# Patient Record
Sex: FEMALE | Race: WHITE | NOT HISPANIC OR LATINO
[De-identification: names, ages, dates, MRNs, and addresses within clinical notes are randomized per-mention and may not be internally consistent; named-entity substitution may affect disease eponyms.]

---

## 2019-02-13 ENCOUNTER — APPOINTMENT (OUTPATIENT)
Age: 34
End: 2019-02-13

## 2019-02-15 ENCOUNTER — ASOB RESULT (OUTPATIENT)
Age: 34
End: 2019-02-15

## 2019-02-15 ENCOUNTER — APPOINTMENT (OUTPATIENT)
Dept: ANTEPARTUM | Facility: CLINIC | Age: 34
End: 2019-02-15
Payer: MEDICAID

## 2019-02-15 PROCEDURE — 76817 TRANSVAGINAL US OBSTETRIC: CPT

## 2019-03-20 ENCOUNTER — APPOINTMENT (OUTPATIENT)
Age: 34
End: 2019-03-20

## 2019-03-20 ENCOUNTER — APPOINTMENT (OUTPATIENT)
Dept: MATERNAL FETAL MEDICINE | Facility: CLINIC | Age: 34
End: 2019-03-20

## 2019-03-22 ENCOUNTER — APPOINTMENT (OUTPATIENT)
Dept: ANTEPARTUM | Facility: CLINIC | Age: 34
End: 2019-03-22

## 2019-04-03 ENCOUNTER — ASOB RESULT (OUTPATIENT)
Age: 34
End: 2019-04-03

## 2019-04-03 ENCOUNTER — APPOINTMENT (OUTPATIENT)
Dept: ANTEPARTUM | Facility: CLINIC | Age: 34
End: 2019-04-03
Payer: MEDICAID

## 2019-04-03 ENCOUNTER — APPOINTMENT (OUTPATIENT)
Dept: MATERNAL FETAL MEDICINE | Facility: CLINIC | Age: 34
End: 2019-04-03
Payer: MEDICAID

## 2019-04-03 VITALS
HEIGHT: 63 IN | OXYGEN SATURATION: 98 % | BODY MASS INDEX: 19.76 KG/M2 | RESPIRATION RATE: 16 BRPM | DIASTOLIC BLOOD PRESSURE: 56 MMHG | SYSTOLIC BLOOD PRESSURE: 88 MMHG | HEART RATE: 88 BPM | WEIGHT: 111.5 LBS

## 2019-04-03 DIAGNOSIS — Z87.898 PERSONAL HISTORY OF OTHER SPECIFIED CONDITIONS: ICD-10-CM

## 2019-04-03 DIAGNOSIS — Z86.19 PERSONAL HISTORY OF OTHER INFECTIOUS AND PARASITIC DISEASES: ICD-10-CM

## 2019-04-03 DIAGNOSIS — Z87.51 PERSONAL HISTORY OF PRE-TERM LABOR: ICD-10-CM

## 2019-04-03 DIAGNOSIS — Z82.79 FAMILY HISTORY OF OTHER CONGENITAL MALFORMATIONS, DEFORMATIONS AND CHROMOSOMAL ABNORMALITIES: ICD-10-CM

## 2019-04-03 DIAGNOSIS — Z87.59 PERSONAL HISTORY OF OTHER COMPLICATIONS OF PREGNANCY, CHILDBIRTH AND THE PUERPERIUM: ICD-10-CM

## 2019-04-03 PROCEDURE — 76815 OB US LIMITED FETUS(S): CPT

## 2019-04-03 PROCEDURE — 99205 OFFICE O/P NEW HI 60 MIN: CPT

## 2019-04-03 NOTE — DISCUSSION/SUMMARY
[FreeTextEntry1] : Cori is aware of these risks. She will discuss with you at the next visit next week.\par \par She has an appointment with the hepatologist next month, and if she decides to continue the pregnancy, closer followup with them will be needed. \par \par She is scheduled to see us in 4 weeks if she decides to continue the pregnancy, however the risk of a poor maternal and/or fetal outcome is extremely high and termination of pregnancy should be considered.

## 2019-04-03 NOTE — HISTORY OF PRESENT ILLNESS
[FreeTextEntry1] : Cori has a poor OB history related to chronic liver disease. Her last pregnancy in 2010 was terminated due to the risks of pregnancy with her medical condition. \par \par She is currently 14 weeks pregnant, having conceived accidentally.\par \par Her prior pregnancies included a 28 week demise followed by a prolonged ICU stay for acute liver failure, a 16 week loss, an 8 week loss and the 6 week TOP for medical reasons. \par \par She presents to discuss the safety of this pregnancy

## 2019-04-03 NOTE — DATA REVIEWED
[FreeTextEntry1] : I reviewed her history with Cori and an . She is aware of the incredibly high risk of pregnancy with her medical condition. I undeerstand her desire to have a child, and although there is no guarantee either way, it is up to her to decide if the risks of the pregnancy are too great.\par \par I spoke with her hepatologist office in [facility] who confirms the presence of the cirrhosis and ascites. there is also a small lesion on the liver which may progress to hapatic cancer.  There are varices noted, and with the increased portal pressure in the second half of pregnancy these would significantly worsen.\par \par The very real liklihood is that there could be irreversible worsening of her liver status with any continuation of pregnancy, regardless of outcome.  Given her history of 2nd and 3rd trimester loss, there is a high liklihood of poor OB outcome in addition to maternal risks.

## 2019-04-23 ENCOUNTER — INPATIENT (INPATIENT)
Facility: HOSPITAL | Age: 34
LOS: 0 days | Discharge: HOME | DRG: 832 | End: 2019-04-24
Attending: OBSTETRICS & GYNECOLOGY | Admitting: OBSTETRICS & GYNECOLOGY
Payer: MEDICAID

## 2019-04-23 VITALS
TEMPERATURE: 97 F | OXYGEN SATURATION: 99 % | RESPIRATION RATE: 20 BRPM | HEIGHT: 61.02 IN | SYSTOLIC BLOOD PRESSURE: 100 MMHG | HEART RATE: 99 BPM | WEIGHT: 145.06 LBS | DIASTOLIC BLOOD PRESSURE: 68 MMHG

## 2019-04-23 LAB
ALBUMIN SERPL ELPH-MCNC: 3.7 G/DL — SIGNIFICANT CHANGE UP (ref 3.3–5.2)
ALP SERPL-CCNC: 100 U/L — SIGNIFICANT CHANGE UP (ref 40–120)
ALT FLD-CCNC: 11 U/L — SIGNIFICANT CHANGE UP
AMYLASE P1 CFR SERPL: 64 U/L — SIGNIFICANT CHANGE UP (ref 36–128)
ANION GAP SERPL CALC-SCNC: 12 MMOL/L — SIGNIFICANT CHANGE UP (ref 5–17)
APPEARANCE UR: CLEAR — SIGNIFICANT CHANGE UP
AST SERPL-CCNC: 20 U/L — SIGNIFICANT CHANGE UP
BACTERIA # UR AUTO: ABNORMAL
BASOPHILS # BLD AUTO: 0 K/UL — SIGNIFICANT CHANGE UP (ref 0–0.2)
BASOPHILS NFR BLD AUTO: 0.2 % — SIGNIFICANT CHANGE UP (ref 0–2)
BILIRUB SERPL-MCNC: 1.1 MG/DL — SIGNIFICANT CHANGE UP (ref 0.4–2)
BILIRUB UR-MCNC: NEGATIVE — SIGNIFICANT CHANGE UP
BUN SERPL-MCNC: 17 MG/DL — SIGNIFICANT CHANGE UP (ref 8–20)
CALCIUM SERPL-MCNC: 9.4 MG/DL — SIGNIFICANT CHANGE UP (ref 8.6–10.2)
CHLORIDE SERPL-SCNC: 103 MMOL/L — SIGNIFICANT CHANGE UP (ref 98–107)
CO2 SERPL-SCNC: 23 MMOL/L — SIGNIFICANT CHANGE UP (ref 22–29)
COLOR SPEC: YELLOW — SIGNIFICANT CHANGE UP
COMMENT - URINE: SIGNIFICANT CHANGE UP
COMMENT - URINE: SIGNIFICANT CHANGE UP
CREAT SERPL-MCNC: 0.35 MG/DL — LOW (ref 0.5–1.3)
DIFF PNL FLD: ABNORMAL
EOSINOPHIL # BLD AUTO: 0.1 K/UL — SIGNIFICANT CHANGE UP (ref 0–0.5)
EOSINOPHIL NFR BLD AUTO: 2.4 % — SIGNIFICANT CHANGE UP (ref 0–6)
EPI CELLS # UR: SIGNIFICANT CHANGE UP
GLUCOSE SERPL-MCNC: 95 MG/DL — SIGNIFICANT CHANGE UP (ref 70–115)
GLUCOSE UR QL: NEGATIVE MG/DL — SIGNIFICANT CHANGE UP
HCG UR QL: POSITIVE
HCT VFR BLD CALC: 34.4 % — LOW (ref 37–47)
HGB BLD-MCNC: 11.2 G/DL — LOW (ref 12–16)
HYALINE CASTS # UR AUTO: ABNORMAL /LPF
KETONES UR-MCNC: ABNORMAL
LEUKOCYTE ESTERASE UR-ACNC: ABNORMAL
LIDOCAIN IGE QN: 40 U/L — SIGNIFICANT CHANGE UP (ref 22–51)
LYMPHOCYTES # BLD AUTO: 1.1 K/UL — SIGNIFICANT CHANGE UP (ref 1–4.8)
LYMPHOCYTES # BLD AUTO: 21.5 % — SIGNIFICANT CHANGE UP (ref 20–55)
MCHC RBC-ENTMCNC: 29.5 PG — SIGNIFICANT CHANGE UP (ref 27–31)
MCHC RBC-ENTMCNC: 32.6 G/DL — SIGNIFICANT CHANGE UP (ref 32–36)
MCV RBC AUTO: 90.5 FL — SIGNIFICANT CHANGE UP (ref 81–99)
MONOCYTES # BLD AUTO: 0.4 K/UL — SIGNIFICANT CHANGE UP (ref 0–0.8)
MONOCYTES NFR BLD AUTO: 7 % — SIGNIFICANT CHANGE UP (ref 3–10)
NEUTROPHILS # BLD AUTO: 3.4 K/UL — SIGNIFICANT CHANGE UP (ref 1.8–8)
NEUTROPHILS NFR BLD AUTO: 67.9 % — SIGNIFICANT CHANGE UP (ref 37–73)
NITRITE UR-MCNC: NEGATIVE — SIGNIFICANT CHANGE UP
PH UR: 6 — SIGNIFICANT CHANGE UP (ref 5–8)
PLATELET # BLD AUTO: 125 K/UL — LOW (ref 150–400)
POTASSIUM SERPL-MCNC: 4 MMOL/L — SIGNIFICANT CHANGE UP (ref 3.5–5.3)
POTASSIUM SERPL-SCNC: 4 MMOL/L — SIGNIFICANT CHANGE UP (ref 3.5–5.3)
PROT SERPL-MCNC: 6.7 G/DL — SIGNIFICANT CHANGE UP (ref 6.6–8.7)
PROT UR-MCNC: 30 MG/DL
RBC # BLD: 3.8 M/UL — LOW (ref 4.4–5.2)
RBC # FLD: 14.2 % — SIGNIFICANT CHANGE UP (ref 11–15.6)
RBC CASTS # UR COMP ASSIST: ABNORMAL /HPF (ref 0–4)
SODIUM SERPL-SCNC: 138 MMOL/L — SIGNIFICANT CHANGE UP (ref 135–145)
SP GR SPEC: 1.03 — HIGH (ref 1.01–1.02)
UROBILINOGEN FLD QL: 8 MG/DL
WBC # BLD: 5 K/UL — SIGNIFICANT CHANGE UP (ref 4.8–10.8)
WBC # FLD AUTO: 5 K/UL — SIGNIFICANT CHANGE UP (ref 4.8–10.8)
WBC UR QL: SIGNIFICANT CHANGE UP

## 2019-04-23 PROCEDURE — 99285 EMERGENCY DEPT VISIT HI MDM: CPT

## 2019-04-23 RX ORDER — SODIUM CHLORIDE 9 MG/ML
500 INJECTION INTRAMUSCULAR; INTRAVENOUS; SUBCUTANEOUS ONCE
Qty: 0 | Refills: 0 | Status: COMPLETED | OUTPATIENT
Start: 2019-04-23 | End: 2019-04-23

## 2019-04-23 RX ADMIN — SODIUM CHLORIDE 500 MILLILITER(S): 9 INJECTION INTRAMUSCULAR; INTRAVENOUS; SUBCUTANEOUS at 23:13

## 2019-04-23 NOTE — ED PROVIDER NOTE - OBJECTIVE STATEMENT
34 y/o F with PMHx of cirrhosis (unsure what the cause is, denies EtOH use) presents to ED with multiple medical complaints. Is currently 18 weeks pregnant and is experiencing L sided abdominal pain and back pain. Associated brown spotting, but no significant bleeding. States she is also experiencing bilateral breast pain that she describes as "being stabbed by a knife". Was evaluated at the [facility], but was told to come to the ED for further evaluation. Denies fevers, chills, nausea, vomiting, diarrhea, dysuria, hematuria or difficulty breathing. NKDA. No further acute complaints at this time.

## 2019-04-23 NOTE — ED ADULT TRIAGE NOTE - CHIEF COMPLAINT QUOTE
c/o lower back pain and abd pain and b/l breast pain, c/o spotting brown, , approximately 18 wks pregnant, hx cirrhosis and HTN

## 2019-04-23 NOTE — ED PROVIDER NOTE - CLINICAL SUMMARY MEDICAL DECISION MAKING FREE TEXT BOX
18 weeks pregnant, spotting, no indication for pelvic exam, will check urine, does not appear jaundiced. Is also experiencing CELESTINA breast discomfort likely attributed to growing of breasts during pregnancy, no SOB or dyspnea.

## 2019-04-23 NOTE — ED PROVIDER NOTE - PROGRESS NOTE DETAILS
I performed the initial face to face bedside interview with this patient regarding history of present illness, review of symptoms and past medical, social and family history.  I completed an independent physical examination.  I was the initial provider who evaluated this patient.  The history, review of symptoms and examination was documented by the scribe in my presence and I attest to the accuracy of the documentation.  I have signed out the follow up of any pending tests (i.e. labs, radiological studies) to the PA.  I have discussed the patient’s plan of care and disposition with the PA. PA Note: PT evaluated by intake physician. HPI/PE/ROS as noted above. Will follow up plan per intake physician. PA Note: Ultrasound reading subchorionic/subamniotic hemorrhage, OBGYN resident called, will come see patient. PA Note: pt to be admitted per OBGYN team

## 2019-04-24 ENCOUNTER — TRANSCRIPTION ENCOUNTER (OUTPATIENT)
Age: 34
End: 2019-04-24

## 2019-04-24 VITALS
SYSTOLIC BLOOD PRESSURE: 93 MMHG | TEMPERATURE: 98 F | HEART RATE: 101 BPM | RESPIRATION RATE: 18 BRPM | DIASTOLIC BLOOD PRESSURE: 62 MMHG

## 2019-04-24 DIAGNOSIS — O41.8X90 OTHER SPECIFIED DISORDERS OF AMNIOTIC FLUID AND MEMBRANES, UNSPECIFIED TRIMESTER, NOT APPLICABLE OR UNSPECIFIED: ICD-10-CM

## 2019-04-24 LAB — HCG SERPL-ACNC: HIGH MIU/ML

## 2019-04-24 PROCEDURE — 76805 OB US >/= 14 WKS SNGL FETUS: CPT | Mod: 26

## 2019-04-24 NOTE — ED ADULT NURSE REASSESSMENT NOTE - NS ED NURSE REASSESS COMMENT FT1
Report given to 2 [facility] RN. Upon pt leaving ER noted to be hypotensive, see VS documentation. Pt with asymptomatic hypotension at this time, denies pain dizziness lightheadedness. OBGYN covering MD contacted. MD made aware of current blood pressure. As per MD pt to be started on LR bolus and transported up to [address] [place] [address] with most recent BP. Pt left ER safely in stretcher with transporter at this time. MD aware pt will be arriving on unit to reassess.
Assumed pt care 0240. VS as noted. Charting as noted. Pt lying in stretcher, no acute distress noted. Pt complains of LLQ, L Pelvic pain radiating to L lower back. Pt states pain is "tolerable" at this time at 5/10. Pt abdomen is rounded, nondistended, tender at LLQ, bowel sounds present throughout. Pt states she is still having brown colored vaginal discharge which she believes is "old blood." Pt awaiting bed at this time, pt educated on plan of care, able to successfully teach back plan of care to RN. RN will continue to update pt throughout ED stay.

## 2019-04-24 NOTE — DISCHARGE NOTE ANTEPARTUM - PLAN OF CARE
Outpatient monitoring Patient should transition to regular activity level. Resume regular diet. Patient should be on pelvic rest. Patient should follow up with MFM on 5/1 and her GI on 5/9. Patient should call her doctor sooner if she develops a fever or uncontrolled vaginal bleeding.

## 2019-04-24 NOTE — OB PROVIDER H&P - HISTORY OF PRESENT ILLNESS
32 yo  at 17.1 weeks LMP 2018 EMY 10/01/219 w/ PMH of liver cirrhosis presents with complaint of left lower abd pain, back pain and brown spotting. The symptoms started 3 days ago with her lower back pain. Patient was seen at [facility] clinic yesterday evening and sent to the ED for further evaluation. Patient denies any graciela vaginal bleeding, LOF or other abnormal vag discharge. 32 yo  at 17.1 weeks LMP 2018 EMY 10/01/219 w/ PMH of liver cirrhosis presents with complaint of left lower abd pain, back pain and brown spotting. The symptoms started 3 days ago with her lower back pain. Patient was seen at [facility] clinic yesterday evening and sent to the ED for further evaluation. Patient denies any graciela vaginal bleeding, LOF, other abnormal vag discharge or cramping pain. Currently her abd pain has reduced and patient is only complaining of mild low back pain.

## 2019-04-24 NOTE — OB PROVIDER H&P - ATTENDING COMMENTS
I have seen and examined this pt in the ER with Dr. Sahni. She will be admitted to [facility] service, with a GI consult.

## 2019-04-24 NOTE — OB PROVIDER H&P - NSHPLABSRESULTS_GEN_ALL_CORE
Labs  HCG Quantitative, Serum: 49236.0    CBC Full  -  ( 23 Apr 2019 23:22 )  WBC Count : 5.0 K/uL  RBC Count : 3.80 M/uL  Hemoglobin : 11.2 g/dL  Hematocrit : 34.4 %  Platelet Count - Automated : 125 K/uL  Mean Cell Volume : 90.5 fl  Mean Cell Hemoglobin : 29.5 pg  Mean Cell Hemoglobin Concentration : 32.6 g/dL  Auto Neutrophil # : 3.4 K/uL  Auto Lymphocyte # : 1.1 K/uL  Auto Monocyte # : 0.4 K/uL  Auto Eosinophil # : 0.1 K/uL  Auto Basophil # : 0.0 K/uL  Auto Neutrophil % : 67.9 %  Auto Lymphocyte % : 21.5 %  Auto Monocyte % : 7.0 %  Auto Eosinophil % : 2.4 %  Auto Basophil % : 0.2 %    04-23    138  |  103  |  17.0  ----------------------------<  95  4.0   |  23.0  |  0.35<L>    Ca    9.4      23 Apr 2019 23:22    TPro  6.7  /  Alb  3.7  /  TBili  1.1  /  DBili  x   /  AST  20  /  ALT  11  /  AlkPhos  100  04-23    Imaging  < from: US Echo OB >=14 Weeks, First Gestation (04.24.19 @ 00:07) >    Findings:     Uterus: Anterior placenta with the placental edge approximately 2.5 cm   away from the cervical os. Ringlike hypoechoic structure (9.6 x 2.5 cm,   transverse x AP) between the amnion and placenta, raising suspicion for   subchronic or possibly subamniotic hemorrhage.    Single intrauterine gestation with a variable presentation of the fetus.   Evaluation of fetal anatomy is limited on this examination. The   measurements are as follows:  BPD: 3.9 cm (17 weeks 6 days).  HC:  14.4 cm (17 weeks 5 days).  AC: 12.0 cm (17 weeks 5 days).   FL: 2.5 cm (17 weeks 3 days).  Fetal heart rate: 152 bpm.    Estimated fetal weight: 7 oz (+/-1 oz) or 201.3 g (+/-30.2 g).  Estimated gestational age by US: 17 weeks 5 days (+/-1 weeks 2 days).  Expected date of delivery by US: 9/26/2019.     Cervix: 3.2 cm in length. Closed.  Right ovary: Measures 2.7 x 2.2 x 2.1 A 1.4 x 1.2 x 1.1 cm corpus luteum   Flow present.   Left ovary: Not visualized, limiting evaluation.  Additional: No adnexal mass. No free fluid.    Impression:    Single live intrauterine gestation with estimated gestational age of 17   weeks 5 days as described. Moderate-sized suspect   subchorionic/subamniotic hemorrhage. Recommend close follow-up.     < end of copied text >

## 2019-04-24 NOTE — OB PROVIDER H&P - NS_OBGYNHISTORY_OBGYN_ALL_OB_FT
Unknown dates  1st preg:  delivery at 28 weeks, baby  30 min after delivery  2nd and 3rd preg: spontaneous abortions  4th preg: induced medical  after patient was convinced to terminate pregnancy by high risk physicians

## 2019-04-24 NOTE — CONSULT NOTE ADULT - ASSESSMENT
33y old  pregnant Female (; 17 weeks) with a past medical history significant for liver cirrhosis presents with complaint of left lower abd pain, back pain and brown spotting. She has a history of cirrhosis. She likely has a low MELD score although we need an INR to calculate. Possible esophageal varices although she was not on any nonselective beta blockers so if she has varices they are likely small.   - LFTs, Cr and INR daily  - please obtain EGD and imaging records from [facility] - to see if she has varices or ascites    Thanks

## 2019-04-24 NOTE — OB PROVIDER H&P - NSHPPHYSICALEXAM_GEN_ALL_CORE
Vitals  T(C): 36.2 (04-23-19 @ 21:50), Max: 36.2 (04-23-19 @ 21:50)  HR: 99 (04-23-19 @ 21:50) (99 - 99)  BP: 100/68 (04-23-19 @ 21:50) (100/68 - 100/68)  RR: 20 (04-23-19 @ 21:50) (20 - 20)  SpO2: 99% (04-23-19 @ 21:50) (99% - 99%)  Wt(kg): --    Physical Exam:   GENERAL: NAD, well-groomed, well-developed  GI: Soft, gravid, Nontender  SVE: cervix closed, scant brown discharge from vaginal canal, no graciela bright red blood  SSE: deferred

## 2019-04-24 NOTE — DISCHARGE NOTE ANTEPARTUM - PATIENT PORTAL LINK FT
You can access the [facility] Portal, offered by [facility], by registering with the following website: [email or web address]

## 2019-04-24 NOTE — DISCHARGE NOTE ANTEPARTUM - MEDICATION SUMMARY - MEDICATIONS TO TAKE
I will START or STAY ON the medications listed below when I get home from the hospital:    Prenatal 1 oral capsule  -- 1 tab(s) orally  -- Indication: For home med

## 2019-04-24 NOTE — DISCHARGE NOTE ANTEPARTUM - HOSPITAL COURSE
Patient admitted for subchorionic hematoma found on US and vaginal spotting. Vaginal spotting resolved. Consulted GI for history of cirrhosis they recommended following labs. Uncomplicated hospital course. Patient aware of plan to follow up with MFM on 5/1 and her GI on 5/9. Patient admitted for subchorionic hematoma found on US and vaginal spotting. Vaginal spotting resolved. Consulted GI for history of cirrhosis they recommended following labs. Uncomplicated hospital course. Patient seen by HERMELINDA and Dr. Johnson recommended MFM f/u outpatient with no need for inpatient consult. Patient aware of plan to follow up with MFM on 5/1 and her GI on 5/9.

## 2019-04-24 NOTE — DISCHARGE NOTE ANTEPARTUM - CARE PROVIDER_API CALL
Kobe Johnson)  MaternalFetal Medicine; Obstetrics and Gynecology  [address], [address]  [place] [address]  Phone: [phone]  Fax: [phone]  Follow Up Time:     Sherlyn Campos)  Obstetrics and Gynecology  [facility] [place] [address]  Phone: [phone]  Fax: [phone]  Follow Up Time:

## 2019-04-24 NOTE — ED ADULT NURSE NOTE - OBJECTIVE STATEMENT
Patient AOX4, reliable historian, complains of pelvic pain that radiates to the back. Patient reports brown blood noticed every month since she's been pregnant. Patient reports pain started a few days ago, but now has increased. She feels a stabbing pain to the left pelvis and under rib cage. Denies n/v, discharge, itching.

## 2019-04-24 NOTE — OB PROVIDER H&P - ASSESSMENT
34 yo  at 17.1 weeks w/ PMH of multiple abortions and liver cirrhosis presents with complaint of low abd/back pain and brown vaginal spotting, found to have moderate subchorionic hematoma.  Admit to Ob/Gyn service    High Risk Pregnancy in 2nd trimester  -HCk  -Ob sono: live IUP, 9.6x2.5cm subchorionic/subamniotic hematoma; .3g  -Cervix closed, 3.2cm  -Multiple previous abortions (2 spontaenous/1 medically induced)  -Patient hemodynamically stable  -Pending MFM consult in the AM    Hx of Liver Cirrhosis  -Unknown etiology  -Followed by hepatologist at [facility]  -GI consult pending    Preventative Measure  -VCD Boots for DVT prophylaxis

## 2019-04-24 NOTE — CONSULT NOTE ADULT - SUBJECTIVE AND OBJECTIVE BOX
[place]  used over the phone  HISTORY OF PRESENT ILLNESS:  This is a 33y old  pregnant Female (; 17 weeks) with a past medical history significant for liver cirrhosis presents with complaint of left lower abd pain, back pain and brown spotting. The symptoms started 3 days ago with her lower back pain. Patient was seen at high risk clinic yesterday evening and sent to the ED. She was admitted for observation.  She is currently only c/o mild back pain. She has a history of cryptogenic cirrhosis diagnosed in  around her first pregnancy. The etiology of her cirrhosis was not known. She had an EGD at that time was told she had "dilated veins" in her esophagus.  Since moving to the  she has been seeing Dr. Waleska Reinoso at [facility]. She shes her every 3-6 months for the last 2 years. She has had multiple liver MRIs at [facility] to screen for HCC. She is not on any meds for her cirrhosis and she is not on the transplant list. She had an EGD in [place] 2 years ago but she is not sure of the doctor's name or what the results were.     REVIEW OF SYSTEMS:  Constitutional:  No unintentional weight loss, fevers, chills or night sweats	  Eyes: No eye pain, redness, discharge, or proptosis  ENMT: No sore throat, ear pain, mouth sores, or swollen glands in the neck  Respiratory: No dyspnea, cough or wheezing  Cardiovascular: No chest pain, dyspnea on exertion, or orthopnea  Gastrointestinal:	Please see HPI  Genitourinary: No dysuria or hematuria  Neurological:	 No changes in sleep/wake cycle, convulsions, confusion, dizziness or lightheadedness  Psychiatric: No changes in personality or emotional problems   Hematology: No easy bruising   Endocrine: No hot or cold flashes or deepening of voice	  All other review of systems were completed and were otherwise negative save what is reported in the HPI.    PAST MEDICAL/SURGICAL HISTORY:  Cirrhosis  No significant past surgical history    SOCIAL HISTORY:  - TOBACCO: none  - ALCOHOL: none  - ILLICIT DRUG USE: Denies    FAMILY HISTORY:  No known history of gastrointestinal or liver disease;  No pertinent family history in first degree relatives      HOME MEDICATIONS:  Prenatal 1 oral capsule: 1 tab(s) orally (2019 02:56)    INPATIENT MEDICATIONS:  MEDICATIONS  (STANDING):    MEDICATIONS  (PRN):    ALLERGIES:  No Known Allergies    VITAL SIGNS LAST 24 HOURS:  T(C): 36.4 (2019 05:53), Max: 36.4 (2019 05:53)  T(F): 97.6 (2019 05:53), Max: 97.6 (2019 05:53)  HR: 95 (2019 05:53) (89 - 99)  BP: 92/60 (2019 05:53) (88/68 - 100/68)  BP(mean): --  RR: 18 (2019 05:53) (18 - 20)  SpO2: 100% (2019 05:53) (97% - 100%)      PHYSICAL EXAM:  Constitutional: Well-developed, well-nourished, in no apparent distress  Eyes: Sclerae anicteric, conjunctivae normal  ENMT: Mucus membranes moist, no oropharyngeal thrush noted  Neck: No thyroid nodules appreciated, no significant cervical or supraclavicular lymphadenopathy  Respiratory: Breathing nonlabored; clear to auscultation  Cardiovascular: Regular rate and rhythm  Gastrointestinal: Soft, nontender, nondistended, normoactive bowel sounds; no hepatosplenomegaly appreciated; no rebound tenderness or involuntary guarding  Extremities: No clubbing, cyanosis or edema  Neurological: Alert and oriented to person, place and time; no asterixis  Skin: No jaundice  Lymph Nodes: No significant lymphadenopathy  Musculoskeletal: No significant peripheral atrophy  Psychiatric: Affect and mood appropriate      LABS:                        11.2   5.0   )-----------( 125      ( 2019 23:22 )             34.4           138  |  103  |  17.0  ----------------------------<  95  4.0   |  23.0  |  0.35<L>    Ca    9.4      2019 23:22    TPro  6.7  /  Alb  3.7  /  TBili  1.1  /  DBili  x   /  AST  20  /  ALT  11  /  AlkPhos  100  04-    LIVER FUNCTIONS - ( 2019 23:22 )  Alb: 3.7 g/dL / Pro: 6.7 g/dL / ALK PHOS: 100 U/L / ALT: 11 U/L / AST: 20 U/L / GGT: x           Urinalysis Basic - ( 2019 22:40 )    Color: Yellow / Appearance: Clear / S.030 / pH: x  Gluc: x / Ketone: Trace  / Bili: Negative / Urobili: 8 mg/dL   Blood: x / Protein: 30 mg/dL / Nitrite: Negative   Leuk Esterase: Trace / RBC: 3-5 /HPF / WBC 0-2   Sq Epi: x / Non Sq Epi: Occasional / Bacteria: Few          IMAGING:  < from: US Echo OB >=14 Weeks, First Gestation (19 @ 00:07) >    Impression:    Single live intrauterine gestation with estimated gestational age of 17   weeks 5 days as described. Moderate-sized suspect   subchorionic/subamniotic hemorrhage. Recommend close follow-up.     < end of copied text >

## 2019-04-24 NOTE — ED ADULT NURSE NOTE - NSIMPLEMENTINTERV_GEN_ALL_ED
Implemented All Universal Safety Interventions:  [place] to call system. Call bell, personal items and telephone within reach. Instruct patient to call for assistance. Room bathroom lighting operational. Non-slip footwear when patient is off stretcher. Physically safe environment: no spills, clutter or unnecessary equipment. Stretcher in lowest position, wheels locked, appropriate side rails in place.

## 2019-05-01 ENCOUNTER — APPOINTMENT (OUTPATIENT)
Dept: MATERNAL FETAL MEDICINE | Facility: CLINIC | Age: 34
End: 2019-05-01
Payer: MEDICAID

## 2019-05-01 ENCOUNTER — APPOINTMENT (OUTPATIENT)
Dept: ANTEPARTUM | Facility: CLINIC | Age: 34
End: 2019-05-01
Payer: MEDICAID

## 2019-05-01 ENCOUNTER — ASOB RESULT (OUTPATIENT)
Age: 34
End: 2019-05-01

## 2019-05-01 VITALS
RESPIRATION RATE: 16 BRPM | WEIGHT: 113.06 LBS | OXYGEN SATURATION: 98 % | HEART RATE: 96 BPM | DIASTOLIC BLOOD PRESSURE: 60 MMHG | SYSTOLIC BLOOD PRESSURE: 90 MMHG | HEIGHT: 63 IN | BODY MASS INDEX: 20.03 KG/M2

## 2019-05-01 PROCEDURE — 99213 OFFICE O/P EST LOW 20 MIN: CPT

## 2019-05-01 PROCEDURE — 76816 OB US FOLLOW-UP PER FETUS: CPT

## 2019-05-01 NOTE — DISCUSSION/SUMMARY
[FreeTextEntry1] : The current plan is to wait for the May 9 visit.  If she decides to continue the pregnancy, she was given an appointment for followup in 2-3 weeks for Level 2 sonogram and reconsultation.

## 2019-05-08 ENCOUNTER — NON-APPOINTMENT (OUTPATIENT)
Age: 34
End: 2019-05-08

## 2019-05-09 ENCOUNTER — NON-APPOINTMENT (OUTPATIENT)
Age: 34
End: 2019-05-09

## 2019-05-09 ENCOUNTER — LABORATORY RESULT (OUTPATIENT)
Age: 34
End: 2019-05-09

## 2019-05-10 ENCOUNTER — NON-APPOINTMENT (OUTPATIENT)
Age: 34
End: 2019-05-10

## 2019-05-10 ENCOUNTER — ASOB RESULT (OUTPATIENT)
Age: 34
End: 2019-05-10

## 2019-05-10 ENCOUNTER — APPOINTMENT (OUTPATIENT)
Dept: MATERNAL FETAL MEDICINE | Facility: CLINIC | Age: 34
End: 2019-05-10
Payer: MEDICAID

## 2019-05-10 ENCOUNTER — APPOINTMENT (OUTPATIENT)
Dept: ANTEPARTUM | Facility: CLINIC | Age: 34
End: 2019-05-10
Payer: MEDICAID

## 2019-05-10 ENCOUNTER — OUTPATIENT (OUTPATIENT)
Dept: OUTPATIENT SERVICES | Facility: HOSPITAL | Age: 34
LOS: 1 days | End: 2019-05-10
Payer: MEDICAID

## 2019-05-10 VITALS
BODY MASS INDEX: 20.2 KG/M2 | SYSTOLIC BLOOD PRESSURE: 88 MMHG | DIASTOLIC BLOOD PRESSURE: 60 MMHG | HEIGHT: 63 IN | WEIGHT: 114 LBS

## 2019-05-10 DIAGNOSIS — O09.899 SUPERVISION OF OTHER HIGH RISK PREGNANCIES, UNSPECIFIED TRIMESTER: ICD-10-CM

## 2019-05-10 LAB
BILIRUB UR QL STRIP: NORMAL
CLARITY UR: CLEAR
COLLECTION METHOD: NORMAL
GLUCOSE UR-MCNC: NORMAL
HCG UR QL: 2 EU/DL
HGB UR QL STRIP.AUTO: NORMAL
KETONES UR-MCNC: NORMAL
LEUKOCYTE ESTERASE UR QL STRIP: NORMAL
NITRITE UR QL STRIP: NORMAL
PH UR STRIP: 5.5
PROT UR STRIP-MCNC: NORMAL
SP GR UR STRIP: 1.02

## 2019-05-10 PROCEDURE — 80053 COMPREHEN METABOLIC PANEL: CPT

## 2019-05-10 PROCEDURE — 36415 COLL VENOUS BLD VENIPUNCTURE: CPT

## 2019-05-10 PROCEDURE — 85610 PROTHROMBIN TIME: CPT

## 2019-05-10 PROCEDURE — 86765 RUBEOLA ANTIBODY: CPT

## 2019-05-10 PROCEDURE — 85384 FIBRINOGEN ACTIVITY: CPT

## 2019-05-10 PROCEDURE — 82570 ASSAY OF URINE CREATININE: CPT

## 2019-05-10 PROCEDURE — 87389 HIV-1 AG W/HIV-1&-2 AB AG IA: CPT

## 2019-05-10 PROCEDURE — 82239 BILE ACIDS TOTAL: CPT

## 2019-05-10 PROCEDURE — 84156 ASSAY OF PROTEIN URINE: CPT

## 2019-05-10 PROCEDURE — 86900 BLOOD TYPING SEROLOGIC ABO: CPT

## 2019-05-10 PROCEDURE — 80074 ACUTE HEPATITIS PANEL: CPT

## 2019-05-10 PROCEDURE — 85730 THROMBOPLASTIN TIME PARTIAL: CPT

## 2019-05-10 PROCEDURE — 86850 RBC ANTIBODY SCREEN: CPT

## 2019-05-10 PROCEDURE — 85027 COMPLETE CBC AUTOMATED: CPT

## 2019-05-10 PROCEDURE — 81003 URINALYSIS AUTO W/O SCOPE: CPT | Mod: NC,QW

## 2019-05-10 PROCEDURE — 76815 OB US LIMITED FETUS(S): CPT | Mod: 26

## 2019-05-10 PROCEDURE — 76817 TRANSVAGINAL US OBSTETRIC: CPT

## 2019-05-10 PROCEDURE — 87086 URINE CULTURE/COLONY COUNT: CPT

## 2019-05-10 PROCEDURE — G0463: CPT

## 2019-05-10 PROCEDURE — 81003 URINALYSIS AUTO W/O SCOPE: CPT

## 2019-05-10 PROCEDURE — 99213 OFFICE O/P EST LOW 20 MIN: CPT | Mod: GE

## 2019-05-10 PROCEDURE — 76815 OB US LIMITED FETUS(S): CPT

## 2019-05-10 PROCEDURE — 76817 TRANSVAGINAL US OBSTETRIC: CPT | Mod: 26

## 2019-05-11 ENCOUNTER — LABORATORY RESULT (OUTPATIENT)
Age: 34
End: 2019-05-11

## 2019-05-11 LAB
ALBUMIN SERPL ELPH-MCNC: 4 G/DL — SIGNIFICANT CHANGE UP (ref 3.3–5)
ALP SERPL-CCNC: 112 U/L — SIGNIFICANT CHANGE UP (ref 40–120)
ALT FLD-CCNC: 14 U/L — SIGNIFICANT CHANGE UP (ref 10–45)
ANION GAP SERPL CALC-SCNC: 12 MMOL/L — SIGNIFICANT CHANGE UP (ref 5–17)
APTT BLD: 40.7 SEC — HIGH (ref 27.5–36.3)
AST SERPL-CCNC: 20 U/L — SIGNIFICANT CHANGE UP (ref 10–40)
BILIRUB SERPL-MCNC: 1.4 MG/DL — HIGH (ref 0.2–1.2)
BUN SERPL-MCNC: 11 MG/DL — SIGNIFICANT CHANGE UP (ref 7–23)
CALCIUM SERPL-MCNC: 9.4 MG/DL — SIGNIFICANT CHANGE UP (ref 8.4–10.5)
CHLORIDE SERPL-SCNC: 104 MMOL/L — SIGNIFICANT CHANGE UP (ref 96–108)
CO2 SERPL-SCNC: 23 MMOL/L — SIGNIFICANT CHANGE UP (ref 22–31)
CREAT ?TM UR-MCNC: 93 MG/DL — SIGNIFICANT CHANGE UP
CREAT SERPL-MCNC: 0.45 MG/DL — LOW (ref 0.5–1.3)
FIBRINOGEN PPP-MCNC: 533 MG/DL — HIGH (ref 320–500)
GLUCOSE SERPL-MCNC: 79 MG/DL — SIGNIFICANT CHANGE UP (ref 70–99)
HAV IGM SER-ACNC: SIGNIFICANT CHANGE UP
HBV CORE IGM SER-ACNC: REACTIVE
HBV SURFACE AG SER-ACNC: SIGNIFICANT CHANGE UP
HCT VFR BLD CALC: 38.9 % — SIGNIFICANT CHANGE UP (ref 34.5–45)
HGB BLD-MCNC: 11.8 G/DL — SIGNIFICANT CHANGE UP (ref 11.5–15.5)
HIV 1+2 AB+HIV1 P24 AG SERPL QL IA: SIGNIFICANT CHANGE UP
INR BLD: 1 RATIO — SIGNIFICANT CHANGE UP (ref 0.88–1.16)
MCHC RBC-ENTMCNC: 29.6 PG — SIGNIFICANT CHANGE UP (ref 27–34)
MCHC RBC-ENTMCNC: 30.3 GM/DL — LOW (ref 32–36)
MCV RBC AUTO: 97.7 FL — SIGNIFICANT CHANGE UP (ref 80–100)
MEV IGG SER-ACNC: >300 AU/ML — SIGNIFICANT CHANGE UP
MEV IGG+IGM SER-IMP: POSITIVE — SIGNIFICANT CHANGE UP
PLATELET # BLD AUTO: 129 K/UL — LOW (ref 150–400)
POTASSIUM SERPL-MCNC: 4.4 MMOL/L — SIGNIFICANT CHANGE UP (ref 3.5–5.3)
POTASSIUM SERPL-SCNC: 4.4 MMOL/L — SIGNIFICANT CHANGE UP (ref 3.5–5.3)
PROT ?TM UR-MCNC: 18 MG/DL — HIGH (ref 0–12)
PROT SERPL-MCNC: 7 G/DL — SIGNIFICANT CHANGE UP (ref 6–8.3)
PROT/CREAT UR-RTO: 0.2 RATIO — SIGNIFICANT CHANGE UP (ref 0–0.2)
PROTHROM AB SERPL-ACNC: 11.5 SEC — SIGNIFICANT CHANGE UP (ref 10–13.1)
RBC # BLD: 3.98 M/UL — SIGNIFICANT CHANGE UP (ref 3.8–5.2)
RBC # FLD: 14.5 % — SIGNIFICANT CHANGE UP (ref 10.3–14.5)
SODIUM SERPL-SCNC: 139 MMOL/L — SIGNIFICANT CHANGE UP (ref 135–145)
WBC # BLD: 5.57 K/UL — SIGNIFICANT CHANGE UP (ref 3.8–10.5)
WBC # FLD AUTO: 5.57 K/UL — SIGNIFICANT CHANGE UP (ref 3.8–10.5)

## 2019-05-12 LAB
CULTURE RESULTS: SIGNIFICANT CHANGE UP
SPECIMEN SOURCE: SIGNIFICANT CHANGE UP

## 2019-05-14 LAB — BILE AC FLD-MCNC: 15.7 UMOL/L — SIGNIFICANT CHANGE UP (ref 4.7–24.5)

## 2019-05-15 ENCOUNTER — APPOINTMENT (OUTPATIENT)
Dept: HEPATOLOGY | Facility: CLINIC | Age: 34
End: 2019-05-15
Payer: MEDICAID

## 2019-05-15 VITALS
RESPIRATION RATE: 16 BRPM | HEART RATE: 89 BPM | SYSTOLIC BLOOD PRESSURE: 100 MMHG | TEMPERATURE: 97.3 F | HEIGHT: 63 IN | DIASTOLIC BLOOD PRESSURE: 69 MMHG

## 2019-05-15 PROCEDURE — 99203 OFFICE O/P NEW LOW 30 MIN: CPT

## 2019-05-16 LAB
CERULOPLASMIN SERPL-MCNC: 52 MG/DL
HBV CORE IGG+IGM SER QL: REACTIVE
HBV SURFACE AB SER QL: NONREACTIVE
HCV AB SER QL: NONREACTIVE
HCV S/CO RATIO: 0.21 S/CO
MITOCHONDRIA AB SER IF-ACNC: NORMAL
SMOOTH MUSCLE AB SER QL IF: NORMAL

## 2019-05-16 RX ORDER — LABETALOL HYDROCHLORIDE 100 MG/1
100 TABLET, FILM COATED ORAL
Qty: 60 | Refills: 3 | Status: DISCONTINUED | COMMUNITY
Start: 2019-05-16 | End: 2019-05-16

## 2019-05-17 LAB
HCV AB S/CO SERPL IA: 0.22 S/CO — SIGNIFICANT CHANGE UP (ref 0–0.99)
HCV AB SERPL-IMP: SIGNIFICANT CHANGE UP

## 2019-05-18 DIAGNOSIS — Z3A.14 14 WEEKS GESTATION OF PREGNANCY: ICD-10-CM

## 2019-05-18 DIAGNOSIS — I85.00 ESOPHAGEAL VARICES WITHOUT BLEEDING: ICD-10-CM

## 2019-05-18 DIAGNOSIS — O09.90 SUPERVISION OF HIGH RISK PREGNANCY, UNSPECIFIED, UNSPECIFIED TRIMESTER: ICD-10-CM

## 2019-05-20 ENCOUNTER — OUTPATIENT (OUTPATIENT)
Dept: OUTPATIENT SERVICES | Facility: HOSPITAL | Age: 34
LOS: 1 days | End: 2019-05-20
Payer: MEDICAID

## 2019-05-20 ENCOUNTER — APPOINTMENT (OUTPATIENT)
Dept: CV DIAGNOSITCS | Facility: HOSPITAL | Age: 34
End: 2019-05-20

## 2019-05-20 DIAGNOSIS — Z3A.19 19 WEEKS GESTATION OF PREGNANCY: ICD-10-CM

## 2019-05-20 DIAGNOSIS — Z3A.14 14 WEEKS GESTATION OF PREGNANCY: ICD-10-CM

## 2019-05-20 DIAGNOSIS — I85.00 ESOPHAGEAL VARICES WITHOUT BLEEDING: ICD-10-CM

## 2019-05-20 DIAGNOSIS — O09.90 SUPERVISION OF HIGH RISK PREGNANCY, UNSPECIFIED, UNSPECIFIED TRIMESTER: ICD-10-CM

## 2019-05-20 PROCEDURE — 93306 TTE W/DOPPLER COMPLETE: CPT

## 2019-05-20 PROCEDURE — 93306 TTE W/DOPPLER COMPLETE: CPT | Mod: 26

## 2019-05-22 ENCOUNTER — APPOINTMENT (OUTPATIENT)
Dept: ANTEPARTUM | Facility: CLINIC | Age: 34
End: 2019-05-22

## 2019-05-22 ENCOUNTER — APPOINTMENT (OUTPATIENT)
Dept: HEPATOLOGY | Facility: CLINIC | Age: 34
End: 2019-05-22
Payer: MEDICAID

## 2019-05-22 ENCOUNTER — APPOINTMENT (OUTPATIENT)
Dept: MATERNAL FETAL MEDICINE | Facility: CLINIC | Age: 34
End: 2019-05-22

## 2019-05-22 VITALS
DIASTOLIC BLOOD PRESSURE: 65 MMHG | HEART RATE: 93 BPM | SYSTOLIC BLOOD PRESSURE: 98 MMHG | BODY MASS INDEX: 20.2 KG/M2 | WEIGHT: 114 LBS | RESPIRATION RATE: 16 BRPM | TEMPERATURE: 98.2 F | HEIGHT: 63 IN

## 2019-05-22 PROCEDURE — 99214 OFFICE O/P EST MOD 30 MIN: CPT

## 2019-05-22 NOTE — PHYSICAL EXAM
[General Appearance - Alert] : alert [General Appearance - In No Acute Distress] : in no acute distress [Extraocular Movements] : extraocular movements were intact [Sclera] : the sclera and conjunctiva were normal [PERRL With Normal Accommodation] : pupils were equal in size, round, and reactive to light [General Appearance - Well-Appearing] : healthy appearing [Outer Ear] : the ears and nose were normal in appearance [Neck Appearance] : the appearance of the neck was normal [Thyroid Nodule] : there were no palpable thyroid nodules [Jugular Venous Distention Increased] : there was no jugular-venous distention [Neck Cervical Mass (___cm)] : no neck mass was observed [Auscultation Breath Sounds / Voice Sounds] : lungs were clear to auscultation bilaterally [Respiration, Rhythm And Depth] : normal respiratory rhythm and effort [Heart Rate And Rhythm] : heart rate was normal and rhythm regular [Heart Sounds] : normal S1 and S2 [Murmurs] : no murmurs [Bowel Sounds] : normal bowel sounds [Heart Sounds Pericardial Friction Rub] : no pericardial rub [Abdomen Soft] : soft [] : no hepato-splenomegaly [Abdomen Tenderness] : non-tender [Abdomen Hernia] : no hernia was discovered [Abdomen Mass (___ Cm)] : no abdominal mass palpated [Skin Color & Pigmentation] : normal skin color and pigmentation [Cranial Nerves] : cranial nerves 2-12 were intact [Sensation] : the sensory exam was normal to light touch and pinprick [Motor Exam] : the motor exam was normal [Oriented To Time, Place, And Person] : oriented to person, place, and time [Scleral Icterus] : No Scleral Icterus [Hepatojugular Reflux] : patient did not have a sustained hepatojugular reflux [Asterixis] : no asterixis observed [Jaundice] : No jaundice [Depression] : no depression [FreeTextEntry1] : Gravid uterus was palpable appropriate her known pregnancy

## 2019-05-22 NOTE — PHYSICAL EXAM
[General Appearance - Alert] : alert [General Appearance - In No Acute Distress] : in no acute distress [Sclera] : the sclera and conjunctiva were normal [Outer Ear] : the ears and nose were normal in appearance [Neck Appearance] : the appearance of the neck was normal [Heart Rate And Rhythm] : heart rate was normal and rhythm regular [Heart Sounds] : normal S1 and S2 [Murmurs] : no murmurs [Heart Sounds Pericardial Friction Rub] : no pericardial rub [Bowel Sounds] : normal bowel sounds [Abdomen Soft] : soft [Abdomen Tenderness] : non-tender [] : no hepato-splenomegaly [Abdomen Hernia] : no hernia was discovered [No CVA Tenderness] : no ~M costovertebral angle tenderness [Abnormal Walk] : normal gait [Cranial Nerves] : cranial nerves 2-12 were intact [Sensation] : the sensory exam was normal to light touch and pinprick [Motor Exam] : the motor exam was normal [Oriented To Time, Place, And Person] : oriented to person, place, and time [Affect] : the affect was normal [Scleral Icterus] : No Scleral Icterus [Spider Angioma] : No spider angioma(s) were observed [Abdominal  Ascites] : no ascites [FreeTextEntry1] : Enlarged gravid uterus

## 2019-05-22 NOTE — HISTORY OF PRESENT ILLNESS
[FreeTextEntry1] : Mr. Cori Tee is a 33 years old young female from [place] with hx of cryptogenic cirrhosis with 24 weeks of pregnancy being seen at the hepatology clinic in consultation with Dr. Albrecht.\par \par Patient is Peruvian speaking presented to the clinic along with her mother. We used  (phone) for conversation. Patient was also able make some conversation in English ( but not enough for a meaningful conversation).\par \par She reportedly has been diagnosed with cirrhosis of the liver since about . She presented with 4 weeks of pregnancy at [facility], and was diagnosed with cirrhosis of the liver, and was advised to terminate pregnancy. She had two prior spontaneous , and one pre-term delivery. Her pregnancy history is as follows:\par \par #1 (): Vaginal delivery girl, at 28 weeks GA . Delivery occurred at [facility]. Pregnancy #1 Comments: ( delivery, baby  after 30 minutes. Pt remained in hospital due to enlarged liver). \par #2 (): at 16 weeks GA . Delivery occurred at [facility]. Pregnancy #2 Comments: (spontaneous ab, D&C). \par #3 (): at 8 weeks GA . Delivery occurred at [facility]. Pregnancy #3 Comments: (Spontaneous ab, D&C). \par  #4 (): at 4 weeks GA . Delivery occurred at [facility]. Pregnancy #4 Comments: (Induced ab- Pt was diagnosed with Cirrhosis, pt    was advised to terminate pregnancy). \par \par She is unmarried, and has a boyfriend, and lives with her family. Her current pregnency is unplanned, but she would like to keep this pregnency. She has been seeing a hepatologist at [facility], Dr. Davida Thompson, and she is scheduled for a EGD tomorrow. Her apst EGD from 2017 showed Grade A  esophgeal varices. Based on the picture it seems like was a short colum  (not commented on the EGD report).\par \par She denies any prior history of gastrointestinal bleeding, symptoms of distal hepatic encephalopathy or ascites. She reports generalized mild pruritus, and has not been on any specific medication to control her itching. She has been using some emolients  or oil for symptoms relief. She is going to discuss this issue with her OB/GYN.\par  \par Based on available records I am unclear as to the etiology of her cirrhosis. I have discussed with Jay Sharpe's NP, Mireya at [facility]. Per my conversation, she has no clear etiology. She seems not immune for hepatitis B per the NP, and they have no record of immunization. She was initailly seen for a suspecious lesion in her liver, and has been followed up with a MRI in January (the report was not immediately avilable for my review). Per my discussuion with Mireya ( NP), this a Li-RAD 3 lesion, and has become smaller over time. I have requested to fax the actual report to me.\par \par I reviewed her available labs. Liver function test from 2019 revealed total bilirubin 1.3 mg/dL, AST 33, ALT 34 alkaline phosphatase 123. Her total protein was 7 g/dL. GGTP was 74. Hepatitis B surface antigen was negative. Her blood type is B+. Screening test for HIV antibody was negative.\par Follow labs from May 10, 2019 revealed AST 30, ALT 14, alkaline phosphatase 112, total bilirubin 1.4 mg/dL. Her total protein was 7 and albumin was 4. Serum creatinine was 0.45 mg/dL acute hepatitis panel including hepatitis B surface antigen, hepatitis A IgM antibody were again negative. Hepatitis B core IgM antibody was positive, which I suspect is false-positive.\par Serum Bile Acid level is 15.7

## 2019-05-22 NOTE — REASON FOR VISIT
[Initial Evaluation] : an initial evaluation [Family Member] : family member [FreeTextEntry1] : pregnancy in the setting of cirrhosis of the liver

## 2019-05-22 NOTE — HISTORY OF PRESENT ILLNESS
[FreeTextEntry1] : Mr. Cori Tee is a 33 years old young female from [place] with hx of cryptogenic cirrhosis with 25 weeks of pregnancy is here for follow up.\par \par She reportedly has been diagnosed with cirrhosis of the liver since about . She presented with 4 weeks of pregnancy at [facility], and was diagnosed with cirrhosis of the liver, and was advised to terminate pregnancy. She had two prior spontaneous , and one pre-term delivery. Her pregnancy history is as follows:\par \par #1 (): Vaginal delivery girl, at 28 weeks GA . Delivery occurred at [facility]. Pregnancy #1 Comments: ( delivery, baby  after 30 minutes. Pt remained in hospital due to enlarged liver). \par #2 (): at 16 weeks GA . Delivery occurred at [facility]. Pregnancy #2 Comments: (spontaneous ab, D&C). \par #3 (): at 8 weeks GA . Delivery occurred at [facility]. Pregnancy #3 Comments: (Spontaneous ab, D&C). \par  #4 (): at 4 weeks GA . Delivery occurred at [facility]. Pregnancy #4 Comments: (Induced ab- Pt was diagnosed with Cirrhosis, pt was advised to terminate pregnancy). \par \par She is unmarried, and has a boyfriend, and lives with her family. Her current pregnancy is unplanned, but she would like to keep this pregnancy. She has been seeing a hepatologist at [facility], Dr. Davida Thompson, and she is scheduled for a EGD tomorrow. Her past EGD from 2017 showed Grade A esophageal varices. Based on the picture it seems like was a short column (not commented on the EGD report).\par \par She denies any prior history of gastrointestinal bleeding, symptoms of distal hepatic encephalopathy or ascites. She reports generalized mild pruritus, and has not been on any specific medication to control her itching. She has been using some emollients or oil for symptoms relief. She is going to discuss this issue with her OB/GYN.\par  \par She was initially seen for a suspicious lesion in her liver with Dr. Thompson, and has been followed up with a MRI in January (the report was not immediately available for my review). Per my discussion with Mireya (BEENA), this a Li-RAD 3 lesion, and has become smaller over time. I have requested to fax the actual report to me.\par \par Liver function test from 2019 revealed total bilirubin 1.3 mg/dL, AST 33, ALT 34 alkaline phosphatase 123. Her total protein was 7 g/dL. GGTP was 74. Hepatitis B surface antigen was negative. Her blood type is B+. Screening test for HIV antibody was negative.\par Follow labs from May 10, 2019 revealed AST 30, ALT 14, alkaline phosphatase 112, total bilirubin 1.4 mg/dL. Her total protein was 7 and albumin was 4. Serum creatinine was 0.45 mg/dL acute hepatitis panel including hepatitis B surface antigen, hepatitis A IgM antibody were again negative. Hepatitis B core IgM antibody was positive, which I suspect is false-positive.\par Serum Bile Acid level is 15.7 \par Interval history: \par She overall feels well other than feeling mild acid reflux, and some tight ness in her abdomen.\par \par Since last seen patient had an EGD by Dr. Thompson. This showed small esophageal varices. Considering her low BP, and small varices, we decided to defer beta blocker for now. \par Follow up labs revealed: Hepatitis C antibody was negative, hepatitis B or antibody total was reactive, hepatitis B surface antibody was nonreactive. Hepatitis B virus DNA PCR was undetectable. Ddbx-8-[id number] level was 246 mg/dl. Autoimmune  markers were negative for antimitochondrial antibody, anti-smooth muscle antibody, anti-nuclear antibody,\par Ceruloplasmin level 52 mg/dL.\par \par MRI report that was performed on 2019 was reviewed. This revealed 0.6 x 0.5 cm lesion in the segmemt 4B ( previously 0.8 x 0,7 cm), this did not meet the criteria by OPTN/UNOS for HCC, LI-RADS 3 for observation.  No new liver lesson was noted.\par

## 2019-05-23 ENCOUNTER — OTHER (OUTPATIENT)
Age: 34
End: 2019-05-23

## 2019-05-23 LAB
A1AT PHENOTYP SERPL-IMP: NORMAL BANDS
A1AT SERPL-MCNC: 246 MG/DL
ANA SER IF-ACNC: NEGATIVE
HBV DNA # SERPL NAA+PROBE: NOT DETECTED IU/ML
HEPB DNA PCR LOG: NOT DETECTED LOGIU/ML
MISCELLANEOUS TEST: NORMAL
PROC NAME: NORMAL

## 2019-05-28 ENCOUNTER — OUTPATIENT (OUTPATIENT)
Dept: OUTPATIENT SERVICES | Facility: HOSPITAL | Age: 34
LOS: 1 days | End: 2019-05-28
Payer: MEDICAID

## 2019-05-28 ENCOUNTER — APPOINTMENT (OUTPATIENT)
Dept: GASTROENTEROLOGY | Facility: HOSPITAL | Age: 34
End: 2019-05-28
Payer: MEDICAID

## 2019-05-28 VITALS
HEIGHT: 63 IN | WEIGHT: 117 LBS | SYSTOLIC BLOOD PRESSURE: 100 MMHG | DIASTOLIC BLOOD PRESSURE: 70 MMHG | BODY MASS INDEX: 20.73 KG/M2 | HEART RATE: 93 BPM

## 2019-05-28 DIAGNOSIS — K31.9 DISEASE OF STOMACH AND DUODENUM, UNSPECIFIED: ICD-10-CM

## 2019-05-28 PROCEDURE — G0463: CPT

## 2019-05-28 PROCEDURE — 99213 OFFICE O/P EST LOW 20 MIN: CPT | Mod: GC

## 2019-05-28 NOTE — PHYSICAL EXAM
[General Appearance - Alert] : alert [Sclera] : the sclera and conjunctiva were normal [General Appearance - In No Acute Distress] : in no acute distress [PERRL With Normal Accommodation] : pupils were equal in size, round, and reactive to light [Oropharynx] : the oropharynx was normal [Extraocular Movements] : extraocular movements were intact [Outer Ear] : the ears and nose were normal in appearance [Jugular Venous Distention Increased] : there was no jugular-venous distention [Neck Appearance] : the appearance of the neck was normal [Neck Cervical Mass (___cm)] : no neck mass was observed [Thyroid Diffuse Enlargement] : the thyroid was not enlarged [Thyroid Nodule] : there were no palpable thyroid nodules [Bowel Sounds] : normal bowel sounds [Auscultation Breath Sounds / Voice Sounds] : lungs were clear to auscultation bilaterally [Abdomen Soft] : soft [] : no hepato-splenomegaly [Abdomen Tenderness] : non-tender [Cervical Lymph Nodes Enlarged Posterior Bilaterally] : posterior cervical [Abdomen Mass (___ Cm)] : no abdominal mass palpated [Supraclavicular Lymph Nodes Enlarged Bilaterally] : supraclavicular [Cervical Lymph Nodes Enlarged Anterior Bilaterally] : anterior cervical [Axillary Lymph Nodes Enlarged Bilaterally] : axillary [Femoral Lymph Nodes Enlarged Bilaterally] : femoral [Inguinal Lymph Nodes Enlarged Bilaterally] : inguinal

## 2019-05-29 ENCOUNTER — OUTPATIENT (OUTPATIENT)
Dept: OUTPATIENT SERVICES | Facility: HOSPITAL | Age: 34
LOS: 1 days | End: 2019-05-29
Payer: MEDICAID

## 2019-05-29 ENCOUNTER — APPOINTMENT (OUTPATIENT)
Dept: ULTRASOUND IMAGING | Facility: HOSPITAL | Age: 34
End: 2019-05-29
Payer: MEDICAID

## 2019-05-29 DIAGNOSIS — I85.00 ESOPHAGEAL VARICES WITHOUT BLEEDING: ICD-10-CM

## 2019-05-29 DIAGNOSIS — O09.90 SUPERVISION OF HIGH RISK PREGNANCY, UNSPECIFIED, UNSPECIFIED TRIMESTER: ICD-10-CM

## 2019-05-29 DIAGNOSIS — Z3A.14 14 WEEKS GESTATION OF PREGNANCY: ICD-10-CM

## 2019-05-29 DIAGNOSIS — Z3A.19 19 WEEKS GESTATION OF PREGNANCY: ICD-10-CM

## 2019-05-29 PROCEDURE — 93975 VASCULAR STUDY: CPT | Mod: 26

## 2019-05-29 PROCEDURE — 93975 VASCULAR STUDY: CPT

## 2019-05-29 PROCEDURE — 76700 US EXAM ABDOM COMPLETE: CPT | Mod: 26,59

## 2019-05-29 PROCEDURE — 76700 US EXAM ABDOM COMPLETE: CPT

## 2019-05-29 NOTE — ASSESSMENT
[FreeTextEntry1] : Impression:\par 1. Cirrhosis- cryptogenic. compensated\par 2. 26 weeks gestation\par 3. LIver lesion <1cm, LIRADS 3\par \par Plan:\par 1. No current changes to management\par 2. She will follow up with Dr. Carvajal within 3 weeks. \par 3. She does not need to continue to follow up in fellows clinic as long as she can be seen by Dr. Carvajal\par 4. Repeat u/s of the liver to evaluate lesion

## 2019-05-29 NOTE — HISTORY OF PRESENT ILLNESS
[de-identified] : 33 year old female with cryptogenic cirrhosis here for follow up. She is currently 26 weeks gestation. \par \par She has had 10 years of cirrhosis with previous workup inconclusive. She thinks she may have had a liver biopsy in  but do not know the results.  She previously was followed at [facility] with Dr. Davida Thompson but has transitioned her care to Dr. Carvajal and her OB/gyn at [facility] as well. \par \par Previous pregnancy history:\par #1 (): Vaginal delivery girl, at 28 weeks GA . Delivery occurred at [facility]. Pregnancy #1 Comments: ( delivery, baby  after 30 minutes. Pt remained in hospital due to enlarged liver). \par #2 (): at 16 weeks GA . Delivery occurred at [facility]. Pregnancy #2 Comments: (spontaneous ab, D&C). \par #3 (): at 8 weeks GA . Delivery occurred at [facility]. Pregnancy #3 Comments: (Spontaneous ab, D&C). \par  #4 (): at 4 weeks GA . Delivery occurred at [facility]. Pregnancy #4 Comments: (Induced ab- Pt was diagnosed with Cirrhosis, pt was advised to terminate pregnancy). \par \par Per Dr. Carvajal's note she had MRI report that was performed on 2019 was reviewed. This revealed 0.6 x 0.5 cm lesion in the segmemt 4B ( previously 0.8 x 0,7 cm), this did not meet the criteria by OPTN/UNOS for HCC, LI-RADS 3 for observation. No new liver lesson was noted.\par

## 2019-05-30 DIAGNOSIS — K76.9 LIVER DISEASE, UNSPECIFIED: ICD-10-CM

## 2019-05-30 DIAGNOSIS — K74.60 UNSPECIFIED CIRRHOSIS OF LIVER: ICD-10-CM

## 2019-05-31 ENCOUNTER — OUTPATIENT (OUTPATIENT)
Dept: OUTPATIENT SERVICES | Facility: HOSPITAL | Age: 34
LOS: 1 days | End: 2019-05-31
Payer: MEDICAID

## 2019-05-31 ENCOUNTER — LABORATORY RESULT (OUTPATIENT)
Age: 34
End: 2019-05-31

## 2019-05-31 ENCOUNTER — NON-APPOINTMENT (OUTPATIENT)
Age: 34
End: 2019-05-31

## 2019-05-31 ENCOUNTER — APPOINTMENT (OUTPATIENT)
Dept: ANTEPARTUM | Facility: CLINIC | Age: 34
End: 2019-05-31
Payer: MEDICAID

## 2019-05-31 ENCOUNTER — APPOINTMENT (OUTPATIENT)
Dept: ANTEPARTUM | Facility: CLINIC | Age: 34
End: 2019-05-31

## 2019-05-31 ENCOUNTER — ASOB RESULT (OUTPATIENT)
Age: 34
End: 2019-05-31

## 2019-05-31 ENCOUNTER — TRANSCRIPTION ENCOUNTER (OUTPATIENT)
Age: 34
End: 2019-05-31

## 2019-05-31 ENCOUNTER — INPATIENT (INPATIENT)
Facility: HOSPITAL | Age: 34
LOS: 4 days | Discharge: HOME | End: 2019-06-05
Attending: OBSTETRICS & GYNECOLOGY | Admitting: OBSTETRICS & GYNECOLOGY
Payer: MEDICAID

## 2019-05-31 ENCOUNTER — APPOINTMENT (OUTPATIENT)
Dept: MATERNAL FETAL MEDICINE | Facility: CLINIC | Age: 34
End: 2019-05-31
Payer: MEDICAID

## 2019-05-31 VITALS
DIASTOLIC BLOOD PRESSURE: 66 MMHG | SYSTOLIC BLOOD PRESSURE: 96 MMHG | RESPIRATION RATE: 16 BRPM | TEMPERATURE: 98 F | OXYGEN SATURATION: 99 % | HEART RATE: 86 BPM

## 2019-05-31 VITALS
BODY MASS INDEX: 20.28 KG/M2 | DIASTOLIC BLOOD PRESSURE: 68 MMHG | OXYGEN SATURATION: 98 % | WEIGHT: 114.44 LBS | SYSTOLIC BLOOD PRESSURE: 106 MMHG | HEIGHT: 63 IN | HEART RATE: 91 BPM

## 2019-05-31 DIAGNOSIS — Z01.818 ENCOUNTER FOR OTHER PREPROCEDURAL EXAMINATION: ICD-10-CM

## 2019-05-31 DIAGNOSIS — O34.30 MATERNAL CARE FOR CERVICAL INCOMPETENCE, UNSPECIFIED TRIMESTER: ICD-10-CM

## 2019-05-31 DIAGNOSIS — O09.899 SUPERVISION OF OTHER HIGH RISK PREGNANCIES, UNSPECIFIED TRIMESTER: ICD-10-CM

## 2019-05-31 LAB
ALBUMIN SERPL ELPH-MCNC: 3.5 G/DL — SIGNIFICANT CHANGE UP (ref 3.3–5)
ALP SERPL-CCNC: 113 U/L — SIGNIFICANT CHANGE UP (ref 40–120)
ALT FLD-CCNC: 12 U/L — SIGNIFICANT CHANGE UP (ref 10–45)
ANION GAP SERPL CALC-SCNC: 12 MMOL/L — SIGNIFICANT CHANGE UP (ref 5–17)
APPEARANCE UR: CLEAR — SIGNIFICANT CHANGE UP
APTT BLD: 37.1 SEC — HIGH (ref 27.5–36.3)
AST SERPL-CCNC: 18 U/L — SIGNIFICANT CHANGE UP (ref 10–40)
B PERT IGG+IGM PNL SER: ABNORMAL
BASOPHILS # BLD AUTO: 0 K/UL — SIGNIFICANT CHANGE UP (ref 0–0.2)
BASOPHILS NFR AMN MANUAL: 4 /UL — SIGNIFICANT CHANGE UP (ref 0–5)
BASOPHILS NFR BLD AUTO: 0.5 % — SIGNIFICANT CHANGE UP (ref 0–2)
BILIRUB SERPL-MCNC: 1.3 MG/DL — HIGH (ref 0.2–1.2)
BILIRUB UR-MCNC: NEGATIVE — SIGNIFICANT CHANGE UP
BLD GP AB SCN SERPL QL: NEGATIVE — SIGNIFICANT CHANGE UP
BUN SERPL-MCNC: 12 MG/DL — SIGNIFICANT CHANGE UP (ref 7–23)
CALCIUM SERPL-MCNC: 9.4 MG/DL — SIGNIFICANT CHANGE UP (ref 8.4–10.5)
CHLORIDE SERPL-SCNC: 103 MMOL/L — SIGNIFICANT CHANGE UP (ref 96–108)
CO2 SERPL-SCNC: 21 MMOL/L — LOW (ref 22–31)
COLOR FLD: YELLOW — SIGNIFICANT CHANGE UP
COLOR SPEC: YELLOW — SIGNIFICANT CHANGE UP
CREAT SERPL-MCNC: 0.43 MG/DL — LOW (ref 0.5–1.3)
DIFF PNL FLD: NEGATIVE — SIGNIFICANT CHANGE UP
EOSINOPHIL # BLD AUTO: 0.2 K/UL — SIGNIFICANT CHANGE UP (ref 0–0.5)
EOSINOPHIL NFR BLD AUTO: 2.8 % — SIGNIFICANT CHANGE UP (ref 0–6)
FIBRINOGEN PPP-MCNC: 548 MG/DL — HIGH (ref 350–510)
FLUID INTAKE SUBSTANCE CLASS: SIGNIFICANT CHANGE UP
FLUID SEGMENTED GRANULOCYTES: 0 % — SIGNIFICANT CHANGE UP
GLUCOSE FLD-MCNC: 15 MG/DL — SIGNIFICANT CHANGE UP
GLUCOSE SERPL-MCNC: 81 MG/DL — SIGNIFICANT CHANGE UP (ref 70–99)
GLUCOSE UR QL: NEGATIVE — SIGNIFICANT CHANGE UP
GRAM STN FLD: SIGNIFICANT CHANGE UP
HCT VFR BLD CALC: 35.5 % — SIGNIFICANT CHANGE UP (ref 34.5–45)
HGB BLD-MCNC: 12.1 G/DL — SIGNIFICANT CHANGE UP (ref 11.5–15.5)
KETONES UR-MCNC: ABNORMAL
LEUKOCYTE ESTERASE UR-ACNC: NEGATIVE — SIGNIFICANT CHANGE UP
LYMPHOCYTES # BLD AUTO: 1.1 K/UL — SIGNIFICANT CHANGE UP (ref 1–3.3)
LYMPHOCYTES # BLD AUTO: 18.4 % — SIGNIFICANT CHANGE UP (ref 13–44)
LYMPHOCYTES # FLD: 93 % — SIGNIFICANT CHANGE UP
MCHC RBC-ENTMCNC: 31.3 PG — SIGNIFICANT CHANGE UP (ref 27–34)
MCHC RBC-ENTMCNC: 34.1 GM/DL — SIGNIFICANT CHANGE UP (ref 32–36)
MCV RBC AUTO: 91.8 FL — SIGNIFICANT CHANGE UP (ref 80–100)
MONOCYTES # BLD AUTO: 0.4 K/UL — SIGNIFICANT CHANGE UP (ref 0–0.9)
MONOCYTES NFR BLD AUTO: 6.5 % — SIGNIFICANT CHANGE UP (ref 2–14)
MONOS+MACROS # FLD: 7 % — SIGNIFICANT CHANGE UP
NEUTROPHILS # BLD AUTO: 4.1 K/UL — SIGNIFICANT CHANGE UP (ref 1.8–7.4)
NEUTROPHILS NFR BLD AUTO: 71.8 % — SIGNIFICANT CHANGE UP (ref 43–77)
NITRITE UR-MCNC: NEGATIVE — SIGNIFICANT CHANGE UP
PH UR: 6.5 — SIGNIFICANT CHANGE UP (ref 5–8)
PLATELET # BLD AUTO: 120 K/UL — LOW (ref 150–400)
POTASSIUM SERPL-MCNC: 4.2 MMOL/L — SIGNIFICANT CHANGE UP (ref 3.5–5.3)
POTASSIUM SERPL-SCNC: 4.2 MMOL/L — SIGNIFICANT CHANGE UP (ref 3.5–5.3)
PROT SERPL-MCNC: 6.5 G/DL — SIGNIFICANT CHANGE UP (ref 6–8.3)
PROT UR-MCNC: SIGNIFICANT CHANGE UP
RBC # BLD: 3.86 M/UL — SIGNIFICANT CHANGE UP (ref 3.8–5.2)
RBC # FLD: 12.5 % — SIGNIFICANT CHANGE UP (ref 10.3–14.5)
RCV VOL RI: 2 /UL — HIGH (ref 0–0)
RH IG SCN BLD-IMP: POSITIVE — SIGNIFICANT CHANGE UP
SODIUM SERPL-SCNC: 136 MMOL/L — SIGNIFICANT CHANGE UP (ref 135–145)
SP GR SPEC: 1.02 — SIGNIFICANT CHANGE UP (ref 1.01–1.02)
TUBE TYPE: SIGNIFICANT CHANGE UP
URATE SERPL-MCNC: 2.9 MG/DL — SIGNIFICANT CHANGE UP (ref 2.5–7)
UROBILINOGEN FLD QL: ABNORMAL
WBC # BLD: 5.7 K/UL — SIGNIFICANT CHANGE UP (ref 3.8–10.5)
WBC # FLD AUTO: 5.7 K/UL — SIGNIFICANT CHANGE UP (ref 3.8–10.5)

## 2019-05-31 PROCEDURE — 59000 AMNIOCENTESIS DIAGNOSTIC: CPT

## 2019-05-31 PROCEDURE — 76811 OB US DETAILED SNGL FETUS: CPT

## 2019-05-31 PROCEDURE — 88285 CHROMOSOME COUNT ADDITIONAL: CPT

## 2019-05-31 PROCEDURE — 59001 AMNIOCENTESIS THERAPEUTIC: CPT

## 2019-05-31 PROCEDURE — 76811 OB US DETAILED SNGL FETUS: CPT | Mod: 26

## 2019-05-31 PROCEDURE — 87205 SMEAR GRAM STAIN: CPT

## 2019-05-31 PROCEDURE — 88269 CHROMOSOME ANALYS AMNIOTIC: CPT

## 2019-05-31 PROCEDURE — 88280 CHROMOSOME KARYOTYPE STUDY: CPT

## 2019-05-31 PROCEDURE — 76946 ECHO GUIDE FOR AMNIOCENTESIS: CPT

## 2019-05-31 PROCEDURE — 76946 ECHO GUIDE FOR AMNIOCENTESIS: CPT | Mod: 26

## 2019-05-31 PROCEDURE — 89051 BODY FLUID CELL COUNT: CPT

## 2019-05-31 PROCEDURE — G0463: CPT

## 2019-05-31 PROCEDURE — 82945 GLUCOSE OTHER FLUID: CPT

## 2019-05-31 PROCEDURE — 76817 TRANSVAGINAL US OBSTETRIC: CPT

## 2019-05-31 PROCEDURE — 76817 TRANSVAGINAL US OBSTETRIC: CPT | Mod: 26

## 2019-05-31 PROCEDURE — 87075 CULTR BACTERIA EXCEPT BLOOD: CPT

## 2019-05-31 PROCEDURE — 87070 CULTURE OTHR SPECIMN AEROBIC: CPT

## 2019-05-31 PROCEDURE — 88235 TISSUE CULTURE PLACENTA: CPT

## 2019-05-31 PROCEDURE — 99213 OFFICE O/P EST LOW 20 MIN: CPT | Mod: GE,25

## 2019-05-31 PROCEDURE — 99204 OFFICE O/P NEW MOD 45 MIN: CPT | Mod: 25

## 2019-05-31 RX ORDER — SODIUM CHLORIDE 9 MG/ML
1000 INJECTION, SOLUTION INTRAVENOUS
Refills: 0 | Status: DISCONTINUED | OUTPATIENT
Start: 2019-05-31 | End: 2019-06-02

## 2019-05-31 RX ORDER — ACETAMINOPHEN 500 MG
975 TABLET ORAL ONCE
Refills: 0 | Status: DISCONTINUED | OUTPATIENT
Start: 2019-05-31 | End: 2019-06-03

## 2019-05-31 RX ORDER — HYDROXYPROGESTERONE CAPROATE 250 MG/ML
250 VIAL (ML) INTRAMUSCULAR ONCE
Refills: 0 | Status: COMPLETED | OUTPATIENT
Start: 2019-05-31 | End: 2019-05-31

## 2019-05-31 RX ORDER — URSODIOL 250 MG/1
500 TABLET, FILM COATED ORAL
Refills: 0 | Status: DISCONTINUED | OUTPATIENT
Start: 2019-05-31 | End: 2019-06-02

## 2019-05-31 RX ADMIN — Medication 250 MILLIGRAM(S): at 22:35

## 2019-05-31 RX ADMIN — Medication 1 TABLET(S): at 20:18

## 2019-05-31 NOTE — PATIENT PROFILE OB - CURRENT PREGNANCY COMPLICATIONS, OB PROFILE
Incompetent Cervix/Cervical Insufficiency Gestational Diabetes/Incompetent Cervix/Cervical Insufficiency

## 2019-06-01 ENCOUNTER — LABORATORY RESULT (OUTPATIENT)
Age: 34
End: 2019-06-01

## 2019-06-01 ENCOUNTER — TRANSCRIPTION ENCOUNTER (OUTPATIENT)
Age: 34
End: 2019-06-01

## 2019-06-01 LAB
APTT BLD: 36.4 SEC — HIGH (ref 27.5–36.3)
GLUCOSE BLDC GLUCOMTR-MCNC: 163 MG/DL — HIGH (ref 70–99)
GLUCOSE BLDC GLUCOMTR-MCNC: 87 MG/DL — SIGNIFICANT CHANGE UP (ref 70–99)
GLUCOSE BLDC GLUCOMTR-MCNC: 87 MG/DL — SIGNIFICANT CHANGE UP (ref 70–99)
GRAM STN FLD: SIGNIFICANT CHANGE UP
HCT VFR BLD CALC: 32.1 % — LOW (ref 34.5–45)
HGB BLD-MCNC: 11.1 G/DL — LOW (ref 11.5–15.5)
INR BLD: 1.02 RATIO — SIGNIFICANT CHANGE UP (ref 0.88–1.16)
MCHC RBC-ENTMCNC: 31.8 PG — SIGNIFICANT CHANGE UP (ref 27–34)
MCHC RBC-ENTMCNC: 34.5 GM/DL — SIGNIFICANT CHANGE UP (ref 32–36)
MCV RBC AUTO: 92.1 FL — SIGNIFICANT CHANGE UP (ref 80–100)
PLATELET # BLD AUTO: 105 K/UL — LOW (ref 150–400)
PROTHROM AB SERPL-ACNC: 11.7 SEC — SIGNIFICANT CHANGE UP (ref 10–12.9)
RBC # BLD: 3.48 M/UL — LOW (ref 3.8–5.2)
RBC # FLD: 12.5 % — SIGNIFICANT CHANGE UP (ref 10.3–14.5)
SPECIMEN SOURCE: SIGNIFICANT CHANGE UP
T PALLIDUM AB TITR SER: NEGATIVE — SIGNIFICANT CHANGE UP
WBC # BLD: 6.1 K/UL — SIGNIFICANT CHANGE UP (ref 3.8–10.5)
WBC # FLD AUTO: 6.1 K/UL — SIGNIFICANT CHANGE UP (ref 3.8–10.5)

## 2019-06-01 PROCEDURE — 59320 REVISION OF CERVIX: CPT

## 2019-06-01 RX ORDER — INDOMETHACIN 50 MG
1 CAPSULE ORAL
Qty: 4 | Refills: 0
Start: 2019-06-01 | End: 2019-06-01

## 2019-06-01 RX ORDER — INDOMETHACIN 50 MG
25 CAPSULE ORAL ONCE
Refills: 0 | Status: COMPLETED | OUTPATIENT
Start: 2019-06-01 | End: 2019-06-01

## 2019-06-01 RX ORDER — URSODIOL 250 MG/1
1 TABLET, FILM COATED ORAL
Qty: 0 | Refills: 0 | DISCHARGE
Start: 2019-06-01

## 2019-06-01 RX ORDER — INDOMETHACIN 50 MG
25 CAPSULE ORAL EVERY 6 HOURS
Refills: 0 | Status: DISCONTINUED | OUTPATIENT
Start: 2019-06-01 | End: 2019-06-01

## 2019-06-01 RX ORDER — INDOMETHACIN 50 MG
50 CAPSULE ORAL ONCE
Refills: 0 | Status: DISCONTINUED | OUTPATIENT
Start: 2019-06-01 | End: 2019-06-01

## 2019-06-01 RX ADMIN — Medication 25 MILLIGRAM(S): at 18:33

## 2019-06-01 RX ADMIN — Medication 25 MILLIGRAM(S): at 12:00

## 2019-06-01 RX ADMIN — SODIUM CHLORIDE 125 MILLILITER(S): 9 INJECTION, SOLUTION INTRAVENOUS at 00:48

## 2019-06-01 RX ADMIN — Medication 25 MILLIGRAM(S): at 18:00

## 2019-06-01 NOTE — DISCHARGE NOTE OB - MEDICATION SUMMARY - MEDICATIONS TO TAKE
I will START or STAY ON the medications listed below when I get home from the hospital:    indomethacin 25 mg oral capsule  -- 1 cap(s) by mouth every 6 hours   -- Do not take aspirin or aspirin containing products without knowledge and consent of your physician.  It is very important that you take or use this exactly as directed.  Do not skip doses or discontinue unless directed by your doctor.  May cause drowsiness or dizziness.  Obtain medical advice before taking any non-prescription drugs as some may affect the action of this medication.  Take with food or milk.    -- Indication: For pain    ursodiol 500 mg oral tablet  -- 1 tab(s) by mouth   -- Indication: For cholestasis    Prenatal 1 oral capsule  -- 1 tab(s) orally  -- Indication: For vitamin I will START or STAY ON the medications listed below when I get home from the hospital:  None I will START or STAY ON the medications listed below when I get home from the hospital:    amoxicillin-clavulanate 875 mg-125 mg oral tablet  -- 1 tab(s) by mouth every 12 hours   -- Finish all this medication unless otherwise directed by prescriber.  Take with food or milk.    -- Indication: For Home

## 2019-06-01 NOTE — DISCHARGE NOTE OB - CARE PROVIDER_API CALL
[facility],   [address] [place] [address]  [address]  [place] [address]  Phone: [phone]  Fax: (   )    -  Follow Up Time:

## 2019-06-01 NOTE — DISCHARGE NOTE OB - PROVIDER TOKENS
FREE:[LAST:[[facility]],PHONE:[phone]],FAX:[(   )    -],ADDRESS:[[address] [place], [place] [id number]]]

## 2019-06-01 NOTE — DISCHARGE NOTE OB - SOCIAL WORKER'S NAME
Ivett MonsivaisLztwkvmh-SORQ-[phone];  Nilsa Rodgers-[facility]- [phone]

## 2019-06-01 NOTE — DISCHARGE NOTE OB - PLAN OF CARE
rest Return to labor and delivery if you have vaginal bleeding, leakage of fluid, regular contractions, or the baby is not moving well. Recovery  Continue antibiotics Follow up Appointment If you are interested in pre-conception counseling, please call [phone] to schedule an appointment. Follow up appointment June 14 at 1030am  If you are interested in pre-conception counseling, please call [phone] to schedule an appointment.

## 2019-06-01 NOTE — DISCHARGE NOTE OB - PATIENT PORTAL LINK FT
You can access the [facility], offered by [facility], by registering with the following website: [email or web address]

## 2019-06-01 NOTE — DISCHARGE NOTE OB - ADDITIONAL INSTRUCTIONS
Follow up at [address] [place] [address] on June 7th at 11am for a repeat sonogram and [facility] follow up appointment. Follow up at [address] on

## 2019-06-01 NOTE — DISCHARGE NOTE OB - CARE PLAN
Principal Discharge DX:	Cervical insufficiency in pregnancy, antepartum  Goal:	rest  Assessment and plan of treatment:	Return to labor and delivery if you have vaginal bleeding, leakage of fluid, regular contractions, or the baby is not moving well. Principal Discharge DX:	Sepsis  Goal:	rest  Assessment and plan of treatment:	Recovery  Continue antibiotics  Secondary Diagnosis:	Cervical insufficiency in pregnancy, antepartum  Goal:	Follow up Appointment Principal Discharge DX:	Sepsis  Goal:	rest  Assessment and plan of treatment:	Recovery  Continue antibiotics  Secondary Diagnosis:	Cervical insufficiency in pregnancy, antepartum  Goal:	Follow up Appointment  Assessment and plan of treatment:	If you are interested in pre-conception counseling, please call [phone] to schedule an appointment. Principal Discharge DX:	Sepsis  Goal:	rest  Assessment and plan of treatment:	Recovery  Continue antibiotics  Secondary Diagnosis:	Cervical insufficiency in pregnancy, antepartum  Goal:	Follow up Appointment  Assessment and plan of treatment:	Follow up appointment June 14 at 1030am  If you are interested in pre-conception counseling, please call [phone] to schedule an appointment.

## 2019-06-01 NOTE — DISCHARGE NOTE OB - HOSPITAL COURSE
Patient admitted for cervical insufficiency for evaluation for rescue cerclage. Patient underwent amniocentesis and gram stain was negative. On HD 2, patient underwent Jordan cerclage. She was discharged home with indomethacin and is to follow up in 1 week for a repeat sonogram and M appointment. Hospital course:  5/31/2019 Amniocentesis with amnioreduction of 300mL of fluid sent for infectious workup - preliminary negative with glucose 15 and gram stain negative  6/1/2019 Ultrasound indicated Braxton cerclage placement under spinal anesthesia, complicated by postoperative rupture of membranes  6/2/2019 Intracardiac KCl injection for interruption of pregnancy, spinal/epidural anesthesia placement - required phenylephrine for pressure support several times, cervical cerclage removal, and induction of labor with misoprostol and laminaria placement.   During this process, she became febrile to 39.5C, tachycardic to 115s, O2 saturation to 89% with concern for sepsis. Lactate 2. Treated with Clinda/Gent x 1 dose each, then transitioned to ertapenem. Tylenol/Motrin for fever, fluid hydration.   Vaginal delivery of nonviable fetus and placenta with EBL 150cc. Pitocin 10 units and methergine 0.2mg IM each given once with excellent hemostasis.   Transferred to SICU  (6/3)  Discharged in stable condition Hospital course:  5/31/2019 Amniocentesis with amnioreduction of 300mL of fluid sent for infectious workup - preliminary negative with glucose 15 and gram stain negative  6/1/2019 Ultrasound indicated Braxton cerclage placement under spinal anesthesia, complicated by postoperative rupture of membranes  6/2/2019 Intracardiac KCl injection for interruption of pregnancy, spinal/epidural anesthesia placement - required phenylephrine for pressure support several times, cervical cerclage removal, and induction of labor with misoprostol and laminaria placement.   During this process, she became febrile to 39.5C, tachycardic to 115s, O2 saturation to 89% with concern for sepsis. Lactate 2. Treated with Clinda/Gent x 1 dose each, then transitioned to ertapenem. Tylenol/Motrin for fever, fluid hydration.   Vaginal delivery of nonviable fetus and placenta with EBL 150cc. Pitocin 10 units and methergine 0.2mg IM each given once with excellent hemostasis.   Transferred to SICU  (6/3)  Discharged in stable condition on Augmentin x 9 days

## 2019-06-02 ENCOUNTER — ASOB RESULT (OUTPATIENT)
Age: 34
End: 2019-06-02

## 2019-06-02 ENCOUNTER — RESULT REVIEW (OUTPATIENT)
Age: 34
End: 2019-06-02

## 2019-06-02 ENCOUNTER — NON-APPOINTMENT (OUTPATIENT)
Age: 34
End: 2019-06-02

## 2019-06-02 ENCOUNTER — APPOINTMENT (OUTPATIENT)
Dept: ANTEPARTUM | Facility: CLINIC | Age: 34
End: 2019-06-02
Payer: MEDICAID

## 2019-06-02 LAB
ALBUMIN SERPL ELPH-MCNC: 3.3 G/DL — SIGNIFICANT CHANGE UP (ref 3.3–5)
ALP SERPL-CCNC: 112 U/L — SIGNIFICANT CHANGE UP (ref 40–120)
ALT FLD-CCNC: 11 U/L — SIGNIFICANT CHANGE UP (ref 10–45)
ANION GAP SERPL CALC-SCNC: 14 MMOL/L — SIGNIFICANT CHANGE UP (ref 5–17)
APTT BLD: 35.5 SEC — SIGNIFICANT CHANGE UP (ref 27.5–36.3)
APTT BLD: 37.3 SEC — HIGH (ref 27.5–36.3)
AST SERPL-CCNC: 18 U/L — SIGNIFICANT CHANGE UP (ref 10–40)
BASOPHILS # BLD AUTO: 0 K/UL — SIGNIFICANT CHANGE UP (ref 0–0.2)
BASOPHILS # BLD AUTO: 0 K/UL — SIGNIFICANT CHANGE UP (ref 0–0.2)
BASOPHILS NFR BLD AUTO: 0.1 % — SIGNIFICANT CHANGE UP (ref 0–2)
BASOPHILS NFR BLD AUTO: 0.2 % — SIGNIFICANT CHANGE UP (ref 0–2)
BILIRUB SERPL-MCNC: 1.5 MG/DL — HIGH (ref 0.2–1.2)
BLD GP AB SCN SERPL QL: NEGATIVE — SIGNIFICANT CHANGE UP
BUN SERPL-MCNC: 5 MG/DL — LOW (ref 7–23)
CALCIUM SERPL-MCNC: 8.4 MG/DL — SIGNIFICANT CHANGE UP (ref 8.4–10.5)
CHLORIDE SERPL-SCNC: 104 MMOL/L — SIGNIFICANT CHANGE UP (ref 96–108)
CO2 SERPL-SCNC: 20 MMOL/L — LOW (ref 22–31)
CREAT SERPL-MCNC: 0.5 MG/DL — SIGNIFICANT CHANGE UP (ref 0.5–1.3)
EOSINOPHIL # BLD AUTO: 0.1 K/UL — SIGNIFICANT CHANGE UP (ref 0–0.5)
EOSINOPHIL # BLD AUTO: 0.1 K/UL — SIGNIFICANT CHANGE UP (ref 0–0.5)
EOSINOPHIL NFR BLD AUTO: 0.7 % — SIGNIFICANT CHANGE UP (ref 0–6)
EOSINOPHIL NFR BLD AUTO: 1.8 % — SIGNIFICANT CHANGE UP (ref 0–6)
FIBRINOGEN PPP-MCNC: 520 MG/DL — HIGH (ref 350–510)
FIBRINOGEN PPP-MCNC: 582 MG/DL — HIGH (ref 350–510)
GLUCOSE BLDC GLUCOMTR-MCNC: 101 MG/DL — HIGH (ref 70–99)
GLUCOSE BLDC GLUCOMTR-MCNC: 84 MG/DL — SIGNIFICANT CHANGE UP (ref 70–99)
GLUCOSE BLDC GLUCOMTR-MCNC: 90 MG/DL — SIGNIFICANT CHANGE UP (ref 70–99)
GLUCOSE BLDC GLUCOMTR-MCNC: 94 MG/DL — SIGNIFICANT CHANGE UP (ref 70–99)
GLUCOSE SERPL-MCNC: 108 MG/DL — HIGH (ref 70–99)
HCT VFR BLD CALC: 32.1 % — LOW (ref 34.5–45)
HCT VFR BLD CALC: 33.6 % — LOW (ref 34.5–45)
HGB BLD-MCNC: 11.2 G/DL — LOW (ref 11.5–15.5)
HGB BLD-MCNC: 11.5 G/DL — SIGNIFICANT CHANGE UP (ref 11.5–15.5)
INR BLD: 0.99 RATIO — SIGNIFICANT CHANGE UP (ref 0.88–1.16)
INR BLD: 1.02 RATIO — SIGNIFICANT CHANGE UP (ref 0.88–1.16)
LACTATE SERPL-SCNC: 2 MMOL/L — SIGNIFICANT CHANGE UP (ref 0.7–2)
LYMPHOCYTES # BLD AUTO: 1 K/UL — SIGNIFICANT CHANGE UP (ref 1–3.3)
LYMPHOCYTES # BLD AUTO: 1.1 K/UL — SIGNIFICANT CHANGE UP (ref 1–3.3)
LYMPHOCYTES # BLD AUTO: 12.8 % — LOW (ref 13–44)
LYMPHOCYTES # BLD AUTO: 18 % — SIGNIFICANT CHANGE UP (ref 13–44)
MCHC RBC-ENTMCNC: 31.5 PG — SIGNIFICANT CHANGE UP (ref 27–34)
MCHC RBC-ENTMCNC: 32.1 PG — SIGNIFICANT CHANGE UP (ref 27–34)
MCHC RBC-ENTMCNC: 34.3 GM/DL — SIGNIFICANT CHANGE UP (ref 32–36)
MCHC RBC-ENTMCNC: 35.1 GM/DL — SIGNIFICANT CHANGE UP (ref 32–36)
MCV RBC AUTO: 91.6 FL — SIGNIFICANT CHANGE UP (ref 80–100)
MCV RBC AUTO: 91.9 FL — SIGNIFICANT CHANGE UP (ref 80–100)
MONOCYTES # BLD AUTO: 0.4 K/UL — SIGNIFICANT CHANGE UP (ref 0–0.9)
MONOCYTES # BLD AUTO: 0.5 K/UL — SIGNIFICANT CHANGE UP (ref 0–0.9)
MONOCYTES NFR BLD AUTO: 6.3 % — SIGNIFICANT CHANGE UP (ref 2–14)
MONOCYTES NFR BLD AUTO: 7.4 % — SIGNIFICANT CHANGE UP (ref 2–14)
NEUTROPHILS # BLD AUTO: 4.4 K/UL — SIGNIFICANT CHANGE UP (ref 1.8–7.4)
NEUTROPHILS # BLD AUTO: 6.2 K/UL — SIGNIFICANT CHANGE UP (ref 1.8–7.4)
NEUTROPHILS NFR BLD AUTO: 72.7 % — SIGNIFICANT CHANGE UP (ref 43–77)
NEUTROPHILS NFR BLD AUTO: 80.1 % — HIGH (ref 43–77)
PLATELET # BLD AUTO: 103 K/UL — LOW (ref 150–400)
PLATELET # BLD AUTO: 110 K/UL — LOW (ref 150–400)
POTASSIUM SERPL-MCNC: 3.6 MMOL/L — SIGNIFICANT CHANGE UP (ref 3.5–5.3)
POTASSIUM SERPL-SCNC: 3.6 MMOL/L — SIGNIFICANT CHANGE UP (ref 3.5–5.3)
PROT SERPL-MCNC: 6.2 G/DL — SIGNIFICANT CHANGE UP (ref 6–8.3)
PROTHROM AB SERPL-ACNC: 11.4 SEC — SIGNIFICANT CHANGE UP (ref 10–12.9)
PROTHROM AB SERPL-ACNC: 11.7 SEC — SIGNIFICANT CHANGE UP (ref 10–12.9)
RBC # BLD: 3.5 M/UL — LOW (ref 3.8–5.2)
RBC # BLD: 3.66 M/UL — LOW (ref 3.8–5.2)
RBC # FLD: 12.4 % — SIGNIFICANT CHANGE UP (ref 10.3–14.5)
RBC # FLD: 12.6 % — SIGNIFICANT CHANGE UP (ref 10.3–14.5)
RH IG SCN BLD-IMP: POSITIVE — SIGNIFICANT CHANGE UP
SODIUM SERPL-SCNC: 138 MMOL/L — SIGNIFICANT CHANGE UP (ref 135–145)
WBC # BLD: 6.1 K/UL — SIGNIFICANT CHANGE UP (ref 3.8–10.5)
WBC # BLD: 7.8 K/UL — SIGNIFICANT CHANGE UP (ref 3.8–10.5)
WBC # FLD AUTO: 6.1 K/UL — SIGNIFICANT CHANGE UP (ref 3.8–10.5)
WBC # FLD AUTO: 7.8 K/UL — SIGNIFICANT CHANGE UP (ref 3.8–10.5)

## 2019-06-02 PROCEDURE — 59514 CESAREAN DELIVERY ONLY: CPT | Mod: U7

## 2019-06-02 PROCEDURE — 88305 TISSUE EXAM BY PATHOLOGIST: CPT | Mod: 26

## 2019-06-02 PROCEDURE — 99233 SBSQ HOSP IP/OBS HIGH 50: CPT

## 2019-06-02 PROCEDURE — 76815 OB US LIMITED FETUS(S): CPT

## 2019-06-02 PROCEDURE — 99221 1ST HOSP IP/OBS SF/LOW 40: CPT

## 2019-06-02 RX ORDER — GLYCERIN ADULT
1 SUPPOSITORY, RECTAL RECTAL AT BEDTIME
Refills: 0 | Status: DISCONTINUED | OUTPATIENT
Start: 2019-06-02 | End: 2019-06-05

## 2019-06-02 RX ORDER — LANOLIN
1 OINTMENT (GRAM) TOPICAL EVERY 6 HOURS
Refills: 0 | Status: DISCONTINUED | OUTPATIENT
Start: 2019-06-02 | End: 2019-06-05

## 2019-06-02 RX ORDER — AER TRAVELER 0.5 G/1
1 SOLUTION RECTAL; TOPICAL EVERY 4 HOURS
Refills: 0 | Status: DISCONTINUED | OUTPATIENT
Start: 2019-06-02 | End: 2019-06-05

## 2019-06-02 RX ORDER — OXYTOCIN 10 UNIT/ML
333.33 VIAL (ML) INJECTION
Qty: 20 | Refills: 0 | Status: DISCONTINUED | OUTPATIENT
Start: 2019-06-02 | End: 2019-06-03

## 2019-06-02 RX ORDER — TETANUS TOXOID, REDUCED DIPHTHERIA TOXOID AND ACELLULAR PERTUSSIS VACCINE, ADSORBED 5; 2.5; 8; 8; 2.5 [IU]/.5ML; [IU]/.5ML; UG/.5ML; UG/.5ML; UG/.5ML
0.5 SUSPENSION INTRAMUSCULAR ONCE
Refills: 0 | Status: DISCONTINUED | OUTPATIENT
Start: 2019-06-02 | End: 2019-06-05

## 2019-06-02 RX ORDER — HYDROCORTISONE 1 %
1 OINTMENT (GRAM) TOPICAL EVERY 6 HOURS
Refills: 0 | Status: DISCONTINUED | OUTPATIENT
Start: 2019-06-02 | End: 2019-06-05

## 2019-06-02 RX ORDER — ERTAPENEM SODIUM 1 G/1
1000 INJECTION, POWDER, LYOPHILIZED, FOR SOLUTION INTRAMUSCULAR; INTRAVENOUS EVERY 24 HOURS
Refills: 0 | Status: DISCONTINUED | OUTPATIENT
Start: 2019-06-03 | End: 2019-06-05

## 2019-06-02 RX ORDER — MAGNESIUM HYDROXIDE 400 MG/1
30 TABLET, CHEWABLE ORAL
Refills: 0 | Status: DISCONTINUED | OUTPATIENT
Start: 2019-06-02 | End: 2019-06-05

## 2019-06-02 RX ORDER — ERTAPENEM SODIUM 1 G/1
INJECTION, POWDER, LYOPHILIZED, FOR SOLUTION INTRAMUSCULAR; INTRAVENOUS
Refills: 0 | Status: DISCONTINUED | OUTPATIENT
Start: 2019-06-02 | End: 2019-06-05

## 2019-06-02 RX ORDER — BENZOCAINE 10 %
1 GEL (GRAM) MUCOUS MEMBRANE EVERY 6 HOURS
Refills: 0 | Status: DISCONTINUED | OUTPATIENT
Start: 2019-06-02 | End: 2019-06-05

## 2019-06-02 RX ORDER — SIMETHICONE 80 MG/1
80 TABLET, CHEWABLE ORAL EVERY 4 HOURS
Refills: 0 | Status: DISCONTINUED | OUTPATIENT
Start: 2019-06-02 | End: 2019-06-05

## 2019-06-02 RX ORDER — ACETAMINOPHEN 500 MG
1000 TABLET ORAL ONCE
Refills: 0 | Status: COMPLETED | OUTPATIENT
Start: 2019-06-02 | End: 2019-06-02

## 2019-06-02 RX ORDER — PIPERACILLIN AND TAZOBACTAM 4; .5 G/20ML; G/20ML
3.38 INJECTION, POWDER, LYOPHILIZED, FOR SOLUTION INTRAVENOUS EVERY 8 HOURS
Refills: 0 | Status: DISCONTINUED | OUTPATIENT
Start: 2019-06-02 | End: 2019-06-02

## 2019-06-02 RX ORDER — GENTAMICIN SULFATE 40 MG/ML
80 VIAL (ML) INJECTION EVERY 8 HOURS
Refills: 0 | Status: DISCONTINUED | OUTPATIENT
Start: 2019-06-02 | End: 2019-06-02

## 2019-06-02 RX ORDER — DOCUSATE SODIUM 100 MG
100 CAPSULE ORAL
Refills: 0 | Status: DISCONTINUED | OUTPATIENT
Start: 2019-06-02 | End: 2019-06-05

## 2019-06-02 RX ORDER — IBUPROFEN 200 MG
600 TABLET ORAL ONCE
Refills: 0 | Status: COMPLETED | OUTPATIENT
Start: 2019-06-02 | End: 2019-06-02

## 2019-06-02 RX ORDER — DIPHENHYDRAMINE HCL 50 MG
25 CAPSULE ORAL EVERY 6 HOURS
Refills: 0 | Status: DISCONTINUED | OUTPATIENT
Start: 2019-06-02 | End: 2019-06-05

## 2019-06-02 RX ORDER — DIBUCAINE 1 %
1 OINTMENT (GRAM) RECTAL EVERY 6 HOURS
Refills: 0 | Status: DISCONTINUED | OUTPATIENT
Start: 2019-06-02 | End: 2019-06-05

## 2019-06-02 RX ORDER — ERTAPENEM SODIUM 1 G/1
1000 INJECTION, POWDER, LYOPHILIZED, FOR SOLUTION INTRAMUSCULAR; INTRAVENOUS ONCE
Refills: 0 | Status: COMPLETED | OUTPATIENT
Start: 2019-06-02 | End: 2019-06-02

## 2019-06-02 RX ORDER — GENTAMICIN SULFATE 40 MG/ML
VIAL (ML) INJECTION
Refills: 0 | Status: DISCONTINUED | OUTPATIENT
Start: 2019-06-02 | End: 2019-06-02

## 2019-06-02 RX ORDER — PIPERACILLIN AND TAZOBACTAM 4; .5 G/20ML; G/20ML
3.38 INJECTION, POWDER, LYOPHILIZED, FOR SOLUTION INTRAVENOUS ONCE
Refills: 0 | Status: DISCONTINUED | OUTPATIENT
Start: 2019-06-02 | End: 2019-06-02

## 2019-06-02 RX ORDER — GENTAMICIN SULFATE 40 MG/ML
120 VIAL (ML) INJECTION ONCE
Refills: 0 | Status: COMPLETED | OUTPATIENT
Start: 2019-06-02 | End: 2019-06-02

## 2019-06-02 RX ORDER — SODIUM CHLORIDE 9 MG/ML
1000 INJECTION INTRAMUSCULAR; INTRAVENOUS; SUBCUTANEOUS
Refills: 0 | Status: DISCONTINUED | OUTPATIENT
Start: 2019-06-02 | End: 2019-06-03

## 2019-06-02 RX ORDER — PRAMOXINE HYDROCHLORIDE 150 MG/15G
1 AEROSOL, FOAM RECTAL EVERY 4 HOURS
Refills: 0 | Status: DISCONTINUED | OUTPATIENT
Start: 2019-06-02 | End: 2019-06-05

## 2019-06-02 RX ORDER — SODIUM CHLORIDE 9 MG/ML
3 INJECTION INTRAMUSCULAR; INTRAVENOUS; SUBCUTANEOUS EVERY 8 HOURS
Refills: 0 | Status: DISCONTINUED | OUTPATIENT
Start: 2019-06-02 | End: 2019-06-03

## 2019-06-02 RX ADMIN — Medication 10 UNIT(S): at 22:13

## 2019-06-02 RX ADMIN — ERTAPENEM SODIUM 120 MILLIGRAM(S): 1 INJECTION, POWDER, LYOPHILIZED, FOR SOLUTION INTRAMUSCULAR; INTRAVENOUS at 20:09

## 2019-06-02 RX ADMIN — Medication 600 MILLIGRAM(S): at 20:09

## 2019-06-02 RX ADMIN — SODIUM CHLORIDE 125 MILLILITER(S): 9 INJECTION INTRAMUSCULAR; INTRAVENOUS; SUBCUTANEOUS at 03:00

## 2019-06-02 RX ADMIN — Medication 400 MILLIGRAM(S): at 18:09

## 2019-06-02 RX ADMIN — Medication 200 MILLIGRAM(S): at 17:04

## 2019-06-02 RX ADMIN — Medication 0.2 MILLIGRAM(S): at 22:18

## 2019-06-02 RX ADMIN — Medication 100 MILLIGRAM(S): at 16:28

## 2019-06-02 RX ADMIN — Medication 1000 MILLIUNIT(S)/MIN: at 22:15

## 2019-06-02 NOTE — PRE-ANESTHESIA EVALUATION ADULT - NSANTHOSAYNRD_GEN_A_CORE
No. NAYELI screening performed.  STOP BANG Legend: 0-2 = LOW Risk; 3-4 = INTERMEDIATE Risk; 5-8 = HIGH Risk
No. NAYELI screening performed.  STOP BANG Legend: 0-2 = LOW Risk; 3-4 = INTERMEDIATE Risk; 5-8 = HIGH Risk

## 2019-06-02 NOTE — CONSULT NOTE ADULT - ATTENDING COMMENTS
Pt evaluated in L&D with GUZMAN Yi  23 wk pregnant with cervical incompetence s/p rescue cerclage developed amniotic leakage s/p induction and delivery of fetus now has high fever tachycardia and severe sepsis  Change IVF to D5LR for borderline hypoglycemia and metabolic acidosis  Abx to cover for chorioamnionitis, on Ertapenem, will send blood cultures  Transfer to SICU    Spoke to her  and OB attending Pt evaluated in L&D with GUZMAN Yi  23 wk pregnant with cervical incompetence s/p rescue cerclage developed amniotic leakage s/p induction and delivery of fetus now has high fever tachycardia borderline hypotension and severe sepsis  Change IVF to D5LR for borderline hypoglycemia and metabolic acidosis  Abx to cover for chorioamnionitis, on Ertapenem, will send blood cultures  H/o cryptogenic cirrhosis  Transfer to SICU    Spoke to her  and OB attending

## 2019-06-02 NOTE — PRE-ANESTHESIA EVALUATION ADULT - NSANTHADDINFOFT_GEN_ALL_CORE
-pt is high-risk for surgical and anesthetic interventions  -d/w surgeon preferability of Neuraxial vs. GETA in this setting with recommendation of Neuraxial d/t risks associated with GA in a parturient > 15 wga in addition to risks associated with liver disease such as aspiration, hypotension, and bleeding  -d/w OB, MFM, and pt r/b/a of anesthesia provided for bedside cardiac injection of fetus and  vs. OR D&E/?Hysterectomy  -MELD=7 (1.9% risk of 3-mo mortality)  -Child's Class = A (10% risk of mortality for abd sx)  -plan for bedside CSE with dense spinal block and close monitoring of BP in the setting of liver disease  -pt also consented for GA and A-line in case of an emergency

## 2019-06-02 NOTE — CONSULT NOTE ADULT - ASSESSMENT
ERIC DAVILA is a 33y  Female with gestational diabetes, cryptogenic cirrhosis, complicated by Grade 1 Esophageal varices, splenomegaly initially presented on  @ 22weeks gestation, with known cervical insufficiency s/p Amniocentesis (), Braxton Cerclage (), then discharged home(). Patient represents , with Premature rupture of membranes, and likely chorioamnionitis, s/p induction and delivery, with fetal demise.    NEURO: Acute post-procedural pain  - Continue with Dilaudid PCEA for now    RESP: No active issues  - Will encourgae ISS, to prevent atelectasis  - Encourage OOB, ambulation tomorrow    CV: Mild sinus tachycardia, Borderline hypotension, now improved; likely related to sepsis, in the context of chorioamnionitis   - Will continue hemodynamic monitoring, as otherwise appears well perfused    GI: No active issues  - Advance diet as per primary team  - No indication for stress ulcer prophylaxis    RENAL: No active issues  - Will change IVF to D5LR @ 125ml/hr    HEME: No active issues  - Will start SQH, in the setting of epidural catheter, if cleared by primary team    ID: Likely chorioamnionitis, although patient appears improved following delivery, and source control  - Will send blood cultures x 2  - Would continue with Ertapenem    Endo: No active issues    DISPO: Will admit patient to SICU for hemodynamic monitoring x 24hours    Garfield Garcia PA-C  Critical Care Physician Assistant  [facility] #[id number] ERIC DAVILA is a 33y  Female with gestational diabetes, cryptogenic cirrhosis, complicated by Grade 1 Esophageal varices, splenomegaly initially presented on  @ 22weeks gestation, with known cervical insufficiency s/p Amniocentesis (), Braxton Cerclage (), then discharged home(). Patient represents , with Premature rupture of membranes, and likely chorioamnionitis, s/p induction and delivery, with fetal demise.    NEURO: Acute post-procedural pain, resolved  - Judicious use of tylenol with history of Cirrhosis, limit 2grams daily    RESP: No active issues  - Will encourgae ISS, to prevent atelectasis  - Encourage OOB, ambulation tomorrow    CV: Mild sinus tachycardia, Borderline hypotension, now improved; likely related to sepsis, in the context of chorioamnionitis   - Will continue hemodynamic monitoring, as otherwise appears well perfused    GI: No active issues  - Advance diet as per primary team  - No indication for stress ulcer prophylaxis    RENAL: No active issues  - Will change IVF to D5LR @ 125ml/hr    HEME: No active issues  - WIll start Lovenox 40mg daily, if cleared by primary team    ID: Likely chorioamnionitis, although patient appears improved following delivery, and source control  - Will send blood cultures x 2  - Would continue with Ertapenem    Endo: No active issues    DISPO: Will admit patient to SICU for hemodynamic monitoring x 24hours    Garfield Garcia PA-C  Critical Care Physician Assistant  [facility] #[id number]

## 2019-06-02 NOTE — CHART NOTE - NSCHARTNOTEFT_GEN_A_CORE
SICU acceptance and care of this patient is appreciated.   Summary of her history and hospital course from [facility]  Notes. Health information also available in [employer]:    32yo Czech speaking  EDC 10/1/2019 presented on 2019 at 22 3/7 weeks gestational age with short cervix on TVUS found to have dilated cervix. Patient w/PMH of cirrhosis, portal hypertension, esophageal varicies cholestasis, GDMA1.    POB:  P1 -28 week - pt presented w VB to hospital, found to have "dirty fluid and foul smelling" and had PTD.  demise 30 minutes after delivery w/ICU stay after  P2 - At 16 weeks pt developed vaginal bleeding and broke her water, presented to the hospital where she delivered the baby and continued to bleed, required D&C for bleeding.   P3 - 8 week SAB, pt states she "broke her water at 2 months"   P4  TOP for new diagnosis of cirrhosis    All these pregnancies were from her prior marriage, care took place in [facility].  P5 current pregnancy, new , unplanned but desires to continue pregnancy.    This history taken by the resident confirmed on our visit this morning.  PMH: cirrhosis, portal vein hypertension, esophageal varices. Follows w Dr Carvajal, a [facility] Hepatologist  PSH: TOP x 1  All: none  Meds: ursodiol 500 qd (held now), pnv      [facility] EGD(5/15/19): small esophageal varices, given small size and low BP, beta blockers not initiated  Abd Sono (19): cirrhosis, mild splenomegaly  EGK (): NSR  Echo (2019): wnl EF 65%  MRI ():This revealed 0.6 x 0.5 cm lesion in the segment 4B ( previously 0.8 x 0,7 cm), this did not meet the criteria by OPTN/UNOS for HCC, LI-RADS 3 for observation. No new liver lesson was noted.    Bile acids this pregnancy elevated to 41, transaminases normal with bilirubin (total) mild elevation (1.5)   Coagulation profile normal       Hospital course:  2019 Amniocentesis with amnioreduction of 300mL of fluid sent for infectious workup - preliminary negative with glucose 15 and gram stain negative  2019 Ultrasound indicated Braxton cerclage placement under spinal anesthesia, complicated by postoperative rupture of membranes  2019 Intracardiac KCl injection for interruption of pregnancy, spinal/epidural anesthesia placement - required phenylephrine for pressure support several times, cervical cerclage removal, and induction of labor with misoprostol and laminaria placement.   During this process, she became febrile to 39.5C, tachycardic to 115s, O2 saturation to 89% with concern for sepsis. Lactate 2. Treated with Clinda/Gent x 1 dose each, then transitioned to ertapenem. Tylenol/Motrin for fever, fluid hydration.   Vaginal delivery of nonviable fetus and placenta with EBL 150cc. Pitocin 10 units and methergine 0.2mg IM each given once with excellent hemostasis.   Bedside ultrasound showed thin endometrial stripe, no concern for retained products and no blood collection in endometrial cavity.     Thank you again for excellent care.   Please contact with any questions [phone]  Kriss Westfall MD

## 2019-06-02 NOTE — PRE-ANESTHESIA EVALUATION ADULT - NSANTHPMHFT_GEN_ALL_CORE
33  @ 22+3wga s/p cerclage with spontaneous /fetal demise for vaginal delivery vs. D&E  PMHx: crypotgenic liver cirrhosis c/b portalHTN, esophageal varices, paraumbilical varices, and splenomegaly.  No ascites.  gDM.  HSV2.  PSHx: D&C    Denies SOB or CP.  >4 METS.  Denies recent URI symptoms.

## 2019-06-02 NOTE — CONSULT NOTE ADULT - SUBJECTIVE AND OBJECTIVE BOX
HISTORY OF PRESENT ILLNESS:  ERIC DAVILA is a 33y  Female with gestational diabetes, cryptogenic cirrhosis, complicated by Grade 1 Esophageal varices, splenomegaly initially presented on  @ 22weeks gestation, with known cervical insufficiency s/p Amniocentesis, 300ml (), Braxton Cerclage (), complicated by Premature rupture of membranes, associated with fever, 38.4 Centigrade, sinus tachycardia (HR-100s), hypoxemia (SpO2- 88%), requiring supplemental O2. Patient received Gentamycin, Clindamycin, which was eventually transitioned to Ertapenem, due to worsening fever curve. Patient subsequently induced, with vaginal delivery of non-viable fetus.   Patient seen in Labor and delivery, Rm#10. Patient denies HA, blurry vision, dyspnea, CP, Abd pain, N/V, chills.  Other than above stated, remainder of ROS is negative.     PAST MEDICAL HISTORY: Cirrhosis  Gestational diabetes       PAST SURGICAL HISTORY: No significant past surgical history  No significant past surgical history      FAMILY HISTORY: No pertinent family history in first degree relatives      SOCIAL HISTORY: Denies ETOH, smoking.    CODE STATUS: Full Code    HOME MEDICATIONS:  Ursodiol  Prenatal vitamin     ALLERGIES: No Known Allergies      VITAL SIGNS:  ICU Vital Signs Last 24 Hrs  T(C): 36 (2019 10:10), Max: 36 (2019 10:10)  T(F): --  HR: 94 (2019 10:10) (94 - 94)  BP: 94/57 (2019 10:10) (94/57 - 94/57)  BP(mean): --  ABP: --  ABP(mean): --  RR: 20 (2019 10:10) (20 - 20)  SpO2: 96% (2019 10:10) (96% - 96%)      NEURO  Exam: Alert, No acute distress, Oriented x 4  acetaminophen   Tablet .. 975 milliGRAM(s) Oral once  diphenhydrAMINE 25 milliGRAM(s) Oral every 6 hours PRN Pruritus  LORazepam     Tablet 0.5 milliGRAM(s) Oral once      RESPIRATORY  Mechanical Ventilation:   ABG - ( 2019 17:50 )  pH: x     /  pCO2: x     /  pO2: x     / HCO3: x     / Base Excess: x     /  SaO2: x       Lactate: 2.0    Exam: Clear to auscultation bilaterally, without wheeze or rhonchi      CARDIOVASCULAR  Exam: No murmur, Appears well perfused with normal mentation, warm extremities  Cardiac Rhythm: Sinus Rhythm       GI/NUTRITION  Exam: Soft, Non-tender, Non distended  Diet: CLD  docusate sodium 100 milliGRAM(s) Oral two times a day PRN For stool softening  glycerin Suppository - Adult 1 Suppository(s) Rectal at bedtime PRN Constipation  magnesium hydroxide Suspension 30 milliLiter(s) Oral two times a day PRN Constipation  simethicone 80 milliGRAM(s) Chew every 4 hours PRN Gas      GENITOURINARY/RENAL  oxytocin Infusion 333.333 milliUNIT(s)/Min IV Continuous <Continuous>  sodium chloride 0.9% lock flush 3 milliLiter(s) IV Push every 8 hours  sodium chloride 0.9%. 1000 milliLiter(s) IV Continuous <Continuous>       @ 07:01  -   @ 07:00  --------------------------------------------------------  IN:    lactated ringers.: 1375 mL  Total IN: 1375 mL    OUT:    Voided: 400 mL  Total OUT: 400 mL    Total NET: 975 mL       @ 07:01  -   @ 23:34  --------------------------------------------------------  IN:    sodium chloride 0.9%.: 1625 mL  Total IN: 1625 mL    OUT:    Indwelling Catheter - Urethral: 900 mL  Total OUT: 900 mL    Total NET: 725 mL        Weight (kg): 51.7 ( @ 10:10)      138  |  104  |  5<L>  ----------------------------<  108<H>  3.6   |  20<L>  |  0.50    Ca    8.4      2019 17:50    TPro  6.2  /  Alb  3.3  /  TBili  1.5<H>  /  DBili  x   /  AST  18  /  ALT  11  /  AlkPhos  112      [x ] Lucas catheter, indication: urine output monitoring in critically ill patient    HEMATOLOGIC  [ ] VTE Prophylaxis:                          11.5   7.8   )-----------( 103      ( 2019 17:50 )             33.6     PT/INR - ( 2019 17:50 )   PT: 11.7 sec;   INR: 1.02 ratio         PTT - ( 2019 17:50 )  PTT:37.3 sec  Transfusion: [ ] PRBC	[ ] Platelets	[ ] FFP	[ ] Cryoprecipitate      INFECTIOUS DISEASES  diphtheria/tetanus/pertussis (acellular) Vaccine (ADAcel) 0.5 milliLiter(s) IntraMuscular once  ertapenem  IVPB        RECENT CULTURES:  Specimen Source: .Body Fluid amnioitic fluid  Date/Time:  @ 05:39  Culture Results:   No growth  Gram Stain:   No polymorphonuclear cells seen  No organisms seen  by cytocentrifuge  Organism: --      ENDOCRINE    CAPILLARY BLOOD GLUCOSE      POCT Blood Glucose.: 101 mg/dL (2019 21:53)  POCT Blood Glucose.: 94 mg/dL (2019 15:19)  POCT Blood Glucose.: 84 mg/dL (2019 07:58)  POCT Blood Glucose.: 90 mg/dL (2019 02:01)      PATIENT CARE ACCESS DEVICES:  [x ] Peripheral IV  [ ] Central Venous Line	[ ] R	[ ] L	[ ] IJ	[ ] Fem	[ ] SC	Placed:   [ ] Arterial Line		[ ] R	[ ] L	[ ] Fem	[ ] Rad	[ ] Ax	Placed:   [ ] PICC:					[ ] Mediport  [x ] Urinary Catheter, Date Placed:   [x] Necessity of urinary, arterial, and venous catheters discussed

## 2019-06-02 NOTE — PRE-ANESTHESIA EVALUATION ADULT - NSRADCARDRESULTSFT_GEN_ALL_CORE
She was in the office the end of June and she has appt for follow up in December EKG: pending  ECHO: EF =  65%, WNL

## 2019-06-03 DIAGNOSIS — A41.9 SEPSIS, UNSPECIFIED ORGANISM: ICD-10-CM

## 2019-06-03 LAB
ANION GAP SERPL CALC-SCNC: 14 MMOL/L — SIGNIFICANT CHANGE UP (ref 5–17)
APTT BLD: 36.9 SEC — HIGH (ref 27.5–36.3)
BASOPHILS # BLD AUTO: 0 K/UL — SIGNIFICANT CHANGE UP (ref 0–0.2)
BASOPHILS NFR BLD AUTO: 0 % — SIGNIFICANT CHANGE UP (ref 0–2)
BUN SERPL-MCNC: 7 MG/DL — SIGNIFICANT CHANGE UP (ref 7–23)
CALCIUM SERPL-MCNC: 8.1 MG/DL — LOW (ref 8.4–10.5)
CHLORIDE SERPL-SCNC: 104 MMOL/L — SIGNIFICANT CHANGE UP (ref 96–108)
CO2 SERPL-SCNC: 18 MMOL/L — LOW (ref 22–31)
CREAT SERPL-MCNC: 0.5 MG/DL — SIGNIFICANT CHANGE UP (ref 0.5–1.3)
EOSINOPHIL # BLD AUTO: 0.1 K/UL — SIGNIFICANT CHANGE UP (ref 0–0.5)
EOSINOPHIL NFR BLD AUTO: 0.8 % — SIGNIFICANT CHANGE UP (ref 0–6)
GLUCOSE SERPL-MCNC: 82 MG/DL — SIGNIFICANT CHANGE UP (ref 70–99)
HBV CORE IGM SER-ACNC: REACTIVE
HBV SURFACE AB SER-ACNC: SIGNIFICANT CHANGE UP
HBV SURFACE AG SER-ACNC: SIGNIFICANT CHANGE UP
HCT VFR BLD CALC: 32.8 % — LOW (ref 34.5–45)
HCT VFR BLD CALC: 33.3 % — LOW (ref 34.5–45)
HGB BLD-MCNC: 11.2 G/DL — LOW (ref 11.5–15.5)
HGB BLD-MCNC: 11.3 G/DL — LOW (ref 11.5–15.5)
INR BLD: 1.06 RATIO — SIGNIFICANT CHANGE UP (ref 0.88–1.16)
LACTATE SERPL-SCNC: 1.2 MMOL/L — SIGNIFICANT CHANGE UP (ref 0.7–2)
LYMPHOCYTES # BLD AUTO: 1.1 K/UL — SIGNIFICANT CHANGE UP (ref 1–3.3)
LYMPHOCYTES # BLD AUTO: 14.6 % — SIGNIFICANT CHANGE UP (ref 13–44)
MAGNESIUM SERPL-MCNC: 1.6 MG/DL — SIGNIFICANT CHANGE UP (ref 1.6–2.6)
MCHC RBC-ENTMCNC: 30.7 PG — SIGNIFICANT CHANGE UP (ref 27–34)
MCHC RBC-ENTMCNC: 31.7 PG — SIGNIFICANT CHANGE UP (ref 27–34)
MCHC RBC-ENTMCNC: 33.5 GM/DL — SIGNIFICANT CHANGE UP (ref 32–36)
MCHC RBC-ENTMCNC: 34.4 GM/DL — SIGNIFICANT CHANGE UP (ref 32–36)
MCV RBC AUTO: 91.8 FL — SIGNIFICANT CHANGE UP (ref 80–100)
MCV RBC AUTO: 92.3 FL — SIGNIFICANT CHANGE UP (ref 80–100)
MONOCYTES # BLD AUTO: 0.7 K/UL — SIGNIFICANT CHANGE UP (ref 0–0.9)
MONOCYTES NFR BLD AUTO: 9.5 % — SIGNIFICANT CHANGE UP (ref 2–14)
NEUTROPHILS # BLD AUTO: 5.7 K/UL — SIGNIFICANT CHANGE UP (ref 1.8–7.4)
NEUTROPHILS NFR BLD AUTO: 75.1 % — SIGNIFICANT CHANGE UP (ref 43–77)
PHOSPHATE SERPL-MCNC: 4.1 MG/DL — SIGNIFICANT CHANGE UP (ref 2.5–4.5)
PLATELET # BLD AUTO: 101 K/UL — LOW (ref 150–400)
PLATELET # BLD AUTO: 93 K/UL — LOW (ref 150–400)
POTASSIUM SERPL-MCNC: 3.3 MMOL/L — LOW (ref 3.5–5.3)
POTASSIUM SERPL-SCNC: 3.3 MMOL/L — LOW (ref 3.5–5.3)
PROCALCITONIN SERPL-MCNC: 0.12 NG/ML — HIGH (ref 0.02–0.1)
PROTHROM AB SERPL-ACNC: 12.2 SEC — SIGNIFICANT CHANGE UP (ref 10–12.9)
RBC # BLD: 3.55 M/UL — LOW (ref 3.8–5.2)
RBC # BLD: 3.63 M/UL — LOW (ref 3.8–5.2)
RBC # FLD: 12.5 % — SIGNIFICANT CHANGE UP (ref 10.3–14.5)
RBC # FLD: 12.6 % — SIGNIFICANT CHANGE UP (ref 10.3–14.5)
SODIUM SERPL-SCNC: 136 MMOL/L — SIGNIFICANT CHANGE UP (ref 135–145)
WBC # BLD: 7.6 K/UL — SIGNIFICANT CHANGE UP (ref 3.8–10.5)
WBC # BLD: 7.6 K/UL — SIGNIFICANT CHANGE UP (ref 3.8–10.5)
WBC # FLD AUTO: 7.6 K/UL — SIGNIFICANT CHANGE UP (ref 3.8–10.5)
WBC # FLD AUTO: 7.6 K/UL — SIGNIFICANT CHANGE UP (ref 3.8–10.5)

## 2019-06-03 PROCEDURE — 99222 1ST HOSP IP/OBS MODERATE 55: CPT | Mod: GC

## 2019-06-03 PROCEDURE — 99231 SBSQ HOSP IP/OBS SF/LOW 25: CPT | Mod: 25

## 2019-06-03 RX ORDER — CHLORHEXIDINE GLUCONATE 213 G/1000ML
1 SOLUTION TOPICAL
Refills: 0 | Status: DISCONTINUED | OUTPATIENT
Start: 2019-06-03 | End: 2019-06-03

## 2019-06-03 RX ORDER — ENOXAPARIN SODIUM 100 MG/ML
40 INJECTION SUBCUTANEOUS EVERY 24 HOURS
Refills: 0 | Status: DISCONTINUED | OUTPATIENT
Start: 2019-06-03 | End: 2019-06-05

## 2019-06-03 RX ORDER — SODIUM CHLORIDE 9 MG/ML
1000 INJECTION, SOLUTION INTRAVENOUS
Refills: 0 | Status: DISCONTINUED | OUTPATIENT
Start: 2019-06-03 | End: 2019-06-03

## 2019-06-03 RX ORDER — ACETAMINOPHEN 500 MG
650 TABLET ORAL EVERY 8 HOURS
Refills: 0 | Status: DISCONTINUED | OUTPATIENT
Start: 2019-06-03 | End: 2019-06-03

## 2019-06-03 RX ORDER — FAMOTIDINE 10 MG/ML
20 INJECTION INTRAVENOUS ONCE
Refills: 0 | Status: DISCONTINUED | OUTPATIENT
Start: 2019-06-03 | End: 2019-06-05

## 2019-06-03 RX ORDER — ACETAMINOPHEN 500 MG
650 TABLET ORAL EVERY 8 HOURS
Refills: 0 | Status: DISCONTINUED | OUTPATIENT
Start: 2019-06-03 | End: 2019-06-05

## 2019-06-03 RX ORDER — POTASSIUM CHLORIDE 20 MEQ
20 PACKET (EA) ORAL
Refills: 0 | Status: COMPLETED | OUTPATIENT
Start: 2019-06-03 | End: 2019-06-03

## 2019-06-03 RX ORDER — OXYTOCIN 10 UNIT/ML
10 VIAL (ML) INJECTION ONCE
Refills: 0 | Status: COMPLETED | OUTPATIENT
Start: 2019-06-03 | End: 2019-06-02

## 2019-06-03 RX ORDER — MAGNESIUM SULFATE 500 MG/ML
2 VIAL (ML) INJECTION ONCE
Refills: 0 | Status: COMPLETED | OUTPATIENT
Start: 2019-06-03 | End: 2019-06-03

## 2019-06-03 RX ORDER — ACETAMINOPHEN 500 MG
975 TABLET ORAL EVERY 6 HOURS
Refills: 0 | Status: DISCONTINUED | OUTPATIENT
Start: 2019-06-03 | End: 2019-06-03

## 2019-06-03 RX ADMIN — Medication 50 GRAM(S): at 04:15

## 2019-06-03 RX ADMIN — Medication 20 MILLIEQUIVALENT(S): at 07:28

## 2019-06-03 RX ADMIN — ENOXAPARIN SODIUM 40 MILLIGRAM(S): 100 INJECTION SUBCUTANEOUS at 06:08

## 2019-06-03 RX ADMIN — Medication 20 MILLIEQUIVALENT(S): at 06:09

## 2019-06-03 RX ADMIN — ERTAPENEM SODIUM 120 MILLIGRAM(S): 1 INJECTION, POWDER, LYOPHILIZED, FOR SOLUTION INTRAMUSCULAR; INTRAVENOUS at 21:59

## 2019-06-03 RX ADMIN — CHLORHEXIDINE GLUCONATE 1 APPLICATION(S): 213 SOLUTION TOPICAL at 06:09

## 2019-06-03 RX ADMIN — Medication 20 MILLIEQUIVALENT(S): at 04:42

## 2019-06-03 NOTE — CONSULT NOTE ADULT - SUBJECTIVE AND OBJECTIVE BOX
HPI:      PAST MEDICAL & SURGICAL HISTORY:  Cirrhosis  No significant past surgical history      Allergies    No Known Allergies    Intolerances        ANTIMICROBIALS:  ertapenem  IVPB    ertapenem  IVPB 1000 every 24 hours      OTHER MEDS:  acetaminophen   Tablet .. 650 milliGRAM(s) Oral every 8 hours PRN  benzocaine 20%/menthol 0.5% Spray 1 Spray(s) Topical every 6 hours PRN  chlorhexidine 4% Liquid 1 Application(s) Topical <User Schedule>  dextrose 5% + lactated ringers. 1000 milliLiter(s) IV Continuous <Continuous>  dibucaine 1% Ointment 1 Application(s) Topical every 6 hours PRN  diphenhydrAMINE 25 milliGRAM(s) Oral every 6 hours PRN  diphtheria/tetanus/pertussis (acellular) Vaccine (ADAcel) 0.5 milliLiter(s) IntraMuscular once  docusate sodium 100 milliGRAM(s) Oral two times a day PRN  enoxaparin Injectable 40 milliGRAM(s) SubCutaneous every 24 hours  glycerin Suppository - Adult 1 Suppository(s) Rectal at bedtime PRN  hydrocortisone 1% Cream 1 Application(s) Topical every 6 hours PRN  lanolin Ointment 1 Application(s) Topical every 6 hours PRN  magnesium hydroxide Suspension 30 milliLiter(s) Oral two times a day PRN  pramoxine 1%/zinc 5% Cream 1 Application(s) Topical every 4 hours PRN  simethicone 80 milliGRAM(s) Chew every 4 hours PRN  witch hazel Pads 1 Application(s) Topical every 4 hours PRN      SOCIAL HISTORY:    no smoking, alcohol or drug abuse  no recent travel    FAMILY HISTORY:  No pertinent family history in first degree relatives      ROS:  Unobtainable because:   All other systems negative     Constitutional: no fever, no chills, no weight loss, no night sweats  Eye: no eye pain, no redness, no vision changes  ENT:  no sore throat, no rhinorrhea  Cardiovascular:  no chest pain, no palpitation  Respiratory:  no SOB, no cough  GI:  no abd pain, no vomiting, no diarrhea  urinary: no dysuria, no hematuria, no flank pain  : no  discharge or bleeding  musculoskeletal:  no joint pain, no joint swelling  skin:  no rash  neurology:  no headache, no seizure, no change in mental status  psych: no anxiety, no depression     Physical Exam:    General:    NAD, non toxic  Head: atraumatic, normocephalic  Eyes: normal sclera and conjunctiva  ENT:   no oropharyngeal lesions, no LAD, neck supple  Cardio:    regular S1,S2, no murmur  Respiratory:   clear b/l, no wheezing  abd:   soft, BS +, not tender, no hepatosplenomegaly  :     no CVAT, no suprapubic tenderness, no salamanca  Musculoskeletal : no joint swelling, no edema  Skin:    no rash  vascular: no phlebitis, normal pulses  Neurologic:     no focal deficits  psych: normal affect, no suicidal ideation      Drug Dosing Weight  Height (cm): 160.02 (02 Jun 2019 10:10)  Weight (kg): 51.7 (02 Jun 2019 10:10)  BMI (kg/m2): 20.2 (02 Jun 2019 10:10)  BSA (m2): 1.52 (02 Jun 2019 10:10)    Vital Signs Last 24 Hrs  T(F): 97.8 (06-03-19 @ 07:00), Max: 101.5 (06-02-19 @ 22:40)    Vital Signs Last 24 Hrs  HR: 74 (06-03-19 @ 08:00) (68 - 101)  BP: 95/66 (06-03-19 @ 08:00) (89/58 - 108/77)  RR: 15 (06-03-19 @ 08:00)  SpO2: 97% (06-03-19 @ 08:00) (94% - 99%)  Wt(kg): --                          11.3   7.6   )-----------( 93       ( 03 Jun 2019 03:26 )             32.8       06-03    136  |  104  |  7   ----------------------------<  82  3.3<L>   |  18<L>  |  0.50    Ca    8.1<L>      03 Jun 2019 03:26  Phos  4.1     06-03  Mg     1.6     06-03    TPro  6.2  /  Alb  3.3  /  TBili  1.5<H>  /  DBili  x   /  AST  18  /  ALT  11  /  AlkPhos  112  06-02          MICROBIOLOGY:  v  .Body Fluid amnioitic fluid  06-01-19   No growth  --    No polymorphonuclear cells seen  No organisms seen  by cytocentrifuge      .Urine  05-11-19   <10,000 CFU/mL Normal Urogenital Ashley  --  --                  RADIOLOGY:    Images below reviewed personally HPI:  33F was pregnant at 22 weeks with cervical insufficiency for which she underwent amnioreduction 5/31 and cerclage 6/1, course complicated by PPROM. She underwent KCl now s/p delivery (fetal demise) and development of sepsis 101.5F on 6/2. She received one dose of Gentamicin and Clindamycin 6/2 evening and now is on Ertapenem. Blood and placental cultures in lab.   placental and blood cultures in lab (after antibiotics given)   low platelets     PAST MEDICAL & SURGICAL HISTORY:  Cirrhosis  No significant past surgical history      Allergies    No Known Allergies    Intolerances        ANTIMICROBIALS:  ertapenem  IVPB    ertapenem  IVPB 1000 every 24 hours      OTHER MEDS:  acetaminophen   Tablet .. 650 milliGRAM(s) Oral every 8 hours PRN  benzocaine 20%/menthol 0.5% Spray 1 Spray(s) Topical every 6 hours PRN  chlorhexidine 4% Liquid 1 Application(s) Topical <User Schedule>  dextrose 5% + lactated ringers. 1000 milliLiter(s) IV Continuous <Continuous>  dibucaine 1% Ointment 1 Application(s) Topical every 6 hours PRN  diphenhydrAMINE 25 milliGRAM(s) Oral every 6 hours PRN  diphtheria/tetanus/pertussis (acellular) Vaccine (ADAcel) 0.5 milliLiter(s) IntraMuscular once  docusate sodium 100 milliGRAM(s) Oral two times a day PRN  enoxaparin Injectable 40 milliGRAM(s) SubCutaneous every 24 hours  glycerin Suppository - Adult 1 Suppository(s) Rectal at bedtime PRN  hydrocortisone 1% Cream 1 Application(s) Topical every 6 hours PRN  lanolin Ointment 1 Application(s) Topical every 6 hours PRN  magnesium hydroxide Suspension 30 milliLiter(s) Oral two times a day PRN  pramoxine 1%/zinc 5% Cream 1 Application(s) Topical every 4 hours PRN  simethicone 80 milliGRAM(s) Chew every 4 hours PRN  witch hazel Pads 1 Application(s) Topical every 4 hours PRN      SOCIAL HISTORY:    no smoking, alcohol or drug abuse  no recent travel    FAMILY HISTORY:  No pertinent family history in first degree relatives      ROS:  Unobtainable because:   All other systems negative     Constitutional: no fever, no chills, no weight loss, no night sweats  Eye: no eye pain, no redness, no vision changes  ENT:  no sore throat, no rhinorrhea  Cardiovascular:  no chest pain, no palpitation  Respiratory:  no SOB, no cough  GI:  no abd pain, no vomiting, no diarrhea  urinary: no dysuria, no hematuria, no flank pain  : no  discharge or bleeding  musculoskeletal:  no joint pain, no joint swelling  skin:  no rash  neurology:  no headache, no seizure, no change in mental status  psych: no anxiety, no depression     Physical Exam:    General:    NAD, non toxic  Head: atraumatic, normocephalic  Eyes: normal sclera and conjunctiva  ENT:   no oropharyngeal lesions, no LAD, neck supple  Cardio:    regular S1,S2, no murmur  Respiratory:   clear b/l, no wheezing  abd:   soft, BS +, not tender, no hepatosplenomegaly  :     no CVAT, no suprapubic tenderness, no salamanca  Musculoskeletal : no joint swelling, no edema  Skin:    no rash  vascular: no phlebitis, normal pulses  Neurologic:     no focal deficits  psych: normal affect, no suicidal ideation      Drug Dosing Weight  Height (cm): 160.02 (02 Jun 2019 10:10)  Weight (kg): 51.7 (02 Jun 2019 10:10)  BMI (kg/m2): 20.2 (02 Jun 2019 10:10)  BSA (m2): 1.52 (02 Jun 2019 10:10)    Vital Signs Last 24 Hrs  T(F): 97.8 (06-03-19 @ 07:00), Max: 101.5 (06-02-19 @ 22:40)    Vital Signs Last 24 Hrs  HR: 74 (06-03-19 @ 08:00) (68 - 101)  BP: 95/66 (06-03-19 @ 08:00) (89/58 - 108/77)  RR: 15 (06-03-19 @ 08:00)  SpO2: 97% (06-03-19 @ 08:00) (94% - 99%)  Wt(kg): --                          11.3   7.6   )-----------( 93       ( 03 Jun 2019 03:26 )             32.8       06-03    136  |  104  |  7   ----------------------------<  82  3.3<L>   |  18<L>  |  0.50    Ca    8.1<L>      03 Jun 2019 03:26  Phos  4.1     06-03  Mg     1.6     06-03    TPro  6.2  /  Alb  3.3  /  TBili  1.5<H>  /  DBili  x   /  AST  18  /  ALT  11  /  AlkPhos  112  06-02          MICROBIOLOGY:  v  .Body Fluid amnioitic fluid  06-01-19   No growth  --    No polymorphonuclear cells seen  No organisms seen  by cytocentrifuge      .Urine  05-11-19   <10,000 CFU/mL Normal Urogenital Ashley  --  --                  RADIOLOGY:    Images below reviewed personally HPI:  33F was pregnant at 22 weeks with cervical insufficiency for which she underwent amnioreduction 5/31 and cerclage 6/1, course complicated by PPROM. Intracardiac KCl for termination of pregnancy was performed and she underwent induction of labor 6/2 with a nonviable fetus and placenta without concern for retained products. During induction she developed sepsis 101.5F and received one dose of Gentamicin and Clindamycin, now on Ertapenem. Blood and placental cultures in lab.   [place] chills last night with the fever but feels well now. No abdominal pain. Small amount of vaginal bleeding. No cough. No diarrhea.   This is her fifth lost pregnancy. Prior pregnancies complicated by bleeding or PPROM.   History of liver cirrhosis of unclear cause. Never drank alcohol. No family history of liver disease. Hep B core positive but antigen negative.     PAST MEDICAL & SURGICAL HISTORY:  Cirrhosis  No significant past surgical history    Allergies    No Known Allergies    Intolerances        ANTIMICROBIALS:  ertapenem  IVPB    ertapenem  IVPB 1000 every 24 hours      OTHER MEDS:  acetaminophen   Tablet .. 650 milliGRAM(s) Oral every 8 hours PRN  benzocaine 20%/menthol 0.5% Spray 1 Spray(s) Topical every 6 hours PRN  chlorhexidine 4% Liquid 1 Application(s) Topical <User Schedule>  dextrose 5% + lactated ringers. 1000 milliLiter(s) IV Continuous <Continuous>  dibucaine 1% Ointment 1 Application(s) Topical every 6 hours PRN  diphenhydrAMINE 25 milliGRAM(s) Oral every 6 hours PRN  diphtheria/tetanus/pertussis (acellular) Vaccine (ADAcel) 0.5 milliLiter(s) IntraMuscular once  docusate sodium 100 milliGRAM(s) Oral two times a day PRN  enoxaparin Injectable 40 milliGRAM(s) SubCutaneous every 24 hours  glycerin Suppository - Adult 1 Suppository(s) Rectal at bedtime PRN  hydrocortisone 1% Cream 1 Application(s) Topical every 6 hours PRN  lanolin Ointment 1 Application(s) Topical every 6 hours PRN  magnesium hydroxide Suspension 30 milliLiter(s) Oral two times a day PRN  pramoxine 1%/zinc 5% Cream 1 Application(s) Topical every 4 hours PRN  simethicone 80 milliGRAM(s) Chew every 4 hours PRN  witch hazel Pads 1 Application(s) Topical every 4 hours PRN      SOCIAL HISTORY: Born in [facility]. No alcohol. No smoking. Lives with her . Not working.     FAMILY HISTORY:  No history of GI or liver disease in family members.     ROS:  All other systems negative   Constitutional: fever and chills last night   Eye: no vision changes  ENT:  no sore throat, no rhinorrhea  Cardiovascular:  no chest pain, no palpitation  Respiratory:  no SOB, no cough  GI:  some tightness in her stomach when she swallows but no pain on swallowing. no abd pain, no vomiting, no diarrhea  urinary: salamanca catheter but did not have dysuria prior   GYN: mild vaginal bleeding/discharge   musculoskeletal: no joint pain, no joint swelling  skin:  no rash  neurology:  no headache   psych: mood stable     Physical Exam:  General:  NAD, non toxic  Head: atraumatic, normocephalic  Eyes: normal sclera and conjunctiva  ENT:   no oropharyngeal lesions. tonsilar adenopathy, nontender. neck supple  Cardio:    regular S1,S2, no murmur  Respiratory:   nonlabored on room air. clear b/l, no wheezing  abd: soft, slightly distended, BS +, not tender, no hepatosplenomegaly  :  salamanca with yellow urine   Musculoskeletal: no joint swelling. mild pitting edema to legs   Skin:  no rash  vascular: no phlebitis, normal pulses  Neurologic:     no focal deficits  psych: normal affect       Drug Dosing Weight  Height (cm): 160.02 (02 Jun 2019 10:10)  Weight (kg): 51.7 (02 Jun 2019 10:10)  BMI (kg/m2): 20.2 (02 Jun 2019 10:10)  BSA (m2): 1.52 (02 Jun 2019 10:10)    Vital Signs Last 24 Hrs  T(F): 97.8 (06-03-19 @ 07:00), Max: 101.5 (06-02-19 @ 22:40)    Vital Signs Last 24 Hrs  HR: 74 (06-03-19 @ 08:00) (68 - 101)  BP: 95/66 (06-03-19 @ 08:00) (89/58 - 108/77)  RR: 15 (06-03-19 @ 08:00)  SpO2: 97% (06-03-19 @ 08:00) (94% - 99%)  Wt(kg): --                          11.3   7.6   )-----------( 93       ( 03 Jun 2019 03:26 )             32.8       06-03    136  |  104  |  7   ----------------------------<  82  3.3<L>   |  18<L>  |  0.50    Ca    8.1<L>      03 Jun 2019 03:26  Phos  4.1     06-03  Mg     1.6     06-03    TPro  6.2  /  Alb  3.3  /  TBili  1.5<H>  /  DBili  x   /  AST  18  /  ALT  11  /  AlkPhos  112  06-02          MICROBIOLOGY:  Culture - Body Fluid with Gram Stain (06.01.19 @ 05:39)    Gram Stain:   No polymorphonuclear cells seen  No organisms seen  by cytocentrifuge    Specimen Source: .Body Fluid amnioitic fluid    Culture Results:   No growth    Culture - Urine (05.11.19 @ 06:59)    Specimen Source: .Urine    Culture Results:   <10,000 CFU/mL Normal Urogenital Ashley    RADIOLOGY:  US Abdomen Complete (05.29.19 @ 15:51)   Diffusely coarsened, heterogeneous echotexture of the liver and enlarged caudate lobe, consistent with cirrhosis.  Mild splenomegaly.  Multiple varices in the splenic hilum and recanalization of the   paraumbilical vein. Hepatopedal flow in the main, right, and left portal   veins. Variable direction of flow is seen within the splenic vein.

## 2019-06-03 NOTE — PROGRESS NOTE ADULT - ASSESSMENT
A/P: 33y P0 w/ PMH of cirrhosis/portal HTN/esophageal varices now PPD#1 s/p elective termination at 22w5d for previable PPROM.   Patient had significant prenatal history of cervical insufficiency s/p amnioreduction on 5/31 in anticipation of cerclage placement 6/1.  Patient received cerclage 6/1 w/ subsequent PPROM.   Patient underwent KCL injection on 6/2 AM w/ subseqent cytotec induction of labor.  Induction c/b sepsis w/ maternal fever and tachycardia.   Patient currently on IV antibiotics w/ close maternal vital sign monitoring.   Tachycardia resolving w/ IV fluids.   Patient accepted for transfer to SICU for closer monitoring.

## 2019-06-03 NOTE — PROGRESS NOTE ADULT - PROBLEM SELECTOR PLAN 1
Neuro: PO pain meds prn  CV: Hemodynamically stable.  H/H stable.    Pulm: Saturating well on room air, encourage oob/amb  GI: NPO while being closely monitored.  Will advance diet as clinically improved.   : Lucas in place for strict I&O's.    Heme: c/w SCDs for DVT ppx.  Consider adding HSQ due to PP state and infection.   FEN: LR@125.  replete electrolytes prn   ID: -Sepsis:  Likely 2/2 intra-amniotic infection.  Now resolving s/p delviery of placenta.  f/u Bcx.  f/u placental cultures.  Continue Invanz.  Continue close vital sign monitoring.  Continue fluid resuscitation.  appreciate SICU care.   Endo: No active issues   Dispo: Appreciate excellent SICU care.     Tati Vang PGY-3

## 2019-06-03 NOTE — CONSULT NOTE ADULT - ATTENDING COMMENTS
Patient seen and examined  Above verified  Agree with above unless as noted below.  33F admitted 5/31/19 for management of cervical insufficiency at 22 weeks gestation, underwent cerclage 6/1, complicated by PPROM, induced labor 6/2 with nonviable fetus and developed sepsis 101.5F. Beside sono without concern for retained products. Received one dose of Gentamicin and Clindamycin 6/2, now on Ertapenem.   Suspect endometritis. No other clear source for an infection. Appears to be doing well now.   Also has cirrhosis of unclear etiology, splenomegaly, low platelets. HBV core positive, antigen negative.     Recommend  -continue Ertapenem 1GM IV q24h for now  -f/u placental and blood cultures   -if clinically worsens, CT chest angiogram (amniotic emboli) and abdomen pelvis (retained products), repeat blood cultures and broaden to Vancomycin and Meropenem       Will tailor plan for ID issues  per course,results.Will defer to primary team on management of other issues.  case d/w SICU team and GYN  Will Follow.  Beeper [id number]/afterhours/No response-[id number]

## 2019-06-03 NOTE — PROGRESS NOTE ADULT - SUBJECTIVE AND OBJECTIVE BOX
HISTORY  ERIC DAVILA is a 33y  Female with gestational diabetes, cryptogenic cirrhosis, complicated by Grade 1 Esophageal varices, splenomegaly initially presented on  @ 22weeks gestation, with known cervical insufficiency s/p Amniocentesis, 300ml (), Braxton Cerclage (), complicated by Premature rupture of membranes, associated with fever, 38.4 Centigrade, sinus tachycardia (HR-100s), hypoxemia (SpO2- 88%), requiring supplemental O2. Patient received Gentamycin, Clindamycin, which was eventually transitioned to Ertapenem, due to worsening fever curve. Patient subsequently induced, with vaginal delivery of non-viable fetus.   Patient seen in Labor and delivery, Rm#10. Patient denies HA, blurry vision, dyspnea, CP, Abd pain, N/V, chills.  Other than above stated, remainder of ROS is negative.       24 HOUR EVENTS: Changed IVF to D5LR @ 125ml/hr  - Blood cultures x 2 sent    SUBJECTIVE/ROS:  [x ] A ten-point review of systems was otherwise negative except as noted.  [ ] Due to altered mental status/intubation, subjective information were not able to be obtained from the patient. History was obtained, to the extent possible, from review of the chart and collateral sources of information.      NEURO  RASS:     GCS:     CAM ICU:  Exam: awake, alert, oriented x 4, NAD  Meds: diphenhydrAMINE 25 milliGRAM(s) Oral every 6 hours PRN Pruritus    [x] Adequacy of sedation and pain control has been assessed and adjusted      RESPIRATORY  RR: 25 (19 @ 03:00) (16 - 25)  SpO2: 97% (19 @ 03:00) (94% - 99%)  Wt(kg): --  Exam: unlabored, clear to auscultation bilaterally  Mechanical Ventilation:   ABG - ( 2019 01:26 )  pH: x     /  pCO2: x     /  pO2: x     / HCO3: x     / Base Excess: x     /  SaO2: x       Lactate: 1.2              [N/A] Extubation Readiness Assessed  Meds:       CARDIOVASCULAR  HR: 94 (19 @ 03:00) (87 - 101)  BP: 107/66 (19 @ 03:00) (94/57 - 108/77)  BP(mean): 78 (19 @ 03:00) (78 - 78)  ABP: --  ABP(mean): --  Wt(kg): --  CVP(cm H2O): --    Lactate, Blood: 1.2 mmol/L ( @ 01:26)    Exam: regular rate and rhythm  Cardiac Rhythm: sinus  Perfusion     [x]Adequate   [ ]Inadequate  Mentation   [x]Normal       [ ]Reduced  Extremities  [x]Warm         [ ]Cool  Volume Status [ ]Hypervolemic [x]Euvolemic [ ]Hypovolemic  Meds:       GI/NUTRITION  Exam: soft, nontender, nondistended  Diet: NPO  Meds: docusate sodium 100 milliGRAM(s) Oral two times a day PRN For stool softening  glycerin Suppository - Adult 1 Suppository(s) Rectal at bedtime PRN Constipation  magnesium hydroxide Suspension 30 milliLiter(s) Oral two times a day PRN Constipation  simethicone 80 milliGRAM(s) Chew every 4 hours PRN Gas      GENITOURINARY  I&O's Detail     @ 07:01  -   @ 07:00  --------------------------------------------------------  IN:    lactated ringers.: 1375 mL  Total IN: 1375 mL    OUT:    Voided: 400 mL  Total OUT: 400 mL    Total NET: 975 mL       @ 07:  -   @ 03:22  --------------------------------------------------------  IN:    sodium chloride 0.9%: 1982 mL  Total IN: 1982 mL    OUT:    Indwelling Catheter - Urethral: 1550 mL  Total OUT: 1550 mL    Total NET: 432 mL        Weight (kg): 51.7 ( @ 10:10)      138  |  104  |  5<L>  ----------------------------<  108<H>  3.6   |  20<L>  |  0.50    Ca    8.4      2019 17:50    TPro  6.2  /  Alb  3.3  /  TBili  1.5<H>  /  DBili  x   /  AST  18  /  ALT  11  /  AlkPhos  112  -    [ x] Lucas catheter, indication: N/A  Meds: dextrose 5% + lactated ringers. 1000 milliLiter(s) IV Continuous <Continuous>  oxytocin Infusion 333.333 milliUNIT(s)/Min IV Continuous <Continuous>        HEMATOLOGIC  Meds:   [x] VTE Prophylaxis                        11.2   7.6   )-----------( 101      ( 2019 01:27 )             33.3     PT/INR - ( 2019 01:26 )   PT: 12.2 sec;   INR: 1.06 ratio         PTT - ( 2019 01:26 )  PTT:36.9 sec  Transfusion     [ ] PRBC   [ ] Platelets   [ ] FFP   [ ] Cryoprecipitate      INFECTIOUS DISEASES  WBC Count: 7.6 K/uL ( @ 01:27)  WBC Count: 7.8 K/uL ( @ 17:50)    RECENT CULTURES:  Specimen Source: .Body Fluid amnioitic fluid  Date/Time:  @ 05:39  Culture Results:   No growth  Gram Stain:   No polymorphonuclear cells seen  No organisms seen  by cytocentrifuge  Organism: --    Meds: diphtheria/tetanus/pertussis (acellular) Vaccine (ADAcel) 0.5 milliLiter(s) IntraMuscular once  ertapenem  IVPB      ertapenem  IVPB 1000 milliGRAM(s) IV Intermittent every 24 hours        ENDOCRINE  CAPILLARY BLOOD GLUCOSE      POCT Blood Glucose.: 101 mg/dL (2019 21:53)  POCT Blood Glucose.: 94 mg/dL (2019 15:19)  POCT Blood Glucose.: 84 mg/dL (2019 07:58)    Meds:       ACCESS DEVICES:  [ x] Peripheral IV  [ ] Central Venous Line	[ ] R	[ ] L	[ ] IJ	[ ] Fem	[ ] SC	Placed:   [ ] Arterial Line		[ ] R	[ ] L	[ ] Fem	[ ] Rad	[ ] Ax	Placed:   [ ] PICC:					[ ] Mediport  [x ] Urinary Catheter, Date Placed:   [x] Necessity of urinary, arterial, and venous catheters discussed    OTHER MEDICATIONS:  benzocaine 20%/menthol 0.5% Spray 1 Spray(s) Topical every 6 hours PRN  chlorhexidine 4% Liquid 1 Application(s) Topical <User Schedule>  dibucaine 1% Ointment 1 Application(s) Topical every 6 hours PRN  hydrocortisone 1% Cream 1 Application(s) Topical every 6 hours PRN  lanolin Ointment 1 Application(s) Topical every 6 hours PRN  pramoxine 1%/zinc 5% Cream 1 Application(s) Topical every 4 hours PRN  witch hazel Pads 1 Application(s) Topical every 4 hours PRN      CODE STATUS: Full Code      IMAGING: HISTORY  ERIC ADVILA is a 33y  Female with gestational diabetes, cryptogenic cirrhosis, complicated by Grade 1 Esophageal varices, splenomegaly initially presented on  @ 22weeks gestation, with known cervical insufficiency s/p Amniocentesis, 300ml (), Braxton Cerclage (), complicated by Premature rupture of membranes, associated with fever, 38.4 Centigrade, sinus tachycardia (HR-100s), hypoxemia (SpO2- 88%), requiring supplemental O2. Patient received Gentamycin, Clindamycin, which was eventually transitioned to Ertapenem, due to worsening fever curve. Patient subsequently induced, with vaginal delivery of non-viable fetus.   Patient seen in Labor and delivery, Rm#10. Patient denies HA, blurry vision, dyspnea, CP, Abd pain, N/V, chills.  Other than above stated, remainder of ROS is negative.       24 HOUR EVENTS: Changed IVF to D5LR @ 125ml/hr  - Blood cultures x 2 sent    SUBJECTIVE/ROS:  [x ] A ten-point review of systems was otherwise negative except as noted.  [ ] Due to altered mental status/intubation, subjective information were not able to be obtained from the patient. History was obtained, to the extent possible, from review of the chart and collateral sources of information.      NEURO  RASS:     GCS:     CAM ICU:  Exam: awake, alert, oriented x 4, NAD  Meds: diphenhydrAMINE 25 milliGRAM(s) Oral every 6 hours PRN Pruritus    [x] Adequacy of sedation and pain control has been assessed and adjusted      RESPIRATORY  RR: 25 (19 @ 03:00) (16 - 25)  SpO2: 97% (19 @ 03:00) (94% - 99%)  Wt(kg): --  Exam: unlabored, clear to auscultation bilaterally  Mechanical Ventilation:   ABG - ( 2019 01:26 )  pH: x     /  pCO2: x     /  pO2: x     / HCO3: x     / Base Excess: x     /  SaO2: x       Lactate: 1.2              [N/A] Extubation Readiness Assessed  Meds:       CARDIOVASCULAR  HR: 94 (19 @ 03:00) (87 - 101)  BP: 107/66 (19 @ 03:00) (94/57 - 108/77)  BP(mean): 78 (06-03-19 @ 03:00) (78 - 78)  ABP: --  ABP(mean): --  Wt(kg): --  CVP(cm H2O): --    Lactate, Blood: 1.2 mmol/L ( @ 01:26)    Exam: regular rate and rhythm  Cardiac Rhythm: sinus  Perfusion     [x]Adequate   [ ]Inadequate  Mentation   [x]Normal       [ ]Reduced  Extremities  [x]Warm         [ ]Cool  Volume Status [ ]Hypervolemic [x]Euvolemic [ ]Hypovolemic  Meds:       GI/NUTRITION  Exam: soft, nontender, nondistended  Diet: NPO  Meds: docusate sodium 100 milliGRAM(s) Oral two times a day PRN For stool softening  glycerin Suppository - Adult 1 Suppository(s) Rectal at bedtime PRN Constipation  magnesium hydroxide Suspension 30 milliLiter(s) Oral two times a day PRN Constipation  simethicone 80 milliGRAM(s) Chew every 4 hours PRN Gas      GENITOURINARY    2019 07:  -  2019 07:00  --------------------------------------------------------  IN:    lactated ringers.: 1375 mL  Total IN: 1375 mL    OUT:    Voided: 400 mL  Total OUT: 400 mL    Total NET: 975 mL      2019 07:  -  2019 06:59  --------------------------------------------------------  IN:    dextrose 5% + lactated ringers.: 375 mL    IV PiggyBack: 50 mL    oxytocin Infusion: 250 mL    sodium chloride 0.9%: 1982 mL  Total IN: 2657 mL    OUT:    Indwelling Catheter - Urethral: 1935 mL  Total OUT: 1935 mL    Total NET: 722 mL          [ x] Lucas catheter, indication: N/A  Meds: dextrose 5% + lactated ringers. 1000 milliLiter(s) IV Continuous <Continuous>  oxytocin Infusion 333.333 milliUNIT(s)/Min IV Continuous <Continuous>            136  |  104  |  7   ----------------------------<  82  3.3<L>   |  18<L>  |  0.50    Ca    8.1<L>      2019 03:26  Phos  4.1       Mg     1.6         TPro  6.2  /  Alb  3.3  /  TBili  1.5<H>  /  DBili  x   /  AST  18  /  ALT  11  /  AlkPhos  112        HEMATOLOGIC  Meds:   [x] VTE Prophylaxis                                   11.3   7.6   )-----------( 93       ( 2019 03:26 )             32.8   PT/INR - ( 2019 01:26 )   PT: 12.2 sec;   INR: 1.06 ratio         PTT - ( 2019 01:26 )  PTT:36.9 sec  Transfusion     [ ] PRBC   [ ] Platelets   [ ] FFP   [ ] Cryoprecipitate      INFECTIOUS DISEASES  WBC Count: 7.6 K/uL ( @ 03:26)  WBC Count: 7.6 K/uL ( @ 01:27)  WBC Count: 7.8 K/uL ( @ 17:50)  WBC Count: 6.1 K/uL ( @ 02:28)  WBC Count: 6.1 K/uL ( @ 13:51)      RECENT CULTURES:  Specimen Source: .Body Fluid amnioitic fluid  Date/Time:  @ 05:39  Culture Results:   No growth  Gram Stain:   No polymorphonuclear cells seen  No organisms seen  by cytocentrifuge  Organism: --    Meds: diphtheria/tetanus/pertussis (acellular) Vaccine (ADAcel) 0.5 milliLiter(s) IntraMuscular once  ertapenem  IVPB      ertapenem  IVPB 1000 milliGRAM(s) IV Intermittent every 24 hours        ENDOCRINE  CAPILLARY BLOOD GLUCOSE      POCT Blood Glucose.: 101 mg/dL (2019 21:53)  POCT Blood Glucose.: 94 mg/dL (2019 15:19)  POCT Blood Glucose.: 84 mg/dL (2019 07:58)    Meds:       ACCESS DEVICES:  [ x] Peripheral IV  [ ] Central Venous Line	[ ] R	[ ] L	[ ] IJ	[ ] Fem	[ ] SC	Placed:   [ ] Arterial Line		[ ] R	[ ] L	[ ] Fem	[ ] Rad	[ ] Ax	Placed:   [ ] PICC:					[ ] Mediport  [x ] Urinary Catheter, Date Placed:   [x] Necessity of urinary, arterial, and venous catheters discussed    OTHER MEDICATIONS:  benzocaine 20%/menthol 0.5% Spray 1 Spray(s) Topical every 6 hours PRN  chlorhexidine 4% Liquid 1 Application(s) Topical <User Schedule>  dibucaine 1% Ointment 1 Application(s) Topical every 6 hours PRN  hydrocortisone 1% Cream 1 Application(s) Topical every 6 hours PRN  lanolin Ointment 1 Application(s) Topical every 6 hours PRN  pramoxine 1%/zinc 5% Cream 1 Application(s) Topical every 4 hours PRN  witch hazel Pads 1 Application(s) Topical every 4 hours PRN      CODE STATUS: Full Code      IMAGING:

## 2019-06-03 NOTE — PROGRESS NOTE ADULT - SUBJECTIVE AND OBJECTIVE BOX
PPD#1  HD#3    Patient seen and examined at bedside, no acute overnight events. No acute complaints, pain well controlled.  Patient not OOB yet. Lucas is still in place. Tolerating clears. Denies CP, SOB, N/V, fevers, and chills.    Vital Signs Last 24 Hours  T(C): 37.4 (06-03-19 @ 03:00), Max: 38.6 (06-02-19 @ 22:40)  HR: 68 (06-03-19 @ 07:00) (68 - 101)  BP: 89/58 (06-03-19 @ 07:00) (89/58 - 108/77)  RR: 15 (06-03-19 @ 07:00) (15 - 25)  SpO2: 96% (06-03-19 @ 07:00) (94% - 99%)    I&O's Summary    02 Jun 2019 07:01  -  03 Jun 2019 07:00  --------------------------------------------------------  IN: 2657 mL / OUT: 1935 mL / NET: 722 mL        Physical Exam:  General: NAD  CV: NR, RR, S1, S2, no M/R/G  Lungs: CTA-B  Abdomen: Soft, non-tender, non-distended, +BS  Ext: No pain or swelling    Labs:                        11.3   7.6   )-----------( 93       ( 03 Jun 2019 03:26 )             32.8   baso x      eos x      imm gran x      lymph x      mono x      poly x                            11.2   7.6   )-----------( 101      ( 03 Jun 2019 01:27 )             33.3   baso 0.0    eos 0.8    imm gran x      lymph 14.6   mono 9.5    poly 75.1                         11.5   7.8   )-----------( 103      ( 02 Jun 2019 17:50 )             33.6   baso 0.2    eos 0.7    imm gran x      lymph 12.8   mono 6.3    poly 80.1                         11.2   6.1   )-----------( 110      ( 02 Jun 2019 02:28 )             32.1   baso 0.1    eos 1.8    imm gran x      lymph 18.0   mono 7.4    poly 72.7                         11.1   6.1   )-----------( 105      ( 01 Jun 2019 13:51 )             32.1   baso x      eos x      imm gran x      lymph x      mono x      poly x                            12.1   5.7   )-----------( 120      ( 31 May 2019 19:00 )             35.5   baso 0.5    eos 2.8    imm gran x      lymph 18.4   mono 6.5    poly 71.8       MEDICATIONS  (STANDING):  chlorhexidine 4% Liquid 1 Application(s) Topical <User Schedule>  dextrose 5% + lactated ringers. 1000 milliLiter(s) (125 mL/Hr) IV Continuous <Continuous>  diphtheria/tetanus/pertussis (acellular) Vaccine (ADAcel) 0.5 milliLiter(s) IntraMuscular once  enoxaparin Injectable 40 milliGRAM(s) SubCutaneous every 24 hours  ertapenem  IVPB      ertapenem  IVPB 1000 milliGRAM(s) IV Intermittent every 24 hours  potassium chloride    Tablet ER 20 milliEquivalent(s) Oral every 2 hours    MEDICATIONS  (PRN):  acetaminophen   Tablet .. 650 milliGRAM(s) Oral every 8 hours PRN Mild Pain (1 - 3)  benzocaine 20%/menthol 0.5% Spray 1 Spray(s) Topical every 6 hours PRN for Perineal discomfort  dibucaine 1% Ointment 1 Application(s) Topical every 6 hours PRN Perineal discomfort  diphenhydrAMINE 25 milliGRAM(s) Oral every 6 hours PRN Pruritus  docusate sodium 100 milliGRAM(s) Oral two times a day PRN For stool softening  glycerin Suppository - Adult 1 Suppository(s) Rectal at bedtime PRN Constipation  hydrocortisone 1% Cream 1 Application(s) Topical every 6 hours PRN Moderate Pain (4-6)  lanolin Ointment 1 Application(s) Topical every 6 hours PRN nipple soreness  magnesium hydroxide Suspension 30 milliLiter(s) Oral two times a day PRN Constipation  pramoxine 1%/zinc 5% Cream 1 Application(s) Topical every 4 hours PRN Moderate Pain (4-6)  simethicone 80 milliGRAM(s) Chew every 4 hours PRN Gas  witch hazel Pads 1 Application(s) Topical every 4 hours PRN Perineal discomfort PPD#1  HD#3    [place]  #[id number]  Patient seen and examined at bedside, no acute overnight events. No acute complaints, pain well controlled.  Patient not OOB yet. Lucas is still in place. Tolerating clears. Denies CP, SOB, N/V, fevers, and chills.    Vital Signs Last 24 Hours  T(C): 37.4 (06-03-19 @ 03:00), Max: 38.6 (06-02-19 @ 22:40)  HR: 68 (06-03-19 @ 07:00) (68 - 101)  BP: 89/58 (06-03-19 @ 07:00) (89/58 - 108/77)  RR: 15 (06-03-19 @ 07:00) (15 - 25)  SpO2: 96% (06-03-19 @ 07:00) (94% - 99%)    I&O's Summary    02 Jun 2019 07:01  -  03 Jun 2019 07:00  --------------------------------------------------------  IN: 2657 mL / OUT: 1935 mL / NET: 722 mL        Physical Exam:  General: NAD  CV: NR, RR, S1, S2, no M/R/G  Lungs: CTA-B  Abdomen: Soft, non-tender, non-distended, +BS  Vag: minimal bleeding on pad  Ext: No pain or swelling    Labs:                        11.3   7.6   )-----------( 93       ( 03 Jun 2019 03:26 )             32.8   baso x      eos x      imm gran x      lymph x      mono x      poly x                            11.2   7.6   )-----------( 101      ( 03 Jun 2019 01:27 )             33.3   baso 0.0    eos 0.8    imm gran x      lymph 14.6   mono 9.5    poly 75.1                         11.5   7.8   )-----------( 103      ( 02 Jun 2019 17:50 )             33.6   baso 0.2    eos 0.7    imm gran x      lymph 12.8   mono 6.3    poly 80.1                         11.2   6.1   )-----------( 110      ( 02 Jun 2019 02:28 )             32.1   baso 0.1    eos 1.8    imm gran x      lymph 18.0   mono 7.4    poly 72.7                         11.1   6.1   )-----------( 105      ( 01 Jun 2019 13:51 )             32.1   baso x      eos x      imm gran x      lymph x      mono x      poly x                            12.1   5.7   )-----------( 120      ( 31 May 2019 19:00 )             35.5   baso 0.5    eos 2.8    imm gran x      lymph 18.4   mono 6.5    poly 71.8       MEDICATIONS  (STANDING):  chlorhexidine 4% Liquid 1 Application(s) Topical <User Schedule>  dextrose 5% + lactated ringers. 1000 milliLiter(s) (125 mL/Hr) IV Continuous <Continuous>  diphtheria/tetanus/pertussis (acellular) Vaccine (ADAcel) 0.5 milliLiter(s) IntraMuscular once  enoxaparin Injectable 40 milliGRAM(s) SubCutaneous every 24 hours  ertapenem  IVPB      ertapenem  IVPB 1000 milliGRAM(s) IV Intermittent every 24 hours  potassium chloride    Tablet ER 20 milliEquivalent(s) Oral every 2 hours    MEDICATIONS  (PRN):  acetaminophen   Tablet .. 650 milliGRAM(s) Oral every 8 hours PRN Mild Pain (1 - 3)  benzocaine 20%/menthol 0.5% Spray 1 Spray(s) Topical every 6 hours PRN for Perineal discomfort  dibucaine 1% Ointment 1 Application(s) Topical every 6 hours PRN Perineal discomfort  diphenhydrAMINE 25 milliGRAM(s) Oral every 6 hours PRN Pruritus  docusate sodium 100 milliGRAM(s) Oral two times a day PRN For stool softening  glycerin Suppository - Adult 1 Suppository(s) Rectal at bedtime PRN Constipation  hydrocortisone 1% Cream 1 Application(s) Topical every 6 hours PRN Moderate Pain (4-6)  lanolin Ointment 1 Application(s) Topical every 6 hours PRN nipple soreness  magnesium hydroxide Suspension 30 milliLiter(s) Oral two times a day PRN Constipation  pramoxine 1%/zinc 5% Cream 1 Application(s) Topical every 4 hours PRN Moderate Pain (4-6)  simethicone 80 milliGRAM(s) Chew every 4 hours PRN Gas  witch hazel Pads 1 Application(s) Topical every 4 hours PRN Perineal discomfort

## 2019-06-03 NOTE — PROGRESS NOTE ADULT - ASSESSMENT
ERIC DAVILA is a 33y  Female with gestational diabetes, cryptogenic cirrhosis, complicated by Grade 1 Esophageal varices, splenomegaly initially presented on  @ 22weeks gestation, with known cervical insufficiency s/p Amniocentesis (), Braxton Cerclage (), then discharged home(). Patient represents , with Premature rupture of membranes, and likely chorioamnionitis, s/p induction and delivery, with fetal demise.    NEURO: Acute post-procedural pain  - Acetaminophen PRN    RESP: No active issues  - Will encourage ISS, to prevent atelectasis  - Encourage OOB, ambulation tomorrow    CV: Mild sinus tachycardia, Borderline hypotension, now improved; likely related to sepsis, in the context of chorioamnionitis   - Will continue hemodynamic monitoring, as otherwise appears well perfused    GI: No active issues  - Advance diet as per primary team  - No indication for stress ulcer prophylaxis    RENAL: No active issues  - Will change IVF to D5LR @ 125ml/hr    HEME: No active issues  - Will start Lovenox 40mg daily for VTE prophylaxis, if cleared by primary team    ID: Likely chorioamnionitis, although patient appears improved following delivery, and source control  - Follow up blood cultures x 2  - Would continue with Ertapenem    Endo: No active issues    DISPO: SICU for hemodynamic monitoring x 24hours    Garfield Garcia PA-C  Critical Care Physician Assistant  [facility] #[phone]    Garfield Garcia PA-C  Critical Care Physician Assistant  [facility] #[phone] ERIC DAVILA is a 33y  Female with gestational diabetes, cryptogenic cirrhosis, complicated by Grade 1 Esophageal varices, splenomegaly initially presented on  @ 22weeks gestation, with known cervical insufficiency s/p Amniocentesis (), Braxton Cerclage (), then discharged home(). Patient represents , with Premature rupture of membranes, and likely chorioamnionitis, s/p induction and delivery, with fetal demise.    NEURO: Acute post-procedural pain  - Judicious use of tylenol with history of Cirrhosis, limit 2grams daily    RESP: No active issues  - Will encourage ISS, to prevent atelectasis  - Encourage OOB, ambulation tomorrow    CV: Mild sinus tachycardia, Borderline hypotension, now improved; likely related to sepsis, in the context of chorioamnionitis   - Will continue hemodynamic monitoring, as otherwise appears well perfused    GI: No active issues  - Advance diet as per primary team  - No indication for stress ulcer prophylaxis    RENAL: No active issues  - Will change IVF to D5LR @ 125ml/hr    HEME: No active issues  - Will start Lovenox 40mg daily for VTE prophylaxis, if cleared by primary team    ID: Likely chorioamnionitis, although patient appears improved following delivery, and source control  - Follow up blood cultures x 2  - Would continue with Ertapenem    Endo: No active issues    DISPO: SICU for hemodynamic monitoring x 24hours    Garfield Garcia PA-C  Critical Care Physician Assistant  [facility] #[id number]    Garfield Garcia PA-C  Critical Care Physician Assistant  [facility] #[phone]

## 2019-06-03 NOTE — PROGRESS NOTE ADULT - SUBJECTIVE AND OBJECTIVE BOX
MEAGHAN ZIMMERMAN Progress Note    PPD#1   HD#3    Patient seen and examined at bedside.  Reports feeling better.  Denies fever/chills, n/v, CP/SOB.     Vital Signs Last 24 Hours  T(C): 37.7 (06-03-19 @ 00:40), Max: 38.6 (06-02-19 @ 22:40)  HR: 87 (06-03-19 @ 00:40) (87 - 101)  BP: 102/60 (06-03-19 @ 00:40) (94/57 - 108/77)  RR: 17 (06-03-19 @ 00:40) (16 - 20)  SpO2: 95% (06-03-19 @ 00:40) (94% - 99%)    I&O's Summary    01 Jun 2019 07:01  -  02 Jun 2019 07:00  --------------------------------------------------------  IN: 1375 mL / OUT: 400 mL / NET: 975 mL    02 Jun 2019 07:01  -  03 Jun 2019 02:14  --------------------------------------------------------  IN: 1982 mL / OUT: 900 mL / NET: 1082 mL        Physical Exam:  General: NAD  CV: RR, S1, S2, no M/R/G  Lungs: CTA b/l, good air flow b/l   Abdomen: Soft, fundus firm, non tender  : no bleeding on pad  Ext: No pain or swelling     Labs:                        11.2   7.6   )-----------( 101      ( 03 Jun 2019 01:27 )             33.3   baso 0.0    eos 0.8    imm gran x      lymph 14.6   mono 9.5    poly 75.1                         11.5   7.8   )-----------( 103      ( 02 Jun 2019 17:50 )             33.6   baso 0.2    eos 0.7    imm gran x      lymph 12.8   mono 6.3    poly 80.1                         11.2   6.1   )-----------( 110      ( 02 Jun 2019 02:28 )             32.1   baso 0.1    eos 1.8    imm gran x      lymph 18.0   mono 7.4    poly 72.7                         11.1   6.1   )-----------( 105      ( 01 Jun 2019 13:51 )             32.1   baso x      eos x      imm gran x      lymph x      mono x      poly x                            12.1   5.7   )-----------( 120      ( 31 May 2019 19:00 )             35.5   baso 0.5    eos 2.8    imm gran x      lymph 18.4   mono 6.5    poly 71.8       MEDICATIONS  (STANDING):  acetaminophen   Tablet .. 975 milliGRAM(s) Oral once  diphtheria/tetanus/pertussis (acellular) Vaccine (ADAcel) 0.5 milliLiter(s) IntraMuscular once  ertapenem  IVPB      ertapenem  IVPB 1000 milliGRAM(s) IV Intermittent every 24 hours  LORazepam     Tablet 0.5 milliGRAM(s) Oral once  oxytocin Infusion 333.333 milliUNIT(s)/Min (1000 mL/Hr) IV Continuous <Continuous>  sodium chloride 0.9% lock flush 3 milliLiter(s) IV Push every 8 hours  sodium chloride 0.9%. 1000 milliLiter(s) (250 mL/Hr) IV Continuous <Continuous>    MEDICATIONS  (PRN):  benzocaine 20%/menthol 0.5% Spray 1 Spray(s) Topical every 6 hours PRN for Perineal discomfort  dibucaine 1% Ointment 1 Application(s) Topical every 6 hours PRN Perineal discomfort  diphenhydrAMINE 25 milliGRAM(s) Oral every 6 hours PRN Pruritus  docusate sodium 100 milliGRAM(s) Oral two times a day PRN For stool softening  glycerin Suppository - Adult 1 Suppository(s) Rectal at bedtime PRN Constipation  hydrocortisone 1% Cream 1 Application(s) Topical every 6 hours PRN Moderate Pain (4-6)  lanolin Ointment 1 Application(s) Topical every 6 hours PRN nipple soreness  magnesium hydroxide Suspension 30 milliLiter(s) Oral two times a day PRN Constipation  pramoxine 1%/zinc 5% Cream 1 Application(s) Topical every 4 hours PRN Moderate Pain (4-6)  simethicone 80 milliGRAM(s) Chew every 4 hours PRN Gas  witch hazel Pads 1 Application(s) Topical every 4 hours PRN Perineal discomfort

## 2019-06-03 NOTE — PROGRESS NOTE ADULT - ASSESSMENT
A/P: 33y P0 w/ PMH of cirrhosis/portal HTN/esophageal varices PPD#1 s/p elective termination at 22w5d for previable PPROM.   Patient had significant prenatal history of cervical insufficiency s/p amnioreduction on 5/31 in anticipation of cerclage placement 6/1.  Patient received cerclage 6/1 w/ subsequent PPROM.   Patient underwent KCL injection on 6/2 AM w/ subsequent cytotec induction of labor.  Induction c/b sepsis w/ maternal fever and tachycardia.   Patient currently on IV antibiotics w/ close maternal vital sign monitoring.   Tachycardia resolving w/ IV fluids.  Currently in SICU, stable.      Neuro: PO pain meds prn  CV: Hemodynamically stable.  H/H stable.    Pulm: Saturating well on room air, encourage oob/amb  GI: Regular diet  : Lucas in place for strict I&O's.    Heme: Lovenox and SCDS for DVT ppx   FEN: LR@125.  replete electrolytes prn   ID: -Sepsis:  Likely 2/2 intra-amniotic infection.  Now resolving s/p delivery of placenta.  f/u Bcx.  f/u placental cultures.  Continue Invanz. s/p Gent/Clinda Continue close vital sign monitoring.  Continue fluid resuscitation.  Appreciate SICU care.   Endo: No active issues   Dispo: Appreciate excellent SICU care. For bereavement serviees.    ANITA Gomes, PGY3  Spectra [phone]

## 2019-06-03 NOTE — PROGRESS NOTE ADULT - ATTENDING COMMENTS
Dr. Franco (Attending Physician)  Cervical insufficiency s/p amniocentesis with PROM, chorioamnionitis on ertapenem fevers now improved SIRS improving  Cirrhosis limit tylenol to <2 grams daily  Reflux will give maalox and pepcid  Cryptogenic cirrhosis unclear hepatitis b labs on last visit will repeat hepatitis b testing.

## 2019-06-03 NOTE — CONSULT NOTE ADULT - ASSESSMENT
33F with decompensated cirrhosis, was pregnant with cervical insufficiency s/p amnioreduction 5/31 and cerclage 6/1, course complicated by PPROM, now s/p delivery (fetal demise) and development of sepsis 101.5F 6/2.   placental and blood cultures in lab (after antibiotics given)   low platelets   white count normal   amniotic fluid 4 WBC?   on ertapenem 33F admitted 5/31/19 for management of cervical insufficiency at 22 weeks gestation, underwent cerclage 6/1, complicated by PPROM, induced labor 6/2 with nonviable fetus and developed sepsis 101.5F. Beside sono without concern for retained products. Received one dose of Gentamicin and Clindamycin 6/2, now on Ertapenem.   Suspect endometritis. No other clear source for an infection. Appears to be doing well now.   Also has cirrhosis of unclear etiology, splenomegaly, low platelets. HBV core positive, antigen negative.     Recommend  -continue Ertapenem 1GM IV q24h for now  -f/u placental and blood cultures (appears collected after antibiotics)   -if clinically worsens, CT chest angiogram (amniotic emboli) and abdomen pelvis (retained products), repeat blood cultures and broaden to Vancomycin and Meropenem

## 2019-06-04 ENCOUNTER — CHART COPY (OUTPATIENT)
Age: 34
End: 2019-06-04

## 2019-06-04 LAB
ANION GAP SERPL CALC-SCNC: 9 MMOL/L — SIGNIFICANT CHANGE UP (ref 5–17)
BUN SERPL-MCNC: 7 MG/DL — SIGNIFICANT CHANGE UP (ref 7–23)
CALCIUM SERPL-MCNC: 9.2 MG/DL — SIGNIFICANT CHANGE UP (ref 8.4–10.5)
CHLORIDE SERPL-SCNC: 103 MMOL/L — SIGNIFICANT CHANGE UP (ref 96–108)
CO2 SERPL-SCNC: 24 MMOL/L — SIGNIFICANT CHANGE UP (ref 22–31)
CREAT SERPL-MCNC: 0.46 MG/DL — LOW (ref 0.5–1.3)
GLUCOSE SERPL-MCNC: 79 MG/DL — SIGNIFICANT CHANGE UP (ref 70–99)
HCT VFR BLD CALC: 34.4 % — LOW (ref 34.5–45)
HGB BLD-MCNC: 11.2 G/DL — LOW (ref 11.5–15.5)
MAGNESIUM SERPL-MCNC: 1.7 MG/DL — SIGNIFICANT CHANGE UP (ref 1.6–2.6)
MCHC RBC-ENTMCNC: 30.3 PG — SIGNIFICANT CHANGE UP (ref 27–34)
MCHC RBC-ENTMCNC: 32.5 GM/DL — SIGNIFICANT CHANGE UP (ref 32–36)
MCV RBC AUTO: 93.3 FL — SIGNIFICANT CHANGE UP (ref 80–100)
PHOSPHATE SERPL-MCNC: 3.9 MG/DL — SIGNIFICANT CHANGE UP (ref 2.5–4.5)
PLATELET # BLD AUTO: 112 K/UL — LOW (ref 150–400)
POTASSIUM SERPL-MCNC: 3.8 MMOL/L — SIGNIFICANT CHANGE UP (ref 3.5–5.3)
POTASSIUM SERPL-SCNC: 3.8 MMOL/L — SIGNIFICANT CHANGE UP (ref 3.5–5.3)
RBC # BLD: 3.69 M/UL — LOW (ref 3.8–5.2)
RBC # FLD: 12.7 % — SIGNIFICANT CHANGE UP (ref 10.3–14.5)
SODIUM SERPL-SCNC: 136 MMOL/L — SIGNIFICANT CHANGE UP (ref 135–145)
WBC # BLD: 6.8 K/UL — SIGNIFICANT CHANGE UP (ref 3.8–10.5)
WBC # FLD AUTO: 6.8 K/UL — SIGNIFICANT CHANGE UP (ref 3.8–10.5)

## 2019-06-04 PROCEDURE — 99231 SBSQ HOSP IP/OBS SF/LOW 25: CPT | Mod: 25

## 2019-06-04 PROCEDURE — 99232 SBSQ HOSP IP/OBS MODERATE 35: CPT

## 2019-06-04 RX ADMIN — ENOXAPARIN SODIUM 40 MILLIGRAM(S): 100 INJECTION SUBCUTANEOUS at 05:47

## 2019-06-04 RX ADMIN — ERTAPENEM SODIUM 120 MILLIGRAM(S): 1 INJECTION, POWDER, LYOPHILIZED, FOR SOLUTION INTRAMUSCULAR; INTRAVENOUS at 21:51

## 2019-06-04 NOTE — PROGRESS NOTE ADULT - ASSESSMENT
A/P: 33y P0 w/ PMH of cirrhosis/portal HTN/esophageal varices now PPD#2 s/p elective termination at 22w5d for previable PPROM.   Patient had significant prenatal history of cervical insufficiency s/p amnioreduction on 5/31 in anticipation of cerclage placement 6/1.  Patient received cerclage 6/1 w/ subsequent PPROM.   Patient underwent KCL injection on 6/2 AM w/ subseqent cytotec induction of labor.  Induction c/b IAI w/ maternal fever and tachycardia w/ brief SICU stay from PPD#0-PPD#1.   Patient currently on IV antibiotics w/ close maternal vital sign monitoring.  Afebrile since 6/2@11 PM.  Clinically improving.

## 2019-06-04 NOTE — CONSULT NOTE ADULT - ASSESSMENT
***********************CASE PENDING DISCUSSION WITH FELLOW/ATTENDING**************************************    This is a 33 year old woman with a PMH of cryptogenic cirrhosis (followed outpt by hepatologist Dr. Carvajal, last seen 5/22/19, f/u scheduled in 2 wks) admitted for PPROM. Hepatology was consulted at pt's request to evaluate for cirrhosis. Recent U/S showing diffusely coarsened, heterogeneous echotexture of the liver and enlarged caudate lobe, consistent with cirrhosis. Recent MRI showing a 0.6 x 0.5 cm lesion in the segmemt 4B ( previously 0.8 x 0,7 cm), this did not meet the criteria by OPTN/UNOS for HCC, LI-RADS 3 for observation. No new liver lesion was noted. Labs showing HBV core positive, antigen negative.     - keep outpatient follow up appointment with hepatologist Dr. Carvajal as scheduled in two weeks  - trend CBC, INR, CMP   - obtain US with doppler to evaluate biliary tree/ rule out portal vein thrombosis  - obtain HBsAb, HBsAg, HBcAb, HCV Ab, HAV IgG/IgM to r/o viral hepatitides as cause of cirrhosis  - obtain JERICHO, ASMA, LKM Ab, IgG subsets, quant IgM, IgA, AMA to r/o autoimmune disease  - obtain alpha-1-antityrypsin, iron panel and ferritin, ceruloplasmin  - avoid all hepatotoxic agents  - pt will need outpatient EGD for varices surveillance (#[id number])  - pt will need hep A and hep B vaccination series if non-immune, ultrasound q6 months for HCC screening  - encourage EtOH cessation  - please call with questions ***********************CASE PENDING DISCUSSION WITH FELLOW/ATTENDING**************************************    This is a 33 year old woman with a PMH of cryptogenic cirrhosis (followed outpt by hepatologist Dr. Carvajal, last seen 5/22/19, f/u scheduled in 2 wks) admitted for PPROM. Hepatology was consulted at pt's request to evaluate for cirrhosis. Recent U/S showing diffusely coarsened, heterogeneous echotexture of the liver and enlarged caudate lobe, consistent with cirrhosis. Recent MRI showing a 0.6 x 0.5 cm lesion in the segmemt 4B ( previously 0.8 x 0,7 cm), this did not meet the criteria by OPTN/UNOS for HCC, LI-RADS 3 for observation. No new liver lesion was noted. Labs showing HBV core positive, antigen negative.     - keep outpatient follow up appointment with hepatologist Dr. Carvajal as scheduled in two weeks  - trend CBC, INR, CMP   - avoid all hepatotoxic agents  - pt will need outpatient EGD for varices surveillance (#[id number])  - pt will need hep A and hep B vaccination series if non-immune, ultrasound q6 months for HCC screening  - encourage EtOH cessation  - please call with questions This is a 33 year old woman with a PMH of cryptogenic cirrhosis (followed outpt by hepatologist Dr. Carvajal, last seen 5/22/19, f/u scheduled in 2 wks) admitted for PPROM. Hepatology was consulted at pt's request to evaluate for cirrhosis. Recent U/S showing diffusely coarsened, heterogeneous echotexture of the liver and enlarged caudate lobe, consistent with cirrhosis. Upper endoscopy done last month with Dr. Thompson and was found to have small esophageal varices. Recent MRI showing a 0.6 x 0.5 cm lesion in the segmemt 4B ( previously 0.8 x 0,7 cm), this did not meet the criteria by OPTN/UNOS for HCC, LI-RADS 3 for observation. Labs showing HBV core positive, antigen negative.     - keep outpatient follow up appointment with hepatologist Dr. Carvajal as scheduled in two weeks  - trend CBC, INR, CMP   - consider hypercoagulability workup  - avoid all hepatotoxic agents  - pt will need outpatient EGD for varices surveillance (#[id number])  - pt will need hep A and hep B vaccination series if non-immune, ultrasound q6 months for HCC screening  - encourage EtOH cessation  - please call with questions

## 2019-06-04 NOTE — PROGRESS NOTE ADULT - SUBJECTIVE AND OBJECTIVE BOX
Patient is a 33y old  Female who presents with a chief complaint of PPROM, s/p  (2019 16:32)    Being followed by ID for ?chorioamniotis     Interval history:feels better  No abd pain  some vaginal bleeding(minimal)   No acute events      ROS:  No cough,SOB,CP  No N/V/D./abd pain  No other complaints      Antimicrobials:    ertapenem  IVPB      ertapenem  IVPB 1000 milliGRAM(s) IV Intermittent every 24 hours    Other medications reviewed    Vital Signs Last 24 Hrs  T(C): 36.5 (19 @ 09:10), Max: 37.1 (19 @ 11:00)  T(F): 97.7 (19 @ 09:10), Max: 98.8 (19 @ 11:00)  HR: 83 (19 @ 09:10) (76 - 90)  BP: 95/60 (19 @ 09:10) (90/59 - 109/75)  BP(mean): 77 (19 @ 13:00) (69 - 77)  RR: 18 (19 @ 09:10) (16 - 20)  SpO2: 97% (19 @ 09:10) (95% - 97%)    Physical Exam:        HEENT PERRLA EOMI    No oral exudate or erythema    Chest Good AE,CTA    CVS RRR S1 S2 WNl No murmur or rub or gallop    Abd soft BS normal minimal  tenderness no masses    IV site no erythema tenderness or discharge    CNS AAO X 3 no focal    Lab Data:                          11.2   6.8   )-----------( 112      ( 2019 07:02 )             34.4       06-04    136  |  103  |  7   ----------------------------<  79  3.8   |  24  |  0.46<L>    Ca    9.2      2019 07:02  Phos  3.9     06-04  Mg     1.7     -    TPro  6.2  /  Alb  3.3  /  TBili  1.5<H>  /  DBili  x   /  AST  18  /  ALT  11  /  AlkPhos  112  06-02        Culture - Blood (collected 2019 09:51)  Source: .Blood  Preliminary Report (2019 10:01):    No growth to date.    Culture - Blood (collected 2019 09:51)  Source: .Blood  Preliminary Report (2019 10:01):    No growth to date.    Culture - Other (collected 2019 08:37)  Source: Skin maternal culture  Preliminary Report (2019 08:53):    No growth    Culture - Other (collected 2019 08:36)  Source: Skin fetal culture  Preliminary Report (2019 08:50):    No growth    Culture - Body Fluid with Gram Stain (collected 2019 05:39)  Source: .Body Fluid amnioitic fluid  Gram Stain (2019 07:46):    No polymorphonuclear cells seen    No organisms seen    by cytocentrifuge  Preliminary Report (2019 08:51):    No growth

## 2019-06-04 NOTE — PROGRESS NOTE ADULT - SUBJECTIVE AND OBJECTIVE BOX
MEAGHAN ZIMMERMAN Progress Note    PPD#2  HD#5    Patient seen and examined at bedside.  No acute events overnight. No acute complaints.  Pain well controlled.   Patient is ambulating and tolerating regular diet. Patient is passing flatus.    Patient is voiding spontaneously.   Denies CP, SOB, N/V, fevers, and chills.    Vital Signs Last 24 Hours  T(C): 36.4 (06-03-19 @ 21:57), Max: 37.4 (06-03-19 @ 03:00)  HR: 89 (06-03-19 @ 21:57) (68 - 94)  BP: 109/75 (06-03-19 @ 21:57) (89/58 - 109/75)  RR: 18 (06-03-19 @ 21:57) (15 - 25)  SpO2: 96% (06-03-19 @ 21:57) (95% - 97%)    I&O's Summary    02 Jun 2019 07:01  -  03 Jun 2019 07:00  --------------------------------------------------------  IN: 2657 mL / OUT: 1935 mL / NET: 722 mL    03 Jun 2019 07:01  -  04 Jun 2019 01:10  --------------------------------------------------------  IN: 750 mL / OUT: 1000 mL / NET: -250 mL        Physical Exam:  General: NAD  CV: RR, S1, S2, no M/R/G  Lungs: CTA b/l, good air flow b/l   Abdomen: Soft, NT, fundus firm and non tender  : no bleeding on pad  Ext: No pain or swelling     Labs:                        11.3   7.6   )-----------( 93       ( 03 Jun 2019 03:26 )             32.8   baso x      eos x      imm gran x      lymph x      mono x      poly x                            11.2   7.6   )-----------( 101      ( 03 Jun 2019 01:27 )             33.3   baso 0.0    eos 0.8    imm gran x      lymph 14.6   mono 9.5    poly 75.1                         11.5   7.8   )-----------( 103      ( 02 Jun 2019 17:50 )             33.6   baso 0.2    eos 0.7    imm gran x      lymph 12.8   mono 6.3    poly 80.1                         11.2   6.1   )-----------( 110      ( 02 Jun 2019 02:28 )             32.1   baso 0.1    eos 1.8    imm gran x      lymph 18.0   mono 7.4    poly 72.7                         11.1   6.1   )-----------( 105      ( 01 Jun 2019 13:51 )             32.1   baso x      eos x      imm gran x      lymph x      mono x      poly x          MEDICATIONS  (STANDING):  diphtheria/tetanus/pertussis (acellular) Vaccine (ADAcel) 0.5 milliLiter(s) IntraMuscular once  enoxaparin Injectable 40 milliGRAM(s) SubCutaneous every 24 hours  ertapenem  IVPB      ertapenem  IVPB 1000 milliGRAM(s) IV Intermittent every 24 hours  famotidine Injectable 20 milliGRAM(s) IV Push once    MEDICATIONS  (PRN):  acetaminophen   Tablet .. 650 milliGRAM(s) Oral every 8 hours PRN Mild Pain (1 - 3)  aluminum hydroxide/magnesium hydroxide/simethicone Suspension 30 milliLiter(s) Oral every 4 hours PRN Dyspepsia  benzocaine 20%/menthol 0.5% Spray 1 Spray(s) Topical every 6 hours PRN for Perineal discomfort  dibucaine 1% Ointment 1 Application(s) Topical every 6 hours PRN Perineal discomfort  diphenhydrAMINE 25 milliGRAM(s) Oral every 6 hours PRN Pruritus  docusate sodium 100 milliGRAM(s) Oral two times a day PRN For stool softening  glycerin Suppository - Adult 1 Suppository(s) Rectal at bedtime PRN Constipation  hydrocortisone 1% Cream 1 Application(s) Topical every 6 hours PRN Moderate Pain (4-6)  lanolin Ointment 1 Application(s) Topical every 6 hours PRN nipple soreness  magnesium hydroxide Suspension 30 milliLiter(s) Oral two times a day PRN Constipation  pramoxine 1%/zinc 5% Cream 1 Application(s) Topical every 4 hours PRN Moderate Pain (4-6)  simethicone 80 milliGRAM(s) Chew every 4 hours PRN Gas  witch hazel Pads 1 Application(s) Topical every 4 hours PRN Perineal discomfort

## 2019-06-04 NOTE — PROGRESS NOTE ADULT - ASSESSMENT
33F admitted 5/31/19 for management of cervical insufficiency at 22 weeks gestation, underwent cerclage 6/1, complicated by PPROM, induced labor 6/2 with nonviable fetus and developed sepsis 101.5F. Beside sono without concern for retained products. Received one dose of Gentamicin and Clindamycin 6/2, now on Ertapenem.   Suspect endometritis. No other clear source for an infection. Appears to be doing well now.   Also has cirrhosis of unclear etiology, splenomegaly, low platelets. HBV core positive, antigen negative.   Clinically stable  Transferred out of SICU  Cultures so far no growth  Continue invanz for now  If continues to improve may be able to de-escalate coverage soon  Will tailor plan for ID issues  per course,results.Will defer to primary team on management of other issues.  Will Follow.  Beeper [id number]-rbns/afterhours/No response-[id number]

## 2019-06-04 NOTE — CONSULT NOTE ADULT - ATTENDING COMMENTS
This patient has been shown to have cirrhosis by imaging and the etiology of liver disease is cryptogenic.  The patient has had multiple pregnancies , but has not carried any of her children to term.  Now s/p delivery of nonviable fetus.  Patient has not had exacerbation of her liver disease and is stable for discharge.  the presence of cirrhosis may contribute to her inability to carry pregnancies to term.

## 2019-06-04 NOTE — CONSULT NOTE ADULT - SUBJECTIVE AND OBJECTIVE BOX
INITIAL CONSULT NOTE    HPI: History obtained via  phone (patient's primary language is Icelandic)    This is a 33 year old woman with a PMH of cryptogenic cirrhosis (followed outpt by hepatologist Dr. Carvajal, last seen 5/22/19, f/u scheduled in 2 wks) admitted for PPROM. Hepatology was consulted at pt's request to evaluate for cirrhosis.     Patient reports she was found to have cirrhosis via US and MRI during her fourth pregnancy (in 2010). At the time, pt reports having some ascites but denies otherwise having any stigmata of chronic liver disease at any time such as hematemesis, lower GI bleeding, or s/s of hepatic encephalopathy. Denies having a paracentesis at any point in the past. Pt reports she had an upper endoscopy done last month with Dr. Thompson and was found to have small esophageal varices. Pt admits to feelings of occasional RUQ "tightness", especially when walking, but otherwise patient reports feeling well. Denies RUQ pain, early satiety, SOB, BOLIVAR, hematemesis, f/c, BRBPR.    As per outpatient note from 5/22 (Dr. Carvajal): "MRI report that was performed on January 20, 2019 revealed 0.6 x 0.5 cm lesion in the segmemt 4B ( previously 0.8 x 0,7 cm), this did not meet the criteria by OPTN/UNOS for HCC, LI-RADS 3 for observation. No new liver lesion was noted."    Review of Sxs:    NO chest pain, SOB, BOLIVAR, PND, orthopnea, palpitations, diaphoresis, lightheadedness, dizziness, lower extremity edema, fever, myalgias, generalized fatigue, abdominal pain, N/V/C/D, BRBPR, melena, cough, or rashes.     PHYSICAL EXAM:    Gen: NAD, Well and comfortable appearing. Interactive.  Neck: No JVD.   Respiratory: Normal respiratory effort; symmetric breath sounds.   Abd: soft, nontender, nondistended, + bowel sounds, no hepatosplenomegaly  Skin: WWP, no rashes  Neuro: A&O x 3.    OBJECTIVE DATA:    h/o cryptogenic Cirrhosis, MELD-Na 9 (Cr 0.46, Tbili 1.5, INR 1.06, Na 136)        - varices: ?        - ascites: N	Any on US? N s/p TIPS? N        - SPE: present?, on Xifaxan and lactulose at home? N        - HCC: imaging evidenc?        - coagulopathy: plt  since admission (most recently 112), INR 1.06        - work up:     6/3/19: HBcIgM Reactive, HBsAb/Ag both nonreactive,   5/11/19: HCV S/CO Ratio 0.22, HCV Interpretation nonreactive    Abdominal US 5/29/19:  FINDINGS:    Liver: Diffusely coarsened, heterogeneous echotexture. Enlarged caudate   lobe.    Doppler evaluation demonstrated patent right, middle, and left hepatic   veins and inferior vena cava.    Patent main, right, and left portal veins, with hepatopedal flow.   However, there is recanalization of the paraumbilical vein. Multiple   varices are noted in the splenic hilum and there is variable direction of   flow within the splenic vein.     Hepatic artery is patent, with peak systolic velocity of 78 cm/s.    Bile ducts: Normal caliber. Common bile duct measures 3 mm.     Gallbladder: Within normal limits.        Pancreas: Visualized portions are within normal limits.    Spleen: Mildly enlarged, measuring 14 cm. Multiple varices are seen   within the splenic hilum.    Right kidney: 13.9 cm. No hydronephrosis.     Left kidney: 12.9 cm.  No hydronephrosis.     Ascites: None.    Aorta and IVC: Visualized portions are within normal limits.    IMPRESSION:     Diffusely coarsened, heterogeneous echotexture of the liver and enlarged   caudate lobe, consistent with cirrhosis.      CBC Full  -  ( 04 Jun 2019 07:02 )  WBC Count : 6.8 K/uL  RBC Count : 3.69 M/uL  Hemoglobin : 11.2 g/dL  Hematocrit : 34.4 %  Platelet Count - Automated : 112 K/uL  Mean Cell Volume : 93.3 fl  Mean Cell Hemoglobin : 30.3 pg  Mean Cell Hemoglobin Concentration : 32.5 gm/dL    06-04    136  |  103  |  7   ----------------------------<  79  3.8   |  24  |  0.46<L>    Ca    9.2      04 Jun 2019 07:02  Phos  3.9     06-04  Mg     1.7     06-04    TPro  6.2  /  Alb  3.3  /  TBili  1.5<H>  /  DBili  x   /  AST  18  /  ALT  11  /  AlkPhos  112  06-02    PT/INR - ( 03 Jun 2019 01:26 )   PT: 12.2 sec;   INR: 1.06 ratio    PTT - ( 03 Jun 2019 01:26 )  PTT:36.9 sec INITIAL CONSULT NOTE    HPI: History obtained via  phone (patient's primary language is Greek)    This is a 33 year old woman with a PMH of cryptogenic cirrhosis (followed outpt by hepatologist Dr. Carvajal, last seen 5/22/19, f/u scheduled in 2 wks) admitted for PPROM. Hepatology was consulted at pt's request to evaluate for cirrhosis.     Patient reports she was found to have cirrhosis via US and MRI during her fourth pregnancy (in 2010). At the time, pt reports having some ascites but denies otherwise having any stigmata of chronic liver disease at any time such as hematemesis, lower GI bleeding, or s/s of hepatic encephalopathy. Denies having a paracentesis at any point in the past. Pt reports she had an upper endoscopy done last month with Dr. Thompson and was found to have small esophageal varices. Pt admits to feelings of occasional RUQ "tightness", especially when walking, but otherwise patient reports feeling well. Denies RUQ pain, early satiety, SOB, BOLIVAR, hematemesis, f/c, BRBPR, h/o blood clots.    As per outpatient note from 5/22 (Dr. Carvajal): "MRI report that was performed on January 20, 2019 revealed 0.6 x 0.5 cm lesion in the segmemt 4B ( previously 0.8 x 0,7 cm), this did not meet the criteria by OPTN/UNOS for HCC, LI-RADS 3 for observation. No new liver lesion was noted."    Review of Sxs:    NO chest pain, SOB, BOLIVAR, PND, orthopnea, palpitations, diaphoresis, lightheadedness, dizziness, lower extremity edema, fever, myalgias, generalized fatigue, abdominal pain, N/V/C/D, BRBPR, melena, cough, or rashes.     PAST MEDICAL & SURGICAL HISTORY:  Cirrhosis  Hep A as a child  Born in [facility] in a hospital  No significant past surgical history    FAMILY HISTORY:  No pertinent family history in first degree relatives    PHYSICAL EXAM:    Gen: NAD, Well and comfortable appearing. Interactive.  Neck: No JVD.   Respiratory: Normal respiratory effort; symmetric breath sounds.   Abd: soft, nontender, nondistended, + bowel sounds, no hepatosplenomegaly  Skin: WWP, no rashes  Neuro: A&O x 3.    OBJECTIVE DATA:    h/o cryptogenic Cirrhosis, MELD-Na 9 (Cr 0.46, Tbili 1.5, INR 1.06, Na 136)        - varices: ?        - ascites: N	Any on US? N s/p TIPS? N        - SPE: present?, on Xifaxan and lactulose at home? N        - HCC: imaging evidenc?        - coagulopathy: plt  since admission (most recently 112), INR 1.06        - work up:     6/3/19: HBcIgM Reactive, HBsAb/Ag both nonreactive,   5/11/19: HCV S/CO Ratio 0.22, HCV Interpretation nonreactive    Abdominal US 5/29/19:  FINDINGS:    Liver: Diffusely coarsened, heterogeneous echotexture. Enlarged caudate   lobe.    Doppler evaluation demonstrated patent right, middle, and left hepatic   veins and inferior vena cava.    Patent main, right, and left portal veins, with hepatopedal flow.   However, there is recanalization of the paraumbilical vein. Multiple   varices are noted in the splenic hilum and there is variable direction of   flow within the splenic vein.     Hepatic artery is patent, with peak systolic velocity of 78 cm/s.    Bile ducts: Normal caliber. Common bile duct measures 3 mm.     Gallbladder: Within normal limits.        Pancreas: Visualized portions are within normal limits.    Spleen: Mildly enlarged, measuring 14 cm. Multiple varices are seen   within the splenic hilum.    Right kidney: 13.9 cm. No hydronephrosis.     Left kidney: 12.9 cm.  No hydronephrosis.     Ascites: None.    Aorta and IVC: Visualized portions are within normal limits.    IMPRESSION:     Diffusely coarsened, heterogeneous echotexture of the liver and enlarged   caudate lobe, consistent with cirrhosis.      CBC Full  -  ( 04 Jun 2019 07:02 )  WBC Count : 6.8 K/uL  RBC Count : 3.69 M/uL  Hemoglobin : 11.2 g/dL  Hematocrit : 34.4 %  Platelet Count - Automated : 112 K/uL  Mean Cell Volume : 93.3 fl  Mean Cell Hemoglobin : 30.3 pg  Mean Cell Hemoglobin Concentration : 32.5 gm/dL    06-04    136  |  103  |  7   ----------------------------<  79  3.8   |  24  |  0.46<L>    Ca    9.2      04 Jun 2019 07:02  Phos  3.9     06-04  Mg     1.7     06-04    TPro  6.2  /  Alb  3.3  /  TBili  1.5<H>  /  DBili  x   /  AST  18  /  ALT  11  /  AlkPhos  112  06-02    PT/INR - ( 03 Jun 2019 01:26 )   PT: 12.2 sec;   INR: 1.06 ratio    PTT - ( 03 Jun 2019 01:26 )  PTT:36.9 sec INITIAL CONSULT NOTE    HPI: History obtained via  phone (patient's primary language is Armenian)    This is a 33 year old  with a PMH of cryptogenic cirrhosis (followed outpt by hepatologist Dr. Carvajal, last seen 19, f/u scheduled in 2 wks) admitted for PPROM. Hepatology was consulted at pt's request to evaluate for cirrhosis.     Patient reports she was found to have cirrhosis via US and MRI during her fourth pregnancy (in ). At the time, pt reports having some ascites but denies otherwise having any stigmata of chronic liver disease at any time such as hematemesis, lower GI bleeding, or s/s of hepatic encephalopathy. Denies having a paracentesis at any point in the past. Pt reports she had an upper endoscopy done last month with Dr. Thompson and was found to have small esophageal varices. Pt admits to feelings of occasional RUQ "tightness", especially when walking, but otherwise patient reports feeling well. Denies RUQ pain, early satiety, SOB, BOLIVAR, hematemesis, f/c, BRBPR, h/o blood clots.    As per outpatient note from  (Dr. Carvajal): "MRI report that was performed on 2019 revealed 0.6 x 0.5 cm lesion in the segmemt 4B ( previously 0.8 x 0,7 cm), this did not meet the criteria by OPTN/UNOS for HCC, LI-RADS 3 for observation. No new liver lesion was noted."    Review of Sxs:    Denies chest pain, SOB, BOLIVAR, PND, orthopnea, palpitations, diaphoresis, lightheadedness, dizziness, lower extremity edema, fever, myalgias, generalized fatigue, abdominal pain, N/V/C/D, BRBPR, melena, cough, or rashes.     PAST MEDICAL & SURGICAL HISTORY:  Cirrhosis  Hep A as a child  Born in [facility] in a hospital  No significant past surgical history    FAMILY HISTORY:  No pertinent family history in first degree relatives    PHYSICAL EXAM:    Gen: NAD, Well and comfortable appearing. Interactive.  Neck: No JVD.   Respiratory: Normal respiratory effort; symmetric breath sounds.   Abd: soft, nontender, nondistended, + bowel sounds, no hepatosplenomegaly  Skin: WWP, no rashes  Neuro: A&O x 3.    OBJECTIVE DATA:    h/o cryptogenic Cirrhosis, MELD-Na 9 (Cr 0.46, Tbili 1.5, INR 1.06, Na 136)        - varices: ?        - ascites: N	Any on US? N s/p TIPS? N        - SPE: present?, on Xifaxan and lactulose at home? N        - HCC: imaging evidenc?        - coagulopathy: plt  since admission (most recently 112), INR 1.06        - work up:     6/3/19: HBcIgM Reactive, HBsAb/Ag both nonreactive,   19: HCV S/CO Ratio 0.22, HCV Interpretation nonreactive    Abdominal US 19:  FINDINGS:    Liver: Diffusely coarsened, heterogeneous echotexture. Enlarged caudate   lobe.    Doppler evaluation demonstrated patent right, middle, and left hepatic   veins and inferior vena cava.    Patent main, right, and left portal veins, with hepatopedal flow.   However, there is recanalization of the paraumbilical vein. Multiple   varices are noted in the splenic hilum and there is variable direction of   flow within the splenic vein.     Hepatic artery is patent, with peak systolic velocity of 78 cm/s.    Bile ducts: Normal caliber. Common bile duct measures 3 mm.     Gallbladder: Within normal limits.        Pancreas: Visualized portions are within normal limits.    Spleen: Mildly enlarged, measuring 14 cm. Multiple varices are seen   within the splenic hilum.    Right kidney: 13.9 cm. No hydronephrosis.     Left kidney: 12.9 cm.  No hydronephrosis.     Ascites: None.    Aorta and IVC: Visualized portions are within normal limits.    IMPRESSION:     Diffusely coarsened, heterogeneous echotexture of the liver and enlarged   caudate lobe, consistent with cirrhosis.      CBC Full  -  ( 2019 07:02 )  WBC Count : 6.8 K/uL  RBC Count : 3.69 M/uL  Hemoglobin : 11.2 g/dL  Hematocrit : 34.4 %  Platelet Count - Automated : 112 K/uL  Mean Cell Volume : 93.3 fl  Mean Cell Hemoglobin : 30.3 pg  Mean Cell Hemoglobin Concentration : 32.5 gm/dL        136  |  103  |  7   ----------------------------<  79  3.8   |  24  |  0.46<L>    Ca    9.2      2019 07:02  Phos  3.9     06-04  Mg     1.7     06-04    TPro  6.2  /  Alb  3.3  /  TBili  1.5<H>  /  DBili  x   /  AST  18  /  ALT  11  /  AlkPhos  112  06-02    PT/INR - ( 2019 01:26 )   PT: 12.2 sec;   INR: 1.06 ratio    PTT - ( 2019 01:26 )  PTT:36.9 sec

## 2019-06-04 NOTE — PROGRESS NOTE ADULT - PROBLEM SELECTOR PLAN 1
Neuro: PO pain meds prn  CV: Hemodynamically stable.  H/H stable.    Pulm: Saturating well on room air, encourage oob/amb  GI: Regular diet  : Voiding freely.   Heme: c/w SCDs and Lovenox for DVT ppx.     -Thrombocytopenia:  Notable pre-delivery.  ?ITP vs. gestational however unusal for this early in gestation.   Can be component of systemic infection.  Will f/u AM CBC for trend.   FEN: SLIV.  replete electrolytes prn   ID: -IAI:  Resolving.  Continue IV Invanz.  Appreciate ID input.  f/u Bcx.  f/u placental cultures.  Continue close vital sign monitoring.  f/u AM CBC  Endo: No active issues   Dispo: Continue inpatient care.     Tati Vang PGY-3

## 2019-06-05 VITALS
DIASTOLIC BLOOD PRESSURE: 63 MMHG | SYSTOLIC BLOOD PRESSURE: 99 MMHG | TEMPERATURE: 98 F | RESPIRATION RATE: 18 BRPM | OXYGEN SATURATION: 98 % | HEART RATE: 93 BPM

## 2019-06-05 LAB
ANION GAP SERPL CALC-SCNC: 11 MMOL/L — SIGNIFICANT CHANGE UP (ref 5–17)
BUN SERPL-MCNC: 10 MG/DL — SIGNIFICANT CHANGE UP (ref 7–23)
CALCIUM SERPL-MCNC: 8.8 MG/DL — SIGNIFICANT CHANGE UP (ref 8.4–10.5)
CHLORIDE SERPL-SCNC: 102 MMOL/L — SIGNIFICANT CHANGE UP (ref 96–108)
CO2 SERPL-SCNC: 23 MMOL/L — SIGNIFICANT CHANGE UP (ref 22–31)
CREAT SERPL-MCNC: 0.45 MG/DL — LOW (ref 0.5–1.3)
GLUCOSE SERPL-MCNC: 84 MG/DL — SIGNIFICANT CHANGE UP (ref 70–99)
HCT VFR BLD CALC: 32.8 % — LOW (ref 34.5–45)
HGB BLD-MCNC: 11.3 G/DL — LOW (ref 11.5–15.5)
MAGNESIUM SERPL-MCNC: 1.8 MG/DL — SIGNIFICANT CHANGE UP (ref 1.6–2.6)
MCHC RBC-ENTMCNC: 32 PG — SIGNIFICANT CHANGE UP (ref 27–34)
MCHC RBC-ENTMCNC: 34.3 GM/DL — SIGNIFICANT CHANGE UP (ref 32–36)
MCV RBC AUTO: 93.3 FL — SIGNIFICANT CHANGE UP (ref 80–100)
PHOSPHATE SERPL-MCNC: 4.6 MG/DL — HIGH (ref 2.5–4.5)
PLATELET # BLD AUTO: 121 K/UL — LOW (ref 150–400)
POTASSIUM SERPL-MCNC: 3.6 MMOL/L — SIGNIFICANT CHANGE UP (ref 3.5–5.3)
POTASSIUM SERPL-SCNC: 3.6 MMOL/L — SIGNIFICANT CHANGE UP (ref 3.5–5.3)
RBC # BLD: 3.52 M/UL — LOW (ref 3.8–5.2)
RBC # FLD: 12.7 % — SIGNIFICANT CHANGE UP (ref 10.3–14.5)
SODIUM SERPL-SCNC: 136 MMOL/L — SIGNIFICANT CHANGE UP (ref 135–145)
WBC # BLD: 6.2 K/UL — SIGNIFICANT CHANGE UP (ref 3.8–10.5)
WBC # FLD AUTO: 6.2 K/UL — SIGNIFICANT CHANGE UP (ref 3.8–10.5)

## 2019-06-05 PROCEDURE — 99222 1ST HOSP IP/OBS MODERATE 55: CPT | Mod: GC

## 2019-06-05 PROCEDURE — 99231 SBSQ HOSP IP/OBS SF/LOW 25: CPT | Mod: 25

## 2019-06-05 PROCEDURE — 99232 SBSQ HOSP IP/OBS MODERATE 35: CPT

## 2019-06-05 RX ADMIN — Medication 650 MILLIGRAM(S): at 14:43

## 2019-06-05 RX ADMIN — Medication 650 MILLIGRAM(S): at 15:15

## 2019-06-05 RX ADMIN — ENOXAPARIN SODIUM 40 MILLIGRAM(S): 100 INJECTION SUBCUTANEOUS at 05:28

## 2019-06-05 NOTE — PROGRESS NOTE ADULT - ASSESSMENT
33F admitted 5/31/19 for management of cervical insufficiency at 22 weeks gestation, underwent cerclage 6/1, complicated by PPROM, induced labor 6/2 with nonviable fetus and developed sepsis 101.5F.   Bedside sono(per Ob Gyn)  without concern for retained products. Received one dose of Gentamicin and Clindamycin 6/2, now on Ertapenem.   Doing well clinically  No growth on cultures  Afebrile  No leucocytosis  No s/s any residual infection clinically  Can switch to po augmentin-continue X 9 more days to finish 14 day course  Other plan per Ob-Gyn team  If Dced to follow in Gyn clinic-advised about s/s worseninga nd to come back to ER if any occur  case d/w Ob team  ID will follow as needed,please call [phone] if any questions or issues.

## 2019-06-05 NOTE — PROGRESS NOTE ADULT - ASSESSMENT
A/P: 33y P0 w/ PMH of cirrhosis/portal HTN/esophageal varices now PPD#3 s/p induction termination at 22w5d for previable PPROM.   Patient had significant prenatal history of cervical insufficiency s/p amnioreduction on 5/31 in anticipation of cerclage placement 6/1.  Patient received cerclage 6/1 w/ subsequent PPROM.   Patient underwent KCL injection on 6/2 AM w/ subseqent cytotec induction of labor.  Induction c/b IAI w/ maternal fever and tachycardia w/ brief SICU stay from PPD#0-PPD#1.   Patient currently on IV antibiotics w/ close maternal vital sign monitoring.  Afebrile since 6/2@11 PM.  Clinically improving.

## 2019-06-05 NOTE — PROGRESS NOTE ADULT - PROBLEM SELECTOR PLAN 1
Neuro: PO pain meds prn  CV: Hemodynamically stable.  H/H stable.    Pulm: Saturating well on room air, encourage oob/amb  GI: Regular diet  : Voiding freely.   Heme: c/w SCDs and Lovenox for DVT ppx.     -Thrombocytopenia:  Notable pre-delivery.  ?ITP vs. gestational however unusal for this early in gestation.   Can be component of systemic infection.  Currently platlets uptrending.  Will f/u AM CBC for trend.   FEN: SLIV.  replete electrolytes prn   ID: -IAI:  Resolving.  Continue IV Invanz.  Appreciate ID input.  f/u Bcx- currently NGTD.  f/u placental cultures- currently NGTD x 2.  Continue close vital sign monitoring.  f/u AM CBC  Endo: No active issues   Dispo: Continue inpatient care.     Tati Vang PGY-3 Neuro: PO pain meds prn  CV: Hemodynamically stable.  H/H stable.    Pulm: Saturating well on room air, encourage oob/amb  GI: Regular diet     -Hep B core antigen +, Remainder of Hep antigens negative:   s/p hepatology consult.  appreciate input.  f/u outpatient.   : Voiding freely.   Heme: c/w SCDs and Lovenox for DVT ppx.     -Thrombocytopenia:  Notable pre-delivery.  ?ITP vs. gestational however unusal for this early in gestation.   Can be component of systemic infection.  Currently platlets uptrending.  Will f/u AM CBC for trend.   FEN: SLIV.  replete electrolytes prn   ID: -IAI:  Resolving.  Continue IV Invanz.  Appreciate ID input.  f/u Bcx- currently NGTD.  f/u placental cultures- currently NGTD x 2.  Continue close vital sign monitoring.  f/u AM CBC  Endo: No active issues   Dispo: Continue inpatient care.     Tati Vang PGY-3 Neuro: PO pain meds prn  CV: Hemodynamically stable.  H/H stable.    Pulm: Saturating well on room air, encourage oob/amb  GI: Regular diet     -Hep B core antigen +, Remainder of Hep antigens negative:   s/p hepatology consult.  appreciate input.  f/u outpatient.   : Voiding freely.   Heme: c/w SCDs and Lovenox for DVT ppx.     -Thrombocytopenia:  Notable pre-delivery.  ?ITP vs. gestational however unusal for this early in gestation.   Can be component of systemic infection.  Currently platlets uptrending.  Will f/u AM CBC for trend.   FEN: SLIV.  replete electrolytes prn   ID: -IAI:  Resolving.  Continue IV Invanz.  Appreciate ID input.  f/u Bcx- currently NGTD.  f/u placental cultures- currently NGTD x 2.  Continue close vital sign monitoring.  f/u AM CBC  Endo: No active issues   Dispo: Continue inpatient care.  Discharge planning.     Tati Vang PGY-3

## 2019-06-05 NOTE — PROGRESS NOTE ADULT - SUBJECTIVE AND OBJECTIVE BOX
Patient is a 33y old  Female who presents with a chief complaint of PPROM (04 Jun 2019 13:59)    Being followed by ID for chorioamniotis    Interval history:feels well  denies abd pain  Minimal vaginal discharge   No acute events      ROS:  No cough,SOB,CP  No N/V/D./abd pain  No other complaints      Antimicrobials:    ertapenem  IVPB      ertapenem  IVPB 1000 milliGRAM(s) IV Intermittent every 24 hours    Other medications reviewed    Vital Signs Last 24 Hrs  T(C): 36.4 (06-05-19 @ 09:00), Max: 36.8 (06-05-19 @ 00:13)  T(F): 97.5 (06-05-19 @ 09:00), Max: 98.2 (06-05-19 @ 00:13)  HR: 93 (06-05-19 @ 09:00) (81 - 98)  BP: 99/63 (06-05-19 @ 09:00) (95/60 - 110/75)  BP(mean): --  RR: 18 (06-05-19 @ 09:00) (16 - 18)  SpO2: 98% (06-05-19 @ 09:00) (96% - 98%)    Physical Exam:        HEENT PERRLA EOMI    No oral exudate or erythema    Chest Good AE,CTA    CVS RRR S1 S2 WNl No murmur or rub or gallop    Abd soft BS normal No tenderness no masses    IV site no erythema tenderness or discharge    CNS AAO X 3 no focal    Lab Data:                          11.3   6.2   )-----------( 121      ( 05 Jun 2019 05:47 )             32.8       06-05    136  |  102  |  10  ----------------------------<  84  3.6   |  23  |  0.45<L>    Ca    8.8      05 Jun 2019 05:47  Phos  4.6     06-05  Mg     1.8     06-05          Culture - Blood (collected 03 Jun 2019 09:51)  Source: .Blood  Preliminary Report (04 Jun 2019 10:01):    No growth to date.    Culture - Blood (collected 03 Jun 2019 09:51)  Source: .Blood  Preliminary Report (04 Jun 2019 10:01):    No growth to date.    Culture - Other (collected 03 Jun 2019 08:37)  Source: Skin maternal culture  Preliminary Report (04 Jun 2019 08:53):    No growth    Culture - Other (collected 03 Jun 2019 08:36)  Source: Skin fetal culture  Preliminary Report (04 Jun 2019 08:50):    No growth    Culture - Body Fluid with Gram Stain (collected 01 Jun 2019 05:39)  Source: .Body Fluid amnioitic fluid  Gram Stain (01 Jun 2019 07:46):    No polymorphonuclear cells seen    No organisms seen    by cytocentrifuge  Preliminary Report (02 Jun 2019 08:51):    No growth          < from: US Abdomen Complete (05.29.19 @ 15:51) >    IMPRESSION:     Diffusely coarsened, heterogeneous echotexture of the liver and enlarged   caudate lobe, consistent with cirrhosis.    Mild splenomegaly.    Multiple varices in the splenic hilum and recanalization of the   paraumbilical vein. Hepatopedal flow in the main, right, and left portal     < end of copied text >

## 2019-06-05 NOTE — PROGRESS NOTE ADULT - SUBJECTIVE AND OBJECTIVE BOX
MEAGHAN ZIMMERMAN Progress Note    PPD#3   HD#6    Patient seen and examined at bedside.  No acute events overnight. No acute complaints.  Pain well controlled.   Patient is ambulating and tolerating regular diet. Patient is passing flatus.    Patient is voiding spontaneously.   Denies CP, SOB, N/V, fevers, and chills.    Vital Signs Last 24 Hours  T(C): 36.8 (06-05-19 @ 00:13), Max: 36.8 (06-05-19 @ 00:13)  HR: 98 (06-05-19 @ 00:13) (81 - 98)  BP: 96/59 (06-05-19 @ 00:13) (95/60 - 110/75)  RR: 18 (06-05-19 @ 00:13) (16 - 18)  SpO2: 97% (06-04-19 @ 20:00) (96% - 97%)    I&O's Summary    03 Jun 2019 07:01  -  04 Jun 2019 07:00  --------------------------------------------------------  IN: 750 mL / OUT: 1000 mL / NET: -250 mL      Physical Exam:  General: NAD  CV: RR, S1, S2, no M/R/G  Lungs: CTA b/l, good air flow b/l   Abdomen: Soft, NT, fundus firm and non tender  : no bleeding on pad  Ext: No pain or swelling     Labs:                        11.2   6.8   )-----------( 112      ( 04 Jun 2019 07:02 )             34.4   baso x      eos x      imm gran x      lymph x      mono x      poly x                            11.3   7.6   )-----------( 93       ( 03 Jun 2019 03:26 )             32.8   baso x      eos x      imm gran x      lymph x      mono x      poly x                            11.2   7.6   )-----------( 101      ( 03 Jun 2019 01:27 )             33.3   baso 0.0    eos 0.8    imm gran x      lymph 14.6   mono 9.5    poly 75.1                         11.5   7.8   )-----------( 103      ( 02 Jun 2019 17:50 )             33.6   baso 0.2    eos 0.7    imm gran x      lymph 12.8   mono 6.3    poly 80.1                         11.2   6.1   )-----------( 110      ( 02 Jun 2019 02:28 )             32.1   baso 0.1    eos 1.8    imm gran x      lymph 18.0   mono 7.4    poly 72.7       MEDICATIONS  (STANDING):  diphtheria/tetanus/pertussis (acellular) Vaccine (ADAcel) 0.5 milliLiter(s) IntraMuscular once  enoxaparin Injectable 40 milliGRAM(s) SubCutaneous every 24 hours  ertapenem  IVPB      ertapenem  IVPB 1000 milliGRAM(s) IV Intermittent every 24 hours  famotidine Injectable 20 milliGRAM(s) IV Push once    MEDICATIONS  (PRN):  acetaminophen   Tablet .. 650 milliGRAM(s) Oral every 8 hours PRN Mild Pain (1 - 3)  aluminum hydroxide/magnesium hydroxide/simethicone Suspension 30 milliLiter(s) Oral every 4 hours PRN Dyspepsia  benzocaine 20%/menthol 0.5% Spray 1 Spray(s) Topical every 6 hours PRN for Perineal discomfort  dibucaine 1% Ointment 1 Application(s) Topical every 6 hours PRN Perineal discomfort  diphenhydrAMINE 25 milliGRAM(s) Oral every 6 hours PRN Pruritus  docusate sodium 100 milliGRAM(s) Oral two times a day PRN For stool softening  glycerin Suppository - Adult 1 Suppository(s) Rectal at bedtime PRN Constipation  hydrocortisone 1% Cream 1 Application(s) Topical every 6 hours PRN Moderate Pain (4-6)  lanolin Ointment 1 Application(s) Topical every 6 hours PRN nipple soreness  magnesium hydroxide Suspension 30 milliLiter(s) Oral two times a day PRN Constipation  pramoxine 1%/zinc 5% Cream 1 Application(s) Topical every 4 hours PRN Moderate Pain (4-6)  simethicone 80 milliGRAM(s) Chew every 4 hours PRN Gas  witch hazel Pads 1 Application(s) Topical every 4 hours PRN Perineal discomfort

## 2019-06-06 ENCOUNTER — OTHER (OUTPATIENT)
Age: 34
End: 2019-06-06

## 2019-06-06 ENCOUNTER — OUTPATIENT (OUTPATIENT)
Dept: OUTPATIENT SERVICES | Facility: HOSPITAL | Age: 34
LOS: 1 days | End: 2019-06-06
Payer: MEDICAID

## 2019-06-06 ENCOUNTER — APPOINTMENT (OUTPATIENT)
Dept: MATERNAL FETAL MEDICINE | Facility: CLINIC | Age: 34
End: 2019-06-06
Payer: MEDICAID

## 2019-06-06 VITALS — DIASTOLIC BLOOD PRESSURE: 68 MMHG | SYSTOLIC BLOOD PRESSURE: 100 MMHG

## 2019-06-06 VITALS — TEMPERATURE: 98.8 F

## 2019-06-06 DIAGNOSIS — K83.1 LIVER AND BILIARY TRACT DISORDERS IN PREGNANCY, SECOND TRIMESTER: ICD-10-CM

## 2019-06-06 DIAGNOSIS — O26.612 LIVER AND BILIARY TRACT DISORDERS IN PREGNANCY, SECOND TRIMESTER: ICD-10-CM

## 2019-06-06 DIAGNOSIS — Z3A.14 14 WEEKS GESTATION OF PREGNANCY: ICD-10-CM

## 2019-06-06 DIAGNOSIS — I85.00 ESOPHAGEAL VARICES WITHOUT BLEEDING: ICD-10-CM

## 2019-06-06 DIAGNOSIS — O09.299 SUPERVISION OF PREGNANCY WITH OTHER POOR REPRODUCTIVE OR OBSTETRIC HISTORY, UNSPECIFIED TRIMESTER: ICD-10-CM

## 2019-06-06 DIAGNOSIS — O09.522 SUPERVISION OF ELDERLY MULTIGRAVIDA, SECOND TRIMESTER: ICD-10-CM

## 2019-06-06 DIAGNOSIS — O09.90 SUPERVISION OF HIGH RISK PREGNANCY, UNSPECIFIED, UNSPECIFIED TRIMESTER: ICD-10-CM

## 2019-06-06 DIAGNOSIS — O92.29 OTHER DISORDERS OF BREAST ASSOCIATED WITH PREGNANCY AND THE PUERPERIUM: ICD-10-CM

## 2019-06-06 DIAGNOSIS — Z86.32 PERSONAL HISTORY OF GESTATIONAL DIABETES: ICD-10-CM

## 2019-06-06 LAB
CULTURE RESULTS: SIGNIFICANT CHANGE UP
SPECIMEN SOURCE: SIGNIFICANT CHANGE UP

## 2019-06-06 PROCEDURE — G0463: CPT

## 2019-06-06 PROCEDURE — 99213 OFFICE O/P EST LOW 20 MIN: CPT

## 2019-06-07 ENCOUNTER — APPOINTMENT (OUTPATIENT)
Dept: ANTEPARTUM | Facility: CLINIC | Age: 34
End: 2019-06-07

## 2019-06-07 ENCOUNTER — APPOINTMENT (OUTPATIENT)
Dept: MATERNAL FETAL MEDICINE | Facility: CLINIC | Age: 34
End: 2019-06-07

## 2019-06-07 LAB — CHROM ANALY OVERALL INTERP SPEC-IMP: SIGNIFICANT CHANGE UP

## 2019-06-08 LAB
CULTURE RESULTS: SIGNIFICANT CHANGE UP
SPECIMEN SOURCE: SIGNIFICANT CHANGE UP

## 2019-06-10 DIAGNOSIS — Z3A.22 22 WEEKS GESTATION OF PREGNANCY: ICD-10-CM

## 2019-06-10 DIAGNOSIS — O43.109 MALFORMATION OF PLACENTA, UNSPECIFIED, UNSPECIFIED TRIMESTER: ICD-10-CM

## 2019-06-10 DIAGNOSIS — K83.1 OBSTRUCTION OF BILE DUCT: ICD-10-CM

## 2019-06-10 DIAGNOSIS — O09.90 SUPERVISION OF HIGH RISK PREGNANCY, UNSPECIFIED, UNSPECIFIED TRIMESTER: ICD-10-CM

## 2019-06-13 NOTE — HISTORY OF PRESENT ILLNESS
[Complications:___] : complications include: [unfilled] [Postpartum Follow Up] : postpartum follow up [Delivery Date: ___] : on [unfilled] [] : delivered by vaginal delivery [Breast Pain] : breast pain [Tender] : tenderness [Examination Of The Breasts] : a normal appearance [Normal] : normal [No Masses] : no breast masses were palpable [No Sign of Infection] : is showing no signs of infection [Rhogam] : Rhogam was not administered [Rubella Vaccine] : Rubella vaccine was not administered [Pertussis Vaccine] : Pertussis vaccine was not administered [BTL] : no tubal ligation [BreastFeeding Problems] : no breastfeeding problems [Mass] : no breast mass [Nipple Discharge] : no nipple discharge [Axillary LAD] : no axillary lymphadenopathy [Breast Palpation Diffuse Fibrous Tissue Bilateral] : no fibrocystic changes [Breast Hypertrophy Bilateral] : no hypertrophy [Breast - Peau D'Orange Bilateral] : no Peau D'Orange [Dimpling In Both Breasts] : no dimpling [Superficial Breast Veins Dilated Bilaterally] : no dilated superficial veins [Breast Implant Bilateral] : no implants [Breast Nipple Inversion] : not inverted [Breast Nipple Retraction] : not retracted [Breast Nipple Flattening] : not flattened [Breast Nipple Fissures] : not fissured [Axillary Lymph Nodes Enlarged Bilaterally] : no enlarged nodes [de-identified] : PPROM @ 22 + weeks  IOL Chorio   [de-identified] : bilateral breast pain and hardness

## 2019-06-14 ENCOUNTER — OUTPATIENT (OUTPATIENT)
Dept: OUTPATIENT SERVICES | Facility: HOSPITAL | Age: 34
LOS: 1 days | End: 2019-06-14
Payer: MEDICAID

## 2019-06-14 ENCOUNTER — APPOINTMENT (OUTPATIENT)
Dept: MATERNAL FETAL MEDICINE | Facility: CLINIC | Age: 34
End: 2019-06-14
Payer: MEDICAID

## 2019-06-14 PROCEDURE — 99213 OFFICE O/P EST LOW 20 MIN: CPT | Mod: GE

## 2019-06-14 PROCEDURE — G0463: CPT

## 2019-06-19 ENCOUNTER — APPOINTMENT (OUTPATIENT)
Dept: HEPATOLOGY | Facility: CLINIC | Age: 34
End: 2019-06-19
Payer: MEDICAID

## 2019-06-19 PROCEDURE — 99215 OFFICE O/P EST HI 40 MIN: CPT

## 2019-06-21 NOTE — PHYSICAL EXAM
[General Appearance - Well Nourished] : well nourished [General Appearance - Alert] : alert [Outer Ear] : the ears and nose were normal in appearance [Sclera] : the sclera and conjunctiva were normal [General Appearance - Well Developed] : well developed [Neck Appearance] : the appearance of the neck was normal [Jugular Venous Distention Increased] : there was no jugular-venous distention [Neck Cervical Mass (___cm)] : no neck mass was observed [Heart Rate And Rhythm] : heart rate was normal and rhythm regular [Murmurs] : no murmurs [Heart Sounds] : normal S1 and S2 [Heart Sounds Gallop] : no gallops [Heart Sounds Pericardial Friction Rub] : no pericardial rub [Arterial Pulses Carotid] : carotid pulses were normal with no bruits [Abdomen Tenderness] : non-tender [Abdomen Soft] : soft [Bowel Sounds] : normal bowel sounds [Abdomen Mass (___ Cm)] : no abdominal mass palpated [Abdomen Hernia] : no hernia was discovered [] : no hepato-splenomegaly [Cranial Nerves] : cranial nerves 2-12 were intact [Abnormal Walk] : normal gait [Sensation] : the sensory exam was normal to light touch and pinprick [Motor Exam] : the motor exam was normal [Oriented To Time, Place, And Person] : oriented to person, place, and time [Affect] : the affect was normal [Spider Angioma] : No spider angioma(s) were observed [Scleral Icterus] : No Scleral Icterus

## 2019-06-21 NOTE — ASSESSMENT
[FreeTextEntry1] : 33 years old young [place] Female with cirrhosis of the liver, cryptogenic etiology, hx of ascites, portal hypertension, esophageal varices. Recent induction and termination of pregnancy for previable rupture of membrane ( PPROM) at 25 weeks 5 days of pregnancy. Patient is here for follow up.\par \par Plan\par -follow up labs.\par -no beta blocker therapy considering small esophageal varices, and her low BP. Risk of variceal bleeding appears to be low.\par -Her  anti HBs test is negative, but has hepatitis B core IgG is positive. The following interpretation is possible: May be distantly immune, but the test may not be sensitive enough to detect a very low level of anti-HBs in serum, may be susceptible with a false positive anti-HBc. I earlier recommend vaccination with Engerix B at 0, 1 , 6 months schedule, as the patient was pregant and the safety of Heplisav B is unknown iin pregnancy. Since patient is no longer conceiving, I will recommend Heplisav B at 0,1 months schedule.\par -MRI from January 20, 2019 shows a 0.6 x 0.5 cm lesion in the segmemt 4B ( previously 0.8 x 0,7 cm), that did not meet the criteria by OPTN/UNOS for HCC, LI-RADS 3 for observation.  No new liver lesson was noted. Her US did not show any lesion. I will plan for an MRI in 6 months from the last imaging study.\par -d/c Ursodiol.\par -follow up with Dr. Ambrocio Albrecht ( ob/gyn).\par \par -RTC in 4 weeks. \par

## 2019-06-21 NOTE — HISTORY OF PRESENT ILLNESS
[FreeTextEntry1] : Mr. Cori Tee is a 33 years old young female from [place] with hx of cryptogenic cirrhosis, portal hypertension with small esophgeal varices, hx of ascites ( now resolved).\par \par She reportedly has been diagnosed with cirrhosis of the liver since about . She presented with 4 weeks of pregnancy at [facility], and was diagnosed with cirrhosis of the liver, and was advised to terminate pregnancy. She had two prior spontaneous , and one pre-term delivery. Her pregnancy history is as follows:\par \par #1 (): Vaginal delivery girl, at 28 weeks GA . Delivery occurred at [facility]. Pregnancy #1 Comments: ( delivery, baby  after 30 minutes. Pt remained in hospital due to enlarged liver). \par #2 (): at 16 weeks GA . Delivery occurred at [facility]. Pregnancy #2 Comments: (spontaneous ab, D&C). \par #3 (): at 8 weeks GA . Delivery occurred at [facility]. Pregnancy #3 Comments: (Spontaneous ab, D&C). \par  #4 (): at 4 weeks GA . Delivery occurred at [facility]. Pregnancy #4 Comments: (Induced ab- Pt was diagnosed with Cirrhosis, pt was advised to terminate pregnancy). \par \par She is unmarried, and has a boyfriend, and lives with her family. She has been seeing a hepatologist at [facility], Dr. Davida Thompson. Her past EGD from 2017 showed Grade A esophageal varices. Based on the picture it seems like was a short column (not commented on the EGD report).\par \par She denies any prior history of gastrointestinal bleeding, symptoms of distal hepatic encephalopathy or ascites. She reports generalized mild pruritus, and has not been on any specific medication to control her itching. She has been using some emollients or oil for symptoms relief. She is going to discuss this issue with her OB/GYN.\par  \par She was initially seen for a suspicious lesion in her liver with Dr. Thompson, and has been followed up with a MRI in January (the report was not immediately available for my review). Per my discussion with Mireya (BEENA), this a Li-RAD 3 lesion, and has become smaller over time. I have requested to fax the actual report to me.\par \par Liver function test from 2019 revealed total bilirubin 1.3 mg/dL, AST 33, ALT 34 alkaline phosphatase 123. Her total protein was 7 g/dL. GGTP was 74. Hepatitis B surface antigen was negative. Her blood type is B+. Screening test for HIV antibody was negative.\par Follow labs from May 10, 2019 revealed AST 30, ALT 14, alkaline phosphatase 112, total bilirubin 1.4 mg/dL. Her total protein was 7 and albumin was 4. Serum creatinine was 0.45 mg/dL acute hepatitis panel including hepatitis B surface antigen, hepatitis A IgM antibody were again negative. Hepatitis B core IgM antibody was positive, which I suspect is false-positive.\par Serum Bile Acid level is 15.7 \par \par Interval history: \par Patient today presented for follow up along with her boyfriend ( who speaks English and [place]ian).\par Recent EGD by Dr. Thompson. This showed small esophageal varices. Considering her low BP, and small varices, we decided to defer beta blocker for now. \par \par Since last seen in the hepatology clinic patient was admitted in hospital and underwent induction termination at 22 week 5 days for previable premature rupture of membrane ( PPROM) per ob/gyn ( see Ambrocio Sharpe's note for more details). She had a brief stay in the SICU for fever, and received IV antibiotics followed by oral antibiotics x 10 days. \par She is here for follow up, and overall feels well. She c/o some tidiness. Denies any nausea, vomiting and fever. Also reports, some pain in both LE since the surgery ( induction termination). \par She wanted to know if she can be pregnant again. I deferred this question to Dr. Albrecht on her next follow up. However, I have cautioned her potential risk and liver related complications with pregnancy. I suggested her to defer any planned pregnancy for now. \par \par Labs reviewed from the last clinic visit ( results appended). She has no immunity towards hepatitis B (although Hepatitis B Core IgG is +) suggesting past infection or possible false positive test. HBV DNA is negative. I will  recommend vaccination for hepatitis B.\par \par Hepatitis C antibody was negative.  [id number] level was 246 mg/dl. Autoimmune markers were negative for antimitochondrial antibody, anti-smooth muscle antibody, anti-nuclear antibody,\par Ceruloplasmin level 52 mg/dL.\par

## 2019-06-21 NOTE — REASON FOR VISIT
[Follow-Up: _____] : a [unfilled] follow-up visit [[employer] ] : provided by [employer]   [FreeTextEntry1] : #[id number]

## 2019-07-08 ENCOUNTER — APPOINTMENT (OUTPATIENT)
Dept: HEPATOLOGY | Facility: CLINIC | Age: 34
End: 2019-07-08
Payer: MEDICAID

## 2019-07-08 PROCEDURE — 90739 HEPB VACC 2/4 DOSE ADULT IM: CPT

## 2019-07-08 PROCEDURE — 90471 IMMUNIZATION ADMIN: CPT

## 2019-07-10 PROCEDURE — 85610 PROTHROMBIN TIME: CPT

## 2019-07-10 PROCEDURE — 85730 THROMBOPLASTIN TIME PARTIAL: CPT

## 2019-07-10 PROCEDURE — 84145 PROCALCITONIN (PCT): CPT

## 2019-07-10 PROCEDURE — 88305 TISSUE EXAM BY PATHOLOGIST: CPT

## 2019-07-10 PROCEDURE — 86705 HEP B CORE ANTIBODY IGM: CPT

## 2019-07-10 PROCEDURE — 80048 BASIC METABOLIC PNL TOTAL CA: CPT

## 2019-07-10 PROCEDURE — 82962 GLUCOSE BLOOD TEST: CPT

## 2019-07-10 PROCEDURE — 84550 ASSAY OF BLOOD/URIC ACID: CPT

## 2019-07-10 PROCEDURE — 85384 FIBRINOGEN ACTIVITY: CPT

## 2019-07-10 PROCEDURE — 86780 TREPONEMA PALLIDUM: CPT

## 2019-07-10 PROCEDURE — 87040 BLOOD CULTURE FOR BACTERIA: CPT

## 2019-07-10 PROCEDURE — 86900 BLOOD TYPING SEROLOGIC ABO: CPT

## 2019-07-10 PROCEDURE — 81003 URINALYSIS AUTO W/O SCOPE: CPT

## 2019-07-10 PROCEDURE — 87340 HEPATITIS B SURFACE AG IA: CPT

## 2019-07-10 PROCEDURE — 85027 COMPLETE CBC AUTOMATED: CPT

## 2019-07-10 PROCEDURE — 86901 BLOOD TYPING SEROLOGIC RH(D): CPT

## 2019-07-10 PROCEDURE — 86706 HEP B SURFACE ANTIBODY: CPT

## 2019-07-10 PROCEDURE — 83735 ASSAY OF MAGNESIUM: CPT

## 2019-07-10 PROCEDURE — 80053 COMPREHEN METABOLIC PANEL: CPT

## 2019-07-10 PROCEDURE — 87070 CULTURE OTHR SPECIMN AEROBIC: CPT

## 2019-07-10 PROCEDURE — 86850 RBC ANTIBODY SCREEN: CPT

## 2019-07-10 PROCEDURE — 84100 ASSAY OF PHOSPHORUS: CPT

## 2019-07-10 PROCEDURE — 83605 ASSAY OF LACTIC ACID: CPT

## 2019-07-12 ENCOUNTER — LABORATORY RESULT (OUTPATIENT)
Age: 34
End: 2019-07-12

## 2019-07-12 ENCOUNTER — APPOINTMENT (OUTPATIENT)
Dept: MATERNAL FETAL MEDICINE | Facility: CLINIC | Age: 34
End: 2019-07-12
Payer: MEDICAID

## 2019-07-12 ENCOUNTER — OUTPATIENT (OUTPATIENT)
Dept: OUTPATIENT SERVICES | Facility: HOSPITAL | Age: 34
LOS: 1 days | End: 2019-07-12
Payer: MEDICAID

## 2019-07-12 VITALS
SYSTOLIC BLOOD PRESSURE: 100 MMHG | WEIGHT: 109.38 LBS | BODY MASS INDEX: 19.38 KG/M2 | DIASTOLIC BLOOD PRESSURE: 70 MMHG | HEIGHT: 63 IN

## 2019-07-12 LAB
BILIRUB UR QL STRIP: NORMAL
GLUCOSE UR-MCNC: NORMAL
HCG UR QL: 1 EU/DL
HGB UR QL STRIP.AUTO: NORMAL
KETONES UR-MCNC: NORMAL
LEUKOCYTE ESTERASE UR QL STRIP: NORMAL
NITRITE UR QL STRIP: NORMAL
PH UR STRIP: 5
PROT UR STRIP-MCNC: NORMAL
SP GR UR STRIP: 1.03

## 2019-07-12 PROCEDURE — 85027 COMPLETE CBC AUTOMATED: CPT

## 2019-07-12 PROCEDURE — 80053 COMPREHEN METABOLIC PANEL: CPT

## 2019-07-12 PROCEDURE — G0463: CPT

## 2019-07-12 PROCEDURE — 87800 DETECT AGNT MULT DNA DIREC: CPT

## 2019-07-12 PROCEDURE — 85610 PROTHROMBIN TIME: CPT

## 2019-07-12 PROCEDURE — 99213 OFFICE O/P EST LOW 20 MIN: CPT | Mod: GC

## 2019-07-12 PROCEDURE — 36415 COLL VENOUS BLD VENIPUNCTURE: CPT

## 2019-07-15 DIAGNOSIS — O09.899 SUPERVISION OF OTHER HIGH RISK PREGNANCIES, UNSPECIFIED TRIMESTER: ICD-10-CM

## 2019-07-16 LAB
ALBUMIN SERPL ELPH-MCNC: 4.5 G/DL — SIGNIFICANT CHANGE UP (ref 3.3–5)
ALP SERPL-CCNC: 99 U/L — SIGNIFICANT CHANGE UP (ref 40–120)
ALT FLD-CCNC: 27 U/L — SIGNIFICANT CHANGE UP (ref 10–45)
ANION GAP SERPL CALC-SCNC: 11 MMOL/L — SIGNIFICANT CHANGE UP (ref 5–17)
AST SERPL-CCNC: 25 U/L — SIGNIFICANT CHANGE UP (ref 10–40)
BILIRUB SERPL-MCNC: 1.4 MG/DL — HIGH (ref 0.2–1.2)
BUN SERPL-MCNC: 14 MG/DL — SIGNIFICANT CHANGE UP (ref 7–23)
CALCIUM SERPL-MCNC: 9.8 MG/DL — SIGNIFICANT CHANGE UP (ref 8.4–10.5)
CANDIDA AB TITR SER: SIGNIFICANT CHANGE UP
CHLORIDE SERPL-SCNC: 106 MMOL/L — SIGNIFICANT CHANGE UP (ref 96–108)
CO2 SERPL-SCNC: 25 MMOL/L — SIGNIFICANT CHANGE UP (ref 22–31)
CREAT SERPL-MCNC: 0.6 MG/DL — SIGNIFICANT CHANGE UP (ref 0.5–1.3)
G VAGINALIS DNA SPEC QL NAA+PROBE: SIGNIFICANT CHANGE UP
GLUCOSE SERPL-MCNC: 91 MG/DL — SIGNIFICANT CHANGE UP (ref 70–99)
HCT VFR BLD CALC: 43.2 % — SIGNIFICANT CHANGE UP (ref 34.5–45)
HGB BLD-MCNC: 13.2 G/DL — SIGNIFICANT CHANGE UP (ref 11.5–15.5)
INR BLD: 1.03 RATIO — SIGNIFICANT CHANGE UP (ref 0.88–1.16)
MCHC RBC-ENTMCNC: 29.3 PG — SIGNIFICANT CHANGE UP (ref 27–34)
MCHC RBC-ENTMCNC: 30.6 GM/DL — LOW (ref 32–36)
MCV RBC AUTO: 95.8 FL — SIGNIFICANT CHANGE UP (ref 80–100)
PLATELET # BLD AUTO: 126 K/UL — LOW (ref 150–400)
POTASSIUM SERPL-MCNC: 4.8 MMOL/L — SIGNIFICANT CHANGE UP (ref 3.5–5.3)
POTASSIUM SERPL-SCNC: 4.8 MMOL/L — SIGNIFICANT CHANGE UP (ref 3.5–5.3)
PROT SERPL-MCNC: 7.3 G/DL — SIGNIFICANT CHANGE UP (ref 6–8.3)
PROTHROM AB SERPL-ACNC: 11.7 SEC — SIGNIFICANT CHANGE UP (ref 10–13.1)
RBC # BLD: 4.51 M/UL — SIGNIFICANT CHANGE UP (ref 3.8–5.2)
RBC # FLD: 12.7 % — SIGNIFICANT CHANGE UP (ref 10.3–14.5)
SODIUM SERPL-SCNC: 142 MMOL/L — SIGNIFICANT CHANGE UP (ref 135–145)
T VAGINALIS SPEC QL WET PREP: SIGNIFICANT CHANGE UP
WBC # BLD: 5.01 K/UL — SIGNIFICANT CHANGE UP (ref 3.8–10.5)
WBC # FLD AUTO: 5.01 K/UL — SIGNIFICANT CHANGE UP (ref 3.8–10.5)

## 2019-07-25 DIAGNOSIS — O34.30 MATERNAL CARE FOR CERVICAL INCOMPETENCE, UNSPECIFIED TRIMESTER: ICD-10-CM

## 2019-08-07 ENCOUNTER — LABORATORY RESULT (OUTPATIENT)
Age: 34
End: 2019-08-07

## 2019-08-07 ENCOUNTER — APPOINTMENT (OUTPATIENT)
Dept: HEPATOLOGY | Facility: CLINIC | Age: 34
End: 2019-08-07
Payer: MEDICAID

## 2019-08-07 VITALS
SYSTOLIC BLOOD PRESSURE: 114 MMHG | HEART RATE: 91 BPM | DIASTOLIC BLOOD PRESSURE: 79 MMHG | HEIGHT: 63 IN | RESPIRATION RATE: 16 BRPM | BODY MASS INDEX: 19.31 KG/M2 | WEIGHT: 109 LBS | TEMPERATURE: 98.5 F

## 2019-08-07 PROCEDURE — 90471 IMMUNIZATION ADMIN: CPT

## 2019-08-07 PROCEDURE — 90739 HEPB VACC 2/4 DOSE ADULT IM: CPT

## 2019-08-07 PROCEDURE — ZZZZZ: CPT

## 2019-08-07 PROCEDURE — 99214 OFFICE O/P EST MOD 30 MIN: CPT | Mod: 25

## 2019-08-07 RX ORDER — MULTIVIT-MIN/FOLIC/VIT K/LYCOP 400-300MCG
28-0.8 TABLET ORAL DAILY
Qty: 31 | Refills: 3 | Status: DISCONTINUED | COMMUNITY
Start: 2019-05-31 | End: 2019-08-07

## 2019-08-07 RX ORDER — URSODIOL 500 MG/1
500 TABLET ORAL TWICE DAILY
Qty: 60 | Refills: 1 | Status: DISCONTINUED | COMMUNITY
Start: 2019-05-10 | End: 2019-08-07

## 2019-08-07 RX ORDER — IBUPROFEN 600 MG/1
600 TABLET, FILM COATED ORAL 3 TIMES DAILY
Qty: 30 | Refills: 0 | Status: DISCONTINUED | COMMUNITY
Start: 2019-06-06 | End: 2019-08-07

## 2019-08-07 NOTE — ASSESSMENT
[FreeTextEntry1] : 33 years old young [place] Female with cirrhosis of the liver, cryptogenic etiology, hx of ascites, portal hypertension, esophageal varices. Recent induction and termination of pregnancy for previable rupture of membrane ( PPROM) at 25 weeks 5 days of pregnancy. Patient is here for follow up after being last seen on 19-Jun-2019\par \par Plan\par -follow up labs.\par -no beta blocker therapy considering small esophageal varices, and her low BP. Risk of variceal bleeding appears to be low.\par -MRI from January 20, 2019 shows a 0.6 x 0.5 cm lesion in the segmemt 4B ( previously 0.8 x 0,7 cm), that did not meet the criteria by OPTN/UNOS for HCC, LI-RADS 3 for observation.  No new liver lesson was noted. Her US did not show any lesion. I will plan for an MRI in 6 months from the last imaging study. She is already scheduled for MRI in September.\par -She asked me if she can take ЕЛЕНА-e ( S-adenosyl methionine) which she was being prescribed when she was in [place], and had no side effects from it. Since this is unregulated in [place], I asked her bring the medication to review with me. This is marketed in [place] as dietary supplements. I explained that these medications may not have any beneficial effects, but possibly may even harm ( if mixed with other OTCs). However, I wanted to review the bottle before giving my OK.\par \par -RTC in 3 months,.\par

## 2019-08-07 NOTE — HISTORY OF PRESENT ILLNESS
[FreeTextEntry1] : Mr. Cori Tee is a 33 years old young female from [place] with hx of cryptogenic cirrhosis, portal hypertension with small esophgeal varices, hx of ascites ( now resolved).\par \par She reportedly has been diagnosed with cirrhosis of the liver since about . She presented with 4 weeks of pregnancy at [facility], and was diagnosed with cirrhosis of the liver, and was advised to terminate pregnancy. She had two prior spontaneous , and one pre-term delivery. Her pregnancy history is as follows:\par \par #1 (): Vaginal delivery girl, at 28 weeks GA . Delivery occurred at [facility]. Pregnancy #1 Comments: ( delivery, baby  after 30 minutes. Pt remained in hospital due to enlarged liver). \par #2 (): at 16 weeks GA . Delivery occurred at [facility]. Pregnancy #2 Comments: (spontaneous ab, D&C). \par #3 (): at 8 weeks GA . Delivery occurred at [facility]. Pregnancy #3 Comments: (Spontaneous ab, D&C). \par  #4 (): at 4 weeks GA . Delivery occurred at [facility]. Pregnancy #4 Comments: (Induced ab- Pt was diagnosed with Cirrhosis, pt was advised to terminate pregnancy). \par \par She is unmarried, and has a boyfriend, and lives with her family. She has been seeing a hepatologist at [facility], Dr. Davida Thompson. Her past EGD from 2017 showed Grade A esophageal varices. Based on the picture it seems like was a short column (not commented on the EGD report).\par \par She denies any prior history of gastrointestinal bleeding, symptoms of distal hepatic encephalopathy or ascites. She reports generalized mild pruritus, and has not been on any specific medication to control her itching. She has been using some emollients or oil for symptoms relief. She is going to discuss this issue with her OB/GYN.\par  \par She was initially seen for a suspicious lesion in her liver with Dr. Thompson, and has been followed up with a MRI in January (the report was not immediately available for my review). Per my discussion with Mireya (BEENA), this a Li-RAD 3 lesion, and has become smaller over time. I have requested to fax the actual report to me.\par \par Liver function test from 2019 revealed total bilirubin 1.3 mg/dL, AST 33, ALT 34 alkaline phosphatase 123. Her total protein was 7 g/dL. GGTP was 74. Hepatitis B surface antigen was negative. Her blood type is B+. Screening test for HIV antibody was negative.\par Follow labs from May 10, 2019 revealed AST 30, ALT 14, alkaline phosphatase 112, total bilirubin 1.4 mg/dL. Her total protein was 7 and albumin was 4. Serum creatinine was 0.45 mg/dL acute hepatitis panel including hepatitis B surface antigen, hepatitis A IgM antibody were again negative. Hepatitis B core IgM antibody was positive, which I suspect is false-positive.\par Serum Bile Acid level is 15.7 \par \par Interval history: \par Patient today presented for follow up along with her boyfriend ( who speaks English and Spanish).\par Recent EGD by Dr. Thompson. This showed small esophageal varices. Considering her low BP, and small varices, we decided to defer beta blocker for now. \par \par Since last seen in the hepatology clinic patient was admitted in hospital and underwent induction termination at 22 week 5 days for previable premature rupture of membrane ( PPROM) per ob/gyn ( see Ambrocio Sharpe's note for more details). She had a brief stay in the SICU for fever, and received IV antibiotics followed by oral antibiotics x 10 days. \par She is here for follow up, and overall feels well. She c/o some tidiness. Denies any nausea, vomiting and fever. Also reports, some pain in both LE since the surgery ( induction termination). \par She wanted to know if she can be pregnant again. I deferred this question to Dr. Albrecht on her next follow up. However, I have cautioned her potential risk and liver related complications with pregnancy. I suggested her to defer any planned pregnancy for now. \par \par Labs reviewed from the last clinic visit ( results appended). She has no immunity towards hepatitis B (although Hepatitis B Core IgG is +) suggesting past infection or possible false positive test. HBV DNA is negative. I will  recommend vaccination for hepatitis B.\par \par Hepatitis C antibody was negative.  Kstt-[id number] level was 246 mg/dl. Autoimmune markers were negative for antimitochondrial antibody, anti-smooth muscle antibody, anti-nuclear antibody,\par Ceruloplasmin level 52 mg/dL.\par \par Interval hx (2019): \par We used  to communicate with her. \par \par Since last seen in the hepatology clinic she has been overall doing well. She came to the clinic by herself. Other than reporting RUQ intermittent discomfort she has no other symptoms. Recent labs shows normalized LFTs ( although bili was 1.4). She has low MELD score with normal Cr and INR.\par \par Today she reported me that her mother once told her that she was jaundiced after birth that was prolonged.\par \par Clinically there were no edema. \par She reported good appetite.\par Denies any nausea/vomiting/fever/chills.\par \par \par

## 2019-08-07 NOTE — PHYSICAL EXAM
[General Appearance - Alert] : alert [General Appearance - Well Nourished] : well nourished [Sclera] : the sclera and conjunctiva were normal [General Appearance - Well Developed] : well developed [Outer Ear] : the ears and nose were normal in appearance [Neck Appearance] : the appearance of the neck was normal [Jugular Venous Distention Increased] : there was no jugular-venous distention [Neck Cervical Mass (___cm)] : no neck mass was observed [Heart Rate And Rhythm] : heart rate was normal and rhythm regular [Heart Sounds] : normal S1 and S2 [Heart Sounds Gallop] : no gallops [Murmurs] : no murmurs [Heart Sounds Pericardial Friction Rub] : no pericardial rub [Arterial Pulses Carotid] : carotid pulses were normal with no bruits [Bowel Sounds] : normal bowel sounds [Abdomen Tenderness] : non-tender [Abdomen Soft] : soft [] : no hepato-splenomegaly [Abdomen Mass (___ Cm)] : no abdominal mass palpated [Abdomen Hernia] : no hernia was discovered [Abnormal Walk] : normal gait [Cranial Nerves] : cranial nerves 2-12 were intact [Sensation] : the sensory exam was normal to light touch and pinprick [Motor Exam] : the motor exam was normal [Oriented To Time, Place, And Person] : oriented to person, place, and time [Affect] : the affect was normal [Skin Color & Pigmentation] : normal skin color and pigmentation [Scleral Icterus] : No Scleral Icterus [Spider Angioma] : No spider angioma(s) were observed

## 2019-08-09 LAB
ALBUMIN SERPL ELPH-MCNC: 4.4 G/DL
ALP BLD-CCNC: 102 U/L
ALT SERPL-CCNC: 23 U/L
ANION GAP SERPL CALC-SCNC: 13 MMOL/L
AST SERPL-CCNC: 26 U/L
BASOPHILS # BLD AUTO: 0.06 K/UL
BASOPHILS NFR BLD AUTO: 1.2 %
BILIRUB SERPL-MCNC: 1.8 MG/DL
BUN SERPL-MCNC: 14 MG/DL
CALCIUM SERPL-MCNC: 9.8 MG/DL
CHLORIDE SERPL-SCNC: 103 MMOL/L
CO2 SERPL-SCNC: 24 MMOL/L
CREAT SERPL-MCNC: 0.64 MG/DL
EOSINOPHIL # BLD AUTO: 1.4 K/UL
EOSINOPHIL NFR BLD AUTO: 27.5 %
GLUCOSE SERPL-MCNC: 79 MG/DL
HCT VFR BLD CALC: 42.1 %
HGB BLD-MCNC: 13.2 G/DL
IMM GRANULOCYTES NFR BLD AUTO: 0.4 %
INR PPP: 1.08 RATIO
LYMPHOCYTES # BLD AUTO: 1.39 K/UL
LYMPHOCYTES NFR BLD AUTO: 27.3 %
MAN DIFF?: NORMAL
MCHC RBC-ENTMCNC: 29.3 PG
MCHC RBC-ENTMCNC: 31.4 GM/DL
MCV RBC AUTO: 93.6 FL
MONOCYTES # BLD AUTO: 0.44 K/UL
MONOCYTES NFR BLD AUTO: 8.6 %
NEUTROPHILS # BLD AUTO: 1.78 K/UL
NEUTROPHILS NFR BLD AUTO: 35 %
PLATELET # BLD AUTO: 122 K/UL
POTASSIUM SERPL-SCNC: 4.4 MMOL/L
PROT SERPL-MCNC: 7.5 G/DL
PT BLD: 12.4 SEC
RBC # BLD: 4.5 M/UL
RBC # FLD: 12.6 %
SODIUM SERPL-SCNC: 140 MMOL/L
WBC # FLD AUTO: 5.09 K/UL

## 2019-08-19 DIAGNOSIS — O09.899 SUPERVISION OF OTHER HIGH RISK PREGNANCIES, UNSPECIFIED TRIMESTER: ICD-10-CM

## 2019-09-23 NOTE — ED ADULT TRIAGE NOTE - TEMPERATURE IN CELSIUS (DEGREES C)
[facility]  Certificate of Child Health Examination  Student's Name:   Jimi Carr Birth Date  2016  Sex  male  Race/Ethnicity   School/Grade Level/ID#     Address:   [address] [place] [id number]  Parent/Guardian             Telephone#                                            Home:                               Work:   IMMUNIZATIONS: To be completed by health care provider. The mo/da/yr for every dose administered is required. If a specific vaccine is medically contraindicated, a separate written statement must be attached by the health care responsible for completing the health examination explaining the medical reason for the contraindication.      Immunization History   Administered Date(s) Administered   • DTaP (INFANRIX)(DAPTACEL,INFANRIX) 04/23/2016   • DTaP/Hep B/IPV 2014, 01/19/2015, 03/23/2015   • Hep B, Unspecified Formulation 2014   • Hepatitis A - Adult 09/22/2015, 04/23/2016   • Hib (PRP-OMP) 2014, 01/19/2015, 04/16/2016   • Influenza, Unspecified Formulation 11/02/2015, 09/27/2016, 10/02/2017, 10/01/2018   • MMR 09/22/2015   • Pneumococcal Conjugate 13 valent 2014, 01/19/2015, 03/23/2015, 09/22/2015   • Rotavirus - monovalent 2014, 01/19/2015   • Varicella 04/16/2016      Immunizations given 9/23/2019: proquad, kinrix, hgb      Health care provider (MD, DO, APN, PA, school health professional, health official) verifying above immunization history must sign below. If adding dates to the above immunization history section, put your initials by date(s) and sign here.)  Signature                                                                 Title                                                Date  _____________________________________________________________________________________  Signature                                                                 Title                                                 Date  _____________________________________________________________________________________   ALTERNATIVE PROOF OF IMMUNITY   1. Clinical diagnosis (measles, mumps, hepatitis B) is allowed when verified by physician and supported with lab confirmation.  Attach copy of lab result.    * MEASLES (Rubeola)  MO   DA  YR          **MUMPS  MO   DA  YR             HEPATITIS B  MO   DA   YR           VARICELLA  MO   DA  YR      2. History of varicella (chickenpox) disease is acceptable if verified by health care provider, school health professional or health official.  Person signing below verifies that the parent/guardian’s description of varicella disease history is indicative of past infection and is accepting such history as documentation of disease.  Date of Disease                                  Signature                                                           Title   3. Laboratory Evidence of Immunity (check one)      __ Measles*         __ Mumps**        __ Rubella        __Varicella    (Attach copy of lab result)         *All measles cases diagnosed on or after July 1, 2002, must be confirmed by laboratory evidence.      **All mumps cases diagnosed on or after July 1, 2013, must be confirmed by laboratory evidence.     Completion of Alternative 1 or 3 MUST be accompanied by Labs & Physician Signature: ___________________________  Physician Statements of Immunity MUST be submitted to [facility] for review.     Certificates of [employer] Exemption to Immunizations or Physician Medical Statements of Medical Contraindication Are Reviewed   and Maintained by the School Authority     (11/2015)                                         (COMPLETE BOTH SIDES)                                  Approved [facility] 3/2016      Student's Name:  Jimi Carr Birth Date 2016 Sex male School Grade Level/ID#     Page 2 of 2   HEALTH HISTORY      TO BE COMPLETED AND SIGNED BY PARENT/GUARDIAN AND VERIFIED BY HEALTH CARE  PROVIDER  ALLERGIES: (Food, drug, insect, other) No has No Known Allergies.   MEDICATION: (List all prescribed or taken on a regular basis.)   No   Diagnosis of asthma?  Child wakes during night coughing? Yes    No  Yes    No  Loss of function of one of paired  organs?(eye/ear/kidney/testicle) Yes    No    Birth defects? Yes    No  Hospitalizations? Yes    No    Developmental delay? Yes    No   When? What for? Yes    No    Blood disorders? Hemophilia,  Sickle Cell, Other? Explain. Yes    No  Surgery? (List all.)  When? What for? Yes    No    Diabetes? Yes    No  Serious injury or illness? Yes    No    Head injury/Concussion/Passed out? Yes    No  TB skin test positive (past/present)? * Yes    No *If yes, refer to local health dept   Seizures? What are they like?  Yes    No  TB disease (past or present)? * Yes    No *If yes, refer to local [facility] dept   Heart problem/Shortness of breath?  Yes    No  Tobacco use (type, frequency)?  Yes    No    Heart murmur/High blood pressure? Yes    No  Alcohol/Drug use?  Yes    No    Dizziness or chest pain with exercise? Yes    No  Family history of sudden death  before age 50? (Cause?) Yes    No    Eye/Vision problems? ______           __Glasses          __Contacts  Last exam by eye doctor ______  Other concerns? (crossed eye, drooping lids, squinting, difficulty reading) Dental?   __ Braces     __ Bridge     __ Plate   __Other   Ear/Hearing problems? Yes    No  Information may be shared with appropriate personnel for health and educational purposes.   Bone/Joint problem/injury/scoliosis? Yes    No  Parent/Guardian  Signature                                               Date   PHYSICAL EXAMINATION REQUIREMENTS   Entire section below to be completed by MD/DO/APN/PA Head Circumference if <2-3 years old - Pulse 116   Temp 98.4 °F (36.9 °C) (Temporal)   Ht 3' 8.21\" (1.123 m)   Wt 19.2 kg (42 lb 6.4 oz)   BMI 15.25 kg/m²   BSA 0.77 m²    44 %ile (Z= -0.15) based on CDC (Boys,  2-20 Years) BMI-for-age based on BMI available as of 9/23/2019.   DIABETES SCREENING (NOT REQUIRED FOR ) BMI>85% age/sex No     And any two of the following: Family History: No  Ethnic Minority: No    Signs of Insulin Resistance (hypertension, dyslipidemia, polycystic ovarian syndrome, acanthosis nigricans): No  At Risk: No   LEAD RISK QUESTIONNAIRE Required for children age 6 months through 6 years enrolled in licensed or public school operated day care, , nursery school and/or . (Blood test required if resides in [place] or high risk zip [facility].)  Questionnaire Administered? Yes  Blood Test Indicated? No   Blood Test Date _____   Blood Test Result ______________   TB SKIN OR BLOOD TEST Recommended only for children in high-risk groups including children immunosuppressed due to HIV infection or other conditions, frequent travel to or born in high prevalence countries or those exposed to adults in high-risk categories. See CDC guidelines. http://www.cdc.gov/tb/publications/factsheets/testing/TB_testing.htm.  Test Needed: No     Test performed: No                          Skin Test:    Date Read              /      /             Result:   Positive__      Negative__        mm________                          Blood Test: Date Reported       /      /               Result:  Positive__      Negative__      Value________  LAB TESTS (recommended) Date/Result  Date/Results   Hemoglobin or Hematocrit NA Sickle Cell (when indicated) NA   Urinalysis NA Developmental Screening Tool NA        SYSTEM REVIEW Normal  Comments/Follow-up/Needs  Normal Comments/Follow-up/Needs   Skin  Yes  Endocrine Yes    Ears Yes                  Screening Result Gastrointestinal Yes    Eyes Yes                  Screening Result: Genito-Urinary Yes                                            LMP   Nose Yes  Neurologic Yes    Throat Yes  Musculoskeletal Yes    Mouth/Dental Yes  Spinal Exam Yes    Cardiovascular/HTN Yes   Nutritional status Yes    Respiratory Yes Diagnosis of Asthma: No   Mental Health Yes    Currently Prescribed Asthma Medication:        No  Quick-relief medication (e.g. Short Acting Beta Antagonist)        No   Controller medication (e.g. inhaled corticosteroid) Other     NEEDS/MODIFICATIONS required in the school setting: No restrictions DIETARY Needs/Restrictions: No restrictions   SPECIAL INSTRUCTIONS/DEVICES e.g. safety glasses, glass eye, chest protector for arrhythmia, pacemaker, prosthetic device, dental bridge, false teeth, athletic support/cup: No restrictions   MENTAL HEALTH/OTHER Is there anything else the school should know about this student?  If you would like to discuss this student’s health with school or school health personnel:  Not needed   EMERGENCY ACTION needed while at school due to child’s health condition (e.g. ,seizures, asthma, insect sting, food, peanut allergy, bleeding problem, diabetes, heart problem)?   No   On the basis of the examination on this day, I approve this child’s participation in                                (If No or Modified please attach explanation.)  PHYSICAL EDUCATION:  Yes                               INTERSCHOLASTIC SPORTS   Yes   Print Name  Raymond Jose MD         Signature                                                                       Date: 9/23/2019   Address:  [facility] [address] [facility] [address]  [address] [facility] [address]  [phone]         (Complete Both Sides)     Approved [facility] 3/2016   36.2

## 2019-10-31 PROCEDURE — 83690 ASSAY OF LIPASE: CPT

## 2019-10-31 PROCEDURE — 82150 ASSAY OF AMYLASE: CPT

## 2019-10-31 PROCEDURE — 81025 URINE PREGNANCY TEST: CPT

## 2019-10-31 PROCEDURE — 84702 CHORIONIC GONADOTROPIN TEST: CPT

## 2019-10-31 PROCEDURE — 99285 EMERGENCY DEPT VISIT HI MDM: CPT

## 2019-10-31 PROCEDURE — G0378: CPT

## 2019-10-31 PROCEDURE — 76805 OB US >/= 14 WKS SNGL FETUS: CPT

## 2019-10-31 PROCEDURE — 36415 COLL VENOUS BLD VENIPUNCTURE: CPT

## 2019-10-31 PROCEDURE — 80053 COMPREHEN METABOLIC PANEL: CPT

## 2019-10-31 PROCEDURE — 85027 COMPLETE CBC AUTOMATED: CPT

## 2019-10-31 PROCEDURE — 81001 URINALYSIS AUTO W/SCOPE: CPT

## 2019-12-16 ENCOUNTER — APPOINTMENT (OUTPATIENT)
Dept: HEPATOLOGY | Facility: CLINIC | Age: 34
End: 2019-12-16
Payer: MEDICAID

## 2019-12-16 VITALS
DIASTOLIC BLOOD PRESSURE: 72 MMHG | SYSTOLIC BLOOD PRESSURE: 102 MMHG | HEART RATE: 80 BPM | HEIGHT: 63 IN | BODY MASS INDEX: 18.61 KG/M2 | WEIGHT: 105 LBS | TEMPERATURE: 97.6 F | RESPIRATION RATE: 16 BRPM

## 2019-12-16 PROCEDURE — 99214 OFFICE O/P EST MOD 30 MIN: CPT

## 2019-12-17 LAB
ALBUMIN SERPL ELPH-MCNC: 3.8 G/DL
ALP BLD-CCNC: 110 U/L
ALT SERPL-CCNC: 24 U/L
ANION GAP SERPL CALC-SCNC: 13 MMOL/L
AST SERPL-CCNC: 34 U/L
BASOPHILS # BLD AUTO: 0.05 K/UL
BASOPHILS NFR BLD AUTO: 1.4 %
BILIRUB SERPL-MCNC: 1.6 MG/DL
BUN SERPL-MCNC: 13 MG/DL
CALCIUM SERPL-MCNC: 8.9 MG/DL
CHLORIDE SERPL-SCNC: 106 MMOL/L
CO2 SERPL-SCNC: 21 MMOL/L
CREAT SERPL-MCNC: 0.66 MG/DL
EOSINOPHIL # BLD AUTO: 0.38 K/UL
EOSINOPHIL NFR BLD AUTO: 10.7 %
GLUCOSE SERPL-MCNC: 89 MG/DL
HCT VFR BLD CALC: 39.8 %
HGB BLD-MCNC: 11.8 G/DL
IMM GRANULOCYTES NFR BLD AUTO: 0.3 %
INR PPP: 1.14 RATIO
LYMPHOCYTES # BLD AUTO: 1.49 K/UL
LYMPHOCYTES NFR BLD AUTO: 41.9 %
MAN DIFF?: NORMAL
MCHC RBC-ENTMCNC: 26.4 PG
MCHC RBC-ENTMCNC: 29.6 GM/DL
MCV RBC AUTO: 89 FL
MONOCYTES # BLD AUTO: 0.26 K/UL
MONOCYTES NFR BLD AUTO: 7.3 %
NEUTROPHILS # BLD AUTO: 1.37 K/UL
NEUTROPHILS NFR BLD AUTO: 38.4 %
PLATELET # BLD AUTO: 131 K/UL
POTASSIUM SERPL-SCNC: 4.2 MMOL/L
PROT SERPL-MCNC: 6.7 G/DL
PT BLD: 13.1 SEC
RBC # BLD: 4.47 M/UL
RBC # FLD: 15.9 %
SODIUM SERPL-SCNC: 140 MMOL/L
WBC # FLD AUTO: 3.56 K/UL

## 2019-12-20 LAB
AFPL3 RESULTS RECEIVED: NORMAL
ALPHA-1-FETOPROTEIN-L3: NORMAL %
ALPHA-1-FETOPROTEIN: 1.5 NG/ML

## 2020-02-04 NOTE — HISTORY OF PRESENT ILLNESS
[Postpartum Follow Up] : postpartum follow up [Complications:___] : complications include: [unfilled] [Delivery Date: ___] : on [unfilled] [Breast Pain] : breast pain [Examination Of The Breasts] : breasts are normal [Mild] : mild vaginal bleeding [Awake] : awake [Alert] : alert [Soft] : soft [Oriented x3] : oriented to person, place, and time [Scant] : there was scant vaginal bleeding [Doing Well] : is doing well [Normal] : external genitalia [Breastfeeding] : not currently nursing [Resumed Menses] : has not resumed her menses [Abdominal Pain] : no abdominal pain [Resumed Mobile] : has not resumed intercourse [Back Pain] : no back pain [BreastFeeding Problems] : no breastfeeding problems [Chest Pain] : no chest pain [Heavy Bleeding] : no heavy bleeding [Chills] : no chills [Fatigue] : no fatigue [Dysuria] : no dysuria [Fever] : no fever [Nausea] : no nausea [Headache] : no headache [Acute Distress] : no acute distress [Vomiting] : no vomiting [Mass] : no breast mass [Nipple Discharge] : no nipple discharge [Axillary LAD] : no axillary lymphadenopathy [Tender] : non tender [Depressed Mood] : not depressed [Distended] : not distended [FreeTextEntry8] : follow up s/p 22w5d .    # [id number] [Flat Affect] : affect not flat

## 2020-05-13 ENCOUNTER — APPOINTMENT (OUTPATIENT)
Dept: HEPATOLOGY | Facility: CLINIC | Age: 35
End: 2020-05-13
Payer: MEDICAID

## 2020-05-13 PROCEDURE — 99443: CPT

## 2020-06-09 NOTE — HISTORY OF PRESENT ILLNESS
[FreeTextEntry1] : Mr. Cori Tee is a 33 years old young female from [place] with hx of cryptogenic cirrhosis, portal hypertension with small esophgeal varices, hx of ascites ( now resolved).\par \par She reportedly has been diagnosed with cirrhosis of the liver since about . She presented with 4 weeks of pregnancy at [facility], and was diagnosed with cirrhosis of the liver, and was advised to terminate pregnancy. She had two prior spontaneous , and one pre-term delivery. Her pregnancy history is as follows:\par \par #1 (): Vaginal delivery girl, at 28 weeks GA . Delivery occurred at [facility]. Pregnancy #1 Comments: ( delivery, baby  after 30 minutes. Pt remained in hospital due to enlarged liver). \par #2 (): at 16 weeks GA . Delivery occurred at [facility]. Pregnancy #2 Comments: (spontaneous ab, D&C). \par #3 (): at 8 weeks GA . Delivery occurred at [facility]. Pregnancy #3 Comments: (Spontaneous ab, D&C). \par  #4 (): at 4 weeks GA . Delivery occurred at [facility]. Pregnancy #4 Comments: (Induced ab- Pt was diagnosed with Cirrhosis, pt was advised to terminate pregnancy). \par \par She is unmarried, and has a boyfriend, and lives with her family. She has been seeing a hepatologist at [facility], Dr. Davida Thompson. Her past EGD from 2017 showed Grade A esophageal varices. Based on the picture it seems like was a short column (not commented on the EGD report).\par \par She denies any prior history of gastrointestinal bleeding, symptoms of distal hepatic encephalopathy or ascites. She reports generalized mild pruritus, and has not been on any specific medication to control her itching. She has been using some emollients or oil for symptoms relief. She is going to discuss this issue with her OB/GYN.\par  \par She was initially seen for a suspicious lesion in her liver with Dr. Thompson, and has been followed up with a MRI in January (the report was not immediately available for my review). Per my discussion with Mireya (BEENA), this a Li-RAD 3 lesion, and has become smaller over time. I have requested to fax the actual report to me.\par \par Liver function test from 2019 revealed total bilirubin 1.3 mg/dL, AST 33, ALT 34 alkaline phosphatase 123. Her total protein was 7 g/dL. GGTP was 74. Hepatitis B surface antigen was negative. Her blood type is B+. Screening test for HIV antibody was negative.\par Follow labs from May 10, 2019 revealed AST 30, ALT 14, alkaline phosphatase 112, total bilirubin 1.4 mg/dL. Her total protein was 7 and albumin was 4. Serum creatinine was 0.45 mg/dL acute hepatitis panel including hepatitis B surface antigen, hepatitis A IgM antibody were again negative. Hepatitis B core IgM antibody was positive, which I suspect is false-positive.\par Serum Bile Acid level is 15.7 \par \par Interval history: \par Patient today presented for follow up along with her boyfriend ( who speaks English and Citizen of [place]).\par Recent EGD by Dr. Thompson. This showed small esophageal varices. Considering her low BP, and small varices, we decided to defer beta blocker for now. \par \par Since last seen in the hepatology clinic patient was admitted in hospital and underwent induction termination at 22 week 5 days for previable premature rupture of membrane ( PPROM) per ob/gyn ( see Ambrocio Sharpe's note for more details). She had a brief stay in the SICU for fever, and received IV antibiotics followed by oral antibiotics x 10 days. \par She is here for follow up, and overall feels well. She c/o some tidiness. Denies any nausea, vomiting and fever. Also reports, some pain in both LE since the surgery ( induction termination). \par She wanted to know if she can be pregnant again. I deferred this question to Dr. Albrecht on her next follow up. However, I have cautioned her potential risk and liver related complications with pregnancy. I suggested her to defer any planned pregnancy for now. \par \par Labs reviewed from the last clinic visit ( results appended). She has no immunity towards hepatitis B (although Hepatitis B Core IgG is +) suggesting past infection or possible false positive test. HBV DNA is negative. I will  recommend vaccination for hepatitis B.\par \par Hepatitis C antibody was negative.  Mrna-3-iyknrmhmwjn level was 246 mg/dl. Autoimmune markers were negative for antimitochondrial antibody, anti-smooth muscle antibody, anti-nuclear antibody,\par Ceruloplasmin level 52 mg/dL.\par \par Interval hx (2019): \par We used  to communicate with her. \par \par Since last seen in the hepatology clinic she has been overall doing well. She came to the clinic by herself. Other than reporting RUQ intermittent discomfort she has no other symptoms. Recent labs shows normalized LFTs ( although bili was 1.4). She has low MELD score with normal Cr and INR.\par \par Today she reported me that her mother once told her that she was jaundiced after birth that was prolonged.\par \par Clinically there were no edema. \par She reported good appetite.\par Denies any nausea/vomiting/fever/chills.\par \par Iternal history (2019):\par Patient presents for followup today. She was last seen in hepatology clinic in 2019. Labs reviewed from her last clinic revealed hemoglobin of 13.2 g/dL and platelet count 122,000. Her liver function tests revealed total bilirubin 1.8 mg/dL, AST 26 U/L, ALT 23 U/L, alkaline phosphatase 102 U/L. Serum creatinine was 0.6 mg/dL. INR was 1.08.\par \par Her MRI scanfrom 19 SEP 2019 revealed 2 lesions in the liver: one in segment 4B and the second one in segment 8. The segment from the lesion measured 0.6x0.5 cm. This was thought to be a LI-RADS 3 lesion, and the lesion in question in segment 8 measured about 0.9 pulse 0.6 cm.. This was also thought to be a LI-RADS 3 lesion.\par \par Interval History May 13, 2020\par \rommel Persaud is being seen today for a followup visit to Telehealth clinic. Patient reports overall clinically doing well but reports significant itching symptoms to the point that it has been bothering her sleep. Apparently she has no scratch marks on her back as well. She denies any nausea, vomiting, abdominal pain or fever. She also denies any abdominal distention or lower extremity swelling.\par \par Followup blood test results from 2019 was reviewed. This revealed normal INR, hemoglobin 11.8, platelet count 131,000. Liver function test revealed total bilirubin 1.6, AST 34, ALT 24 alkaline phosphatase 110. Serum creatine was 0.66. Her alpha-fetoprotein level was 1.5 ng per ml.\par \par She is due for a follow up MRI.\par

## 2020-06-09 NOTE — ASSESSMENT
[FreeTextEntry1] : 34 years old young Brazilian Female with cirrhosis of the liver, cryptogenic etiology, hx of ascites, portal hypertension, esophageal varices. She is being seen for routine followup and reports having significant pruritus symptoms. No jaundice.\par \par Plan\par -follow up labs today to include CBC, CMP, PT/INR, AFP and Ca19-9.\par -no beta blocker therapy considering small esophageal varices, and her low BP. Risk of variceal bleeding appears to be low. We'll plan for repeat upper endoscopy in about 6 months after her next visit in the hepatology clinic.\par -Her MRI scan from 19 SEP 2019 revealed 2 lesions in the liver: one in segment 4B and the second one in segment 8. The segment from the lesion measured 0.6x0.5 cm. This was thought to be a LI-RADS 3 lesion, and the lesion in question in segment 8 measured about 0.9 pulse 0.6 cm.. This was also thought to be a LI-RADS 3 lesion. I have recommended a follow up MRI. Patient is hesitant to come out of the house to have an MRI done at this time due to Covid19 dynamic\par -I discussed the meaning of cirrhosis with the patient. I reviewed the natural history of the disease. I explained the risks for the development of esophageal varices with and without bleeding, hepatic encephalopathy, ascites, hepatocellular carcinoma, and liver failure. I have explained that disease can progress to the point of requiring an evaluation for liver transplantation. I have explained the need for imaging every 6 months to screen for liver cancer.\par -I have recommended her to take Ursodiol 300 mg PO tid. and Hydroxyzine 25 mg PO q 6hrs as needed for her pruritus symptoms.\par \par -RTC in 3 months,.\par

## 2020-09-15 NOTE — DISCHARGE NOTE OB - INSTRUCTIONS
RE: Plan of Care    Dear Dr. Cleve Guerra MD    Thank you for referring Marilyn Weinstein. The following information reflects my assessment and plan of care.           Plan of Care 09/15/20   Effective from: 9/15/2020  Effective to: 9/15/2020    Plan ID: [id number]           Participants     Name Type Comments Contact Info    Cleve Guerra MD Provider  [phone]    Ramona Galloway, PT PT             Marilyn Weinstein MRN:[id number] (:1956 64 year old F)            Evaluation     Author: Ramona Galloway, PT Status: Signed Last edited: 9/15/2020 10:30 AM       Physical Therapy Discharge Summary    Visit Count: 6   Insurance: Medicare     Referred by: Cleve Guerra MD  Next provider visit (if known/scheduled): unknown  Medical Diagnosis (from order): Patellofemoral syndrome of right knee [M22.2X1]  Treatment Diagnosis: R knee OA   Diagnosis Precautions: none  Chart review completed, relevant history as follows: high cholesterol, HTN - both controlled with medication; L knee arthroscopy      SUBJECTIVE   9/15/2020   Stairs are much better, which is my big concern. I still have a twinge every now and then, but nothing major. Therapy was definitely helpful.       OBJECTIVE     Objective data At eval 2020 Current 9/15/2020   Gait Slightly decr stance time R, decr step length L, decr heel strike B, decr push off B, short step length No significant gait deviations   Sit<>stand B knee valgus, weight shift off R LE x10 s UEs and min cues for knee mechanics   AROM R knee Knee ext: lacking 10deg  Knee flex: 120deg  Knee ext: lacking 5deg  Knee flex: 129deg    MMT  * indicates pain Hip flex: 4/5 L / 4-/5 R*  Hip ABD: 3+/5 R  Knee flex: 4/5 B  Knee ext: 4/5 B (R*)  DF: 4+/5 B  LE strength grossly 4+/5 or greater throughout   Stairs Ascend/descend reciprocally with min reliance on HR but slow, cautious, decr eccentric control, decr concentric leverage Ascend/descend with improved confidence compared with  eval, no c/o pain   LEFS 52 (see tab) 59pts (see tab)      Treatment     HEP Therapeutic Exercise  Reps Date Comments   yes clamshell x15  With green TB   yes SL hip ABD x15  With green TB    Mejias       NuStep 6min 9/15/2020 Lev 6   yes Stretching @ stairs 3x30\" each RLE  Knee flex & ext   yes Step ups x20 each RLE 9/15/2020 Up & lat   yes Sink squats x15 9/15/2020 With TRX    bridge x15      Leg press x20 each 9/15/2020 100# all - DL & SL RLE    Balance board  9/15/2020 Balance 2x1min ML  Mini squats x15    Stationary bike 5min  Seat at B    paloff step out x5 B 9/15/2020 Red TB    SLR in ER x15      Bridge with ABD band x15  Green TB    Bridge/hs curl combo x15  On SB          Skilled input: cues for form throughout, explanation of rationale  MedBridge    Manual Therapy Treatment details Date performed   IASTM/MFR To R distal ITB via HG8 8/25/2020   Mejias tape To R knee 8/4/2020        Neuro Re-education                    Therapeutic Activities     reassessment Goals reviewed, progress assessed 9/15/2020             Modalities      Intensity:  Duration:  Position:            Therapist verbally educated the patient and received verbal consent from the patient on hand placement, positioning of patient, and techniques to be performed today and how they are pertinent to the patient's plan of care.       ASSESSMENT   Thank you for referring Marilyn Weinstein to us, I enjoy working with her. She has been an enthusiastic participant in therapy and shows good adherence to her HEP. Patient has made good progress in therapy and shows improved ROM and mechanics about the R knee. Patient to be discharged at this time.       PLAN   Goals: To be achieved by end of this plan of care:  1. Patient will be independent with progressed and modified home exercise program      []Progressing [x]Goal Met []Partially Met []Not Met []Discontinue  2. Patient will incr knee AROM to 0-120deg in order to facilitate improved L  mechanics      []Progressing []Goal Met [x]Partially Met []Not Met []Discontinue  3. Patient will incr hip strength to 4/5 or greater in order to facilitate decr load to knee joint     []Progressing [x]Goal Met []Partially Met []Not Met []Discontinue  4. Patient will improve LE mechanics in order to facilitate decr load to knee joint     []Progressing [x]Goal Met []Partially Met []Not Met []Discontinue  5. Patient will improve LEFS score to 61pts or greater     []Progressing []Goal Met [x]Partially Met []Not Met []Discontinue    Ramona Galloway PT, DPT         Updated Participants     Name Type Comments Contact Info    Cleve Guerra MD Provider  [phone]    Signature pending    Ramona Galloway PT PT                  Please complete the attached form to indicate your approval of the plan of care upon receipt.  Insurance compliance requires your approval be on file.  Should you have any questions, feel free to contact me.     Ramona Galloway PT  [address] [address]  Phone: [phone]  Fax: [phone]                RE: Plan of Care    I certify the need for these services, furnished under this plan of treatment and while under my care.  I agree with the plan of care as stated and request that therapy proceed.        __________________________________________________________________________________  MD Signature         Date   Time Follow up appointment June 14 at 1030am

## 2020-11-22 ENCOUNTER — EMERGENCY (EMERGENCY)
Facility: HOSPITAL | Age: 35
LOS: 1 days | Discharge: HOME | End: 2020-11-22
Attending: EMERGENCY MEDICINE
Payer: COMMERCIAL

## 2020-11-22 VITALS
RESPIRATION RATE: 16 BRPM | DIASTOLIC BLOOD PRESSURE: 78 MMHG | SYSTOLIC BLOOD PRESSURE: 114 MMHG | TEMPERATURE: 98 F | WEIGHT: 114.64 LBS | HEIGHT: 63 IN | HEART RATE: 83 BPM | OXYGEN SATURATION: 97 %

## 2020-11-22 VITALS
TEMPERATURE: 98 F | DIASTOLIC BLOOD PRESSURE: 71 MMHG | RESPIRATION RATE: 18 BRPM | OXYGEN SATURATION: 98 % | SYSTOLIC BLOOD PRESSURE: 101 MMHG | HEART RATE: 78 BPM

## 2020-11-22 LAB
ALBUMIN SERPL ELPH-MCNC: 4 G/DL — SIGNIFICANT CHANGE UP (ref 3.3–5)
ALP SERPL-CCNC: 125 U/L — HIGH (ref 40–120)
ALT FLD-CCNC: 23 U/L — SIGNIFICANT CHANGE UP (ref 10–45)
ANION GAP SERPL CALC-SCNC: 14 MMOL/L — SIGNIFICANT CHANGE UP (ref 5–17)
APPEARANCE UR: ABNORMAL
APTT BLD: 45.4 SEC — HIGH (ref 27.5–35.5)
AST SERPL-CCNC: 32 U/L — SIGNIFICANT CHANGE UP (ref 10–40)
BACTERIA # UR AUTO: NEGATIVE — SIGNIFICANT CHANGE UP
BASE EXCESS BLDV CALC-SCNC: -0.5 MMOL/L — SIGNIFICANT CHANGE UP (ref -2–2)
BASOPHILS # BLD AUTO: 0.03 K/UL — SIGNIFICANT CHANGE UP (ref 0–0.2)
BASOPHILS NFR BLD AUTO: 0.6 % — SIGNIFICANT CHANGE UP (ref 0–2)
BILIRUB SERPL-MCNC: 2.4 MG/DL — HIGH (ref 0.2–1.2)
BILIRUB UR-MCNC: ABNORMAL
BUN SERPL-MCNC: 16 MG/DL — SIGNIFICANT CHANGE UP (ref 7–23)
CA-I SERPL-SCNC: 1.14 MMOL/L — SIGNIFICANT CHANGE UP (ref 1.12–1.3)
CALCIUM SERPL-MCNC: 9.4 MG/DL — SIGNIFICANT CHANGE UP (ref 8.4–10.5)
CHLORIDE BLDV-SCNC: 106 MMOL/L — SIGNIFICANT CHANGE UP (ref 96–108)
CHLORIDE SERPL-SCNC: 104 MMOL/L — SIGNIFICANT CHANGE UP (ref 96–108)
CO2 BLDV-SCNC: 26 MMOL/L — SIGNIFICANT CHANGE UP (ref 22–30)
CO2 SERPL-SCNC: 21 MMOL/L — LOW (ref 22–31)
COLOR SPEC: ABNORMAL
CREAT SERPL-MCNC: 0.59 MG/DL — SIGNIFICANT CHANGE UP (ref 0.5–1.3)
DIFF PNL FLD: ABNORMAL
EOSINOPHIL # BLD AUTO: 0.15 K/UL — SIGNIFICANT CHANGE UP (ref 0–0.5)
EOSINOPHIL NFR BLD AUTO: 3 % — SIGNIFICANT CHANGE UP (ref 0–6)
EPI CELLS # UR: 13 /HPF — HIGH
GAS PNL BLDV: 136 MMOL/L — SIGNIFICANT CHANGE UP (ref 135–145)
GAS PNL BLDV: SIGNIFICANT CHANGE UP
GAS PNL BLDV: SIGNIFICANT CHANGE UP
GLUCOSE BLDV-MCNC: 73 MG/DL — SIGNIFICANT CHANGE UP (ref 70–99)
GLUCOSE SERPL-MCNC: 74 MG/DL — SIGNIFICANT CHANGE UP (ref 70–99)
GLUCOSE UR QL: NEGATIVE — SIGNIFICANT CHANGE UP
HCG UR QL: NEGATIVE — SIGNIFICANT CHANGE UP
HCO3 BLDV-SCNC: 24 MMOL/L — SIGNIFICANT CHANGE UP (ref 21–29)
HCT VFR BLD CALC: 42.8 % — SIGNIFICANT CHANGE UP (ref 34.5–45)
HCT VFR BLDA CALC: 42 % — SIGNIFICANT CHANGE UP (ref 39–50)
HGB BLD CALC-MCNC: 13.7 G/DL — SIGNIFICANT CHANGE UP (ref 11.5–15.5)
HGB BLD-MCNC: 13.3 G/DL — SIGNIFICANT CHANGE UP (ref 11.5–15.5)
HYALINE CASTS # UR AUTO: 9 /LPF — HIGH (ref 0–2)
IMM GRANULOCYTES NFR BLD AUTO: 0.4 % — SIGNIFICANT CHANGE UP (ref 0–1.5)
INR BLD: 1.17 RATIO — HIGH (ref 0.88–1.16)
KETONES UR-MCNC: ABNORMAL
LACTATE BLDV-MCNC: 2 MMOL/L — SIGNIFICANT CHANGE UP (ref 0.7–2)
LEUKOCYTE ESTERASE UR-ACNC: NEGATIVE — SIGNIFICANT CHANGE UP
LIDOCAIN IGE QN: 32 U/L — SIGNIFICANT CHANGE UP (ref 7–60)
LYMPHOCYTES # BLD AUTO: 1 K/UL — SIGNIFICANT CHANGE UP (ref 1–3.3)
LYMPHOCYTES # BLD AUTO: 20.3 % — SIGNIFICANT CHANGE UP (ref 13–44)
MCHC RBC-ENTMCNC: 28.5 PG — SIGNIFICANT CHANGE UP (ref 27–34)
MCHC RBC-ENTMCNC: 31.1 GM/DL — LOW (ref 32–36)
MCV RBC AUTO: 91.6 FL — SIGNIFICANT CHANGE UP (ref 80–100)
MONOCYTES # BLD AUTO: 0.31 K/UL — SIGNIFICANT CHANGE UP (ref 0–0.9)
MONOCYTES NFR BLD AUTO: 6.3 % — SIGNIFICANT CHANGE UP (ref 2–14)
NEUTROPHILS # BLD AUTO: 3.42 K/UL — SIGNIFICANT CHANGE UP (ref 1.8–7.4)
NEUTROPHILS NFR BLD AUTO: 69.4 % — SIGNIFICANT CHANGE UP (ref 43–77)
NITRITE UR-MCNC: NEGATIVE — SIGNIFICANT CHANGE UP
NRBC # BLD: 0 /100 WBCS — SIGNIFICANT CHANGE UP (ref 0–0)
PCO2 BLDV: 44 MMHG — SIGNIFICANT CHANGE UP (ref 35–50)
PH BLDV: 7.36 — SIGNIFICANT CHANGE UP (ref 7.35–7.45)
PH UR: 5.5 — SIGNIFICANT CHANGE UP (ref 5–8)
PLATELET # BLD AUTO: 101 K/UL — LOW (ref 150–400)
PO2 BLDV: 27 MMHG — SIGNIFICANT CHANGE UP (ref 25–45)
POTASSIUM BLDV-SCNC: 5.2 MMOL/L — SIGNIFICANT CHANGE UP (ref 3.5–5.3)
POTASSIUM SERPL-MCNC: 4.9 MMOL/L — SIGNIFICANT CHANGE UP (ref 3.5–5.3)
POTASSIUM SERPL-SCNC: 4.9 MMOL/L — SIGNIFICANT CHANGE UP (ref 3.5–5.3)
PROT SERPL-MCNC: 6.9 G/DL — SIGNIFICANT CHANGE UP (ref 6–8.3)
PROT UR-MCNC: ABNORMAL
PROTHROM AB SERPL-ACNC: 13.9 SEC — HIGH (ref 10.6–13.6)
RBC # BLD: 4.67 M/UL — SIGNIFICANT CHANGE UP (ref 3.8–5.2)
RBC # FLD: 14.5 % — SIGNIFICANT CHANGE UP (ref 10.3–14.5)
RBC CASTS # UR COMP ASSIST: 2 /HPF — SIGNIFICANT CHANGE UP (ref 0–4)
SAO2 % BLDV: 42 % — LOW (ref 67–88)
SODIUM SERPL-SCNC: 139 MMOL/L — SIGNIFICANT CHANGE UP (ref 135–145)
SP GR SPEC: 1.03 — HIGH (ref 1.01–1.02)
UROBILINOGEN FLD QL: ABNORMAL
WBC # BLD: 4.93 K/UL — SIGNIFICANT CHANGE UP (ref 3.8–10.5)
WBC # FLD AUTO: 4.93 K/UL — SIGNIFICANT CHANGE UP (ref 3.8–10.5)
WBC UR QL: 3 /HPF — SIGNIFICANT CHANGE UP (ref 0–5)

## 2020-11-22 PROCEDURE — 85018 HEMOGLOBIN: CPT

## 2020-11-22 PROCEDURE — 82803 BLOOD GASES ANY COMBINATION: CPT

## 2020-11-22 PROCEDURE — 87086 URINE CULTURE/COLONY COUNT: CPT

## 2020-11-22 PROCEDURE — 82330 ASSAY OF CALCIUM: CPT

## 2020-11-22 PROCEDURE — 81025 URINE PREGNANCY TEST: CPT

## 2020-11-22 PROCEDURE — 84295 ASSAY OF SERUM SODIUM: CPT

## 2020-11-22 PROCEDURE — 36415 COLL VENOUS BLD VENIPUNCTURE: CPT

## 2020-11-22 PROCEDURE — 99285 EMERGENCY DEPT VISIT HI MDM: CPT

## 2020-11-22 PROCEDURE — 85610 PROTHROMBIN TIME: CPT

## 2020-11-22 PROCEDURE — 76700 US EXAM ABDOM COMPLETE: CPT

## 2020-11-22 PROCEDURE — 82435 ASSAY OF BLOOD CHLORIDE: CPT

## 2020-11-22 PROCEDURE — 99284 EMERGENCY DEPT VISIT MOD MDM: CPT | Mod: 25

## 2020-11-22 PROCEDURE — 85025 COMPLETE CBC W/AUTO DIFF WBC: CPT

## 2020-11-22 PROCEDURE — 76700 US EXAM ABDOM COMPLETE: CPT | Mod: 26

## 2020-11-22 PROCEDURE — 80053 COMPREHEN METABOLIC PANEL: CPT

## 2020-11-22 PROCEDURE — 83690 ASSAY OF LIPASE: CPT

## 2020-11-22 PROCEDURE — 82947 ASSAY GLUCOSE BLOOD QUANT: CPT

## 2020-11-22 PROCEDURE — 84132 ASSAY OF SERUM POTASSIUM: CPT

## 2020-11-22 PROCEDURE — 85730 THROMBOPLASTIN TIME PARTIAL: CPT

## 2020-11-22 PROCEDURE — 85014 HEMATOCRIT: CPT

## 2020-11-22 PROCEDURE — 81001 URINALYSIS AUTO W/SCOPE: CPT

## 2020-11-22 PROCEDURE — 83605 ASSAY OF LACTIC ACID: CPT

## 2020-11-22 RX ORDER — FAMOTIDINE 10 MG/ML
20 INJECTION INTRAVENOUS DAILY
Refills: 0 | Status: DISCONTINUED | OUTPATIENT
Start: 2020-11-22 | End: 2020-11-25

## 2020-11-22 RX ORDER — FAMOTIDINE 10 MG/ML
1 INJECTION INTRAVENOUS
Qty: 28 | Refills: 0
Start: 2020-11-22 | End: 2020-12-05

## 2020-11-22 RX ADMIN — FAMOTIDINE 20 MILLIGRAM(S): 10 INJECTION INTRAVENOUS at 13:36

## 2020-11-22 RX ADMIN — Medication 30 MILLILITER(S): at 13:36

## 2020-11-22 RX ADMIN — Medication 10 MILLIGRAM(S): at 13:36

## 2020-11-22 NOTE — ED PROVIDER NOTE - OBJECTIVE STATEMENT
Patient history obtained via [place]  as above.  Patient presenting complaining of 3-4 days of RUQ pains radiating to epigastrum and back.  Occuring intermittently.  Some associated nausea, no vomiting.   Associated loose stools which she attributes to only drinking liquids over the past 3-4 days.  No noted fevers or chills.  Does report feeling "like there is too much gas in my lower abdomen" associated with symptoms.  Denying urinary symptoms.  History of cirrhosis, currently being followed by GI, not actively drinking at this time (couldn't tell me if cirrhosis was secondary to EtOH or primary hepatic issue).  Pains currently not present.  No history of similar prior pains.  No prior abdominal surgeries.

## 2020-11-22 NOTE — ED PROVIDER NOTE - PROGRESS NOTE DETAILS
Patient with minor elevation of alkphos and bili possibly secondary to known cirrhosis however in setting of RUQ pain US obtained.  No emergent findings noted on US.  Discussed with patient.  Will treat symptomatically and re-evaluate, likely follow up with her GI as outpatient for further management.  Jay Story M.D. Pain improved with bentyl and pepcid, will discharge with same and follow up with her GI.  Jay Story M.D.

## 2020-11-22 NOTE — ED ADULT NURSE NOTE - OBJECTIVE STATEMENT
35 y/o female PMH cirrhosis presents to ED reporting four days of abdominal pain. Pt reports loose stools and only drinking water the past two days. On exam, AOx3, speaking in complete sentences. Unlabored, spontaneous respirations, NAD, O2 sat 98% RA. Abdomen soft, non-tender, non-distended. Pt denies CP, SOB, n/v/d, fever/chills at this time. Heplock placed, labs sent. MD at bedside to evaluate pt.

## 2020-11-22 NOTE — ED PROVIDER NOTE - NSFOLLOWUPINSTRUCTIONS_ED_ALL_ED_FT
Thank you for choosing [facility] for your care.    Please take medications as prescribed for pain at home.  Follow up with your gastroenterologist for further care.  Please return to the Emergency Department for severe, uncontrollable pains or any other concerning symptoms.

## 2020-11-22 NOTE — ED PROVIDER NOTE - CLINICAL SUMMARY MEDICAL DECISION MAKING FREE TEXT BOX
Patient presenting with colicky RUQ pains radiating to back - differential includes biliary colic versus renal colic - non focal exam, well appearing.  Plan for screening labs/UA, re-evaluate, may require imaging based off workup.

## 2020-11-22 NOTE — ED PROVIDER NOTE - PHYSICAL EXAMINATION
Exam:  General: Patient well appearing, vital signs within normal limits  HEENT: airway patent with moist mucous membranes  Cardiac: RRR S1/S2 with strong peripheral pulses  Respiratory: lungs clear without respiratory distress  GI: abdomen soft, non tender, non distended  Neuro: no gross neurologic deficits  Skin: warm, well perfused  Psych: normal mood and affect

## 2020-11-23 LAB
CULTURE RESULTS: SIGNIFICANT CHANGE UP
SPECIMEN SOURCE: SIGNIFICANT CHANGE UP

## 2020-11-24 ENCOUNTER — NON-APPOINTMENT (OUTPATIENT)
Age: 35
End: 2020-11-24

## 2020-11-24 ENCOUNTER — APPOINTMENT (OUTPATIENT)
Dept: GASTROENTEROLOGY | Facility: CLINIC | Age: 35
End: 2020-11-24
Payer: MEDICAID

## 2020-11-24 VITALS
HEIGHT: 62 IN | TEMPERATURE: 97.7 F | HEART RATE: 74 BPM | BODY MASS INDEX: 20.43 KG/M2 | WEIGHT: 111 LBS | SYSTOLIC BLOOD PRESSURE: 115 MMHG | DIASTOLIC BLOOD PRESSURE: 65 MMHG

## 2020-11-24 PROCEDURE — 82272 OCCULT BLD FECES 1-3 TESTS: CPT

## 2020-11-24 PROCEDURE — 99205 OFFICE O/P NEW HI 60 MIN: CPT

## 2020-11-24 NOTE — ASSESSMENT
[FreeTextEntry1] : 1.  Cryptogenic cirrhosis, evidence of portal hypertension with esophageal varices and recent onset abdominal pain, found to have ascites on ultrasound and on today's exam.\par 2.  Status post D&C.\par 3.  Allergic to fruit.\par \par Plan:\par 1.  Records reviewed.\par 2.  FOLLOW-UP WITH HEPATOLOGY ASAP!!\par 3.  MRI abdomen with and without contrast.\par 4.  2 g sodium diet--instruction given.\par 5.  Begin Aldactone 50 mg and furosemide 20 mg daily each morning.  She is aware that chemistries need to be repeated within 1-2 weeks.\par 6.  Continue twice daily Pepcid for now.\par 7.  EGD to be performed by Hepatology team at the hospital---we do no work at the hospitals. \par 8.  Further evaluation, Rx as per Hepatology.

## 2020-11-24 NOTE — PHYSICAL EXAM
[General Appearance - Alert] : alert [General Appearance - In No Acute Distress] : in no acute distress [General Appearance - Well Nourished] : well nourished [General Appearance - Well Developed] : well developed [Sclera] : the sclera and conjunctiva were normal [Neck Appearance] : the appearance of the neck was normal [Neck Cervical Mass (___cm)] : no neck mass was observed [Jugular Venous Distention Increased] : there was no jugular-venous distention [Thyroid Diffuse Enlargement] : the thyroid was not enlarged [Thyroid Nodule] : there were no palpable thyroid nodules [Auscultation Breath Sounds / Voice Sounds] : lungs were clear to auscultation bilaterally [Heart Rate And Rhythm] : heart rate was normal and rhythm regular [Heart Sounds] : normal S1 and S2 [Heart Sounds Gallop] : no gallops [Murmurs] : no murmurs [Heart Sounds Pericardial Friction Rub] : no pericardial rub [Full Pulse] : the pedal pulses are present [Edema] : there was no peripheral edema [Bowel Sounds] : normal bowel sounds [Abdomen Soft] : soft [Abdomen Mass (___ Cm)] : no abdominal mass palpated [Normal Sphincter Tone] : normal sphincter tone [No Rectal Mass] : no rectal mass [Internal Hemorrhoid] : no internal hemorrhoids [External Hemorrhoid] : no external hemorrhoids [Occult Blood Positive] : stool was negative for occult blood [Cervical Lymph Nodes Enlarged Posterior Bilaterally] : posterior cervical [Cervical Lymph Nodes Enlarged Anterior Bilaterally] : anterior cervical [Supraclavicular Lymph Nodes Enlarged Bilaterally] : supraclavicular [Inguinal Lymph Nodes Enlarged Bilaterally] : inguinal [Abnormal Walk] : normal gait [Nail Clubbing] : no clubbing  or cyanosis of the fingernails [Musculoskeletal - Swelling] : no joint swelling seen [Skin Color & Pigmentation] : normal skin color and pigmentation [Skin Turgor] : normal skin turgor [] : no rash [FreeTextEntry1] : no asterixis [Oriented To Time, Place, And Person] : oriented to person, place, and time [Impaired Insight] : insight and judgment were intact [Affect] : the affect was normal

## 2020-11-24 NOTE — REASON FOR VISIT
[Consultation] : a consultation visit [Source: ______] : History obtained from [unfilled] [FreeTextEntry1] : Bloating and pain in the chest and abdomen

## 2020-11-24 NOTE — HISTORY OF PRESENT ILLNESS
[FreeTextEntry1] : Cori was diagnosed with cryptogenic cirrhosis approximately 2010.  She is followed by the Hepatology team.  She presented to [facility] ER on 11/22/20 with intermittent epigastric and chest discomfort, bloating and nausea.  Abdominal ultrasound revealed periportal edema, trace perihepatic ascites, right pleural effusion, cirrhosis, and mild splenomegaly.  Labs revealed mild thrombocytopenia, INR 1.17, , Bili 2.4, with albumin 4.0.  She was discharged on twice daily Pepcid and dicyclomine as needed.  She reports constipation, up to 2-3 days without defecating.  She denies fever, vomiting, melena, or blood in the stool.  Last EGD June 2017 reportedly revealed grade a varices.  Last MRI was September 2019.

## 2020-11-30 ENCOUNTER — APPOINTMENT (OUTPATIENT)
Dept: HEPATOLOGY | Facility: CLINIC | Age: 35
End: 2020-11-30
Payer: MEDICAID

## 2020-11-30 VITALS
TEMPERATURE: 97.9 F | RESPIRATION RATE: 16 BRPM | BODY MASS INDEX: 20.06 KG/M2 | SYSTOLIC BLOOD PRESSURE: 91 MMHG | HEART RATE: 80 BPM | DIASTOLIC BLOOD PRESSURE: 68 MMHG | WEIGHT: 109 LBS | HEIGHT: 62 IN

## 2020-11-30 PROCEDURE — 99214 OFFICE O/P EST MOD 30 MIN: CPT

## 2020-11-30 RX ORDER — HYDROXYZINE HYDROCHLORIDE 25 MG/1
25 TABLET ORAL 4 TIMES DAILY
Qty: 60 | Refills: 1 | Status: DISCONTINUED | COMMUNITY
Start: 2020-05-13 | End: 2020-11-30

## 2020-11-30 RX ORDER — URSODIOL 300 MG/1
300 CAPSULE ORAL
Qty: 90 | Refills: 2 | Status: DISCONTINUED | COMMUNITY
Start: 2020-05-13 | End: 2020-11-30

## 2020-12-08 ENCOUNTER — APPOINTMENT (OUTPATIENT)
Dept: MRI IMAGING | Facility: CLINIC | Age: 35
End: 2020-12-08
Payer: COMMERCIAL

## 2020-12-08 ENCOUNTER — OUTPATIENT (OUTPATIENT)
Dept: OUTPATIENT SERVICES | Facility: HOSPITAL | Age: 35
LOS: 1 days | End: 2020-12-08
Payer: COMMERCIAL

## 2020-12-08 DIAGNOSIS — K74.60 UNSPECIFIED CIRRHOSIS OF LIVER: ICD-10-CM

## 2020-12-08 PROCEDURE — 74183 MRI ABD W/O CNTR FLWD CNTR: CPT | Mod: 26

## 2020-12-08 PROCEDURE — A9585: CPT

## 2020-12-08 PROCEDURE — 74183 MRI ABD W/O CNTR FLWD CNTR: CPT

## 2020-12-15 ENCOUNTER — NON-APPOINTMENT (OUTPATIENT)
Age: 35
End: 2020-12-15

## 2020-12-22 ENCOUNTER — LABORATORY RESULT (OUTPATIENT)
Age: 35
End: 2020-12-22

## 2020-12-22 ENCOUNTER — APPOINTMENT (OUTPATIENT)
Dept: HEPATOLOGY | Facility: CLINIC | Age: 35
End: 2020-12-22
Payer: COMMERCIAL

## 2020-12-22 VITALS
DIASTOLIC BLOOD PRESSURE: 64 MMHG | BODY MASS INDEX: 20.43 KG/M2 | RESPIRATION RATE: 16 BRPM | TEMPERATURE: 98 F | HEIGHT: 62 IN | SYSTOLIC BLOOD PRESSURE: 95 MMHG | WEIGHT: 111 LBS | HEART RATE: 85 BPM

## 2020-12-22 LAB
BASOPHILS # BLD AUTO: 0.02 K/UL
BASOPHILS NFR BLD AUTO: 0.5 %
EOSINOPHIL # BLD AUTO: 0.19 K/UL
EOSINOPHIL NFR BLD AUTO: 5.2 %
HCT VFR BLD CALC: 43.3 %
HGB BLD-MCNC: 13.2 G/DL
IMM GRANULOCYTES NFR BLD AUTO: 0.5 %
INR PPP: 1.17 RATIO
LYMPHOCYTES # BLD AUTO: 1.06 K/UL
LYMPHOCYTES NFR BLD AUTO: 29.1 %
MAN DIFF?: NORMAL
MCHC RBC-ENTMCNC: 27.9 PG
MCHC RBC-ENTMCNC: 30.5 GM/DL
MCV RBC AUTO: 91.5 FL
MONOCYTES # BLD AUTO: 0.25 K/UL
MONOCYTES NFR BLD AUTO: 6.9 %
NEUTROPHILS # BLD AUTO: 2.1 K/UL
NEUTROPHILS NFR BLD AUTO: 57.8 %
PLATELET # BLD AUTO: 99 K/UL
PT BLD: 13.7 SEC
RBC # BLD: 4.73 M/UL
RBC # FLD: 14.6 %
WBC # FLD AUTO: 3.64 K/UL

## 2020-12-22 PROCEDURE — 99072 ADDL SUPL MATRL&STAF TM PHE: CPT

## 2020-12-22 PROCEDURE — 99214 OFFICE O/P EST MOD 30 MIN: CPT

## 2020-12-23 ENCOUNTER — NON-APPOINTMENT (OUTPATIENT)
Age: 35
End: 2020-12-23

## 2020-12-23 LAB
ALBUMIN SERPL ELPH-MCNC: 4.4 G/DL
ALP BLD-CCNC: 144 U/L
ALT SERPL-CCNC: 22 U/L
ANION GAP SERPL CALC-SCNC: 13 MMOL/L
AST SERPL-CCNC: 29 U/L
BILIRUB SERPL-MCNC: 1.6 MG/DL
BUN SERPL-MCNC: 13 MG/DL
CALCIUM SERPL-MCNC: 9.2 MG/DL
CHLORIDE SERPL-SCNC: 108 MMOL/L
CO2 SERPL-SCNC: 20 MMOL/L
CREAT SERPL-MCNC: 0.7 MG/DL
GLUCOSE SERPL-MCNC: 97 MG/DL
POTASSIUM SERPL-SCNC: 4.5 MMOL/L
PROT SERPL-MCNC: 7.2 G/DL
SODIUM SERPL-SCNC: 141 MMOL/L

## 2020-12-24 LAB
ALPHA-1-FETOPROTEIN-L3: NORMAL %
ALPHA-1-FETOPROTEIN: 2.2 NG/ML

## 2021-01-08 ENCOUNTER — OUTPATIENT (OUTPATIENT)
Dept: OUTPATIENT SERVICES | Facility: HOSPITAL | Age: 36
LOS: 1 days | Discharge: HOME | End: 2021-01-08

## 2021-01-08 DIAGNOSIS — I82.0 BUDD-CHIARI SYNDROME: ICD-10-CM

## 2021-01-12 ENCOUNTER — APPOINTMENT (OUTPATIENT)
Dept: HEMATOLOGY ONCOLOGY | Facility: CLINIC | Age: 36
End: 2021-01-12
Payer: COMMERCIAL

## 2021-01-12 ENCOUNTER — RESULT REVIEW (OUTPATIENT)
Age: 36
End: 2021-01-12

## 2021-01-12 VITALS
BODY MASS INDEX: 19.87 KG/M2 | WEIGHT: 109.35 LBS | HEART RATE: 89 BPM | SYSTOLIC BLOOD PRESSURE: 114 MMHG | DIASTOLIC BLOOD PRESSURE: 74 MMHG | OXYGEN SATURATION: 97 % | TEMPERATURE: 97.6 F | RESPIRATION RATE: 16 BRPM | HEIGHT: 62.2 IN

## 2021-01-12 LAB
BASOPHILS # BLD AUTO: 0.04 K/UL — SIGNIFICANT CHANGE UP (ref 0–0.2)
BASOPHILS NFR BLD AUTO: 1 % — SIGNIFICANT CHANGE UP (ref 0–2)
EOSINOPHIL # BLD AUTO: 0.24 K/UL — SIGNIFICANT CHANGE UP (ref 0–0.5)
EOSINOPHIL NFR BLD AUTO: 5.9 % — SIGNIFICANT CHANGE UP (ref 0–6)
HCT VFR BLD CALC: 41.9 % — SIGNIFICANT CHANGE UP (ref 34.5–45)
HGB BLD-MCNC: 13.1 G/DL — SIGNIFICANT CHANGE UP (ref 11.5–15.5)
IMM GRANULOCYTES NFR BLD AUTO: 0.2 % — SIGNIFICANT CHANGE UP (ref 0–1.5)
LYMPHOCYTES # BLD AUTO: 1.03 K/UL — SIGNIFICANT CHANGE UP (ref 1–3.3)
LYMPHOCYTES # BLD AUTO: 25.3 % — SIGNIFICANT CHANGE UP (ref 13–44)
MCHC RBC-ENTMCNC: 28.1 PG — SIGNIFICANT CHANGE UP (ref 27–34)
MCHC RBC-ENTMCNC: 31.3 G/DL — LOW (ref 32–36)
MCV RBC AUTO: 89.9 FL — SIGNIFICANT CHANGE UP (ref 80–100)
MONOCYTES # BLD AUTO: 0.26 K/UL — SIGNIFICANT CHANGE UP (ref 0–0.9)
MONOCYTES NFR BLD AUTO: 6.4 % — SIGNIFICANT CHANGE UP (ref 2–14)
NEUTROPHILS # BLD AUTO: 2.49 K/UL — SIGNIFICANT CHANGE UP (ref 1.8–7.4)
NEUTROPHILS NFR BLD AUTO: 61.2 % — SIGNIFICANT CHANGE UP (ref 43–77)
NRBC # BLD: 0 /100 WBCS — SIGNIFICANT CHANGE UP (ref 0–0)
PLATELET # BLD AUTO: 120 K/UL — LOW (ref 150–400)
RBC # BLD: 4.66 M/UL — SIGNIFICANT CHANGE UP (ref 3.8–5.2)
RBC # FLD: 13.9 % — SIGNIFICANT CHANGE UP (ref 10.3–14.5)
WBC # BLD: 4.07 K/UL — SIGNIFICANT CHANGE UP (ref 3.8–10.5)
WBC # FLD AUTO: 4.07 K/UL — SIGNIFICANT CHANGE UP (ref 3.8–10.5)

## 2021-01-12 PROCEDURE — 99072 ADDL SUPL MATRL&STAF TM PHE: CPT

## 2021-01-12 PROCEDURE — 99499A: CUSTOM | Mod: NC

## 2021-01-12 PROCEDURE — 99204 OFFICE O/P NEW MOD 45 MIN: CPT

## 2021-01-12 RX ORDER — FAMOTIDINE 20 MG/1
20 TABLET, FILM COATED ORAL
Refills: 0 | Status: DISCONTINUED | COMMUNITY
End: 2021-01-12

## 2021-01-12 RX ORDER — VITAMIN C, CALCIUM, IRON, VITAMIN D3, VITAMIN E, VITAMIN B1, VITAMIN B2, VITAMIN B3, VITAMIN B6, FOLIC ACID, IODINE, ZINC, COPPER, DOCUSATE SODIUM, DOCOSAHEXAENOIC ACID (DHA) 27-1-50 MG
KIT ORAL
Refills: 0 | Status: DISCONTINUED | COMMUNITY
End: 2021-01-12

## 2021-01-20 LAB
ABR COMMENT: NORMAL
ALBUMIN SERPL ELPH-MCNC: 4.2 G/DL
ALP BLD-CCNC: 132 U/L
ALT SERPL-CCNC: 21 U/L
ANION GAP SERPL CALC-SCNC: 11 MMOL/L
APCR PPP: 2.82 RATIO
AST SERPL-CCNC: 28 U/L
AT III PPP CHRO-ACNC: 90 %
B2 GLYCOPROT1 IGG SER-ACNC: <5 SGU
B2 GLYCOPROT1 IGM SER-ACNC: <5 SMU
BILIRUB SERPL-MCNC: 1.7 MG/DL
BUN SERPL-MCNC: 19 MG/DL
CALCIUM SERPL-MCNC: 9.1 MG/DL
CARDIOLIPIN IGM SER-MCNC: 8.6 GPL
CARDIOLIPIN IGM SER-MCNC: 9.2 MPL
CHLORIDE SERPL-SCNC: 104 MMOL/L
CO2 SERPL-SCNC: 24 MMOL/L
CONFIRM: 32 SEC
CREAT SERPL-MCNC: 0.76 MG/DL
DNA PLOIDY SPEC FC-IMP: NORMAL
DRVVT IMM 1:2 NP PPP: NORMAL
DRVVT SCREEN TO CONFIRM RATIO: 0.85 RATIO
FVL BLANK: 41.5
FVL TEST: 116.9
GLUCOSE SERPL-MCNC: 111 MG/DL
HCYS SERPL-MCNC: 6.5 UMOL/L
JAK2 GENE MUT ANL BLD/T: NORMAL
PNH GRANULOCYTES: 0 %
PNH MONOCYTES: 0 %
PNH RBC - COMPLETE: 0 %
PNH RBC - PARTIAL: 0.01 %
POTASSIUM SERPL-SCNC: 4.4 MMOL/L
PROT C PPP CHRO-ACNC: 83 %
PROT S AG ACT/NOR PPP IA: 68 %
PROT SERPL-MCNC: 7.2 G/DL
PTR INTERP: NORMAL
SCREEN DRVVT: 30.2 SEC
SODIUM SERPL-SCNC: 139 MMOL/L

## 2021-01-20 NOTE — PHYSICAL EXAM
[Fully active, able to carry on all pre-disease performance without restriction] : Status 0 - Fully active, able to carry on all pre-disease performance without restriction [Normal] : affect appropriate [de-identified] : No cervical, axillary or inguinal LAD noted

## 2021-01-20 NOTE — ASSESSMENT
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 \par \par Will check CBC, CMP, PT/PTT/INR, homocysteine, Factor V Leiden mutation, prothrombin gene mutation, anti-phospholipid antibody (DRVVT, anti-cardioliipin antibody, Beta 2 glycoprotein). anti-thrombin III assay, protein C activity/antigen, activated protein c resistance, protein S antigen, protein S free activity, Pi Linked antigen and Elliott 2 mutation. \par RV in 2 weeks to discuss all test results. \par Case and management discussed with Dr. Kelechi Venegas.  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  No other explanations for cirrhosis known currently.  Much of this history took place in [facility], records not available to me, and unclear if full workup for hypercoagulable states were ever run. The history of 2 miscarriages, and and her first born passing away within minutes is concerning for possible underling hypercoagulation disorder, particularly APLS since the early 2000's.   Check Activated Protein C resistance/FVL, Prothrombin gene mutation, Homocysteine, DRVVT, Antiphospholipid antibodies, Protein C and S, AT III activity for hypercoagulable states.Will also evaluate for ELLIOTT 2 and Pi linked antigen for PNH given their association with Budd Chiari.  \par \par Spent > 45 minutes in direct patient care and and addressed all questions and concerns.  >50% of this time was in direct face to face contact with patient during exam and counseling. \par \par Addendum 1/20/21\par Called patient with [place]  Flore[place]  #[id number]\par Briefly reviewed patients bloodwork form last week.  APLS labs, Protein C and S, ATIII, Factor V liden, APCR, and Prothrombin gene mution all normal.  ELLIOTT 2 not detected, Pi- Linked antigen for PNH normal.  No hypercoagulable state detected.  Will have patient follow up on the 28th for further explanation, and consider extended hypercoagulable workup for minor hypercoagulable states though those are not typically associated with Budd-Chiari.

## 2021-01-20 NOTE — HISTORY OF PRESENT ILLNESS
[de-identified] : 2021 Initial Consultation:\par CC: Referred by Dr. 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  Patient had an MRI abdomen 2020 that showed enlarged heterogenous cirrhotic liver with diffuse patchy enhancement and poor delineation of hepatic veins suggesting Budd Chiari syndrome.  Patient has a history of 5 pregnancies- 3 of them were spontaneous abortions.  Patient denies h/o PE, DVT or CVA.  She denies h/o taking oral contraceptives.  She c/o fatigue.  She reports a good appetite without recent weight loss. She reports occasional abdominal pain and occasional nausea.  She also reports occasional breast pain bilateral X 2 months.  She denies nipple discharge.  She reports occasional pruritus arms/ trunks without distinct rash.  Patient denies any fever/chills, recent infections, CP, SOB, diarrhea, blood in stool, frequent headaches, dizziness, change in vision, hematuria/dysuria, skeletal pain, calf pain/edema or any unexplained bleeding/bruising. 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  Baby  within 20-30 minutes of life.  In [facility]\par 2nd pregnancy () at 16 weeks gestational age.  Heavy bleeding, spontaneous .  In [facility]\par 3rd pregnancy () premature rupture of membranes, bleeding, spontaneous  at 8 weeks gestation.  In [facility] 4th pregnancy () heavy bleeding at 4 weeks gestation.  Was advised to terminate pregnancy due to cirrhosis.  ADvised not to get pregnant again\par 5th pregnancy (2019) Patient reports that she accidentally got pregnant.  She had short cervix and  premature rupture of membranes. 22 weeks 5 days gestation, patient was advised to terminate pregnancy. \par Menstrual cycle is regular- comes every month, lasts 5 days.  Denies heavy bleeding. \par \par Medications:\par Furosemide\par Spironolactone\par \par Allergies: \par NKDA\par \par Social History:\par .  Has boyfriend. \par Emigrated from [place] in 2017.  \par Works in day care.  \par Denies smoking or alcohol use.  \par Lives with mom\par \par Healthcare Maintenance: \par Dr. Fanta Bhakta- in PMD\par Sees Hepatology Dr. Carvajal\par Sees Dr. Davida PABLO Hepatology\par Last endoscopy 1 1/2 years ago\par Received flu vaccine this season  [de-identified] : Initial Consult

## 2021-01-20 NOTE — REVIEW OF SYSTEMS
[Fatigue] : fatigue [Abdominal Pain] : abdominal pain [Negative] : Allergic/Immunologic [FreeTextEntry4] : occasional gum bleeding, denies epistaxis  [FreeTextEntry7] : occas abd pain and occasional nausea without vomiting. Denies bloating  [de-identified] : pruritus

## 2021-01-26 ENCOUNTER — APPOINTMENT (OUTPATIENT)
Dept: HEPATOLOGY | Facility: CLINIC | Age: 36
End: 2021-01-26
Payer: COMMERCIAL

## 2021-01-26 VITALS
DIASTOLIC BLOOD PRESSURE: 67 MMHG | TEMPERATURE: 97.8 F | HEART RATE: 92 BPM | OXYGEN SATURATION: 95 % | HEIGHT: 62.2 IN | SYSTOLIC BLOOD PRESSURE: 97 MMHG | BODY MASS INDEX: 20.53 KG/M2 | RESPIRATION RATE: 16 BRPM | WEIGHT: 113 LBS

## 2021-01-26 PROCEDURE — 99214 OFFICE O/P EST MOD 30 MIN: CPT

## 2021-01-26 PROCEDURE — 99072 ADDL SUPL MATRL&STAF TM PHE: CPT

## 2021-01-26 RX ORDER — FUROSEMIDE 20 MG/1
20 TABLET ORAL DAILY
Qty: 30 | Refills: 5 | Status: DISCONTINUED | COMMUNITY
Start: 2020-11-24 | End: 2021-01-26

## 2021-01-27 ENCOUNTER — APPOINTMENT (OUTPATIENT)
Dept: HEMATOLOGY ONCOLOGY | Facility: CLINIC | Age: 36
End: 2021-01-27
Payer: COMMERCIAL

## 2021-01-27 VITALS
BODY MASS INDEX: 20.75 KG/M2 | SYSTOLIC BLOOD PRESSURE: 97 MMHG | WEIGHT: 114.2 LBS | HEIGHT: 62.17 IN | DIASTOLIC BLOOD PRESSURE: 67 MMHG | TEMPERATURE: 97.8 F | OXYGEN SATURATION: 98 % | HEART RATE: 76 BPM | RESPIRATION RATE: 16 BRPM

## 2021-01-27 PROCEDURE — 99072 ADDL SUPL MATRL&STAF TM PHE: CPT

## 2021-01-27 PROCEDURE — 99213 OFFICE O/P EST LOW 20 MIN: CPT

## 2021-01-28 LAB — PROT S FREE PPP-ACNC: 40 %

## 2021-01-30 NOTE — HISTORY OF PRESENT ILLNESS
[de-identified] : \par \par THere is a 2/22/10 document in [place] that she has that staes that she had a prothrombin gene mutation.  heterozygous.  \par \par PReoitein S activity and antigen in 2017 form [facility] during [facility] group found a proten S acitviyy 68% and Protein S Ag 66. \par PRotein C 90%.  \par AT .\par \par FV L neg \par \par Factrore XI activity 27% facor IX activity 73% and Factor XII 86%.  \par \par PTT was prolonged.  40.7 sec.  \par Prothrombin gene muation negative.  [de-identified] : Patient returns for follow up.  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 Activity and Antigen both 40%.  THis may be secondary to her liver disease therefore may not have cause the initial liver cirrhosis and may simply be a result of it. As the damage had already been done and the vessels saima sclerosed with no current blood clot in the hepatic veins, I am uncertain she would have much benefit from anticoagulation ow that is has been years later.  \par \par She does have a large stack of papers from her original evaluations from [facility] and her evaluation at [facility] hematology.  Art had a Pretien S activity of 66% and Antigen 66% in 2017.\par \par \par [id number] Sergej\par \par

## 2021-01-30 NOTE — ASSESSMENT
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  No other explanations for cirrhosis known currently.  Much of this history took place in [facility],  Patient was able to bring records from the evaluation in [facility] as well as earlier evaluation in 2017 at [facility].  Records mostly in [place]ian, was not able to julien that much detail with the video . Obtained a copy for a professional translation.  On the parts that we were able to read by video phone, patient was reported to have a heterozygous prothrombin gene mutation, however this test was run at our office was negative.  REcords from [facility] in 2018 also documented no prothrombin gene mutation.  On our hypercoagulable workup, the only deficit that was found was a protein S deficiency at 40% activity and antigen, however patient's protein S levels at [facility] were 66%.  This suggests that it was a consequence of progressive liver disease rather than the underlying cause of what is suspected to be an initial hepatic vein thrombosis causing Budd-Chiari syndrome.  CUrrently there is no evidence of a current acute thrombosis. 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  and a prolonged PTT.  THis may introduce a significant risk of bleeding.  Treatment of the cirrhosis and what appears  to be chronic Budd-Chiari changes would include management of the portal hypertension, perhaps by TIPS procedure and an eventual liver transplant should she qualify for one.  \par \par Anticoagulation is is still subject to discussion however as there can be an argument made for future risk for thrombosis, or perhaps the Budd-Chiari is not as chronic as I suspect it to be. Will review the rest of the older records of after translation.

## 2021-01-30 NOTE — PHYSICAL EXAM
[Normal] : normal appearance, no rash, nodules, vesicles, ulcers, erythema [de-identified] : mild abdominal pain

## 2021-02-23 ENCOUNTER — LABORATORY RESULT (OUTPATIENT)
Age: 36
End: 2021-02-23

## 2021-02-23 ENCOUNTER — APPOINTMENT (OUTPATIENT)
Dept: TRANSPLANT | Facility: CLINIC | Age: 36
End: 2021-02-23
Payer: COMMERCIAL

## 2021-02-23 ENCOUNTER — APPOINTMENT (OUTPATIENT)
Dept: HEPATOLOGY | Facility: CLINIC | Age: 36
End: 2021-02-23
Payer: COMMERCIAL

## 2021-02-23 VITALS
WEIGHT: 110 LBS | HEART RATE: 94 BPM | BODY MASS INDEX: 19.99 KG/M2 | TEMPERATURE: 97.8 F | RESPIRATION RATE: 16 BRPM | HEIGHT: 62.17 IN | DIASTOLIC BLOOD PRESSURE: 71 MMHG | OXYGEN SATURATION: 95 % | SYSTOLIC BLOOD PRESSURE: 110 MMHG

## 2021-02-23 PROCEDURE — 99072 ADDL SUPL MATRL&STAF TM PHE: CPT

## 2021-02-23 PROCEDURE — 99214 OFFICE O/P EST MOD 30 MIN: CPT

## 2021-02-23 PROCEDURE — 99205 OFFICE O/P NEW HI 60 MIN: CPT

## 2021-02-23 NOTE — PHYSICAL EXAM
[Abdominal  Ascites] : ascites [Splenomegaly] : splenomegaly [Liver Palpable ___ Finger Breadths Below Costal Margin] : Liver edge was palpable [unfilled] finger breadths below costal margin [Non-Tender] : non-tender [General Appearance - Alert] : alert [General Appearance - In No Acute Distress] : in no acute distress [General Appearance - Well Nourished] : well nourished [Neck Appearance] : the appearance of the neck was normal [] : no respiratory distress [Respiration, Rhythm And Depth] : normal respiratory rhythm and effort [Exaggerated Use Of Accessory Muscles For Inspiration] : no accessory muscle use [Apical Impulse] : the apical impulse was normal [Heart Sounds] : normal S1 and S2 [Heart Rate And Rhythm] : heart rate was normal and rhythm regular [Arterial Pulses Carotid] : carotid pulses were normal with no bruits [Bowel Sounds] : normal bowel sounds [Abnormal Walk] : normal gait [Skin Color & Pigmentation] : normal skin color and pigmentation [Oriented To Time, Place, And Person] : oriented to person, place, and time [Affect] : the affect was normal [Irregular] : irregular [Sclera] : the sclera and conjunctiva were normal [PERRL With Normal Accommodation] : pupils were equal in size, round, and reactive to light [Outer Ear] : the ears and nose were normal in appearance [Scleral Icterus] : No Scleral Icterus [Spider Angioma] : No spider angioma(s) were observed [Abdominal Bruit] : no abdominal bruit [Asterixis] : no asterixis observed [Jaundice] : No jaundice [FreeTextEntry1] : Liver palpable 2fb below CM, Spleen palpable 0.5 fb , trace ascites

## 2021-02-23 NOTE — HISTORY OF PRESENT ILLNESS
[MELD Score: ___] : MELD Score is [unfilled] [de-identified] : Ms. Tee is a 35y.o F from  [facility] referred by Dr. Carvajal for TIPS consideration.  \par This consult was conducted using video . \par She has a PMH Cryptogenic cirrhosis dx in [facility] around  during her 4th pregnancy. Her disease is complicated by portal HTN with small esophageal varices and ascites now resolved, treated with diuretics, never had LVP. MELD 10. She's had a poor OB hx related to her chronic liver disease. she only had once viable pregnancy, she delivered prematurely and infant passed same day. Her pregnancy hx is as follows: \par \par #1 (): Vaginal delivery girl, at 28 weeks GA . Delivery occurred at [facility]. Pregnancy #1 Comments: ( delivery, baby  after 30 minutes. Pt remained in hospital due to enlarged liver). \par #2 (): at 16 weeks GA . Delivery occurred at [facility]. Pregnancy #2 Comments: (spontaneous ab, D&C). \par #3 (): at 8 weeks GA . Delivery occurred at [facility]. Pregnancy #3 Comments: (Spontaneous ab, D&C). \par  #4 (): at 4 weeks GA . Delivery occurred at [facility]. Pregnancy #4 Comments: (Induced ab- Pt was diagnosed with Cirrhosis, pt was advised to terminate pregnancy). \par # 5 () at 25 weeks and 5 days  [place] induction and termination of pregnancy for previable rupture of membrane ( PPROM) at 25 weeks 5 days of pregnancy.\par \par She denies any prior history of gastrointestinal bleeding, symptoms of hepatic encephalopathy, ascites in the past now well controlled on diuretics.  c/o pruritus \par \par She has been followed by Dr. Carvajal for disease management as well as Dr. Davida Moreno (hepatologist at [facility]) for a liver lesion last seen 2 yrs ago. Her most recent MRI 2020 demonstrated an Enlarged heterogeneous cirrhotic liver with diffuse patchy enhancement and poor delineation of the hepatic veins suggesting underlying Budd-Chiari syndrome. Extensive portal hypertension with paraesophageal varices and splenorenal varices. There are multiple small enhancing foci scattered in liver the largest appears represent a 1 cm lesion along the dome segment 8 features favoring Category: LR-4\par In addition there is a small 0.5 cm segment 4 lesion Category: LR-3. AFP 2.2 (2020)\par She denies undergoing liver bx or BM bx. \par Her Factor V Leiden (21)-Negative , Jak2 Mutation -Negative , Prothrombin mutation -Negative. she does has a protein S deficiency. She is followed by Dr. Venegas from hematology and is not on AC as there is min benefit and high risk of bleeding. She reports Hep A infection as a child. \par She denies hemoptysis, no abnormal vaginal bleeding reports regular menses. \par \par Social Hx : Born in [facility] in the  4 yrs. 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 Has a brother who is healthy \par \par Studies reviewed: \par \par Labs 21-MELD 10 TB 1.7, AST 28, ALT 21, , Cr 0.76\par WBC 4.07, H/H 13.1/41.9\par \par ABD US 2020- Cirrhosis. Periportal edema which may be seen in fluid overload or hepatitis.\par Trace perihepatic ascites. Right pleural effusion. Mild splenomegaly.\par \par EGD 2017-Grade A varices and Gastropathy

## 2021-02-23 NOTE — REASON FOR VISIT
[Consultation] : a consultation visit [ Service] : provided by  Service [FreeTextEntry1] : [id number] [FreeTextEntry3] :  # [phone] [id number] -elizabeth  [TWNoteComboBox1] : [place]

## 2021-03-01 LAB
AFP-TM SERPL-MCNC: 3.5 NG/ML
ALBUMIN SERPL ELPH-MCNC: 4.3 G/DL
ALP BLD-CCNC: 156 U/L
ALT SERPL-CCNC: 26 U/L
ANA SER IF-ACNC: NEGATIVE
ANION GAP SERPL CALC-SCNC: 13 MMOL/L
AST SERPL-CCNC: 33 U/L
BASOPHILS # BLD AUTO: 0.04 K/UL
BASOPHILS NFR BLD AUTO: 1 %
BILIRUB SERPL-MCNC: 2.5 MG/DL
BUN SERPL-MCNC: 15 MG/DL
CALCIUM SERPL-MCNC: 9.9 MG/DL
CHLORIDE SERPL-SCNC: 103 MMOL/L
CO2 SERPL-SCNC: 24 MMOL/L
COPPER SERPL-MCNC: 157 UG/DL
CREAT SERPL-MCNC: 0.71 MG/DL
DNA PLOIDY SPEC FC-IMP: NORMAL
EOSINOPHIL # BLD AUTO: 0.19 K/UL
EOSINOPHIL NFR BLD AUTO: 4.9 %
FACT VIII ACT/NOR PPP: 135 %
GLUCOSE SERPL-MCNC: 97 MG/DL
HAV IGM SER QL: ABNORMAL
HBV CORE IGG+IGM SER QL: REACTIVE
HBV SURFACE AB SER QL: REACTIVE
HBV SURFACE AB SERPL IA-ACNC: 794.3 MIU/ML
HBV SURFACE AG SER QL: NONREACTIVE
HCT VFR BLD CALC: 41.5 %
HCV AB SER QL: NONREACTIVE
HCV S/CO RATIO: 0.26 S/CO
HGB BLD-MCNC: 13.2 G/DL
IMM GRANULOCYTES NFR BLD AUTO: 0.5 %
INR PPP: 1.21 RATIO
LYMPHOCYTES # BLD AUTO: 0.87 K/UL
LYMPHOCYTES NFR BLD AUTO: 22.3 %
MAN DIFF?: NORMAL
MCHC RBC-ENTMCNC: 28.2 PG
MCHC RBC-ENTMCNC: 31.8 GM/DL
MCV RBC AUTO: 88.7 FL
MONOCYTES # BLD AUTO: 0.32 K/UL
MONOCYTES NFR BLD AUTO: 8.2 %
NEUTROPHILS # BLD AUTO: 2.47 K/UL
NEUTROPHILS NFR BLD AUTO: 63.1 %
PLATELET # BLD AUTO: 112 K/UL
POTASSIUM SERPL-SCNC: 4.7 MMOL/L
PROT SERPL-MCNC: 7.7 G/DL
PT BLD: 14.1 SEC
RBC # BLD: 4.68 M/UL
RBC # FLD: 14.3 %
SMOOTH MUSCLE AB SER QL IF: ABNORMAL
SODIUM SERPL-SCNC: 139 MMOL/L
WBC # FLD AUTO: 3.91 K/UL

## 2021-03-04 NOTE — HISTORY OF PRESENT ILLNESS
[FreeTextEntry1] : Mr. Cori Tee is a 33 years old young female from [place] with hx of cryptogenic cirrhosis, portal hypertension with small esophgeal varices, hx of ascites ( now resolved).\par \par She reportedly has been diagnosed with cirrhosis of the liver since about . She presented with 4 weeks of pregnancy at [facility], and was diagnosed with cirrhosis of the liver, and was advised to terminate pregnancy. She had two prior spontaneous , and one pre-term delivery. Her pregnancy history is as follows:\par \par #1 (): Vaginal delivery girl, at 28 weeks GA . Delivery occurred at [facility]. Pregnancy #1 Comments: ( delivery, baby  after 30 minutes. Pt remained in hospital due to enlarged liver). \par #2 (): at 16 weeks GA . Delivery occurred at [facility]. Pregnancy #2 Comments: (spontaneous ab, D&C). \par #3 (): at 8 weeks GA . Delivery occurred at [facility]. Pregnancy #3 Comments: (Spontaneous ab, D&C). \par  #4 (): at 4 weeks GA . Delivery occurred at [facility]. Pregnancy #4 Comments: (Induced ab- Pt was diagnosed with Cirrhosis, pt was advised to terminate pregnancy). \par \par She is unmarried, and has a boyfriend, and lives with her family. She has been seeing a hepatologist at [facility], Dr. Davida Thompson. Her past EGD from 2017 showed Grade A esophageal varices. Based on the picture it seems like was a short column (not commented on the EGD report).\par \par She denies any prior history of gastrointestinal bleeding, symptoms of distal hepatic encephalopathy or ascites. She reports generalized mild pruritus, and has not been on any specific medication to control her itching. She has been using some emollients or oil for symptoms relief. She is going to discuss this issue with her OB/GYN.\par  \par She was initially seen for a suspicious lesion in her liver with Dr. Thompson, and has been followed up with a MRI in January (the report was not immediately available for my review). Per my discussion with Mireya (BEENA), this a Li-RAD 3 lesion, and has become smaller over time. I have requested to fax the actual report to me.\par \par Liver function test from 2019 revealed total bilirubin 1.3 mg/dL, AST 33, ALT 34 alkaline phosphatase 123. Her total protein was 7 g/dL. GGTP was 74. Hepatitis B surface antigen was negative. Her blood type is B+. Screening test for HIV antibody was negative.\par Follow labs from May 10, 2019 revealed AST 30, ALT 14, alkaline phosphatase 112, total bilirubin 1.4 mg/dL. Her total protein was 7 and albumin was 4. Serum creatinine was 0.45 mg/dL acute hepatitis panel including hepatitis B surface antigen, hepatitis A IgM antibody were again negative. Hepatitis B core IgM antibody was positive, which I suspect is false-positive.\par Serum Bile Acid level is 15.7 \par \par Interval history: \par Patient today presented for follow up along with her boyfriend ( who speaks English and Irish).\par Recent EGD by Dr. Thompson. This showed small esophageal varices. Considering her low BP, and small varices, we decided to defer beta blocker for now. \par \par Since last seen in the hepatology clinic patient was admitted in hospital and underwent induction termination at 22 week 5 days for previable premature rupture of membrane ( PPROM) per ob/gyn ( see Ambrocio Sharpe's note for more details). She had a brief stay in the SICU for fever, and received IV antibiotics followed by oral antibiotics x 10 days. \par She is here for follow up, and overall feels well. She c/o some tidiness. Denies any nausea, vomiting and fever. Also reports, some pain in both LE since the surgery ( induction termination). \par She wanted to know if she can be pregnant again. I deferred this question to Dr. Albrecht on her next follow up. However, I have cautioned her potential risk and liver related complications with pregnancy. I suggested her to defer any planned pregnancy for now. \par \par Labs reviewed from the last clinic visit ( results appended). She has no immunity towards hepatitis B (although Hepatitis B Core IgG is +) suggesting past infection or possible false positive test. HBV DNA is negative. I will  recommend vaccination for hepatitis B.\par \par Hepatitis C antibody was negative.  Pvna-0-ptciskpaqui level was 246 mg/dl. Autoimmune markers were negative for antimitochondrial antibody, anti-smooth muscle antibody, anti-nuclear antibody,\par Ceruloplasmin level 52 mg/dL.\par \par Interval hx (2019): \par We used  to communicate with her. \par \par Since last seen in the hepatology clinic she has been overall doing well. She came to the clinic by herself. Other than reporting RUQ intermittent discomfort she has no other symptoms. Recent labs shows normalized LFTs ( although bili was 1.4). She has low MELD score with normal Cr and INR.\par \par Today she reported me that her mother once told her that she was jaundiced after birth that was prolonged.\par \par Clinically there were no edema. \par She reported good appetite.\par Denies any nausea/vomiting/fever/chills.\par \par Iternal history (2019):\par Patient presents for followup today. She was last seen in hepatology clinic in 2019. Labs reviewed from her last clinic revealed hemoglobin of 13.2 g/dL and platelet count 122,000. Her liver function tests revealed total bilirubin 1.8 mg/dL, AST 26 U/L, ALT 23 U/L, alkaline phosphatase 102 U/L. Serum creatinine was 0.6 mg/dL. INR was 1.08.\par \par Her MRI scanfrom 19 SEP 2019 revealed 2 lesions in the liver: one in segment 4B and the second one in segment 8. The segment from the lesion measured 0.6x0.5 cm. This was thought to be a LI-RADS 3 lesion, and the lesion in question in segment 8 measured about 0.9 pulse 0.6 cm.. This was also thought to be a LI-RADS 3 lesion.\par \par Interval History May 13, 2020\par \rommel Persaud is being seen today for a followup visit to Telehealth clinic. Patient reports overall clinically doing well but reports significant itching symptoms to the point that it has been bothering her sleep. Apparently she has no scratch marks on her back as well. She denies any nausea, vomiting, abdominal pain or fever. She also denies any abdominal distention or lower extremity swelling.\par \par Followup blood test results from 2019 was reviewed. This revealed normal INR, hemoglobin 11.8, platelet count 131,000. Liver function test revealed total bilirubin 1.6, AST 34, ALT 24 alkaline phosphatase 110. Serum creatine was 0.66. Her alpha-fetoprotein level was 1.5 ng per ml.\par \par She is due for a follow up MRI.\par \par Interval Hx (2020)\par \par Cori presents for a follow up. She recently presented to [facility] ER on 20 with intermittent epigastric and chest discomfort, bloating and nausea. Abdominal ultrasound revealed periportal edema, trace perihepatic ascites, right pleural effusion, cirrhosis, and mild splenomegaly.\par Labs revealed mild thrombocytopnia, INR 1.17, , Bili 2.4, with albumin 4.0. She was discharged on twice daily Pepcid and dicyclomine as needed. \par \par She denies fever, vomiting, melena, or blood in the stool. \par \par Last EGD 2017 reportedly revealed grade 1 varices. Last MRI was 2019. \par \par Since last seen by Dr. Bunch ( GI) she has been started with Lasix 20 mg and Aldactone 50 mg. Hef ascites, and pl effusion has resolved. She feels well. \par \par Interval Hx (2020)\par Patient presents for a follow up after MRI. \par MRI from Dec 08, 2020- LIVER: Hepatomegaly with cirrhotic features seen, .. Multiple scattered small subcentimeters areas of enhancement is seen in liver the largest measuring 1.0 cm in the anterior dome. Please see below There is marked patchy heterogeneous enhancement morphology throughout the liver with poor delineation of the hepatic veins but mildly compressed patent intrahepatic IVC.\par The findings suggest seen in Budd-Chiari syndrome\par \par Lesion #: 1\par Location: Anterior dome segment 8, series 20 image 20\par Size: 1.0 x 1.0.\par AP Hyperenhancement: YES\par PV/Equilibrium Washout: YES\par Capsule Appearance: YES:\par Threshold Growth (>50% size increase in <= 6 months): Not applicable\par Ancillary features: NA\par LI-RADS v 2018 Category: LR-4\par OPTN Category (if LR-5):\par \par Lesion #: 2\par Location: Segment 4, series 12 image 24\par Size: 0.5 cm.\par AP Hyperenhancement: YES\par PV/Equilibrium Washout: YES\par Capsule Appearance: Not able to be determined:\par Threshold Growth (>50% size increase in <= 6 months): Not applicable\par Ancillary features:\par LI-RADS v 2018 Category: LR-3\par OPTN Category (if LR-5):\par \par Today, she reports feeling fine, but apparently having dizziness with Aldactone and Lasix due to dizziness. She is having difficulty sleeping. She also reported breast tenderness when she was taking Aldactone. \par She also reports feeling heaviness on her upper abdomen.\par I have recommended her to take Lasix 20 mg as needed. \par \par Interval history dated 2021-\par Patient presents for a follow-up visit today at the hematology clinic.  She was last seen in the pathology clinic on 2020.  Since then she was referred to hematologist and has been seeing Dr. Kelechi Venegas, for further work-up with regards to her recent diagnosis of Budd-Chiari syndrome.  She has been extensively evaluated and no clear hematological disorder has been identified so far.\par I have noted a slightly low protein S free activity level otherwise most of the hematological work-up has been negative.  Patient also brought extensive test results from [place].  Most of the test results were in Irish language, and patient help me translate it through an .  Although, it is unclear based on review of her extensive work-up from the past as to if there was a clear diagnosis, but it appears that she was diagnosed with cryptogenic cirrhosis at that time. \par Today, she appears very anxious about her diagnosis and is eager to know what is the etiology of her liver disease that resulted in Budd-Chiari syndrome.  She denies any significant abdominal distention but reports some discomfort.  She does not have any significant pedal edema.  She was taking Lasix 20 mg daily as well as Aldactone 25 mg daily which she has ran out about a month ago.  Considering she has not developed significant recurrence of edema or abdominal distention, I have recommended her to initiate only Aldactone 25 mg daily.\par Her recent liver function test results from 2021 revealed a total bilirubin of 1.7, AST 28, ALT 21, alkaline phosphatase 132.\par \par Interval history dated 2021.\par \par Cori presents for a hepatology follow-up appointment for potential evaluation for liver transplantation.  She has been overall doing well.  Apicitis is currently resolved with small dose of diuretics.  She was initiated with Aldactone 25 mg daily during her last clinic visit.\par

## 2021-03-04 NOTE — ASSESSMENT
[FreeTextEntry1] : 35 years old young [place] Female with cirrhosis of the liver, recent diagnosis of Budd-Chiari syndrome based on MRI ( used to carry a diagnosis of cryptogenic cirrhosis), hx of ascites, portal hypertension, esophageal varices. \par MRI showing Budd.Chiari syndrome, and clinically she has hepatomegaly. She has a low MELD score.  Her ascites is currently resolved and she remains on a low-dose of diuretics Aldactone 25 mg daily.\par \par Plan\par -no beta blocker therapy considering small esophageal varices, and her low BP. Risk of variceal bleeding appears to be low. \par \par -Stable lesions on follow up MRI. But now follow up MRI shows Budd-Chiari syndrome. She used to have her MRI at [facility].\par \par -I discussed the meaning of cirrhosis with the patient. I reviewed the natural history of the disease. I explained the risks for the development of esophageal varices with and without bleeding, hepatic encephalopathy, ascites, hepatocellular carcinoma, and liver failure. I have explained that disease can progress to the point of requiring an evaluation for liver transplantation. I have explained the need for imaging every 6 months to screen for liver cancer.\par \par -Keep on Lasix 20 mg as needed.  Keep on Aldactone 25 mg daily.  Ms. DAVILA was counseled to adhere to a low sodium (<2 grams of sodium per day) diet by: avoiding adding any salt to meals (removing the salt shaker from the table); eliminating salty foods from her diet; eating more home-cooked meals; choosing fresh or frozen, not canned, vegetables and fruits; and reading ingredient and food labels to choose low sodium foods. \par \par -Considering overall stability we have decided after discussion with transplant surgery team not to pursue evaluation for liver transplantation.  She has a little more difference to liver disease score.  We will defer any IR intervention at this time.\par \par -She will follow up with Dr. Kelechi Venegas with regards to further work-up for any hypercoagulable disorder.  I will defer to Dr. Venegas with regards to starting anticoagulation.\par \par \par -RTC in 1-2 months.\par

## 2021-03-04 NOTE — PHYSICAL EXAM
[General Appearance - Alert] : alert [General Appearance - In No Acute Distress] : in no acute distress [PERRL With Normal Accommodation] : pupils were equal in size, round, and reactive to light [Extraocular Movements] : extraocular movements were intact [Outer Ear] : the ears and nose were normal in appearance [Oropharynx] : the oropharynx was normal [Neck Appearance] : the appearance of the neck was normal [Jugular Venous Distention Increased] : there was no jugular-venous distention [Neck Cervical Mass (___cm)] : no neck mass was observed [Thyroid Diffuse Enlargement] : the thyroid was not enlarged [Thyroid Nodule] : there were no palpable thyroid nodules [Auscultation Breath Sounds / Voice Sounds] : lungs were clear to auscultation bilaterally [Bowel Sounds] : normal bowel sounds [Abdomen Soft] : soft [Abdomen Tenderness] : non-tender [Abdomen Mass (___ Cm)] : no abdominal mass palpated [Skin Color & Pigmentation] : normal skin color and pigmentation [Skin Turgor] : normal skin turgor [] : no rash [Deep Tendon Reflexes (DTR)] : deep tendon reflexes were 2+ and symmetric [Sensation] : the sensory exam was normal to light touch and pinprick [No Focal Deficits] : no focal deficits [Oriented To Time, Place, And Person] : oriented to person, place, and time [Impaired Insight] : insight and judgment were intact [Affect] : the affect was normal [FreeTextEntry1] : Mild icterus

## 2021-03-20 ENCOUNTER — INPATIENT (INPATIENT)
Facility: HOSPITAL | Age: 36
LOS: 4 days | Discharge: HOME | DRG: 294 | End: 2021-03-25
Attending: STUDENT IN AN ORGANIZED HEALTH CARE EDUCATION/TRAINING PROGRAM | Admitting: INTERNAL MEDICINE
Payer: COMMERCIAL

## 2021-03-20 VITALS
DIASTOLIC BLOOD PRESSURE: 80 MMHG | SYSTOLIC BLOOD PRESSURE: 116 MMHG | OXYGEN SATURATION: 91 % | TEMPERATURE: 98 F | HEART RATE: 109 BPM | RESPIRATION RATE: 18 BRPM

## 2021-03-20 DIAGNOSIS — J96.01 ACUTE RESPIRATORY FAILURE WITH HYPOXIA: ICD-10-CM

## 2021-03-20 DIAGNOSIS — K74.60 UNSPECIFIED CIRRHOSIS OF LIVER: ICD-10-CM

## 2021-03-20 DIAGNOSIS — I81 PORTAL VEIN THROMBOSIS: ICD-10-CM

## 2021-03-20 DIAGNOSIS — I82.220 ACUTE EMBOLISM AND THROMBOSIS OF INFERIOR VENA CAVA: ICD-10-CM

## 2021-03-20 DIAGNOSIS — U07.1 COVID-19: ICD-10-CM

## 2021-03-20 DIAGNOSIS — J90 PLEURAL EFFUSION, NOT ELSEWHERE CLASSIFIED: ICD-10-CM

## 2021-03-20 DIAGNOSIS — Z29.9 ENCOUNTER FOR PROPHYLACTIC MEASURES, UNSPECIFIED: ICD-10-CM

## 2021-03-20 DIAGNOSIS — I82.0 BUDD-CHIARI SYNDROME: ICD-10-CM

## 2021-03-20 DIAGNOSIS — R18.8 OTHER ASCITES: ICD-10-CM

## 2021-03-20 LAB
ALBUMIN SERPL ELPH-MCNC: 3.1 G/DL — LOW (ref 3.3–5)
ALBUMIN SERPL ELPH-MCNC: 3.4 G/DL — SIGNIFICANT CHANGE UP (ref 3.3–5)
ALP SERPL-CCNC: 158 U/L — HIGH (ref 40–120)
ALP SERPL-CCNC: 170 U/L — HIGH (ref 40–120)
ALT FLD-CCNC: 15 U/L — SIGNIFICANT CHANGE UP (ref 10–45)
ALT FLD-CCNC: 16 U/L — SIGNIFICANT CHANGE UP (ref 10–45)
ANION GAP SERPL CALC-SCNC: 12 MMOL/L — SIGNIFICANT CHANGE UP (ref 5–17)
ANION GAP SERPL CALC-SCNC: 12 MMOL/L — SIGNIFICANT CHANGE UP (ref 5–17)
APPEARANCE UR: CLEAR — SIGNIFICANT CHANGE UP
APTT BLD: 122.3 SEC — CRITICAL HIGH (ref 27.5–35.5)
APTT BLD: 41.9 SEC — HIGH (ref 27.5–35.5)
APTT BLD: >200 SEC — CRITICAL HIGH (ref 27.5–35.5)
AST SERPL-CCNC: 28 U/L — SIGNIFICANT CHANGE UP (ref 10–40)
AST SERPL-CCNC: 32 U/L — SIGNIFICANT CHANGE UP (ref 10–40)
BACTERIA # UR AUTO: NEGATIVE — SIGNIFICANT CHANGE UP
BASOPHILS # BLD AUTO: 0 K/UL — SIGNIFICANT CHANGE UP (ref 0–0.2)
BASOPHILS NFR BLD AUTO: 0 % — SIGNIFICANT CHANGE UP (ref 0–2)
BILIRUB SERPL-MCNC: 2.1 MG/DL — HIGH (ref 0.2–1.2)
BILIRUB SERPL-MCNC: 2.1 MG/DL — HIGH (ref 0.2–1.2)
BILIRUB UR-MCNC: NEGATIVE — SIGNIFICANT CHANGE UP
BUN SERPL-MCNC: 10 MG/DL — SIGNIFICANT CHANGE UP (ref 7–23)
BUN SERPL-MCNC: 14 MG/DL — SIGNIFICANT CHANGE UP (ref 7–23)
CALCIUM SERPL-MCNC: 8.7 MG/DL — SIGNIFICANT CHANGE UP (ref 8.4–10.5)
CALCIUM SERPL-MCNC: 9.2 MG/DL — SIGNIFICANT CHANGE UP (ref 8.4–10.5)
CHLORIDE SERPL-SCNC: 101 MMOL/L — SIGNIFICANT CHANGE UP (ref 96–108)
CHLORIDE SERPL-SCNC: 103 MMOL/L — SIGNIFICANT CHANGE UP (ref 96–108)
CO2 SERPL-SCNC: 21 MMOL/L — LOW (ref 22–31)
CO2 SERPL-SCNC: 21 MMOL/L — LOW (ref 22–31)
COLOR SPEC: YELLOW — SIGNIFICANT CHANGE UP
COVID-19 SPIKE DOMAIN AB INTERP: POSITIVE
COVID-19 SPIKE DOMAIN ANTIBODY RESULT: >250 U/ML — HIGH
CREAT SERPL-MCNC: 0.43 MG/DL — LOW (ref 0.5–1.3)
CREAT SERPL-MCNC: 0.48 MG/DL — LOW (ref 0.5–1.3)
CRP SERPL-MCNC: 1.27 MG/DL — HIGH (ref 0–0.4)
D DIMER BLD IA.RAPID-MCNC: 1093 NG/ML DDU — HIGH
DIFF PNL FLD: NEGATIVE — SIGNIFICANT CHANGE UP
EOSINOPHIL # BLD AUTO: 0 K/UL — SIGNIFICANT CHANGE UP (ref 0–0.5)
EOSINOPHIL NFR BLD AUTO: 0 % — SIGNIFICANT CHANGE UP (ref 0–6)
EPI CELLS # UR: 2 /HPF — SIGNIFICANT CHANGE UP
FERRITIN SERPL-MCNC: 53 NG/ML — SIGNIFICANT CHANGE UP (ref 15–150)
GLUCOSE SERPL-MCNC: 90 MG/DL — SIGNIFICANT CHANGE UP (ref 70–99)
GLUCOSE SERPL-MCNC: 94 MG/DL — SIGNIFICANT CHANGE UP (ref 70–99)
GLUCOSE UR QL: NEGATIVE — SIGNIFICANT CHANGE UP
HCG SERPL-ACNC: <2 MIU/ML — SIGNIFICANT CHANGE UP
HCT VFR BLD CALC: 37.3 % — SIGNIFICANT CHANGE UP (ref 34.5–45)
HCT VFR BLD CALC: 40.3 % — SIGNIFICANT CHANGE UP (ref 34.5–45)
HGB BLD-MCNC: 11.5 G/DL — SIGNIFICANT CHANGE UP (ref 11.5–15.5)
HGB BLD-MCNC: 12.6 G/DL — SIGNIFICANT CHANGE UP (ref 11.5–15.5)
HYALINE CASTS # UR AUTO: 1 /LPF — SIGNIFICANT CHANGE UP (ref 0–2)
INR BLD: 1.42 RATIO — HIGH (ref 0.88–1.16)
INR BLD: 1.47 RATIO — HIGH (ref 0.88–1.16)
KETONES UR-MCNC: ABNORMAL
LEUKOCYTE ESTERASE UR-ACNC: NEGATIVE — SIGNIFICANT CHANGE UP
LIDOCAIN IGE QN: 35 U/L — SIGNIFICANT CHANGE UP (ref 7–60)
LYMPHOCYTES # BLD AUTO: 0.27 K/UL — LOW (ref 1–3.3)
LYMPHOCYTES # BLD AUTO: 4.4 % — LOW (ref 13–44)
MAGNESIUM SERPL-MCNC: 1.8 MG/DL — SIGNIFICANT CHANGE UP (ref 1.6–2.6)
MCHC RBC-ENTMCNC: 27.1 PG — SIGNIFICANT CHANGE UP (ref 27–34)
MCHC RBC-ENTMCNC: 27.2 PG — SIGNIFICANT CHANGE UP (ref 27–34)
MCHC RBC-ENTMCNC: 30.8 GM/DL — LOW (ref 32–36)
MCHC RBC-ENTMCNC: 31.3 GM/DL — LOW (ref 32–36)
MCV RBC AUTO: 87 FL — SIGNIFICANT CHANGE UP (ref 80–100)
MCV RBC AUTO: 88 FL — SIGNIFICANT CHANGE UP (ref 80–100)
MONOCYTES # BLD AUTO: 0.32 K/UL — SIGNIFICANT CHANGE UP (ref 0–0.9)
MONOCYTES NFR BLD AUTO: 5.2 % — SIGNIFICANT CHANGE UP (ref 2–14)
NEUTROPHILS # BLD AUTO: 5.31 K/UL — SIGNIFICANT CHANGE UP (ref 1.8–7.4)
NEUTROPHILS NFR BLD AUTO: 86.1 % — HIGH (ref 43–77)
NITRITE UR-MCNC: NEGATIVE — SIGNIFICANT CHANGE UP
NRBC # BLD: 0 /100 WBCS — SIGNIFICANT CHANGE UP (ref 0–0)
PH UR: 6 — SIGNIFICANT CHANGE UP (ref 5–8)
PHOSPHATE SERPL-MCNC: 3 MG/DL — SIGNIFICANT CHANGE UP (ref 2.5–4.5)
PLATELET # BLD AUTO: 145 K/UL — LOW (ref 150–400)
PLATELET # BLD AUTO: 177 K/UL — SIGNIFICANT CHANGE UP (ref 150–400)
POTASSIUM SERPL-MCNC: 4.1 MMOL/L — SIGNIFICANT CHANGE UP (ref 3.5–5.3)
POTASSIUM SERPL-MCNC: 4.3 MMOL/L — SIGNIFICANT CHANGE UP (ref 3.5–5.3)
POTASSIUM SERPL-SCNC: 4.1 MMOL/L — SIGNIFICANT CHANGE UP (ref 3.5–5.3)
POTASSIUM SERPL-SCNC: 4.3 MMOL/L — SIGNIFICANT CHANGE UP (ref 3.5–5.3)
PROCALCITONIN SERPL-MCNC: 0.08 NG/ML — SIGNIFICANT CHANGE UP (ref 0.02–0.1)
PROT SERPL-MCNC: 6.2 G/DL — SIGNIFICANT CHANGE UP (ref 6–8.3)
PROT SERPL-MCNC: 6.8 G/DL — SIGNIFICANT CHANGE UP (ref 6–8.3)
PROT UR-MCNC: SIGNIFICANT CHANGE UP
PROTHROM AB SERPL-ACNC: 16.8 SEC — HIGH (ref 10.6–13.6)
PROTHROM AB SERPL-ACNC: 17.3 SEC — HIGH (ref 10.6–13.6)
RBC # BLD: 4.24 M/UL — SIGNIFICANT CHANGE UP (ref 3.8–5.2)
RBC # BLD: 4.63 M/UL — SIGNIFICANT CHANGE UP (ref 3.8–5.2)
RBC # FLD: 14.6 % — HIGH (ref 10.3–14.5)
RBC # FLD: 14.6 % — HIGH (ref 10.3–14.5)
RBC CASTS # UR COMP ASSIST: 1 /HPF — SIGNIFICANT CHANGE UP (ref 0–4)
SARS-COV-2 IGG+IGM SERPL QL IA: >250 U/ML — HIGH
SARS-COV-2 IGG+IGM SERPL QL IA: POSITIVE
SARS-COV-2 RNA SPEC QL NAA+PROBE: DETECTED
SODIUM SERPL-SCNC: 134 MMOL/L — LOW (ref 135–145)
SODIUM SERPL-SCNC: 136 MMOL/L — SIGNIFICANT CHANGE UP (ref 135–145)
SP GR SPEC: 1.05 — HIGH (ref 1.01–1.02)
TROPONIN T, HIGH SENSITIVITY RESULT: <6 NG/L — SIGNIFICANT CHANGE UP (ref 0–51)
UROBILINOGEN FLD QL: ABNORMAL
WBC # BLD: 5.23 K/UL — SIGNIFICANT CHANGE UP (ref 3.8–10.5)
WBC # BLD: 6.17 K/UL — SIGNIFICANT CHANGE UP (ref 3.8–10.5)
WBC # FLD AUTO: 5.23 K/UL — SIGNIFICANT CHANGE UP (ref 3.8–10.5)
WBC # FLD AUTO: 6.17 K/UL — SIGNIFICANT CHANGE UP (ref 3.8–10.5)
WBC UR QL: 1 /HPF — SIGNIFICANT CHANGE UP (ref 0–5)

## 2021-03-20 PROCEDURE — 71275 CT ANGIOGRAPHY CHEST: CPT | Mod: 26,ME

## 2021-03-20 PROCEDURE — 99285 EMERGENCY DEPT VISIT HI MDM: CPT

## 2021-03-20 PROCEDURE — 99223 1ST HOSP IP/OBS HIGH 75: CPT | Mod: GC

## 2021-03-20 PROCEDURE — 71045 X-RAY EXAM CHEST 1 VIEW: CPT | Mod: 26

## 2021-03-20 PROCEDURE — 74177 CT ABD & PELVIS W/CONTRAST: CPT | Mod: 26,MG

## 2021-03-20 PROCEDURE — G1004: CPT

## 2021-03-20 RX ORDER — CEFTRIAXONE 500 MG/1
2000 INJECTION, POWDER, FOR SOLUTION INTRAMUSCULAR; INTRAVENOUS ONCE
Refills: 0 | Status: COMPLETED | OUTPATIENT
Start: 2021-03-20 | End: 2021-03-20

## 2021-03-20 RX ORDER — HEPARIN SODIUM 5000 [USP'U]/ML
4000 INJECTION INTRAVENOUS; SUBCUTANEOUS EVERY 6 HOURS
Refills: 0 | Status: DISCONTINUED | OUTPATIENT
Start: 2021-03-20 | End: 2021-03-25

## 2021-03-20 RX ORDER — SODIUM CHLORIDE 9 MG/ML
1000 INJECTION INTRAMUSCULAR; INTRAVENOUS; SUBCUTANEOUS ONCE
Refills: 0 | Status: COMPLETED | OUTPATIENT
Start: 2021-03-20 | End: 2021-03-20

## 2021-03-20 RX ORDER — HEPARIN SODIUM 5000 [USP'U]/ML
4000 INJECTION INTRAVENOUS; SUBCUTANEOUS ONCE
Refills: 0 | Status: COMPLETED | OUTPATIENT
Start: 2021-03-20 | End: 2021-03-20

## 2021-03-20 RX ORDER — PANTOPRAZOLE SODIUM 20 MG/1
40 TABLET, DELAYED RELEASE ORAL
Refills: 0 | Status: DISCONTINUED | OUTPATIENT
Start: 2021-03-20 | End: 2021-03-23

## 2021-03-20 RX ORDER — HEPARIN SODIUM 5000 [USP'U]/ML
2000 INJECTION INTRAVENOUS; SUBCUTANEOUS EVERY 6 HOURS
Refills: 0 | Status: DISCONTINUED | OUTPATIENT
Start: 2021-03-20 | End: 2021-03-25

## 2021-03-20 RX ORDER — LANOLIN ALCOHOL/MO/W.PET/CERES
3 CREAM (GRAM) TOPICAL AT BEDTIME
Refills: 0 | Status: DISCONTINUED | OUTPATIENT
Start: 2021-03-20 | End: 2021-03-21

## 2021-03-20 RX ORDER — KETOROLAC TROMETHAMINE 30 MG/ML
15 SYRINGE (ML) INJECTION ONCE
Refills: 0 | Status: DISCONTINUED | OUTPATIENT
Start: 2021-03-20 | End: 2021-03-20

## 2021-03-20 RX ORDER — HEPARIN SODIUM 5000 [USP'U]/ML
INJECTION INTRAVENOUS; SUBCUTANEOUS
Qty: 25000 | Refills: 0 | Status: DISCONTINUED | OUTPATIENT
Start: 2021-03-20 | End: 2021-03-25

## 2021-03-20 RX ORDER — SPIRONOLACTONE 25 MG/1
25 TABLET, FILM COATED ORAL DAILY
Refills: 0 | Status: DISCONTINUED | OUTPATIENT
Start: 2021-03-20 | End: 2021-03-21

## 2021-03-20 RX ADMIN — HEPARIN SODIUM 700 UNIT(S)/HR: 5000 INJECTION INTRAVENOUS; SUBCUTANEOUS at 19:32

## 2021-03-20 RX ADMIN — HEPARIN SODIUM 800 UNIT(S)/HR: 5000 INJECTION INTRAVENOUS; SUBCUTANEOUS at 12:23

## 2021-03-20 RX ADMIN — PANTOPRAZOLE SODIUM 40 MILLIGRAM(S): 20 TABLET, DELAYED RELEASE ORAL at 14:37

## 2021-03-20 RX ADMIN — CEFTRIAXONE 100 MILLIGRAM(S): 500 INJECTION, POWDER, FOR SOLUTION INTRAMUSCULAR; INTRAVENOUS at 02:00

## 2021-03-20 RX ADMIN — HEPARIN SODIUM 4000 UNIT(S): 5000 INJECTION INTRAVENOUS; SUBCUTANEOUS at 06:07

## 2021-03-20 RX ADMIN — HEPARIN SODIUM 0 UNIT(S)/HR: 5000 INJECTION INTRAVENOUS; SUBCUTANEOUS at 12:20

## 2021-03-20 RX ADMIN — HEPARIN SODIUM 1000 UNIT(S)/HR: 5000 INJECTION INTRAVENOUS; SUBCUTANEOUS at 06:07

## 2021-03-20 RX ADMIN — SODIUM CHLORIDE 1000 MILLILITER(S): 9 INJECTION INTRAMUSCULAR; INTRAVENOUS; SUBCUTANEOUS at 01:10

## 2021-03-20 RX ADMIN — Medication 15 MILLIGRAM(S): at 20:51

## 2021-03-20 NOTE — H&P ADULT - ATTENDING COMMENTS
35F with PMHx of cryptogenic cirrhosis, Budd Chiari syndrome, Protein S deficiency, and COVID-19 (dx on 3/7) infection who presents with complaints of upper abdominal abdominal bloating and diarrhea x 5 days. Endorses She was having loose stools throughout the week, took imodium x 1 yesterday. Since then has not been passing as much gas and worsening discomfort. No nausea/vomiting.     Here afebrile, sinus tachycardia to 100, -116, saturating 91% on RA. Labs with INR 1.42 PTT 41.9, ddimer 1000s. CTA chest CTAP with IVC noting: no PE, moderate L pleueral effusion, thombus in suprerenal SVC to level of splenic/portal vein, moderate volume ascites.     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 Started on heparin gtt, monitor PTT closely. Vascular recs appreciated. Follow up Hepatology recs. Consult heme  (follows Dr. Venegas as outpatient)    #Cryptogenic Cirrhosis: Currently with mild ascites and L hydrothorax. MELD 17 on admission. EGD 6/2017 Grade A esophageal varices, gastropathy. Will see if needs repeat tap vs diuretic regimen. Follow up hepatology recs    #Recent COVId infxn: Positive on 3/7, currently with high titers of Ab. Wean O2 as able. Obtain ambulatory sats    #R pleural effusion: Unclear if due to hepatic hydrothorax, plan for IR consult and tap 35F with PMHx of cryptogenic cirrhosis, Budd Chiari syndrome, Protein S deficiency, and COVID-19 (dx on 3/7) infection who presents with complaints of upper abdominal abdominal bloating and diarrhea x 5 days. Endorses She was having loose stools throughout the week, took imodium x 1 yesterday. Since then has not been passing as much gas and worsening discomfort. No nausea/vomiting.     Here afebrile, sinus tachycardia to 100, -116, saturating 91% on RA. Labs with INR 1.42 PTT 41.9, ddimer 1000s. CTA chest CTAP with IVC noting: no PE, moderate L pleueral effusion, thombus in suprerenal SVC to level of splenic/portal vein, moderate volume ascites.     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 Started on heparin gtt, monitor PTT closely. Vascular recs appreciated. Follow up Hepatology recs. Consult heme  (follows Dr. Venegas as outpatient)    #Cryptogenic Cirrhosis: Currently with mild ascites and L hydrothorax. MELD 17 on admission. EGD 6/2017 Grade A esophageal varices, gastropathy. Will see if needs repeat tap vs diuretic regimen. Follow up hepatology recs    #Recent COVId infxn: Positive on 3/4, currently with high titers of Ab. Wean O2 as able. Obtain ambulatory sats    #R pleural effusion: Unclear if due to hepatic hydrothorax, plan for IR consult and tap

## 2021-03-20 NOTE — ED PROVIDER NOTE - CLINICAL SUMMARY MEDICAL DECISION MAKING FREE TEXT BOX
35yF h/o Cirrhosis (unsure of etiology), Covid+ (3/4) presents from urgent care for further evaluation due to worsening epigastric pain and abdominal distention of days duration. Concern for but not limited to pancreatitis vs biliary pathology vs PV thrombosis vs gastritis. Will get ekg, labs, lipase, coags, blood gas, UA, hcg, CT abd/pelvis and reassess 35yF h/o Cirrhosis (unsure of etiology), Covid+ (3/4) presents from urgent care for further evaluation due to worsening epigastric pain and abdominal distention of days duration. Concern for but not limited to pancreatitis vs biliary pathology vs PV thrombosis vs gastritis. Will get ekg, labs, lipase, coags, blood gas, UA, hcg, CT abd/pelvis and reassess    Attending Statement: Agree with the above.  Abd distension and difficulty breathing in pt c known idiopathic cirrhosis.  DDx as above, would include SBP as well given new ascites.  Noted to have R pleural effusion and ascites on POCUS but no significant accumulation evident to tap safely for paracentesis.  Overall minimally tachypneic and borderline SaO2 (~94%) but well appearing, nontoxic.  Reported fever today.  Plan as above; will require admission.  --BMM 35yF h/o Cirrhosis (unsure of etiology), Covid+ (3/4) presents from urgent care for further evaluation due to worsening epigastric pain and abdominal distention of days duration. Concern for but not limited to pancreatitis vs biliary pathology vs PV thrombosis vs gastritis. Will get ekg, labs, lipase, coags, blood gas, UA, hcg, CT abd/pelvis and reassess    Attending Statement: Agree with the above.  Abd distension and difficulty breathing in pt c known idiopathic cirrhosis.  DDx as above with most concern for portal veing thrombus, though possible to represent SBP as well given new(?) ascites.  Noted to have R pleural effusion and ascites on POCUS but no significant accumulation evident to tap safely for paracentesis.  Overall minimally tachypneic and borderline SaO2 (~94%) but well appearing, nontoxic.  Reported fever today.  Plan as above; will require admission.  --BMM

## 2021-03-20 NOTE — ED PROVIDER NOTE - PHYSICAL EXAMINATION
Gen: AAOx3, non-toxic  Head: NCAT  HEENT: EOMI, oral mucosa moist, normal conjunctiva  Lung: CTAB, no respiratory distress,, speaking in full sentences  CV: RRR, no murmurs, rubs or gallops  Abd: soft, distended, epigastric ttp, no guarding, no CVA tenderness  MSK: no visible deformities  Neuro: No focal sensory or motor deficits  Skin: Warm, well perfused, no rash  Psych: normal affect.   ~Antoni Strickland M.D. Resident

## 2021-03-20 NOTE — H&P ADULT - NSICDXPASTMEDICALHX_GEN_ALL_CORE_FT
PAST MEDICAL HISTORY:  Cirrhosis      PAST MEDICAL HISTORY:  Budd-Chiari syndrome     Cirrhosis     H/O protein S deficiency

## 2021-03-20 NOTE — H&P ADULT - PROBLEM SELECTOR PLAN 5
Large Moderate pleural effusion, likely in setting of hepatic hydrothorax  -IR guided thoracentesis when more stable  -O2 supplementation, wean down as tolerated Large Moderate pleural effusion, likely in setting of hepatic hydrothorax  -IR guided thoracentesis  -O2 supplementation, wean down as tolerated

## 2021-03-20 NOTE — ED PROVIDER NOTE - PROGRESS NOTE DETAILS
SVC/SMV ?PV? thrombus on CTs noted.  Heparin gtt started.  She remains hemodynamically stable.  Patient to be admitted.  --BMM

## 2021-03-20 NOTE — H&P ADULT - NSHPSOCIALHISTORY_GEN_ALL_CORE
Lives with    Non-smoker  Non-drinker  No recreational drug use  Previously able to do all iADLs/ADLs but largely bedbound for past 2 weeks due to COVID Lives with    5 Pregnancies: 1st pregnancy (delivered at 28 weeks), 2nd-3rd pregnancies spontaneous miscarriages at 8 and 16 weeks respectively, 4th-5th pregnancy advised to have elective abortions    Non-smoker  Non-drinker  No recreational drug use  Previously able to do all iADLs/ADLs but largely bedbound for past 2 weeks due to COVID Lives with   From [facility]  No recent travel hx  5 Pregnancies: 1st pregnancy (delivered at 28 weeks), 2nd-3rd pregnancies spontaneous miscarriages at 8 and 16 weeks respectively, 4th-5th pregnancy advised to have elective abortions    Non-smoker  Non-drinker  No recreational drug use  Previously able to do all iADLs/ADLs but largely bedbound for past 2 weeks due to COVID

## 2021-03-20 NOTE — H&P ADULT - ASSESSMENT
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

## 2021-03-20 NOTE — H&P ADULT - PROBLEM SELECTOR PLAN 2
Hypercoagulable in setting of Budd-Chiari syndrome, Protein S Deficiency, COVID-19, ? ANGELICA syndrome (hx of multiple miscarriages)  -No PE seen on CTA, hemodynamically stable outside of O2  -Not previously on AC  -C/w Heparin gtt  -Vascular Sx consulted, appreciate recs Hypercoagulable in setting of Budd-Chiari syndrome, Protein S Deficiency, COVID-19, ? ANGELICA syndrome (hx of multiple miscarriages)  -No PE seen on CTA, hemodynamically stable outside of O2  -Not previously on AC  -C/w Heparin gtt  -F/u Anticardiolipin, Beta2-glycoprotein, Lupus anticoagulant  -Vascular Sx consulted, appreciate recs

## 2021-03-20 NOTE — ED PROVIDER NOTE - OBJECTIVE STATEMENT
35yF h/o Cirrhosis (unsure of etiology), Covid+ (3/4) presents from urgent care for further evaluation due to worsening epigastric pain and abdominal distention of days duration. Patient also reports intermittent b/l chest pain but denies sob, n/v, urinary or bowel irregularities.

## 2021-03-20 NOTE — H&P ADULT - PROBLEM SELECTOR PLAN 7
-COVID positive on PCR, first positive  -Elevated inflammatory markers, D-Dimer downtrending  -Inflammatory markers q3 days  -Heparin gtt  -O2 supplementation as needed -COVID positive on PCR, first positive of 3/4  -Outside window for remdesivir, dexamethasone  -F/u COVID Antibodies  -Elevated inflammatory markers, D-Dimer downtrending  -Inflammatory markers q3 days  -Heparin gtt  -O2 supplementation

## 2021-03-20 NOTE — ED PROVIDER NOTE - NS ED ROS FT
GENERAL: No fever or chills, EYES: no change in vision, HEENT: no trouble swallowing or speaking, CARDIAC: +chest pain, PULMONARY: no cough or SOB, GI: +abdominal pain, no nausea, no vomiting, no diarrhea or constipation, : No changes in urination, SKIN: no rashes, NEURO: no headache,  MSK: No joint pain ~Antoni Strickland M.D. Resident

## 2021-03-20 NOTE — H&P ADULT - PROBLEM SELECTOR PLAN 3
MRI 12/7/2020 demonstrated an Enlarged heterogeneous cirrhotic liver with diffuse patchy enhancement and poor delineation of the hepatic veins suggesting underlying Budd-Chiari syndrome.  -Extensive portal hypertension with paraesophageal varices and splenorenal varices.  -Multiple small enhancing foci scattered in liver the largest appears represent a 1 cm lesion along the dome segment 8  -Not previously on AC due to esophageal varices, high bleeding risk  -Plan for cirrhosis/Budd chiari below  -Hepatology consulted, appreciate recs MRI 12/7/2020 demonstrated an Enlarged heterogeneous cirrhotic liver with diffuse patchy enhancement and poor delineation of the hepatic veins suggesting underlying Budd-Chiari syndrome.  -Extensive portal hypertension with paraesophageal varices and splenorenal varices.  -Multiple small enhancing foci scattered in liver the largest appears represent a 1 cm lesion along the dome segment 8  -Not previously on AC due to esophageal varices, high bleeding risk  -Plan for cirrhosis/Budd Chiari below  -Hepatology consulted, appreciate recs

## 2021-03-20 NOTE — H&P ADULT - PROBLEM SELECTOR PLAN 6
-S/p IV Ceftriaxone x 1  -Low volume ascites on CT A/P  -Concern for SBP given febrile (102-103 F) outpatient (could be 2/2 to COVID infection) with intermittent abdominal pain outpatient. However, afebrile, no WBC or abdominal pain on admission   -No previous taps, per patient  -Warrants diagnostic paracentesis, concerning for bleeding risk given elevated INR   -May empirically treat with Ceftriaxone 2 mg  -Hepatology following, appreciate recs -S/p IV Ceftriaxone x 1  -Low volume ascites on CT A/P  -Concern for SBP given febrile (102-103 F) outpatient (could be 2/2 to COVID infection) with intermittent abdominal pain outpatient. However, afebrile, no WBC or abdominal pain on admission   -No previous taps, per patient  -Warrants diagnostic paracentesis, concerning for bleeding risk given elevated INR   -May empirically treat with Zosyn for coverage for PNA if febrile  -IR consult for diagnostic paracentesis  -Hepatology following, appreciate recs

## 2021-03-20 NOTE — CONSULT NOTE ADULT - SUBJECTIVE AND OBJECTIVE BOX
HPI:    Patient is a 35 year old female with PMHx of cryptogenic cirrhosis, Budd Chiari syndrome, Protein S deficiency, COVID-19 infection who presents with complaints of chest painx 3 days and abdominal bloating and tightness for 5 days. Patient follows with Dr. Carvajal for outpatient Hepatology. Previous work up from outpatient hepatology: No immunity towards hepatitis B (although Hepatitis B Core IgG is +) suggesting past infection or possible false positive test. HBV DNA is negative. Hepatitis C antibody was negative. Mica-0-aimepqlhbkk level was 246 mg/dl. Autoimmune markers were negative for antimitochondrial antibody, anti-smooth muscle antibody, anti-nuclear antibody, Ceruloplasmin level 52 mg/dL. There is marked patchy heterogeneous enhancement morphology throughout the liver with poor delineation of the hepatic veins but mildly compressed patent intrahepatic IVC. Patient received CT angio showing. Thrombus in the suprarenal inferior vena cava. Vacular surgery consulted for thrombus in the superior mesenteric vein extending to the confluence with the splenic vein and possibly into the portal vein.      PAST MEDICAL & SURGICAL HISTORY:  H/O protein S deficiency    Budd-Chiari syndrome    Cirrhosis        MEDICATIONS  (STANDING):  heparin  Infusion.  Unit(s)/Hr (10 mL/Hr) IV Continuous <Continuous>    MEDICATIONS  (PRN):  heparin   Injectable 4000 Unit(s) IV Push every 6 hours PRN For aPTT less than 40  heparin   Injectable 2000 Unit(s) IV Push every 6 hours PRN For aPTT between 40 - 57      Allergies    No Known Allergies    Intolerances        SOCIAL HISTORY:    FAMILY HISTORY:  FHx: liver disease  Paternal uncle            Physical Exam:  General: NAD, resting comfortably  HEENT: NC/AT, EOMI, normal hearing, no oral lesions, no LAD, neck supple  Pulmonary: normal resp effort, CTA-B  Cardiovascular: NSR, no murmurs  Abdominal: soft, ND/NT, no organomegaly  Extremities: WWP, normal strength, no clubbing/cyanosis/edema  Neuro: A/O x 3, CNs II-XII grossly intact, normal sensation, no focal deficits  Pulses: palpable distal pulses    Vital Signs Last 24 Hrs  T(C): 36.5 (20 Mar 2021 06:32), Max: 36.8 (20 Mar 2021 00:58)  T(F): 97.7 (20 Mar 2021 06:32), Max: 98.3 (20 Mar 2021 00:58)  HR: 102 (20 Mar 2021 06:32) (88 - 109)  BP: 110/67 (20 Mar 2021 06:32) (110/67 - 122/67)  BP(mean): --  RR: 23 (20 Mar 2021 06:32) (16 - 23)  SpO2: 97% (20 Mar 2021 06:32) (91% - 98%)    I&O's Summary          LABS:                        11.5   5.23  )-----------( 145      ( 20 Mar 2021 09:27 )             37.3     03-20    136  |  103  |  10  ----------------------------<  90  4.1   |  21<L>  |  0.43<L>    Ca    8.7      20 Mar 2021 09:27  Phos  3.0     03-20  Mg     1.8     03-20    TPro  6.2  /  Alb  3.1<L>  /  TBili  2.1<H>  /  DBili  x   /  AST  28  /  ALT  16  /  AlkPhos  158<H>  03-20    PT/INR - ( 20 Mar 2021 09:28 )   PT: 17.3 sec;   INR: 1.47 ratio         PTT - ( 20 Mar 2021 09:28 )  PTT:>200.0 sec  Urinalysis Basic - ( 20 Mar 2021 03:21 )    Color: Yellow / Appearance: Clear / S.050 / pH: x  Gluc: x / Ketone: Small  / Bili: Negative / Urobili: 2 mg/dL   Blood: x / Protein: Trace / Nitrite: Negative   Leuk Esterase: Negative / RBC: 1 /hpf / WBC 1 /HPF   Sq Epi: x / Non Sq Epi: 2 /hpf / Bacteria: Negative      CAPILLARY BLOOD GLUCOSE        LIVER FUNCTIONS - ( 20 Mar 2021 09:27 )  Alb: 3.1 g/dL / Pro: 6.2 g/dL / ALK PHOS: 158 U/L / ALT: 16 U/L / AST: 28 U/L / GGT: x             Cultures:      RADIOLOGY & ADDITIONAL STUDIES:              < from: CT Abdomen and Pelvis w/ IV Cont (21 @ 02:41) >  INTERPRETATION:  CLINICAL INFORMATION: Cough. Epigastric pain and abdominal distention for several days. History of cirrhosis. Concern for portal vein thrombosis.    COMPARISON: None.    CONTRAST/COMPLICATIONS:  IV Contrast: Omnipaque 350  90 cc administered   10 cc discarded  Oral Contrast: NONE  Complications: None reported at time of study completion    PROCEDURE:  CT Angiography of the Chest was performed followed by portal venous phase imaging of the Abdomen and Pelvis.  Sagittal and coronal reformats were performed as well as 3D (MIP) reconstructions.    FINDINGS:  CHEST:  LUNGS AND LARGE AIRWAYS: Patent central airways. Near complete atelectasis of the right lower lobe and partial atelectasis of the right middle lobe. Left lower lobe subsegmental atelectasis. Small patchy airspace opacities in bilateral upper and left lower lobes likely infectious in etiology.  PLEURA: Moderate to large right pleural effusion.  VESSELS: No pulmonary embolism. Enlargement of the azygos vein.  HEART: Heart size is normal. No pericardial effusion.  MEDIASTINUM AND CHRISTA: No lymphadenopathy.  CHEST WALL AND LOWER NECK: Within normal limits.    ABDOMEN AND PELVIS:  LIVER: Cirrhotic. Scattered areas of decreased enhancement throughout the liver possibly due to transient hepatic attenuation difference.  BILE DUCTS: Normal caliber.  GALLBLADDER: Diffuse wall thickening likely secondary to cirrhosis.  SPLEEN: Splenomegaly.  PANCREAS: Within normal limits.  ADRENALS: Within normal limits.  KIDNEYS/URETERS: Within normal limits.    BLADDER: Within normal limits.  REPRODUCTIVE ORGANS: Uterus and ovaries within normal limits.    BOWEL: No bowel obstruction or inflammation. Appendix is normal.  PERITONEUM: Small to moderate volume abdominal and pelvic ascites.  VESSELS: Thrombus in the suprarenal inferior vena cava. Thrombus in the superior mesenteric vein extending to the confluence with the splenic vein and possibly into the portal vein. Recanalization of the umbilical vein. Umbilical and splenic varices.  RETROPERITONEUM/LYMPH NODES: No lymphadenopathy.  ABDOMINAL WALL: Within normal limits.  BONES: Within normal limits.    IMPRESSION:  1. No pulmonary embolism.  2. Moderate to large right pleural effusion with adjacent compressive atelectasis including near complete atelectasis of the right lower lobe and partial atelectasis of the right middle lobe.  3. Small patchy airspace opacities in bilateral upper and left lower lobes likely infectious in etiology.  4. Thrombus in the suprarenal inferior vena cava. Thrombus in the superior mesenteric vein extending to the confluence with the splenic vein and possibly into the portal vein.  5. Cirrhosis with portal hypertension.  6. Small to moderate volume abdominal and pelvic ascites.    < end of copied text >

## 2021-03-20 NOTE — ED ADULT NURSE NOTE - OBJECTIVE STATEMENT
35yF h/o Cirrhosis (unsure of etiology), Covid+ (3/4) presents from urgent care for further evaluation due to worsening epigastric pain and abdominal distention of days duration. Patient also reports intermittent b/l chest pain but denies sob, n/v, urinary or bowel irregularities. Pt is AOx4, patent airway, hypoxic on RA to 91% placed on 2L NC saturating at 97%, soft tender epigastric region, skin is warm dry and intact, ambulates independently.

## 2021-03-20 NOTE — H&P ADULT - NSHPPHYSICALEXAM_GEN_ALL_CORE
Vital Signs Last 24 Hrs  T(C): 36.5 (20 Mar 2021 06:32), Max: 36.8 (20 Mar 2021 00:58)  T(F): 97.7 (20 Mar 2021 06:32), Max: 98.3 (20 Mar 2021 00:58)  HR: 102 (20 Mar 2021 06:32) (88 - 109)  BP: 110/67 (20 Mar 2021 06:32) (110/67 - 122/67)  BP(mean): --  RR: 23 (20 Mar 2021 06:32) (16 - 23)  SpO2: 97% (20 Mar 2021 06:32) (91% - 98%)    PHYSICAL EXAM:  GENERAL: NAD, well-groomed, well-developed young woman, on NC, able to speak in full sentences  HEAD: Atraumatic, Normocephalic  EYES: EOMI, PERRL, pink conjunctiva and sclera clear, no evidence of scleral icterus  ENMT: No tonsillar erythema, exudates, or enlargement; Moist mucous membranes  CHEST/LUNG: Decreased breath sounds on RML and RLL with dullness to percussion; No wheezes or rubs  HEART: Tachycardic S1 and S2; No murmurs, rubs, or gallops  ABDOMEN: Soft, Mildly distended, Non distended, Nondistended: Bowel sounds present  EXTREMITIES: 2+ Peripheral Pulses, No clubbing, cyanosis, or edema  LYMPH: No lymphadenopathy noted  SKIN: No rashes or lesions  MSK: no joint swelling or erythema  NEURO: CN 2-12 intact, 5/5 strength in UE and LE, gross sensatation intact, RACHELLE intact, Normal gait Vital Signs Last 24 Hrs  T(C): 36.5 (20 Mar 2021 06:32), Max: 36.8 (20 Mar 2021 00:58)  T(F): 97.7 (20 Mar 2021 06:32), Max: 98.3 (20 Mar 2021 00:58)  HR: 102 (20 Mar 2021 06:32) (88 - 109)  BP: 110/67 (20 Mar 2021 06:32) (110/67 - 122/67)  BP(mean): --  RR: 23 (20 Mar 2021 06:32) (16 - 23)  SpO2: 97% (20 Mar 2021 06:32) (91% - 98%)    PHYSICAL EXAM:  GENERAL: NAD, well-groomed, well-developed young woman, on NC, able to speak in full sentences  HEAD: Atraumatic, Normocephalic  EYES: EOMI, PERRL, pink conjunctiva and sclera clear, no evidence of scleral icterus  ENMT: No tonsillar erythema, exudates, or enlargement; Moist mucous membranes  CHEST/LUNG: CP Non-reproducible on palpation. Decreased breath sounds on RML and RLL with dullness to percussion; No wheezes or rubs  HEART: Tachycardic S1 and S2; No murmurs, rubs, or gallops  ABDOMEN: Soft, Distended, Non tender, +Hepatosplenomegaly Nondistended: Bowel sounds present  EXTREMITIES: 2+ Peripheral Pulses, No clubbing, cyanosis, or edema  LYMPH: No lymphadenopathy noted  SKIN: No rashes or lesions  MSK: no joint swelling or erythema  NEURO: CN 2-12 intact, 5/5 strength in UE and LE, gross sensatation intact, RACHELLE intact, Normal gait

## 2021-03-20 NOTE — CONSULT NOTE ADULT - ASSESSMENT
Patient is a 35 year old female with PMHx of cryptogenic cirrhosis, Budd Chiari syndrome, Protein S deficiency, COVID-19 infection who presents with complaints of chest painx 3 days and abdominal bloating and tightness for 5 day. Vacular surgery consulted for thrombus in the superior mesenteric vein extending to the confluence with the splenic vein and possibly into the portal vein.    - No acute vascular surgery intervention at this time  - Recommend hepatology consult (outpatient hepatologist following)  - Recommend transplant surgery consult  - Please start Heparin gtt  - Discussed with fellow    [id number]

## 2021-03-20 NOTE — H&P ADULT - PROBLEM SELECTOR PLAN 8
Diet: Regular diet  DVT: Heparin gtt  Dispo: Pending clinical course, GOC conversation ongoing, FULL CODE

## 2021-03-20 NOTE — H&P ADULT - NSHPREVIEWOFSYSTEMS_GEN_ALL_CORE
REVIEW OF SYSTEMS:    CONSTITUTIONAL: No weakness, fevers, night sweats, fatigue/malaise, chills, weight loss, loss of appetite  HEENT: No headache, visual changes, hearing changes, dysphagia or odynophagia    NECK: No neck pain or stiffness  RESPIRATORY: No SOB, cough, wheezing, hemoptysis   CARDIOVASCULAR: No chest pain, palpitations, orthopnea  GASTROINTESTINAL: No abdominal pain, nausea, vomiting, hematemesis, diarrhea or constipation. No melena or hematochezia.  GENITOURINARY: No dysuria, frequency or hematuria  NEUROLOGICAL: No numbness, weakness, paresthesias  MSK: No joint swelling or pain  HEME: No easy bruising, bleeding or LAD  SKIN: No itching, rashes  PSYCH: No mood changes, no SI/HI REVIEW OF SYSTEMS:    CONSTITUTIONAL: +Weakness, fatigue, fevers, loss of appetite. No night sweats, weight loss  HEENT: No headache, visual changes, hearing changes, dysphagia or odynophagia    NECK: No neck pain or stiffness  RESPIRATORY: +Dyspnea on exertion on minimal exertion (walking a few steps), non-productive cough. No SOB at rest, wheezing, hemoptysis   CARDIOVASCULAR: +Non-reproducible chest pain, palpitations, orthopnea. No PND.   GASTROINTESTINAL: +Abdominal discomfort, nausea, NBNB vomiting, diarrhea. No abdominal pain, hematemesis, constipation, melena or hematochezia.  GENITOURINARY: No dysuria, frequency or hematuria  NEUROLOGICAL: No numbness, weakness, paresthesias  MSK: No joint swelling or pain  HEME: No easy bruising, bleeding or LAD  SKIN: No itching, rashes  PSYCH: No mood changes, no SI/HI

## 2021-03-20 NOTE — H&P ADULT - PROBLEM SELECTOR PLAN 1
2/2 to Moderate pleural effusion, COVID-19 infection  -Plan for mod pleural effusion, COVID-19 infection below  -on 2 L NC, wean down as tolerated  -ECG Sinus tachy without evidence of ischemia  -Trops downtrending on repeat  -TTE when stable given hx of orthopnea, chest pain 2/2 to Moderate pleural effusion, COVID-19 infection, possible underlying PNA  -Plan for mod pleural effusion, COVID-19 infection below  -on 2 L NC, wean down as tolerated  -ECG Sinus tachy without evidence of ischemia  -Can start Zosyn for coverage for PNA and SBP  -Trops <6  -TTE when stable given hx of orthopnea, chest pain

## 2021-03-20 NOTE — H&P ADULT - PROBLEM SELECTOR PLAN 4
-Hx of crytogenic cirrhosis and Budd Chiari, followed by Dr. Carvajal outpatient  -MELD-Na 16, up from 10 outpatient   -MRI showing Budd Chiari, paraesophageal varices and splenorenal varices with multiple foci in liver  -Outpatient Hepatitis workup inconclusive: Hx of Hep A as a child, Hep B Core Antibody Positive but Hep B DNA neg. Otherwise neg  -Alpha1-Antitrypsin, Ceroplasmin neg  -CT A/P showing cirrhosis  -Being worked up for liver transplantation outpatient  -Hepatology/Transplant Hepatology consulted, appreciate recs -No Hx of HE, no hx of variceal bleed, no hx of SBP  -Hx of crytogenic cirrhosis and Budd Chiari, followed by Dr. Carvajal outpatient  -MELD-Na 16, up from 10 outpatient   -MRI showing Budd Chiari, paraesophageal varices and splenorenal varices with multiple foci in liver  -Outpatient Hepatitis workup inconclusive: Hx of Hep A as a child, Hep B Core Antibody Positive but Hep B DNA neg. Otherwise neg  -Alpha1-Antitrypsin, Ceroplasmin neg  -CT A/P showing cirrhosis  -Being worked up for liver transplantation outpatient  -Hepatology/Transplant Hepatology consulted, appreciate recs -No Hx of HE, no hx of variceal bleed, no hx of SBP  -Hx of crytogenic cirrhosis and Budd Chiari, followed by Dr. Carvajal outpatient  -MELD-Na 16, up from 10 outpatient   -MRI showing Budd Chiari, paraesophageal varices and splenorenal varices with multiple foci in liver  -Outpatient Hepatitis workup inconclusive: Hx of Hep A as a child, Hep B Core Antibody Positive but Hep B DNA neg. Otherwise neg  -Alpha1-Antitrypsin, Ceroplasmin neg  -CT A/P showing cirrhosis with small-moderate ascites  -Being worked up for liver transplantation outpatient  -Hepatology/Transplant Hepatology consulted, appreciate recs

## 2021-03-20 NOTE — H&P ADULT - HISTORY OF PRESENT ILLNESS
Previous work up from outpatient hepatology: Labs reviewed from the last clinic visit ( results appended). She has no immunity towards hepatitis B (although Hepatitis B Core IgG is +) suggesting past infection or possible false positive test. HBV DNA is negative. I will recommend vaccination for hepatitis B.    Hepatitis C antibody was negative. Lsrm-7-bhyuepidzek level was 246 mg/dl. Autoimmune markers were negative for antimitochondrial antibody, anti-smooth muscle antibody, anti-nuclear antibody,  Ceruloplasmin level 52 mg/dL.    ere is marked patchy heterogeneous enhancement morphology throughout the liver with poor delineation of the hepatic veins but mildly compressed patent intrahepatic IVC.  The findings suggest seen in Budd-Chiari syndrome   Patient is a 35 year old female with PMHx of cryptogenic cirrhosis, Budd Chiari syndrome, Protein S deficiency, COVID-19 infection who presents with complaints of chest painx 3 days and abdominal bloating and tightness for 5 days.     Patient follows with Dr. Carvajal for outpatient Hepatology. Previous work up from outpatient hepatology: No immunity towards hepatitis B (although Hepatitis B Core IgG is +) suggesting past infection or possible false positive test. HBV DNA is negative. Hepatitis C antibody was negative. Qrws-3-sscwevedcmd level was 246 mg/dl. Autoimmune markers were negative for antimitochondrial antibody, anti-smooth muscle antibody, anti-nuclear antibody, Ceruloplasmin level 52 mg/dL.    ere is marked patchy heterogeneous enhancement morphology throughout the liver with poor delineation of the hepatic veins but mildly compressed patent intrahepatic IVC.  The findings suggest seen in Budd-Chiari syndrome   Patient is a 35 year old female with PMHx of cryptogenic cirrhosis, Budd Chiari syndrome, Protein S deficiency, COVID-19 infection who presents with complaints of chest painx 3 days and abdominal bloating and tightness for 5 days. Chest pain described as chest tightness, across the chest with radiation to left side, worsens with changes in positions and non-productive cough. Denies abdominal pain but has abdominal discomfort with worsening distension. Per patient, she was diagnosed with COVID on 3/4 and since then has had high fevers (102-103F), dyspnea on minimal exertion (previously able to ambulate normally-several blocks and stairs without SOB).      Patient follows with Dr. Carvajal for outpatient Hepatology and Dr. Venegas for Hematology. Previous work up from outpatient hepatology: No immunity towards hepatitis B (although Hepatitis B Core IgG is +) suggesting past infection or possible false positive test. HBV DNA is negative. Hepatitis C antibody was negative. Xjfb-4-vurinvoqsue level was 246 mg/dl. Autoimmune markers were negative for antimitochondrial antibody, anti-smooth muscle antibody, anti-nuclear antibody, Ceruloplasmin level 52 mg/dL. Factor V Leiden neg, Ivan 2 Neg, Prothrombin gene neg. MRI abdomen showing heterogenous liver with branches of hepatic vein. EGD 6/2017 Grade A esophageal varices, gastropathy. Abdomen U/S for cirrhosis, mild splenomegaly, trace ascites.   In ED, vitals 97.7, tachycardic to 102, 110/67, tachpneic to 23, Satting 91% on RA--> 97% on 2L NC. S/p 2 g Ceftriaxone, NS 1 L bolus.    Patient is a 35 year old female with PMHx of cryptogenic cirrhosis, Budd Chiari syndrome, Protein S deficiency, COVID-19 infection who presents with complaints of chest painx 3 days and abdominal bloating and tightness for 5 days. Chest pain described as chest tightness, across the chest with radiation to left side, worsens with changes in positions and non-productive cough. Denies abdominal pain but has abdominal discomfort with worsening distension. Per patient, she was diagnosed with COVID on 3/4 and since then has had high fevers (102-103F), dyspnea on minimal exertion (previously able to ambulate normally-several blocks and stairs without SOB). Chest pain worsened to 9/10, more constant, prompting her to come into the ED.     Patient follows with Dr. Carvajal for outpatient Hepatology and Dr. Venegas for Hematology. Previous work up from outpatient hepatology: No immunity towards hepatitis B (although Hepatitis B Core IgG is +) suggesting past infection or possible false positive test. HBV DNA is negative. Hepatitis C antibody was negative. Rlye-5-wtaelvtoqcz level was 246 mg/dl. Autoimmune markers were negative for antimitochondrial antibody, anti-smooth muscle antibody, anti-nuclear antibody, Ceruloplasmin level 52 mg/dL. Factor V Leiden neg, Ivan 2 Neg, Prothrombin gene neg. MRI abdomen showing heterogenous liver with branches of hepatic vein. EGD 6/2017 Grade A esophageal varices, gastropathy. Abdomen U/S for cirrhosis, mild splenomegaly, trace ascites.   In ED, vitals 97.7, tachycardic to 102, 110/67, tachpneic to 23, Satting 91% on RA--> 97% on 2L NC. S/p 2 g Ceftriaxone, NS 1 L bolus.

## 2021-03-20 NOTE — CONSULT NOTE ADULT - ATTENDING COMMENTS
Full consult as above; discussed with surgery resident and vascular  fellow.   She has Budd-Chiari syndrome with an IVC thrombosis. Initially she had abdominal pain with distention. The pain is resolved. She is on anticoagulation. She has signs and symptoms of liver failure. She does not require any vascular intervention. She should be evaluated by the transplant team for potentially a liver transplant.

## 2021-03-21 ENCOUNTER — TRANSCRIPTION ENCOUNTER (OUTPATIENT)
Age: 36
End: 2021-03-21

## 2021-03-21 LAB
ALBUMIN SERPL ELPH-MCNC: 3 G/DL — LOW (ref 3.3–5)
ALP SERPL-CCNC: 149 U/L — HIGH (ref 40–120)
ALT FLD-CCNC: 15 U/L — SIGNIFICANT CHANGE UP (ref 10–45)
ANION GAP SERPL CALC-SCNC: 13 MMOL/L — SIGNIFICANT CHANGE UP (ref 5–17)
APTT BLD: 89.4 SEC — HIGH (ref 27.5–35.5)
APTT BLD: 97.9 SEC — HIGH (ref 27.5–35.5)
AST SERPL-CCNC: 24 U/L — SIGNIFICANT CHANGE UP (ref 10–40)
BILIRUB SERPL-MCNC: 1.9 MG/DL — HIGH (ref 0.2–1.2)
BUN SERPL-MCNC: 15 MG/DL — SIGNIFICANT CHANGE UP (ref 7–23)
CALCIUM SERPL-MCNC: 8.4 MG/DL — SIGNIFICANT CHANGE UP (ref 8.4–10.5)
CHLORIDE SERPL-SCNC: 104 MMOL/L — SIGNIFICANT CHANGE UP (ref 96–108)
CO2 SERPL-SCNC: 22 MMOL/L — SIGNIFICANT CHANGE UP (ref 22–31)
CREAT SERPL-MCNC: 0.46 MG/DL — LOW (ref 0.5–1.3)
GLUCOSE SERPL-MCNC: 68 MG/DL — LOW (ref 70–99)
HAV IGM SER-ACNC: SIGNIFICANT CHANGE UP
HBV CORE IGM SER-ACNC: REACTIVE
HBV SURFACE AG SER-ACNC: SIGNIFICANT CHANGE UP
HCT VFR BLD CALC: 36.5 % — SIGNIFICANT CHANGE UP (ref 34.5–45)
HCV AB S/CO SERPL IA: 0.17 S/CO — SIGNIFICANT CHANGE UP (ref 0–0.99)
HCV AB SERPL-IMP: SIGNIFICANT CHANGE UP
HEPARINASE TEG R TIME: 11.8 MIN (ref 4.3–8.3)
HGB BLD-MCNC: 11.5 G/DL — SIGNIFICANT CHANGE UP (ref 11.5–15.5)
INR BLD: 1.31 RATIO — HIGH (ref 0.88–1.16)
MAGNESIUM SERPL-MCNC: 2.1 MG/DL — SIGNIFICANT CHANGE UP (ref 1.6–2.6)
MCHC RBC-ENTMCNC: 27.8 PG — SIGNIFICANT CHANGE UP (ref 27–34)
MCHC RBC-ENTMCNC: 31.5 GM/DL — LOW (ref 32–36)
MCV RBC AUTO: 88.2 FL — SIGNIFICANT CHANGE UP (ref 80–100)
NRBC # BLD: 0 /100 WBCS — SIGNIFICANT CHANGE UP (ref 0–0)
PHOSPHATE SERPL-MCNC: 3.1 MG/DL — SIGNIFICANT CHANGE UP (ref 2.5–4.5)
PLATELET # BLD AUTO: 144 K/UL — LOW (ref 150–400)
POTASSIUM SERPL-MCNC: 4.1 MMOL/L — SIGNIFICANT CHANGE UP (ref 3.5–5.3)
POTASSIUM SERPL-SCNC: 4.1 MMOL/L — SIGNIFICANT CHANGE UP (ref 3.5–5.3)
PROCALCITONIN SERPL-MCNC: 0.11 NG/ML — HIGH (ref 0.02–0.1)
PROT SERPL-MCNC: 6.2 G/DL — SIGNIFICANT CHANGE UP (ref 6–8.3)
PROTHROM AB SERPL-ACNC: 15.5 SEC — HIGH (ref 10.6–13.6)
RAPIDTEG MAXIMUM AMPLITUDE: 63.5 MM — SIGNIFICANT CHANGE UP (ref 52–70)
RBC # BLD: 4.14 M/UL — SIGNIFICANT CHANGE UP (ref 3.8–5.2)
RBC # FLD: 14.7 % — HIGH (ref 10.3–14.5)
SODIUM SERPL-SCNC: 139 MMOL/L — SIGNIFICANT CHANGE UP (ref 135–145)
TEG FUNCTIONAL FIBRINOGEN: 20.1 MM — SIGNIFICANT CHANGE UP (ref 15–32)
TEG MAXIMUM AMPLITUDE: 58.5 MM — SIGNIFICANT CHANGE UP (ref 52–69)
TEG REACTION TIME: >17 MIN (ref 4.6–9.1)
WBC # BLD: 3.72 K/UL — LOW (ref 3.8–10.5)
WBC # FLD AUTO: 3.72 K/UL — LOW (ref 3.8–10.5)

## 2021-03-21 PROCEDURE — 99223 1ST HOSP IP/OBS HIGH 75: CPT | Mod: GC

## 2021-03-21 PROCEDURE — 99233 SBSQ HOSP IP/OBS HIGH 50: CPT | Mod: GC

## 2021-03-21 PROCEDURE — ZZZZZ: CPT

## 2021-03-21 RX ORDER — LANOLIN ALCOHOL/MO/W.PET/CERES
3 CREAM (GRAM) TOPICAL AT BEDTIME
Refills: 0 | Status: DISCONTINUED | OUTPATIENT
Start: 2021-03-21 | End: 2021-03-25

## 2021-03-21 RX ORDER — SPIRONOLACTONE 25 MG/1
50 TABLET, FILM COATED ORAL DAILY
Refills: 0 | Status: DISCONTINUED | OUTPATIENT
Start: 2021-03-21 | End: 2021-03-22

## 2021-03-21 RX ORDER — SPIRONOLACTONE 25 MG/1
100 TABLET, FILM COATED ORAL DAILY
Refills: 0 | Status: DISCONTINUED | OUTPATIENT
Start: 2021-03-21 | End: 2021-03-21

## 2021-03-21 RX ORDER — FUROSEMIDE 40 MG
40 TABLET ORAL DAILY
Refills: 0 | Status: DISCONTINUED | OUTPATIENT
Start: 2021-03-21 | End: 2021-03-21

## 2021-03-21 RX ORDER — FUROSEMIDE 40 MG
20 TABLET ORAL DAILY
Refills: 0 | Status: DISCONTINUED | OUTPATIENT
Start: 2021-03-21 | End: 2021-03-21

## 2021-03-21 RX ORDER — SPIRONOLACTONE 25 MG/1
25 TABLET, FILM COATED ORAL DAILY
Refills: 0 | Status: DISCONTINUED | OUTPATIENT
Start: 2021-03-21 | End: 2021-03-21

## 2021-03-21 RX ORDER — OXYCODONE AND ACETAMINOPHEN 5; 325 MG/1; MG/1
1 TABLET ORAL EVERY 8 HOURS
Refills: 0 | Status: DISCONTINUED | OUTPATIENT
Start: 2021-03-21 | End: 2021-03-25

## 2021-03-21 RX ORDER — LIDOCAINE 4 G/100G
1 CREAM TOPICAL DAILY
Refills: 0 | Status: DISCONTINUED | OUTPATIENT
Start: 2021-03-21 | End: 2021-03-21

## 2021-03-21 RX ORDER — ACETAMINOPHEN 500 MG
650 TABLET ORAL EVERY 8 HOURS
Refills: 0 | Status: DISCONTINUED | OUTPATIENT
Start: 2021-03-21 | End: 2021-03-25

## 2021-03-21 RX ORDER — FUROSEMIDE 40 MG
20 TABLET ORAL DAILY
Refills: 0 | Status: DISCONTINUED | OUTPATIENT
Start: 2021-03-21 | End: 2021-03-22

## 2021-03-21 RX ORDER — LIDOCAINE 4 G/100G
1 CREAM TOPICAL DAILY
Refills: 0 | Status: DISCONTINUED | OUTPATIENT
Start: 2021-03-21 | End: 2021-03-25

## 2021-03-21 RX ORDER — SPIRONOLACTONE 25 MG/1
50 TABLET, FILM COATED ORAL DAILY
Refills: 0 | Status: DISCONTINUED | OUTPATIENT
Start: 2021-03-21 | End: 2021-03-21

## 2021-03-21 RX ADMIN — HEPARIN SODIUM 700 UNIT(S)/HR: 5000 INJECTION INTRAVENOUS; SUBCUTANEOUS at 01:45

## 2021-03-21 RX ADMIN — HEPARIN SODIUM 700 UNIT(S)/HR: 5000 INJECTION INTRAVENOUS; SUBCUTANEOUS at 12:37

## 2021-03-21 RX ADMIN — SPIRONOLACTONE 50 MILLIGRAM(S): 25 TABLET, FILM COATED ORAL at 12:48

## 2021-03-21 RX ADMIN — PANTOPRAZOLE SODIUM 40 MILLIGRAM(S): 20 TABLET, DELAYED RELEASE ORAL at 05:41

## 2021-03-21 RX ADMIN — PANTOPRAZOLE SODIUM 40 MILLIGRAM(S): 20 TABLET, DELAYED RELEASE ORAL at 17:39

## 2021-03-21 RX ADMIN — Medication 20 MILLIGRAM(S): at 12:48

## 2021-03-21 NOTE — DISCHARGE NOTE PROVIDER - DETAILS OF MALNUTRITION DIAGNOSIS/DIAGNOSES
This patient has been assessed with a concern for Malnutrition and was treated during this hospitalization for the following Nutrition diagnosis/diagnoses:     -  03/23/2021: Mild protein-calorie malnutrition   -  03/23/2021: Underweight (BMI < 19)   This patient has been assessed with a concern for Malnutrition and was treated during this hospitalization for the following Nutrition diagnosis/diagnoses:     -  03/23/2021: Mild protein-calorie malnutrition   -  03/23/2021: Underweight (BMI < 19)    This patient has been assessed with a concern for Malnutrition and was treated during this hospitalization for the following Nutrition diagnosis/diagnoses:     -  03/23/2021: Mild protein-calorie malnutrition   -  03/23/2021: Underweight (BMI < 19)   This patient has been assessed with a concern for Malnutrition and was treated during this hospitalization for the following Nutrition diagnosis/diagnoses:     -  03/23/2021: Mild protein-calorie malnutrition   -  03/23/2021: Underweight (BMI < 19)    This patient has been assessed with a concern for Malnutrition and was treated during this hospitalization for the following Nutrition diagnosis/diagnoses:     -  03/23/2021: Mild protein-calorie malnutrition   -  03/23/2021: Underweight (BMI < 19)    This patient has been assessed with a concern for Malnutrition and was treated during this hospitalization for the following Nutrition diagnosis/diagnoses:     -  03/23/2021: Mild protein-calorie malnutrition   -  03/23/2021: Underweight (BMI < 19)   This patient has been assessed with a concern for Malnutrition and was treated during this hospitalization for the following Nutrition diagnosis/diagnoses:     -  03/23/2021: Mild protein-calorie malnutrition   -  03/23/2021: Underweight (BMI < 19)    This patient has been assessed with a concern for Malnutrition and was treated during this hospitalization for the following Nutrition diagnosis/diagnoses:     -  03/23/2021: Mild protein-calorie malnutrition   -  03/23/2021: Underweight (BMI < 19)    This patient has been assessed with a concern for Malnutrition and was treated during this hospitalization for the following Nutrition diagnosis/diagnoses:     -  03/23/2021: Mild protein-calorie malnutrition   -  03/23/2021: Underweight (BMI < 19)    This patient has been assessed with a concern for Malnutrition and was treated during this hospitalization for the following Nutrition diagnosis/diagnoses:     -  03/23/2021: Mild protein-calorie malnutrition   -  03/23/2021: Underweight (BMI < 19)

## 2021-03-21 NOTE — DISCHARGE NOTE PROVIDER - NSDCCPCAREPLAN_GEN_ALL_CORE_FT
PRINCIPAL DISCHARGE DIAGNOSIS  Diagnosis: Venous thrombosis  Assessment and Plan of Treatment: You came to the hosptal with stomach and chest pain and were found to have clots in the large vein that leads to your heart as well as a second vein in your stomach. This was likely the source of your pain. You were started on a blood thinner and were seen by the hematology (blood) team who recommended_____________. Please follow up with the hematology team within one to two weeks of discharge. If you start to feel short of breath, very light headed, or dizzy for a prolonged period of time, please  return to the emergency room.      SECONDARY DISCHARGE DIAGNOSES  Diagnosis: Pleural effusion  Assessment and Plan of Treatment: When you came to the hospital, you were found to have a large pleural effusion, fluid around the lung. The interventional radiology team drained the fluid which showed______?    Diagnosis: Cirrhosis  Assessment and Plan of Treatment: You came to the hospital with a history of cirrhosis and were evaluated by the hepatology team who stated that__________?    Diagnosis: COVID-19  Assessment and Plan of Treatment:      PRINCIPAL DISCHARGE DIAGNOSIS  Diagnosis: Venous thrombosis  Assessment and Plan of Treatment: You came to the hospital with stomach and chest pain and were found to have clots in the large vein that leads to your heart as well as a second vein in your stomach. This was likely the source of your pain. You were started on a blood thinner and were seen by the hematology (blood) team who recommended_____________.   Please follow up with the hematology team within one to two weeks of discharge. If you start to feel short of breath, very light headed, or dizzy for a prolonged period of time, please return to the emergency room immediately.      SECONDARY DISCHARGE DIAGNOSES  Diagnosis: Cirrhosis  Assessment and Plan of Treatment: You came to the hospital with a history of cirrhosis and were evaluated by the hepatology team who stated that__________?    Diagnosis: Pleural effusion  Assessment and Plan of Treatment: When you came to the hospital, you were found to have a large pleural effusion, fluid around the lung. The interventional radiology team drained the fluid which showed______?    Diagnosis: COVID-19  Assessment and Plan of Treatment:      PRINCIPAL DISCHARGE DIAGNOSIS  Diagnosis: Venous thrombosis  Assessment and Plan of Treatment: You came to the hospital with stomach and chest pain and were found to have clots in the large vein that leads to your heart as well as a second vein in your stomach. This was likely the source of your pain. You were started on a blood thinner and were seen by the hematology (blood) team who recommended discharge on medication, Xarelto, a blood thinner to treat this large clot. You were initially on an IV blood thinner called Heparin in the hospital. After your lab studies resulted negative for Antiphospholipid Syndrome, this medication was stopped and your oral blood thinner was started.  Please follow up with the hematology team, Dr. Venegas, within one to two weeks of discharge. If you start to feel short of breath, very light headed, or dizzy for a prolonged period of time, please return to the emergency room immediately. It was recommended to repeat an ultrasound study on 3/28 or 3/29 outpatient to access the clot again after initiation of medicine.      SECONDARY DISCHARGE DIAGNOSES  Diagnosis: Cirrhosis  Assessment and Plan of Treatment: You came to the hospital with a history of cirrhosis and were evaluated by the hepatology team who recommended lasix and spirinolactone medications to increase your urine output and slow reoccurance of fluid build up in your belly (ascites) and lungs (pleural effusions). You had fluid drained from both sites (lung = thoracentesis, abdomen = paracentesis) and studies were sent on this fluid that was negative for infection or malignancy. It is important that you follow closely with the hepatologist outpatient to ensure proper management of your condition. Cirrhosis is end-stage liver disease, and you should continue to work with your outpatient doctors for evaluation for liver transplant.    Diagnosis: Pleural effusion  Assessment and Plan of Treatment: When you came to the hospital, you were found to have a large pleural effusion, fluid around the lung. The interventional radiology team drained the fluid which helped improve your shortness of breath. This fluid likely accumulated secondary to your liver disease, and may require recurrent drainage. It is important to follow up outpatient closely with your doctors to access for reoccurance. If you become extremely short of breath with deep, slow breaths outpatient and become dizziness, lightheaded, "air hungry" like you can not catch your breath, please call your doctor or come to the hospital for evaluation.    Diagnosis: Ascites  Assessment and Plan of Treatment: You were found to have fluid in your belly that collected secondary to your liver disease. You have this fluid collection drained inpatient and your abdomenal distention largely resolved. This fluid was sent for evaluation and was negative for infection or cancer. Given your cirrhosis, it is likely this fluid will collect again in your abdomen and you will require routine drains outpatient. This will be decided upon evaluation outpatient / follow up with your liver doctor (hepatologist)

## 2021-03-21 NOTE — CONSULT NOTE ADULT - ASSESSMENT
35F w/ cryptogenic cirrhosis decompensated by ascites and small non-bleeding varices, Budd-Chiari syndrome, hypercoagulable disorder with multiple clots and multiple spontaneous abortions, recent dx of COVID-19 PNA, presenting w/ chest tightness, dyspnea, and abd distension, found to have clot in IVC and portal confluence.    Impression:  #Decompensated cirrhosis  - varices: small, short column on last EGD 2017, not on beta blockers due to small size and low BP  - ascites: history of ascites, previously resolved, recurrent likely due to PV thrombus  - HE: none  - HCC: LIRADS 4 and LIRADS 3 lesion seen on MRI 12/2020, stable from prior, AFP wnl 2/2021  - MELD-Na = 16 on 3/20/2020  #IVC thrombus  #Portal confluence thrombus  #Hx of hypercoagulable disorder  #COVID-19 PNA - on nasal cannula     Recommendations:  - Agree w/ hep gtt for thrombi  - Hematology consult for long-term A/C recommendations in this patient w/ hypercoagulable disorder  - Recommend diagnostic paracentesis, please send cell count, total protein, albumin  - Can start lasix 40mg PO daily and spironolactone 100mg daily, trend Cr/lytes closely  - Trend CBC, CMP, INR daily    Kane Orellana  Gastroenterology Fellow  NON-URGENT CONSULTS:  Please email [email or web address]  AT NIGHT AND ON WEEKENDS:  Contact on-call GI fellow via answering service ([phone]) from 5pm-8am and on weekends/holidays  MONDAY-FRIDAY 8AM-5PM:  Available via [employer] Teams  Call [phone] ([facility]) or Page [phone] ([facility]) from 8am-5pm M-F     35F w/ cryptogenic cirrhosis decompensated by ascites and small non-bleeding varices, Budd-Chiari syndrome, hypercoagulable disorder with multiple clots and multiple spontaneous abortions, recent dx of COVID-19 PNA, presenting w/ chest tightness, dyspnea, and abd distension, found to have clot in IVC and portal confluence.    Impression:  #Decompensated cirrhosis  - varices: small, short column on last EGD 2017, not on beta blockers due to small size and low BP  - ascites: history of ascites, previously resolved, recurrent likely due to PV thrombus  - HE: none  - HCC: LIRADS 4 and LIRADS 3 lesion seen on MRI 12/2020, stable from prior, AFP wnl 2/2021  - MELD-Na = 16 on 3/20/2020  #IVC thrombus  #Portal confluence thrombus  #Hx of hypercoagulable disorder  #COVID-19 PNA - on nasal cannula     Recommendations:  - Agree w/ hep gtt for thrombi  - Hematology consult for long-term A/C recommendations in this patient w/ hypercoagulable disorder  - Recommend diagnostic paracentesis, please send cell count, total protein, albumin  - Agree w/ thoracentesis at the same time, please send same fluid studies as above to evaluate for hepatic hydrothorax  - Hold hep gtt prior to procedure as per IR recommendations  - Can start lasix 20mg PO daily and spironolactone 50mg daily, trend Cr/lytes closely  - Trend CBC, CMP, INR daily    Kane Orellana  Gastroenterology Fellow  NON-URGENT CONSULTS:  Please email [email or web address]  [email or web address]  AT NIGHT AND ON WEEKENDS:  Contact on-call GI fellow via answering service ([phone]) from 5pm-8am and on weekends/holidays  MONDAY-FRIDAY 8AM-5PM:  Available via [employer] Teams  Call [phone] ([facility]) or Page [phone] ([facility]) from 8am-5pm M-F

## 2021-03-21 NOTE — CONSULT NOTE ADULT - SUBJECTIVE AND OBJECTIVE BOX
Chief Complaint:  Patient is a 35y old  Female who presents with a chief complaint of abdominal pain (21 Mar 2021 06:46)    HPI:ERIC DAVILA is a 35y Female w/ hx of cryptogenic cirrhosis decompensated by ascites and small non-bleeding esophageal varices, Budd-Chiari syndrome, protein S deficiency w/ multiple spontaneous abortions, recent dx of COVID-19 PNA (on 3/4 w/ high fevers), presenting w/ dyspnea, chest pain/tightness, and abd distension. Pt is COVID + and is on 2L NC. 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     Her prior liver work-up has been extensive and largely unrevealing. Her MELD-Na has been consistently low and she is not currently planned for transplant evaluation. She follows w/ hematology (Dr. Venegas) as an outpatient for hypercoagulable disorder. She has had several pregnancies with spontaneous  in the past. She has 2 liver lesions that have been stable on serial MRIs. Last MRI 2020 showed Budd Chiari syndrome.     She has been started on heparin gtt for multiple clots    PMHX/PSHX:  H/O protein S deficiency  Budd-Chiari syndrome  Cirrhosis    Allergies:  No Known Allergies    Home Medications: reviewed    Hospital Medications:  heparin   Injectable 4000 Unit(s) IV Push every 6 hours PRN  heparin   Injectable 2000 Unit(s) IV Push every 6 hours PRN  heparin  Infusion.  Unit(s)/Hr IV Continuous <Continuous>  melatonin 3 milliGRAM(s) Oral at bedtime PRN  pantoprazole  Injectable 40 milliGRAM(s) IV Push two times a day  spironolactone 25 milliGRAM(s) Oral daily    Social History:   Tob: Denies  EtOH: Denies  Illicit Drugs: Denies    Family history:  FHx: liver disease      Denies family history of colon cancer/polyps, stomach cancer/polyps, pancreatic cancer/masses, liver cancer/disease, ovarian cancer and endometrial cancer.    ROS:   General:  No  fevers, chills, night sweats, fatigue  Eyes:  Good vision, no reported pain  ENT:  No sore throat, pain, runny nose  CV:  No pain, palpitations  Pulm:  No cough  GI:  See HPI, otherwise negative  :  No  incontinence, nocturia  Muscle:  No pain, weakness  Neuro:  No memory problems  Psych:  No insomnia, mood problems, depression  Endocrine:  No polyuria, polydipsia, cold/heat intolerance  Heme:  No petechiae, ecchymosis, easy bruisability  Skin:  No rash    PHYSICAL EXAM:   Vital Signs:  Vital Signs Last 24 Hrs  T(C): 36.7 (21 Mar 2021 05:38), Max: 36.7 (21 Mar 2021 05:38)  T(F): 98 (21 Mar 2021 05:38), Max: 98 (21 Mar 2021 05:38)  HR: 88 (21 Mar 2021 05:38) (88 - 100)  BP: 109/73 (21 Mar 2021 05:38) (92/66 - 129/70)  BP(mean): --  RR: 19 (21 Mar 2021 05:38) (18 - 21)  SpO2: 95% (21 Mar 2021 05:38) (95% - 98%)  Daily Height in cm: 170.18 (20 Mar 2021 17:30)    Daily     GENERAL: no acute distress  NEURO: alert  HEENT: anicteric sclera, no conjunctival pallor appreciated  CHEST: no respiratory distress, no accessory muscle use  CARDIAC: regular rate, rhythm  ABDOMEN: soft, non-tender, distended, no rebound or guarding  EXTREMITIES: warm, well perfused, no edema  SKIN: no lesions noted    LABS: reviewed                        11.5   5.23  )-----------( 145      ( 20 Mar 2021 09:27 )             37.3     03-20    136  |  103  |  10  ----------------------------<  90  4.1   |  21<L>  |  0.43<L>    Ca    8.7      20 Mar 2021 09:27  Phos  3.0     03-20  Mg     1.8     03-20    TPro  6.2  /  Alb  3.1<L>  /  TBili  2.1<H>  /  DBili  x   /  AST  28  /  ALT  16  /  AlkPhos  158<H>  03-20    LIVER FUNCTIONS - ( 20 Mar 2021 09:27 )  Alb: 3.1 g/dL / Pro: 6.2 g/dL / ALK PHOS: 158 U/L / ALT: 16 U/L / AST: 28 U/L / GGT: x               Diagnostic Studies: see sunrise for full report         Chief Complaint:  Patient is a 35y old  Female who presents with a chief complaint of abdominal pain (21 Mar 2021 06:46)    HPI:ERIC DAVILA is a 35y Female w/ hx of cryptogenic cirrhosis decompensated by ascites and small non-bleeding esophageal varices, Budd-Chiari syndrome, protein S deficiency w/ multiple spontaneous abortions, recent dx of COVID-19 PNA (on 3/4 w/ high fevers), presenting w/ dyspnea, chest pain/tightness, and abd distension. Pt is COVID + and is on 2L NC. 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     Her prior liver work-up has been extensive and largely unrevealing. Her MELD-Na has been consistently low and she is not currently planned for transplant evaluation. She follows w/ hematology (Dr. Venegas) as an outpatient for hypercoagulable disorder. She has had several pregnancies with spontaneous  in the past. She has 2 liver lesions that have been stable on serial MRIs. Last MRI 2020 suspected Budd Chiari syndrome.     She has been started on heparin gtt for multiple clots    PMHX/PSHX:  H/O protein S deficiency  Budd-Chiari syndrome  Cirrhosis    Allergies:  No Known Allergies    Home Medications: reviewed    Hospital Medications:  heparin   Injectable 4000 Unit(s) IV Push every 6 hours PRN  heparin   Injectable 2000 Unit(s) IV Push every 6 hours PRN  heparin  Infusion.  Unit(s)/Hr IV Continuous <Continuous>  melatonin 3 milliGRAM(s) Oral at bedtime PRN  pantoprazole  Injectable 40 milliGRAM(s) IV Push two times a day  spironolactone 25 milliGRAM(s) Oral daily    Social History:   Tob: Denies  EtOH: Denies  Illicit Drugs: Denies    Family history:  FHx: liver disease      Denies family history of colon cancer/polyps, stomach cancer/polyps, pancreatic cancer/masses, liver cancer/disease, ovarian cancer and endometrial cancer.    ROS:   General:  No  fevers, chills, night sweats, fatigue  Eyes:  Good vision, no reported pain  ENT:  No sore throat, pain, runny nose  CV:  No pain, palpitations  Pulm:  No cough  GI:  See HPI, otherwise negative  :  No  incontinence, nocturia  Muscle:  No pain, weakness  Neuro:  No memory problems  Psych:  No insomnia, mood problems, depression  Endocrine:  No polyuria, polydipsia, cold/heat intolerance  Heme:  No petechiae, ecchymosis, easy bruisability  Skin:  No rash    PHYSICAL EXAM:   Vital Signs:  Vital Signs Last 24 Hrs  T(C): 36.7 (21 Mar 2021 05:38), Max: 36.7 (21 Mar 2021 05:38)  T(F): 98 (21 Mar 2021 05:38), Max: 98 (21 Mar 2021 05:38)  HR: 88 (21 Mar 2021 05:38) (88 - 100)  BP: 109/73 (21 Mar 2021 05:38) (92/66 - 129/70)  BP(mean): --  RR: 19 (21 Mar 2021 05:38) (18 - 21)  SpO2: 95% (21 Mar 2021 05:38) (95% - 98%)  Daily Height in cm: 170.18 (20 Mar 2021 17:30)    Daily     GENERAL: no acute distress  NEURO: alert  HEENT: anicteric sclera, no conjunctival pallor appreciated  CHEST: no respiratory distress, no accessory muscle use  CARDIAC: regular rate, rhythm  ABDOMEN: soft, non-tender, distended, no rebound or guarding  EXTREMITIES: warm, well perfused, no edema  SKIN: no lesions noted    LABS: reviewed                        11.5   5.23  )-----------( 145      ( 20 Mar 2021 09:27 )             37.3     03-20    136  |  103  |  10  ----------------------------<  90  4.1   |  21<L>  |  0.43<L>    Ca    8.7      20 Mar 2021 09:27  Phos  3.0     03-20  Mg     1.8     03-20    TPro  6.2  /  Alb  3.1<L>  /  TBili  2.1<H>  /  DBili  x   /  AST  28  /  ALT  16  /  AlkPhos  158<H>  03-20    LIVER FUNCTIONS - ( 20 Mar 2021 09:27 )  Alb: 3.1 g/dL / Pro: 6.2 g/dL / ALK PHOS: 158 U/L / ALT: 16 U/L / AST: 28 U/L / GGT: x               Diagnostic Studies: see sunrise for full report

## 2021-03-21 NOTE — PROGRESS NOTE ADULT - PROBLEM SELECTOR PLAN 3
MRI 12/7/2020 demonstrated an Enlarged heterogeneous cirrhotic liver with diffuse patchy enhancement and poor delineation of the hepatic veins suggesting underlying Budd-Chiari syndrome.  -Extensive portal hypertension with paraesophageal varices and splenorenal varices.  -Multiple small enhancing foci scattered in liver the largest appears represent a 1 cm lesion along the dome segment 8  -Not previously on AC due to esophageal varices, high bleeding risk  -Plan for cirrhosis/Budd Chiari below  -Hepatology consulted, appreciate recs MRI 12/7/2020 demonstrated an Enlarged heterogeneous cirrhotic liver with diffuse patchy enhancement and poor delineation of the hepatic veins suggesting underlying Budd-Chiari syndrome.  -Not on AC due to bleeding risk in setting of extensive portal hypertension with paraesophageal varices and splenorenal varices.  -Plan for cirrhosis below  -Plan for IVC thrombosis above  -Hepatology consulted, appreciate recs  -Heme following, appreciate recs

## 2021-03-21 NOTE — DISCHARGE NOTE PROVIDER - NSDCCPTREATMENT_GEN_ALL_CORE_FT
PRINCIPAL PROCEDURE  Procedure: CTA chest w/w/o contrast  Findings and Treatment: IMPRESSION:  1. No pulmonary embolism.  2. Moderate to large right pleural effusion with adjacent compressive atelectasis including near complete atelectasis of the right lower lobe and partial atelectasis of the right middle lobe.  3. Small patchy airspace opacities in bilateral upper and left lower lobes likely infectious in etiology.  4. Thrombus in the suprarenal inferior vena cava. Thrombus in the superior mesenteric vein extending to the confluence with the splenic vein and possibly into the portal vein.  5. Cirrhosis with portal hypertension.  6. Small to moderate volume abdominal and pelvic ascites.

## 2021-03-21 NOTE — CONSULT NOTE ADULT - SUBJECTIVE AND OBJECTIVE BOX
HPI:  Patient is a 35 year old female with PMHx of cryptogenic cirrhosis, Budd Chiari syndrome, Protein S deficiency, COVID-19 infection who presents with complaints of chest painx 3 days and abdominal bloating and tightness for 5 days. Chest pain described as chest tightness, across the chest with radiation to left side, worsens with changes in positions and non-productive cough. Denies abdominal pain but has abdominal discomfort with worsening distension. Per patient, she was diagnosed with COVID on 3/4 and since then has had high fevers (102-103F), dyspnea on minimal exertion (previously able to ambulate normally-several blocks and stairs without SOB). Chest pain worsened to 9/10, more constant, prompting her to come into the ED.     Patient follows with Dr. Carvajal for outpatient Hepatology and Dr. Venegas for Hematology. Previous work up from outpatient hepatology: No immunity towards hepatitis B (although Hepatitis B Core IgG is +) suggesting past infection or possible false positive test. HBV DNA is negative. Hepatitis C antibody was negative. [id number] level was 246 mg/dl. Autoimmune markers were negative for antimitochondrial antibody, anti-smooth muscle antibody, anti-nuclear antibody, Ceruloplasmin level 52 mg/dL. Factor V Leiden neg, Ivan 2 Neg, Prothrombin gene neg. MRI abdomen showing heterogenous liver with branches of hepatic vein. EGD 6/2017 Grade A esophageal varices, gastropathy. Abdomen U/S for cirrhosis, mild splenomegaly, trace ascites.     In ED, vitals 97.7, tachycardic to 102, 110/67, tachpneic to 23, Satting 91% on RA--> 97% on 2L NC. S/p 2 g Ceftriaxone, NS 1 L bolus.     Allergies  No Known Allergies    MEDICATIONS  (STANDING):  furosemide    Tablet 20 milliGRAM(s) Oral daily  heparin  Infusion.  Unit(s)/Hr (10 mL/Hr) IV Continuous <Continuous>  pantoprazole  Injectable 40 milliGRAM(s) IV Push two times a day  spironolactone 50 milliGRAM(s) Oral daily    MEDICATIONS  (PRN):  heparin   Injectable 4000 Unit(s) IV Push every 6 hours PRN For aPTT less than 40  heparin   Injectable 2000 Unit(s) IV Push every 6 hours PRN For aPTT between 40 - 57  melatonin 3 milliGRAM(s) Oral at bedtime PRN Insomnia      PAST MEDICAL & SURGICAL HISTORY:  H/O protein S deficiency  Budd-Chiari syndrome  Cirrhosis    FAMILY HISTORY:  FHx: liver disease  Paternal uncle    SOCIAL HISTORY: No EtOH, no tobacco    REVIEW OF SYSTEMS:  CONSTITUTIONAL: no fever  EYES/ENT: No visual changes;  no throat pain   NECK: No pain or stiffness  RESPIRATORY: see above hpi  CARDIOVASCULAR: see above hpi  GASTROINTESTINAL: see above hpi  GENITOURINARY: No dysuria, change in frequency or hematuria  NEUROLOGICAL: No numbness or weakness  SKIN: No itching, burning, rashes, or lesions   Psych: No depression   MSK: no joint pain  Allergy: no urticaria     Height (cm): 170.2 (03-20 @ 17:30)    T(F): 98 (03-21-21 @ 05:38), Max: 98 (03-21-21 @ 05:38)  HR: 88 (03-21-21 @ 05:38)  BP: 109/73 (03-21-21 @ 05:38)  RR: 19 (03-21-21 @ 05:38)  SpO2: 95% (03-21-21 @ 05:38)  Wt(kg): --    GENERAL: NAD  HEENT: EOMI, MMM, no oropharyngeal lesions or erythema appreciated  Pulm: no inc wob  CV: RRR  ABDOMEN: soft, nt, distended  MSK: nl ROM  EXTREMITIES:  no appreciable edema in b/l LE  Neuro: A&Ox3, no focal deficits  SKIN: warm and dry, no visible rash                          11.5   3.72  )-----------( 144      ( 21 Mar 2021 07:49 )             36.5       03-21    139  |  104  |  15  ----------------------------<  68<L>  4.1   |  22  |  0.46<L>    Ca    8.4      21 Mar 2021 07:49  Phos  3.1     03-21  Mg     2.1     03-21    TPro  6.2  /  Alb  3.0<L>  /  TBili  1.9<H>  /  DBili  x   /  AST  24  /  ALT  15  /  AlkPhos  149<H>  03-21      Magnesium, Serum: 2.1 mg/dL (03-21 @ 07:49)  Phosphorus Level, Serum: 3.1 mg/dL (03-21 @ 07:49)    < from: CT Angio Chest w/ IV Cont (03.20.21 @ 02:33) >  FINDINGS:  CHEST:  LUNGS AND LARGE AIRWAYS: Patent central airways. Near complete atelectasis of the right lower lobe and partial atelectasis of the right middle lobe. Left lower lobe subsegmental atelectasis. Small patchy airspace opacities in bilateral upper and left lower lobes likely infectious in etiology.  PLEURA: Moderate to large right pleural effusion.  VESSELS: No pulmonary embolism. Enlargement of the azygos vein.  HEART: Heart size is normal. No pericardial effusion.  MEDIASTINUM AND CHRISTA: No lymphadenopathy.  CHEST WALL AND LOWER NECK: Within normal limits.    ABDOMEN AND PELVIS:  LIVER: Cirrhotic. Scattered areas of decreased enhancement throughout the liver possibly due to transient hepatic attenuation difference.  BILE DUCTS: Normal caliber.  GALLBLADDER: Diffuse wall thickening likely secondary to cirrhosis.  SPLEEN: Splenomegaly.  PANCREAS: Within normal limits.  ADRENALS: Within normal limits.  KIDNEYS/URETERS: Within normal limits.    BLADDER: Within normal limits.  REPRODUCTIVE ORGANS: Uterus and ovaries within normal limits.    BOWEL: No bowel obstruction or inflammation. Appendix is normal.  PERITONEUM: Small to moderate volume abdominal and pelvic ascites.  VESSELS: Thrombus in the suprarenal inferior vena cava. Thrombus in the superior mesenteric vein extending to the confluence with the splenic vein and possibly into the portal vein. Recanalization of the umbilical vein. Umbilical and splenic varices.  RETROPERITONEUM/LYMPH NODES: No lymphadenopathy.  ABDOMINAL WALL: Within normal limits.  BONES: Within normal limits.    IMPRESSION:  1. No pulmonary embolism.  2. Moderate to large right pleural effusion with adjacent compressive atelectasis including near complete atelectasis of the right lower lobe and partial atelectasis of the right middle lobe.  3. Small patchy airspace opacities in bilateral upper and left lower lobes likely infectious in etiology.  4. Thrombus in the suprarenal inferior vena cava. Thrombus in the superior mesenteric vein extending to the confluence with the splenic vein and possibly into the portal vein.  5. Cirrhosis with portal hypertension.  6. Small to moderate volume abdominal and pelvic ascites.    < end of copied text >

## 2021-03-21 NOTE — DISCHARGE NOTE PROVIDER - PROVIDER TOKENS
PROVIDER:[TOKEN:[[id number]],FOLLOWUP:[1 week],ESTABLISHEDPATIENT:[T]],PROVIDER:[TOKEN:[[id number]],FOLLOWUP:[1 week],ESTABLISHEDPATIENT:[T]] PROVIDER:[TOKEN:[[id number]],SCHEDULEDAPPT:[03/29/2021],SCHEDULEDAPPTTIME:[01:30 PM],ESTABLISHEDPATIENT:[T]],PROVIDER:[TOKEN:[[id number]],FOLLOWUP:[1 week],ESTABLISHEDPATIENT:[T]]

## 2021-03-21 NOTE — PROGRESS NOTE ADULT - PROBLEM SELECTOR PLAN 5
Large Moderate pleural effusion, likely in setting of hepatic hydrothorax  -IR guided thoracentesis  -O2 supplementation, wean down as tolerated Large Moderate pleural effusion, likely in setting of hepatic hydrothorax  -PRN pain regimen for pleuritic chest pain  -IR guided thoracentesis tomorrow   -O2 supplementation, wean down as tolerated

## 2021-03-21 NOTE — PROGRESS NOTE ADULT - SUBJECTIVE AND OBJECTIVE BOX
Cindy Olivo, PGY-1  Internal Medicine  Pager: [phone]/MYRON [phone]    PROGRESS NOTE:     Patient is a 35y old  Female who presents with a chief complaint of abdominal pain (21 Mar 2021 09:50)      SUBJECTIVE / OVERNIGHT EVENTS: Pleuritic chest pain O/N, improved with IV Toradol. Pt seen and examined at bedside. Denies fever, chills, nausea, vomiting, cp, or sob.     ADDITIONAL REVIEW OF SYSTEMS: Endorses pleuritic chest pain, worsens with sitting forward. Denies abdominal pain. Improvement in abdominal bloating and tightness.     MEDICATIONS  (STANDING):  furosemide    Tablet 20 milliGRAM(s) Oral daily  heparin  Infusion.  Unit(s)/Hr (10 mL/Hr) IV Continuous <Continuous>  pantoprazole  Injectable 40 milliGRAM(s) IV Push two times a day  spironolactone 50 milliGRAM(s) Oral daily    MEDICATIONS  (PRN):  heparin   Injectable 4000 Unit(s) IV Push every 6 hours PRN For aPTT less than 40  heparin   Injectable 2000 Unit(s) IV Push every 6 hours PRN For aPTT between 40 - 57  melatonin 3 milliGRAM(s) Oral at bedtime PRN Insomnia      CAPILLARY BLOOD GLUCOSE        I&O's Summary    20 Mar 2021 07:01  -  21 Mar 2021 07:00  --------------------------------------------------------  IN: 86 mL / OUT: 0 mL / NET: 86 mL        PHYSICAL EXAM:  Vital Signs Last 24 Hrs  T(C): 36.7 (21 Mar 2021 05:38), Max: 36.7 (21 Mar 2021 05:38)  T(F): 98 (21 Mar 2021 05:38), Max: 98 (21 Mar 2021 05:38)  HR: 88 (21 Mar 2021 05:38) (88 - 97)  BP: 109/73 (21 Mar 2021 05:38) (92/66 - 109/76)  BP(mean): --  RR: 19 (21 Mar 2021 05:38) (18 - 20)  SpO2: 95% (21 Mar 2021 05:38) (95% - 97%)    CONSTITUTIONAL: NAD, well-developed female, on NC, speaking in full sentences  RESPIRATORY: Increased respiratory effort; Decreased lung sounds on RML and RLL  CARDIOVASCULAR: Mild tachycardic, normal S1 and S2, no murmur/rub/gallop; No lower extremity edema; Peripheral pulses are 2+ bilaterally  ABDOMEN: Soft, Nontender to palpation, Mildly distended, normoactive bowel sounds, no rebound/guarding; +Palpable Hepatosplenomegaly  MUSCULOSKELETAL: no clubbing or cyanosis of digits; no joint swelling or tenderness to palpation  PSYCH: A+O to person, place, time and situation; affect appropriate    LABS:                        11.5   3.72  )-----------( 144      ( 21 Mar 2021 07:49 )             36.5     -    139  |  104  |  15  ----------------------------<  68<L>  4.1   |  22  |  0.46<L>    Ca    8.4      21 Mar 2021 07:49  Phos  3.1       Mg     2.1         TPro  6.2  /  Alb  3.0<L>  /  TBili  1.9<H>  /  DBili  x   /  AST  24  /  ALT  15  /  AlkPhos  149<H>      PT/INR - ( 21 Mar 2021 08:52 )   PT: 15.5 sec;   INR: 1.31 ratio         PTT - ( 21 Mar 2021 01:21 )  PTT:97.9 sec      Urinalysis Basic - ( 20 Mar 2021 03:21 )    Color: Yellow / Appearance: Clear / S.050 / pH: x  Gluc: x / Ketone: Small  / Bili: Negative / Urobili: 2 mg/dL   Blood: x / Protein: Trace / Nitrite: Negative   Leuk Esterase: Negative / RBC: 1 /hpf / WBC 1 /HPF   Sq Epi: x / Non Sq Epi: 2 /hpf / Bacteria: Negative          RADIOLOGY & ADDITIONAL TESTS:  Results Reviewed:   Imaging Personally Reviewed:  Electrocardiogram Personally Reviewed:    COORDINATION OF CARE:  Care Discussed with Consultants/Other Providers [Y/N]:  Prior or Outpatient Records Reviewed [Y/N]:   Cindy Olivo, PGY-1  Internal Medicine  Pager: [phone]/MYRON [phone]    PROGRESS NOTE:     Patient is a 35y old  Female who presents with a chief complaint of abdominal pain (21 Mar 2021 09:50)      SUBJECTIVE / OVERNIGHT EVENTS: Pleuritic chest pain O/N, improved with IV Toradol. Pt seen and examined at bedside. Denies fever, chills, nausea, vomiting, cp, or sob.     ADDITIONAL REVIEW OF SYSTEMS: Endorses pleuritic chest pain, worsens with sitting forward. Denies abdominal pain. Improvement in abdominal bloating and tightness.     MEDICATIONS  (STANDING):  furosemide    Tablet 20 milliGRAM(s) Oral daily  heparin  Infusion.  Unit(s)/Hr (10 mL/Hr) IV Continuous <Continuous>  pantoprazole  Injectable 40 milliGRAM(s) IV Push two times a day  spironolactone 50 milliGRAM(s) Oral daily    MEDICATIONS  (PRN):  heparin   Injectable 4000 Unit(s) IV Push every 6 hours PRN For aPTT less than 40  heparin   Injectable 2000 Unit(s) IV Push every 6 hours PRN For aPTT between 40 - 57  melatonin 3 milliGRAM(s) Oral at bedtime PRN Insomnia      CAPILLARY BLOOD GLUCOSE        I&O's Summary    20 Mar 2021 07:01  -  21 Mar 2021 07:00  --------------------------------------------------------  IN: 86 mL / OUT: 0 mL / NET: 86 mL        PHYSICAL EXAM:  Vital Signs Last 24 Hrs  T(C): 36.7 (21 Mar 2021 05:38), Max: 36.7 (21 Mar 2021 05:38)  T(F): 98 (21 Mar 2021 05:38), Max: 98 (21 Mar 2021 05:38)  HR: 88 (21 Mar 2021 05:38) (88 - 97)  BP: 109/73 (21 Mar 2021 05:38) (92/66 - 109/76)  BP(mean): --  RR: 19 (21 Mar 2021 05:38) (18 - 20)  SpO2: 95% (21 Mar 2021 05:38) (95% - 97%)    CONSTITUTIONAL: NAD, well-developed female, on NC, speaking in full sentences  RESPIRATORY: Increased respiratory effort; Decreased lung sounds on RML and RLL  CHEST: Non-TTP  CARDIOVASCULAR: Mild tachycardic, normal S1 and S2, no murmur/rub/gallop; No lower extremity edema; Peripheral pulses are 2+ bilaterally  ABDOMEN: Soft, Nontender to palpation, Mildly distended, normoactive bowel sounds, no rebound/guarding; +Palpable Hepatosplenomegaly  MUSCULOSKELETAL: no clubbing or cyanosis of digits; no joint swelling or tenderness to palpation  PSYCH: A+O to person, place, time and situation; affect appropriate    LABS:                        11.5   3.72  )-----------( 144      ( 21 Mar 2021 07:49 )             36.5     -    139  |  104  |  15  ----------------------------<  68<L>  4.1   |  22  |  0.46<L>    Ca    8.4      21 Mar 2021 07:49  Phos  3.1     -  Mg     2.1         TPro  6.2  /  Alb  3.0<L>  /  TBili  1.9<H>  /  DBili  x   /  AST  24  /  ALT  15  /  AlkPhos  149<H>      PT/INR - ( 21 Mar 2021 08:52 )   PT: 15.5 sec;   INR: 1.31 ratio         PTT - ( 21 Mar 2021 01:21 )  PTT:97.9 sec      Urinalysis Basic - ( 20 Mar 2021 03:21 )    Color: Yellow / Appearance: Clear / S.050 / pH: x  Gluc: x / Ketone: Small  / Bili: Negative / Urobili: 2 mg/dL   Blood: x / Protein: Trace / Nitrite: Negative   Leuk Esterase: Negative / RBC: 1 /hpf / WBC 1 /HPF   Sq Epi: x / Non Sq Epi: 2 /hpf / Bacteria: Negative          RADIOLOGY & ADDITIONAL TESTS:  Results Reviewed:   Imaging Personally Reviewed:  Electrocardiogram Personally Reviewed:    COORDINATION OF CARE:  Care Discussed with Consultants/Other Providers [Y/N]:  Prior or Outpatient Records Reviewed [Y/N]:

## 2021-03-21 NOTE — PROGRESS NOTE ADULT - PROBLEM SELECTOR PLAN 1
2/2 to Moderate pleural effusion, COVID-19 infection, possible underlying PNA  -Plan for mod pleural effusion, COVID-19 infection below  -on 2 L NC, wean down as tolerated  -ECG Sinus tachy without evidence of ischemia  -Can start Zosyn for coverage for PNA and SBP  -Trops <6  -TTE when stable given hx of orthopnea, chest pain 2/2 to Moderate pleural effusion, COVID-19 infection, possible underlying PNA  -Plan for mod pleural effusion, COVID-19 infection below  -On 2 L NC  -ECG Sinus tachy without evidence of ischemia  -Procal 0.11. Currently AF and elevated WBC. Can start Zosyn and BCx, SCx for coverage for PNA and SBP if febrile.   -Trops <6  -Can consider TTE if orthopnea, chest pain remains after thoracentesis 2/2 to Moderate pleural effusion, COVID-19 infection, possible underlying PNA  -Plan for mod pleural effusion, COVID-19 infection below  -On 2 L NC, wean down following thora,  -ECG Sinus tachy without evidence of ischemia,  trops <6  -Procal 0.11. Currently AF and elevated WBC. Can start Zosyn and BCx, SCx for coverage for PNA and SBP if febrile.   -Can consider TTE if orthopnea, pleuritic chest pain remains after thoracentesis

## 2021-03-21 NOTE — DISCHARGE NOTE PROVIDER - CARE PROVIDER_API CALL
Haydee Carvajal)  Gastroenterology; Internal Medicine; Transplant Hepatology  [address] [place] [address]  Phone: [phone]  Fax: [phone]  Established Patient  Follow Up Time: 1 week    Kelechi Venegas)  Hematology; Internal Medicine; Oncology  [address], [place] [id number]  Phone: [phone]  Fax: [phone]  Established Patient  Follow Up Time: 1 week   Haydee Carvajal (MD)  Gastroenterology; Internal Medicine; Transplant Hepatology  [address] [place] [address]  Phone: [phone]  Fax: [phone]  Established Patient  Scheduled Appointment: 03/29/2021 01:30 PM    Kelechi Venegas)  Hematology; Internal Medicine; Oncology  [address] [place] [id number]  Phone: [phone]  Fax: [phone]  Established Patient  Follow Up Time: 1 week

## 2021-03-21 NOTE — PROGRESS NOTE ADULT - PROBLEM SELECTOR PLAN 7
-COVID positive on PCR, first positive of 3/4  -Outside window for remdesivir, dexamethasone  -F/u COVID Antibodies  -Elevated inflammatory markers, D-Dimer downtrending  -Inflammatory markers q3 days  -Heparin gtt  -O2 supplementation -COVID positive on PCR, first positive of 3/4  -Outside window for remdesivir, dexamethasone  -Elevated COVID Antibodies  -Elevated inflammatory markers, D-Dimer downtrending  -Inflammatory markers q3 days  -Heparin gtt  -O2 supplementation

## 2021-03-21 NOTE — DISCHARGE NOTE PROVIDER - HOSPITAL COURSE
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 Pt was started on a heparin drip, and vascular surgery was consulted who stated to continue full AC, no other intervention. Patient underwent an IR guided thoracentesis and diagnostic paracentesis which showed_________? Pt was evaluated by hematology who stated_____________? Patient was evaluated by hepatology who stated_____________?   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 Pt was started on a heparin drip, and vascular surgery was consulted who stated to continue full AC, no other intervention. Patient underwent an IR guided thoracentesis and diagnostic paracentesis which showed_________? Pt was evaluated by hematology who stated_____________? Patient was evaluated by hepatology who stated_____________?. Started on Lasix 20 and Aldactone 50, per Hepatology.    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 Pt was started on a heparin drip, and vascular surgery was consulted who stated to continue full AC, no other intervention. Patient underwent an IR guided thoracentesis and diagnostic paracentesis which were both negative for infection, malignancy. Pt was evaluated by hematology who stated patient has been worked up outpatient for hypercoagualtion, only deficient Protein S with 40% activity and antigen [negative for Factor V Leiden, Jak2, Prothrombin mutation. protein c and antithrombin III] and recommended to send APLA labs in s/o multiple miscarriages and current presentation. Recommended coumadin bridge with outpatient follow up. Patient was started on bridge inpatient but APLA resulted as negative while inpatient so heme recommended discharging on Xarelto starter pack. Patient was evaluated by hepatology who recommended lasix 40mg PO daily and spironolactone 100mg PO daily, with outpatient repeat US doppler 1 week from last evaluation [3/21/22]. Pt with extensive varices not on BB due to blood pressure / heart rate, started on ppi. Repeat U/S doppler to evaluate thrombus burden outpatient on  3/29/21 ; one week from prior evaluation. Course c/b nonreproducible chest pain on ambulation, multiple EKGs and trops with ischemic changes, CTA ruled out PE, TTE grossly normal.

## 2021-03-21 NOTE — CONSULT NOTE ADULT - ATTENDING COMMENTS
Patient was seen and examined with hepatology team on rounds on 3/21/2021.   A 35 year-old woman with history of 3 miscarriages, Protein S deficiency, suspected BCS, cryptogenic cirrhosis decompensated by ascites and small non-bleeding esophageal varices,  recent dx of COVID-19 with PNA presented with worsening chest pain and abdominal distention. A CT angio ruled out PE but mod to large right pleural effusion, cirrhosis, small to mod ascites, suprarenal IVC thrombus and thrombus in SMV extending into confluence (splenic vein, possibly PV).   Her MELD-Na has been low  Last MRI 12/2020 suspected Budd Chiari syndrome and small liver lesions.   INR 1.47, AST and ALT normal, , TB 2.1, Na 136 MELD_Na 16  Patient has protein S deficiency, cryptogenic cirrhosis with decompensated by ascites and history of small non-bleeding EV, found to have IVC thrombosis, SMV thrombosis, ascites and right pleural effusion.   Would recommend-diagnotic paracentesis, diagnostic and therapeutic thoracentesis, heme evaluation regarding hypercoagulable stable and anticoagulation, daily MELD-Na, discuss with IR for possible thrombosis intervention if needed, continue care per primary team including management of COVID related issues as well

## 2021-03-21 NOTE — DISCHARGE NOTE PROVIDER - CARE PROVIDERS DIRECT ADDRESSES
[email or web address]

## 2021-03-21 NOTE — DISCHARGE NOTE PROVIDER - NSDCMRMEDTOKEN_GEN_ALL_CORE_FT
Aldactone 25 mg oral tablet: 25 milligram(s) orally once a day  omeprazole 20 mg oral delayed release capsule: 1 cap(s) orally once a day   Aldactone 25 mg oral tablet: 25 milligram(s) orally once a day  omeprazole 20 mg oral delayed release capsule: 1 cap(s) orally once a day  Xarelto Starter Pack 15 mg-20 mg oral kit: 15 milligram(s) orally 2 times a day   please follow instructions on box   acetaminophen 325 mg oral tablet: 2 tab(s) orally every 8 hours, As needed, Mild Pain (1 - 3)  aluminum hydroxide-magnesium hydroxide 200 mg-200 mg/5 mL oral suspension: 30 milliliter(s) orally every 4 hours, As needed, Dyspepsia  ascorbic acid 500 mg oral tablet: 1 tab(s) orally once a day  furosemide 40 mg oral tablet: 1 tab(s) orally once a day  lidocaine 5% topical film: Apply topically to affected area once a day, As Needed  melatonin 3 mg oral tablet: 1 tab(s) orally once a day (at bedtime), As needed, Insomnia  Multiple Vitamins with Minerals oral tablet: 1 tab(s) orally once a day  omeprazole 40 mg oral delayed release capsule: 1 cap(s) orally once a day  polyethylene glycol 3350 oral powder for reconstitution: 17 gram(s) orally once a day, As Needed  senna oral tablet: 2 tab(s) orally once a day (at bedtime)  spironolactone 25 mg oral tablet: 4 tab(s) orally once a day  Xarelto Starter Pack 15 mg-20 mg oral kit: 15 milligram(s) orally 2 times a day   please follow instructions on box   acetaminophen 325 mg oral tablet: 2 tab(s) orally every 8 hours, As needed, Mild Pain (1 - 3)  aluminum hydroxide-magnesium hydroxide 200 mg-200 mg/5 mL oral suspension: 30 milliliter(s) orally every 4 hours, As needed, Dyspepsia  ascorbic acid 500 mg oral tablet: 1 tab(s) orally once a day  furosemide 40 mg oral tablet: 1 tab(s) orally once a day  lidocaine 5% topical film: Apply topically to affected area once a day, As Needed  melatonin 3 mg oral tablet: 1 tab(s) orally once a day (at bedtime), As needed, Insomnia  Multiple Vitamins with Minerals oral tablet: 1 tab(s) orally once a day  omeprazole 40 mg oral delayed release capsule: 1 cap(s) orally once a day  polyethylene glycol 3350 oral powder for reconstitution: 17 gram(s) orally once a day, As Needed  senna oral tablet: 2 tab(s) orally once a day (at bedtime)  spironolactone 25 mg oral tablet: 4 tab(s) orally once a day  Ultrasound Abdomen/Pelvis with Doppler: need to reaccess suprarenal IVC thrombus   please complete 1 week from initial assessment [complete on 3/29/2021 +/- 2 days]    Xarelto Starter Pack 15 mg-20 mg oral kit: 15 milligram(s) orally 2 times a day   please follow instructions on box

## 2021-03-21 NOTE — PROGRESS NOTE ADULT - PROBLEM SELECTOR PLAN 2
Hypercoagulable in setting of Budd-Chiari syndrome, Protein S Deficiency, COVID-19, ? ANGELICA syndrome (hx of multiple miscarriages)  -No PE seen on CTA, hemodynamically stable outside of O2  -Not previously on AC  -C/w Heparin gtt  -F/u Anticardiolipin, Beta2-glycoprotein, Lupus anticoagulant  -Vascular Sx consulted, appreciate recs Hypercoagulable in setting of Budd-Chiari syndrome (no previously on AC), Protein S Deficiency, COVID-19, ? ANGELICA syndrome (hx of multiple miscarriages)  -No PE seen on CTA, hemodynamically stable outside of O2  -C/w Heparin gtt, transition to Coumadin after procedures  -F/u Lupus profile for likely APLA: Anticardiolipin, Beta2-glycoprotein, Lupus anticoagulant  -F/u TEG   -Vascular Sx consulted, no surgical intervention  -Heme following, appreciate recs Hypercoagulable in setting of Budd-Chiari syndrome (no previously on AC), Protein S Deficiency, COVID-19, ? 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

## 2021-03-21 NOTE — DISCHARGE NOTE PROVIDER - NSDCFUADDAPPT_GEN_ALL_CORE_FT
- You were provided a prescription for Ultrasound to re-evaluate your blood clot. Please schedule to have this test performed either 3/28 or 3/29/2021.   - Please follow up with Dr. Venegas in 1 week with results from your repeated Ultrasound. Dr. Venegas will discuss the results and your blood thinner, Xarelto, management outpatient.  - You were evaluated by Dr. Carvajal, hepatologist, in the hospital. Please schedule an appointment with the liver doctor within 1 week of discharge. The liver doctor will help manage your water pills (spironolactone, lasix) and monitor your varices and liver function.  - You were provided a prescription for Ultrasound to re-evaluate your blood clot. Please schedule to have this test performed either 3/28 or 3/29/2021. You can do this at [address] [place] [address]. Call [phone] to schedule your Ultrasound.  - Please follow up with Dr. Venegas in 1 week with results from your repeated Ultrasound. Dr. Venegas will discuss the results and your blood thinner, Xarelto, management outpatient.  - You were evaluated by Dr. Carvajal, hepatologist, in the hospital. Please schedule an appointment with the liver doctor within 1 week of discharge. The liver doctor will help manage your water pills (spironolactone, lasix) and monitor your varices and liver function.

## 2021-03-21 NOTE — CONSULT NOTE ADULT - ASSESSMENT
35F h/o crytogenic cirrhosis, Budd Chiari, Protein S Deficiency, prior COVID infection who presents with chest pain and abdominal distension. Found to have thrombus in suprarenal IVC and SMV with moderate sized R pleural effusion, mild to moderate ascites and hematology consulted:     #DVT   -MR liver Dec 2020: cirrhosis with patchy enhancement and poor delineation of hepatic veins. suspect budd-chiari. no evidence of thrombus at that time.  -patient of Dr. Venegas who on prior hypercoagulable workup, the only deficit that was found was a protein S deficiency at 40% activity and antigen. has had neg Factor V Leiden, Jak2, Prothrombin mutation. protein c and antithrombin III normal   -patient's protein S levels at [facility] were 66% in 2018, suggesting a deficiency that's related to progressive liver disease   although its possible that a protein S deficiency vs another hypercoaguable disorder may have triggered a previous episode of suspected hepatic vein thrombosis causing Budd-Chiari syndrome>>cirrhosis.  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 pending thoracentesis, paracentesis  -send APLA labs   -can continue to f/u with Dr. Venegas     #pleural effusion   -thoracentesis with IR pending    #cirrhosis  -known to hepatology service, recommend c/s   -on ceftriaxone, diagnostic para pending    Pepito Churchill Heme/onc PGY4  35F h/o crytogenic cirrhosis, Budd Chiari, Protein S Deficiency, prior COVID infection who presents with chest pain and abdominal distension. Found to have thrombus in suprarenal IVC and SMV with moderate sized R pleural effusion, mild to moderate ascites and hematology consulted:     #DVT   -MR liver Dec 2020: cirrhosis with patchy enhancement and poor delineation of hepatic veins. suspect budd-chiari. no evidence of thrombus at that time.  -patient of Dr. Venegas who on prior hypercoagulable workup, the only deficit that was found was a protein S deficiency at 40% activity and antigen. has had neg Factor V Leiden, Jak2, Prothrombin mutation. protein c and antithrombin III normal   -patient's protein S levels at [facility] were 66% in 2018, suggesting a deficiency that's related to progressive liver disease   although its possible that a protein S deficiency vs another hypercoaguable disorder may have triggered a previous episode of suspected hepatic vein thrombosis causing Budd-Chiari syndrome>>cirrhosis.  -risks vs benefits discussion regarding a/c and decision was made to observe off a/c given factor XI deficiency and a prolonged PTT  -now with IVC and SMV thrombus. suspect 2/2 cirrhosis with portal hypertension vs COVID vs antiphospholipid syndrome?  -recommend c/w hep gtt with bridge to coumadin as there is concern for APLS syndrome given her h/o 3 late term miscarriages, inc PTT (although inc PTT could be from liver dysfunction)  -can start coumadin once no further procedure/intervention planned. pending thoracentesis, paracentesis  -send APLA labs   -can continue to f/u with Dr. Venegas     #pleural effusion   -thoracentesis with IR pending    #cirrhosis  -known to hepatology service, recommend c/s   -on ceftriaxone, diagnostic para pending    Pepito Churchill Heme/onc PGY4

## 2021-03-21 NOTE — PROGRESS NOTE ADULT - PROBLEM SELECTOR PLAN 6
-S/p IV Ceftriaxone x 1  -Low volume ascites on CT A/P  -Concern for SBP given febrile (102-103 F) outpatient (could be 2/2 to COVID infection) with intermittent abdominal pain outpatient. However, afebrile, no WBC or abdominal pain on admission   -No previous taps, per patient  -Warrants diagnostic paracentesis, concerning for bleeding risk given elevated INR   -May empirically treat with Zosyn for coverage for PNA if febrile  -IR consult for diagnostic paracentesis  -Hepatology following, appreciate recs

## 2021-03-21 NOTE — PROGRESS NOTE ADULT - PROBLEM SELECTOR PLAN 4
-No Hx of HE, no hx of variceal bleed, no hx of SBP  -Hx of crytogenic cirrhosis and Budd Chiari, followed by Dr. Carvajal outpatient  -MELD-Na 16, up from 10 outpatient   -MRI showing Budd Chiari, paraesophageal varices and splenorenal varices with multiple foci in liver  -Outpatient Hepatitis workup inconclusive: Hx of Hep A as a child, Hep B Core Antibody Positive but Hep B DNA neg. Otherwise neg  -Alpha1-Antitrypsin, Ceroplasmin neg  -CT A/P showing cirrhosis with small-moderate ascites  -Being worked up for liver transplantation outpatient  -Hepatology/Transplant Hepatology consulted, appreciate recs No Hx of HE, no hx of variceal bleed, no hx of SBP. Hx of cryptogenic cirrhosis and Budd Chiari, followed by Dr. Carvajal outpatient  -Diagnostic para tomorrow   -MELD-Na 16, up from 10 outpatient   -Hep B Antibody positive, f/u Hep DNA  -Outpatient Hepatitis workup inconclusive: Hx of Hep A as a child, Hep B Core Antibody Positive but Hep B DNA neg. Otherwise neg  -Alpha1-Antitrypsin, Ceroplasmin neg  -Being worked up for liver transplantation outpatient  -Hepatology consulted, appreciate recs No Hx of HE, no hx of variceal bleed, no hx of SBP. Hx of cryptogenic cirrhosis and Budd Chiari, followed by Dr. Carvajal outpatient  -Diagnostic para tomorrow, pending PTT  -MELD-Na 13, 16 on admission, up from 10 outpatient   -Hep B Antibody positive, f/u Hep DNA  -Outpatient Hepatitis workup inconclusive: Hx of Hep A as a child, Hep B Core Antibody Positive but Hep B DNA neg. Otherwise neg  -Alpha1-Antitrypsin, Ceroplasmin neg  -Being worked up for liver transplantation outpatient  -Hepatology consulted, appreciate recs

## 2021-03-21 NOTE — CONSULT NOTE ADULT - SUBJECTIVE AND OBJECTIVE BOX
Interventional Radiology Inpatient Consult Note       HPI:  Patient is a 35 year old female with PMHx of cryptogenic cirrhosis, Budd Chiari syndrome, Protein S deficiency, COVID-19 infection who presents with complaints of chest painx 3 days and abdominal bloating and tightness for 5 days. Chest pain described as chest tightness, across the chest with radiation to left side, worsens with changes in positions and non-productive cough. Denies abdominal pain but has abdominal discomfort with worsening distension. Per patient, she was diagnosed with COVID on 3/4 and since then has had high fevers (102-103F), dyspnea on minimal exertion (previously able to ambulate normally-several blocks and stairs without SOB). Chest pain worsened to 9/10, more constant, prompting her to come into the ED.     Patient follows with Dr. Carvajal for outpatient Hepatology and Dr. Venegas for Hematology. Previous work up from outpatient hepatology: No immunity towards hepatitis B (although Hepatitis B Core IgG is +) suggesting past infection or possible false positive test. HBV DNA is negative. Hepatitis C antibody was negative. Psmy-9-vrpbpxoeegl level was 246 mg/dl. Autoimmune markers were negative for antimitochondrial antibody, anti-smooth muscle antibody, anti-nuclear antibody, Ceruloplasmin level 52 mg/dL. Factor V Leiden neg, Ivan 2 Neg, Prothrombin gene neg. MRI abdomen showing heterogenous liver with branches of hepatic vein. EGD 2017 Grade A esophageal varices, gastropathy. Abdomen U/S for cirrhosis, mild splenomegaly, trace ascites.   In ED, vitals 97.7, tachycardic to 102, 110/67, tachpneic to 23, Satting 91% on RA--> 97% on 2L NC. S/p 2 g Ceftriaxone, NS 1 L bolus.    (20 Mar 2021 07:31)      Vital Signs: Vital Signs Last 24 Hrs  T(C): 36.7 (21 Mar 2021 05:38), Max: 36.7 (21 Mar 2021 05:38)  T(F): 98 (21 Mar 2021 05:38), Max: 98 (21 Mar 2021 05:38)  HR: 88 (21 Mar 2021 05:38) (88 - 97)  BP: 109/73 (21 Mar 2021 05:38) (92/66 - 109/76)  BP(mean): --  RR: 19 (21 Mar 2021 05:38) (18 - 20)  SpO2: 95% (21 Mar 2021 05:38) (95% - 97%)    Past Medical/ Surgical History: PAST MEDICAL & SURGICAL HISTORY:  H/O protein S deficiency    Budd-Chiari syndrome    Cirrhosis        Allergies: Allergies    No Known Allergies    Intolerances        Medications: MEDICATIONS  (STANDING):  furosemide    Tablet 20 milliGRAM(s) Oral daily  heparin  Infusion.  Unit(s)/Hr (10 mL/Hr) IV Continuous <Continuous>  pantoprazole  Injectable 40 milliGRAM(s) IV Push two times a day  spironolactone 50 milliGRAM(s) Oral daily    MEDICATIONS  (PRN):  heparin   Injectable 4000 Unit(s) IV Push every 6 hours PRN For aPTT less than 40  heparin   Injectable 2000 Unit(s) IV Push every 6 hours PRN For aPTT between 40 - 57  melatonin 3 milliGRAM(s) Oral at bedtime PRN Insomnia      SOCIAL HISTORY:    FAMILY HISTORY:  FHx: liver disease  Paternal uncle          PHYSICAL EXAM:    General:   Well-groomed, well-nourished, in no distress  Neuro/Psych:  A &O x 3    LABS:                        11.5   3.72  )-----------( 144      ( 21 Mar 2021 07:49 )             36.5     03-21    139  |  104  |  15  ----------------------------<  68<L>  4.1   |  22  |  0.46<L>    Ca    8.4      21 Mar 2021 07:49  Phos  3.1     -  Mg     2.1     -    TPro  6.2  /  Alb  3.0<L>  /  TBili  1.9<H>  /  DBili  x   /  AST  24  /  ALT  15  /  AlkPhos  149<H>  03-21    PT/INR - ( 21 Mar 2021 08:52 )   PT: 15.5 sec;   INR: 1.31 ratio         PTT - ( 21 Mar 2021 01:21 )  PTT:97.9 sec  Urinalysis Basic - ( 20 Mar 2021 03:21 )    Color: Yellow / Appearance: Clear / S.050 / pH: x  Gluc: x / Ketone: Small  / Bili: Negative / Urobili: 2 mg/dL   Blood: x / Protein: Trace / Nitrite: Negative   Leuk Esterase: Negative / RBC: 1 /hpf / WBC 1 /HPF   Sq Epi: x / Non Sq Epi: 2 /hpf / Bacteria: Negative        RADIOLOGY & ADDITIONAL STUDIES:  < from: CT Abdomen and Pelvis w/ IV Cont (21 @ 02:41) >    EXAM:  CT ANGIO CHEST (W)AW IC                            PROCEDURE DATE:  2021            INTERPRETATION:  CLINICAL INFORMATION: Cough. Epigastric pain and abdominal distention for several days. History of cirrhosis. Concern for portal vein thrombosis.    COMPARISON: None.    CONTRAST/COMPLICATIONS:  IV Contrast: Omnipaque 350  90 cc administered   10 cc discarded  Oral Contrast: NONE  Complications: None reported at time of study completion    PROCEDURE:  CT Angiography of the Chest was performed followed by portal venous phase imaging of the Abdomen and Pelvis.  Sagittal and coronal reformats were performed as well as 3D (MIP) reconstructions.    FINDINGS:  CHEST:  LUNGS AND LARGE AIRWAYS: Patent central airways. Near complete atelectasis of the right lower lobe and partial atelectasis of the right middle lobe. Left lower lobe subsegmental atelectasis. Small patchy airspace opacities in bilateral upper and left lower lobes likely infectious in etiology.  PLEURA: Moderate to large right pleural effusion.  VESSELS: No pulmonary embolism. Enlargement of the azygos vein.  HEART: Heart size is normal. No pericardial effusion.  MEDIASTINUM AND CHRISTA: No lymphadenopathy.  CHEST WALL AND LOWER NECK: Within normal limits.    ABDOMEN AND PELVIS:  LIVER: Cirrhotic. Scattered areas of decreased enhancement throughout the liver possibly due to transient hepatic attenuation difference.  BILE DUCTS: Normal caliber.  GALLBLADDER: Diffuse wall thickening likely secondary to cirrhosis.  SPLEEN: Splenomegaly.  PANCREAS: Within normal limits.  ADRENALS: Within normal limits.  KIDNEYS/URETERS: Within normal limits.    BLADDER: Within normal limits.  REPRODUCTIVE ORGANS: Uterus and ovaries within normal limits.    BOWEL: No bowel obstruction or inflammation. Appendix is normal.  PERITONEUM: Small to moderate volume abdominal and pelvic ascites.  VESSELS: Thrombus in the suprarenal inferior vena cava. Thrombus in the superior mesenteric vein extending to the confluence with the splenic vein and possibly into the portal vein. Recanalization of the umbilical vein. Umbilical and splenic varices.  RETROPERITONEUM/LYMPH NODES: No lymphadenopathy.  ABDOMINAL WALL: Within normal limits.  BONES: Within normal limits.    IMPRESSION:  1. No pulmonary embolism.  2. Moderate to large right pleural effusion with adjacent compressive atelectasis including near complete atelectasis of the right lower lobe and partial atelectasis of the right middle lobe.  3. Small patchy airspace opacities in bilateral upper and left lower lobes likely infectious in etiology.  4. Thrombus in the suprarenal inferior vena cava. Thrombus in the superior mesenteric vein extending to the confluence with the splenic vein and possibly into the portal vein.  5. Cirrhosis with portal hypertension.  6. Small to moderate volume abdominal and pelvic ascites.                          MARYLIN HORTA MD; Resident Radiology  This document has been electronically signed.  AMA HIGHTOWER MD; Attending Radiologist  This document has been electronically signed. Mar 20 2021  4    < end of copied text >      A/P:  - Paracentesis and thoracentesis approved for 3/22/21  - Please place order for IR procedures. Dr. Contreras approving attending.  - Pt does not need to be NPO for procedures.  - Please obtain stat PT/PTT/INR in am. Heparin will need to be held prior to and post procedure. Contact IR at [phone] in am for timing.

## 2021-03-21 NOTE — PROGRESS NOTE ADULT - ATTENDING COMMENTS
Agree with assessment and plan as above by Dr. Olivo. Reviewed all pertinent labs, glucose values, and imaging studies. Modifications made as indicated above.     Orlin Chapa D.O  [phone] Agree with assessment and plan as above by Dr. Olivo. Reviewed all pertinent labs, glucose values, and imaging studies. Modifications made as indicated above. Pt. with IVC and SMV thrombus. suspect 2/2 cirrhosis with portal hypertension vs COVID vs antiphospholipid syndrome? c/w a/c awaiting paracentesis and thoracentesis 3/22. Heme and GI follow-up.    Orlin Chapa D.O  [phone]

## 2021-03-22 ENCOUNTER — RESULT REVIEW (OUTPATIENT)
Age: 36
End: 2021-03-22

## 2021-03-22 LAB
ALBUMIN FLD-MCNC: 2.2 G/DL — SIGNIFICANT CHANGE UP
ALBUMIN SERPL ELPH-MCNC: 3.2 G/DL — LOW (ref 3.3–5)
ALP SERPL-CCNC: 147 U/L — HIGH (ref 40–120)
ALT FLD-CCNC: 14 U/L — SIGNIFICANT CHANGE UP (ref 10–45)
ANION GAP SERPL CALC-SCNC: 12 MMOL/L — SIGNIFICANT CHANGE UP (ref 5–17)
APTT BLD: 41 SEC — HIGH (ref 27.5–35.5)
APTT BLD: 52.4 SEC — HIGH (ref 27.5–35.5)
AST SERPL-CCNC: 24 U/L — SIGNIFICANT CHANGE UP (ref 10–40)
B PERT IGG+IGM PNL SER: CLEAR — SIGNIFICANT CHANGE UP
B PERT IGG+IGM PNL SER: CLEAR — SIGNIFICANT CHANGE UP
BILIRUB DIRECT SERPL-MCNC: 0.6 MG/DL — HIGH (ref 0–0.2)
BILIRUB SERPL-MCNC: 1.8 MG/DL — HIGH (ref 0.2–1.2)
BUN SERPL-MCNC: 13 MG/DL — SIGNIFICANT CHANGE UP (ref 7–23)
CALCIUM SERPL-MCNC: 8.6 MG/DL — SIGNIFICANT CHANGE UP (ref 8.4–10.5)
CHLORIDE SERPL-SCNC: 99 MMOL/L — SIGNIFICANT CHANGE UP (ref 96–108)
CK MB CFR SERPL CALC: 1 NG/ML — SIGNIFICANT CHANGE UP (ref 0–3.8)
CK SERPL-CCNC: 75 U/L — SIGNIFICANT CHANGE UP (ref 25–170)
CO2 SERPL-SCNC: 24 MMOL/L — SIGNIFICANT CHANGE UP (ref 22–31)
COLOR FLD: YELLOW — SIGNIFICANT CHANGE UP
COLOR FLD: YELLOW — SIGNIFICANT CHANGE UP
CREAT SERPL-MCNC: 0.53 MG/DL — SIGNIFICANT CHANGE UP (ref 0.5–1.3)
DRVVT SCREEN TO CONFIRM RATIO: SIGNIFICANT CHANGE UP
FLUID INTAKE SUBSTANCE CLASS: SIGNIFICANT CHANGE UP
FLUID INTAKE SUBSTANCE CLASS: SIGNIFICANT CHANGE UP
FLUID SEGMENTED GRANULOCYTES: 13 % — SIGNIFICANT CHANGE UP
FLUID SEGMENTED GRANULOCYTES: 8 % — SIGNIFICANT CHANGE UP
GLUCOSE FLD-MCNC: 92 MG/DL — SIGNIFICANT CHANGE UP
GLUCOSE SERPL-MCNC: 80 MG/DL — SIGNIFICANT CHANGE UP (ref 70–99)
HAPTOGLOB SERPL-MCNC: 90 MG/DL — SIGNIFICANT CHANGE UP (ref 34–200)
INR BLD: 1.27 RATIO — HIGH (ref 0.88–1.16)
LA NT DPL PPP QL: 34.1 SEC — SIGNIFICANT CHANGE UP
LDH SERPL L TO P-CCNC: 159 U/L — SIGNIFICANT CHANGE UP
LDH SERPL L TO P-CCNC: 171 U/L — SIGNIFICANT CHANGE UP (ref 50–242)
LYMPHOCYTES # FLD: 37 % — SIGNIFICANT CHANGE UP
LYMPHOCYTES # FLD: 58 % — SIGNIFICANT CHANGE UP
MAGNESIUM SERPL-MCNC: 2 MG/DL — SIGNIFICANT CHANGE UP (ref 1.6–2.6)
MESOTHL CELL # FLD: 12 % — SIGNIFICANT CHANGE UP
MESOTHL CELL # FLD: 42 % — SIGNIFICANT CHANGE UP
MONOS+MACROS # FLD: 22 % — SIGNIFICANT CHANGE UP
MONOS+MACROS # FLD: 8 % — SIGNIFICANT CHANGE UP
NORMALIZED SCT PPP-RTO: 0.73 RATIO — SIGNIFICANT CHANGE UP (ref 0–1.16)
NORMALIZED SCT PPP-RTO: SIGNIFICANT CHANGE UP
PH FLD: >8 — SIGNIFICANT CHANGE UP
PHOSPHATE SERPL-MCNC: 3.2 MG/DL — SIGNIFICANT CHANGE UP (ref 2.5–4.5)
POTASSIUM SERPL-MCNC: 4 MMOL/L — SIGNIFICANT CHANGE UP (ref 3.5–5.3)
POTASSIUM SERPL-SCNC: 4 MMOL/L — SIGNIFICANT CHANGE UP (ref 3.5–5.3)
PROT FLD-MCNC: 3.8 G/DL — SIGNIFICANT CHANGE UP
PROT SERPL-MCNC: 6.4 G/DL — SIGNIFICANT CHANGE UP (ref 6–8.3)
PROTHROM AB SERPL-ACNC: 14.9 SEC — HIGH (ref 10.6–13.6)
RCV VOL RI: 187 /UL — HIGH (ref 0–0)
RCV VOL RI: 207 /UL — HIGH (ref 0–0)
SODIUM SERPL-SCNC: 135 MMOL/L — SIGNIFICANT CHANGE UP (ref 135–145)
TOTAL NUCLEATED CELL COUNT, BODY FLUID: 213 /UL — SIGNIFICANT CHANGE UP
TOTAL NUCLEATED CELL COUNT, BODY FLUID: 227 /UL — SIGNIFICANT CHANGE UP
TROPONIN T, HIGH SENSITIVITY RESULT: <6 NG/L — SIGNIFICANT CHANGE UP (ref 0–51)
TSH SERPL-MCNC: 2.96 UIU/ML — SIGNIFICANT CHANGE UP (ref 0.27–4.2)
TUBE TYPE: SIGNIFICANT CHANGE UP
TUBE TYPE: SIGNIFICANT CHANGE UP

## 2021-03-22 PROCEDURE — 49083 ABD PARACENTESIS W/IMAGING: CPT

## 2021-03-22 PROCEDURE — 88112 CYTOPATH CELL ENHANCE TECH: CPT | Mod: 26

## 2021-03-22 PROCEDURE — 99233 SBSQ HOSP IP/OBS HIGH 50: CPT | Mod: GC

## 2021-03-22 PROCEDURE — 99233 SBSQ HOSP IP/OBS HIGH 50: CPT

## 2021-03-22 PROCEDURE — 32555 ASPIRATE PLEURA W/ IMAGING: CPT | Mod: RT

## 2021-03-22 PROCEDURE — 71045 X-RAY EXAM CHEST 1 VIEW: CPT | Mod: 26

## 2021-03-22 PROCEDURE — 88305 TISSUE EXAM BY PATHOLOGIST: CPT | Mod: 26

## 2021-03-22 RX ORDER — SPIRONOLACTONE 25 MG/1
100 TABLET, FILM COATED ORAL DAILY
Refills: 0 | Status: DISCONTINUED | OUTPATIENT
Start: 2021-03-22 | End: 2021-03-25

## 2021-03-22 RX ORDER — POLYETHYLENE GLYCOL 3350 17 G/17G
17 POWDER, FOR SOLUTION ORAL DAILY
Refills: 0 | Status: DISCONTINUED | OUTPATIENT
Start: 2021-03-22 | End: 2021-03-25

## 2021-03-22 RX ORDER — MULTIVIT-MIN/FERROUS GLUCONATE 9 MG/15 ML
1 LIQUID (ML) ORAL DAILY
Refills: 0 | Status: DISCONTINUED | OUTPATIENT
Start: 2021-03-22 | End: 2021-03-25

## 2021-03-22 RX ORDER — SENNA PLUS 8.6 MG/1
2 TABLET ORAL AT BEDTIME
Refills: 0 | Status: DISCONTINUED | OUTPATIENT
Start: 2021-03-22 | End: 2021-03-25

## 2021-03-22 RX ORDER — WARFARIN SODIUM 2.5 MG/1
5 TABLET ORAL ONCE
Refills: 0 | Status: COMPLETED | OUTPATIENT
Start: 2021-03-22 | End: 2021-03-22

## 2021-03-22 RX ORDER — ALBUMIN HUMAN 25 %
100 VIAL (ML) INTRAVENOUS EVERY 8 HOURS
Refills: 0 | Status: COMPLETED | OUTPATIENT
Start: 2021-03-22 | End: 2021-03-23

## 2021-03-22 RX ORDER — FUROSEMIDE 40 MG
40 TABLET ORAL DAILY
Refills: 0 | Status: DISCONTINUED | OUTPATIENT
Start: 2021-03-22 | End: 2021-03-25

## 2021-03-22 RX ORDER — ASCORBIC ACID 60 MG
500 TABLET,CHEWABLE ORAL DAILY
Refills: 0 | Status: DISCONTINUED | OUTPATIENT
Start: 2021-03-22 | End: 2021-03-25

## 2021-03-22 RX ORDER — FUROSEMIDE 40 MG
40 TABLET ORAL DAILY
Refills: 0 | Status: DISCONTINUED | OUTPATIENT
Start: 2021-03-22 | End: 2021-03-22

## 2021-03-22 RX ADMIN — SPIRONOLACTONE 50 MILLIGRAM(S): 25 TABLET, FILM COATED ORAL at 06:21

## 2021-03-22 RX ADMIN — WARFARIN SODIUM 5 MILLIGRAM(S): 2.5 TABLET ORAL at 21:47

## 2021-03-22 RX ADMIN — HEPARIN SODIUM 800 UNIT(S)/HR: 5000 INJECTION INTRAVENOUS; SUBCUTANEOUS at 18:19

## 2021-03-22 RX ADMIN — PANTOPRAZOLE SODIUM 40 MILLIGRAM(S): 20 TABLET, DELAYED RELEASE ORAL at 18:21

## 2021-03-22 RX ADMIN — Medication 20 MILLIGRAM(S): at 06:19

## 2021-03-22 RX ADMIN — PANTOPRAZOLE SODIUM 40 MILLIGRAM(S): 20 TABLET, DELAYED RELEASE ORAL at 06:20

## 2021-03-22 RX ADMIN — Medication 50 MILLILITER(S): at 22:25

## 2021-03-22 RX ADMIN — SENNA PLUS 2 TABLET(S): 8.6 TABLET ORAL at 21:47

## 2021-03-22 RX ADMIN — POLYETHYLENE GLYCOL 3350 17 GRAM(S): 17 POWDER, FOR SOLUTION ORAL at 18:21

## 2021-03-22 NOTE — PROGRESS NOTE ADULT - ATTENDING COMMENTS
Hepatology Staff: Haydee Carvajal MD    I saw and examined the patient along with  Dr. Orellana 03-22-21 @ 15:57  Patient Medical Record, hosptial course was reviewed and summarized as below:    Vitals: Vital Signs Last 24 Hrs  T(C): 36.4 (22 Mar 2021 14:54), Max: 37 (22 Mar 2021 11:35)  T(F): 97.5 (22 Mar 2021 14:54), Max: 98.6 (22 Mar 2021 11:35)  HR: 81 (22 Mar 2021 14:54) (76 - 93)  BP: 102/67 (22 Mar 2021 14:54) (99/65 - 102/70)  BP(mean): --  RR: 18 (22 Mar 2021 14:54) (18 - 18)  SpO2: 96% (22 Mar 2021 14:54) (95% - 96%)    Labs:INR: 1.27 ratio (03-22-21 @ 07:42)  Creatinine, Serum: 0.53 mg/dL (03-22-21 @ 07:01)  Bilirubin Total, Serum: 1.8 mg/dL (03-22-21 @ 07:01)      I/O: I&O's Summary    21 Mar 2021 07:01  -  22 Mar 2021 07:00  --------------------------------------------------------  IN: 804 mL / OUT: 300 mL / NET: 504 mL    22 Mar 2021 07:01  -  22 Mar 2021 15:57  --------------------------------------------------------  IN: 21 mL / OUT: 0 mL / NET: 21 mL      Nutritional Status:   Albumin, Serum: 3.2 g/dL (03-22-21 @ 07:01)    Recommendations: Patient known to me from the clinic.  35-year-old female with cirrhosis of the liver suspected to be related to Budd-Chiari syndrome (recently diagnosed previously carried a diagnosis of cryptogenic cirrhosis), protein S deficiency, prior COVID-19 infection presented chest pain and abdominal distention.  Patient also has history of decompensated liver disease with ascites, portal hypertension with esophageal varices.  She was found on further evaluation to have a thrombus in the suprarenal IVC and SMV with moderate sized right pleural effusion and mild to moderate ascites.    Patient has been seen by Dr. Kelechi Venegas as an outpatient and who deferred starting anticoagulation.  Considering extensive thrombosis, anticoagulation is strongly indicated at this time.  Agree with initiation of IV heparin at this time.    Thoracentesis and paracentesis today.  Initiate Lasix 40 mg daily and Aldactone 100 mg daily.      Plan discussed with Primary team.

## 2021-03-22 NOTE — PROGRESS NOTE ADULT - PROBLEM SELECTOR PLAN 1
2/2 to Moderate pleural effusion, COVID-19 infection, possible underlying PNA  -Plan for mod pleural effusion, COVID-19 infection below  -On 2 L NC, wean down following thora,  -ECG Sinus tachy without evidence of ischemia,  trops <6  -Procal 0.11. Currently AF and elevated WBC. Can start Zosyn and BCx, SCx for coverage for PNA and SBP if febrile.   -Can consider TTE if orthopnea, pleuritic chest pain remains after thoracentesis

## 2021-03-22 NOTE — PROGRESS NOTE ADULT - PROBLEM SELECTOR PLAN 4
No Hx of HE, no hx of variceal bleed, no hx of SBP. Hx of cryptogenic cirrhosis and Budd Chiari, followed by Dr. Carvajal outpatient  -Diagnostic para tomorrow, pending PTT  -MELD-Na 13, 16 on admission, up from 10 outpatient   -Hep B Antibody positive, f/u Hep DNA  -Outpatient Hepatitis workup inconclusive: Hx of Hep A as a child, Hep B Core Antibody Positive but Hep B DNA neg. Otherwise neg  -Alpha1-Antitrypsin, Ceroplasmin neg  -Being worked up for liver transplantation outpatient  -Hepatology consulted, appreciate recs No Hx of HE, no hx of variceal bleed, no hx of SBP. Hx of cryptogenic cirrhosis and Budd Chiari, followed by Dr. Carvajal outpatient  -Diagnostic para today, hep ggt held 930AM  -MELD-Na 13, 16 on admission, up from 10 outpatient   -Hep B Antibody positive, f/u Hep DNA  -Outpatient Hepatitis workup inconclusive: Hx of Hep A as a child, Hep B Core Antibody Positive but Hep B DNA neg. Otherwise neg  -Alpha1-Antitrypsin, Ceroplasmin neg  -Being worked up for liver transplantation outpatient  -Hepatology consulted, appreciate recs

## 2021-03-22 NOTE — PROCEDURE NOTE - PLAN
Status post right thoracentesis (removed 650 cc clear yellow fluid) and right lower quadrant paracentesis (removed 600 cc clear yellow fluid).  Specimens from both sent for culture and cytology.  Followup chest x-ray result s/p right thoracentesis

## 2021-03-22 NOTE — PROGRESS NOTE ADULT - PROBLEM SELECTOR PLAN 2
Hypercoagulable in setting of Budd-Chiari syndrome (no previously on AC), Protein S Deficiency, COVID-19, ? ANGELICA syndrome (hx of multiple miscarriages)  -No PE seen on CTA, hemodynamically stable outside of O2  -C/w Heparin gtt, transition to Coumadin after procedures  -F/u Lupus profile for likely APLA: Anticardiolipin, Beta2-glycoprotein, Lupus anticoagulant  -F/u TEG   -Vascular Sx consulted, no surgical intervention at this time, hemodynamically stable  -Heme following, appreciate recs Hypercoagulable in setting of Budd-Chiari syndrome (no previously on AC), Protein S Deficiency, COVID-19, ? ANGELICA syndrome (hx of multiple miscarriages)  -No PE seen on CTA, hemodynamically stable outside of O2  -C/w Heparin gtt, transition to Coumadin after procedures  -F/u Lupus profile for likely APLA: Anticardiolipin, Beta2-glycoprotein, Lupus AC  -TEG resulted  -Vascular Sx consulted, no surgical intervention at this time, hemodynamically stable  -Hep to coumadin bridge post IR interventions today  -Heme following, appreciate recs

## 2021-03-22 NOTE — PROGRESS NOTE ADULT - ASSESSMENT
35F w/ cryptogenic cirrhosis decompensated by ascites and small non-bleeding varices, Budd-Chiari syndrome, hypercoagulable disorder with multiple clots and multiple spontaneous abortions, recent dx of COVID-19 PNA, presenting w/ chest tightness, dyspnea, and abd distension, found to have clot in IVC and portal confluence.    Impression:  #Decompensated cirrhosis  - varices: small, short column on last EGD 2017, not on beta blockers due to small size and low BP  - ascites: history of ascites, previously resolved, recurrent likely due to PV thrombus  - HE: none  - HCC: LIRADS 4 and LIRADS 3 lesion seen on MRI 12/2020, stable from prior, AFP wnl 2/2021  - MELD-Na = 16 on 3/20/2020  #IVC thrombus  #Portal confluence thrombus  #Hx of hypercoagulable disorder  #COVID-19 PNA - on nasal cannula     Recommendations:  - Agree w/ hep gtt for thrombi  - Plan to eventually start Coumadin per hematology  - Recommend diagnostic paracentesis, please send cell count, total protein, albumin  - Agree w/ thoracentesis at the same time, please send same fluid studies as above to evaluate for hepatic hydrothorax  - C/w lasix 20mg PO daily and spironolactone 50mg daily, trend Cr/lytes closely  - Trend CBC, CMP, INR daily    Kane Orellana  Gastroenterology Fellow  NON-URGENT CONSULTS:  Please email [email or web address]  [email or web address]  AT NIGHT AND ON WEEKENDS:  Contact on-call GI fellow via answering service ([phone]) from 5pm-8am and on weekends/holidays  MONDAY-FRIDAY 8AM-5PM:  Available via [employer] Teams  Pager# [phone] ([facility]) or [phone] ([facility])  GI Phone# [phone] ([facility])

## 2021-03-22 NOTE — PRE PROCEDURE NOTE - PRE PROCEDURE EVALUATION
HPI:  Patient is a 35 year old female with PMHx of cryptogenic cirrhosis, Budd Chiari syndrome, Protein S deficiency, COVID-19 infection who presents with complaints of chest painx 3 days and abdominal bloating and tightness for 5 days. Chest pain described as chest tightness, across the chest with radiation to left side, worsens with changes in positions and non-productive cough. Denies abdominal pain but has abdominal discomfort with worsening distension. Per patient, she was diagnosed with COVID on 3/4 and since then has had high fevers (102-103F), dyspnea on minimal exertion (previously able to ambulate normally-several blocks and stairs without SOB). Chest pain worsened to 9/10, more constant, prompting her to come into the ED.     Patient follows with Dr. Carvajal for outpatient Hepatology and Dr. Venegas for Hematology. Previous work up from outpatient hepatology: No immunity towards hepatitis B (although Hepatitis B Core IgG is +) suggesting past infection or possible false positive test. HBV DNA is negative. Hepatitis C antibody was negative. Srvh-0-vqvrzmvxhkf level was 246 mg/dl. Autoimmune markers were negative for antimitochondrial antibody, anti-smooth muscle antibody, anti-nuclear antibody, Ceruloplasmin level 52 mg/dL. Factor V Leiden neg, Ivan 2 Neg, Prothrombin gene neg. MRI abdomen showing heterogenous liver with branches of hepatic vein. EGD 6/2017 Grade A esophageal varices, gastropathy. Abdomen U/S for cirrhosis, mild splenomegaly, trace ascites.   In ED, vitals 97.7, tachycardic to 102, 110/67, tachpneic to 23, Satting 91% on RA--> 97% on 2L NC. S/p 2 g Ceftriaxone, NS 1 L bolus.    (20 Mar 2021 07:31)      NPO status:   Anticoagulation:  Antibiotics:     Allergies:No Known Allergies      PAST MEDICAL & SURGICAL HISTORY:  H/O protein S deficiency    Budd-Chiari syndrome    Cirrhosis          Pertinent labs:                      11.5   3.72  )-----------( 144      ( 21 Mar 2021 07:49 )             36.5   03-22    135  |  99  |  13  ----------------------------<  80  4.0   |  24  |  0.53    Ca    8.6      22 Mar 2021 07:01  Phos  3.2     03-22  Mg     2.0     03-22    TPro  6.4  /  Alb  3.2<L>  /  TBili  1.8<H>  /  DBili  0.6<H>  /  AST  24  /  ALT  14  /  AlkPhos  147<H>  03-22  PT/INR - ( 22 Mar 2021 07:42 )   PT: 14.9 sec;   INR: 1.27 ratio         PTT - ( 22 Mar 2021 07:42 )  PTT:52.4 sec    Consent: Procedure/risks/ Benefits explained. Informed consent obtained using Tristanian . Pt verbalizes understanding.            HPI:  Patient is a 35 year old female with PMHx of cryptogenic cirrhosis, Budd Chiari syndrome, Protein S deficiency, COVID-19 infection who presents with complaints of chest painx 3 days and abdominal bloating and tightness for 5 days. Chest pain described as chest tightness, across the chest with radiation to left side, worsens with changes in positions and non-productive cough. Denies abdominal pain but has abdominal discomfort with worsening distension. Per patient, she was diagnosed with COVID on 3/4 and since then has had high fevers (102-103F), dyspnea on minimal exertion (previously able to ambulate normally-several blocks and stairs without SOB). Chest pain worsened to 9/10, more constant, prompting her to come into the ED.     Patient follows with Dr. Carvajal for outpatient Hepatology and Dr. Venegas for Hematology. Previous work up from outpatient hepatology: No immunity towards hepatitis B (although Hepatitis B Core IgG is +) suggesting past infection or possible false positive test. HBV DNA is negative. Hepatitis C antibody was negative. Kfff-1-nqiykdscaaw level was 246 mg/dl. Autoimmune markers were negative for antimitochondrial antibody, anti-smooth muscle antibody, anti-nuclear antibody, Ceruloplasmin level 52 mg/dL. Factor V Leiden neg, Ivan 2 Neg, Prothrombin gene neg. MRI abdomen showing heterogenous liver with branches of hepatic vein. EGD 6/2017 Grade A esophageal varices, gastropathy. Abdomen U/S for cirrhosis, mild splenomegaly, trace ascites.   In ED, vitals 97.7, tachycardic to 102, 110/67, tachpneic to 23, Satting 91% on RA--> 97% on 2L NC. S/p 2 g Ceftriaxone, NS 1 L bolus.     IR consulted for right thoracentesis and paracentesis.     NPO status: N/A  Anticoagulation: Coumadin (has IVC thrombus)  Antibiotics: Completed dose of Rocephin IV    Allergies: No Known Allergies      PAST MEDICAL & SURGICAL HISTORY:  H/O protein S deficiency    Budd-Chiari syndrome    Cirrhosis          Pertinent labs:                      11.5   3.72  )-----------( 144      ( 21 Mar 2021 07:49 )             36.5   03-22    135  |  99  |  13  ----------------------------<  80  4.0   |  24  |  0.53    Ca    8.6      22 Mar 2021 07:01  Phos  3.2     03-22  Mg     2.0     03-22    TPro  6.4  /  Alb  3.2<L>  /  TBili  1.8<H>  /  DBili  0.6<H>  /  AST  24  /  ALT  14  /  AlkPhos  147<H>  03-22  PT/INR - ( 22 Mar 2021 07:42 )   PT: 14.9 sec;   INR: 1.27 ratio      PTT - ( 22 Mar 2021 07:42 )  PTT:52.4 sec    Consent: Procedure/risks/ Benefits explained. Informed consent obtained using Bahraini . Pt verbalizes understanding.

## 2021-03-22 NOTE — PROGRESS NOTE ADULT - PROBLEM SELECTOR PLAN 5
Large Moderate pleural effusion, likely in setting of hepatic hydrothorax  -PRN pain regimen for pleuritic chest pain  -IR guided thoracentesis tomorrow   -O2 supplementation, wean down as tolerated Large Moderate pleural effusion, likely in setting of hepatic hydrothorax  -PRN pain regimen for pleuritic chest pain  -IR guided thoracentesis 3/22  -O2 supplementation, wean down as tolerated

## 2021-03-22 NOTE — PROGRESS NOTE ADULT - ATTENDING COMMENTS
Pt seen and examined. Agree with above with additional findings:    # decompensated cirrhosis with ascites  # pleural effusion/hepatic hydrothorax  # IVC thrombosis  # COVID19    - plan for IR guided diagnostic/therapeutic thora and paracentesis  - continue on heparin gtt; highly concern for APLS syndrome; heme recommends transition to coumadin  - appreciate hepatology: continue lasix/spironolactone  - pt out of window for monoclonal Ab or remdesivir    Shasha Palencia MD  Division of Hospital Medicine  Cell: [phone]  Office: [phone]

## 2021-03-22 NOTE — PROGRESS NOTE ADULT - SUBJECTIVE AND OBJECTIVE BOX
PROGRESS NOTE:     Contact Information:  Katherine Parmar DO PGY1   Pager [phone] [facility]   Please call cover for TEAM 3 if after hours     Patient is a 35y old  Female who presents with a chief complaint of abdominal pain (22 Mar 2021 08:06)    OVERNIGHT EVENTS / SUBJECTIVE:  ADDITIONAL REVIEW OF SYSTEMS: see above     MEDICATIONS  (STANDING):  ascorbic acid 500 milliGRAM(s) Oral daily  furosemide    Tablet 20 milliGRAM(s) Oral daily  heparin  Infusion.  Unit(s)/Hr (10 mL/Hr) IV Continuous <Continuous>  multivitamin/minerals 1 Tablet(s) Oral daily  pantoprazole  Injectable 40 milliGRAM(s) IV Push two times a day  spironolactone 50 milliGRAM(s) Oral daily    MEDICATIONS  (PRN):  acetaminophen   Tablet .. 650 milliGRAM(s) Oral every 8 hours PRN Mild Pain (1 - 3)  heparin   Injectable 4000 Unit(s) IV Push every 6 hours PRN For aPTT less than 40  heparin   Injectable 2000 Unit(s) IV Push every 6 hours PRN For aPTT between 40 - 57  lidocaine   Patch 1 Patch Transdermal daily PRN Mild Pain (1-3)  melatonin 3 milliGRAM(s) Oral at bedtime PRN Insomnia  oxycodone    5 mG/acetaminophen 325 mG 1 Tablet(s) Oral every 8 hours PRN Moderate Pain (4 - 6)    Allergies  No Known Allergies    PHYSICAL EXAM:  Vital Signs Last 24 Hrs  T(C): 36.9 (22 Mar 2021 05:46), Max: 36.9 (22 Mar 2021 05:46)  T(F): 98.4 (22 Mar 2021 05:46), Max: 98.4 (22 Mar 2021 05:46)  HR: 93 (22 Mar 2021 05:46) (87 - 93)  BP: 99/65 (22 Mar 2021 05:46) (99/65 - 102/70)  BP(mean): --  RR: 18 (22 Mar 2021 05:46) (18 - 19)  SpO2: 95% (22 Mar 2021 05:46) (95% - 95%)    GENERAL: No acute distress, well-developed  HEAD:  Atraumatic, Normocephalic  EYES: EOMI, PERRLA, conjunctiva and sclera clear  EARS: symmetrical, no tenderness, no discharge  NECK: Supple, no lymphadenopathy, no JVD  CHEST/LUNG: CTAB; No wheezes, rales, or rhonchi  HEART: Regular rate and rhythm; No murmurs, rubs, or gallops  ABDOMEN: Soft, non-tender, non-distended; normal bowel sounds, no organomegaly  EXTREMITIES:  2+ peripheral pulses b/l, No clubbing, cyanosis, or edema  NEUROLOGY: A&O x 3, no focal deficits  SKIN: No rashes or lesions    PATIENT DATA:    I&O's Summary    21 Mar 2021 07:01  -  22 Mar 2021 07:00  --------------------------------------------------------  IN: 804 mL / OUT: 300 mL / NET: 504 mL      LABS:                        11.5   3.72  )-----------( 144      ( 21 Mar 2021 07:49 )             36.5     03-22    135  |  99  |  13  ----------------------------<  80  4.0   |  24  |  0.53    Ca    8.6      22 Mar 2021 07:01  Phos  3.2     03-22  Mg     2.0     03-22    TPro  6.4  /  Alb  3.2<L>  /  TBili  1.8<H>  /  DBili  0.6<H>  /  AST  24  /  ALT  14  /  AlkPhos  147<H>  03-22    PT/INR - ( 22 Mar 2021 07:42 )   PT: 14.9 sec;   INR: 1.27 ratio    PTT - ( 22 Mar 2021 07:42 )  PTT:52.4 sec      RADIOLOGY & ADDITIONAL TESTS:  Pertinent & New Results:     COORDINATION OF CARE:   Consultants Status: [ON BOARD/ SIGNED OFF]  Consultants Spoke With:   Consults Pending:   Telemetry: YES/NO PROGRESS NOTE:     Contact Information:  Katherine Parmar DO PGY1   Pager [phone] [facility]   Please call cover for TEAM 3 if after hours     Patient is a 35y old  Female who presents with a chief complaint of abdominal pain (22 Mar 2021 08:06)    OVERNIGHT EVENTS / SUBJECTIVE: Pt seen at bedside. Had first inpt BM hs, hard but improvement in abdominal distention (still present in s/o pending para). Plans for IR thora/para this AM, Hep ggt to be held 930AM.  ADDITIONAL REVIEW OF SYSTEMS: see above     MEDICATIONS  (STANDING):  ascorbic acid 500 milliGRAM(s) Oral daily  furosemide    Tablet 20 milliGRAM(s) Oral daily  heparin  Infusion.  Unit(s)/Hr (10 mL/Hr) IV Continuous <Continuous>  multivitamin/minerals 1 Tablet(s) Oral daily  pantoprazole  Injectable 40 milliGRAM(s) IV Push two times a day  spironolactone 50 milliGRAM(s) Oral daily    MEDICATIONS  (PRN):  acetaminophen   Tablet .. 650 milliGRAM(s) Oral every 8 hours PRN Mild Pain (1 - 3)  heparin   Injectable 4000 Unit(s) IV Push every 6 hours PRN For aPTT less than 40  heparin   Injectable 2000 Unit(s) IV Push every 6 hours PRN For aPTT between 40 - 57  lidocaine   Patch 1 Patch Transdermal daily PRN Mild Pain (1-3)  melatonin 3 milliGRAM(s) Oral at bedtime PRN Insomnia  oxycodone    5 mG/acetaminophen 325 mG 1 Tablet(s) Oral every 8 hours PRN Moderate Pain (4 - 6)    Allergies  No Known Allergies    PHYSICAL EXAM:  Vital Signs Last 24 Hrs  T(C): 36.9 (22 Mar 2021 05:46), Max: 36.9 (22 Mar 2021 05:46)  T(F): 98.4 (22 Mar 2021 05:46), Max: 98.4 (22 Mar 2021 05:46)  HR: 93 (22 Mar 2021 05:46) (87 - 93)  BP: 99/65 (22 Mar 2021 05:46) (99/65 - 102/70)  BP(mean): --  RR: 18 (22 Mar 2021 05:46) (18 - 19)  SpO2: 95% (22 Mar 2021 05:46) (95% - 95%)    GENERAL: No acute distress, well-developed  HEAD:  Atraumatic, Normocephalic  EYES: EOMI, PERRLA, conjunctiva and sclera clear  EARS: symmetrical, no tenderness, no discharge  NECK: Supple, no lymphadenopathy, no JVD  CHEST/LUNG: NC O2, decreased breath sounds bL   HEART: Regular rate and rhythm; No murmurs, rubs, or gallops  ABDOMEN: Soft, non-tender, distended; bowel sounds  EXTREMITIES:  2+ peripheral pulses b/l, No clubbing, cyanosis, or edema  NEUROLOGY: A&O x 3, no focal deficits  SKIN: No rashes or lesions    I&O's Summary  21 Mar 2021 07:01  -  22 Mar 2021 07:00  --------------------------------------------------------  IN: 804 mL / OUT: 300 mL / NET: 504 mL                          11.5   3.72  )-----------( 144      ( 21 Mar 2021 07:49 )             36.5     03-22    135  |  99  |  13  ----------------------------<  80  4.0   |  24  |  0.53    Ca    8.6      22 Mar 2021 07:01  Phos  3.2     03-22  Mg     2.0     03-22    TPro  6.4  /  Alb  3.2<L>  /  TBili  1.8<H>  /  DBili  0.6<H>  /  AST  24  /  ALT  14  /  AlkPhos  147<H>  03-22    PT/INR - ( 22 Mar 2021 07:42 )   PT: 14.9 sec;   INR: 1.27 ratio    PTT - ( 22 Mar 2021 07:42 )  PTT:52.4 sec

## 2021-03-22 NOTE — PROGRESS NOTE ADULT - SUBJECTIVE AND OBJECTIVE BOX
Chief Complaint:  Patient is a 35y old  Female who presents with a chief complaint of abdominal pain (21 Mar 2021 20:05)    Reason for consult: cirrhosis, ascites    Interval Events: No new events, pt remains on 2L NC, waiting IR for para/thora today.     Hospital Medications:  acetaminophen   Tablet .. 650 milliGRAM(s) Oral every 8 hours PRN  ascorbic acid 500 milliGRAM(s) Oral daily  furosemide    Tablet 20 milliGRAM(s) Oral daily  heparin   Injectable 4000 Unit(s) IV Push every 6 hours PRN  heparin   Injectable 2000 Unit(s) IV Push every 6 hours PRN  heparin  Infusion.  Unit(s)/Hr IV Continuous <Continuous>  lidocaine   Patch 1 Patch Transdermal daily PRN  melatonin 3 milliGRAM(s) Oral at bedtime PRN  multivitamin/minerals 1 Tablet(s) Oral daily  oxycodone    5 mG/acetaminophen 325 mG 1 Tablet(s) Oral every 8 hours PRN  pantoprazole  Injectable 40 milliGRAM(s) IV Push two times a day  spironolactone 50 milliGRAM(s) Oral daily      ROS:   General:  No  fevers, chills, night sweats, fatigue  Eyes:  Good vision, no reported pain  ENT:  No sore throat, pain, runny nose  CV:  No pain, palpitations  Pulm:  No dyspnea, cough  GI:  See HPI, otherwise negative  :  No  incontinence, nocturia  Muscle:  No pain, weakness  Neuro:  No memory problems  Psych:  No insomnia, mood problems, depression  Endocrine:  No polyuria, polydipsia, cold/heat intolerance  Heme:  No petechiae, ecchymosis, easy bruisability  Skin:  No rash    PHYSICAL EXAM:   Vital Signs:  Vital Signs Last 24 Hrs  T(C): 36.9 (22 Mar 2021 05:46), Max: 36.9 (22 Mar 2021 05:46)  T(F): 98.4 (22 Mar 2021 05:46), Max: 98.4 (22 Mar 2021 05:46)  HR: 93 (22 Mar 2021 05:46) (87 - 93)  BP: 99/65 (22 Mar 2021 05:46) (99/65 - 102/70)  BP(mean): --  RR: 18 (22 Mar 2021 05:46) (18 - 19)  SpO2: 95% (22 Mar 2021 05:46) (95% - 95%)  Daily     Daily     GENERAL: no acute distress  NEURO: alert  HEENT: anicteric sclera, no conjunctival pallor appreciated  CHEST: no respiratory distress, no accessory muscle use  CARDIAC: regular rate, rhythm  ABDOMEN: soft, non-tender, non-distended, no rebound or guarding  EXTREMITIES: warm, well perfused, no edema  SKIN: no lesions noted    LABS: reviewed                        11.5   3.72  )-----------( 144      ( 21 Mar 2021 07:49 )             36.5     03-22    135  |  99  |  13  ----------------------------<  80  4.0   |  24  |  0.53    Ca    8.6      22 Mar 2021 07:01  Phos  3.2     03-22  Mg     2.0     03-22    TPro  6.4  /  Alb  3.2<L>  /  TBili  1.8<H>  /  DBili  0.6<H>  /  AST  24  /  ALT  14  /  AlkPhos  147<H>  03-22    LIVER FUNCTIONS - ( 22 Mar 2021 07:01 )  Alb: 3.2 g/dL / Pro: 6.4 g/dL / ALK PHOS: 147 U/L / ALT: 14 U/L / AST: 24 U/L / GGT: x             Interval Diagnostic Studies: see sunrise for full report

## 2021-03-22 NOTE — CHART NOTE - NSCHARTNOTEFT_GEN_A_CORE
Magaly Williamson MD-PGY3  Department of Internal Medicine  Pager [phone]                                          PRE-INTERVENTIONAL RADIOLOGY PROCEDURE NOTE      Patient Age: 35    Patient Gender: F    Procedure: diagnostic thoracentesis and paracentesis     Diagnosis/Indication: R sided ascites and hydrothorax     Interventional Radiology Attending Physician:    Ordering Attending Physician: Dr. Palencia    Pertinent Medical History: cryptogenic cirrhosis, suprarenal IVC and SMV Thrombus    Pertinent labs:                      11.5   3.72  )-----------( 144      ( 21 Mar 2021 07:49 )             36.5       03-22    135  |  99  |  13  ----------------------------<  80  4.0   |  24  |  0.53    Ca    8.6      22 Mar 2021 07:01  Phos  3.2     03-22  Mg     2.0     03-22    TPro  6.4  /  Alb  3.2<L>  /  TBili  1.8<H>  /  DBili  0.6<H>  /  AST  24  /  ALT  14  /  AlkPhos  147<H>  03-22      PT/INR - ( 22 Mar 2021 07:42 )   PT: 14.9 sec;   INR: 1.27 ratio         PTT - ( 22 Mar 2021 07:42 )  PTT:52.4 sec        Patient and Family Aware ? Yes Magaly Williamson MD-PGY3  Department of Internal Medicine  Pager [phone]                                          PRE-INTERVENTIONAL RADIOLOGY PROCEDURE NOTE      Patient Age: 35    Patient Gender: F    Procedure: diagnostic thoracentesis and paracentesis     Diagnosis/Indication: R sided ascites and hydrothorax     Interventional Radiology Attending Physician: Dr. Contreras    Ordering Attending Physician: Dr. Palencia    Pertinent Medical History: cryptogenic cirrhosis, suprarenal IVC and SMV Thrombus    Pertinent labs:                      11.5   3.72  )-----------( 144      ( 21 Mar 2021 07:49 )             36.5       03-22    135  |  99  |  13  ----------------------------<  80  4.0   |  24  |  0.53    Ca    8.6      22 Mar 2021 07:01  Phos  3.2     03-22  Mg     2.0     03-22    TPro  6.4  /  Alb  3.2<L>  /  TBili  1.8<H>  /  DBili  0.6<H>  /  AST  24  /  ALT  14  /  AlkPhos  147<H>  03-22      PT/INR - ( 22 Mar 2021 07:42 )   PT: 14.9 sec;   INR: 1.27 ratio         PTT - ( 22 Mar 2021 07:42 )  PTT:52.4 sec        Patient and Family Aware ? Yes

## 2021-03-22 NOTE — PROGRESS NOTE ADULT - ASSESSMENT
Patient is a 35 year old [place]i-speaking female with PMHx of cryptogenic cirrhosis, Budd Chiari syndrome, Protein S deficiency, COVID-19 infection who presents with complaints of chest painx 3 days and abdominal bloating and tightness for 5 days, found to have thrombi in suprarenal IVC and SMV extending to confluence of splenic vein and portal vein, on Heparin gtt and moderate R pleural effusion, likely 2/2 hepatic hydrothorax. Pending IR guided thoracentesis and diagnostic paracentesis.       Heparin ggt stopped 930AM, 2 hour prior to procedure per IR. Nurse made aware.

## 2021-03-22 NOTE — PROGRESS NOTE ADULT - PROBLEM SELECTOR PLAN 3
MRI 12/7/2020 demonstrated an Enlarged heterogeneous cirrhotic liver with diffuse patchy enhancement and poor delineation of the hepatic veins suggesting underlying Budd-Chiari syndrome.  -Not on AC due to bleeding risk in setting of extensive portal hypertension with paraesophageal varices and splenorenal varices.  -Plan for cirrhosis below  -Plan for IVC thrombosis above  -Hepatology consulted, appreciate recs  -Heme following, appreciate recs MRI 12/7/2020 demonstrated an Enlarged heterogeneous cirrhotic liver with diffuse patchy enhancement and poor delineation of the hepatic veins suggesting underlying Budd-Chiari syndrome.  -prior was not on AC due to bleeding risk in setting of extensive portal hypertension with paraesophageal varices and splenorenal varices.  -Plan for cirrhosis below  -Plan for IVC thrombosis above  -Hepatology consulted, appreciate recs  -Heme following, appreciate recs

## 2021-03-23 ENCOUNTER — APPOINTMENT (OUTPATIENT)
Dept: MRI IMAGING | Facility: CLINIC | Age: 36
End: 2021-03-23

## 2021-03-23 DIAGNOSIS — R07.9 CHEST PAIN, UNSPECIFIED: ICD-10-CM

## 2021-03-23 DIAGNOSIS — K59.00 CONSTIPATION, UNSPECIFIED: ICD-10-CM

## 2021-03-23 LAB
ALBUMIN SERPL ELPH-MCNC: 4.4 G/DL — SIGNIFICANT CHANGE UP (ref 3.3–5)
ALP SERPL-CCNC: 160 U/L — HIGH (ref 40–120)
ALT FLD-CCNC: 13 U/L — SIGNIFICANT CHANGE UP (ref 10–45)
ANION GAP SERPL CALC-SCNC: 18 MMOL/L — HIGH (ref 5–17)
APPEARANCE UR: CLEAR — SIGNIFICANT CHANGE UP
APTT BLD: 76.1 SEC — HIGH (ref 27.5–35.5)
APTT BLD: 84.4 SEC — HIGH (ref 27.5–35.5)
AST SERPL-CCNC: 30 U/L — SIGNIFICANT CHANGE UP (ref 10–40)
BACTERIA # UR AUTO: NEGATIVE — SIGNIFICANT CHANGE UP
BILIRUB SERPL-MCNC: 2.4 MG/DL — HIGH (ref 0.2–1.2)
BILIRUB UR-MCNC: NEGATIVE — SIGNIFICANT CHANGE UP
BUN SERPL-MCNC: 10 MG/DL — SIGNIFICANT CHANGE UP (ref 7–23)
CALCIUM SERPL-MCNC: 9.8 MG/DL — SIGNIFICANT CHANGE UP (ref 8.4–10.5)
CHLORIDE SERPL-SCNC: 96 MMOL/L — SIGNIFICANT CHANGE UP (ref 96–108)
CO2 SERPL-SCNC: 23 MMOL/L — SIGNIFICANT CHANGE UP (ref 22–31)
COLOR SPEC: YELLOW — SIGNIFICANT CHANGE UP
CREAT SERPL-MCNC: 0.58 MG/DL — SIGNIFICANT CHANGE UP (ref 0.5–1.3)
CRP SERPL-MCNC: 2.78 MG/DL — HIGH (ref 0–0.4)
D DIMER BLD IA.RAPID-MCNC: 844 NG/ML DDU — HIGH
DIFF PNL FLD: ABNORMAL
EPI CELLS # UR: 3 /HPF — SIGNIFICANT CHANGE UP
FERRITIN SERPL-MCNC: 104 NG/ML — SIGNIFICANT CHANGE UP (ref 15–150)
GLUCOSE SERPL-MCNC: 85 MG/DL — SIGNIFICANT CHANGE UP (ref 70–99)
GLUCOSE UR QL: NEGATIVE — SIGNIFICANT CHANGE UP
HBV DNA # SERPL NAA+PROBE: SIGNIFICANT CHANGE UP IU/ML
HBV DNA SERPL NAA+PROBE-LOG#: SIGNIFICANT CHANGE UP LOG10IU/ML
HCT VFR BLD CALC: 41.8 % — SIGNIFICANT CHANGE UP (ref 34.5–45)
HGB BLD-MCNC: 13 G/DL — SIGNIFICANT CHANGE UP (ref 11.5–15.5)
HYALINE CASTS # UR AUTO: 1 /LPF — SIGNIFICANT CHANGE UP (ref 0–2)
INR BLD: 1.34 RATIO — HIGH (ref 0.88–1.16)
KETONES UR-MCNC: NEGATIVE — SIGNIFICANT CHANGE UP
LEUKOCYTE ESTERASE UR-ACNC: NEGATIVE — SIGNIFICANT CHANGE UP
MAGNESIUM SERPL-MCNC: 1.7 MG/DL — SIGNIFICANT CHANGE UP (ref 1.6–2.6)
MCHC RBC-ENTMCNC: 27.4 PG — SIGNIFICANT CHANGE UP (ref 27–34)
MCHC RBC-ENTMCNC: 31.1 GM/DL — LOW (ref 32–36)
MCV RBC AUTO: 88.2 FL — SIGNIFICANT CHANGE UP (ref 80–100)
NITRITE UR-MCNC: NEGATIVE — SIGNIFICANT CHANGE UP
NRBC # BLD: 0 /100 WBCS — SIGNIFICANT CHANGE UP (ref 0–0)
PH UR: 7.5 — SIGNIFICANT CHANGE UP (ref 5–8)
PHOSPHATE SERPL-MCNC: 3.4 MG/DL — SIGNIFICANT CHANGE UP (ref 2.5–4.5)
PLATELET # BLD AUTO: 169 K/UL — SIGNIFICANT CHANGE UP (ref 150–400)
POTASSIUM SERPL-MCNC: 4.1 MMOL/L — SIGNIFICANT CHANGE UP (ref 3.5–5.3)
POTASSIUM SERPL-SCNC: 4.1 MMOL/L — SIGNIFICANT CHANGE UP (ref 3.5–5.3)
PROCALCITONIN SERPL-MCNC: 0.09 NG/ML — SIGNIFICANT CHANGE UP (ref 0.02–0.1)
PROT SERPL-MCNC: 7.9 G/DL — SIGNIFICANT CHANGE UP (ref 6–8.3)
PROT UR-MCNC: NEGATIVE — SIGNIFICANT CHANGE UP
PROTHROM AB SERPL-ACNC: 15.6 SEC — HIGH (ref 10.6–13.6)
RBC # BLD: 4.74 M/UL — SIGNIFICANT CHANGE UP (ref 3.8–5.2)
RBC # FLD: 14.6 % — HIGH (ref 10.3–14.5)
RBC CASTS # UR COMP ASSIST: 3 /HPF — SIGNIFICANT CHANGE UP (ref 0–4)
SODIUM SERPL-SCNC: 137 MMOL/L — SIGNIFICANT CHANGE UP (ref 135–145)
SP GR SPEC: 1.01 — SIGNIFICANT CHANGE UP (ref 1.01–1.02)
UROBILINOGEN FLD QL: ABNORMAL
WBC # BLD: 3.48 K/UL — LOW (ref 3.8–10.5)
WBC # FLD AUTO: 3.48 K/UL — LOW (ref 3.8–10.5)
WBC UR QL: 1 /HPF — SIGNIFICANT CHANGE UP (ref 0–5)

## 2021-03-23 PROCEDURE — 71045 X-RAY EXAM CHEST 1 VIEW: CPT | Mod: 26

## 2021-03-23 PROCEDURE — 99223 1ST HOSP IP/OBS HIGH 75: CPT

## 2021-03-23 PROCEDURE — 99233 SBSQ HOSP IP/OBS HIGH 50: CPT | Mod: GC

## 2021-03-23 PROCEDURE — 99232 SBSQ HOSP IP/OBS MODERATE 35: CPT

## 2021-03-23 PROCEDURE — 93306 TTE W/DOPPLER COMPLETE: CPT | Mod: 26

## 2021-03-23 RX ORDER — WARFARIN SODIUM 2.5 MG/1
5 TABLET ORAL ONCE
Refills: 0 | Status: COMPLETED | OUTPATIENT
Start: 2021-03-23 | End: 2021-03-23

## 2021-03-23 RX ORDER — PANTOPRAZOLE SODIUM 20 MG/1
40 TABLET, DELAYED RELEASE ORAL
Refills: 0 | Status: DISCONTINUED | OUTPATIENT
Start: 2021-03-23 | End: 2021-03-25

## 2021-03-23 RX ORDER — IPRATROPIUM/ALBUTEROL SULFATE 18-103MCG
3 AEROSOL WITH ADAPTER (GRAM) INHALATION ONCE
Refills: 0 | Status: DISCONTINUED | OUTPATIENT
Start: 2021-03-23 | End: 2021-03-25

## 2021-03-23 RX ADMIN — Medication 50 MILLILITER(S): at 05:53

## 2021-03-23 RX ADMIN — PANTOPRAZOLE SODIUM 40 MILLIGRAM(S): 20 TABLET, DELAYED RELEASE ORAL at 17:26

## 2021-03-23 RX ADMIN — POLYETHYLENE GLYCOL 3350 17 GRAM(S): 17 POWDER, FOR SOLUTION ORAL at 11:52

## 2021-03-23 RX ADMIN — PANTOPRAZOLE SODIUM 40 MILLIGRAM(S): 20 TABLET, DELAYED RELEASE ORAL at 05:52

## 2021-03-23 RX ADMIN — SPIRONOLACTONE 100 MILLIGRAM(S): 25 TABLET, FILM COATED ORAL at 05:52

## 2021-03-23 RX ADMIN — HEPARIN SODIUM 800 UNIT(S)/HR: 5000 INJECTION INTRAVENOUS; SUBCUTANEOUS at 10:54

## 2021-03-23 RX ADMIN — Medication 500 MILLIGRAM(S): at 11:52

## 2021-03-23 RX ADMIN — WARFARIN SODIUM 5 MILLIGRAM(S): 2.5 TABLET ORAL at 21:58

## 2021-03-23 RX ADMIN — HEPARIN SODIUM 800 UNIT(S)/HR: 5000 INJECTION INTRAVENOUS; SUBCUTANEOUS at 01:37

## 2021-03-23 RX ADMIN — Medication 1 TABLET(S): at 11:52

## 2021-03-23 RX ADMIN — SPIRONOLACTONE 100 MILLIGRAM(S): 25 TABLET, FILM COATED ORAL at 01:38

## 2021-03-23 RX ADMIN — Medication 40 MILLIGRAM(S): at 05:52

## 2021-03-23 RX ADMIN — SENNA PLUS 2 TABLET(S): 8.6 TABLET ORAL at 21:58

## 2021-03-23 RX ADMIN — Medication 40 MILLIGRAM(S): at 01:38

## 2021-03-23 NOTE — PROGRESS NOTE ADULT - PROBLEM SELECTOR PLAN 2
Hypercoagulable in setting of Budd-Chiari syndrome (no previously on AC), Protein S Deficiency, COVID-19, ? 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

## 2021-03-23 NOTE — DIETITIAN INITIAL EVALUATION ADULT. - CHIEF COMPLAINT
"Found + suprarenal IVC thrombi, ascites, R pleural effusion likely hepatic hydrothorax. S/p IR thoracentesis and diagnostic paracentesis 3/23"

## 2021-03-23 NOTE — DIETITIAN INITIAL EVALUATION ADULT. - PROBLEM SELECTOR PLAN 2
Hypercoagulable in setting of Budd-Chiari syndrome, Protein S Deficiency, COVID-19, ? ANGELICA syndrome (hx of multiple miscarriages)  -No PE seen on CTA, hemodynamically stable outside of O2  -Not previously on AC  -C/w Heparin gtt  -F/u Anticardiolipin, Beta2-glycoprotein, Lupus anticoagulant  -Vascular Sx consulted, appreciate recs

## 2021-03-23 NOTE — PROGRESS NOTE ADULT - PROBLEM SELECTOR PLAN 8
Diet: Regular diet  DVT: Heparin gtt  Dispo: Pending clinical course, GOC conversation ongoing, FULL CODE - started on bowel regimen  - monitor stool count

## 2021-03-23 NOTE — PROVIDER CONTACT NOTE (OTHER) - ACTION/TREATMENT ORDERED:
Team notified and will put inh orders for EKG and labs. Per team, restart heparin gtt at rate of 7 and follow up per protocol with new lab results.
Collect UA. Continue monitor patient

## 2021-03-23 NOTE — DIETITIAN INITIAL EVALUATION ADULT. - ETIOLOGY
related to prolonged inadequate protein-energy intake related to prolonged inadequate protein-energy intake in setting of acute illness (IVC thrombi, pleural effusion), with increased needs in setting of cirrhosis complicated by Budd Chiari syndrome

## 2021-03-23 NOTE — PROGRESS NOTE ADULT - PROBLEM SELECTOR PLAN 5
Large Moderate pleural effusion, likely in setting of hepatic hydrothorax  -PRN pain regimen for pleuritic chest pain  -IR guided thoracentesis 3/22  -O2 supplementation, wean down as tolerated - low volume ascites on CT A/P  - s/p diagnostic paracentesis, fu cytologies   - Hepatology following, appreciate recs

## 2021-03-23 NOTE — PROVIDER CONTACT NOTE (OTHER) - RECOMMENDATIONS
Pt has a hx of COVID 3/4/21.  Presents with no symptoms of COVID-19, isolation discontinued as per guidelines.   Logistics Informed.
STAT EKG, Troponins & restart Heparin gtt with new aPTT
Notify team

## 2021-03-23 NOTE — PROGRESS NOTE ADULT - PROBLEM SELECTOR PLAN 3
MRI 12/7/2020 demonstrated an Enlarged heterogeneous cirrhotic liver with diffuse patchy enhancement and poor delineation of the hepatic veins suggesting underlying Budd-Chiari syndrome.  -prior was not on AC due to bleeding risk in setting of extensive portal hypertension with paraesophageal varices and splenorenal varices.  -Plan for cirrhosis below  -Plan for IVC thrombosis above  -Hepatology consulted, appreciate recs  -Heme following, appreciate recs - h/o Budd-Chiari syndrome (no previously on AC), Protein S Deficiency, COVID-19  - concern for ANGELICA in s/o multiple miscarriages   - hypercoagulability workup sent, pending final results likely outpatient   - Vascular Sx consulted: no surgical intervention at this time  - c/w Heparin gtt / coumadin bridge

## 2021-03-23 NOTE — DIETITIAN INITIAL EVALUATION ADULT. - PROBLEM SELECTOR PLAN 7
-COVID positive on PCR, first positive of 3/4  -Outside window for remdesivir, dexamethasone  -F/u COVID Antibodies  -Elevated inflammatory markers, D-Dimer downtrending  -Inflammatory markers q3 days  -Heparin gtt  -O2 supplementation

## 2021-03-23 NOTE — PROGRESS NOTE ADULT - PROBLEM SELECTOR PLAN 1
2/2 to Moderate pleural effusion, COVID-19 infection, possible underlying PNA  -Plan for mod pleural effusion, COVID-19 infection below  -On 2 L NC, wean down following thora,  -ECG Sinus tachy without evidence of ischemia,  trops <6  -Procal 0.11. Currently AF and elevated WBC. Can start Zosyn and BCx, SCx for coverage for PNA and SBP if febrile.   -Can consider TTE if orthopnea, pleuritic chest pain remains after thoracentesis - in s/o pleural effusion, COVID-19 +, possible underlying PNA  - remains hypoxic on 2L NC s/p para   - TTE for further evaluation, CTA r/o PE but missed upper lobes bL   - Infectious Disease consult to r/o underlying PNA  - afebrile, if fever --> blood cultures , sent for sputum

## 2021-03-23 NOTE — DIETITIAN INITIAL EVALUATION ADULT. - PROBLEM SELECTOR PLAN 5
Large Moderate pleural effusion, likely in setting of hepatic hydrothorax  -IR guided thoracentesis  -O2 supplementation, wean down as tolerated

## 2021-03-23 NOTE — DIETITIAN INITIAL EVALUATION ADULT. - ORAL INTAKE PTA/DIET HISTORY
Pt reports poor appetite and minimal PO intake in setting of abdominal bloating x1 day PTA. Reports typically consuming soft foods and avoid bitter foods and foods high in sodium. No vitamin/mineral supplementation PTA reported.

## 2021-03-23 NOTE — DIETITIAN NUTRITION RISK NOTIFICATION - TREATMENT: THE FOLLOWING DIET HAS BEEN RECOMMENDED
Diet, Soft:   Low Sodium  Supplement Feeding Modality:  Oral  Ensure Enlive Cans or Servings Per Day:  1       Frequency:  Daily (03-23-21 @ 17:42) [Pending Verification By Attending]

## 2021-03-23 NOTE — DIETITIAN INITIAL EVALUATION ADULT. - PROBLEM SELECTOR PLAN 4
-No Hx of HE, no hx of variceal bleed, no hx of SBP  -Hx of crytogenic cirrhosis and Budd Chiari, followed by Dr. Carvajal outpatient  -MELD-Na 16, up from 10 outpatient   -MRI showing Budd Chiari, paraesophageal varices and splenorenal varices with multiple foci in liver  -Outpatient Hepatitis workup inconclusive: Hx of Hep A as a child, Hep B Core Antibody Positive but Hep B DNA neg. Otherwise neg  -Alpha1-Antitrypsin, Ceroplasmin neg  -CT A/P showing cirrhosis with small-moderate ascites  -Being worked up for liver transplantation outpatient  -Hepatology/Transplant Hepatology consulted, appreciate recs

## 2021-03-23 NOTE — DIETITIAN INITIAL EVALUATION ADULT. - PROBLEM SELECTOR PLAN 3
MRI 12/7/2020 demonstrated an Enlarged heterogeneous cirrhotic liver with diffuse patchy enhancement and poor delineation of the hepatic veins suggesting underlying Budd-Chiari syndrome.  -Extensive portal hypertension with paraesophageal varices and splenorenal varices.  -Multiple small enhancing foci scattered in liver the largest appears represent a 1 cm lesion along the dome segment 8  -Not previously on AC due to esophageal varices, high bleeding risk  -Plan for cirrhosis/Budd Chiari below  -Hepatology consulted, appreciate recs

## 2021-03-23 NOTE — PROGRESS NOTE ADULT - ASSESSMENT
35 [place]ian-speaking Female PMHx recurrent miscarriages (x4), cryptogenic cirrhosis c/b Budd Chiari syndrome and Protein S deficiency, COVID PCR + 3/4 presented with chest pain and abdominal bloating. Found +  suprarenal IVC thrombi, ascites, R pleural effusion likely hepatic hydrothorax. S/p IR thoracentesis and diagnostic paracentesis 3/23 with cytologies, on Hep ggt with coumadin bridge for outpatient AC.

## 2021-03-23 NOTE — PROVIDER CONTACT NOTE (OTHER) - SITUATION
Patient c/o chest tightness 6/10 pain worse than previous. /72 HR 93 02 93 RA.
Notice blood in pt.s urine

## 2021-03-23 NOTE — PROGRESS NOTE ADULT - PROBLEM SELECTOR PLAN 4
No Hx of HE, no hx of variceal bleed, no hx of SBP. Hx of cryptogenic cirrhosis and Budd Chiari, followed by Dr. Carvajal outpatient  -Diagnostic para today, hep ggt held 930AM  -MELD-Na 13, 16 on admission, up from 10 outpatient   -Hep B Antibody positive, f/u Hep DNA  -Outpatient Hepatitis workup inconclusive: Hx of Hep A as a child, Hep B Core Antibody Positive but Hep B DNA neg. Otherwise neg  -Alpha1-Antitrypsin, Ceroplasmin neg  -Being worked up for liver transplantation outpatient  -Hepatology consulted, appreciate recs - R Moderate pleural effusion, likely in setting of hepatic hydrothorax  - s/p IR guided thoracentesis, still with hypoxia on NC O2  - CXR negative for pneumothorax   - f/u cytologies

## 2021-03-23 NOTE — PROGRESS NOTE ADULT - SUBJECTIVE AND OBJECTIVE BOX
Chief Complaint:  Patient is a 35y old  Female who presents with a chief complaint of abdominal pain (23 Mar 2021 09:57)    Reason for consult: cirrhosis, ascites    Interval Events: S/p para and thoracentesis yesterday, no SBP or infection. Still dyspneic off O2.     Hospital Medications:  acetaminophen   Tablet .. 650 milliGRAM(s) Oral every 8 hours PRN  ascorbic acid 500 milliGRAM(s) Oral daily  furosemide    Tablet 40 milliGRAM(s) Oral daily  heparin   Injectable 4000 Unit(s) IV Push every 6 hours PRN  heparin   Injectable 2000 Unit(s) IV Push every 6 hours PRN  heparin  Infusion.  Unit(s)/Hr IV Continuous <Continuous>  lidocaine   Patch 1 Patch Transdermal daily PRN  melatonin 3 milliGRAM(s) Oral at bedtime PRN  multivitamin/minerals 1 Tablet(s) Oral daily  oxycodone    5 mG/acetaminophen 325 mG 1 Tablet(s) Oral every 8 hours PRN  pantoprazole  Injectable 40 milliGRAM(s) IV Push two times a day  polyethylene glycol 3350 17 Gram(s) Oral daily  senna 2 Tablet(s) Oral at bedtime  spironolactone 100 milliGRAM(s) Oral daily      ROS:   General:  No  fevers, chills, night sweats, fatigue  Eyes:  Good vision, no reported pain  ENT:  No sore throat, pain, runny nose  CV:  No pain, palpitations  Pulm:  No dyspnea, cough  GI:  See HPI, otherwise negative  :  No  incontinence, nocturia  Muscle:  No pain, weakness  Neuro:  No memory problems  Psych:  No insomnia, mood problems, depression  Endocrine:  No polyuria, polydipsia, cold/heat intolerance  Heme:  No petechiae, ecchymosis, easy bruisability  Skin:  No rash    PHYSICAL EXAM:   Vital Signs:  Vital Signs Last 24 Hrs  T(C): 36.4 (23 Mar 2021 08:50), Max: 37 (22 Mar 2021 11:35)  T(F): 97.6 (23 Mar 2021 08:50), Max: 98.6 (22 Mar 2021 11:35)  HR: 88 (23 Mar 2021 08:50) (76 - 102)  BP: 92/65 (23 Mar 2021 08:50) (92/65 - 102/67)  BP(mean): --  RR: 19 (23 Mar 2021 08:50) (16 - 19)  SpO2: 94% (23 Mar 2021 08:50) (93% - 97%)  Daily     Daily     GENERAL: no acute distress, on nasal cannula  NEURO: alert  HEENT: anicteric sclera, no conjunctival pallor appreciated  CHEST: no respiratory distress, no accessory muscle use  CARDIAC: regular rate, rhythm  ABDOMEN: soft, non-tender, non-distended, no rebound or guarding  EXTREMITIES: warm, well perfused, no edema  SKIN: no lesions noted    LABS: reviewed                        13.0   3.48  )-----------( 169      ( 23 Mar 2021 07:09 )             41.8     03-23    137  |  96  |  10  ----------------------------<  85  4.1   |  23  |  0.58    Ca    9.8      23 Mar 2021 07:09  Phos  3.4     03-23  Mg     1.7     03-23    TPro  7.9  /  Alb  4.4  /  TBili  2.4<H>  /  DBili  x   /  AST  30  /  ALT  13  /  AlkPhos  160<H>  03-23    LIVER FUNCTIONS - ( 23 Mar 2021 07:09 )  Alb: 4.4 g/dL / Pro: 7.9 g/dL / ALK PHOS: 160 U/L / ALT: 13 U/L / AST: 30 U/L / GGT: x             Interval Diagnostic Studies: see sunrise for full report

## 2021-03-23 NOTE — DIETITIAN INITIAL EVALUATION ADULT. - REASON
Pt declined, wishing to rest. Observed with possible mild-moderate muscle wasting to temple and clavicle regions.

## 2021-03-23 NOTE — DIETITIAN INITIAL EVALUATION ADULT. - ADD RECOMMEND
Continue micronutrient supplementation if no medical contraindications. Encouraged PO intake as tolerated. Diet education provided. Patient made aware RD remains available. Monitor PO intake, weight, labs, skin, GI status, diet. Malnutrition sticker placed, RD to follow-up as per protocol.

## 2021-03-23 NOTE — PROGRESS NOTE ADULT - ASSESSMENT
35F w/ cryptogenic cirrhosis decompensated by ascites and small non-bleeding varices, Budd-Chiari syndrome, hypercoagulable disorder with multiple clots and multiple spontaneous abortions, recent dx of COVID-19 PNA, presenting w/ chest tightness, dyspnea, and abd distension, found to have clot in IVC and portal confluence.    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  - HE: none  - HCC: LIRADS 4 and LIRADS 3 lesion seen on MRI 12/2020, stable from prior, AFP wnl 2/2021  - MELD-Na = 16 on 3/20/2020  #IVC thrombus  #Portal confluence thrombus  #Hx of hypercoagulable disorder  #COVID-19 PNA - on nasal cannula     Recommendations:  - Agree w/ hep gtt for thrombi  - Plan to eventually start Coumadin per hematology  - Continue w/ lasix 40mg PO daily and spironolactone 100mg PO daily  - Agree w/ thoracentesis at the same time, please send same fluid studies as above to evaluate for hepatic hydrothorax  - Would consider TTE to evaluate for submassive PE in the setting of persistent hypoxia despite removal of fluid in pt w/ known thrombi  - ID to see patient re: role of COVID-19 in patient's continued hypoxia (already consulted)  - Trend CBC, CMP, INR daily    Kane Orellana  Gastroenterology Fellow  NON-URGENT CONSULTS:  Please email [email or web address]  AT NIGHT AND ON WEEKENDS:  Contact on-call GI fellow via answering service ([phone]) from 5pm-8am and on weekends/holidays  MONDAY-FRIDAY 8AM-5PM:  Available via [employer] Teams  Pager# [phone] ([facility]) or [phone] ([facility])  GI Phone# [phone] ([facility])

## 2021-03-23 NOTE — CONSULT NOTE ADULT - ASSESSMENT
35 year old female with PMHx of cryptogenic cirrhosis, Budd Chiari syndrome, Protein S deficiency, COVID-19 infection who presents with complaints of chest painx 3 days and abdominal bloating and tightness for 5 days.     WBC on presentation of 6.17 with 86.1% PMN.     COVID19 PCR (3/20) Positive  COVID19 [place] Antibody (3/20) > 250    Procalcitonin (3/20) of 0.08   CRP (3/23) 2.78  Ferritin (3/23) 104    CT CTA (3/20) with no PE but with Moderate to large right pleural effusion with adjacent compressive atelectasis including near complete atelectasis of the right lower lobe and partial atelectasis of the right middle lobe and Small patchy airspace opacities in bilateral upper and left lower lobes likely infectious in etiology.    CT A/P (3/20) with Moderate volume ascutes and Thrombus in the suprarenal inferior vena cava. Thrombus in the superior mesenteric vein extending to the confluence with the splenic vein and possibly into the portal vein.  s/p Paracentesis (3/22) with 227 nucleated cells and 13% PMN.   s/p Thoraceitesis (3/22) with negative gram stain on pleural fluid but no cell count sent off    35 year old female with PMHx of cryptogenic cirrhosis, Budd Chiari syndrome, Protein S deficiency, COVID-19 infection who presents with complaints of chest painx 3 days and abdominal bloating and tightness for 5 days.     WBC on presentation of 6.17 with 86.1% PMN.     COVID19 PCR (3/20) Positive  COVID19 [place] Antibody (3/20) > 250    Procalcitonin (3/20) of 0.08   CRP (3/23) 2.78  Ferritin (3/23) 104    CT CTA (3/20) with no PE but with Moderate to large right pleural effusion with adjacent compressive atelectasis including near complete atelectasis of the right lower lobe and partial atelectasis of the right middle lobe and Small patchy airspace opacities in bilateral upper and left lower lobes likely infectious in etiology.    CT A/P (3/20) with Moderate volume ascutes and Thrombus in the suprarenal inferior vena cava. Thrombus in the superior mesenteric vein extending to the confluence with the splenic vein and possibly into the portal vein.  s/p Paracentesis (3/22) with 227 nucleated cells and 13% PMN.   s/p Thoracentesis (3/22) with negative gram stain on pleural fluid but no cell count sent off    35 year old female with PMHx of cryptogenic cirrhosis, Budd Chiari syndrome, Protein S deficiency, COVID-19 infection who presents with complaints of chest pain x3 days and abdominal bloating and tightness for 5 days.  Diagnosed with COVID on 3/4 and with symptoms of fevers, cough and weakness. Did not require hospital admission.     COVID19 PCR (3/20) Positive  COVID19 [place] Antibody (3/20) > 250    Procalcitonin (3/20) of 0.08   CRP (3/23) 2.78  Ferritin (3/23) 104    CT CTA (3/20) with no PE but with Moderate to large right pleural effusion with adjacent compressive atelectasis including near complete atelectasis of the right lower lobe and partial atelectasis of the right middle lobe and Small patchy airspace opacities in bilateral upper and left lower lobes likely infectious in etiology.    CT A/P (3/20) with Moderate volume ascites and Thrombus in the suprarenal inferior vena cava. Thrombus in the superior mesenteric vein extending to the confluence with the splenic vein and possibly into the portal vein.    s/p Paracentesis (3/22) with 227 nucleated cells and 13% PMN.   s/p Thoracentesis (3/22) with negative gram stain on pleural fluid but no cell count sent off    I doubt active COVID19 infection at this time, positive PCR likely low level viral shedding  I suspect infiltrates on CT Chest are fluid versus residual infiltrates from COVID19  At this point I doubt bacterial superinfection as etiology of presentation  I am reassured that procalcitonin is not elevated and patient is without cough.   I do believe that the thromboses may have been precipitated by recent COVID19 infection     I am somewhat concerned with the chest pain on presentation (one of the presenting symptoms of MIS-A however, patient is currently afebrile and her inflammatory markers are not significantly elevated). I would at minimum check TTE.    I suspect her shortness of breath is secondary to her pleural effusion and not cytokine storm secondary to her COVID19 or due to COVID19 pneumonia.     RECOMMENDATIONS:    #COVID19 Infection, Chest Pain, Hypoxic Respiratory Failure, Cirrhosis  --No need for isolation at this time   --No need for antibiotics at this time  --Continue to monitor O2 saturations.  --Check TTE  --Follow up on pleural effusion cultures and ascites cultures    #IVC Thrombus, SMV Thrombus  --Anticoagulation per transplant hepatology and medicine    I will continue to follow. Please feel free to contact me with any further questions.    Avila Hunt M.D.  [facility] Division of Infectious Disease  8AM-5PM: Pager Number [phone]  After Hours (or if no response): Please contact the Infectious Diseases Office at [phone]     The above assessment and plan were discussed with Dr Shasha Palencia

## 2021-03-23 NOTE — DIETITIAN INITIAL EVALUATION ADULT. - REASON INDICATOR FOR ASSESSMENT
Consult received for assessment, education. Pt also with BMI<19. Source: EMR, ( ). Pt is a 34 yo female with PMH of cryptogenic cirrhosis, Budd Chiari syndrome, Protein S deficiency, COVID-19 who presented with chest pain, abdominal bloating and tightness, admitted 3/20. Consult received for assessment, education. Pt also with BMI<19. Source: EMR, patient. Pt is a 34 yo female with PMH of cryptogenic cirrhosis, Budd Chiari syndrome, Protein S deficiency, COVID-19 who presented with chest pain, abdominal bloating and tightness, admitted 3/20.

## 2021-03-23 NOTE — DIETITIAN INITIAL EVALUATION ADULT. - SIGNS/SYMPTOMS
as evidenced by BMI 17.6 kg/m2 as evidenced by decreased PO intake, reported weight loss, ascites, muscle losses

## 2021-03-23 NOTE — PROGRESS NOTE ADULT - ATTENDING COMMENTS
Pt seen and examined. Care discussed with Dr. Parmar. Agree with above with additional findings:    # decompensated cirrhosis with ascites  # pleural effusion/hepatic hydrothorax  # IVC thrombosis  # COVID19    - s/p IR guided diagnostic/therapeutic thora and paracentesis  - appreciate ID recs; care discussed with Dr. Hunt (ID) - low suspicion for infectious etiology at this time. Obtain TTE  - continue on heparin gtt; highly concern for APLS syndrome; heme recommends transition to coumadin - started last night; goal INR 2-3  - appreciate hepatology: continue lasix/spironolactone  - pt out of window for monoclonal Ab or Remdesivir    Shasha Palencia MD  Division of Hospital Medicine  Cell: [phone]  Office: [phone] .

## 2021-03-23 NOTE — DIETITIAN INITIAL EVALUATION ADULT. - PROBLEM SELECTOR PLAN 1
2/2 to Moderate pleural effusion, COVID-19 infection, possible underlying PNA  -Plan for mod pleural effusion, COVID-19 infection below  -on 2 L NC, wean down as tolerated  -ECG Sinus tachy without evidence of ischemia  -Can start Zosyn for coverage for PNA and SBP  -Trops <6  -TTE when stable given hx of orthopnea, chest pain

## 2021-03-23 NOTE — PROGRESS NOTE ADULT - ATTENDING COMMENTS
Hepatology Staff: Haydee Carvajal MD    I saw and examined the patient along with  Dr. Orellana 03-23-21 @ 14:02  Patient Medical Record, hosptial course was reviewed and summarized as below:    Vitals: Vital Signs Last 24 Hrs  T(C): 36.9 (23 Mar 2021 13:32), Max: 36.9 (23 Mar 2021 04:27)  T(F): 98.4 (23 Mar 2021 13:32), Max: 98.4 (23 Mar 2021 04:27)  HR: 100 (23 Mar 2021 13:32) (81 - 102)  BP: 104/69 (23 Mar 2021 13:32) (92/65 - 104/69)    RR: 19 (23 Mar 2021 13:32) (16 - 19)  SpO2: 92% (23 Mar 2021 13:32) (92% - 97%)    Diuretics:furosemide    Tablet 40 milliGRAM(s) Oral daily  spironolactone 100 milliGRAM(s) Oral daily    Labs:INR: 1.34 ratio (03-23-21 @ 08:33)  Creatinine, Serum: 0.58 mg/dL (03-23-21 @ 07:09)  Bilirubin Total, Serum: 2.4 mg/dL (03-23-21 @ 07:09)      I/O: I&O's Summary    22 Mar 2021 07:01  -  23 Mar 2021 07:00  --------------------------------------------------------  IN: 399 mL / OUT: 1150 mL / NET: -751 mL    23 Mar 2021 07:01  -  23 Mar 2021 14:02  --------------------------------------------------------  IN: 0 mL / OUT: 500 mL / NET: -500 mL      Nutritional Status:   Albumin, Serum: 4.4 g/dL (03-23-21 @ 07:09)    Recommendations: Patient remains mildly short of breath and reports feeling better since morning.  Denies any significant cough or fever.  LFTs are essentially stable with INR 1.34, total bilirubin 2.4, normal liver enzymes and mildly elevated alkaline phosphatase to 160.    Continue with anticoagulation with IV heparin.  I have also requested infectious disease specialist Dr. Bhatt for consultation in view of recent COVID-19 infection.  Noted significantly elevated D-dimer assay.    I also discussed with Dr. Otoole, interventional radiologist with regards to future management of her Budd-Chiari syndrome.  At this time we will continue with anticoagulation and we will reassess need for IR guided intervention on follow-up if needed.  Have also spoken to Kelechi Venegas her hematologist and I have updated him about her current condition.      Patient has a low model for end-stage liver disease.  She has already been seen by transplant surgery team for potential evaluation for liver transplant but in view of her low MELD score evaluation was deferred.  We will continue to assess need for transplant evaluation.      Plan discussed with Primary team.

## 2021-03-23 NOTE — CONSULT NOTE ADULT - SUBJECTIVE AND OBJECTIVE BOX
Patient is a 35y old  Female who presents with a chief complaint of abdominal pain (23 Mar 2021 09:57)      HPI:  Patient is a 35 year old female with PMHx of cryptogenic cirrhosis, Budd Chiari syndrome, Protein S deficiency, COVID-19 infection who presents with complaints of chest painx 3 days and abdominal bloating and tightness for 5 days. Chest pain described as chest tightness, across the chest with radiation to left side, worsens with changes in positions and non-productive cough. Denies abdominal pain but has abdominal discomfort with worsening distension. Per patient, she was diagnosed with COVID on 3/4 and since then has had high fevers (102-103F), dyspnea on minimal exertion (previously able to ambulate normally-several blocks and stairs without SOB). Chest pain worsened to 9/10, more constant, prompting her to come into the ED.     Patient follows with Dr. Carvajal for outpatient Hepatology and Dr. Venegas for Hematology. Previous work up from outpatient hepatology: No immunity towards hepatitis B (although Hepatitis B Core IgG is +) suggesting past infection or possible false positive test. HBV DNA is negative. Hepatitis C antibody was negative. Ebnu-3-mgwurqamglb level was 246 mg/dl. Autoimmune markers were negative for antimitochondrial antibody, anti-smooth muscle antibody, anti-nuclear antibody, Ceruloplasmin level 52 mg/dL. Factor V Leiden neg, Ivan 2 Neg, Prothrombin gene neg. MRI abdomen showing heterogenous liver with branches of hepatic vein. EGD 6/2017 Grade A esophageal varices, gastropathy. Abdomen U/S for cirrhosis, mild splenomegaly, trace ascites.   In ED, vitals 97.7, tachycardic to 102, 110/67, tachpneic to 23, Satting 91% on RA--> 97% on 2L NC. S/p 2 g Ceftriaxone, NS 1 L bolus.    (20 Mar 2021 07:31)     prior hospital charts reviewed [  ]  primary team notes reviewed [  ]  other consultant notes reviewed [  ]    PAST MEDICAL & SURGICAL HISTORY:  H/O protein S deficiency    Budd-Chiari syndrome    Cirrhosis        Allergies  No Known Allergies    ANTIMICROBIALS (past 90 days)  MEDICATIONS  (STANDING):  cefTRIAXone   IVPB   100 mL/Hr IV Intermittent (03-20-21 @ 02:00)      ANTIMICROBIALS:      OTHER MEDS: MEDICATIONS  (STANDING):  acetaminophen   Tablet .. 650 every 8 hours PRN  furosemide    Tablet 40 daily  heparin   Injectable 4000 every 6 hours PRN  heparin   Injectable 2000 every 6 hours PRN  heparin  Infusion.  <Continuous>  melatonin 3 at bedtime PRN  oxycodone    5 mG/acetaminophen 325 mG 1 every 8 hours PRN  pantoprazole  Injectable 40 two times a day  polyethylene glycol 3350 17 daily  senna 2 at bedtime  spironolactone 100 daily  warfarin 5 once    SOCIAL HISTORY:   hx smoking  non-smoker    FAMILY HISTORY:  FHx: liver disease  Paternal uncle      REVIEW OF SYSTEMS  [  ] ROS unobtainable because:    [  ] All other systems negative except as noted below:	    Constitutional:  [ ] fever [ ] chills  [ ] weight loss  [ ] weakness  Skin:  [ ] rash [ ] phlebitis	  Eyes: [ ] icterus [ ] pain  [ ] discharge	  ENMT: [ ] sore throat  [ ] thrush [ ] ulcers [ ] exudates  Respiratory: [ ] dyspnea [ ] hemoptysis [ ] cough [ ] sputum	  Cardiovascular:  [ ] chest pain [ ] palpitations [ ] edema	  Gastrointestinal:  [ ] nausea [ ] vomiting [ ] diarrhea [ ] constipation [ ] pain	  Genitourinary:  [ ] dysuria [ ] frequency [ ] hematuria [ ] discharge [ ] flank pain  [ ] incontinence  Musculoskeletal:  [ ] myalgias [ ] arthralgias [ ] arthritis  [ ] back pain  Neurological:  [ ] headache [ ] seizures  [ ] confusion/altered mental status  Psychiatric:  [ ] anxiety [ ] depression	  Hematology/Lymphatics:  [ ] lymphadenopathy  Endocrine:  [ ] adrenal [ ] thyroid  Allergic/Immunologic:	 [ ] transplant [ ] seasonal    Vital Signs Last 24 Hrs  T(F): 97.6 (03-23-21 @ 08:50), Max: 98.6 (03-22-21 @ 11:35)  Vital Signs Last 24 Hrs  HR: 88 (03-23-21 @ 08:50) (81 - 102)  BP: 92/65 (03-23-21 @ 08:50) (92/65 - 102/67)  RR: 19 (03-23-21 @ 08:50)  SpO2: 94% (03-23-21 @ 08:50) (93% - 97%)  Wt(kg): --    PHYSICAL EXAMINATION:  General: Alert and Awake, NAD  HEENT: PERRL, EOMI, No subconjunctival hemorrhages, Oropharynx Clear, MMM  Neck: Supple, No JOSE J  Cardiac: RRR, No M/R/G  Resp: CTAB, No Wh/Rh/Ra  Abdomen: NBS, NT/ND, No HSM, No rigidity or guarding  MSK: No LE edema. No stigmata of IE. No evidence of phlebitis. No evidence of synovitis.  : No salamanca  Skin: No rashes or lesions. Skin is warm and dry to the touch.   Neuro: Alert and Awake. CN 2-12 Grossly intact. Moves all four extremities spontaneously.  Psych: Calm, Pleasant, Cooperative                          13.0   3.48  )-----------( 169      ( 23 Mar 2021 07:09 )             41.8     03-23    137  |  96  |  10  ----------------------------<  85  4.1   |  23  |  0.58    Ca    9.8      23 Mar 2021 07:09  Phos  3.4     03-23  Mg     1.7     03-23    TPro  7.9  /  Alb  4.4  /  TBili  2.4<H>  /  DBili  x   /  AST  30  /  ALT  13  /  AlkPhos  160<H>  03-23    MICROBIOLOGY:  Culture - Fungal, Body Fluid (collected 22 Mar 2021 23:03)  Source: .Body Fluid Peritoneal Fluid  Preliminary Report (23 Mar 2021 09:40):    Testing in progress    Culture - Body Fluid with Gram Stain (collected 22 Mar 2021 17:33)  Source: .Body Fluid Pleural Fluid  Preliminary Report (23 Mar 2021 10:36):    No growth to date.    Culture - Fungal, Body Fluid (collected 22 Mar 2021 17:32)  Source: .Body Fluid Pleural Fluid  Preliminary Report (23 Mar 2021 07:36):    Testing in progress                    RADIOLOGY:    <The imaging below has been reviewed and visualized by me independently. Findings as detailed in report below>     Patient is a 35y old  Female who presents with a chief complaint of abdominal pain (23 Mar 2021 09:57)      HPI:  Patient is a 35 year old female with PMHx of cryptogenic cirrhosis, Budd Chiari syndrome, Protein S deficiency, COVID-19 infection who presents with complaints of chest painx 3 days and abdominal bloating and tightness for 5 days. Chest pain described as chest tightness, across the chest with radiation to left side, worsens with changes in positions and non-productive cough. Denies abdominal pain but has abdominal discomfort with worsening distension. Per patient, she was diagnosed with COVID on 3/4 and since then has had high fevers (102-103F), dyspnea on minimal exertion (previously able to ambulate normally-several blocks and stairs without SOB). Chest pain worsened to 9/10, more constant, prompting her to come into the ED.     Patient follows with Dr. Carvajal for outpatient Hepatology and Dr. Venegas for Hematology. Previous work up from outpatient hepatology: No immunity towards hepatitis B (although Hepatitis B Core IgG is +) suggesting past infection or possible false positive test. HBV DNA is negative. Hepatitis C antibody was negative. Xvae-0-phlyirdsvas level was 246 mg/dl. Autoimmune markers were negative for antimitochondrial antibody, anti-smooth muscle antibody, anti-nuclear antibody, Ceruloplasmin level 52 mg/dL. Factor V Leiden neg, Ivan 2 Neg, Prothrombin gene neg. MRI abdomen showing heterogenous liver with branches of hepatic vein. EGD 6/2017 Grade A esophageal varices, gastropathy. Abdomen U/S for cirrhosis, mild splenomegaly, trace ascites.   In ED, vitals 97.7, tachycardic to 102, 110/67, tachpneic to 23, Satting 91% on RA--> 97% on 2L NC. S/p 2 g Ceftriaxone, NS 1 L bolus.    (20 Mar 2021 07:31)     prior hospital charts reviewed [  ]  primary team notes reviewed [ x ]  other consultant notes reviewed [ x ]    PAST MEDICAL & SURGICAL HISTORY:  H/O protein S deficiency    Budd-Chiari syndrome    Cirrhosis        Allergies  No Known Allergies    ANTIMICROBIALS (past 90 days)  MEDICATIONS  (STANDING):  cefTRIAXone   IVPB   100 mL/Hr IV Intermittent (03-20-21 @ 02:00)      ANTIMICROBIALS:      OTHER MEDS: MEDICATIONS  (STANDING):  acetaminophen   Tablet .. 650 every 8 hours PRN  furosemide    Tablet 40 daily  heparin   Injectable 4000 every 6 hours PRN  heparin   Injectable 2000 every 6 hours PRN  heparin  Infusion.  <Continuous>  melatonin 3 at bedtime PRN  oxycodone    5 mG/acetaminophen 325 mG 1 every 8 hours PRN  pantoprazole  Injectable 40 two times a day  polyethylene glycol 3350 17 daily  senna 2 at bedtime  spironolactone 100 daily  warfarin 5 once    SOCIAL HISTORY:  no smoking or etoh use. born in [facility] and moved to  in 2017. No recent travel     FAMILY HISTORY:  FHx: liver disease  Paternal uncle      REVIEW OF SYSTEMS  [  ] ROS unobtainable because:    [ x ] All other systems negative except as noted below:	    Constitutional:  [ ] fever [ ] chills  [ ] weight loss  [x ] weakness  Skin:  [ ] rash [ ] phlebitis	  Eyes: [ ] icterus [ ] pain  [ ] discharge	  ENMT: [ ] sore throat  [ ] thrush [ ] ulcers [ ] exudates  Respiratory: [x] dyspnea [ ] hemoptysis [ ] cough [ ] sputum	  Cardiovascular:  [ ] chest pain [ ] palpitations [ ] edema	  Gastrointestinal:  [ ] nausea [ ] vomiting [ ] diarrhea [ ] constipation [ ] pain	  Genitourinary:  [ ] dysuria [ ] frequency [ ] hematuria [ ] discharge [ ] flank pain  [ ] incontinence  Musculoskeletal:  [ ] myalgias [ ] arthralgias [ ] arthritis  [ ] back pain  Neurological:  [ ] headache [ ] seizures  [ ] confusion/altered mental status  Psychiatric:  [ ] anxiety [ ] depression	  Hematology/Lymphatics:  [ ] lymphadenopathy  Endocrine:  [ ] adrenal [ ] thyroid  Allergic/Immunologic:	 [ ] transplant [ ] seasonal    Vital Signs Last 24 Hrs  T(F): 97.6 (03-23-21 @ 08:50), Max: 98.6 (03-22-21 @ 11:35)  Vital Signs Last 24 Hrs  HR: 88 (03-23-21 @ 08:50) (81 - 102)  BP: 92/65 (03-23-21 @ 08:50) (92/65 - 102/67)  RR: 19 (03-23-21 @ 08:50)  SpO2: 94% (03-23-21 @ 08:50) (93% - 97%)  Wt(kg): --    PHYSICAL EXAMINATION:  General: Alert and Awake, NAD  HEENT: PERRL, EOMI, Scleral icterus  Neck: Supple, No JOSE J  Cardiac: RRR, No M/R/G  Resp: Decreased BS at right lung base. no rales, wheezes or rhonchi.   Abdomen: NBS, NT/ND, No HSM, No rigidity or guarding  MSK: No LE edema. No stigmata of IE. No evidence of phlebitis. No evidence of synovitis.  : No salamanca  Skin: No rashes or lesions. Skin is warm and dry to the touch.   Neuro: Alert and Awake. CN 2-12 Grossly intact. Moves all four extremities spontaneously.  Psych: Calm, Pleasant, Cooperative                          13.0   3.48  )-----------( 169      ( 23 Mar 2021 07:09 )             41.8     03-23    137  |  96  |  10  ----------------------------<  85  4.1   |  23  |  0.58    Ca    9.8      23 Mar 2021 07:09  Phos  3.4     03-23  Mg     1.7     03-23    TPro  7.9  /  Alb  4.4  /  TBili  2.4<H>  /  DBili  x   /  AST  30  /  ALT  13  /  AlkPhos  160<H>  03-23    MICROBIOLOGY:    Culture - Fungal, Body Fluid (collected 22 Mar 2021 23:03)  Source: .Body Fluid Peritoneal Fluid  Preliminary Report (23 Mar 2021 09:40):    Testing in progress    Culture - Body Fluid with Gram Stain (collected 22 Mar 2021 17:33)  Source: .Body Fluid Pleural Fluid  Preliminary Report (23 Mar 2021 10:36):    No growth to date.    Culture - Fungal, Body Fluid (collected 22 Mar 2021 17:32)  Source: .Body Fluid Pleural Fluid  Preliminary Report (23 Mar 2021 07:36):    Testing in progress      RADIOLOGY:    <The imaging below has been reviewed and visualized by me independently. Findings as detailed in report below>    EXAM:  CT ABDOMEN AND PELVIS IC                        EXAM:  CT ANGIO CHEST (W)AW IC                        PROCEDURE DATE:  03/20/2021    1. No pulmonary embolism.  2. Moderate to large right pleural effusion with adjacent compressive atelectasis including near complete atelectasis of the right lower lobe and partial atelectasis of the right middle lobe.  3. Small patchy airspace opacities in bilateral upper and left lower lobes likely infectious in etiology.  4. Thrombus in the suprarenal inferior vena cava. Thrombus in the superior mesenteric vein extending to the confluence with the splenic vein and possibly into the portal vein.  5. Cirrhosis with portal hypertension.  6. Small to moderate volume abdominal and pelvic ascites.

## 2021-03-23 NOTE — PROGRESS NOTE ADULT - PROBLEM SELECTOR PLAN 7
-COVID positive on PCR, first positive of 3/4  -Outside window for remdesivir, dexamethasone  -Elevated COVID Antibodies  -Elevated inflammatory markers, D-Dimer downtrending  -Inflammatory markers q3 days  -Heparin gtt   -O2 supplementation

## 2021-03-23 NOTE — PROGRESS NOTE ADULT - SUBJECTIVE AND OBJECTIVE BOX
PROGRESS NOTE:     Katherine Parmar, DO PGY1   Contact: Pager [phone] [facility] | [phone] [facility] | emids Teams  Please check Resident Assignment tab on [employer] for Team / Coverage     Patient is a 35y old  Female who presents with a chief complaint of abdominal pain (23 Mar 2021 08:57)    EVENTS / SUBJECTIVE: Pt with chest pain over L side of chest, exacerbated with ambulation, resolves with rest. Trop <6. EKG largely unchanged. CXR w/o pneumo  + weakness, poor oral intake, anxiety, continued BOLIVAR / L chest pain with ambulation, decreased urine output    MEDICATIONS  (STANDING):  ascorbic acid 500 milliGRAM(s) Oral daily  furosemide    Tablet 40 milliGRAM(s) Oral daily  heparin  Infusion.  Unit(s)/Hr (10 mL/Hr) IV Continuous <Continuous>  multivitamin/minerals 1 Tablet(s) Oral daily  pantoprazole  Injectable 40 milliGRAM(s) IV Push two times a day  polyethylene glycol 3350 17 Gram(s) Oral daily  senna 2 Tablet(s) Oral at bedtime  spironolactone 100 milliGRAM(s) Oral daily    MEDICATIONS  (PRN):  acetaminophen   Tablet .. 650 milliGRAM(s) Oral every 8 hours PRN Mild Pain (1 - 3)  heparin   Injectable 4000 Unit(s) IV Push every 6 hours PRN For aPTT less than 40  heparin   Injectable 2000 Unit(s) IV Push every 6 hours PRN For aPTT between 40 - 57  lidocaine   Patch 1 Patch Transdermal daily PRN Mild Pain (1-3)  melatonin 3 milliGRAM(s) Oral at bedtime PRN Insomnia  oxycodone    5 mG/acetaminophen 325 mG 1 Tablet(s) Oral every 8 hours PRN Moderate Pain (4 - 6)    Allergies  No Known Allergies    PHYSICAL EXAM:  Vital Signs Last 24 Hrs  T(C): 36.4 (23 Mar 2021 08:50), Max: 37 (22 Mar 2021 11:35)  T(F): 97.6 (23 Mar 2021 08:50), Max: 98.6 (22 Mar 2021 11:35)  HR: 88 (23 Mar 2021 08:50) (76 - 102)  BP: 92/65 (23 Mar 2021 08:50) (92/65 - 102/67)  RR: 19 (23 Mar 2021 08:50) (16 - 19)  SpO2: 94% (23 Mar 2021 08:50) (93% - 97%)  I&O's Summary    22 Mar 2021 07:01  -  23 Mar 2021 07:00  --------------------------------------------------------  IN: 399 mL / OUT: 1150 mL / NET: -751 mL    23 Mar 2021 07:01  -  23 Mar 2021 09:57  --------------------------------------------------------  IN: 0 mL / OUT: 500 mL / NET: -500 mL    GENERAL: No acute distress, petite  HEENT::  Atraumatic, EOMI, sclera clear, supple neck  CHEST/LUNG: CTA L lung, decreased breath sounds R-side in comparison   HEART: Regular rate and rhythm; No murmurs, rubs, or gallops  ABDOMEN: Soft, mild tenderness RLQ, improved distension; + bowel sounds, hepatomegaly  EXTREMITIES:  2+ peripheral pulses b/l, No clubbing, cyanosis, or edema  NEUROLOGY: A&O x 3, no focal deficits  PSYCH: anxious, soft spoken   SKIN: No rashes or lesions    PATIENT DATA:  CAPILLARY BLOOD GLUCOSE                        13.0   3.48  )-----------( 169      ( 23 Mar 2021 07:09 )             41.8     03-23    137  |  96  |  10  ----------------------------<  85  4.1   |  23  |  0.58    Ca    9.8      23 Mar 2021 07:09  Phos  3.4     03-23  Mg     1.7     03-23    TPro  7.9  /  Alb  4.4  /  TBili  2.4<H>  /  DBili  x   /  AST  30  /  ALT  13  /  AlkPhos  160<H>  03-23    PT/INR - ( 23 Mar 2021 08:33 )   PT: 15.6 sec;   INR: 1.34 ratio         PTT - ( 23 Mar 2021 08:33 )  PTT:84.4 sec  CARDIAC MARKERS ( 22 Mar 2021 16:47 )  x     / x     / 75 U/L / x     / 1.0 ng/mL      Culture - Fungal, Body Fluid (collected 22 Mar 2021 23:03)  Source: .Body Fluid Peritoneal Fluid  Preliminary Report (23 Mar 2021 09:40):    Testing in progress    Culture - Fungal, Body Fluid (collected 22 Mar 2021 17:32)  Source: .Body Fluid Pleural Fluid  Preliminary Report (23 Mar 2021 07:36):    Testing in progress

## 2021-03-24 LAB
ALBUMIN SERPL ELPH-MCNC: 4.1 G/DL — SIGNIFICANT CHANGE UP (ref 3.3–5)
ALP SERPL-CCNC: 158 U/L — HIGH (ref 40–120)
ALT FLD-CCNC: 17 U/L — SIGNIFICANT CHANGE UP (ref 10–45)
ANION GAP SERPL CALC-SCNC: 15 MMOL/L — SIGNIFICANT CHANGE UP (ref 5–17)
APTT BLD: 92.6 SEC — HIGH (ref 27.5–35.5)
AST SERPL-CCNC: 34 U/L — SIGNIFICANT CHANGE UP (ref 10–40)
BILIRUB SERPL-MCNC: 1.9 MG/DL — HIGH (ref 0.2–1.2)
BUN SERPL-MCNC: 9 MG/DL — SIGNIFICANT CHANGE UP (ref 7–23)
CALCIUM SERPL-MCNC: 9.3 MG/DL — SIGNIFICANT CHANGE UP (ref 8.4–10.5)
CARDIOLIPIN AB SER-ACNC: NEGATIVE — SIGNIFICANT CHANGE UP
CHLORIDE SERPL-SCNC: 98 MMOL/L — SIGNIFICANT CHANGE UP (ref 96–108)
CO2 SERPL-SCNC: 25 MMOL/L — SIGNIFICANT CHANGE UP (ref 22–31)
CREAT SERPL-MCNC: 0.55 MG/DL — SIGNIFICANT CHANGE UP (ref 0.5–1.3)
GLUCOSE SERPL-MCNC: 101 MG/DL — HIGH (ref 70–99)
HCT VFR BLD CALC: 38.8 % — SIGNIFICANT CHANGE UP (ref 34.5–45)
HGB BLD-MCNC: 12.1 G/DL — SIGNIFICANT CHANGE UP (ref 11.5–15.5)
INR BLD: 1.41 RATIO — HIGH (ref 0.88–1.16)
MAGNESIUM SERPL-MCNC: 2.1 MG/DL — SIGNIFICANT CHANGE UP (ref 1.6–2.6)
MCHC RBC-ENTMCNC: 26.9 PG — LOW (ref 27–34)
MCHC RBC-ENTMCNC: 31.2 GM/DL — LOW (ref 32–36)
MCV RBC AUTO: 86.4 FL — SIGNIFICANT CHANGE UP (ref 80–100)
NON-GYNECOLOGICAL CYTOLOGY STUDY: SIGNIFICANT CHANGE UP
NON-GYNECOLOGICAL CYTOLOGY STUDY: SIGNIFICANT CHANGE UP
NRBC # BLD: 0 /100 WBCS — SIGNIFICANT CHANGE UP (ref 0–0)
PHOSPHATE SERPL-MCNC: 3.5 MG/DL — SIGNIFICANT CHANGE UP (ref 2.5–4.5)
PLATELET # BLD AUTO: 157 K/UL — SIGNIFICANT CHANGE UP (ref 150–400)
POTASSIUM SERPL-MCNC: 3.5 MMOL/L — SIGNIFICANT CHANGE UP (ref 3.5–5.3)
POTASSIUM SERPL-SCNC: 3.5 MMOL/L — SIGNIFICANT CHANGE UP (ref 3.5–5.3)
PROT SERPL-MCNC: 7.5 G/DL — SIGNIFICANT CHANGE UP (ref 6–8.3)
PROTHROM AB SERPL-ACNC: 16.6 SEC — HIGH (ref 10.6–13.6)
RBC # BLD: 4.49 M/UL — SIGNIFICANT CHANGE UP (ref 3.8–5.2)
RBC # FLD: 14.4 % — SIGNIFICANT CHANGE UP (ref 10.3–14.5)
SODIUM SERPL-SCNC: 138 MMOL/L — SIGNIFICANT CHANGE UP (ref 135–145)
WBC # BLD: 3.03 K/UL — LOW (ref 3.8–10.5)
WBC # FLD AUTO: 3.03 K/UL — LOW (ref 3.8–10.5)

## 2021-03-24 PROCEDURE — 99233 SBSQ HOSP IP/OBS HIGH 50: CPT | Mod: GC

## 2021-03-24 PROCEDURE — 99232 SBSQ HOSP IP/OBS MODERATE 35: CPT

## 2021-03-24 RX ORDER — BENZOCAINE AND MENTHOL 5; 1 G/100ML; G/100ML
1 LIQUID ORAL
Refills: 0 | Status: DISCONTINUED | OUTPATIENT
Start: 2021-03-24 | End: 2021-03-25

## 2021-03-24 RX ORDER — WARFARIN SODIUM 2.5 MG/1
5 TABLET ORAL ONCE
Refills: 0 | Status: COMPLETED | OUTPATIENT
Start: 2021-03-24 | End: 2021-03-24

## 2021-03-24 RX ADMIN — SPIRONOLACTONE 100 MILLIGRAM(S): 25 TABLET, FILM COATED ORAL at 05:50

## 2021-03-24 RX ADMIN — BENZOCAINE AND MENTHOL 1 LOZENGE: 5; 1 LIQUID ORAL at 17:25

## 2021-03-24 RX ADMIN — Medication 1 TABLET(S): at 12:10

## 2021-03-24 RX ADMIN — SENNA PLUS 2 TABLET(S): 8.6 TABLET ORAL at 22:20

## 2021-03-24 RX ADMIN — Medication 500 MILLIGRAM(S): at 12:09

## 2021-03-24 RX ADMIN — Medication 40 MILLIGRAM(S): at 05:50

## 2021-03-24 RX ADMIN — HEPARIN SODIUM 800 UNIT(S)/HR: 5000 INJECTION INTRAVENOUS; SUBCUTANEOUS at 09:04

## 2021-03-24 RX ADMIN — PANTOPRAZOLE SODIUM 40 MILLIGRAM(S): 20 TABLET, DELAYED RELEASE ORAL at 05:50

## 2021-03-24 RX ADMIN — POLYETHYLENE GLYCOL 3350 17 GRAM(S): 17 POWDER, FOR SOLUTION ORAL at 12:10

## 2021-03-24 RX ADMIN — PANTOPRAZOLE SODIUM 40 MILLIGRAM(S): 20 TABLET, DELAYED RELEASE ORAL at 17:25

## 2021-03-24 RX ADMIN — WARFARIN SODIUM 5 MILLIGRAM(S): 2.5 TABLET ORAL at 22:21

## 2021-03-24 NOTE — PROGRESS NOTE ADULT - PROBLEM SELECTOR PLAN 4
- R Moderate pleural effusion, likely in setting of hepatic hydrothorax  - s/p IR guided thoracentesis, still with hypoxia on NC O2 though improving  - CXR negative for R pneumothorax, small R pleural effusion remains L lung clear  - see above cytologies, no growth on gram stain  - net negative output 1L over 24L with improving SpO2

## 2021-03-24 NOTE — PROGRESS NOTE ADULT - ASSESSMENT
35 year old female with PMHx of cryptogenic cirrhosis, Budd Chiari syndrome, Protein S deficiency, COVID-19 infection who presents with complaints of chest pain x3 days and abdominal bloating and tightness for 5 days.  Diagnosed with COVID on 3/4 and with symptoms of fevers, cough and weakness. Did not require hospital admission.     COVID19 PCR (3/20) Positive  COVID19 [place] Antibody (3/20) > 250    Procalcitonin (3/20) of 0.08   CRP (3/23) 2.78  Ferritin (3/23) 104    CT CTA (3/20) with no PE but with Moderate to large right pleural effusion with adjacent compressive atelectasis including near complete atelectasis of the right lower lobe and partial atelectasis of the right middle lobe and Small patchy airspace opacities in bilateral upper and left lower lobes likely infectious in etiology.    CT A/P (3/20) with Moderate volume ascites and Thrombus in the suprarenal inferior vena cava. Thrombus in the superior mesenteric vein extending to the confluence with the splenic vein and possibly into the portal vein.    s/p Paracentesis (3/22) with 227 nucleated cells and 13% PMN.   s/p Thoracentesis (3/22) with negative gram stain on pleural fluid but no cell count sent off    I doubt active COVID19 infection at this time, positive PCR likely low level viral shedding  I suspect infiltrates on CT Chest are fluid versus residual infiltrates from COVID19  At this point I doubt bacterial superinfection as etiology of presentation  I am reassured that procalcitonin is not elevated and patient is without cough.   I do believe that the thromboses may have been precipitated by recent COVID19 infection     TTE with normal EF. The normal EF, absence of fever and minimally elevated inflammatory markers make MIS-A unlikely.     I suspect her shortness of breath is secondary to her pleural effusion and not cytokine storm secondary to her COVID19 or due to COVID19 pneumonia.     RECOMMENDATIONS:    #COVID19 Infection, Chest Pain, Hypoxic Respiratory Failure, Cirrhosis  --No need for isolation at this time   --No need for antibiotics at this time  --Continue to monitor O2 saturations.  --Follow up on pleural effusion cultures and ascites cultures    #IVC Thrombus, SMV Thrombus  --Anticoagulation per transplant hepatology and medicine      I will follow the patient as needed. Please feel free to contact me with any further questions or concerns.    Avila Hunt M.D.  [facility] Division of Infectious Disease  8AM-5PM: Pager Number [phone]  After Hours (or if no response): Please contact the Infectious Diseases Office at [phone]

## 2021-03-24 NOTE — PROGRESS NOTE ADULT - ATTENDING COMMENTS
# decompensated cirrhosis with ascites  # pleural effusion/hepatic hydrothorax  # IVC thrombosis  # COVID19    - s/p IR guided diagnostic/therapeutic thora and paracentesis; culture so far negative  - TTE with normal LV function, normal pulmonary pressure  - appreciate ID recs; care discussed with Dr. Hunt (ID) - low suspicion for infectious etiology at this time; monitor off of abx  - continue on heparin gtt; highly concern for APLS syndrome; heme recommends transition to coumadin - INR 1.4 this morning; goal INR 2-3  - appreciate hepatology: continue lasix/spironolactone  - pt out of window for monoclonal Ab or Remdesivir    care discussed with Dr. Agata Palencia MD  Division of Hospital Medicine  Cell: [phone]  Office: [phone] .

## 2021-03-24 NOTE — PROGRESS NOTE ADULT - SUBJECTIVE AND OBJECTIVE BOX
Chief Complaint:  Patient is a 35y old  Female who presents with a chief complaint of abdominal pain (24 Mar 2021 08:05)    Reason for consult: cirrhosis, ascites    Interval Events: Pt feels much better, off O2.     Hospital Medications:  acetaminophen   Tablet .. 650 milliGRAM(s) Oral every 8 hours PRN  albuterol/ipratropium for Nebulization. 3 milliLiter(s) Nebulizer once PRN  ascorbic acid 500 milliGRAM(s) Oral daily  benzocaine 15 mG/menthol 3.6 mG (Sugar-Free) Lozenge 1 Lozenge Oral four times a day PRN  furosemide    Tablet 40 milliGRAM(s) Oral daily  heparin   Injectable 4000 Unit(s) IV Push every 6 hours PRN  heparin   Injectable 2000 Unit(s) IV Push every 6 hours PRN  heparin  Infusion.  Unit(s)/Hr IV Continuous <Continuous>  lidocaine   Patch 1 Patch Transdermal daily PRN  melatonin 3 milliGRAM(s) Oral at bedtime PRN  multivitamin/minerals 1 Tablet(s) Oral daily  oxycodone    5 mG/acetaminophen 325 mG 1 Tablet(s) Oral every 8 hours PRN  pantoprazole  Injectable 40 milliGRAM(s) IV Push two times a day  polyethylene glycol 3350 17 Gram(s) Oral daily  senna 2 Tablet(s) Oral at bedtime  spironolactone 100 milliGRAM(s) Oral daily  warfarin 5 milliGRAM(s) Oral once      ROS:   General:  No  fevers, chills, night sweats, fatigue  Eyes:  Good vision, no reported pain  ENT:  No sore throat, pain, runny nose  CV:  No pain, palpitations  Pulm:  No dyspnea, cough  GI:  See HPI, otherwise negative  :  No  incontinence, nocturia  Muscle:  No pain, weakness  Neuro:  No memory problems  Psych:  No insomnia, mood problems, depression  Endocrine:  No polyuria, polydipsia, cold/heat intolerance  Heme:  No petechiae, ecchymosis, easy bruisability  Skin:  No rash    PHYSICAL EXAM:   Vital Signs:  Vital Signs Last 24 Hrs  T(C): 36.4 (24 Mar 2021 12:08), Max: 36.9 (23 Mar 2021 16:56)  T(F): 97.5 (24 Mar 2021 12:08), Max: 98.4 (23 Mar 2021 16:56)  HR: 99 (24 Mar 2021 12:08) (76 - 99)  BP: 100/68 (24 Mar 2021 12:08) (97/65 - 106/73)  BP(mean): --  RR: 18 (24 Mar 2021 12:08) (18 - 18)  SpO2: 95% (24 Mar 2021 12:08) (95% - 97%)  Daily     Daily     GENERAL: no acute distress  NEURO: alert  HEENT: anicteric sclera, no conjunctival pallor appreciated  CHEST: no respiratory distress, no accessory muscle use  CARDIAC: regular rate, rhythm  ABDOMEN: soft, non-tender, non-distended, no rebound or guarding  EXTREMITIES: warm, well perfused, no edema  SKIN: no lesions noted    LABS: reviewed                        12.1   3.03  )-----------( 157      ( 24 Mar 2021 06:06 )             38.8     03-24    138  |  98  |  9   ----------------------------<  101<H>  3.5   |  25  |  0.55    Ca    9.3      24 Mar 2021 06:06  Phos  3.5     03-24  Mg     2.1     03-24    TPro  7.5  /  Alb  4.1  /  TBili  1.9<H>  /  DBili  x   /  AST  34  /  ALT  17  /  AlkPhos  158<H>  03-24    LIVER FUNCTIONS - ( 24 Mar 2021 06:06 )  Alb: 4.1 g/dL / Pro: 7.5 g/dL / ALK PHOS: 158 U/L / ALT: 17 U/L / AST: 34 U/L / GGT: x             Interval Diagnostic Studies: see sunrise for full report

## 2021-03-24 NOTE — PROGRESS NOTE ADULT - PROBLEM SELECTOR PLAN 1
- in s/o pleural effusion, COVID-19 +, possible underlying PNA  - remains on 2L NC s/p para, PNA vs. PE vs. atelectasis in s/o poor inspiration  - CTA r/o PE but missed upper lobes bL, TTE grossly normal   - CXR R small pl effusion +/- RLL PNA vs atelectasis   - Infectious Disease on board, recs appreciated   - afebrile, if fever --> blood cultures , sent for sputum

## 2021-03-24 NOTE — PROGRESS NOTE ADULT - ASSESSMENT
35F w/ cryptogenic cirrhosis decompensated by ascites and small non-bleeding varices, Budd-Chiari syndrome, hypercoagulable disorder with multiple clots and multiple spontaneous abortions, recent dx of COVID-19 PNA, presenting w/ chest tightness, dyspnea, and abd distension, found to have clot in IVC and portal confluence.    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  - HE: none  - HCC: LIRADS 4 and LIRADS 3 lesion seen on MRI 12/2020, stable from prior, AFP wnl 2/2021  - MELD-Na = 16 on 3/20/2020  #IVC thrombus  #Portal confluence thrombus  #Hx of hypercoagulable disorder  #COVID-19 PNA - on nasal cannula     Recommendations:  - Agree w/ hep gtt for thrombi with bridge to Coumadin  - Continue w/ lasix 40mg PO daily and spironolactone 100mg PO daily  - Trend CBC, CMP, INR daily  - Repeat U/S doppler to evaluate thrombus burden 1 week from last one (Sunday/Monday)    Kane Orellana  Gastroenterology Fellow  NON-URGENT CONSULTS:  Please email [email or web address]  AT NIGHT AND ON WEEKENDS:  Contact on-call GI fellow via answering service ([phone]) from 5pm-8am and on weekends/holidays  MONDAY-FRIDAY 8AM-5PM:  Available via [employer] Teams  Pager# [phone] ([facility]) or [phone] ([facility])  GI Phone# [phone] ([facility])

## 2021-03-24 NOTE — PROGRESS NOTE ADULT - PROBLEM SELECTOR PLAN 2
- continues to endorse chest pain on exertion, varies in location  - not reproducible with palpation  - trops < 6, CTA r/o PE however poor view of upper lobes, TTE w/o RH strain

## 2021-03-24 NOTE — PROGRESS NOTE ADULT - SUBJECTIVE AND OBJECTIVE BOX
PROGRESS NOTE:     Katherine Parmar,  PGY1   Contact: Pager [phone] [facility] | [phone] [facility] | [employer] Teams  Please check Resident Assignment tab on [place] for Team / Coverage     Patient is a 35y old  Female who presents with a chief complaint of chest pain and abdominal bloating (23 Mar 2021 16:22)    EVENTS / SUBJECTIVE: Pt seen at bedside.     MEDICATIONS  (STANDING):  ascorbic acid 500 milliGRAM(s) Oral daily  furosemide    Tablet 40 milliGRAM(s) Oral daily  heparin  Infusion.  Unit(s)/Hr (10 mL/Hr) IV Continuous <Continuous>  multivitamin/minerals 1 Tablet(s) Oral daily  pantoprazole  Injectable 40 milliGRAM(s) IV Push two times a day  polyethylene glycol 3350 17 Gram(s) Oral daily  senna 2 Tablet(s) Oral at bedtime  spironolactone 100 milliGRAM(s) Oral daily    MEDICATIONS  (PRN):  acetaminophen   Tablet .. 650 milliGRAM(s) Oral every 8 hours PRN Mild Pain (1 - 3)  albuterol/ipratropium for Nebulization. 3 milliLiter(s) Nebulizer once PRN Shortness of Breath and/or Wheezing  heparin   Injectable 4000 Unit(s) IV Push every 6 hours PRN For aPTT less than 40  heparin   Injectable 2000 Unit(s) IV Push every 6 hours PRN For aPTT between 40 - 57  lidocaine   Patch 1 Patch Transdermal daily PRN Mild Pain (1-3)  melatonin 3 milliGRAM(s) Oral at bedtime PRN Insomnia  oxycodone    5 mG/acetaminophen 325 mG 1 Tablet(s) Oral every 8 hours PRN Moderate Pain (4 - 6)    PHYSICAL EXAM:  Vital Signs Last 24 Hrs  T(C): 36.7 (24 Mar 2021 05:44), Max: 36.9 (23 Mar 2021 13:32)  T(F): 98 (24 Mar 2021 05:44), Max: 98.4 (23 Mar 2021 13:32)  HR: 76 (24 Mar 2021 05:44) (76 - 100)  BP: 101/67 (24 Mar 2021 05:44) (92/65 - 105/73)  RR: 18 (24 Mar 2021 05:44) (18 - 19)  SpO2: 96% (24 Mar 2021 05:44) (92% - 97%)    I&O's Summary    23 Mar 2021 07:01  -  24 Mar 2021 07:00  --------------------------------------------------------  IN: 190 mL / OUT: 1450 mL / NET: -1260 mL    GENERAL: No acute distress, well-developed  HEENT::  Atraumatic, EOMI, sclera clear, supple neck  CHEST/LUNG: CTAB; No wheezes, rales, or rhonchi  HEART: Regular rate and rhythm; No murmurs, rubs, or gallops  ABDOMEN: Soft, non-tender, non-distended; normal bowel sounds, no organomegaly  EXTREMITIES:  2+ peripheral pulses b/l, No clubbing, cyanosis, or edema  NEUROLOGY: A&O x 3, no focal deficits  PSYCH: anxious  SKIN: No rashes or lesions    PATIENT DATA:                        12.1   3.03  )-----------( 157      ( 24 Mar 2021 06:06 )             38.8     138  |  98  |  9   ----------------------------<  101<H>  3.5   |  25  |  0.55    Ca    9.3      24 Mar 2021 06:06  Phos  3.5     03-24  Mg     2.1     03-24    TPro  7.5  /  Alb  4.1  /  TBili  1.9<H>  /  DBili  x   /  AST  34  /  ALT  17  /  AlkPhos  158<H>  03-24    PT/INR - ( 24 Mar 2021 06:43 )   PT: 16.6 sec;   INR: 1.41 ratio    PTT - ( 24 Mar 2021 06:43 )  PTT:92.6 sec    CARDIAC MARKERS ( 22 Mar 2021 16:47 )  x     / x     / 75 U/L / x     / 1.0 ng/mL    Urinalysis Basic - ( 23 Mar 2021 17:50 )  Color: Yellow / Appearance: Clear / S.011 / pH: x  Gluc: x / Ketone: Negative  / Bili: Negative / Urobili: 6 mg/dL   Blood: x / Protein: Negative / Nitrite: Negative   Leuk Esterase: Negative / RBC: 3 /hpf / WBC 1 /HPF   Sq Epi: x / Non Sq Epi: 3 /hpf / Bacteria: Negative    Culture - Fungal, Body Fluid (collected 22 Mar 2021 23:03)  Source: .Body Fluid Peritoneal Fluid  Preliminary Report (23 Mar 2021 09:40):    Testing in progress    Culture - Body Fluid with Gram Stain (collected 22 Mar 2021 17:33)  Source: .Body Fluid Pleural Fluid  Preliminary Report (23 Mar 2021 10:36):    No growth to date.    Culture - Fungal, Body Fluid (collected 22 Mar 2021 17:32)  Source: .Body Fluid Pleural Fluid  Preliminary Report (23 Mar 2021 07:36):    Testing in progress  Cell Count, Body Fluid (21 @ 15:18) Fluid Type: Ascites fluid   Body Fluid Appearance: Clear   Monocyte/Macrophage Count - Body Fluid: 22 %   BF Lymphocytes: 58 %   Fluid Segmented Granulocytes: 8 %  Mesothelial Cells - Body Fluid: 12 %   Color - Body Fluid: Yellow   Total Nucleated Cell Count, Body Fluid: 213: Peritoneal/Pleural/ Pericardial Body Fluid Types   Total Nucleated cells: <500/uL   Neutrophils: <25%   Synovial Body Fluid Type   Total Nucleated cells: <150/uL   Neutrophils: <25%   Lymphocytes: <75%   Moncytes/Macrophages:   Please note reference range updated effective 2019 /uL   Total RBC Count: 207 /uL   Cell Count, Body Fluid (21 @ 15:06) Fluid Type: Peritoneal fl   Fluid Segmented Granulocytes: 13 %   Monocyte/Macrophage Count - Body Fluid: 8 %   BF Lymphocytes: 37 %   Body Fluid Appearance: Clear   Mesothelial Cells - Body Fluid: 42 %   Color - Body Fluid: Yellow   Total Nucleated Cell Count, Body Fluid: 227: Peritoneal/Pleural/ Pericardial Body Fluid Types   Total Nucleated cells: <500/uL   Neutrophils: <25%   Synovial Body Fluid Type   Total Nucleated cells: <150/uL   Neutrophils: <25%   Lymphocytes: <75%   Moncytes/Macrophages:   Please note reference range updated effective 2019 /uL   Total RBC Count: 187 /uL   < from: Xray Chest 1 View- PORTABLE-Urgent (Xray Chest 1 View- PORTABLE-Urgent .) (21 @ 10:38) >  Impression:  The heart is normal in size. Small right pleural effusion. Right lower lobe pneumonia and/or atelectasis cannot be ruled out entirely. Left lung is clear. No pneumothorax. No acute bony pathology could be identified.    < from: TTE with Doppler (w/Cont) (21 @ 15:19) >  Conclusions:  1. Grossly normal left ventricular internal dimensions and  wall thicknesses.  2. Endocardial visualization enhanced with intravenous  injection of Ultrasonic Enhancing Agent (Definity). Grossly  normal left ventricular systolic function. No obvious  segmental wall motion abnormalities.  3. Normal diastolic function.  4. Normal right ventricular size and function.  5. Estimated pulmonary artery systolic pressure equals 19  mm Hg, assuming right atrial pressure equals 8  mm Hg,  consistent with normal pulmonary pressures.

## 2021-03-24 NOTE — PROGRESS NOTE ADULT - PROBLEM SELECTOR PLAN 6
- cryptogenic cirrhosis, followed by Dr. Carvajal   - MELD-Na 13, 16 on admission, up from 10 outpatient   - Hep B Ab + , Hep DNA neg ; grossly normal workup to date outpt  - c/w lasix 40, spironolactone 100 qd   - c/w ppi 40 bid for varices in s/o low bps, hr  - Hepatology on board, appreciate recs

## 2021-03-24 NOTE — PROGRESS NOTE ADULT - SUBJECTIVE AND OBJECTIVE BOX
Follow Up:  abdominal pain    Interval History:    REVIEW OF SYSTEMS  [  ] ROS unobtainable because:    [  ] All other systems negative except as noted below    Constitutional:  [ ] fever [ ] chills  [ ] weight loss  [ ] weakness  Skin:  [ ] rash [ ] phlebitis	  Eyes: [ ] icterus [ ] pain  [ ] discharge	  ENMT: [ ] sore throat  [ ] thrush [ ] ulcers [ ] exudates  Respiratory: [ ] dyspnea [ ] hemoptysis [ ] cough [ ] sputum	  Cardiovascular:  [ ] chest pain [ ] palpitations [ ] edema	  Gastrointestinal:  [ ] nausea [ ] vomiting [ ] diarrhea [ ] constipation [ ] pain	  Genitourinary:  [ ] dysuria [ ] frequency [ ] hematuria [ ] discharge [ ] flank pain  [ ] incontinence  Musculoskeletal:  [ ] myalgias [ ] arthralgias [ ] arthritis  [ ] back pain  Neurological:  [ ] headache [ ] seizures  [ ] confusion/altered mental status    Allergies  No Known Allergies        ANTIMICROBIALS:      OTHER MEDS:  MEDICATIONS  (STANDING):  acetaminophen   Tablet .. 650 every 8 hours PRN  albuterol/ipratropium for Nebulization. 3 once PRN  furosemide    Tablet 40 daily  heparin   Injectable 4000 every 6 hours PRN  heparin   Injectable 2000 every 6 hours PRN  heparin  Infusion.  <Continuous>  melatonin 3 at bedtime PRN  oxycodone    5 mG/acetaminophen 325 mG 1 every 8 hours PRN  pantoprazole  Injectable 40 two times a day  polyethylene glycol 3350 17 daily  senna 2 at bedtime  spironolactone 100 daily  warfarin 5 once      Vital Signs Last 24 Hrs  T(C): 36.9 (24 Mar 2021 16:33), Max: 36.9 (24 Mar 2021 16:33)  T(F): 98.5 (24 Mar 2021 16:33), Max: 98.5 (24 Mar 2021 16:33)  HR: 108 (24 Mar 2021 16:33) (76 - 108)  BP: 114/83 (24 Mar 2021 16:33) (97/65 - 114/83)  BP(mean): --  RR: 18 (24 Mar 2021 16:33) (18 - 18)  SpO2: 95% (24 Mar 2021 16:33) (95% - 97%)    PHYSICAL EXAMINATION:  General: Alert and Awake, NAD  HEENT: PERRL, EOMI  Neck: Supple  Cardiac: RRR, No M/R/G  Resp: CTAB, No Wh/Rh/Ra  Abdomen: NBS, NT/ND, No HSM, No rigidity or guarding  MSK: No LE edema. No Calf tenderness  : No salamanca  Skin: No rashes or lesions. Skin is warm and dry to the touch.   Neuro: Alert and Awake. CN 2-12 Grossly intact. Moves all four extremities spontaneously.  Psych: Calm, Pleasant, Cooperative                          12.1   3.03  )-----------( 157      ( 24 Mar 2021 06:06 )             38.8           138  |  98  |  9   ----------------------------<  101<H>  3.5   |  25  |  0.55    Ca    9.3      24 Mar 2021 06:06  Phos  3.5       Mg     2.1         TPro  7.5  /  Alb  4.1  /  TBili  1.9<H>  /  DBili  x   /  AST  34  /  ALT  17  /  AlkPhos  158<H>        Urinalysis Basic - ( 23 Mar 2021 17:50 )    Color: Yellow / Appearance: Clear / S.011 / pH: x  Gluc: x / Ketone: Negative  / Bili: Negative / Urobili: 6 mg/dL   Blood: x / Protein: Negative / Nitrite: Negative   Leuk Esterase: Negative / RBC: 3 /hpf / WBC 1 /HPF   Sq Epi: x / Non Sq Epi: 3 /hpf / Bacteria: Negative        MICROBIOLOGY:  v  .Body Fluid Peritoneal Fluid  21   Testing in progress  --  --      .Body Fluid Pleural Fluid  21   No growth to date.  --  --      .Body Fluid Pleural Fluid  21   Testing in progress  --  --                RADIOLOGY:    <The imaging below has been reviewed and visualized by me independently. Findings as detailed in report below> Follow Up:  abdominal pain    Interval History: afebrile. no acute overnight events. feels less SOB today.     REVIEW OF SYSTEMS  [  ] ROS unobtainable because:    [ x ] All other systems negative except as noted below:	    Constitutional:  [ ] fever [ ] chills  [ ] weight loss  [x ] weakness  Skin:  [ ] rash [ ] phlebitis	  Eyes: [ ] icterus [ ] pain  [ ] discharge	  ENMT: [ ] sore throat  [ ] thrush [ ] ulcers [ ] exudates  Respiratory: [x] dyspnea [ ] hemoptysis [ ] cough [ ] sputum	  Cardiovascular:  [ ] chest pain [ ] palpitations [ ] edema	  Gastrointestinal:  [ ] nausea [ ] vomiting [ ] diarrhea [ ] constipation [ ] pain	  Genitourinary:  [ ] dysuria [ ] frequency [ ] hematuria [ ] discharge [ ] flank pain  [ ] incontinence  Musculoskeletal:  [ ] myalgias [ ] arthralgias [ ] arthritis  [ ] back pain  Neurological:  [ ] headache [ ] seizures  [ ] confusion/altered mental status    Allergies  No Known Allergies        ANTIMICROBIALS:      OTHER MEDS:  MEDICATIONS  (STANDING):  acetaminophen   Tablet .. 650 every 8 hours PRN  albuterol/ipratropium for Nebulization. 3 once PRN  furosemide    Tablet 40 daily  heparin   Injectable 4000 every 6 hours PRN  heparin   Injectable 2000 every 6 hours PRN  heparin  Infusion.  <Continuous>  melatonin 3 at bedtime PRN  oxycodone    5 mG/acetaminophen 325 mG 1 every 8 hours PRN  pantoprazole  Injectable 40 two times a day  polyethylene glycol 3350 17 daily  senna 2 at bedtime  spironolactone 100 daily  warfarin 5 once      Vital Signs Last 24 Hrs  T(C): 36.9 (24 Mar 2021 16:33), Max: 36.9 (24 Mar 2021 16:33)  T(F): 98.5 (24 Mar 2021 16:33), Max: 98.5 (24 Mar 2021 16:33)  HR: 108 (24 Mar 2021 16:33) (76 - 108)  BP: 114/83 (24 Mar 2021 16:33) (97/65 - 114/83)  BP(mean): --  RR: 18 (24 Mar 2021 16:33) (18 - 18)  SpO2: 95% (24 Mar 2021 16:33) (95% - 97%)    PHYSICAL EXAMINATION:  General: Alert and Awake, NAD  HEENT: PERRL, EOMI, Scleral icterus  Cardiac: RRR, No M/R/G  Resp: Decreased BS at right lung base. no rales, wheezes or rhonchi.   Abdomen: NBS, NT/ND, No HSM, No rigidity or guarding  MSK: No LE edema. No stigmata of IE. No evidence of phlebitis. No evidence of synovitis.  : No salamanca  Skin: No rashes or lesions. Skin is warm and dry to the touch.   Neuro: Alert and Awake. CN 2-12 Grossly intact. Moves all four extremities spontaneously.  Psych: Calm, Pleasant, Cooperative                          12.1   3.03  )-----------( 157      ( 24 Mar 2021 06:06 )             38.8           138  |  98  |  9   ----------------------------<  101<H>  3.5   |  25  |  0.55    Ca    9.3      24 Mar 2021 06:06  Phos  3.5       Mg     2.1         TPro  7.5  /  Alb  4.1  /  TBili  1.9<H>  /  DBili  x   /  AST  34  /  ALT  17  /  AlkPhos  158<H>        Urinalysis Basic - ( 23 Mar 2021 17:50 )    Color: Yellow / Appearance: Clear / S.011 / pH: x  Gluc: x / Ketone: Negative  / Bili: Negative / Urobili: 6 mg/dL   Blood: x / Protein: Negative / Nitrite: Negative   Leuk Esterase: Negative / RBC: 3 /hpf / WBC 1 /HPF   Sq Epi: x / Non Sq Epi: 3 /hpf / Bacteria: Negative        MICROBIOLOGY:  v  .Body Fluid Peritoneal Fluid  21   Testing in progress  --  --      .Body Fluid Pleural Fluid  21   No growth to date.  --  --      .Body Fluid Pleural Fluid  21   Testing in progress  --  --    RADIOLOGY:    <The imaging below has been reviewed and visualized by me independently. Findings as detailed in report below>    EXAM:  CT ABDOMEN AND PELVIS IC                        EXAM:  CT ANGIO CHEST (W)AW IC                        PROCEDURE DATE:  2021    1. No pulmonary embolism.  2. Moderate to large right pleural effusion with adjacent compressive atelectasis including near complete atelectasis of the right lower lobe and partial atelectasis of the right middle lobe.  3. Small patchy airspace opacities in bilateral upper and left lower lobes likely infectious in etiology.  4. Thrombus in the suprarenal inferior vena cava. Thrombus in the superior mesenteric vein extending to the confluence with the splenic vein and possibly into the portal vein.  5. Cirrhosis with portal hypertension.  6. Small to moderate volume abdominal and pelvic ascites.

## 2021-03-24 NOTE — PROGRESS NOTE ADULT - ASSESSMENT
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 S/p IR thoracentesis and diagnostic paracentesis 3/22 with cytologies, on Hep ggt with coumadin bridge. Hypercoagulability work up sent inpatient, r/o APLS. Remains on 2L O2, r/o infectious process

## 2021-03-24 NOTE — PROGRESS NOTE ADULT - PROBLEM SELECTOR PLAN 7
- COVID PCR + 3/4 ; was quarantined outpatient   - DOA outside window for remdesivir, dexamethasone  - Elevated COVID Antibodies  - Elevated inflammatory markers, D-Dimer downtrending, continue to trend  - CXR + R PNA vs atelectasis, remained afebrile inpt (fevers prior to admission)  - AC on Heparin gtt - coumadin bridge for IVC thrombi  - O2 supplementation, ween as tolerated

## 2021-03-24 NOTE — PROGRESS NOTE ADULT - PROBLEM SELECTOR PLAN 3
- h/o Budd-Chiari syndrome (no previously on AC), Protein S Deficiency, COVID-19  - concern for ANGELICA in s/o multiple miscarriages   - hypercoagulability workup sent, pending final results likely outpatient   - Vascular Sx consulted: no surgical intervention at this time  - c/w Heparin gtt / coumadin bridge

## 2021-03-24 NOTE — PROGRESS NOTE ADULT - ATTENDING COMMENTS
Hepatology Staff: Haydee Carvajal MD    I saw and examined the patient along with  Dr. Orellana 03-24-21 @ 17:34.    Patient Medical Record, hosptial course was reviewed and summarized as below:    Vitals: Vital Signs Last 24 Hrs  T(C): 36.9 (24 Mar 2021 16:33), Max: 36.9 (24 Mar 2021 16:33)  T(F): 98.5 (24 Mar 2021 16:33), Max: 98.5 (24 Mar 2021 16:33)  HR: 108 (24 Mar 2021 16:33) (76 - 108)  BP: 114/83 (24 Mar 2021 16:33) (97/65 - 114/83)  BP(mean): --  RR: 18 (24 Mar 2021 16:33) (18 - 18)  SpO2: 95% (24 Mar 2021 16:33) (95% - 97%)    Labs:INR: 1.41 ratio (03-24-21 @ 06:43)  Creatinine, Serum: 0.55 mg/dL (03-24-21 @ 06:06)  Bilirubin Total, Serum: 1.9 mg/dL (03-24-21 @ 06:06)      I/O: I&O's Summary    23 Mar 2021 07:01  -  24 Mar 2021 07:00  --------------------------------------------------------  IN: 190 mL / OUT: 1450 mL / NET: -1260 mL      Nutritional Status:   Albumin, Serum: 4.1 g/dL (03-24-21 @ 06:06)    Recommendations: Patient clinically feels much better and is off oxygen.  Denies any shortness of breath although appears to have intermittent mild cough and also reports nausea symptoms.  Abdomen is much less distended.  There is no peripheral edema on my examination.  Continue with current management.  We will plan for a repeat ultrasound with Doppler study early next week.  Continue with heparin and bridge with Coumadin.      Plan discussed with Primary team.

## 2021-03-24 NOTE — PROGRESS NOTE ADULT - PROBLEM SELECTOR PLAN 5
- low volume ascites on CT A/P   - s/p diagnostic paracentesis ; see cytologies , negative gram stain  - Hepatology following, appreciate recs

## 2021-03-25 ENCOUNTER — TRANSCRIPTION ENCOUNTER (OUTPATIENT)
Age: 36
End: 2021-03-25

## 2021-03-25 VITALS
RESPIRATION RATE: 18 BRPM | SYSTOLIC BLOOD PRESSURE: 107 MMHG | OXYGEN SATURATION: 96 % | TEMPERATURE: 98 F | HEART RATE: 84 BPM | DIASTOLIC BLOOD PRESSURE: 78 MMHG

## 2021-03-25 DIAGNOSIS — K82.9 DISEASE OF GALLBLADDER, UNSPECIFIED: ICD-10-CM

## 2021-03-25 LAB
ALBUMIN SERPL ELPH-MCNC: 4.1 G/DL — SIGNIFICANT CHANGE UP (ref 3.3–5)
ALP SERPL-CCNC: 161 U/L — HIGH (ref 40–120)
ALT FLD-CCNC: 20 U/L — SIGNIFICANT CHANGE UP (ref 10–45)
ANION GAP SERPL CALC-SCNC: 15 MMOL/L — SIGNIFICANT CHANGE UP (ref 5–17)
APTT BLD: 94.3 SEC — HIGH (ref 27.5–35.5)
AST SERPL-CCNC: 41 U/L — HIGH (ref 10–40)
B2 GLYCOPROT1 AB SER QL: NEGATIVE — SIGNIFICANT CHANGE UP
BILIRUB SERPL-MCNC: 1.7 MG/DL — HIGH (ref 0.2–1.2)
BUN SERPL-MCNC: 15 MG/DL — SIGNIFICANT CHANGE UP (ref 7–23)
CALCIUM SERPL-MCNC: 9.6 MG/DL — SIGNIFICANT CHANGE UP (ref 8.4–10.5)
CHLORIDE SERPL-SCNC: 95 MMOL/L — LOW (ref 96–108)
CO2 SERPL-SCNC: 23 MMOL/L — SIGNIFICANT CHANGE UP (ref 22–31)
CREAT SERPL-MCNC: 0.56 MG/DL — SIGNIFICANT CHANGE UP (ref 0.5–1.3)
GLUCOSE SERPL-MCNC: 83 MG/DL — SIGNIFICANT CHANGE UP (ref 70–99)
HCT VFR BLD CALC: 40 % — SIGNIFICANT CHANGE UP (ref 34.5–45)
HGB BLD-MCNC: 12.7 G/DL — SIGNIFICANT CHANGE UP (ref 11.5–15.5)
MAGNESIUM SERPL-MCNC: 2.1 MG/DL — SIGNIFICANT CHANGE UP (ref 1.6–2.6)
MCHC RBC-ENTMCNC: 27.3 PG — SIGNIFICANT CHANGE UP (ref 27–34)
MCHC RBC-ENTMCNC: 31.8 GM/DL — LOW (ref 32–36)
MCV RBC AUTO: 85.8 FL — SIGNIFICANT CHANGE UP (ref 80–100)
NRBC # BLD: 0 /100 WBCS — SIGNIFICANT CHANGE UP (ref 0–0)
PHOSPHATE SERPL-MCNC: 3.4 MG/DL — SIGNIFICANT CHANGE UP (ref 2.5–4.5)
PLATELET # BLD AUTO: 176 K/UL — SIGNIFICANT CHANGE UP (ref 150–400)
POTASSIUM SERPL-MCNC: 3.8 MMOL/L — SIGNIFICANT CHANGE UP (ref 3.5–5.3)
POTASSIUM SERPL-SCNC: 3.8 MMOL/L — SIGNIFICANT CHANGE UP (ref 3.5–5.3)
PROT SERPL-MCNC: 7.7 G/DL — SIGNIFICANT CHANGE UP (ref 6–8.3)
RBC # BLD: 4.66 M/UL — SIGNIFICANT CHANGE UP (ref 3.8–5.2)
RBC # FLD: 14.6 % — HIGH (ref 10.3–14.5)
SODIUM SERPL-SCNC: 133 MMOL/L — LOW (ref 135–145)
WBC # BLD: 3.56 K/UL — LOW (ref 3.8–10.5)
WBC # FLD AUTO: 3.56 K/UL — LOW (ref 3.8–10.5)

## 2021-03-25 PROCEDURE — 99233 SBSQ HOSP IP/OBS HIGH 50: CPT | Mod: GC

## 2021-03-25 PROCEDURE — 99232 SBSQ HOSP IP/OBS MODERATE 35: CPT

## 2021-03-25 RX ORDER — ONDANSETRON 8 MG/1
4 TABLET, FILM COATED ORAL ONCE
Refills: 0 | Status: COMPLETED | OUTPATIENT
Start: 2021-03-25 | End: 2021-03-25

## 2021-03-25 RX ORDER — WARFARIN SODIUM 2.5 MG/1
5 TABLET ORAL ONCE
Refills: 0 | Status: DISCONTINUED | OUTPATIENT
Start: 2021-03-25 | End: 2021-03-25

## 2021-03-25 RX ORDER — RIVAROXABAN 15 MG-20MG
15 KIT ORAL
Refills: 0 | Status: DISCONTINUED | OUTPATIENT
Start: 2021-03-25 | End: 2021-03-25

## 2021-03-25 RX ORDER — ASCORBIC ACID 60 MG
1 TABLET,CHEWABLE ORAL
Qty: 0 | Refills: 0 | DISCHARGE
Start: 2021-03-25

## 2021-03-25 RX ORDER — FUROSEMIDE 40 MG
1 TABLET ORAL
Qty: 30 | Refills: 0
Start: 2021-03-25 | End: 2021-04-23

## 2021-03-25 RX ORDER — OMEPRAZOLE 10 MG/1
1 CAPSULE, DELAYED RELEASE ORAL
Qty: 30 | Refills: 2
Start: 2021-03-25 | End: 2021-06-22

## 2021-03-25 RX ORDER — ACETAMINOPHEN 500 MG
2 TABLET ORAL
Qty: 180 | Refills: 2
Start: 2021-03-25 | End: 2021-06-22

## 2021-03-25 RX ORDER — LANOLIN ALCOHOL/MO/W.PET/CERES
1 CREAM (GRAM) TOPICAL
Qty: 0 | Refills: 0 | DISCHARGE
Start: 2021-03-25

## 2021-03-25 RX ORDER — RIVAROXABAN 15 MG-20MG
15 KIT ORAL
Qty: 1 | Refills: 0
Start: 2021-03-25

## 2021-03-25 RX ORDER — SENNA PLUS 8.6 MG/1
2 TABLET ORAL
Qty: 60 | Refills: 2
Start: 2021-03-25 | End: 2021-06-22

## 2021-03-25 RX ORDER — MULTIVIT-MIN/FERROUS GLUCONATE 9 MG/15 ML
1 LIQUID (ML) ORAL
Qty: 0 | Refills: 0 | DISCHARGE
Start: 2021-03-25

## 2021-03-25 RX ORDER — SPIRONOLACTONE 25 MG/1
4 TABLET, FILM COATED ORAL
Qty: 120 | Refills: 2
Start: 2021-03-25 | End: 2021-06-22

## 2021-03-25 RX ORDER — LIDOCAINE 4 G/100G
1 CREAM TOPICAL
Qty: 0 | Refills: 0 | DISCHARGE
Start: 2021-03-25

## 2021-03-25 RX ORDER — POLYETHYLENE GLYCOL 3350 17 G/17G
17 POWDER, FOR SOLUTION ORAL
Qty: 510 | Refills: 0
Start: 2021-03-25 | End: 2021-04-23

## 2021-03-25 RX ADMIN — Medication 500 MILLIGRAM(S): at 12:54

## 2021-03-25 RX ADMIN — HEPARIN SODIUM 800 UNIT(S)/HR: 5000 INJECTION INTRAVENOUS; SUBCUTANEOUS at 09:17

## 2021-03-25 RX ADMIN — SPIRONOLACTONE 100 MILLIGRAM(S): 25 TABLET, FILM COATED ORAL at 05:36

## 2021-03-25 RX ADMIN — PANTOPRAZOLE SODIUM 40 MILLIGRAM(S): 20 TABLET, DELAYED RELEASE ORAL at 05:36

## 2021-03-25 RX ADMIN — RIVAROXABAN 15 MILLIGRAM(S): KIT at 18:17

## 2021-03-25 RX ADMIN — Medication 40 MILLIGRAM(S): at 05:36

## 2021-03-25 RX ADMIN — POLYETHYLENE GLYCOL 3350 17 GRAM(S): 17 POWDER, FOR SOLUTION ORAL at 12:54

## 2021-03-25 RX ADMIN — ONDANSETRON 4 MILLIGRAM(S): 8 TABLET, FILM COATED ORAL at 12:54

## 2021-03-25 RX ADMIN — Medication 1 TABLET(S): at 12:54

## 2021-03-25 RX ADMIN — RIVAROXABAN 15 MILLIGRAM(S): KIT at 13:19

## 2021-03-25 NOTE — PROVIDER CONTACT NOTE (CHANGE IN STATUS NOTIFICATION) - BACKGROUND
Pt admitted for portal vein thrombosis. Pt on heparin gtt at 8ml/hr per orders w/ bridge to coumadin.

## 2021-03-25 NOTE — DISCHARGE NOTE NURSING/CASE MANAGEMENT/SOCIAL WORK - PATIENT PORTAL LINK FT
You can access the FollowMyHealth Patient Portal offered by [facility] by registering at the following website: [email or web address]. By joining [facility]’s FollowMyHealth portal, you will also be able to view your health information using other applications (apps) compatible with our system.

## 2021-03-25 NOTE — PHYSICAL THERAPY INITIAL EVALUATION ADULT - ADDITIONAL COMMENTS
Pt lives in an elevator apartment w/ spouse, no steps to enter. PTA pt reports being independent w/ all functional mobility & did not utilize an AD for ambulation.

## 2021-03-25 NOTE — PROGRESS NOTE ADULT - PROBLEM SELECTOR PLAN 4
- continues to endorse chest pain on exertion, varies in location  - not reproducible with palpation  - trops < 6, CTA r/o PE however poor view of upper lobes, TTE w/o RH strain  - continue to monitor

## 2021-03-25 NOTE — PROGRESS NOTE ADULT - PROBLEM SELECTOR PLAN 9
- MRI 12/7/20 suggesting underlying Budd-Chiari syndrome  - was not on AC 2/2 bleed risk (portal HTN, paraesophageal/splenorenal varices)  - Hepatology consulted, appreciate recs  - Heme following, appreciate recs
- started on bowel regimen  - monitor stool count
- MRI 12/7/20 suggesting underlying Budd-Chiari syndrome  - was not on AC 2/2 bleed risk (portal HTN, paraesophageal/splenorenal varices)  - Hepatology consulted, appreciate recs  - Heme following, appreciate recs

## 2021-03-25 NOTE — PROGRESS NOTE ADULT - REASON FOR ADMISSION
abdominal pain

## 2021-03-25 NOTE — PROGRESS NOTE ADULT - SUBJECTIVE AND OBJECTIVE BOX
PROGRESS NOTE:     Katherine Parmar,  PGY1   Contact: Pager [phone] [facility] | [id number] [facility] | [facility]phoma Teams  Please check Resident Assignment tab on [place] for Team / Coverage     Patient is a 35y old  Female who presents with a chief complaint of abdominal pain (24 Mar 2021 17:21)    EVENTS / SUBJECTIVE: Pt seen at bedside. OVN chest pain, recurrent     MEDICATIONS  (STANDING):  ascorbic acid 500 milliGRAM(s) Oral daily  furosemide    Tablet 40 milliGRAM(s) Oral daily  heparin  Infusion.  Unit(s)/Hr (10 mL/Hr) IV Continuous <Continuous>  multivitamin/minerals 1 Tablet(s) Oral daily  pantoprazole  Injectable 40 milliGRAM(s) IV Push two times a day  polyethylene glycol 3350 17 Gram(s) Oral daily  senna 2 Tablet(s) Oral at bedtime  spironolactone 100 milliGRAM(s) Oral daily    MEDICATIONS  (PRN):  acetaminophen   Tablet .. 650 milliGRAM(s) Oral every 8 hours PRN Mild Pain (1 - 3)  albuterol/ipratropium for Nebulization. 3 milliLiter(s) Nebulizer once PRN Shortness of Breath and/or Wheezing  benzocaine 15 mG/menthol 3.6 mG (Sugar-Free) Lozenge 1 Lozenge Oral four times a day PRN dry cough, sore throat  heparin   Injectable 4000 Unit(s) IV Push every 6 hours PRN For aPTT less than 40  heparin   Injectable 2000 Unit(s) IV Push every 6 hours PRN For aPTT between 40 - 57  lidocaine   Patch 1 Patch Transdermal daily PRN Mild Pain (1-3)  melatonin 3 milliGRAM(s) Oral at bedtime PRN Insomnia  oxycodone    5 mG/acetaminophen 325 mG 1 Tablet(s) Oral every 8 hours PRN Moderate Pain (4 - 6)    Allergies  No Known Allergies    PHYSICAL EXAM:  Vital Signs Last 24 Hrs  T(C): 36.6 (25 Mar 2021 04:39), Max: 36.9 (24 Mar 2021 16:33)  T(F): 97.8 (25 Mar 2021 04:39), Max: 98.5 (24 Mar 2021 16:33)  HR: 80 (25 Mar 2021 04:39) (79 - 108)  BP: 101/67 (25 Mar 2021 04:39) (100/68 - 114/83)  RR: 18 (25 Mar 2021 04:39) (18 - 19)  SpO2: 95% (25 Mar 2021 04:39) (95% - 96%)    GENERAL: No acute distress, well-developed  HEENT::  Atraumatic, EOMI, sclera clear, supple neck  CHEST/LUNG: NC 2L, decreased inspiratory effort, poor air movement appreciated w bibasilar crackles   HEART: Regular rate and rhythm; No murmurs, rubs, or gallops  ABDOMEN: Soft, mild tenderness to palpation, improved distention; + bowel sounds, hepatomegaly   EXTREMITIES:  2+ peripheral pulses b/l, No clubbing, cyanosis, or edema  NEUROLOGY: A&O x 3, no focal deficits  PSYCH: anxious, soft spoken  SKIN: No rashes or lesions    PATIENT DATA:                       12.7   3.56  )-----------( 176      ( 25 Mar 2021 07:08 )             40.0       138  |  98  |  9   ----------------------------<  101<H>  3.5   |  25  |  0.55    Ca    9.3      24 Mar 2021 06:06  Phos  3.5     03-24  Mg     2.1     03-24    TPro  7.5  /  Alb  4.1  /  TBili  1.9<H>  /  DBili  x   /  AST  34  /  ALT  17  /  AlkPhos  158<H>  03-24    PT/INR - ( 24 Mar 2021 06:43 )   PT: 16.6 sec;   INR: 1.41 ratio    PTT - ( 24 Mar 2021 06:43 )  PTT:92.6 sec    Urinalysis Basic - ( 23 Mar 2021 17:50 )  Color: Yellow / Appearance: Clear / S.011 / pH: x  Gluc: x / Ketone: Negative  / Bili: Negative / Urobili: 6 mg/dL   Blood: x / Protein: Negative / Nitrite: Negative   Leuk Esterase: Negative / RBC: 3 /hpf / WBC 1 /HPF   Sq Epi: x / Non Sq Epi: 3 /hpf / Bacteria: Negative    Culture - Fungal, Body Fluid (collected 22 Mar 2021 23:03)  Source: .Body Fluid Peritoneal Fluid  Preliminary Report (23 Mar 2021 09:40):    Testing in progress    Culture - Body Fluid with Gram Stain (collected 22 Mar 2021 23:03)  Source: .Body Fluid Peritoneal Fluid  Preliminary Report (24 Mar 2021 22:16):    No growth    Culture - Body Fluid with Gram Stain (collected 22 Mar 2021 17:33)  Source: .Body Fluid Pleural Fluid  Preliminary Report (23 Mar 2021 10:36):    No growth to date.    Culture - Fungal, Body Fluid (collected 22 Mar 2021 17:32)  Source: .Body Fluid Pleural Fluid  Preliminary Report (23 Mar 2021 07:36):    Testing in progress

## 2021-03-25 NOTE — PROGRESS NOTE ADULT - ASSESSMENT
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 S/p IR thoracentesis and diagnostic paracentesis 3/22 with cytologies, on Hep ggt with coumadin bridge. Hypercoagulability work up sent inpatient, r/o APLS. Remains on 2L O2, r/o infectious process

## 2021-03-25 NOTE — PROVIDER CONTACT NOTE (CHANGE IN STATUS NOTIFICATION) - ASSESSMENT
Pt c/o pain under left breast radiating to left side 5/10. Pt refusing Tylenol for pain. Pt AO x 4. Pt denies dizziness, lightheadedness, or SOB. /67, HR 80. 02 95% on 2 L NC, other vss. No s/s of distress.

## 2021-03-25 NOTE — PROGRESS NOTE ADULT - ATTENDING COMMENTS
# decompensated cirrhosis with ascites  # pleural effusion/hepatic hydrothorax  # IVC thrombosis  # COVID19    - s/p IR guided diagnostic/therapeutic thora and paracentesis; culture so far negative  - TTE with normal LV function, normal pulmonary pressure  - appreciate ID recs; care discussed with Dr. Hunt (ID) - low suspicion for infectious etiology at this time; monitor off of abx  - APLS negative; appreciate heme recs- start on xarelto  - appreciate hepatology: continue lasix/spironolactone  - pt out of window for monoclonal Ab or Remdesivir    dc planning  care discussed with Dr. Agata Palencia MD  Division of Hospital Medicine  Cell: [phone]  Office: [phone] .

## 2021-03-25 NOTE — PHYSICAL THERAPY INITIAL EVALUATION ADULT - PERTINENT HX OF CURRENT PROBLEM, REHAB EVAL
35 y.o. F PMH cryptogenic cirrhosis, Budd Chiari syndrome, Protein S deficiency, COVID-19 infection who c/o chest painx 3 days & abdominal bloating/tightness for 5 days. Chest pain described as tightness, across the chest w/ radiation to left side, worsens w/ changes in positions a/w non-productive cough. Pt was diagnosed w/ COVID on 3/4, since has had high fevers (102-103F), & dyspnea on minimal exertion. Chest pain worsened to 9/10, more constant, prompting her to come into the ED.

## 2021-03-25 NOTE — PROGRESS NOTE ADULT - NUTRITIONAL ASSESSMENT
This patient has been assessed with a concern for Malnutrition and has been determined to have a diagnosis/diagnoses of Mild protein-calorie malnutrition and Underweight (BMI < 19).    This patient is being managed with:   Diet Soft-  Low Sodium  Supplement Feeding Modality:  Oral  Ensure Enlive Cans or Servings Per Day:  1       Frequency:  Daily  Entered: Mar 23 2021  6:27PM    Diet Soft-  Low Sodium  Supplement Feeding Modality:  Oral  Ensure Enlive Cans or Servings Per Day:  1       Frequency:  Daily  Entered: Mar 23 2021  5:42PM    The following pending diet order is being considered for treatment of Mild protein-calorie malnutrition and Underweight (BMI < 19):null
This patient has been assessed with a concern for Malnutrition and has been determined to have a diagnosis/diagnoses of Mild protein-calorie malnutrition and Underweight (BMI < 19).    This patient is being managed with:   Diet Soft-  Low Sodium  Supplement Feeding Modality:  Oral  Ensure Enlive Cans or Servings Per Day:  1       Frequency:  Daily  Entered: Mar 23 2021  6:27PM    Diet Soft-  Low Sodium  Supplement Feeding Modality:  Oral  Ensure Enlive Cans or Servings Per Day:  1       Frequency:  Daily  Entered: Mar 23 2021  5:42PM    The following pending diet order is being considered for treatment of Mild protein-calorie malnutrition and Underweight (BMI < 19):null

## 2021-03-25 NOTE — PROGRESS NOTE ADULT - PROBLEM SELECTOR PLAN 10
Diet: Regular diet  DVT: Heparin gtt - coumadin bridge   Dispo: Pending clinical course, GOC conversation ongoing, FULL CODE
Diet: Regular diet  DVT: Heparin gtt - coumadin bridge   Dispo: Pending clinical course, GOC conversation ongoing, FULL CODE
Diet: Regular diet  DVT: Heparin gtt  Dispo: Pending clinical course, GOC conversation ongoing, FULL CODE

## 2021-03-25 NOTE — PROGRESS NOTE ADULT - SUBJECTIVE AND OBJECTIVE BOX
Chief Complaint:  Patient is a 35y old  Female who presents with a chief complaint of abdominal pain (25 Mar 2021 07:43)    Reason for consult: cirrhosis, ascites    Interval Events: No events, pt off O2. Discussed at multidisciplinary conference.     Hospital Medications:  acetaminophen   Tablet .. 650 milliGRAM(s) Oral every 8 hours PRN  albuterol/ipratropium for Nebulization. 3 milliLiter(s) Nebulizer once PRN  ascorbic acid 500 milliGRAM(s) Oral daily  benzocaine 15 mG/menthol 3.6 mG (Sugar-Free) Lozenge 1 Lozenge Oral four times a day PRN  furosemide    Tablet 40 milliGRAM(s) Oral daily  heparin   Injectable 4000 Unit(s) IV Push every 6 hours PRN  heparin   Injectable 2000 Unit(s) IV Push every 6 hours PRN  heparin  Infusion.  Unit(s)/Hr IV Continuous <Continuous>  lidocaine   Patch 1 Patch Transdermal daily PRN  melatonin 3 milliGRAM(s) Oral at bedtime PRN  multivitamin/minerals 1 Tablet(s) Oral daily  oxycodone    5 mG/acetaminophen 325 mG 1 Tablet(s) Oral every 8 hours PRN  pantoprazole  Injectable 40 milliGRAM(s) IV Push two times a day  polyethylene glycol 3350 17 Gram(s) Oral daily  senna 2 Tablet(s) Oral at bedtime  spironolactone 100 milliGRAM(s) Oral daily  warfarin 5 milliGRAM(s) Oral once    ROS:   General:  No  fevers, chills, night sweats, fatigue  Eyes:  Good vision, no reported pain  ENT:  No sore throat, pain, runny nose  CV:  No pain, palpitations  Pulm:  No dyspnea, cough  GI:  See HPI, otherwise negative  :  No  incontinence, nocturia  Muscle:  No pain, weakness  Neuro:  No memory problems  Psych:  No insomnia, mood problems, depression  Endocrine:  No polyuria, polydipsia, cold/heat intolerance  Heme:  No petechiae, ecchymosis, easy bruisability  Skin:  No rash    PHYSICAL EXAM:   Vital Signs:  Vital Signs Last 24 Hrs  T(C): 36.6 (25 Mar 2021 04:39), Max: 36.9 (24 Mar 2021 16:33)  T(F): 97.8 (25 Mar 2021 04:39), Max: 98.5 (24 Mar 2021 16:33)  HR: 80 (25 Mar 2021 04:39) (80 - 108)  BP: 101/67 (25 Mar 2021 04:39) (100/68 - 114/83)  BP(mean): --  RR: 18 (25 Mar 2021 04:39) (18 - 19)  SpO2: 95% (25 Mar 2021 04:39) (95% - 96%)  Daily     Daily     GENERAL: no acute distress  NEURO: alert  HEENT: anicteric sclera, no conjunctival pallor appreciated  CHEST: no respiratory distress, no accessory muscle use  CARDIAC: regular rate, rhythm  ABDOMEN: soft, non-tender, non-distended, no rebound or guarding  EXTREMITIES: warm, well perfused, no edema  SKIN: no lesions noted    LABS: reviewed                        12.7   3.56  )-----------( 176      ( 25 Mar 2021 07:08 )             40.0     03-25    133<L>  |  95<L>  |  15  ----------------------------<  83  3.8   |  23  |  0.56    Ca    9.6      25 Mar 2021 07:08  Phos  3.4     03-25  Mg     2.1     03-25    TPro  7.7  /  Alb  4.1  /  TBili  1.7<H>  /  DBili  x   /  AST  41<H>  /  ALT  20  /  AlkPhos  161<H>  03-25    LIVER FUNCTIONS - ( 25 Mar 2021 07:08 )  Alb: 4.1 g/dL / Pro: 7.7 g/dL / ALK PHOS: 161 U/L / ALT: 20 U/L / AST: 41 U/L / GGT: x             Interval Diagnostic Studies: see sunrise for full report

## 2021-03-25 NOTE — DISCHARGE NOTE NURSING/CASE MANAGEMENT/SOCIAL WORK - NSDCFUADDAPPT_GEN_ALL_CORE_FT
- You were provided a prescription for Ultrasound to re-evaluate your blood clot. Please schedule to have this test performed either 3/28 or 3/29/2021. You can do this at [address] [place] [address]. Call [phone] to schedule your Ultrasound.  - Please follow up with Dr. Venegas in 1 week with results from your repeated Ultrasound. Dr. Venegas will discuss the results and your blood thinner, Xarelto, management outpatient.  - You were evaluated by Dr. Carvajal, hepatologist, in the hospital. Please schedule an appointment with the liver doctor within 1 week of discharge. The liver doctor will help manage your water pills (spironolactone, lasix) and monitor your varices and liver function.

## 2021-03-25 NOTE — PROGRESS NOTE ADULT - ATTENDING COMMENTS
Hepatology Staff: Haydee Carvajal MD    I saw and examined the patient along with  Dr. Orellana  03-25-21 @ 16:36  Patient Medical Record, hosptial course was reviewed and summarized as below:    Vitals: Vital Signs Last 24 Hrs  T(C): 36.4 (25 Mar 2021 13:08), Max: 36.8 (25 Mar 2021 00:19)  T(F): 97.5 (25 Mar 2021 13:08), Max: 98.3 (25 Mar 2021 00:19)  HR: 86 (25 Mar 2021 13:08) (77 - 89)  BP: 107/75 (25 Mar 2021 13:08) (101/67 - 107/75)    RR: 18 (25 Mar 2021 13:08) (18 - 19)  SpO2: 96% (25 Mar 2021 13:08) (95% - 96%)    Labs:INR: 1.55 ratio (03-25-21 @ 07:13)  Creatinine, Serum: 0.56 mg/dL (03-25-21 @ 07:08)  Bilirubin Total, Serum: 1.7 mg/dL (03-25-21 @ 07:08)      I/O: I&O's Summary    25 Mar 2021 07:01  -  25 Mar 2021 16:36  --------------------------------------------------------  IN: 360 mL / OUT: 0 mL / NET: 360 mL      Nutritional Status:   Albumin, Serum: 4.1 g/dL (03-25-21 @ 07:08)    Recommendations: Patient continues to remain hemodynamically and clinically stable.  Continues to remain afebrile and is off oxygen.  She feels comfortable except for mild pressure in the right side of her chest.  Continue with current anticoagulation regimen and bridged with Coumadin.  I discussed her imaging studies and the tumor board meeting today.  We discussed potential plan for catheter directed thrombolysis of her IVC clot if persistent thrombosis on follow-up Doppler ultrasound.    Continue with Lasix 40 mg and Aldactone 100 mg daily.    Patient has a low model for end-stage liver disease: Remains orally for evaluation for liver transplantation.  We will continue to monitor her transplant candidacy as an outpatient.  She has already been referred and has been seen by transplant surgery team as an outpatient for potential consideration for liver transplantation.          Plan discussed with Primary team.

## 2021-03-25 NOTE — PROGRESS NOTE ADULT - PROBLEM SELECTOR PLAN 1
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

## 2021-03-25 NOTE — PROGRESS NOTE ADULT - PROBLEM SELECTOR PROBLEM 1
Acute hypoxemic respiratory failure
IVC thrombosis

## 2021-03-25 NOTE — PROGRESS NOTE ADULT - PROBLEM SELECTOR PLAN 2
- in s/o pleural effusion, COVID-19 +, possible underlying PNA, ascites  - CTA r/o PE but missed upper lobes bL, TTE grossly normal   - CXR R small pl effusion +/- RLL PNA vs atelectasis ; low suspicion infxn  - remains on 2L NC s/p para, pleural effusion vs. atelectasis +/- anxiety  - incentive spirometry, lozenges for dry cough, diuresis per hepatology

## 2021-03-25 NOTE — PROGRESS NOTE ADULT - PROBLEM SELECTOR PLAN 5
- CT + diffuse wall thickening likely 2/2 cirrhosis   - Tbili and Alk Phos elevated, stable   - continue to monitor

## 2021-03-25 NOTE — PROGRESS NOTE ADULT - ASSESSMENT
35F w/ cryptogenic cirrhosis decompensated by ascites and small non-bleeding varices, Budd-Chiari syndrome, hypercoagulable disorder with multiple clots and multiple spontaneous abortions, recent dx of COVID-19 PNA, presenting w/ chest tightness, dyspnea, and abd distension, found to have clot in IVC and portal confluence.    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  - HE: none  - HCC: LIRADS 4 and LIRADS 3 lesion seen on MRI 12/2020, stable from prior, AFP wnl 2/2021  - MELD-Na = 16 on 3/20/2020  #IVC thrombus  #Portal confluence thrombus  #Hx of hypercoagulable disorder  #COVID-19 PNA - on nasal cannula     Recommendations:  - Agree w/ hep gtt for thrombi with bridge to Coumadin  - Continue w/ lasix 40mg PO daily and spironolactone 100mg PO daily  - Trend CBC, CMP, INR daily  - Repeat U/S doppler to evaluate thrombus burden 1 week from last one (Sunday/Monday)    Kane Orellana  Gastroenterology Fellow  NON-URGENT CONSULTS:  Please email [email or web address] OR  [email or web address]  AT NIGHT AND ON WEEKENDS:  Contact on-call GI fellow via answering service ([phone]) from 5pm-8am and on weekends/holidays  MONDAY-FRIDAY 8AM-5PM:  Available via [employer] Teams  Pager# [phone] ([facility]) or [phone] ([facility])  GI Phone# [phone] ([facility])

## 2021-03-26 ENCOUNTER — TRANSCRIPTION ENCOUNTER (OUTPATIENT)
Age: 36
End: 2021-03-26

## 2021-03-26 NOTE — CHART NOTE - NSCHARTNOTEFT_GEN_A_CORE
Pt discharged to home on 3/25. Case being reviewed by tumor board. Will attempt to obtain prior auth for outpt CT A/P with IV contrast at 11 am at [address] on 3/28. Case # [id number].   Fax # [phone]

## 2021-03-27 LAB
CULTURE RESULTS: SIGNIFICANT CHANGE UP
SPECIMEN SOURCE: SIGNIFICANT CHANGE UP

## 2021-03-28 ENCOUNTER — APPOINTMENT (OUTPATIENT)
Dept: CT IMAGING | Facility: IMAGING CENTER | Age: 36
End: 2021-03-28

## 2021-03-28 ENCOUNTER — INPATIENT (INPATIENT)
Facility: HOSPITAL | Age: 36
LOS: 4 days | Discharge: HOME | DRG: 294 | End: 2021-04-02
Attending: HOSPITALIST | Admitting: HOSPITALIST
Payer: COMMERCIAL

## 2021-03-28 VITALS
DIASTOLIC BLOOD PRESSURE: 66 MMHG | SYSTOLIC BLOOD PRESSURE: 99 MMHG | WEIGHT: 101.41 LBS | TEMPERATURE: 98 F | HEIGHT: 63 IN | RESPIRATION RATE: 18 BRPM | OXYGEN SATURATION: 93 % | HEART RATE: 100 BPM

## 2021-03-28 LAB
ALBUMIN SERPL ELPH-MCNC: 3.7 G/DL — SIGNIFICANT CHANGE UP (ref 3.3–5)
ALP SERPL-CCNC: 147 U/L — HIGH (ref 40–120)
ALT FLD-CCNC: 20 U/L — SIGNIFICANT CHANGE UP (ref 10–45)
ANION GAP SERPL CALC-SCNC: 8 MMOL/L — SIGNIFICANT CHANGE UP (ref 5–17)
APTT BLD: 52.7 SEC — HIGH (ref 27.5–35.5)
AST SERPL-CCNC: 37 U/L — SIGNIFICANT CHANGE UP (ref 10–40)
BASOPHILS # BLD AUTO: 0.02 K/UL — SIGNIFICANT CHANGE UP (ref 0–0.2)
BASOPHILS NFR BLD AUTO: 0.5 % — SIGNIFICANT CHANGE UP (ref 0–2)
BILIRUB SERPL-MCNC: 1 MG/DL — SIGNIFICANT CHANGE UP (ref 0.2–1.2)
BUN SERPL-MCNC: 19 MG/DL — SIGNIFICANT CHANGE UP (ref 7–23)
CALCIUM SERPL-MCNC: 8.9 MG/DL — SIGNIFICANT CHANGE UP (ref 8.4–10.5)
CHLORIDE SERPL-SCNC: 104 MMOL/L — SIGNIFICANT CHANGE UP (ref 96–108)
CO2 SERPL-SCNC: 23 MMOL/L — SIGNIFICANT CHANGE UP (ref 22–31)
CREAT SERPL-MCNC: 0.55 MG/DL — SIGNIFICANT CHANGE UP (ref 0.5–1.3)
CULTURE RESULTS: SIGNIFICANT CHANGE UP
EOSINOPHIL # BLD AUTO: 0.11 K/UL — SIGNIFICANT CHANGE UP (ref 0–0.5)
EOSINOPHIL NFR BLD AUTO: 2.8 % — SIGNIFICANT CHANGE UP (ref 0–6)
GLUCOSE SERPL-MCNC: 106 MG/DL — HIGH (ref 70–99)
HCG SERPL-ACNC: <2 MIU/ML — SIGNIFICANT CHANGE UP
HCT VFR BLD CALC: 39.5 % — SIGNIFICANT CHANGE UP (ref 34.5–45)
HGB BLD-MCNC: 12 G/DL — SIGNIFICANT CHANGE UP (ref 11.5–15.5)
HIV 1 & 2 AB SERPL IA.RAPID: SIGNIFICANT CHANGE UP
IMM GRANULOCYTES NFR BLD AUTO: 0.3 % — SIGNIFICANT CHANGE UP (ref 0–1.5)
INR BLD: 2.58 RATIO — HIGH (ref 0.88–1.16)
LYMPHOCYTES # BLD AUTO: 0.93 K/UL — LOW (ref 1–3.3)
LYMPHOCYTES # BLD AUTO: 24 % — SIGNIFICANT CHANGE UP (ref 13–44)
MCHC RBC-ENTMCNC: 26.8 PG — LOW (ref 27–34)
MCHC RBC-ENTMCNC: 30.4 GM/DL — LOW (ref 32–36)
MCV RBC AUTO: 88.4 FL — SIGNIFICANT CHANGE UP (ref 80–100)
MONOCYTES # BLD AUTO: 0.34 K/UL — SIGNIFICANT CHANGE UP (ref 0–0.9)
MONOCYTES NFR BLD AUTO: 8.8 % — SIGNIFICANT CHANGE UP (ref 2–14)
NEUTROPHILS # BLD AUTO: 2.47 K/UL — SIGNIFICANT CHANGE UP (ref 1.8–7.4)
NEUTROPHILS NFR BLD AUTO: 63.6 % — SIGNIFICANT CHANGE UP (ref 43–77)
NRBC # BLD: 0 /100 WBCS — SIGNIFICANT CHANGE UP (ref 0–0)
PLATELET # BLD AUTO: 151 K/UL — SIGNIFICANT CHANGE UP (ref 150–400)
POTASSIUM SERPL-MCNC: 4.4 MMOL/L — SIGNIFICANT CHANGE UP (ref 3.5–5.3)
POTASSIUM SERPL-SCNC: 4.4 MMOL/L — SIGNIFICANT CHANGE UP (ref 3.5–5.3)
PROT SERPL-MCNC: 7 G/DL — SIGNIFICANT CHANGE UP (ref 6–8.3)
PROTHROM AB SERPL-ACNC: 29.6 SEC — HIGH (ref 10.6–13.6)
RBC # BLD: 4.47 M/UL — SIGNIFICANT CHANGE UP (ref 3.8–5.2)
RBC # FLD: 14.7 % — HIGH (ref 10.3–14.5)
SODIUM SERPL-SCNC: 135 MMOL/L — SIGNIFICANT CHANGE UP (ref 135–145)
SPECIMEN SOURCE: SIGNIFICANT CHANGE UP
WBC # BLD: 3.88 K/UL — SIGNIFICANT CHANGE UP (ref 3.8–10.5)
WBC # FLD AUTO: 3.88 K/UL — SIGNIFICANT CHANGE UP (ref 3.8–10.5)

## 2021-03-28 PROCEDURE — G1004: CPT

## 2021-03-28 PROCEDURE — 74177 CT ABD & PELVIS W/CONTRAST: CPT | Mod: 26,MG

## 2021-03-28 PROCEDURE — 99285 EMERGENCY DEPT VISIT HI MDM: CPT

## 2021-03-28 NOTE — ED ADULT NURSE REASSESSMENT NOTE - NS ED NURSE REASSESS COMMENT FT1
Received report @ 1900, pt lying in bed comfortable and denies any pain or discomfort. VSS. Pt given call bell and educated on how to use.

## 2021-03-28 NOTE — ED PROVIDER NOTE - OBJECTIVE STATEMENT
34 yo F with hx of recurrent miscarriages (x4), cryptogenic cirrhosis c/b Budd Chiari syndrome and Protein S deficiency, COVID+ on 3/4 and 3/20, recently admitted for abdominal pain found to have IVC thrombus on xarelto presenting following request by inpatient team to return to repeat CT scan. Patient reports mild ruq pain unchanged from previous admission. She has been taking the xarelto as directed. Denies leg swelling, chest pain, sob, fevers/chills, n/v/d/c.

## 2021-03-28 NOTE — ED PROVIDER NOTE - PHYSICAL EXAMINATION
Gen: Patient is well-appearing, NAD, AAOx3.  HEENT: NCAT, EOMI, CESAR, normal conjunctiva, tongue midline, oral mucosa moist.  Lung: CTAB, no respiratory distress, no wheezes/rhonchi/rales B/L, speaking in full sentences.  CV: RRR, no murmurs, rubs or gallops, distal pulses 2+ b/l.  Abd: soft, mild ruq ttp, ND, no guarding, no rigidity, no rebound tenderness, no CVA tenderness.  MSK: no visible deformities, ROM normal in UE/LE, no back TTP.  Neuro: No focal sensory or motor deficits.  Skin: Warm, well perfused, no rash, no leg swelling.  Psych: normal affect, calm.

## 2021-03-28 NOTE — ED PROVIDER NOTE - ATTENDING CONTRIBUTION TO CARE
Chief Complaint   Patient presents with   • Headache     had eye exam about 6 months ago       Kiersten Sweeney presents for headaches.  Patient is accompanied today by her mother.  She has been experiencing headaches on a fairly consistent basis for the last year.  Despite daily treatment with nasal spray and allergy medication and recent eye exam which was normal patient continues to struggle regular headaches.  Frequency of the headaches is to -3 times per week.  She is treated with Tylenol or ibuprofen with fairly good symptoms.  She sleeps well but not always seem rested.  She was recently found to be grinding her teeth while sleeping.  She has not had any congestion fevers chills cough or other symptoms.  Denies vision changes numbness tingling or other neurological symptoms associated with headache.  No phonophobia or photophobia.  Efforts have been made to make sure that she is drinking plenty of water and that she is eating regularly.  She takes vitamin-D supplement and a probiotic.  Moods have been good.  They have not found any specific triggers such as mood, weather, or other exposures.  Patient is a twin and her sister does not experience headaches.    Family history is significant for her brother having frequent headaches that have resulted in taking a daily preventative medication.  He is seen by the [facility] pain and headache clinic.  MRI significant for Chiari 1 malformation.  Family history is also significant for thyroid abnormalities and MS in mother and father.    Other concerns today include:  Despite monitoring and symptomatic treatment at home have been unable to (severity or frequency of headaches are fine specific triggers.    ALLERGIES:  No Known Allergies    Current Outpatient Medications   Medication Sig Dispense Refill   • ALL DAY ALLERGY 10 MG tablet TAKE 1 TABLET BY MOUTH DAILY. 30 tablet 5   • sodium fluoride (LURIDE) 2.2 (1 F) MG per chewable tablet Chew 1 tablet  by mouth daily. 30 tablet 5   • Fluticasone Propionate (FLONASE NA) Spray 1 spray in each nostril 2 times daily.     • VITAMIN D, CHOLECALCIFEROL, PO      • Probiotic Product (PROBIOTIC DAILY PO)        No current facility-administered medications for this visit.        Physical Exam:  Visit Vitals  /66   Pulse 96   Ht 4' 2\" (1.27 m)   Wt 26.8 kg   BMI 16.59 kg/m²     Gen: 6 year old female pleasant, alert and orientated, no acute distress.  HEENT: Conjunctivae are noninjected, Allergic shiners are present.  TMs are dull and retracted with fluid present.  Canals are clear bilaterally. Nasal mucosa is purple and boggy with swelling, and discharge. Posterior oropharynx has small amount of postnasal drip with erythema.  Buccal mucosa is without lesions, good dentition. Neck no anterior cervical or supraclavicular lymphadenopathy.    Lungs: Clear to auscultation  Cardiac: Regular rate and rhythm, no murmurs appreciated  Abdomen: Soft, nontender, nondistended, normoactive bowel sounds, no hepatosplenomegaly  Skin:  No rashes or lesions  Extremities:  No joint swelling redness for other noted abnormalities.  Good range of motion.  Neuro:  Cranial nerves 2-12 are grossly intact.  No pronator drift alternating movements and finger-to-nose dysmetria.  Gait and station are normal.  Reflexes + 2 and symmetric bilaterally.  5/5 strength.    In office testing:  None    Assessment:   1. Need for vaccination          Plan:  At this time we recommended refill of regular allergy medication including Flonase nasal spray 1 spray each nostril twice daily and Claritin 10 mg daily.  Make sure that she is eating regularly and drinking plenty of water.  They are going to try to obtain an over-the-counter mouth guard for teeth grinding to see if that helps provide relief.  Lab evaluation to include B12 and folate, vitamin-D, hemoglobin A1 c, iron panel, ferritin, CMP, TSH, CBC, sedimentation rate for headache.  XANDER with reflex due to  strong family history of autoimmune disease.  Lipid panel testing to screen for lipid abnormalities.  Headache healthy lifestyle changes discussed.  They have been doing a good job of covering most of the things that contribute to headaches.  She still receiving relief from Tylenol and ibuprofen so will continue this treatment.  Will adjust treatment plan pending results of the labs.  If headaches are not improving consider MRI imaging or referral.   Follow up As needed pending results. If anything worsens or changes she should call or follow up sooner than planned.   All questions and concerns addressed.        Preventive care: Influenza vaccine today.      Supervising physician: Dr. HEATH Velásquez     I saw and evaluated patient with resident. I discussed H+P and MDM with resident. I agree with the statements made by the resident unless otherwise noted.    The care of this patient was in support of the [facility] countermeasures to Covid-19.

## 2021-03-28 NOTE — CHART NOTE - NSCHARTNOTEFT_GEN_A_CORE
Prior auth denied. [employer]  ID # [id number]    language Azerbaijani,  name Dot    Spoke to pt to come back to hospital to repeat imaging to assess clot burden per GI/hepatology recs and consider pt's eligibility for catheter directed thrombolysis. Pt verbalized agreement.

## 2021-03-28 NOTE — ED ADULT TRIAGE NOTE - WEIGHT IN LBS
Per pt just moved into a new work place that is causing her a lot of allergies. Pt advised she can take zertec, tylenol cold and sinus  Sudafed for congestion. Should double check for interactions with pharmacist. Pt verbalized understanding. 101.4

## 2021-03-28 NOTE — ED PROVIDER NOTE - PROGRESS NOTE DETAILS
Liliana Mosley MD: Received sign out on patient. Patient reassessed. Vital signs stable. NAD. abd soft, nontender. Pending CT. Liliana Mosley MD: CT with IVC thrombus. Pt is under different MRN ([id number]) with more recent CT scan 1 weeks ago. Radiology resident Dr. Palencia reviewed images, states that the clot burden is similar if not less. [facility] notified. Attending MD Garrison: CT shows stable IVC suprarenal thrombus, therapeutic on coumadin. Will discuss case with hepatology to see if patient still should be considered for catheter directed thrombolysis. Liliana Mosley MD: Discussed case with hepatology fellow who is requesting pt to be admitted for hepatology team in AM to evaluate. Pt remains stable. Tolerating PO.

## 2021-03-28 NOTE — ED PROVIDER NOTE - CLINICAL SUMMARY MEDICAL DECISION MAKING FREE TEXT BOX
34 yo F with hx of recurrent miscarriages (x4), cryptogenic cirrhosis c/b Budd Chiari syndrome and Protein S deficiency, COVID+ on 3/4 and 3/20, recently admitted for abdominal pain found to have IVC thrombus on xarelto presenting following request by inpatient team to return to repeat CT scan. Medicine consulted, requesting repeat CT to eval for clot burden progression. If neg patient, possible d/c. If clot burden has progressed may be admitted for thrombolysis. 34 yo F with hx of recurrent miscarriages (x4), cryptogenic cirrhosis c/b Budd Chiari syndrome and Protein S deficiency, COVID+ on 3/4 and 3/20, recently admitted for abdominal pain found to have IVC thrombus on xarelto presenting following request by inpatient team to return to repeat CT scan. Medicine consulted, requesting repeat CT to eval for clot burden progression. If neg patient, possible d/c. If clot burden has progressed may be admitted for thrombolysis.    AQUILES Gasca MD: Pt is a 36 y/o female with PMH of recurrent miscarriages (x4), cryptogenic cirrhosis c/b Budd Chiari syndrome and Protein S deficiency, COVID+ on 3/4 and 3/20, recently admitted for abdominal pain found to have IVC thrombus, now on coumadin, who presents per request of her hepatologist for imaging studies of abdomen for assessment of clot burden to determine whether pt is a candidate for catheter directed thrombolysis. Pt was unable to obtain pre-approval for CTAP outpt. Pt c/o chronic RUQ pain, unchanged. Denies CP/SOB. Plan: basic labs, coags, hcg, CTAP, f/u recommendations per hepatology

## 2021-03-28 NOTE — ED ADULT NURSE NOTE - OBJECTIVE STATEMENT
35y female coming in from home c/o blood clot. A&Ox3 and VSS. Bulgarian speaking,  Walter ID#[id number]. Hx cirrhosis. Pt is a poor historian. Presents to ED today c/o blood clots. Pt reports she was recently dx with blood clots in stomach and liver; placed on heparin in hospital, discharged home 2 days ago on xarelto. Pt reports intermittent "solid" pain underneath b/l breasts. Pt also states covid + 3/4/21 but found to be covid + 3/20/21. Denies chest pain, sob, fever/chills, n/v/d. Upon exam, neuro intact. Respirations even and unlabored. Abdomen soft, nontender.

## 2021-03-28 NOTE — ED ADULT NURSE NOTE - NSIMPLEMENTINTERV_GEN_ALL_ED
Implemented All Fall with Harm Risk Interventions:  [place] to call system. Call bell, personal items and telephone within reach. Instruct patient to call for assistance. Room bathroom lighting operational. Non-slip footwear when patient is off stretcher. Physically safe environment: no spills, clutter or unnecessary equipment. Stretcher in lowest position, wheels locked, appropriate side rails in place. Provide visual cue, wrist band, yellow gown, etc. Monitor gait and stability. Monitor for mental status changes and reorient to person, place, and time. Review medications for side effects contributing to fall risk. Reinforce activity limits and safety measures with patient and family. Provide visual clues: red socks.

## 2021-03-29 ENCOUNTER — APPOINTMENT (OUTPATIENT)
Dept: HEPATOLOGY | Facility: CLINIC | Age: 36
End: 2021-03-29

## 2021-03-29 DIAGNOSIS — Z02.9 ENCOUNTER FOR ADMINISTRATIVE EXAMINATIONS, UNSPECIFIED: ICD-10-CM

## 2021-03-29 DIAGNOSIS — I82.90 ACUTE EMBOLISM AND THROMBOSIS OF UNSPECIFIED VEIN: ICD-10-CM

## 2021-03-29 DIAGNOSIS — K74.60 UNSPECIFIED CIRRHOSIS OF LIVER: ICD-10-CM

## 2021-03-29 DIAGNOSIS — Z29.9 ENCOUNTER FOR PROPHYLACTIC MEASURES, UNSPECIFIED: ICD-10-CM

## 2021-03-29 LAB
ALBUMIN SERPL ELPH-MCNC: 3.4 G/DL — SIGNIFICANT CHANGE UP (ref 3.3–5)
ALP SERPL-CCNC: 123 U/L — HIGH (ref 40–120)
ALT FLD-CCNC: 18 U/L — SIGNIFICANT CHANGE UP (ref 10–45)
ANION GAP SERPL CALC-SCNC: 12 MMOL/L — SIGNIFICANT CHANGE UP (ref 5–17)
APTT BLD: 46.9 SEC — HIGH (ref 27.5–35.5)
AST SERPL-CCNC: 39 U/L — SIGNIFICANT CHANGE UP (ref 10–40)
BASOPHILS # BLD AUTO: 0 K/UL — SIGNIFICANT CHANGE UP (ref 0–0.2)
BASOPHILS NFR BLD AUTO: 0 % — SIGNIFICANT CHANGE UP (ref 0–2)
BILIRUB SERPL-MCNC: 1.5 MG/DL — HIGH (ref 0.2–1.2)
BUN SERPL-MCNC: 17 MG/DL — SIGNIFICANT CHANGE UP (ref 7–23)
CALCIUM SERPL-MCNC: 9 MG/DL — SIGNIFICANT CHANGE UP (ref 8.4–10.5)
CHLORIDE SERPL-SCNC: 103 MMOL/L — SIGNIFICANT CHANGE UP (ref 96–108)
CO2 SERPL-SCNC: 21 MMOL/L — LOW (ref 22–31)
CREAT SERPL-MCNC: 0.48 MG/DL — LOW (ref 0.5–1.3)
EOSINOPHIL # BLD AUTO: 0.18 K/UL — SIGNIFICANT CHANGE UP (ref 0–0.5)
EOSINOPHIL NFR BLD AUTO: 5.3 % — SIGNIFICANT CHANGE UP (ref 0–6)
GLUCOSE SERPL-MCNC: 79 MG/DL — SIGNIFICANT CHANGE UP (ref 70–99)
HCT VFR BLD CALC: 37.5 % — SIGNIFICANT CHANGE UP (ref 34.5–45)
HGB BLD-MCNC: 11.5 G/DL — SIGNIFICANT CHANGE UP (ref 11.5–15.5)
INR BLD: 1.81 RATIO — HIGH (ref 0.88–1.16)
LYMPHOCYTES # BLD AUTO: 0.94 K/UL — LOW (ref 1–3.3)
LYMPHOCYTES # BLD AUTO: 28.1 % — SIGNIFICANT CHANGE UP (ref 13–44)
MAGNESIUM SERPL-MCNC: 2 MG/DL — SIGNIFICANT CHANGE UP (ref 1.6–2.6)
MCHC RBC-ENTMCNC: 26.8 PG — LOW (ref 27–34)
MCHC RBC-ENTMCNC: 30.7 GM/DL — LOW (ref 32–36)
MCV RBC AUTO: 87.4 FL — SIGNIFICANT CHANGE UP (ref 80–100)
MONOCYTES # BLD AUTO: 0.2 K/UL — SIGNIFICANT CHANGE UP (ref 0–0.9)
MONOCYTES NFR BLD AUTO: 6.1 % — SIGNIFICANT CHANGE UP (ref 2–14)
NEUTROPHILS # BLD AUTO: 2.02 K/UL — SIGNIFICANT CHANGE UP (ref 1.8–7.4)
NEUTROPHILS NFR BLD AUTO: 60.5 % — SIGNIFICANT CHANGE UP (ref 43–77)
PHOSPHATE SERPL-MCNC: 3.4 MG/DL — SIGNIFICANT CHANGE UP (ref 2.5–4.5)
PLATELET # BLD AUTO: 130 K/UL — LOW (ref 150–400)
POTASSIUM SERPL-MCNC: 4.7 MMOL/L — SIGNIFICANT CHANGE UP (ref 3.5–5.3)
POTASSIUM SERPL-SCNC: 4.7 MMOL/L — SIGNIFICANT CHANGE UP (ref 3.5–5.3)
PROT SERPL-MCNC: 6.3 G/DL — SIGNIFICANT CHANGE UP (ref 6–8.3)
PROTHROM AB SERPL-ACNC: 21.1 SEC — HIGH (ref 10.6–13.6)
RBC # BLD: 4.29 M/UL — SIGNIFICANT CHANGE UP (ref 3.8–5.2)
RBC # FLD: 14.9 % — HIGH (ref 10.3–14.5)
SARS-COV-2 RNA SPEC QL NAA+PROBE: SIGNIFICANT CHANGE UP
SODIUM SERPL-SCNC: 136 MMOL/L — SIGNIFICANT CHANGE UP (ref 135–145)
WBC # BLD: 3.34 K/UL — LOW (ref 3.8–10.5)
WBC # FLD AUTO: 3.34 K/UL — LOW (ref 3.8–10.5)

## 2021-03-29 PROCEDURE — 99233 SBSQ HOSP IP/OBS HIGH 50: CPT

## 2021-03-29 PROCEDURE — 99223 1ST HOSP IP/OBS HIGH 75: CPT | Mod: GC

## 2021-03-29 RX ORDER — ENOXAPARIN SODIUM 100 MG/ML
50 INJECTION SUBCUTANEOUS EVERY 12 HOURS
Refills: 0 | Status: DISCONTINUED | OUTPATIENT
Start: 2021-03-29 | End: 2021-04-02

## 2021-03-29 RX ORDER — SPIRONOLACTONE 25 MG/1
100 TABLET, FILM COATED ORAL DAILY
Refills: 0 | Status: DISCONTINUED | OUTPATIENT
Start: 2021-03-29 | End: 2021-03-31

## 2021-03-29 RX ORDER — FUROSEMIDE 40 MG
40 TABLET ORAL EVERY 24 HOURS
Refills: 0 | Status: DISCONTINUED | OUTPATIENT
Start: 2021-03-29 | End: 2021-03-31

## 2021-03-29 RX ORDER — PANTOPRAZOLE SODIUM 20 MG/1
40 TABLET, DELAYED RELEASE ORAL EVERY 12 HOURS
Refills: 0 | Status: DISCONTINUED | OUTPATIENT
Start: 2021-03-29 | End: 2021-04-02

## 2021-03-29 RX ORDER — POLYETHYLENE GLYCOL 3350 17 G/17G
17 POWDER, FOR SOLUTION ORAL DAILY
Refills: 0 | Status: DISCONTINUED | OUTPATIENT
Start: 2021-03-29 | End: 2021-04-02

## 2021-03-29 RX ORDER — SENNA PLUS 8.6 MG/1
2 TABLET ORAL AT BEDTIME
Refills: 0 | Status: DISCONTINUED | OUTPATIENT
Start: 2021-03-29 | End: 2021-04-02

## 2021-03-29 RX ORDER — ASCORBIC ACID 60 MG
500 TABLET,CHEWABLE ORAL DAILY
Refills: 0 | Status: DISCONTINUED | OUTPATIENT
Start: 2021-03-29 | End: 2021-04-02

## 2021-03-29 RX ADMIN — SENNA PLUS 2 TABLET(S): 8.6 TABLET ORAL at 21:26

## 2021-03-29 RX ADMIN — PANTOPRAZOLE SODIUM 40 MILLIGRAM(S): 20 TABLET, DELAYED RELEASE ORAL at 17:45

## 2021-03-29 RX ADMIN — Medication 40 MILLIGRAM(S): at 12:28

## 2021-03-29 RX ADMIN — Medication 1 TABLET(S): at 12:28

## 2021-03-29 RX ADMIN — POLYETHYLENE GLYCOL 3350 17 GRAM(S): 17 POWDER, FOR SOLUTION ORAL at 12:28

## 2021-03-29 RX ADMIN — Medication 500 MILLIGRAM(S): at 12:28

## 2021-03-29 RX ADMIN — ENOXAPARIN SODIUM 50 MILLIGRAM(S): 100 INJECTION SUBCUTANEOUS at 17:45

## 2021-03-29 NOTE — H&P ADULT - NSHPOUTPATIENTPROVIDERS_GEN_ALL_CORE
FullName: ROSINA DOE; Specialty:   [facility]  HEPATOLOGY  FullName: DAGMAR CANDELARIO; Specialty:  [facility]  TRANSPLANT SURGERY  FullName: NARCISA OSMAN; Specialty:  [address] HEMATOLOGY ONCOLOGY  FullName: LISE BENITO; Specialty:   [facility] HEMATOLOGY ONCOLOGY  FullName: JUSTINA GARCIA; Specialty: DO [address] GASTRO[facility]OLOGY

## 2021-03-29 NOTE — H&P ADULT - HISTORY OF PRESENT ILLNESS
INTERNAL MEDICINE - ADMISSION HPI  Katherine Parmar PGY1 | NS Pager: [phone] | TEAM 3 INTERN    ADMISSION DATA:   patient name: ERIC DAVILA | age: 35y ([birth year]) | gender: Female  arrival date: 03-29-21 Inpatient    CHIEF COMPLAINT: called back to hospital for imaging     HISTORY OF PRESENT ILLNESS:                      INTERNAL MEDICINE - ADMISSION HPI  Katherine Parmar PGY1 | NS Pager: [phone] | TEAM 3 INTERN    ADMISSION DATA:   patient name: ERIC DAVILA | age: 35y ([birth year]) | gender: Female  arrival date: 03-29-21 Inpatient    CHIEF COMPLAINT: called back to hospital for imaging     HISTORY OF PRESENT ILLNESS:                      INTERNAL MEDICINE - ADMISSION [facility]  Katherine Garaybarry PGY1 | NS Pager: [phone] | TEAM 3 INTERN    ADMISSION DATA:   patient name: ERIC DAVILA | age: 35y ([birth year]) | gender: Female  arrival date: 03-29-21 Inpatient    CHIEF COMPLAINT: called back to hospital for imaging     HISTORY OF PRESENT ILLNESS:     Patient is a 35 year old Slovak-speaking female with PMHx of cryptogenic cirrhosis, Budd Chiari syndrome, Protein S deficiency (?APLS), multiple miscarriages, COVID-19 PCR + 3/4/21, recently discharged 3/25 on Xarelto for suprarenal IVC thrombus with plans for outpatient repeat imaging and hepatology follow-up c/b failed prior authorization and scheduling issues. Pt was asked to return to the hospital to reaccess her IVC clot burden with potential plans for intervention after tumor board discussion of case. Hepatology aware of patient arrival.    Of note, patient has separate chart where last admission work up is found. Pt had presented 3/20/21 w/ abdominal bloating and tightness for 5 days, found to have moderate R pleural effusion, likely 2/2 hepatic hydrothorax, ascites, and thrombi in suprarenal IVC and SMV extending to confluence of splenic vein and portal vein. Pt was started on Heparin gtt and vascular surgery was consulted who stated to continue full AC, no other intervention. Patient underwent an IR guided thoracentesis and diagnostic paracentesis which were both negative for infection, malignancy. Pt was evaluated by hematology who stated patient has been worked up outpatient for hypercoagulation - only deficient Protein S with 40% activity and antigen. Pt prior negative for Factor V Leiden, Jak2, Prothrombin mutation. protein c and antithrombin III. APLA labs were sent in s/o multiple miscarriages and current presentation. Patient was started on bridge inpatient but APLA resulted as negative while inpatient so heme recommended discharging on Xarelto starter pack. Patient was evaluated by hepatology who recommended lasix 40mg PO daily and spironolactone 100mg PO daily. Pt with extensive varices not on BB due to blood pressure / heart rate, started on ppi. Repeat U/S doppler to evaluate thrombus burden outpatient on  3/29/21 [however now readmitted for imaging] ; one week from prior evaluation. Course c/b nonreproducible chest pain on ambulation, multiple EKGs and trops with ischemic changes, CTA ruled out PE, TTE grossly normal.                  INTERNAL MEDICINE - ADMISSION [facility]  Katherine Parmar PGY1 | NS Pager: [phone] | TEAM 3 INTERN    ADMISSION DATA:   patient name: ERIC DAVILA | age: 35y ([birth year]) | gender: Female  arrival date: 03-29-21 Inpatient    CHIEF COMPLAINT: called back to hospital for imaging     HISTORY OF PRESENT ILLNESS:     Patient is a 35 year old Monegasque-speaking female with PMHx of cryptogenic cirrhosis, Budd Chiari syndrome, Protein S deficiency (?APLS), multiple miscarriages, COVID-19 PCR + 3/4/21, recently discharged 3/25 on Xarelto for suprarenal IVC thrombus with plans for outpatient repeat imaging and hepatology follow-up c/b failed prior authorization and scheduling issues. Pt was asked to return to the hospital to reaccess her IVC clot burden with potential plans for intervention after tumor board discussion of case. Hepatology aware of patient arrival.    Of note, patient has separate chart [MRN: [id number]] where last admission work up is found. Pt had presented 3/20/21 w/ abdominal bloating and tightness for 5 days, found to have moderate R pleural effusion, likely 2/2 hepatic hydrothorax, ascites, and thrombi in suprarenal IVC and SMV extending to confluence of splenic vein and portal vein. Pt was started on Heparin gtt and vascular surgery was consulted who stated to continue full AC, no other intervention. Patient underwent an IR guided thoracentesis and diagnostic paracentesis which were both negative for infection, malignancy. Pt was evaluated by hematology who stated patient has been worked up outpatient for hypercoagulation - only deficient Protein S with 40% activity and antigen. Pt prior negative for Factor V Leiden, Jak2, Prothrombin mutation. protein c and antithrombin III. APLA labs were sent in s/o multiple miscarriages and current presentation. Patient was started on bridge inpatient but APLA resulted as negative while inpatient so heme recommended discharging on Xarelto starter pack. Patient was evaluated by hepatology who recommended lasix 40mg PO daily and spironolactone 100mg PO daily. Pt with extensive varices not on BB due to blood pressure / heart rate, started on ppi. Repeat U/S doppler to evaluate thrombus burden outpatient on  3/29/21 [however now readmitted for imaging] ; one week from prior evaluation. Course c/b nonreproducible chest pain on ambulation, multiple EKGs and trops with ischemic changes, CTA ruled out PE, TTE grossly normal.

## 2021-03-29 NOTE — H&P ADULT - PROBLEM SELECTOR PLAN 1
- repeat CT a/p largely unchanged from prior CT imaging  - pending hepatology recommendations  - holding Xarelto pending recs

## 2021-03-29 NOTE — CONSULT NOTE ADULT - SUBJECTIVE AND OBJECTIVE BOX
Chief Complaint:  Patient is a 35y old  Female who presents with a chief complaint of IVC thrombus (29 Mar 2021 08:58)      HPI:  Patient is a 35 year old Afghan-speaking female with PMHx of cryptogenic cirrhosis, Budd Chiari syndrome, Protein S deficiency (?APLS), multiple miscarriages, COVID-19 PCR + 3/4/21, recently discharged 3/25 on Xarelto for suprarenal IVC thrombus with  initial plans for outpatient repeat imaging and hepatology follow-up but for imaging failed prior authorization and so was told to present to the ED. Pt was asked to return to the hospital to reaccess her IVC clot burden with potential plans for intervention after tumor board discussion of case. Hepatology aware of patient arrival.    Of note, patient has separate chart where last admission work up is found. Pt had presented 3/20/21 w/ abdominal bloating for 5 days, found to have moderate R pleural effusion, likely 2/2 hepatic hydrothorax, ascites, and thrombi in suprarenal IVC and SMV extending to confluence of splenic vein and portal vein. Pt was started on Heparin gtt and vascular surgery was consulted who stated to continue full AC, no other intervention. Patient underwent an IR guided thoracentesis and diagnostic paracentesis which were both negative for infection and malignancy. Pt was evaluated by hematology who stated patient has been worked up outpatient for hypercoagulation - only deficient Protein S with 40% activity and antigen. Pt prior negative for Factor V Leiden, Jak2, Prothrombin mutation. protein c and antithrombin III. APLS labs were sent in s/o multiple miscarriages and current presentation. Patient was started on bridge inpatient but APLS resulted as negative while inpatient so heme recommended discharging on Xarelto starter pack. Patient was evaluated by hepatology who recommended lasix 40mg PO daily and spironolactone 100mg PO daily. Pt with extensive varices not on BB due to blood pressure / heart rate. Repeat U/S doppler to evaluate thrombus burden outpatient on  3/29/21 ; one week from prior evaluation but this was not approved by insurance. During that admission CTA ruled out PE, TTE grossly normal.     Allergies:  No Known Allergies      Home Medications:    Hospital Medications:  ascorbic acid 500 milliGRAM(s) Oral daily  furosemide    Tablet 40 milliGRAM(s) Oral every 24 hours  multivitamin 1 Tablet(s) Oral daily  pantoprazole    Tablet 40 milliGRAM(s) Oral every 12 hours  polyethylene glycol 3350 17 Gram(s) Oral daily  senna 2 Tablet(s) Oral at bedtime  spironolactone 100 milliGRAM(s) Oral daily      PMHX/PSHX:  Cirrhosis    No significant past surgical history    No significant past surgical history        Family history:  No pertinent family history in first degree relatives        Social History:     ROS:     General:  No wt loss, fevers, chills, night sweats, fatigue,   Eyes:  Good vision, no reported pain  ENT:  No sore throat, pain, runny nose, dysphagia  CV:  No pain, palpitations, hypo/hypertension  Resp:  No dyspnea, cough, tachypnea, wheezing  GI:  See HPI  :  No pain, bleeding, incontinence, nocturia  Muscle:  No pain, weakness  Neuro:  No weakness, tingling, memory problems  Psych:  No fatigue, insomnia, mood problems, depression  Endocrine:  No polyuria, polydipsia, cold/heat intolerance  Heme:  No petechiae, ecchymosis, easy bruisability  Skin:  No rash, edema      PHYSICAL EXAM:     GENERAL:  NAD  CHEST:  Full & symmetric excursion  HEART:  Regular rhythm, no abdominal bruit, no edema  ABDOMEN:  Soft, non-tender, non-distended, normoactive bowel sounds,  no masses , no hepatosplenomegaly  EXTREMITIES:  no cyanosis,clubbing or edema  SKIN:  No rash/erythema/ecchymoses/petechiae/wounds/abscess/warm/dry  NEURO:  Alert, oriented    Vital Signs:  Vital Signs Last 24 Hrs  T(C): 36.4 (29 Mar 2021 08:57), Max: 37.1 (28 Mar 2021 20:00)  T(F): 97.5 (29 Mar 2021 08:57), Max: 98.8 (29 Mar 2021 05:04)  HR: 82 (29 Mar 2021 12:23) (64 - 100)  BP: 104/70 (29 Mar 2021 12:23) (93/74 - 118/70)  BP(mean): 76 (29 Mar 2021 06:00) (76 - 76)  RR: 18 (29 Mar 2021 08:57) (16 - 20)  SpO2: 95% (29 Mar 2021 08:57) (93% - 98%)  Daily Height in cm: 160.02 (28 Mar 2021 15:41)    Daily     LABS:                        11.5   3.34  )-----------( 130      ( 29 Mar 2021 08:45 )             37.5     03-29    136  |  103  |  17  ----------------------------<  79  4.7   |  21<L>  |  0.48<L>    Ca    9.0      29 Mar 2021 08:45  Phos  3.4     03-29  Mg     2.0     03-29    TPro  6.3  /  Alb  3.4  /  TBili  1.5<H>  /  DBili  x   /  AST  39  /  ALT  18  /  AlkPhos  123<H>  03-29    LIVER FUNCTIONS - ( 29 Mar 2021 08:45 )  Alb: 3.4 g/dL / Pro: 6.3 g/dL / ALK PHOS: 123 U/L / ALT: 18 U/L / AST: 39 U/L / GGT: x           PT/INR - ( 29 Mar 2021 08:45 )   PT: 21.1 sec;   INR: 1.81 ratio         PTT - ( 29 Mar 2021 08:45 )  PTT:46.9 sec        Imaging:           Chief Complaint:  Patient is a 35y old  Female who presents with a chief complaint of IVC thrombus (29 Mar 2021 08:58)      HPI:  Patient is a 35 year old [place]inamese-speaking female with PMHx of cryptogenic cirrhosis, Budd Chiari syndrome, Protein S deficiency (?APLS), multiple miscarriages, COVID-19 PCR + 3/4/21, recently discharged 3/25 on Xarelto for suprarenal IVC thrombus with  initial plans for outpatient repeat imaging and hepatology follow-up but for imaging failed prior authorization and so was told to present to the ED. Pt was asked to return to the hospital to reaccess her IVC clot burden with potential plans for intervention after tumor board discussion of case. Hepatology aware of patient arrival.    Of note, patient has separate chart where last admission work up is found. Pt had presented 3/20/21 w/ abdominal bloating for 5 days, found to have moderate R pleural effusion, likely 2/2 hepatic hydrothorax, ascites, and thrombi in suprarenal IVC and SMV extending to confluence of splenic vein and portal vein. Pt was started on Heparin gtt and vascular surgery was consulted who stated to continue full AC, no other intervention. Patient underwent an IR guided thoracentesis and diagnostic paracentesis which were both negative for infection and malignancy. Pt was evaluated by hematology who stated patient has been worked up outpatient for hypercoagulation - only deficient Protein S with 40% activity and antigen. Pt prior negative for Factor V Leiden, Jak2, Prothrombin mutation. protein c and antithrombin III. APLS labs were sent in s/o multiple miscarriages and current presentation. Patient was started on bridge inpatient but APLS resulted as negative while inpatient so heme recommended discharging on Xarelto starter pack. Patient was evaluated by hepatology who recommended lasix 40mg PO daily and spironolactone 100mg PO daily. Pt with varices not on BB due to blood pressure / heart rate. Repeat U/S doppler to evaluate thrombus burden outpatient on  3/29/21 ; one week from prior evaluation but this was not approved by insurance. During that admission CTA ruled out PE, TTE grossly normal.     Since admission CT has been done showing Suprarenal IVC thrombus. Vascular collateralization with enlargement of the azygos and paraspinal vasculature.Hepatic cirrhosis with heterogeneous/mottled appearance throughout the liver, which again can be seen in the setting of Budd-Chiari. New region of hypoenhancement in the caudate lobe which could be perfusion related to the IVC thrombus, however, hepatic infarct not excluded. Interval mild enlargement of hyperenhancing focus in segment 8. Follow-up with dedicated liver MRI is recommended. Hepatosplenomegaly and small volume abdominopelvic free fluid, suggestive of portal venous hypertension.  Bilirubin initally normal now 1.5.  AST/ALT normal and mild elevation in ALP.  INR 2.58 >1.8.    Allergies:  No Known Allergies      Home Medications:    Hospital Medications:  ascorbic acid 500 milliGRAM(s) Oral daily  furosemide    Tablet 40 milliGRAM(s) Oral every 24 hours  multivitamin 1 Tablet(s) Oral daily  pantoprazole    Tablet 40 milliGRAM(s) Oral every 12 hours  polyethylene glycol 3350 17 Gram(s) Oral daily  senna 2 Tablet(s) Oral at bedtime  spironolactone 100 milliGRAM(s) Oral daily      PMHX/PSHX:  Cirrhosis    No significant past surgical history    No significant past surgical history        Family history:  No pertinent family history in first degree relatives        Social History:     ROS:     General:  No wt loss, fevers, chills, night sweats, fatigue,   Eyes:  Good vision, no reported pain  ENT:  No sore throat, pain, runny nose, dysphagia  CV:  No pain, palpitations, hypo/hypertension  Resp:  No dyspnea, cough, tachypnea, wheezing  GI:  See HPI  :  No pain, bleeding, incontinence, nocturia  Muscle:  No pain, weakness  Neuro:  No weakness, tingling, memory problems  Psych:  No fatigue, insomnia, mood problems, depression  Endocrine:  No polyuria, polydipsia, cold/heat intolerance  Heme:  No petechiae, ecchymosis, easy bruisability  Skin:  No rash, edema      PHYSICAL EXAM:     GENERAL:  NAD  CHEST:  Full & symmetric excursion  HEART:  Regular rhythm, no abdominal bruit, no edema  ABDOMEN:  Soft, non-tender, non-distended, normoactive bowel sounds,  no masses , no hepatosplenomegaly  EXTREMITIES:  no cyanosis,clubbing or edema  SKIN:  No rash/erythema/ecchymoses/petechiae/wounds/abscess/warm/dry  NEURO:  Alert, oriented    Vital Signs:  Vital Signs Last 24 Hrs  T(C): 36.4 (29 Mar 2021 08:57), Max: 37.1 (28 Mar 2021 20:00)  T(F): 97.5 (29 Mar 2021 08:57), Max: 98.8 (29 Mar 2021 05:04)  HR: 82 (29 Mar 2021 12:23) (64 - 100)  BP: 104/70 (29 Mar 2021 12:23) (93/74 - 118/70)  BP(mean): 76 (29 Mar 2021 06:00) (76 - 76)  RR: 18 (29 Mar 2021 08:57) (16 - 20)  SpO2: 95% (29 Mar 2021 08:57) (93% - 98%)  Daily Height in cm: 160.02 (28 Mar 2021 15:41)    Daily     LABS:                        11.5   3.34  )-----------( 130      ( 29 Mar 2021 08:45 )             37.5     03-29    136  |  103  |  17  ----------------------------<  79  4.7   |  21<L>  |  0.48<L>    Ca    9.0      29 Mar 2021 08:45  Phos  3.4     03-29  Mg     2.0     03-29    TPro  6.3  /  Alb  3.4  /  TBili  1.5<H>  /  DBili  x   /  AST  39  /  ALT  18  /  AlkPhos  123<H>  03-29    LIVER FUNCTIONS - ( 29 Mar 2021 08:45 )  Alb: 3.4 g/dL / Pro: 6.3 g/dL / ALK PHOS: 123 U/L / ALT: 18 U/L / AST: 39 U/L / GGT: x           PT/INR - ( 29 Mar 2021 08:45 )   PT: 21.1 sec;   INR: 1.81 ratio         PTT - ( 29 Mar 2021 08:45 )  PTT:46.9 sec        Imaging:           Chief Complaint:  Patient is a 35y old  Female who presents with a chief complaint of IVC thrombus (29 Mar 2021 08:58)      HPI:  Patient is a 35 year old [place]-speaking female with PMHx of cryptogenic cirrhosis, Budd Chiari syndrome, Protein S deficiency (?APLS), multiple miscarriages, COVID-19 PCR + 3/4/21, recently discharged 3/25 on Xarelto for suprarenal IVC thrombus with  initial plans for outpatient repeat imaging and hepatology follow-up but for imaging failed prior authorization and so was told to present to the ED. Pt was asked to return to the hospital to reaccess her IVC clot burden with potential plans for intervention after tumor board discussion of case. Hepatology aware of patient arrival.    Of note, patient has separate chart where last admission work up is found. Pt had presented 3/20/21 w/ abdominal bloating for 5 days, found to have moderate R pleural effusion, likely 2/2 hepatic hydrothorax, ascites, and thrombi in suprarenal IVC and SMV extending to confluence of splenic vein and portal vein. Pt was started on Heparin gtt and vascular surgery was consulted who stated to continue full AC, no other intervention. Patient underwent an IR guided thoracentesis and diagnostic paracentesis which were both negative for infection and malignancy. Pt was evaluated by hematology who stated patient has been worked up outpatient for hypercoagulation - only deficient Protein S with 40% activity and antigen. Pt prior negative for Factor V Leiden, Jak2, Prothrombin mutation. protein c and antithrombin III. APLS labs were sent in s/o multiple miscarriages and current presentation. Patient was started on bridge inpatient but APLS resulted as negative while inpatient so heme recommended discharging on Xarelto starter pack. Patient was evaluated by hepatology who recommended lasix 40mg PO daily and spironolactone 100mg PO daily. Pt with varices not on BB due to blood pressure / heart rate. Repeat U/S doppler to evaluate thrombus burden outpatient on  3/29/21 ; one week from prior evaluation but this was not approved by insurance. During that admission CTA ruled out PE, TTE grossly normal.     Since admission CT has been done showing Suprarenal IVC thrombus. Vascular collateralization with enlargement of the azygos and paraspinal vasculature.Hepatic cirrhosis with heterogeneous/mottled appearance throughout the liver, which again can be seen in the setting of Budd-Chiari. New region of hypoenhancement in the caudate lobe which could be perfusion related to the IVC thrombus, however, hepatic infarct not excluded. Interval mild enlargement of hyperenhancing focus in segment 8. Follow-up with dedicated liver MRI is recommended. Hepatosplenomegaly and small volume abdominopelvic free fluid, suggestive of portal venous hypertension.  Bilirubin initally normal now 1.5.  AST/ALT normal and mild elevation in ALP.  INR 2.58 >1.8.    Allergies:  No Known Allergies      Home Medications:    Hospital Medications:  ascorbic acid 500 milliGRAM(s) Oral daily  furosemide    Tablet 40 milliGRAM(s) Oral every 24 hours  multivitamin 1 Tablet(s) Oral daily  pantoprazole    Tablet 40 milliGRAM(s) Oral every 12 hours  polyethylene glycol 3350 17 Gram(s) Oral daily  senna 2 Tablet(s) Oral at bedtime  spironolactone 100 milliGRAM(s) Oral daily      PMHX/PSHX:  Cirrhosis    No significant past surgical history    No significant past surgical history        Family history:  No pertinent family history in first degree relatives        Social History:     ROS:     General:  No wt loss, fevers, chills, night sweats, fatigue,   Eyes:  Good vision, no reported pain  ENT:  No sore throat, pain, runny nose, dysphagia  CV:  No pain, palpitations, hypo/hypertension  Resp:  No dyspnea, cough, tachypnea, wheezing  GI:  See HPI  :  No pain, bleeding, incontinence, nocturia  Muscle:  No pain, weakness  Neuro:  No weakness, tingling, memory problems  Psych:  No fatigue, insomnia, mood problems, depression  Endocrine:  No polyuria, polydipsia, cold/heat intolerance  Heme:  No petechiae, ecchymosis, easy bruisability  Skin:  No rash, edema      PHYSICAL EXAM:     GENERAL:  NAD  CHEST:  Full & symmetric excursion  HEART:  Regular rhythm, no abdominal bruit, no edema  ABDOMEN:  Soft, non-tender, slightly distended, normoactive bowel sounds,  no masses , no hepatosplenomegaly  EXTREMITIES:  no cyanosis,clubbing or edema  SKIN:  No rash/erythema/ecchymoses/petechiae/wounds/abscess/warm/dry  NEURO:  Alert, oriented    Vital Signs:  Vital Signs Last 24 Hrs  T(C): 36.4 (29 Mar 2021 08:57), Max: 37.1 (28 Mar 2021 20:00)  T(F): 97.5 (29 Mar 2021 08:57), Max: 98.8 (29 Mar 2021 05:04)  HR: 82 (29 Mar 2021 12:23) (64 - 100)  BP: 104/70 (29 Mar 2021 12:23) (93/74 - 118/70)  BP(mean): 76 (29 Mar 2021 06:00) (76 - 76)  RR: 18 (29 Mar 2021 08:57) (16 - 20)  SpO2: 95% (29 Mar 2021 08:57) (93% - 98%)  Daily Height in cm: 160.02 (28 Mar 2021 15:41)    Daily     LABS:                        11.5   3.34  )-----------( 130      ( 29 Mar 2021 08:45 )             37.5     03-29    136  |  103  |  17  ----------------------------<  79  4.7   |  21<L>  |  0.48<L>    Ca    9.0      29 Mar 2021 08:45  Phos  3.4     03-29  Mg     2.0     03-29    TPro  6.3  /  Alb  3.4  /  TBili  1.5<H>  /  DBili  x   /  AST  39  /  ALT  18  /  AlkPhos  123<H>  03-29    LIVER FUNCTIONS - ( 29 Mar 2021 08:45 )  Alb: 3.4 g/dL / Pro: 6.3 g/dL / ALK PHOS: 123 U/L / ALT: 18 U/L / AST: 39 U/L / GGT: x           PT/INR - ( 29 Mar 2021 08:45 )   PT: 21.1 sec;   INR: 1.81 ratio         PTT - ( 29 Mar 2021 08:45 )  PTT:46.9 sec        Imaging:

## 2021-03-29 NOTE — H&P ADULT - PROBLEM SELECTOR PLAN 3
- Diet: Regular   - DVT ppx: scds, pending intervention for reinstating AC  - GI: bowel regimen, PPI in s/o varices

## 2021-03-29 NOTE — H&P ADULT - ATTENDING COMMENTS
Patient seen and examined. Plan as discussed w/ Dr. Parmar: patient was asked to return to the hospital for further evaluation and monitoring of IVC clot burden and further management options per hepatology; appreciate hepatology's recommendations following CT A/P performed; will obtain recommended MRI imaging, continue AC w/ Lovenox SC, and diuretics; IR to be consulted for ?thrombectomy.

## 2021-03-29 NOTE — PROGRESS NOTE ADULT - SUBJECTIVE AND OBJECTIVE BOX
PROGRESS NOTE:     Katherine Parmar DO PGY1   Contact: Pager [phone] [facility] | [id number] [facility] | [employer]  Please check Resident Assignment tab on [place] for Team / Coverage     Patient is a 35y old  Female who presents with a chief complaint of     EVENTS / SUBJECTIVE: Pt seen at bedside.    MEDICATIONS  (STANDING):  ascorbic acid 500 milliGRAM(s) Oral daily  furosemide    Tablet 40 milliGRAM(s) Oral every 24 hours  multivitamin 1 Tablet(s) Oral daily  pantoprazole    Tablet 40 milliGRAM(s) Oral every 12 hours  polyethylene glycol 3350 17 Gram(s) Oral daily  senna 2 Tablet(s) Oral at bedtime  spironolactone 100 milliGRAM(s) Oral daily    Allergies  No Known Allergies    PHYSICAL EXAM:  Vital Signs Last 24 Hrs  T(C): 36.7 (29 Mar 2021 08:29), Max: 37.1 (28 Mar 2021 20:00)  T(F): 98 (29 Mar 2021 08:29), Max: 98.8 (29 Mar 2021 05:04)  HR: 81 (29 Mar 2021 08:29) (64 - 100)  BP: 93/74 (29 Mar 2021 08:29) (93/74 - 118/70)  BP(mean): 76 (29 Mar 2021 06:00) (76 - 76)  RR: 20 (29 Mar 2021 08:29) (16 - 20)  SpO2: 98% (29 Mar 2021 08:29) (93% - 98%)    GENERAL: No acute distress, well-developed  HEENT::  Atraumatic, EOMI, sclera clear, supple neck  CHEST/LUNG: CTAB; No wheezes, rales, or rhonchi  HEART: Regular rate and rhythm; No murmurs, rubs, or gallops  ABDOMEN: Soft, non-tender, distended; normal bowel sounds, no organomegaly  EXTREMITIES:  2+ peripheral pulses b/l, No clubbing, cyanosis, or edema  NEUROLOGY: A&O x 3, no focal deficits  PSYCH: appropriate   SKIN: No rashes or lesions    PATIENT DATA:             f/u AM lab studies       < from: CT Abdomen and Pelvis w/ IV Cont (03.28.21 @ 23:32) >  *** ADDENDUM 03/29/2021  ***  Further information provided that patient had additional interval CT scan between what was reported as the most recent prior study (MRI) on the original dictation. Patient was scanned on 3/20/2021. This addendum is in comparison to that study, the most recent.  When compared to prior study from 3/20/2021, the suprarenal IVC thrombus is not significantly changed/minimally decreased in size. The hypoenhancement within the caudate lobe and size of the largest hypervascular lesion is unchanged. Overall, study is not significantly changed from the most recent prior 3/20/2021.  *** END OF ADDENDUM 03/29/2021  ***  LOWER CHEST: Moderate right pleural effusion with associated passive atelectasis involving the rightlower lobe. Trace left basilar linear atelectasis.  LIVER: Cirrhotic morphology. Heterogeneous/mottled appearance throughout the liver, which again can be seen in the setting of Budd-Chiari. As compared to the liver MRI there is new hypoenhancementwithin the caudate lobe. Additionally, the hyperenhancing focus in segment 8 appears mildly larger now measuring 1.6 cm (prior 1 cm). Additional hyperenhancing foci in the right hepatic lobe appear grossly unchanged.  BILE DUCTS: Normal caliber.  GALLBLADDER: Within normal limits.  SPLEEN: Splenomegaly.  PANCREAS: Within normal limits.  ADRENALS: Within normal limits.  KIDNEYS/URETERS: Within normal limits.  BLADDER: Within normal limits.  REPRODUCTIVE ORGANS: Uterus within normal limits. A 2.9cm left adnexal cystic focus containing a thin curvilinear calcification, which could represent a left ovarian cyst.  BOWEL: No bowel obstruction. Appendix is normal.  PERITONEUM: Small volume abdominopelvic free fluid.  VESSELS: Thrombus in the suprarenal IVC extending into the liver. Marked splenic varices. Vascular collateralization with enlargement of the azygos and paraspinal vasculature.  RETROPERITONEUM/LYMPH NODES: No lymphadenopathy.  ABDOMINAL WALL: Within normal limits.  BONES: Within normal limits.    IMPRESSION:  Suprarenal IVC thrombus. Vascular collateralization with enlargement of the azygos and paraspinal vasculature.  Hepatic cirrhosis with heterogeneous/mottled appearance throughout the liver, which again can be seen inthe setting of Budd-Chiari. New region of hypoenhancement in the caudate lobe which could be perfusion related to the IVC thrombus, however, hepatic infarct not excluded. Interval mild enlargement of hyperenhancing focus in segment 8. Follow-up with dedicated liver MRI is recommended.  Hepatosplenomegaly and small volume abdominopelvic free fluid, suggestive of portal venous hypertension.

## 2021-03-29 NOTE — CONSULT NOTE ADULT - ASSESSMENT
Impression:  35F w/ cryptogenic cirrhosis decompensated by ascites and small non-bleeding varices, Budd-Chiari syndrome, hypercoagulable disorder with multiple clots and multiple spontaneous abortions, recent dx of COVID-19 PNA, presenting w/ chest tightness, dyspnea, and abd distension, found to have clot in IVC and portal confluence.    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  - HE: none  - HCC: LIRADS 4 and LIRADS 3 lesion seen on MRI 12/2020, stable from prior, AFP wnl 2/2021  #IVC thrombus  #Portal confluence thrombus  #Hx of hypercoagulable disorder  #COVID-19 PNA - on nasal cannula     Recommendations:  - MRI recommended by radiology  - consult interventional radiology for possible thrombectomy  - Continue w/ lasix 40mg PO daily and spironolactone 100mg PO daily  - Trend CBC, CMP, INR daily  - supportive per primary    Susan Almaraz  Gastroenterology Fellow  Pager x [phone] or [phone]  Please page on-call Fellow on weekends/holidays or after 5 pm and before 8 am on weekdays   On-call GI fellow can be contacted via the [employer] [facility] service ([phone]) Impression:  35F w/ cryptogenic cirrhosis decompensated by ascites and small non-bleeding varices, Budd-Chiari syndrome, hypercoagulable disorder with multiple clots and multiple spontaneous abortions, recent dx of COVID-19 PNA, presenting w/ chest tightness, dyspnea, and abd distension, found to have clot in IVC and portal confluence.    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  - HE: none  - HCC: LIRADS 4 and LIRADS 3 lesion seen on MRI 12/2020, stable from prior, AFP wnl 2/2021  #IVC thrombus  #Portal confluence thrombus  #Hx of hypercoagulable disorder  #COVID-19 PNA - on nasal cannula     Recommendations:  - please start LMWH now  - MRI recommended by radiology  - consult interventional radiology for possible thrombectomy  - Continue w/ lasix 40mg PO daily and spironolactone 100mg PO daily  - Trend CBC, CMP, INR daily  - supportive per primary    Susan Almaraz  Gastroenterology Fellow  Pager x [phone] or [phone]  Please page on-call Fellow on weekends/holidays or after 5 pm and before 8 am on weekdays   On-call GI fellow can be contacted via the answering service ([phone]) Impression:  35F w/ cryptogenic cirrhosis decompensated by ascites and small non-bleeding varices, Budd-Chiari syndrome, hypercoagulable disorder with multiple clots and multiple spontaneous abortions, recent dx of COVID-19 PNA, presenting w/ chest tightness, dyspnea, and abd distension, found to have clot in IVC and portal confluence.    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  - HE: none  - HCC: LIRADS 4 and LIRADS 3 lesion seen on MRI 12/2020, stable from prior, AFP wnl 2/2021  - MELD-Na is 16  #IVC thrombus  #Portal confluence thrombus  #Hx of hypercoagulable disorder  #COVID-19 PNA - on nasal cannula     Recommendations:  - please start LMWH now  - MRI-V recommended by radiology  - consult interventional radiology for possible thrombectomy  - Continue w/ lasix 40mg PO daily and spironolactone 100mg PO daily  - Trend CBC, CMP, INR daily  - supportive per primary    Susan Almaraz  Gastroenterology Fellow  Pager x [phone] or [phone]  Please page on-call Fellow on weekends/holidays or after 5 pm and before 8 am on weekdays   On-call GI fellow can be contacted via the answering service ([phone]) Impression:  35F w/ chronic Budd Chiari syndrome with cirrhosis decompensated by ascites, hepatic hydrothorax, and small non-bleeding varices, with hypercoagulable disorder with multiple clots and multiple spontaneous abortions, recent dx of COVID-19 PNA, presenting w/ chest tightness, dyspnea, and abd distension, found to have clot in IVC and portal confluence.    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  - HE: none  - HCC: LIRADS 4 and LIRADS 3 lesion seen on MRI 12/2020, stable from prior, AFP wnl 2/2021  - MELD-Na is 16  #IVC thrombus  #Portal confluence thrombus  #Hx of hypercoagulable disorder  #COVID-19 PNA - on nasal cannula     Recommendations:  - please start LMWH now  - MRI-V recommended by radiology  - consult interventional radiology for possible thrombectomy  - Continue w/ lasix 40mg PO daily and spironolactone 100mg PO daily  - Trend CBC, CMP, INR daily  - supportive per primary    Susan Almaraz  Gastroenterology Fellow  Pager x [phone] or [phone]  Please page on-call Fellow on weekends/holidays or after 5 pm and before 8 am on weekdays   On-call GI fellow can be contacted via the [employer] service ([phone])

## 2021-03-29 NOTE — H&P ADULT - NSHPPHYSICALEXAM_GEN_ALL_CORE
GENERAL: NAD, well-groomed, well-developed  EYES: EOMI, PERRLA, conjunctiva and sclera clear  MOUTH, THROAT: good hygiene, no erythema  NECK: supple, no JVD,  CHEST/LUNG: symmetrical chest rise with respiration, clear to auscultation bilaterally  HEART: Regular rate and rhythm; No murmurs, rubs, or gallops appreciated   ABDOMEN: Soft,  mild tender, mild distended; Bowel sounds present  MUSCULOSKELETAL: no muscle weakness, normal ROM, no joint swelling, redness, tenderness  VASCULAR:  2+ Peripheral Pulses, No clubbing, cyanosis, or edema of extremities   NERVOUS SYSTEM: alert & oriented X3 [name, location, month], motor and sensation intact

## 2021-03-29 NOTE — H&P ADULT - PROBLEM SELECTOR PLAN 2
- c/w spironolactone, lasix   - s/p dx para, thora last admission  - monitor stool count, intake and output, weights daily  - monitor vitals per routine, sbp float 110-90s

## 2021-03-29 NOTE — H&P ADULT - NSHPLABSRESULTS_GEN_ALL_CORE
PRIOR WORKUP FROM OTHER CHART:   see current labs in [facility] Results    Thromboelastography Hemostasis - STAT (03.21.21 @ 16:24)   TEG Reaction Time: >17 min   TEG Maximum Amplitude: 58.5 mm   Heparinase TEG R Time: 11.8 min   RapidTEG Maximum Amplitude: 63.5 mm   TEG Functional Fibrinogen: 20.1 mm     Anticardiolipin Antibody Level, Total (03.23.21 @ 12:28) NEGATIVE  Beta 2 Glycoprotein 1 Antibody Screen (03.23.21 @ 12:28) NEGATIVE    Ferritin, Serum (03.23.21 @ 10:31) 104 NG/Ml  D-Dimer Assay, Quantitative (03.23.21 @ 07:12) 844  C-Reactive Protein, Serum (03.23.21 @ 07:09) 2.78  Procalcitonin, Serum (03.23.21 @ 07:09)  0.09    Culture - Body Fluid with Gram Stain (03.22.21 @ 23:03)   Specimen Source: .Body Fluid Peritoneal Fluid   Culture Results:   No growth at 5 days     CTA chest, abdomen, pelvis PROCEDURE DATE:  03/20/2021    INTERPRETATION:  CLINICAL INFORMATION: Cough. Epigastric pain and abdominal distention for several days. History of cirrhosis. Concern for portal vein thrombosis.  CT Angiography of the Chest was performed followed by portal venous phase imaging of the Abdomen and Pelvis.  LUNGS AND LARGE AIRWAYS: Patent central airways. Near complete atelectasis of right lower lobe and partial atelectasis of the right middle lobe. Left lower lobe subsegmental atelectasis. Small patchy airspace opacities in bilateral upper, left lower lobes likely infectious   PLEURA: Moderate to large right pleural effusion.  LIVER: Cirrhotic. Scattered areas of decreased enhancement throughout the liver possibly due to transient hepatic attenuation difference.  BILE DUCTS: Normal caliber.  GALLBLADDER: Diffuse wall thickening likely secondary to cirrhosis.  SPLEEN: Splenomegaly.  PERITONEUM: Small to moderate volume abdominal and pelvic ascites.  VESSELS: Thrombus in the suprarenal inferior vena cava. Thrombus in the superior mesenteric vein extending to the confluence with the splenic vein and possibly into the portal vein. Recanalization of the umbilical vein. Umbilical and splenic varices.  IMPRESSION:  1. No pulmonary embolism.  2. Moderate to large right pleural effusion with adjacent compressive atelectasis including near complete atelectasis of the right lower lobe and partial atelectasis of the right middle lobe.  3. Small patchy airspace opacities in bilateral upper and left lower lobes likely infectious in etiology.  4. Thrombus in the suprarenal inferior vena cava. Thrombus in the superior mesenteric vein extending to the confluence with the splenic vein and possibly into the portal vein.  5. Cirrhosis with portal hypertension.  6. Small to moderate volume abdominal and pelvic ascites.    TTE 3/23/21   Conclusions:  1. Grossly normal left ventricular internal dimensions and  wall thicknesses.  2. Endocardial visualization enhanced with intravenous  injection of Ultrasonic Enhancing Agent (Definity). Grossly  normal left ventricular systolic function. No obvious  segmental wall motion abnormalities.  3. Normal diastolic function.  4. Normal right ventricular size and function.  5. Estimated pulmonary artery systolic pressure equals 19  mm Hg, assuming right atrial pressure equals 8  mm Hg,  consistent with normal pulmonary pressures.  *** No previous Echo exam.

## 2021-03-29 NOTE — H&P ADULT - ASSESSMENT
35 year old Indian-speaking female with PMHx of cryptogenic cirrhosis, Budd Chiari syndrome, Protein S deficiency, multiple miscarriages, COVID-19 PCR + 3/4/21, recently discharged 3/25 on Xarelto for suprarenal IVC thrombus with plans for outpatient repeat imaging and hepatology follow-up c/b failed prior authorization and scheduling issues. Pt was asked to return to the hospital to reaccess her IVC clot burden with potential plans for intervention after tumor board discussion of case. Hepatology aware of patient arrival.

## 2021-03-29 NOTE — H&P ADULT - NSHPREVIEWOFSYSTEMS_GEN_ALL_CORE
GENERAL: no weight change, no fatigue, no weakness, no fevers, no chills, no night sweats  SKIN: no itching, burning, rashes, or leisons   HEAD: no trauma, no headache  EYES: no visual changes; no blurriness, no tearing, no itching   EARS: no hearing loss, no tinnitus, no vertigo, no earache  NOSE, SINUSES: no rhinnorhea, no stuffiness, no sneezing  MOUTH, THROAT, NECK: no hoarseness, no throat pain, no neck swelling or stiffness    BREASTS: no skin changes, no tenderness, no masses/lumps  RESPIRATORY: no SOB, no cough, no wheezing, no sputum or hemoptysis  CARDIAC: no chest pain or palpitations, no dyspnea on exertion  VASCULAR: no leg edema, no varicose veins, no claudication   GASTROINTESTINAL: no changes in appetite, no N/V, no changes in bowel movement frequency/color, no diarrhea or constipation, no abdominal or epigastric pain, no jaundice   URINARY: no dysuria, polyuria, or hematuria  NEUROLOGICAL: No numbness, no tingling, no tremors, no weakness or paralysis, no fainting or  black outs   HEMATOLOGIC: no anemia, no easy bruising/bleeding  ENDOCRINE: no heat/cold intolerance, no excessive swelling, no polyphagia  PSYCHIATRIC: no changes in mood and memory    All other review of systems is negative unless indicated above. GENERAL: no weight change, +fatigue, no weakness, no fevers, no chills, no night sweats  SKIN: no itching, burning, rashes, or leisons   HEAD: no trauma, no headache  EYES: no visual changes; no blurriness, no tearing, no itching   EARS: no hearing loss, no tinnitus, no vertigo, no earache  NOSE, SINUSES: no rhinnorhea, no stuffiness, no sneezing  MOUTH, THROAT, NECK: no hoarseness, no throat pain, no neck swelling or stiffness    BREASTS: no skin changes, no tenderness, no masses/lumps  RESPIRATORY: no SOB, no cough, no wheezing, no sputum or hemoptysis  CARDIAC: no chest pain or palpitations, + dyspnea on exertion  VASCULAR: no leg edema, no varicose veins, no claudication   GASTROINTESTINAL: no changes in appetite, no N/V, no diarrhea or constipation, + abdominal discomfort  URINARY: no dysuria, polyuria, or hematuria  NEUROLOGICAL: No numbness, no tingling, no tremors, no weakness or paralysis, no fainting or  black outs   HEMATOLOGIC: no anemia, no easy bruising/bleeding  PSYCHIATRIC: no changes in mood and memory    All other review of systems is negative unless indicated above.

## 2021-03-29 NOTE — CONSULT NOTE ADULT - ATTENDING COMMENTS
34 yo F with protein S deficiency, recent COVID-19 pneumonia (3/4/21), and chronic Budd-Chiari syndrome complicated by small volume ascites, right hepatic hydrothorax, small non-bleeding esophageal varices, extensive intra-abdominal ysabel-systemic collaterals, splenomegaly, and caudate lobe hypertrophy with associated mass effect on intrahepatic IVC and IVC thrombosis. Current MELD-Na 18, and discussing with Transplant Surgery and IR re: evaluation for liver transplantation, as well as likely DIPS + IVC thrombectomy this admission. Meanwhile, recommend continuing therapeutic anticoagulation.    Please don't hesitate to call with any questions or concerns.    Meme Bryatn M.D., Ph.D.  Transplant Hepatology  Cell: [phone]

## 2021-03-30 DIAGNOSIS — R17 UNSPECIFIED JAUNDICE: ICD-10-CM

## 2021-03-30 LAB
ALBUMIN SERPL ELPH-MCNC: 3.7 G/DL — SIGNIFICANT CHANGE UP (ref 3.3–5)
ALP SERPL-CCNC: 145 U/L — HIGH (ref 40–120)
ALT FLD-CCNC: 21 U/L — SIGNIFICANT CHANGE UP (ref 10–45)
ANION GAP SERPL CALC-SCNC: 12 MMOL/L — SIGNIFICANT CHANGE UP (ref 5–17)
APTT BLD: 48.7 SEC — HIGH (ref 27.5–35.5)
AST SERPL-CCNC: 38 U/L — SIGNIFICANT CHANGE UP (ref 10–40)
BASOPHILS # BLD AUTO: 0.03 K/UL — SIGNIFICANT CHANGE UP (ref 0–0.2)
BASOPHILS NFR BLD AUTO: 0.8 % — SIGNIFICANT CHANGE UP (ref 0–2)
BILIRUB SERPL-MCNC: 1.6 MG/DL — HIGH (ref 0.2–1.2)
BUN SERPL-MCNC: 17 MG/DL — SIGNIFICANT CHANGE UP (ref 7–23)
CALCIUM SERPL-MCNC: 9.3 MG/DL — SIGNIFICANT CHANGE UP (ref 8.4–10.5)
CHLORIDE SERPL-SCNC: 96 MMOL/L — SIGNIFICANT CHANGE UP (ref 96–108)
CO2 SERPL-SCNC: 24 MMOL/L — SIGNIFICANT CHANGE UP (ref 22–31)
COVID-19 SPIKE DOMAIN AB INTERP: POSITIVE
COVID-19 SPIKE DOMAIN ANTIBODY RESULT: >250 U/ML — HIGH
CREAT SERPL-MCNC: 0.6 MG/DL — SIGNIFICANT CHANGE UP (ref 0.5–1.3)
D DIMER BLD IA.RAPID-MCNC: 1245 NG/ML DDU — HIGH
EOSINOPHIL # BLD AUTO: 0.18 K/UL — SIGNIFICANT CHANGE UP (ref 0–0.5)
EOSINOPHIL NFR BLD AUTO: 4.6 % — SIGNIFICANT CHANGE UP (ref 0–6)
GLUCOSE SERPL-MCNC: 86 MG/DL — SIGNIFICANT CHANGE UP (ref 70–99)
HCT VFR BLD CALC: 41.8 % — SIGNIFICANT CHANGE UP (ref 34.5–45)
HGB BLD-MCNC: 14.1 G/DL — SIGNIFICANT CHANGE UP (ref 11.5–15.5)
IMM GRANULOCYTES NFR BLD AUTO: 0.5 % — SIGNIFICANT CHANGE UP (ref 0–1.5)
INR BLD: 1.48 RATIO — HIGH (ref 0.88–1.16)
LYMPHOCYTES # BLD AUTO: 1.36 K/UL — SIGNIFICANT CHANGE UP (ref 1–3.3)
LYMPHOCYTES # BLD AUTO: 34.9 % — SIGNIFICANT CHANGE UP (ref 13–44)
MAGNESIUM SERPL-MCNC: 2.1 MG/DL — SIGNIFICANT CHANGE UP (ref 1.6–2.6)
MCHC RBC-ENTMCNC: 29.4 PG — SIGNIFICANT CHANGE UP (ref 27–34)
MCHC RBC-ENTMCNC: 33.7 GM/DL — SIGNIFICANT CHANGE UP (ref 32–36)
MCV RBC AUTO: 87.3 FL — SIGNIFICANT CHANGE UP (ref 80–100)
MONOCYTES # BLD AUTO: 0.36 K/UL — SIGNIFICANT CHANGE UP (ref 0–0.9)
MONOCYTES NFR BLD AUTO: 9.2 % — SIGNIFICANT CHANGE UP (ref 2–14)
NEUTROPHILS # BLD AUTO: 1.95 K/UL — SIGNIFICANT CHANGE UP (ref 1.8–7.4)
NEUTROPHILS NFR BLD AUTO: 50 % — SIGNIFICANT CHANGE UP (ref 43–77)
NRBC # BLD: 0 /100 WBCS — SIGNIFICANT CHANGE UP (ref 0–0)
PHOSPHATE SERPL-MCNC: 3.9 MG/DL — SIGNIFICANT CHANGE UP (ref 2.5–4.5)
PLATELET # BLD AUTO: 194 K/UL — SIGNIFICANT CHANGE UP (ref 150–400)
POTASSIUM SERPL-MCNC: 4.3 MMOL/L — SIGNIFICANT CHANGE UP (ref 3.5–5.3)
POTASSIUM SERPL-SCNC: 4.3 MMOL/L — SIGNIFICANT CHANGE UP (ref 3.5–5.3)
PROT SERPL-MCNC: 7.2 G/DL — SIGNIFICANT CHANGE UP (ref 6–8.3)
PROTHROM AB SERPL-ACNC: 17.4 SEC — HIGH (ref 10.6–13.6)
RBC # BLD: 4.79 M/UL — SIGNIFICANT CHANGE UP (ref 3.8–5.2)
RBC # FLD: 14.7 % — HIGH (ref 10.3–14.5)
SARS-COV-2 IGG+IGM SERPL QL IA: >250 U/ML — HIGH
SARS-COV-2 IGG+IGM SERPL QL IA: POSITIVE
SODIUM SERPL-SCNC: 132 MMOL/L — LOW (ref 135–145)
WBC # BLD: 3.9 K/UL — SIGNIFICANT CHANGE UP (ref 3.8–10.5)
WBC # FLD AUTO: 3.9 K/UL — SIGNIFICANT CHANGE UP (ref 3.8–10.5)

## 2021-03-30 PROCEDURE — 99233 SBSQ HOSP IP/OBS HIGH 50: CPT | Mod: GC

## 2021-03-30 PROCEDURE — 71045 X-RAY EXAM CHEST 1 VIEW: CPT | Mod: 26

## 2021-03-30 PROCEDURE — 74182 MRI ABDOMEN W/CONTRAST: CPT | Mod: 26

## 2021-03-30 PROCEDURE — 99232 SBSQ HOSP IP/OBS MODERATE 35: CPT | Mod: GC

## 2021-03-30 RX ADMIN — Medication 500 MILLIGRAM(S): at 12:33

## 2021-03-30 RX ADMIN — Medication 40 MILLIGRAM(S): at 08:53

## 2021-03-30 RX ADMIN — PANTOPRAZOLE SODIUM 40 MILLIGRAM(S): 20 TABLET, DELAYED RELEASE ORAL at 18:43

## 2021-03-30 RX ADMIN — Medication 1 TABLET(S): at 12:33

## 2021-03-30 RX ADMIN — Medication 0.25 MILLIGRAM(S): at 17:13

## 2021-03-30 RX ADMIN — ENOXAPARIN SODIUM 50 MILLIGRAM(S): 100 INJECTION SUBCUTANEOUS at 20:46

## 2021-03-30 RX ADMIN — ENOXAPARIN SODIUM 50 MILLIGRAM(S): 100 INJECTION SUBCUTANEOUS at 08:56

## 2021-03-30 RX ADMIN — PANTOPRAZOLE SODIUM 40 MILLIGRAM(S): 20 TABLET, DELAYED RELEASE ORAL at 05:56

## 2021-03-30 NOTE — PROGRESS NOTE ADULT - SUBJECTIVE AND OBJECTIVE BOX
Chief Complaint:  Patient is a 35y old  Female who presents with a chief complaint of IVC thrombus (30 Mar 2021 08:43)      Interval Events:     Indian-speaking [place]  ID #[id number]    Mild right lower abdominal pain - denies other symptoms. All questions answered in full in Indian using  services.     Allergies:  No Known Allergies      Hospital Medications:  ascorbic acid 500 milliGRAM(s) Oral daily  enoxaparin Injectable 50 milliGRAM(s) SubCutaneous every 12 hours  furosemide    Tablet 40 milliGRAM(s) Oral every 24 hours  multivitamin 1 Tablet(s) Oral daily  pantoprazole    Tablet 40 milliGRAM(s) Oral every 12 hours  polyethylene glycol 3350 17 Gram(s) Oral daily  senna 2 Tablet(s) Oral at bedtime  spironolactone 100 milliGRAM(s) Oral daily      PMHX/PSHX:  Cirrhosis    No significant past surgical history    No significant past surgical history        Family history:  No pertinent family history in first degree relatives        ROS:     General:  No weight loss, fevers, chills, night sweats, fatigue   Eyes:  No vision changes  ENT:  No sore throat, pain, runny nose  CV:  No chest pain, palpitations, dizziness   Resp:  No SOB, cough, wheezing  GI:  See HPI  :  No burning with urination, hematuria  Muscle:  No pain, weakness  Neuro:  No weakness/tingling, memory problems  Psych:  No fatigue, insomnia, mood problems, depression  Heme:  No easy bruisability  Skin:  No rash, edema      PHYSICAL EXAM:     GENERAL:  Well developed, no distress  HEENT:  NC/AT,  conjunctivae clear, sclera anicteric  CHEST:  Full & symmetric excursion, no increased effort w/ respirations  HEART:  Regular rhythm & rate  ABDOMEN:  Soft, non-tender, non-distended  EXTREMITIES:  no LE  edema  SKIN:  No rash/erythema/ecchymoses/petechiae/wounds/jaundice  NEURO:  Alert, oriented    Vital Signs:  Vital Signs Last 24 Hrs  T(C): 36.7 (30 Mar 2021 05:04), Max: 36.7 (30 Mar 2021 05:04)  T(F): 98.1 (30 Mar 2021 05:04), Max: 98.1 (30 Mar 2021 05:04)  HR: 86 (30 Mar 2021 09:14) (83 - 88)  BP: 104/61 (30 Mar 2021 09:14) (98/66 - 104/61)  BP(mean): --  RR: 18 (30 Mar 2021 05:04) (16 - 18)  SpO2: 94% (30 Mar 2021 05:04) (94% - 95%)  Daily     Daily     LABS:                        14.1   3.90  )-----------( 194      ( 30 Mar 2021 06:48 )             41.8     03-30    132<L>  |  96  |  17  ----------------------------<  86  4.3   |  24  |  0.60    Ca    9.3      30 Mar 2021 06:48  Phos  3.9     03-30  Mg     2.1     03-30    TPro  7.2  /  Alb  3.7  /  TBili  1.6<H>  /  DBili  x   /  AST  38  /  ALT  21  /  AlkPhos  145<H>  03-30    LIVER FUNCTIONS - ( 30 Mar 2021 06:48 )  Alb: 3.7 g/dL / Pro: 7.2 g/dL / ALK PHOS: 145 U/L / ALT: 21 U/L / AST: 38 U/L / GGT: x           PT/INR - ( 30 Mar 2021 06:52 )   PT: 17.4 sec;   INR: 1.48 ratio         PTT - ( 30 Mar 2021 06:52 )  PTT:48.7 sec        Imaging:             Chief Complaint:  Patient is a 35y old  Female who presents with a chief complaint of IVC thrombus (30 Mar 2021 08:43)      Interval Events:     [place]speaking [place]  ID #[id number]    Mild right lower abdominal pain - denies other symptoms. All questions answered in full in [place]an using  services.     Allergies:  No Known Allergies      Hospital Medications:  ascorbic acid 500 milliGRAM(s) Oral daily  enoxaparin Injectable 50 milliGRAM(s) SubCutaneous every 12 hours  furosemide    Tablet 40 milliGRAM(s) Oral every 24 hours  multivitamin 1 Tablet(s) Oral daily  pantoprazole    Tablet 40 milliGRAM(s) Oral every 12 hours  polyethylene glycol 3350 17 Gram(s) Oral daily  senna 2 Tablet(s) Oral at bedtime  spironolactone 100 milliGRAM(s) Oral daily      PMHX/PSHX:  Cirrhosis    No significant past surgical history    No significant past surgical history        Family history:  No pertinent family history in first degree relatives        ROS:     General:  ++Fatigued. +Weight loss. No fevers, chills, night sweats  Eyes:  No vision changes  ENT:  No sore throat, pain, runny nose  CV:  No chest pain, palpitations, dizziness   Resp:  No SOB, cough, wheezing  GI:  See HPI  :  No burning with urination, hematuria  Muscle:  No pain, weakness  Neuro:  No weakness/tingling, memory problems  Psych:  +Depression. No insomnia.  Heme:  No easy bruisability  Skin:  No rash, edema      PHYSICAL EXAM:     GENERAL:  Well developed, no distress, thin  HEENT:  NC/AT,  conjunctivae clear, sclera anicteric  CHEST:  Full & symmetric excursion, no increased effort w/ respirations, decreased BS R base  HEART:  Regular rhythm & rate  ABDOMEN:  Soft, non-tender, non-distended  EXTREMITIES:  no LE  edema  SKIN:  No rash/erythema/ecchymoses/petechiae/wounds/jaundice  NEURO:  Alert, oriented    Vital Signs:  Vital Signs Last 24 Hrs  T(C): 36.7 (30 Mar 2021 05:04), Max: 36.7 (30 Mar 2021 05:04)  T(F): 98.1 (30 Mar 2021 05:04), Max: 98.1 (30 Mar 2021 05:04)  HR: 86 (30 Mar 2021 09:14) (83 - 88)  BP: 104/61 (30 Mar 2021 09:14) (98/66 - 104/61)  BP(mean): --  RR: 18 (30 Mar 2021 05:04) (16 - 18)  SpO2: 94% (30 Mar 2021 05:04) (94% - 95%)  Daily     Daily     LABS:                        14.1   3.90  )-----------( 194      ( 30 Mar 2021 06:48 )             41.8     03-30    132<L>  |  96  |  17  ----------------------------<  86  4.3   |  24  |  0.60    Ca    9.3      30 Mar 2021 06:48  Phos  3.9     03-30  Mg     2.1     03-30    TPro  7.2  /  Alb  3.7  /  TBili  1.6<H>  /  DBili  x   /  AST  38  /  ALT  21  /  AlkPhos  145<H>  03-30    LIVER FUNCTIONS - ( 30 Mar 2021 06:48 )  Alb: 3.7 g/dL / Pro: 7.2 g/dL / ALK PHOS: 145 U/L / ALT: 21 U/L / AST: 38 U/L / GGT: x           PT/INR - ( 30 Mar 2021 06:52 )   PT: 17.4 sec;   INR: 1.48 ratio         PTT - ( 30 Mar 2021 06:52 )  PTT:48.7 sec        Imaging:

## 2021-03-30 NOTE — PROGRESS NOTE ADULT - ATTENDING COMMENTS
Patient seen and examined. Plan as discussed w/ Dr. Parmar: MRI abdomen pending and scheduled for this afternoon; appreciate hepatology's recommendations -- discussions ongoing with patient in communication, with hepatology/IR/transplant surgery re: "possible direct intrahepatic portosystemic shunt (DIPS) placement as bridge to liver transplantation/liver transplant evaluation;" continue treatment dose AC w/ Lovenox. Patient seen and examined. Plan as discussed w/ Dr. Parmar: MRI abdomen pending and scheduled for this afternoon; appreciate hepatology's recommendations -- discussions ongoing with patient in communication, with hepatology/IR/transplant surgery re: "possible direct intrahepatic portosystemic shunt (DIPS) placement as bridge to liver transplantation/liver transplant evaluation;" continue treatment dose AC w/ Lovenox, Lasix, Spironolactone, w/ close monitoring of hemodynamics, electrolytes, coags, H&H, and mental status. Patient seen and examined. Plan as discussed w/ Dr. Parmar and MS3 Devika: MRI abdomen pending and scheduled for this afternoon; appreciate hepatology's recommendations -- discussions ongoing with patient in communication, with hepatology/IR/transplant surgery re: "possible direct intrahepatic portosystemic shunt (DIPS) placement as bridge to liver transplantation/liver transplant evaluation;" continue treatment dose AC w/ Lovenox, Lasix, Spironolactone, w/ close monitoring of hemodynamics, electrolytes, coags, H&H, and mental status.

## 2021-03-30 NOTE — PROGRESS NOTE ADULT - PROBLEM SELECTOR PLAN 4
- patient prior auth / scheduling complications outpatient - Diet: Regular   - DVT ppx: scds, on AC given clot  - GI: bowel regimen, PPI in s/o varices - Montenegrin  for communication  - Diet: Regular   - DVT ppx: scds, on AC given clot  - GI: bowel regimen, PPI in s/o varices

## 2021-03-30 NOTE — PROGRESS NOTE ADULT - ASSESSMENT
35 year old Gabonese-speaking female with PMHx of cryptogenic cirrhosis, Budd Chiari syndrome, Protein S deficiency, multiple miscarriages, COVID-19 PCR + 3/4/21, recently discharged 3/25 on Xarelto for suprarenal IVC thrombus with plans for outpatient repeat imaging and hepatology follow-up c/b failed prior authorization and scheduling issues. Pt was asked to return to the hospital to reaccess her IVC clot burden with potential plans for intervention after tumor board discussion of case. Hepatology, IR, Transplant Surgery involved,

## 2021-03-30 NOTE — PROGRESS NOTE ADULT - PROBLEM SELECTOR PLAN 2
MELDNa 18 ; c/b Budd Chiari, extensive varices and portal HTN  - HCC: LIRADS 4 / LIRADS 3 lesion on MRI 12/2020, stable w AFP wnl 2/2021  - s/p dx para, thora last admission ; monitor for distention, repeat CXR  - c/w spironolactone 100 qd, lasix 40 qd  - monitor stool count, intake and output, weights daily  - monitor vitals per routine, sbp float 110-90s MELDNa 18 ; c/b Budd Chiari, extensive varices and portal HTN  - HCC: LIRADS 4 / LIRADS 3 lesion on MRI 12/2020, stable w AFP wnl 2/2021  - s/p dx para, thora last admission ; monitor for distention, repeat CXR  - c/w spironolactone 100 qd, lasix 40 qd  - monitor stool count, intake and output, weights daily  - monitor vitals per routine, sbp float 110-90s  - daily CMP, INR for MELD-Na ; 18 today

## 2021-03-30 NOTE — PROGRESS NOTE ADULT - PROBLEM SELECTOR PLAN 1
- repeat CT a/p largely unchanged from prior CT imaging  - holding Xarelto, started lovenox 50 BID  - pending MRI-V this AM, NPO for 4 hours prior  - Transplant Surgery and IR re: evaluation for liver transplantation, as well as likely DIPS + IVC thrombectomy this admission - meeting to discuss today will f/u - repeat CT a/p largely unchanged from prior CT imaging  - holding Xarelto, started lovenox 50 BID [1mg/kg BID] w daily weights  - pending MRI-V, NPO for 4 hours prior  - Transplant Surgery and IR re: evaluation for liver transplantation, as well as likely DIPS + IVC thrombectomy this admission - meeting to discuss today will f/u

## 2021-03-30 NOTE — PROGRESS NOTE ADULT - ASSESSMENT
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, +hepatic hydrothorax, +small EV), recent COVID-19 PNA/infection, presenting MRI abdominal imaging for reassessment of intraabdominal clot burden     Impression:    #Budd Chiari syndrome with decompensated cirrhosis: - MELD-Na is 16 3/29/21  - Varices: small non-bleeding EV 2017, not on beta blockers due to small size and low BP  - Ascites: recurrent related to portal vein thrombus; likely pre-sinusoidal portal hypertension related to Budd-Chiari  - HE: None on exam  - HCC: LIRADS 4 and LIRADS 3 lesion seen on MRI 12/2020, stable from prior, AFP WNL 02/2021    #Protein S deficiency/hypercoagulable disorder on full anticoagulation  #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  #History of COVID-19 PNA     Recommendations:  - please resume full AC with Lovenox 1 mg/kg BID   - f/u MRI-venogram to reassess IVC and portal confluence thrombi  - will continue plan of care discussion with Interventional Radiology and Transplant Surgery teams regarding possible direct intrahepatic portosystemic shunt (DIPS) placement as bridge to liver transplantation/liver transplant evaluation  - continue Lasix 40mg PO daily and spironolactone 100mg PO daily  - Trend CBC, CMP, INR daily for MELD-Na  - supportive care per primary team  - plan of care discussed extensively with patient at bedside using [place]-speaking [place]  ID #[id number]      Brenda Chase PGY-5  Gastroenterology/Hepatology Fellow  Pager #[phone]  E-mail [email or web address]  Call [phone] to speak to answering service for on-call GI fellow 5pm-7am and weekends.

## 2021-03-30 NOTE — PROGRESS NOTE ADULT - PROBLEM SELECTOR PLAN 3
- Diet: Regular   - DVT ppx: scds, on AC given clot  - GI: bowel regimen, PPI in s/o varices - in s/o cirrhosis, gb wall thickening, mass effect / portal HTN, clots  - uptrend in Tbili, f/u direct and indirect levels  - continue to monitor

## 2021-03-30 NOTE — PROGRESS NOTE ADULT - ATTENDING COMMENTS
34 yo F with protein S deficiency, recent COVID-19 pneumonia (3/4/21), and chronic Budd-Chiari syndrome complicated by small volume ascites, moderate-large right hepatic hydrothorax, small non-bleeding esophageal varices, extensive intra-abdominal ysabel-systemic collaterals, splenomegaly, and caudate lobe hypertrophy with associated mass effect on intrahepatic IVC and IVC thrombosis. Current MELD-Na 18. Awaiting MRI/MRV for further evaluation, but anticipate likely will need DIPS + IVC thrombectomy, discussed with patient at length today, also discussed with IR Dr. Darrick Ragsdale. Will plan to initiate liver transplant evaluation this admission, pre-DIPS. Continue therapeutic anticoagulation with LMWH.    Please don't hesitate to call with any questions or concerns.    Meme Bryant M.D., Ph.D.  Transplant Hepatology  Cell: [phone]

## 2021-03-30 NOTE — PROGRESS NOTE ADULT - SUBJECTIVE AND OBJECTIVE BOX
PROGRESS NOTE:     Katherine Parmar DO PGY1   Contact: Pager [phone] [facility] | [id number] [facility] | [id number] Teams  Please check Resident Assignment tab on [place] for Team / Coverage     Patient is a 35y old  Female who presents with a chief complaint of IVC thrombus (29 Mar 2021 14:23)    EVENTS / SUBJECTIVE: Pt seen at bedside.    MEDICATIONS  (STANDING):  ascorbic acid 500 milliGRAM(s) Oral daily  enoxaparin Injectable 50 milliGRAM(s) SubCutaneous every 12 hours  furosemide    Tablet 40 milliGRAM(s) Oral every 24 hours  multivitamin 1 Tablet(s) Oral daily  pantoprazole    Tablet 40 milliGRAM(s) Oral every 12 hours  polyethylene glycol 3350 17 Gram(s) Oral daily  senna 2 Tablet(s) Oral at bedtime  spironolactone 100 milliGRAM(s) Oral daily    Allergies  No Known Allergies    Vital Signs Last 24 Hrs  T(F): 98.1 (30 Mar 2021 05:04), Max: 98.1 (30 Mar 2021 05:04)  HR: 83 (30 Mar 2021 05:04) (72 - 88)  BP: 100/64 (30 Mar 2021 05:04) (95/61 - 104/70)  RR: 18 (30 Mar 2021 05:04) (16 - 18)  SpO2: 94% (30 Mar 2021 05:04) (94% - 95%)    I&O's Summary  29 Mar 2021 07:01  -  30 Mar 2021 07:00  --------------------------------------------------------  IN: 80 mL / OUT: 0 mL / NET: 80 mL    GENERAL: No acute distress, well-developed  HEENT::  Atraumatic, EOMI, sclera clear, supple neck  CHEST/LUNG: CTAB; No wheezes, rales, or rhonchi  HEART: Regular rate and rhythm; No murmurs, rubs, or gallops  ABDOMEN: Soft, non-tender, non-distended; normal bowel sounds, no organomegaly  EXTREMITIES:  2+ peripheral pulses b/l, No clubbing, cyanosis, or edema  NEUROLOGY: A&O x 3, no focal deficits  PSYCH: appropriate  SKIN: No rashes or lesions    PATIENT DATA:                        14.1   3.90  )-----------( 194      ( 30 Mar 2021 06:48 )             41.8     132<L>  |  96  |  17  ----------------------------<  86  4.3   |  24  |  0.60    Ca    9.3      30 Mar 2021 06:48  Phos  3.9     03-30  Mg     2.1     03-30    TPro  7.2  /  Alb  3.7  /  TBili  1.6<H>  /  DBili  x   /  AST  38  /  ALT  21  /  AlkPhos  145<H>  03-30    PT/INR - ( 30 Mar 2021 06:52 )   PT: 17.4 sec;   INR: 1.48 ratio    PTT - ( 30 Mar 2021 06:52 )  PTT:48.7 sec  D-Dimer Assay, Quantitative: 1245 ng/mL DDU (03.30.21 @ 06:52)   < from: CT Abdomen and Pelvis w/ IV Cont (03.28.21 @ 23:32) >  EXAM:  CT ABDOMEN AND PELVIS IC                        *** ADDENDUM 03/29/2021  ***  Further information provided that patient had additional interval CT scan between what was reported as the most recent prior study (MRI) on the original dictation. Patient was scanned on 3/20/2021. This addendum is in comparison to that study, the most recent.  When compared to prior study from 3/20/2021, the suprarenal IVC thrombus is not significantly changed/minimally decreased in size. The hypoenhancement within the caudate lobe and size of the largest hypervascular lesion is unchanged. Overall, study is not significantly changed from the most recent prior 3/20/2021.  *** END OF ADDENDUM 03/29/2021  ***    Labs, Radiology, Cardiology, and Other Results: PRIOR WORKUP FROM OTHER CHART:   see current labs in [place] Results    Thromboelastography Hemostasis - STAT (03.21.21 @ 16:24)   TEG Reaction Time: >17 min   TEG Maximum Amplitude: 58.5 mm   Heparinase TEG R Time: 11.8 min   RapidTEG Maximum Amplitude: 63.5 mm   TEG Functional Fibrinogen: 20.1 mm     Anticardiolipin Antibody Level, Total (03.23.21 @ 12:28) NEGATIVE  Beta 2 Glycoprotein 1 Antibody Screen (03.23.21 @ 12:28) NEGATIVE    Ferritin, Serum (03.23.21 @ 10:31) 104 NG/Ml  D-Dimer Assay, Quantitative (03.23.21 @ 07:12) 844  C-Reactive Protein, Serum (03.23.21 @ 07:09) 2.78  Procalcitonin, Serum (03.23.21 @ 07:09)  0.09    Culture - Body Fluid with Gram Stain (03.22.21 @ 23:03)   Specimen Source: .Body Fluid Peritoneal Fluid   Culture Results:   No growth at 5 days     CTA chest, abdomen, pelvis PROCEDURE DATE:  03/20/2021    INTERPRETATION:  CLINICAL INFORMATION: Cough. Epigastric pain and abdominal distention for several days. History of cirrhosis. Concern for portal vein thrombosis.  CT Angiography of the Chest was performed followed by portal venous phase imaging of the Abdomen and Pelvis.  LUNGS AND LARGE AIRWAYS: Patent central airways. Near complete atelectasis of right lower lobe and partial atelectasis of the right middle lobe. Left lower lobe subsegmental atelectasis. Small patchy airspace opacities in bilateral upper, left lower lobes likely infectious   PLEURA: Moderate to large right pleural effusion.  LIVER: Cirrhotic. Scattered areas of decreased enhancement throughout the liver possibly due to transient hepatic attenuation difference.  BILE DUCTS: Normal caliber.  GALLBLADDER: Diffuse wall thickening likely secondary to cirrhosis.  SPLEEN: Splenomegaly.  PERITONEUM: Small to moderate volume abdominal and pelvic ascites.  VESSELS: Thrombus in the suprarenal inferior vena cava. Thrombus in the superior mesenteric vein extending to the confluence with the splenic vein and possibly into the portal vein. Recanalization of the umbilical vein. Umbilical and splenic varices.  IMPRESSION:  1. No pulmonary embolism.  2. Moderate to large right pleural effusion with adjacent compressive atelectasis including near complete atelectasis of the right lower lobe and partial atelectasis of the right middle lobe.  3. Small patchy airspace opacities in bilateral upper and left lower lobes likely infectious in etiology.  4. Thrombus in the suprarenal inferior vena cava. Thrombus in the superior mesenteric vein extending to the confluence with the splenic vein and possibly into the portal vein.  5. Cirrhosis with portal hypertension.  6. Small to moderate volume abdominal and pelvic ascites.    TTE 3/23/21   Conclusions:  1. Grossly normal left ventricular internal dimensions and  wall thicknesses.  2. Endocardial visualization enhanced with intravenous  injection of Ultrasonic Enhancing Agent (Definity). Grossly  normal left ventricular systolic function. No obvious  segmental wall motion abnormalities.  3. Normal diastolic function.  4. Normal right ventricular size and function.  5. Estimated pulmonary artery systolic pressure equals 19  mm Hg, assuming right atrial pressure equals 8  mm Hg,  consistent with normal pulmonary pressures.  *** No previous Echo exam.       PROGRESS NOTE:     Katherine Parmar DO PGY1   Contact: Pager [phone] [facility] | [id number] [facility] | [employer] Teams  Please check Resident Assignment tab on [place] for Team / Coverage     Patient is a 35y old  Female who presents with a chief complaint of IVC thrombus (29 Mar 2021 14:23)    EVENTS / SUBJECTIVE: Pt seen at bedside. Discussed at length current work up with .     MEDICATIONS  (STANDING):  ascorbic acid 500 milliGRAM(s) Oral daily  enoxaparin Injectable 50 milliGRAM(s) SubCutaneous every 12 hours  furosemide    Tablet 40 milliGRAM(s) Oral every 24 hours  multivitamin 1 Tablet(s) Oral daily  pantoprazole    Tablet 40 milliGRAM(s) Oral every 12 hours  polyethylene glycol 3350 17 Gram(s) Oral daily  senna 2 Tablet(s) Oral at bedtime  spironolactone 100 milliGRAM(s) Oral daily    Allergies  No Known Allergies    Vital Signs Last 24 Hrs  T(F): 98.1 (30 Mar 2021 05:04), Max: 98.1 (30 Mar 2021 05:04)  HR: 83 (30 Mar 2021 05:04) (72 - 88)  BP: 100/64 (30 Mar 2021 05:04) (95/61 - 104/70)  RR: 18 (30 Mar 2021 05:04) (16 - 18)  SpO2: 94% (30 Mar 2021 05:04) (94% - 95%)    I&O's Summary  29 Mar 2021 07:01  -  30 Mar 2021 07:00  --------------------------------------------------------  IN: 80 mL / OUT: 0 mL / NET: 80 mL    GENERAL: No acute distress, well-developed  HEENT::  Atraumatic, EOMI, sclera clear, supple neck  CHEST/LUNG: CTAB; No wheezes, rales, or rhonchi  HEART: Regular rate and rhythm; No murmurs, rubs, or gallops  ABDOMEN: Soft, non-tender, non-distended; normal bowel sounds, no organomegaly  EXTREMITIES:  2+ peripheral pulses b/l, No clubbing, cyanosis, or edema  NEUROLOGY: A&O x 3, no focal deficits  PSYCH: appropriate  SKIN: slight yellowing of skin from prior admission    PATIENT DATA:                        14.1   3.90  )-----------( 194      ( 30 Mar 2021 06:48 )             41.8     132<L>  |  96  |  17  ----------------------------<  86  4.3   |  24  |  0.60    Ca    9.3      30 Mar 2021 06:48  Phos  3.9     03-30  Mg     2.1     03-30    TPro  7.2  /  Alb  3.7  /  TBili  1.6<H>  /  DBili  x   /  AST  38  /  ALT  21  /  AlkPhos  145<H>  03-30    PT/INR - ( 30 Mar 2021 06:52 )   PT: 17.4 sec;   INR: 1.48 ratio    PTT - ( 30 Mar 2021 06:52 )  PTT:48.7 sec  D-Dimer Assay, Quantitative: 1245 ng/mL DDU (03.30.21 @ 06:52)   < from: CT Abdomen and Pelvis w/ IV Cont (03.28.21 @ 23:32) >  EXAM:  CT ABDOMEN AND PELVIS IC                        *** ADDENDUM 03/29/2021  ***  Further information provided that patient had additional interval CT scan between what was reported as the most recent prior study (MRI) on the original dictation. Patient was scanned on 3/20/2021. This addendum is in comparison to that study, the most recent.  When compared to prior study from 3/20/2021, the suprarenal IVC thrombus is not significantly changed/minimally decreased in size. The hypoenhancement within the caudate lobe and size of the largest hypervascular lesion is unchanged. Overall, study is not significantly changed from the most recent prior 3/20/2021.  *** END OF ADDENDUM 03/29/2021  ***    Labs, Radiology, Cardiology, and Other Results: PRIOR WORKUP FROM OTHER CHART:   see current labs in [place] Results    Thromboelastography Hemostasis - STAT (03.21.21 @ 16:24)   TEG Reaction Time: >17 min   TEG Maximum Amplitude: 58.5 mm   Heparinase TEG R Time: 11.8 min   RapidTEG Maximum Amplitude: 63.5 mm   TEG Functional Fibrinogen: 20.1 mm     Anticardiolipin Antibody Level, Total (03.23.21 @ 12:28) NEGATIVE  Beta 2 Glycoprotein 1 Antibody Screen (03.23.21 @ 12:28) NEGATIVE    Ferritin, Serum (03.23.21 @ 10:31) 104 NG/Ml  D-Dimer Assay, Quantitative (03.23.21 @ 07:12) 844  C-Reactive Protein, Serum (03.23.21 @ 07:09) 2.78  Procalcitonin, Serum (03.23.21 @ 07:09)  0.09    Culture - Body Fluid with Gram Stain (03.22.21 @ 23:03)   Specimen Source: .Body Fluid Peritoneal Fluid   Culture Results:   No growth at 5 days     CTA chest, abdomen, pelvis PROCEDURE DATE:  03/20/2021    INTERPRETATION:  CLINICAL INFORMATION: Cough. Epigastric pain and abdominal distention for several days. History of cirrhosis. Concern for portal vein thrombosis.  CT Angiography of the Chest was performed followed by portal venous phase imaging of the Abdomen and Pelvis.  LUNGS AND LARGE AIRWAYS: Patent central airways. Near complete atelectasis of right lower lobe and partial atelectasis of the right middle lobe. Left lower lobe subsegmental atelectasis. Small patchy airspace opacities in bilateral upper, left lower lobes likely infectious   PLEURA: Moderate to large right pleural effusion.  LIVER: Cirrhotic. Scattered areas of decreased enhancement throughout the liver possibly due to transient hepatic attenuation difference.  BILE DUCTS: Normal caliber.  GALLBLADDER: Diffuse wall thickening likely secondary to cirrhosis.  SPLEEN: Splenomegaly.  PERITONEUM: Small to moderate volume abdominal and pelvic ascites.  VESSELS: Thrombus in the suprarenal inferior vena cava. Thrombus in the superior mesenteric vein extending to the confluence with the splenic vein and possibly into the portal vein. Recanalization of the umbilical vein. Umbilical and splenic varices.  IMPRESSION:  1. No pulmonary embolism.  2. Moderate to large right pleural effusion with adjacent compressive atelectasis including near complete atelectasis of the right lower lobe and partial atelectasis of the right middle lobe.  3. Small patchy airspace opacities in bilateral upper and left lower lobes likely infectious in etiology.  4. Thrombus in the suprarenal inferior vena cava. Thrombus in the superior mesenteric vein extending to the confluence with the splenic vein and possibly into the portal vein.  5. Cirrhosis with portal hypertension.  6. Small to moderate volume abdominal and pelvic ascites.    TTE 3/23/21   Conclusions:  1. Grossly normal left ventricular internal dimensions and  wall thicknesses.  2. Endocardial visualization enhanced with intravenous  injection of Ultrasonic Enhancing Agent (Definity). Grossly  normal left ventricular systolic function. No obvious  segmental wall motion abnormalities.  3. Normal diastolic function.  4. Normal right ventricular size and function.  5. Estimated pulmonary artery systolic pressure equals 19  mm Hg, assuming right atrial pressure equals 8  mm Hg,  consistent with normal pulmonary pressures.  *** No previous Echo exam.

## 2021-03-31 DIAGNOSIS — J90 PLEURAL EFFUSION, NOT ELSEWHERE CLASSIFIED: ICD-10-CM

## 2021-03-31 LAB
ALBUMIN SERPL ELPH-MCNC: 3.7 G/DL — SIGNIFICANT CHANGE UP (ref 3.3–5)
ALP SERPL-CCNC: 137 U/L — HIGH (ref 40–120)
ALT FLD-CCNC: 20 U/L — SIGNIFICANT CHANGE UP (ref 10–45)
ANION GAP SERPL CALC-SCNC: 14 MMOL/L — SIGNIFICANT CHANGE UP (ref 5–17)
APTT BLD: 54.8 SEC — HIGH (ref 27.5–35.5)
AST SERPL-CCNC: 35 U/L — SIGNIFICANT CHANGE UP (ref 10–40)
BASOPHILS # BLD AUTO: 0.03 K/UL — SIGNIFICANT CHANGE UP (ref 0–0.2)
BASOPHILS NFR BLD AUTO: 0.8 % — SIGNIFICANT CHANGE UP (ref 0–2)
BILIRUB DIRECT SERPL-MCNC: 0.5 MG/DL — HIGH (ref 0–0.2)
BILIRUB INDIRECT FLD-MCNC: 0.9 MG/DL — SIGNIFICANT CHANGE UP (ref 0.2–1)
BILIRUB SERPL-MCNC: 1.4 MG/DL — HIGH (ref 0.2–1.2)
BILIRUB SERPL-MCNC: 1.4 MG/DL — HIGH (ref 0.2–1.2)
BUN SERPL-MCNC: 18 MG/DL — SIGNIFICANT CHANGE UP (ref 7–23)
CALCIUM SERPL-MCNC: 9.1 MG/DL — SIGNIFICANT CHANGE UP (ref 8.4–10.5)
CHLORIDE SERPL-SCNC: 98 MMOL/L — SIGNIFICANT CHANGE UP (ref 96–108)
CO2 SERPL-SCNC: 23 MMOL/L — SIGNIFICANT CHANGE UP (ref 22–31)
CREAT SERPL-MCNC: 0.61 MG/DL — SIGNIFICANT CHANGE UP (ref 0.5–1.3)
D DIMER BLD IA.RAPID-MCNC: 712 NG/ML DDU — HIGH
EOSINOPHIL # BLD AUTO: 0.17 K/UL — SIGNIFICANT CHANGE UP (ref 0–0.5)
EOSINOPHIL NFR BLD AUTO: 4.3 % — SIGNIFICANT CHANGE UP (ref 0–6)
GLUCOSE SERPL-MCNC: 83 MG/DL — SIGNIFICANT CHANGE UP (ref 70–99)
HCT VFR BLD CALC: 39.7 % — SIGNIFICANT CHANGE UP (ref 34.5–45)
HGB BLD-MCNC: 12.5 G/DL — SIGNIFICANT CHANGE UP (ref 11.5–15.5)
IMM GRANULOCYTES NFR BLD AUTO: 0.5 % — SIGNIFICANT CHANGE UP (ref 0–1.5)
INR BLD: 1.21 RATIO — HIGH (ref 0.88–1.16)
LDH SERPL L TO P-CCNC: 160 U/L — SIGNIFICANT CHANGE UP (ref 50–242)
LYMPHOCYTES # BLD AUTO: 1.4 K/UL — SIGNIFICANT CHANGE UP (ref 1–3.3)
LYMPHOCYTES # BLD AUTO: 35.7 % — SIGNIFICANT CHANGE UP (ref 13–44)
MAGNESIUM SERPL-MCNC: 2 MG/DL — SIGNIFICANT CHANGE UP (ref 1.6–2.6)
MCHC RBC-ENTMCNC: 27.2 PG — SIGNIFICANT CHANGE UP (ref 27–34)
MCHC RBC-ENTMCNC: 31.5 GM/DL — LOW (ref 32–36)
MCV RBC AUTO: 86.5 FL — SIGNIFICANT CHANGE UP (ref 80–100)
MONOCYTES # BLD AUTO: 0.41 K/UL — SIGNIFICANT CHANGE UP (ref 0–0.9)
MONOCYTES NFR BLD AUTO: 10.5 % — SIGNIFICANT CHANGE UP (ref 2–14)
NEUTROPHILS # BLD AUTO: 1.89 K/UL — SIGNIFICANT CHANGE UP (ref 1.8–7.4)
NEUTROPHILS NFR BLD AUTO: 48.2 % — SIGNIFICANT CHANGE UP (ref 43–77)
NRBC # BLD: 0 /100 WBCS — SIGNIFICANT CHANGE UP (ref 0–0)
PHOSPHATE SERPL-MCNC: 3.4 MG/DL — SIGNIFICANT CHANGE UP (ref 2.5–4.5)
PLATELET # BLD AUTO: 151 K/UL — SIGNIFICANT CHANGE UP (ref 150–400)
POTASSIUM SERPL-MCNC: 3.6 MMOL/L — SIGNIFICANT CHANGE UP (ref 3.5–5.3)
POTASSIUM SERPL-SCNC: 3.6 MMOL/L — SIGNIFICANT CHANGE UP (ref 3.5–5.3)
PROT SERPL-MCNC: 7 G/DL — SIGNIFICANT CHANGE UP (ref 6–8.3)
PROTHROM AB SERPL-ACNC: 14.4 SEC — HIGH (ref 10.6–13.6)
RBC # BLD: 4.59 M/UL — SIGNIFICANT CHANGE UP (ref 3.8–5.2)
RBC # FLD: 14.6 % — HIGH (ref 10.3–14.5)
SODIUM SERPL-SCNC: 135 MMOL/L — SIGNIFICANT CHANGE UP (ref 135–145)
WBC # BLD: 3.92 K/UL — SIGNIFICANT CHANGE UP (ref 3.8–10.5)
WBC # FLD AUTO: 3.92 K/UL — SIGNIFICANT CHANGE UP (ref 3.8–10.5)

## 2021-03-31 PROCEDURE — 99232 SBSQ HOSP IP/OBS MODERATE 35: CPT | Mod: GC

## 2021-03-31 PROCEDURE — 99233 SBSQ HOSP IP/OBS HIGH 50: CPT | Mod: GC

## 2021-03-31 RX ORDER — LIDOCAINE 4 G/100G
1 CREAM TOPICAL EVERY 24 HOURS
Refills: 0 | Status: DISCONTINUED | OUTPATIENT
Start: 2021-03-31 | End: 2021-04-02

## 2021-03-31 RX ORDER — FUROSEMIDE 40 MG
20 TABLET ORAL ONCE
Refills: 0 | Status: COMPLETED | OUTPATIENT
Start: 2021-03-31 | End: 2021-03-31

## 2021-03-31 RX ORDER — SPIRONOLACTONE 25 MG/1
150 TABLET, FILM COATED ORAL EVERY 24 HOURS
Refills: 0 | Status: DISCONTINUED | OUTPATIENT
Start: 2021-03-31 | End: 2021-04-02

## 2021-03-31 RX ORDER — FUROSEMIDE 40 MG
60 TABLET ORAL EVERY 24 HOURS
Refills: 0 | Status: DISCONTINUED | OUTPATIENT
Start: 2021-04-01 | End: 2021-04-02

## 2021-03-31 RX ORDER — FUROSEMIDE 40 MG
20 TABLET ORAL DAILY
Refills: 0 | Status: DISCONTINUED | OUTPATIENT
Start: 2021-03-31 | End: 2021-03-31

## 2021-03-31 RX ADMIN — PANTOPRAZOLE SODIUM 40 MILLIGRAM(S): 20 TABLET, DELAYED RELEASE ORAL at 18:00

## 2021-03-31 RX ADMIN — Medication 20 MILLIGRAM(S): at 18:04

## 2021-03-31 RX ADMIN — PANTOPRAZOLE SODIUM 40 MILLIGRAM(S): 20 TABLET, DELAYED RELEASE ORAL at 05:58

## 2021-03-31 RX ADMIN — Medication 1 TABLET(S): at 13:41

## 2021-03-31 RX ADMIN — Medication 500 MILLIGRAM(S): at 13:41

## 2021-03-31 RX ADMIN — ENOXAPARIN SODIUM 50 MILLIGRAM(S): 100 INJECTION SUBCUTANEOUS at 18:00

## 2021-03-31 RX ADMIN — Medication 40 MILLIGRAM(S): at 13:41

## 2021-03-31 RX ADMIN — POLYETHYLENE GLYCOL 3350 17 GRAM(S): 17 POWDER, FOR SOLUTION ORAL at 13:41

## 2021-03-31 RX ADMIN — SPIRONOLACTONE 150 MILLIGRAM(S): 25 TABLET, FILM COATED ORAL at 18:04

## 2021-03-31 RX ADMIN — SENNA PLUS 2 TABLET(S): 8.6 TABLET ORAL at 22:02

## 2021-03-31 RX ADMIN — ENOXAPARIN SODIUM 50 MILLIGRAM(S): 100 INJECTION SUBCUTANEOUS at 05:58

## 2021-03-31 NOTE — PROGRESS NOTE ADULT - ATTENDING COMMENTS
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 Current MELD-Na 14 (decreased from 18 yesterday). We will review her MRI/MRV at our multidisciplinary hepatobiliary conference tomorrow (Thursday) morning to discuss: (1) starting liver transplant evaluation, and (2) consideration of DIPS + IVC thrombectomy. Meanwhile, continue therapeutic anticoagulation with LMWH. Increase diuretics today to furosemide 60 mg po daily and spironolactone 150 mg po daily. Recommend limiting dietary sodium to <2g/day.    Please don't hesitate to call with any questions or concerns.    Meme Bryant M.D., Ph.D.  Transplant Hepatology  Cell: [phone]

## 2021-03-31 NOTE — PROGRESS NOTE ADULT - ASSESSMENT
35 year old [place]-speaking female [id number] PMHx of decompensated cirrhosis of unknown etiology, Budd Chiari syndrome, Protein S deficiency, COVID-19 PCR + 3/4/21, recently discharged 3/25 on Xarelto for suprarenal IVC thrombus with plans for outpatient repeat imaging and hepatology follow-up c/b failed prior authorization and scheduling issues. Pt was asked to return to the hospital to reaccess her IVC clot burden with potential plans for intervention after tumor board discussion of case. Hepatology, IR, Transplant Surgery involved.

## 2021-03-31 NOTE — PROGRESS NOTE ADULT - SUBJECTIVE AND OBJECTIVE BOX
Chief Complaint:  Patient is a 35y old  Female who presents with a chief complaint of IVC thrombus (31 Mar 2021 08:02)      Interval Events:     [place]  ID # [id number]    Only reports mild abdominal fullness, denies abdominal pain, SOB, chest pain, nausea/vomiting.      MRI abdomen performed - awaiting official read.     Allergies:  No Known Allergies      Hospital Medications:  ascorbic acid 500 milliGRAM(s) Oral daily  enoxaparin Injectable 50 milliGRAM(s) SubCutaneous every 12 hours  furosemide    Tablet 40 milliGRAM(s) Oral every 24 hours  multivitamin 1 Tablet(s) Oral daily  pantoprazole    Tablet 40 milliGRAM(s) Oral every 12 hours  polyethylene glycol 3350 17 Gram(s) Oral daily  senna 2 Tablet(s) Oral at bedtime  spironolactone 100 milliGRAM(s) Oral daily      PMHX/PSHX:  Cirrhosis    No significant past surgical history    No significant past surgical history        Family history:  No pertinent family history in first degree relatives        ROS:     General:  ++Fatigued. +Weight loss. No fevers, chills, night sweats  Eyes:  No vision changes  ENT:  No sore throat, pain, runny nose  CV:  No chest pain, palpitations, dizziness   Resp:  No SOB, cough, wheezing  GI:  See HPI  :  No burning with urination, hematuria  Muscle:  No pain, weakness  Neuro:  No weakness/tingling, memory problems  Psych:  +Depression. No insomnia.  Heme:  No easy bruisability      PHYSICAL EXAM:       GENERAL:  Well developed, no distress, thin  HEENT:  Conjunctivae clear, sclera anicteric  CHEST:  Full & symmetric excursion, no increased effort w/ respirations, decreased BS R base  HEART:  Regular rhythm & rate  ABDOMEN:  Soft, non-distended +mild RLQ tenderness   EXTREMITIES:  no LE  edema  SKIN:  No rash/erythema/ecchymoses/petechiae/wounds/jaundice  NEURO:  Alert, orientedx3      Vital Signs:  Vital Signs Last 24 Hrs  T(C): 36.6 (31 Mar 2021 04:47), Max: 36.8 (30 Mar 2021 21:59)  T(F): 97.9 (31 Mar 2021 04:47), Max: 98.3 (30 Mar 2021 21:59)  HR: 76 (31 Mar 2021 04:47) (70 - 90)  BP: 96/64 (31 Mar 2021 04:47) (96/64 - 108/77)  BP(mean): --  RR: 18 (31 Mar 2021 04:47) (18 - 18)  SpO2: 95% (31 Mar 2021 04:47) (94% - 96%)  Daily     Daily     LABS:                        12.5   3.92  )-----------( 151      ( 31 Mar 2021 07:17 )             39.7     03-31    135  |  98  |  18  ----------------------------<  83  3.6   |  23  |  0.61    Ca    9.1      31 Mar 2021 07:23  Phos  3.4     03-31  Mg     2.0     03-31    TPro  7.0  /  Alb  3.7  /  TBili  1.4<H>  /  DBili  0.5<H>  /  AST  35  /  ALT  20  /  AlkPhos  137<H>  03-31    LIVER FUNCTIONS - ( 31 Mar 2021 07:23 )  Alb: 3.7 g/dL / Pro: 7.0 g/dL / ALK PHOS: 137 U/L / ALT: 20 U/L / AST: 35 U/L / GGT: x           PT/INR - ( 31 Mar 2021 07:23 )   PT: 14.4 sec;   INR: 1.21 ratio    PTT - ( 31 Mar 2021 07:23 )  PTT:54.8 sec        Imaging:

## 2021-03-31 NOTE — PROGRESS NOTE ADULT - PROBLEM SELECTOR PLAN 4
- hx of pleural effusion, s/p thoracentesis last admission in s/o cirrhosis  - cytologies negative for malignant cells, infxn  - f/u CXR, monitor SpO2

## 2021-03-31 NOTE — PROGRESS NOTE ADULT - ASSESSMENT
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, +hepatic hydrothorax, +small EV), recent COVID-19 PNA/infection, presenting MRI abdominal imaging for reassessment of intraabdominal clot burden     Impression:    #Budd Chiari syndrome with decompensated cirrhosis: - MELD-Na is 16 3/29/21  - Varices: small non-bleeding EV 2017, not on beta blockers due to small size and low BP  - Ascites: recurrent related to portal vein thrombus; 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-sinusoidal portal hypertension related to Budd-Chiari  - HE: None on exam  - HCC: LIRADS 4 and LIRADS 3 lesion seen on MRI 12/2020, stable from prior, AFP WNL 02/2021    #Protein S deficiency/hypercoagulable disorder on full anticoagulation  #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  #History of COVID-19 PNA     Recommendations:  - increase to Lasix to 60mg PO daily and spironolactone to 150mg PO daily  - await results of MRI-venogram   - continue full AC with Lovenox 1 mg/kg BID for now  - will discuss plan for direct intrahepatic portosystemic shunt (DIPS) placement with IR as bridge to liver transplantation tomorrow  - will discuss liver transplant evaluation with Transplant Surgery teams tomorrow  - Trend CBC, CMP, INR daily for MELD-Na  - supportive care per primary team  - plan of care discussed extensively with patient at bedside using Somali-speaking Pacific  ID #[id number]        Brenda Chase PGY-5  Gastroenterology/Hepatology Fellow  Pager #[phone]  E-mail [email or web address]  Call [phone] to speak to answering service for on-call GI fellow 5pm-7am and weekends.     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, +hepatic hydrothorax, +small EV), recent COVID-19 PNA/infection, presenting MRI abdominal imaging for reassessment of intraabdominal clot burden     Impression:    #Budd Chiari syndrome with decompensated cirrhosis: - MELD-Na is 16 3/29/21  - Varices: small non-bleeding EV 2017, not on beta blockers due to small size and low BP  - Ascites: recurrent related to portal vein thrombus; likely pre-sinusoidal portal hypertension related to Budd-Chiari  - HE: None on exam  - HCC: LIRADS 4 and LIRADS 3 lesion seen on MRI 12/2020, stable from prior, AFP WNL 02/2021    #Protein S deficiency/hypercoagulable disorder on full anticoagulation  #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  #History of COVID-19 PNA     Recommendations:  - increase to Lasix to 60mg PO daily and spironolactone to 150mg PO daily  - limit sodium in diet to <2g/day  - await results of MRI-venogram   - continue full AC with Lovenox 1 mg/kg BID for now  - will discuss plan for direct intrahepatic portosystemic shunt (DIPS) placement with IR as bridge to liver transplantation tomorrow  - will discuss liver transplant evaluation with Transplant Surgery teams tomorrow  - Trend CBC, CMP, INR daily for MELD-Na  - supportive care per primary team  - plan of care discussed extensively with patient at bedside using Chilean-speaking Pacific  ID #[id number]        Brenda Chase PGY-5  Gastroenterology/Hepatology Fellow  Pager #[phone]  E-mail [email or web address]  Call [phone] to speak to answering service for on-call GI fellow 5pm-7am and weekends.

## 2021-03-31 NOTE — PROGRESS NOTE ADULT - PROBLEM SELECTOR PLAN 3
- in s/o cirrhosis, gb wall thickening, mass effect / portal HTN, clots  - uptrend in Tbili, f/u direct and indirect levels  - continue to monitor

## 2021-03-31 NOTE — PROGRESS NOTE ADULT - ATTENDING COMMENTS
Patient seen and examined. Plan as discussed w/ Dr. Parmar and MS3 Daisyleonidas: appreciate hepatology's continued follow up and ongoing interdisciplinary discussions regarding plan for consideration of DIPS + IVC thrombectomy and/or starting liver transplant evaluation; continue therapeutic AC w/ Lovenox; increase diuresis w/ Lasix / Spironolactone and restrict sodium intake to <2g day per hepatology's recommendations; continue close monitoring of hemodynamics, electrolytes, coags, H&H, and mental status; patient would benefit from use of Mauritian translation especially when communicating details of her care plan.

## 2021-03-31 NOTE — PROGRESS NOTE ADULT - PROBLEM SELECTOR PLAN 2
MELDNa 18 ; c/b Budd Chiari, extensive varices and portal HTN  - HCC: LIRADS 4 / LIRADS 3 lesion on MRI 12/2020, stable w AFP wnl 2/2021  - s/p dx para, thora last admission ; monitor for distention, repeat CXR  - c/w spironolactone 100 qd, lasix 40 qd  - monitor stool count, intake and output, weights daily  - monitor vitals per routine, sbp float 110-90s  - daily CMP, INR for MELD-Na

## 2021-03-31 NOTE — PROGRESS NOTE ADULT - PROBLEM SELECTOR PLAN 1
- repeat CT a/p largely unchanged from prior CT imaging  - holding Xarelto, started lovenox 50 BID [1mg/kg BID] w daily weights  - pending MRI-V, NPO for 4 hours prior  - Transplant Surgery and IR re: evaluation for liver transplantation, as well as likely DIPS + IVC thrombectomy this admission - meeting to discuss today will f/u

## 2021-03-31 NOTE — CHART NOTE - NSCHARTNOTEFT_GEN_A_CORE
35F h/o crytogenic cirrhosis, Budd Chiari, Protein S Deficiency, prior COVID infection who presents with chest pain and abdominal distension. Found to have thrombus in suprarenal IVC and SMV with moderate sized R pleural effusion, mild to moderate ascites. Patient was recently admitted for DVT for which hematology was consulted. At the time APLS labs were sent and were unremarkable. Due to negative hypercoagulable workup, hematology recommended to treat with DOAC. Patient is now readmitted after being called and asked to return to the hospital to reassess her IVC clot burden with potential plans for intervention after tumor board discussion of case.     - Hematology defers and intervention to primary team & surgery.    Kathy Benitez  Hematology-Oncology PGY-6  [phone]

## 2021-04-01 DIAGNOSIS — R10.9 UNSPECIFIED ABDOMINAL PAIN: ICD-10-CM

## 2021-04-01 DIAGNOSIS — D68.59 OTHER PRIMARY THROMBOPHILIA: ICD-10-CM

## 2021-04-01 DIAGNOSIS — K74.69 OTHER CIRRHOSIS OF LIVER: ICD-10-CM

## 2021-04-01 PROCEDURE — 99232 SBSQ HOSP IP/OBS MODERATE 35: CPT | Mod: GC

## 2021-04-01 PROCEDURE — 99221 1ST HOSP IP/OBS SF/LOW 40: CPT

## 2021-04-01 RX ORDER — FUROSEMIDE 40 MG
20 TABLET ORAL ONCE
Refills: 0 | Status: COMPLETED | OUTPATIENT
Start: 2021-04-01 | End: 2021-04-01

## 2021-04-01 RX ORDER — SPIRONOLACTONE 25 MG/1
50 TABLET, FILM COATED ORAL DAILY
Refills: 0 | Status: DISCONTINUED | OUTPATIENT
Start: 2021-04-01 | End: 2021-04-02

## 2021-04-01 RX ORDER — SPIRONOLACTONE 25 MG/1
200 TABLET, FILM COATED ORAL DAILY
Refills: 0 | Status: DISCONTINUED | OUTPATIENT
Start: 2021-04-02 | End: 2021-04-02

## 2021-04-01 RX ORDER — FUROSEMIDE 40 MG
80 TABLET ORAL DAILY
Refills: 0 | Status: DISCONTINUED | OUTPATIENT
Start: 2021-04-02 | End: 2021-04-02

## 2021-04-01 RX ADMIN — PANTOPRAZOLE SODIUM 40 MILLIGRAM(S): 20 TABLET, DELAYED RELEASE ORAL at 17:42

## 2021-04-01 RX ADMIN — Medication 500 MILLIGRAM(S): at 12:49

## 2021-04-01 RX ADMIN — SENNA PLUS 2 TABLET(S): 8.6 TABLET ORAL at 22:01

## 2021-04-01 RX ADMIN — SPIRONOLACTONE 150 MILLIGRAM(S): 25 TABLET, FILM COATED ORAL at 12:50

## 2021-04-01 RX ADMIN — POLYETHYLENE GLYCOL 3350 17 GRAM(S): 17 POWDER, FOR SOLUTION ORAL at 12:50

## 2021-04-01 RX ADMIN — Medication 1 TABLET(S): at 12:50

## 2021-04-01 RX ADMIN — SPIRONOLACTONE 50 MILLIGRAM(S): 25 TABLET, FILM COATED ORAL at 17:42

## 2021-04-01 RX ADMIN — ENOXAPARIN SODIUM 50 MILLIGRAM(S): 100 INJECTION SUBCUTANEOUS at 17:42

## 2021-04-01 RX ADMIN — Medication 20 MILLIGRAM(S): at 17:42

## 2021-04-01 NOTE — CONSULT NOTE ADULT - ASSESSMENT
35F w/ PMHx of Protein S deficiency, recent COVID-19 pna (3/4/21) and chronic Budd Chiari syndrome c/b small ascites, R hepatic hydrothorax, small non bleeding esophageal varices (last EGD 2017), multiple spontaneous abortions. Presented with chest tightness, dyspnea, and abd distension, imaging c/f extensive intra-abdominal ysabel-systemic collaterals, splenomegaly, and caudate lobe hypertrophy with associated mass effect on intrahepatic IVC and IVC thrombosis.     Continue care per primary team  Hepatology following: agree with AC  No acute indication for liver transplant evaluation at this time.   Ongoing discussion regarding further intervention, may need stenting at cavoatrial junction (can be arranged outpatient).      Will continue to follow.    Transplant Surgery pager [phone].

## 2021-04-01 NOTE — PROGRESS NOTE ADULT - ASSESSMENT
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      Impression:    #Budd Chiari syndrome with decompensated cirrhosis: - MELD-Na is 16 3/29/21  - Varices: small non-bleeding EV 2017, not on beta blockers due to small size and low BP  - Ascites: Mild recurrent ascites on MRV 03/2020; likely related to portal vein thrombus vs. 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  #History of COVID-19 PNA     Recommendations:  - increase to Lasix to 80mg PO daily and spironolactone to 200mg PO daily  - IR consulted by Hepatology to evaluate patient this admission for thrombectomy and DIPS placement  - limit sodium in diet to <2g/day  - continue full AC with Lovenox 1 mg/kg BID for now  - Trend CBC, CMP and INR daily for MELD-Na  - reasonable to discharge patient to home on increased dose of diuretics after IR evaluation today with planned hospital re-admission next week for thrombecotmy and DIPS placement as inpatient   - plan of care discussed extensively with patient at bedside using Bhutanese-speaking Pacific  ID #[id number]      Brenda Chase PGY-5  Gastroenterology/Hepatology Fellow  Pager #[phone]  E-mail [email or web address]  Call [phone] to speak to answering service for on-call GI fellow 5pm-7am and weekends.   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      Impression:    #Budd Chiari syndrome with decompensated cirrhosis: - MELD-Na is 16 3/29/21  - Varices: small non-bleeding EV 2017, not on beta blockers due to small size and low BP  - Ascites: Mild recurrent ascites on MRV 03/2020; likely related to portal vein thrombus vs. 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  #History of COVID-19 PNA     Recommendations:  - increase to Lasix to 80mg PO daily and spironolactone to 200mg PO daily  - IR consulted by Hepatology to evaluate patient this admission for thrombectomy and DIPS placement, though procedure likely to be scheduled as outpatient  - limit sodium in diet to <2g/day  - continue full AC with Lovenox 1 mg/kg BID for now  - Trend CBC, CMP and INR daily for MELD-Na  - reasonable to discharge patient to home on increased dose of diuretics after IR evaluation today with planned hospital re-admission next week for thrombecotmy and DIPS placement as inpatient   - plan of care discussed extensively with patient at bedside using Bur[place]ian-speaking Pacific  ID #[id number]      Brenda Chase PGY-5  Gastroenterology/Hepatology Fellow  Pager #[phone]  E-mail [email or web address]  Call [phone] to speak to answering service for on-call GI fellow 5pm-7am and weekends.

## 2021-04-01 NOTE — PROGRESS NOTE ADULT - PROBLEM SELECTOR PLAN 7
- Omani  for communication  - Diet: Regular   - DVT ppx: scds, on AC given clot  - GI: bowel regimen, PPI in s/o varices

## 2021-04-01 NOTE — DIETITIAN INITIAL EVALUATION ADULT. - REASON INDICATOR FOR ASSESSMENT
Consults received for assessment, education. Source: EMR, pt. Pt is a 34 yo female with PMH of cryptogenic cirrhosis, Budd Chiari syndrome, Protein S deficiency, multiple miscarriages, COVID-19 PCR + 3/4/21, recently discharged 3/25 for suprarenal IVC thrombus with plans for outpatient repeat imaging and hepatology follow-up complicated by failed prior authorization and scheduling issues. Returned to hospital for reaccess of IVC clot burden with potential plans for intervention, admitted 3/29.

## 2021-04-01 NOTE — PROGRESS NOTE ADULT - PROBLEM SELECTOR PLAN 2
MELDNa 18 ; c/b Budd Chiari, Protein S def, extensive varices and portal HTN  - HCC: LIRADS 4 / LIRADS 3 lesion on MRI 12/2020, stable w AFP wnl 2/2021  - s/p dx para, thora last admission ; monitor for distention, repeat CXR  - c/w spironolactone 100 qd, lasix 40 qd  - monitor stool count, intake and output, weights daily  - monitor vitals per routine, sbp float 110-90s  - daily CMP, INR for MELD-Na   - MRV rad notes appreciated ; f/u hepatology / transplant recs

## 2021-04-01 NOTE — CONSULT NOTE ADULT - SUBJECTIVE AND OBJECTIVE BOX
Transplant Surgery Consult Note    Present:   Patient seen and examined with Dr. Dejesus and Dr. Zmaora.     HPI: 35F w/ PMHx of Protein S deficiency, recent COVID-19 pna (3/4/21) and chronic Budd Chiari syndrome c/b small ascites, R hepatic hydrothorax, small non bleeding esophageal varices (last EGD 2017), multiple spontaneous abortions. Presented with chest tightness, dyspnea, and abd distension, imaging c/f  extensive intra-abdominal ysabel-systemic collaterals, splenomegaly, and caudate lobe hypertrophy with associated mass effect on intrahepatic IVC and IVC thrombosis. Started on Lovenox.  Bilirubin initially normal now 1.5.  AST/ALT normal and mild elevation in ALP.  INR 2.58 >1.8.    3/30 MRI - Severely compressed superior inferior vena cava with associated suprarenal thrombus. No superior mesenteric vein thrombus. Budd-Chiari with cirrhosis. Evidence for portal hypertension.  Unchanged moderate right pleural effusion and mild ascites.Unchanged 1.3 cm hepatic segment 8 indeterminate lesion. This could represent malignancy. Multiple other subcentimeter similar foci are seen within the liver.    MEDICATIONS  (STANDING):  ascorbic acid 500 milliGRAM(s) Oral daily  enoxaparin Injectable 50 milliGRAM(s) SubCutaneous every 12 hours  furosemide    Tablet 60 milliGRAM(s) Oral every 24 hours  multivitamin 1 Tablet(s) Oral daily  pantoprazole    Tablet 40 milliGRAM(s) Oral every 12 hours  polyethylene glycol 3350 17 Gram(s) Oral daily  senna 2 Tablet(s) Oral at bedtime  spironolactone 150 milliGRAM(s) Oral every 24 hours    Vital Signs Last 24 Hrs  T(C): 36.7 (01 Apr 2021 04:59), Max: 36.7 (31 Mar 2021 20:58)  T(F): 98 (01 Apr 2021 04:59), Max: 98.1 (31 Mar 2021 20:58)  HR: 109 (01 Apr 2021 12:51) (80 - 109)  BP: 103/73 (01 Apr 2021 12:51) (93/56 - 103/73)  BP(mean): --  RR: 18 (01 Apr 2021 04:59) (18 - 18)  SpO2: 94% (01 Apr 2021 04:59) (94% - 94%)    I&O's Summary                         13.9   4.18  )-----------( 178      ( 01 Apr 2021 09:27 )             45.1     04-01    137  |  97  |  16  ----------------------------<  105<H>  3.6   |  29  |  0.59    Ca    9.4      01 Apr 2021 09:27  Phos  2.8     04-01  Mg     2.1     04-01    TPro  7.9  /  Alb  4.2  /  TBili  1.6<H>  /  DBili  0.6<H>  /  AST  43<H>  /  ALT  25  /  AlkPhos  154<H>  04-01      Review of systems  Gen: No weight changes, fatigue, fevers/chills, weakness  Skin: No rashes  Head/Eyes/Ears/Mouth: No headache; Normal hearing; Normal vision w/o blurriness; No sinus pain/discomfort, sore throat  Respiratory: dyspnea on exertion  CV: No chest pain, PND, orthopnea  GI: no abd pain  : No increased frequency, dysuria, hematuria, nocturia  MSK: No joint pain/swelling; no back pain; no edema  Neuro: No dizziness/lightheadedness, weakness, seizures, numbness, tingling  Heme: No easy bruising or bleeding  Endo: No heat/cold intolerance  Psych: No significant nervousness, anxiety, stress, depression  All other systems were reviewed and are negative, except as noted.      PHYSICAL EXAM:  Constitutional: Well developed / well nourished  Eyes: Anicteric, PERRLA  ENMT: nc/at  Neck: supple, no JVD  Respiratory: CTA B/L  Cardiovascular: RRR  Gastrointestinal: Voiding spontaneously  Extremities: no edema  Neurological: A&O x3  Skin: no rashes, ulcerations or lesions;  Musculoskeletal: Moving all extremities  Psychiatric: Responsive

## 2021-04-01 NOTE — PROGRESS NOTE ADULT - PROBLEM SELECTOR PLAN 3
- extensive workup outpatient for underlying cause of cirrhosis  - was told + APLS in [place], cardiolipin / [id number] negative last admission  - repeat testing with Dr Venegas outpatient in ~6 weeks, made heme aware inpt  - per other chart had outpatient genetic testing/appt  - MRV cannot r/o malignancy, pending hepatology commentary

## 2021-04-01 NOTE — PROGRESS NOTE ADULT - PROBLEM SELECTOR PLAN 1
- repeat CT a/p largely unchanged from prior CT imaging  - holding Xarelto, started lovenox 50 BID [1mg/kg BID] w daily weights  - MRI-V + large extent of thrombus, likely not amendable to DIPS, r/o malignancy  - f/u transplant and hepatology recommendations

## 2021-04-01 NOTE — PROGRESS NOTE ADULT - ATTENDING COMMENTS
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 Current MELD-Na down to 12. Her recent imaging including yesterday's MRV reviewed at our multidisciplinary hepatobiliary conference today and discussed with IR and Transplant Surgery, with ongoing discussions to plan for likely IVC thrombectomy and same-day DIPS. This would likely be planned as a scheduled re-admission, not this admission. Increase furosemide to 80 mg po daily and spironolactone to 200 mg po daily. Continue LMWH for anticoagulation; will plan to resume DOAC for long-term anticoagulation once she does not have any upcoming procedures.    Please don't hesitate to call with any questions or concerns.    Meme Bryant M.D., Ph.D.  Transplant Hepatology  Cell: [phone]

## 2021-04-01 NOTE — DIETITIAN INITIAL EVALUATION ADULT. - ORAL INTAKE PTA/DIET HISTORY
Of note, patient has separate chart (MRN: [id number]). Pt known to this RD from prior admission (discharged 3/25). At time of RD assessment during previous admission, pt with recent diet advancement, eating poorly, had eaten poorly prior to that admission as well.

## 2021-04-01 NOTE — PROGRESS NOTE ADULT - PROBLEM SELECTOR PLAN 6
- patient prior auth / scheduling complications outpatient
- hx of pleural effusion, s/p thoracentesis last admission in s/o cirrhosis  - cytologies in chart under MRN noted in HPI  - CXR comparable to prior last week s/p thoracentesis ; MRV + mod R pl effusion  - monitor SpO2 > 90%

## 2021-04-01 NOTE — DIETITIAN NUTRITION RISK NOTIFICATION - TREATMENT: THE FOLLOWING DIET HAS BEEN RECOMMENDED
Diet, Regular:   Low Sodium  Supplement Feeding Modality:  Oral  Ensure Enlive Cans or Servings Per Day:  1       Frequency:  Daily (04-01-21 @ 15:50) [Pending Verification By Attending]

## 2021-04-01 NOTE — PROGRESS NOTE ADULT - PROBLEM SELECTOR PLAN 4
- in s/o decompensated cirrhosis with ascites s/p dx para last admission   - minimal fluid / distention, explained to patient recurrence expected   - continue daily abdominal exams / daily stool count

## 2021-04-01 NOTE — DIETITIAN INITIAL EVALUATION ADULT. - OTHER INFO
Pt noted to be English and East Timorese speaking, declined  services. Reports good appetite and PO intake, completing 100% of meals. Per flowsheets, pt consumed 90% of breakfast this morning (4/1). Pt denies nausea/vomiting/diarrhea/constipation. Pt unable to recall date of last BM, per nursing documentation, last BM 3/30, loose. Pt denies difficulties chewing or swallowing. Confirmed NKFA.     Pt fatigued, deferred remainder of interview at this time. Weight history per [facility]: 109 pounds (11/30/2020), 111 pounds (12/22/2020), 109.4 pounds (1/12/2021), 110 pounds (2/23/2021). Current dosing weight 101.2 pounds suggests 8.8 pound (~8%) wt loss x <2 months - clinically significant. Obtained new bed scale weight at time of visit: 105 pounds - suggests 5 pound (~4.5%) wt loss. Noted pt with recurrent mild ascites per team, and on Lasix, likely affecting weight fluctuations. However, suspect some weight loss likely in setting of variable PO intake prior to this admission with increased nutrient needs. Will continue to monitor and trend weights.     Encouraged PO intake as tolerated, educated pt on importance of adequate calorie and protein intake. Pt amenable to trial of Ensure Enlive to optimize intake. Encouraged consumption of protein foods as able and discussed good sources of protein. Further diet education deferred at this time, pt wishing to rest. Pt made aware RD remains available.

## 2021-04-01 NOTE — PROGRESS NOTE ADULT - ATTENDING COMMENTS
Patient seen and examined. Plan as discussed w/ Dr. Parmar and MS3 Devika: hepatology and transplant surgery in discussion re: plan for further intervention, though patient likely would not require initiation for liver transplant evaluation at this time; continue therapeutic AC w/ Lovenox; increase diuresis w/ Lasix to 80mg daily and Spironolactone to 200mg daily and restrict sodium intake to <2g day per hepatology's recommendations; continue close monitoring of hemodynamics, electrolytes, coags, H&H, and mental status. Appreciate SW's evaluation.

## 2021-04-01 NOTE — PROGRESS NOTE ADULT - ASSESSMENT
35 year old Paraguayan-speaking female [id number] PMHx of decompensated cirrhosis of unknown etiology, Budd Chiari syndrome, Protein S deficiency, COVID-19 PCR + 3/4/21, recently discharged 3/25 on Xarelto for suprarenal IVC thrombus with plans for outpatient repeat imaging and hepatology follow-up c/b failed prior authorization and scheduling issues. Pt was asked to return to the hospital to reaccess her IVC clot burden with potential plans for intervention after tumor board discussion of case. Hepatology, IR, Transplant Surgery involved.

## 2021-04-01 NOTE — PROGRESS NOTE ADULT - PROBLEM SELECTOR PLAN 5
- Guatemalan  for communication  - Diet: Regular   - DVT ppx: scds, on AC given clot  - GI: bowel regimen, PPI in s/o varices
- in s/o cirrhosis, gb wall thickening, mass effect / portal HTN, clots  - uptrend in Tbili, f/u direct and indirect levels  - continue to monitor
- patient prior auth / scheduling complications outpatient

## 2021-04-01 NOTE — PROGRESS NOTE ADULT - SUBJECTIVE AND OBJECTIVE BOX
PROGRESS NOTE:     Katherine Parmar DO PGY1   Contact: Pager [phone] [facility] | [phone] [facility] | [employer] Teams  Please check Resident Assignment tab on [place] for Team / Coverage     Patient is a 35y old  Female who presents with a chief complaint of IVC thrombus (31 Mar 2021 12:20)    EVENTS / SUBJECTIVE:    MEDICATIONS  (STANDING):  ascorbic acid 500 milliGRAM(s) Oral daily  enoxaparin Injectable 50 milliGRAM(s) SubCutaneous every 12 hours  furosemide    Tablet 60 milliGRAM(s) Oral every 24 hours  multivitamin 1 Tablet(s) Oral daily  pantoprazole    Tablet 40 milliGRAM(s) Oral every 12 hours  polyethylene glycol 3350 17 Gram(s) Oral daily  senna 2 Tablet(s) Oral at bedtime  spironolactone 150 milliGRAM(s) Oral every 24 hours    MEDICATIONS  (PRN):  lidocaine   Patch 1 Patch Transdermal every 24 hours PRN MSK pain    Allergies  No Known Allergies      Intolerances      PHYSICAL EXAM:  Vital Signs Last 24 Hrs  T(C): 36.7 (01 Apr 2021 04:59), Max: 36.7 (31 Mar 2021 20:58)  T(F): 98 (01 Apr 2021 04:59), Max: 98.1 (31 Mar 2021 20:58)  HR: 80 (01 Apr 2021 04:59) (80 - 89)  BP: 95/62 (01 Apr 2021 04:59) (93/56 - 95/62)  BP(mean): --  RR: 18 (01 Apr 2021 04:59) (18 - 18)  SpO2: 94% (01 Apr 2021 04:59) (94% - 95%)  I&O's Summary      GENERAL: No acute distress, well-developed  HEENT::  Atraumatic, EOMI, sclera clear, supple neck  CHEST/LUNG: CTAB; No wheezes, rales, or rhonchi  HEART: Regular rate and rhythm; No murmurs, rubs, or gallops  ABDOMEN: Soft, non-tender, non-distended; normal bowel sounds, no organomegaly  EXTREMITIES:  2+ peripheral pulses b/l, No clubbing, cyanosis, or edema  NEUROLOGY: A&O x 3, no focal deficits  PSYCH:   SKIN: No rashes or lesions    PATIENT DATA:  CAPILLARY BLOOD GLUCOSE                              13.9   4.18  )-----------( 178      ( 01 Apr 2021 09:27 )             45.1     04-01    137  |  97  |  16  ----------------------------<  105<H>  3.6   |  29  |  0.59    Ca    9.4      01 Apr 2021 09:27  Phos  2.8     04-01  Mg     2.1     04-01    TPro  7.9  /  Alb  4.2  /  TBili  1.6<H>  /  DBili  0.6<H>  /  AST  43<H>  /  ALT  25  /  AlkPhos  154<H>  04-01    PT/INR - ( 01 Apr 2021 09:27 )   PT: 13.8 sec;   INR: 1.16 ratio         PTT - ( 01 Apr 2021 09:27 )  PTT:53.1 sec             PROGRESS NOTE:     Katherine Parmar,  PGY1   Contact: Pager [phone] [facility] | [phone] [facility] | [employer] Teams  Please check Resident Assignment tab on [place] for Team / Coverage     Patient is a 35y old  Female who presents with a chief complaint of IVC thrombus (31 Mar 2021 12:20)    EVENTS / SUBJECTIVE: Pt seen at bedside. Wants to go home. Endorses some chest and abdomen discomfort similar to prior in s/o pl effusion, ascites.    MEDICATIONS  (STANDING):  ascorbic acid 500 milliGRAM(s) Oral daily  enoxaparin Injectable 50 milliGRAM(s) SubCutaneous every 12 hours  furosemide    Tablet 60 milliGRAM(s) Oral every 24 hours  multivitamin 1 Tablet(s) Oral daily  pantoprazole    Tablet 40 milliGRAM(s) Oral every 12 hours  polyethylene glycol 3350 17 Gram(s) Oral daily  senna 2 Tablet(s) Oral at bedtime  spironolactone 150 milliGRAM(s) Oral every 24 hours    MEDICATIONS  (PRN):  lidocaine   Patch 1 Patch Transdermal every 24 hours PRN MSK pain    Allergies  No Known Allergies    PHYSICAL EXAM:  Vital Signs Last 24 Hrs  T(C): 36.7 (01 Apr 2021 04:59), Max: 36.7 (31 Mar 2021 20:58)  T(F): 98 (01 Apr 2021 04:59), Max: 98.1 (31 Mar 2021 20:58)  HR: 80 (01 Apr 2021 04:59) (80 - 89)  BP: 95/62 (01 Apr 2021 04:59) (93/56 - 95/62)  RR: 18 (01 Apr 2021 04:59) (18 - 18)  SpO2: 94% (01 Apr 2021 04:59) (94% - 95%)    GENERAL: No acute distress, well-developed  HEENT::  Atraumatic, EOMI, sclera clear, supple neck  CHEST/LUNG: decreased bs bL LL, decreased inspiratory effort  HEART: Regular rate and rhythm; No murmurs, rubs, or gallops  ABDOMEN: Soft, non-tender, min distended; normal bowel sounds, hepatosplenomegaly  EXTREMITIES:  2+ peripheral pulses b/l, No clubbing, cyanosis, or edema  NEUROLOGY: A&O x 3, no focal deficits  PSYCH: anxious, soft-spoken   SKIN: No rashes or lesions    PATIENT DATA:                        13.9   4.18  )-----------( 178      ( 01 Apr 2021 09:27 )             45.1     04-01    137  |  97  |  16  ----------------------------<  105<H>  3.6   |  29  |  0.59    Ca    9.4      01 Apr 2021 09:27  Phos  2.8     04-01  Mg     2.1     04-01    TPro  7.9  /  Alb  4.2  /  TBili  1.6<H>  /  DBili  0.6<H>  /  AST  43<H>  /  ALT  25  /  AlkPhos  154<H>  04-01    PT/INR - ( 01 Apr 2021 09:27 )   PT: 13.8 sec;   INR: 1.16 ratio    PTT - ( 01 Apr 2021 09:27 )  PTT:53.1 sec          < from: MR Abdomen w/ IV Cont (03.30.21 @ 18:31) >  FINDINGS:  LOWER CHEST: Unchanged moderate right pleural effusion.    LIVER: Cirrhotic morphology. Heterogeneous parenchymal enhancement.  A previously described lesion (lesion 1) in the in the right lobe dome segment 8 is unchangedin size measuring 1.2 x 0.9 cm. This shows high signal on precontrast T1 weighted imaging consistent with hemorrhage or proteinaceous material (8:8). This is isosignal on T2-weighted imaging. This shows subtle enhancement on the early subtraction imaging with washout with a capsular appearance on delayed imaging. Numerous other subcentimeter similar appearing nodules are seen within the liver mostly in the right lobe. Malignancy cannot be excluded  Lesion #: 1  Location: Anterior dome segment 8, series 10 image 8  Size: 1.2 x 0.9.  AP Hyperenhancement: YES  PV/Equilibrium Washout: YES  Capsule Appearance: YES:  Threshold Growth (>50% size increase in <= 6 months): Not applicable  Ancillary features: NA  LI-RADS v 2018 Category: LR-4  OPTN Category (if LR-5):  BILE DUCTS: Normal caliber.  GALLBLADDER: Within normal limits.  SPLEEN: Borderline splenomegaly. Unchanged  PANCREAS: Within normal limits.  ADRENALS: Within normal limits.  KIDNEYS/URETERS: Within normal limits.  BLADDER: Within normal limits.  REPRODUCTIVE ORGANS: Normal uterus and adnexa.  BOWEL: No bowel obstruction.  PERITONEUM: Mild ascites.  VESSELS: Thrombosis of the suprarenal inferior vena cava again noted. The IVC becomes severely compressed as it traverses the liver with reconstitution just prior to entering the right atrium. No hepatic veins are appreciated. The superior mesenteric vein and its branches are widely patent. The splenic vein and main portal vein are patent. Dilated azygos vein and paraspinal veins. Splenic hilar varices. Recanalized umbilical vein.  RETROPERITONEUM/LYMPH NODES: No lymphadenopathy.  ABDOMINAL WALL: Within normal limits.  BONES: Within normal limits.  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  No superior mesenteric vein thrombus.  Budd-Chiari with cirrhosis. Evidence for portal hypertension.  Unchanged moderate right pleural effusion and mild ascites.  Unchanged 1.3 cm hepatic segment 8 indeterminatelesion. This could represent malignancy. Multiple other subcentimeter similar foci are seen within the liver.

## 2021-04-01 NOTE — DIETITIAN INITIAL EVALUATION ADULT. - ADD RECOMMEND
Continue micronutrient supplementation if no medical contraindications. Encouraged continued good PO intake as tolerated. Diet education provided. Patient made aware RD remains available. Monitor PO intake, weight, labs, skin, GI status, diet. Malnutrition sticker placed, RD to follow-up as per protocol.

## 2021-04-01 NOTE — DIETITIAN INITIAL EVALUATION ADULT. - ETIOLOGY
related to prolonged inadequate protein-energy intake related to increased nutrient needs in setting of cryptogenic cirrhosis

## 2021-04-01 NOTE — PROGRESS NOTE ADULT - SUBJECTIVE AND OBJECTIVE BOX
Chief Complaint:  Patient is a 35y old  Female who presents with a chief complaint of IVC thrombus (01 Apr 2021 12:35)      Interval Events:     MRI-venogram performed - results reviewed.  Candidacy for thrombectomy with DIPS, candidacy for liver transplantation, and MRI venogram discussed at Tumor Board with IR, Transplant Surgery and Hepatology this morning.   Patient seen by Transplant Surgery.  [facility]  ID#[id number]    Patient is feeling well, has stable mild RLQ abdominal discomfort, increased urination and slightly dyspnea after ambulation    Allergies:  No Known Allergies      Hospital Medications:  ascorbic acid 500 milliGRAM(s) Oral daily  enoxaparin Injectable 50 milliGRAM(s) SubCutaneous every 12 hours  furosemide    Tablet 60 milliGRAM(s) Oral every 24 hours  lidocaine   Patch 1 Patch Transdermal every 24 hours PRN  multivitamin 1 Tablet(s) Oral daily  pantoprazole    Tablet 40 milliGRAM(s) Oral every 12 hours  polyethylene glycol 3350 17 Gram(s) Oral daily  senna 2 Tablet(s) Oral at bedtime  spironolactone 150 milliGRAM(s) Oral every 24 hours      PMHX/PSHX:  Cirrhosis    No significant past surgical history    No significant past surgical history      Family history:  No pertinent family history in first degree relatives      ROS:     General:  ++Fatigued. +Weight loss. No fevers, chills, night sweats  Eyes:  No vision changes  ENT:  No sore throat, pain, runny nose  CV:  No chest pain, palpitations, dizziness   Resp:  No SOB, cough, wheezing  GI:  See HPI  :  No burning with urination, hematuria  Muscle:  No pain, weakness  Neuro:  No weakness/tingling, memory problems  Psych:  +Depression. No insomnia.  Heme:  No easy bruisability      PHYSICAL EXAM:     GENERAL:  Well developed, no distress, thin  HEENT:  Conjunctivae clear, sclera anicteric  CHEST:  Full & symmetric excursion, no increased effort w/ respirations, decreased BS R base  HEART:  Regular rhythm & rate  ABDOMEN:  Soft, non-distended +mild RLQ tenderness   EXTREMITIES:  no LE  edema  SKIN:  No rash/erythema/ecchymoses/petechiae/wounds/jaundice  NEURO:  Alert, orientedx3      Vital Signs:  Vital Signs Last 24 Hrs  T(C): 36.7 (01 Apr 2021 04:59), Max: 36.7 (31 Mar 2021 20:58)  T(F): 98 (01 Apr 2021 04:59), Max: 98.1 (31 Mar 2021 20:58)  HR: 109 (01 Apr 2021 12:51) (80 - 109)  BP: 103/73 (01 Apr 2021 12:51) (93/56 - 103/73)  BP(mean): --  RR: 18 (01 Apr 2021 04:59) (18 - 18)  SpO2: 94% (01 Apr 2021 04:59) (94% - 94%)  Daily     Daily     LABS:                        13.9   4.18  )-----------( 178      ( 01 Apr 2021 09:27 )             45.1     04-01    137  |  97  |  16  ----------------------------<  105<H>  3.6   |  29  |  0.59    Ca    9.4      01 Apr 2021 09:27  Phos  2.8     04-01  Mg     2.1     04-01    TPro  7.9  /  Alb  4.2  /  TBili  1.6<H>  /  DBili  0.6<H>  /  AST  43<H>  /  ALT  25  /  AlkPhos  154<H>  04-01    LIVER FUNCTIONS - ( 01 Apr 2021 09:27 )  Alb: 4.2 g/dL / Pro: 7.9 g/dL / ALK PHOS: 154 U/L / ALT: 25 U/L / AST: 43 U/L / GGT: x           PT/INR - ( 01 Apr 2021 09:27 )   PT: 13.8 sec;   INR: 1.16 ratio    PTT - ( 01 Apr 2021 09:27 )  PTT:53.1 sec    Imaging:      EXAM:  MR ABDOMEN IC                            PROCEDURE DATE:  03/30/2021            INTERPRETATION:  CLINICAL INFORMATION: Budd-Chiari, protein S deficiency, cirrhosis, IVC and SMV thrombi. Evaluate IVC thrombus.    COMPARISON: CT abdomen/pelvis3/20/2021. MRI abdomen 12/8/2020    CONTRAST/COMPLICATIONS:  IV Contrast: Gadavist  6 cc administered   1.5 cc discarded  Oral Contrast: NONE  Complications: None reported at time of study completion    PROCEDURE:  MRI/MRV of the abdomen and pelvis was performed.      FINDINGS:  LOWER CHEST: Unchanged moderate right pleural effusion.    LIVER: Cirrhotic morphology. Heterogeneous parenchymal enhancement.  A previously described lesion (lesion 1) in the in the right lobe dome segment 8 is unchangedin size measuring 1.2 x 0.9 cm. This shows high signal on precontrast T1 weighted imaging consistent with hemorrhage or proteinaceous material (8:8). This is isosignal on T2-weighted imaging. This shows subtle enhancement on the early subtraction imaging with washout with a capsular appearance on delayed imaging. Numerous other subcentimeter similar appearing nodules are seen within the liver mostly in the right lobe. Malignancy cannot be excluded    Lesion #: 1  Location: Anterior dome segment 8, series 10 image 8  Size: 1.2 x 0.9.  AP Hyperenhancement: YES  PV/Equilibrium Washout: YES  Capsule Appearance: YES:  Threshold Growth (>50% size increase in <= 6 months): Not applicable  Ancillary features: NA  LI-RADS v 2018 Category: LR-4  OPTN Category (if LR-5):      BILE DUCTS: Normal caliber.  GALLBLADDER: Within normal limits.  SPLEEN: Borderline splenomegaly. Unchanged  PANCREAS: Within normal limits.  ADRENALS: Within normal limits.  KIDNEYS/URETERS: Within normal limits.    BLADDER: Within normal limits.  REPRODUCTIVE ORGANS: Normal uterus and adnexa.    BOWEL: No bowel obstruction.  PERITONEUM: Mild ascites.  VESSELS: Thrombosis of the suprarenal inferior vena cava again noted. The IVC becomes severely compressed as it traverses the liver with reconstitution just prior to entering the right atrium. No hepatic veins are appreciated. The superior mesenteric vein and its branches are widely patent. The splenic vein and main portal vein are patent. Dilated azygos vein and paraspinal veins. Splenic hilar varices. Recanalized umbilical vein.  RETROPERITONEUM/LYMPH NODES: No lymphadenopathy.  ABDOMINAL WALL: Within normal limits.  BONES: Within normal limits.    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    No superior mesenteric vein thrombus.    Budd-Chiari with cirrhosis. Evidence for portal hypertension.    Unchanged moderate right pleural effusion and mild ascites.    Unchanged 1.3 cm hepatic segment 8 indeterminatelesion. This could represent malignancy. Multiple other subcentimeter similar foci are seen within the liver.          Yonatan Chun MD; Fellow Radiology  This document has been electronically signed.  CORRINA MAXWELL MD; Attending Radiologist  This document has been electronically signed. Mar 31 2021 12:49PM

## 2021-04-02 ENCOUNTER — TRANSCRIPTION ENCOUNTER (OUTPATIENT)
Age: 36
End: 2021-04-02

## 2021-04-02 VITALS
TEMPERATURE: 98 F | RESPIRATION RATE: 18 BRPM | DIASTOLIC BLOOD PRESSURE: 74 MMHG | OXYGEN SATURATION: 94 % | HEART RATE: 106 BPM | SYSTOLIC BLOOD PRESSURE: 104 MMHG

## 2021-04-02 LAB
ALBUMIN SERPL ELPH-MCNC: 3.6 G/DL — SIGNIFICANT CHANGE UP (ref 3.3–5)
ALP SERPL-CCNC: 130 U/L — HIGH (ref 40–120)
ALT FLD-CCNC: 21 U/L — SIGNIFICANT CHANGE UP (ref 10–45)
ANION GAP SERPL CALC-SCNC: 12 MMOL/L — SIGNIFICANT CHANGE UP (ref 5–17)
APTT BLD: 52.6 SEC — HIGH (ref 27.5–35.5)
AST SERPL-CCNC: 34 U/L — SIGNIFICANT CHANGE UP (ref 10–40)
BASOPHILS # BLD AUTO: 0.02 K/UL — SIGNIFICANT CHANGE UP (ref 0–0.2)
BASOPHILS NFR BLD AUTO: 0.6 % — SIGNIFICANT CHANGE UP (ref 0–2)
BILIRUB DIRECT SERPL-MCNC: 0.6 MG/DL — HIGH (ref 0–0.2)
BILIRUB INDIRECT FLD-MCNC: 1.4 MG/DL — HIGH (ref 0.2–1)
BILIRUB SERPL-MCNC: 2 MG/DL — HIGH (ref 0.2–1.2)
BILIRUB SERPL-MCNC: 2 MG/DL — HIGH (ref 0.2–1.2)
BUN SERPL-MCNC: 15 MG/DL — SIGNIFICANT CHANGE UP (ref 7–23)
CALCIUM SERPL-MCNC: 9.1 MG/DL — SIGNIFICANT CHANGE UP (ref 8.4–10.5)
CHLORIDE SERPL-SCNC: 97 MMOL/L — SIGNIFICANT CHANGE UP (ref 96–108)
CO2 SERPL-SCNC: 25 MMOL/L — SIGNIFICANT CHANGE UP (ref 22–31)
CREAT SERPL-MCNC: 0.6 MG/DL — SIGNIFICANT CHANGE UP (ref 0.5–1.3)
D DIMER BLD IA.RAPID-MCNC: 409 NG/ML DDU — HIGH
EOSINOPHIL # BLD AUTO: 0.1 K/UL — SIGNIFICANT CHANGE UP (ref 0–0.5)
EOSINOPHIL NFR BLD AUTO: 3 % — SIGNIFICANT CHANGE UP (ref 0–6)
GLUCOSE SERPL-MCNC: 96 MG/DL — SIGNIFICANT CHANGE UP (ref 70–99)
HCT VFR BLD CALC: 38.6 % — SIGNIFICANT CHANGE UP (ref 34.5–45)
HGB BLD-MCNC: 12.1 G/DL — SIGNIFICANT CHANGE UP (ref 11.5–15.5)
IMM GRANULOCYTES NFR BLD AUTO: 0.3 % — SIGNIFICANT CHANGE UP (ref 0–1.5)
INR BLD: 1.24 RATIO — HIGH (ref 0.88–1.16)
LDH SERPL L TO P-CCNC: 148 U/L — SIGNIFICANT CHANGE UP (ref 50–242)
LYMPHOCYTES # BLD AUTO: 0.51 K/UL — LOW (ref 1–3.3)
LYMPHOCYTES # BLD AUTO: 15.1 % — SIGNIFICANT CHANGE UP (ref 13–44)
MAGNESIUM SERPL-MCNC: 1.9 MG/DL — SIGNIFICANT CHANGE UP (ref 1.6–2.6)
MCHC RBC-ENTMCNC: 27.3 PG — SIGNIFICANT CHANGE UP (ref 27–34)
MCHC RBC-ENTMCNC: 31.3 GM/DL — LOW (ref 32–36)
MCV RBC AUTO: 86.9 FL — SIGNIFICANT CHANGE UP (ref 80–100)
MONOCYTES # BLD AUTO: 0.45 K/UL — SIGNIFICANT CHANGE UP (ref 0–0.9)
MONOCYTES NFR BLD AUTO: 13.3 % — SIGNIFICANT CHANGE UP (ref 2–14)
NEUTROPHILS # BLD AUTO: 2.29 K/UL — SIGNIFICANT CHANGE UP (ref 1.8–7.4)
NEUTROPHILS NFR BLD AUTO: 67.7 % — SIGNIFICANT CHANGE UP (ref 43–77)
NRBC # BLD: 0 /100 WBCS — SIGNIFICANT CHANGE UP (ref 0–0)
PHOSPHATE SERPL-MCNC: 2.7 MG/DL — SIGNIFICANT CHANGE UP (ref 2.5–4.5)
PLATELET # BLD AUTO: 119 K/UL — LOW (ref 150–400)
POTASSIUM SERPL-MCNC: 3.8 MMOL/L — SIGNIFICANT CHANGE UP (ref 3.5–5.3)
POTASSIUM SERPL-SCNC: 3.8 MMOL/L — SIGNIFICANT CHANGE UP (ref 3.5–5.3)
PROT SERPL-MCNC: 6.9 G/DL — SIGNIFICANT CHANGE UP (ref 6–8.3)
PROTHROM AB SERPL-ACNC: 14.7 SEC — HIGH (ref 10.6–13.6)
RBC # BLD: 4.44 M/UL — SIGNIFICANT CHANGE UP (ref 3.8–5.2)
RBC # FLD: 14.6 % — HIGH (ref 10.3–14.5)
SODIUM SERPL-SCNC: 134 MMOL/L — LOW (ref 135–145)
WBC # BLD: 3.38 K/UL — LOW (ref 3.8–10.5)
WBC # FLD AUTO: 3.38 K/UL — LOW (ref 3.8–10.5)

## 2021-04-02 PROCEDURE — 80053 COMPREHEN METABOLIC PANEL: CPT

## 2021-04-02 PROCEDURE — 85379 FIBRIN DEGRADATION QUANT: CPT

## 2021-04-02 PROCEDURE — 99239 HOSP IP/OBS DSCHRG MGMT >30: CPT | Mod: GC

## 2021-04-02 PROCEDURE — 86769 SARS-COV-2 COVID-19 ANTIBODY: CPT

## 2021-04-02 PROCEDURE — 84100 ASSAY OF PHOSPHORUS: CPT

## 2021-04-02 PROCEDURE — U0003: CPT

## 2021-04-02 PROCEDURE — U0005: CPT

## 2021-04-02 PROCEDURE — 83615 LACTATE (LD) (LDH) ENZYME: CPT

## 2021-04-02 PROCEDURE — 85730 THROMBOPLASTIN TIME PARTIAL: CPT

## 2021-04-02 PROCEDURE — 99232 SBSQ HOSP IP/OBS MODERATE 35: CPT | Mod: GC

## 2021-04-02 PROCEDURE — 85610 PROTHROMBIN TIME: CPT

## 2021-04-02 PROCEDURE — 74182 MRI ABDOMEN W/CONTRAST: CPT

## 2021-04-02 PROCEDURE — 85025 COMPLETE CBC W/AUTO DIFF WBC: CPT

## 2021-04-02 PROCEDURE — 74177 CT ABD & PELVIS W/CONTRAST: CPT

## 2021-04-02 PROCEDURE — 86703 HIV-1/HIV-2 1 RESULT ANTBDY: CPT

## 2021-04-02 PROCEDURE — 83735 ASSAY OF MAGNESIUM: CPT

## 2021-04-02 PROCEDURE — 71045 X-RAY EXAM CHEST 1 VIEW: CPT

## 2021-04-02 PROCEDURE — 99285 EMERGENCY DEPT VISIT HI MDM: CPT | Mod: 25

## 2021-04-02 PROCEDURE — 84702 CHORIONIC GONADOTROPIN TEST: CPT

## 2021-04-02 PROCEDURE — 82247 BILIRUBIN TOTAL: CPT

## 2021-04-02 PROCEDURE — 82248 BILIRUBIN DIRECT: CPT

## 2021-04-02 RX ORDER — ASCORBIC ACID 60 MG
0 TABLET,CHEWABLE ORAL
Qty: 0 | Refills: 0 | DISCHARGE

## 2021-04-02 RX ORDER — ZINC SULFATE TAB 220 MG (50 MG ZINC EQUIVALENT) 220 (50 ZN) MG
0 TAB ORAL
Qty: 0 | Refills: 0 | DISCHARGE

## 2021-04-02 RX ORDER — SODIUM CHLORIDE 0.65 %
1 AEROSOL, SPRAY (ML) NASAL DAILY
Refills: 0 | Status: DISCONTINUED | OUTPATIENT
Start: 2021-04-02 | End: 2021-04-02

## 2021-04-02 RX ORDER — SPIRONOLACTONE 25 MG/1
8 TABLET, FILM COATED ORAL
Qty: 240 | Refills: 1
Start: 2021-04-02 | End: 2021-05-31

## 2021-04-02 RX ORDER — FUROSEMIDE 40 MG
1 TABLET ORAL
Qty: 30 | Refills: 1
Start: 2021-04-02 | End: 2021-05-31

## 2021-04-02 RX ORDER — SPIRONOLACTONE 25 MG/1
4 TABLET, FILM COATED ORAL
Qty: 0 | Refills: 0 | DISCHARGE

## 2021-04-02 RX ORDER — FUROSEMIDE 40 MG
1 TABLET ORAL
Qty: 0 | Refills: 0 | DISCHARGE

## 2021-04-02 RX ORDER — SODIUM CHLORIDE 0.65 %
1 AEROSOL, SPRAY (ML) NASAL
Qty: 1 | Refills: 0
Start: 2021-04-02 | End: 2021-05-01

## 2021-04-02 RX ADMIN — SPIRONOLACTONE 200 MILLIGRAM(S): 25 TABLET, FILM COATED ORAL at 05:39

## 2021-04-02 RX ADMIN — PANTOPRAZOLE SODIUM 40 MILLIGRAM(S): 20 TABLET, DELAYED RELEASE ORAL at 05:43

## 2021-04-02 RX ADMIN — Medication 80 MILLIGRAM(S): at 05:39

## 2021-04-02 RX ADMIN — ENOXAPARIN SODIUM 50 MILLIGRAM(S): 100 INJECTION SUBCUTANEOUS at 05:43

## 2021-04-02 NOTE — PROGRESS NOTE ADULT - ATTENDING COMMENTS
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 Current MELD-Na 15 (has ranged 12-18 this admission). Receiving furosemide 80 mg po daily and spironolactone 200 mg po daily (increased this admission) to prevent reaccumulation of ascites and hepatic hydrothorax, with stable renal function on labs today. Okay to resume DOAC, as no further procedures planned for this admission.    There have been ongoing, daily discussions with IR and Transplant Surgery re: alternatives for her management, including (from least to most aggressive): (1) anticoagulation alone, (2) IVC thrombectomy and IVC stent, and (3) IVC thrombectomy, DIPS, and IVC stent. With option #1 (anticoagulation alone), there is risk of IVC thrombus becoming chronic (especially given ongoing stagnant flow within compressed intrahepatic portion of IVC), which could preclude our ability for future interventions and also preclude future liver transplantation. Option #2 would address the acute IVC thrombus and may decrease risk of recurrent IVC thrombus, but would not improve her hepatic congestion/function or portal hypertension and could make future DIPS technically challenging. Option #3 would address both the acute IVC thrombus and could improve her hepatic function and portal hypertension, potentially delaying need for liver transplantation. However, for both options #2 and 3, careful coordination between IR and Transplant Surgery is critical, to ensure there is sufficient vena cava and portal vein above and below the stents (respectively) to enable future liver transplantation.    We anticipate scheduling IR intervention next week or the following week, and will arrange for bridging off DOAC with LMWH as an outpatient, once her procedure date is confirmed. She would be admitted for observation post-procedure, typically for 48-72h.    Please don't hesitate to call with any questions or concerns.    Meme Bryant M.D., Ph.D.  Transplant Hepatology  Cell: [phone]

## 2021-04-02 NOTE — PROGRESS NOTE ADULT - SUBJECTIVE AND OBJECTIVE BOX
Chief Complaint:  Patient is a 35y old  Female who presents with a chief complaint of IVC thrombus (02 Apr 2021 07:40)      Interval Events:     Patient reports nosebleed that resolved spontaneously, no recurrent episodes, no other symptoms related to nose bleed. Otherwise no other symptoms - she denies SOB or abdominal pain.     Allergies:  No Known Allergies      Hospital Medications:  ascorbic acid 500 milliGRAM(s) Oral daily  enoxaparin Injectable 50 milliGRAM(s) SubCutaneous every 12 hours  furosemide    Tablet 80 milliGRAM(s) Oral daily  lidocaine   Patch 1 Patch Transdermal every 24 hours PRN  multivitamin 1 Tablet(s) Oral daily  pantoprazole    Tablet 40 milliGRAM(s) Oral every 12 hours  polyethylene glycol 3350 17 Gram(s) Oral daily  senna 2 Tablet(s) Oral at bedtime  sodium chloride 0.65% Nasal 1 Spray(s) Both Nostrils daily  spironolactone 200 milliGRAM(s) Oral daily      PMHX/PSHX:  Cirrhosis    No significant past surgical history    No significant past surgical history        Family history:  No pertinent family history in first degree relatives        ROS:     General:  No weight loss, fevers, chills, night sweats +fatigue   Eyes:  No vision changes  ENT:  No sore throat, pain, runny nose +nose bleeding   CV:  No chest pain, palpitations, dizziness   Resp:  No SOB, cough, wheezing  GI:  See HPI  :  No burning with urination, hematuria  Muscle:  No pain, weakness  Neuro:  No weakness/tingling, memory problems  Psych:  No fatigue, insomnia, mood problems, depression  Heme:  No easy bruisability  Skin:  No rash, edema      PHYSICAL EXAM:     GENERAL:  Well developed, no distress, thin  HEENT:  Conjunctivae clear, sclera anicteric  CHEST:  Full & symmetric excursion, no increased effort w/ respirations, decreased BS R base  HEART:  Regular rhythm & rate  ABDOMEN:  Soft, non-distended, non-tender   EXTREMITIES:  no LE  edema  SKIN:  No rash/erythema/ecchymoses/petechiae/wounds/jaundice  NEURO:  Alert, orientedx3      Vital Signs:  Vital Signs Last 24 Hrs  T(C): 36.7 (02 Apr 2021 04:26), Max: 36.7 (01 Apr 2021 20:59)  T(F): 98.1 (02 Apr 2021 04:26), Max: 98.1 (01 Apr 2021 20:59)  HR: 101 (02 Apr 2021 11:39) (78 - 103)  BP: 110/76 (02 Apr 2021 11:39) (95/60 - 110/76)  BP(mean): --  RR: 18 (02 Apr 2021 04:26) (18 - 20)  SpO2: 93% (02 Apr 2021 04:26) (93% - 97%)  Daily     Daily     LABS:                        12.1   3.38  )-----------( 119      ( 02 Apr 2021 06:55 )             38.6     04-02    134<L>  |  97  |  15  ----------------------------<  96  3.8   |  25  |  0.60    Ca    9.1      02 Apr 2021 06:57  Phos  2.7     04-02  Mg     1.9     04-02    TPro  6.9  /  Alb  3.6  /  TBili  2.0<H>  /  DBili  0.6<H>  /  AST  34  /  ALT  21  /  AlkPhos  130<H>  04-02    LIVER FUNCTIONS - ( 02 Apr 2021 06:57 )  Alb: 3.6 g/dL / Pro: 6.9 g/dL / ALK PHOS: 130 U/L / ALT: 21 U/L / AST: 34 U/L / GGT: x           PT/INR - ( 02 Apr 2021 06:58 )   PT: 14.7 sec;   INR: 1.24 ratio    PTT - ( 02 Apr 2021 06:58 )  PTT:52.6 sec      Imaging:

## 2021-04-02 NOTE — DISCHARGE NOTE PROVIDER - NSDCFUADDAPPT_GEN_ALL_CORE_FT
You will get a call from the hepatology team affiliated with Dr. Carvajal on Monday to further discuss what procedure you will undergo and when that will take place. It will likely take place the week of April 11, 2021 or later.

## 2021-04-02 NOTE — DISCHARGE NOTE PROVIDER - NSDCMRMEDTOKEN_GEN_ALL_CORE_FT
furosemide 80 mg oral tablet: 1 tab(s) orally once a day  MiraLax oral powder for reconstitution: use 17g daily  Multiple Vitamins oral tablet: 1 tab(s) orally once a day  omeprazole 40 mg oral delayed release capsule: 1 cap(s) orally once a day  Senna 8.6 mg oral tablet: 2 tab(s) orally once a day (at bedtime)  sodium chloride 0.65% nasal spray: 1 spray(s) in each nostril 2 times a day to prevent dry nose  spironolactone 25 mg oral tablet: 8 tab(s) orally once a day  Vitamin C:   Xarelto Starter Pack 15 mg-20 mg oral kit: 15mg by mouth twice a day for 3 weeks  Zinc:

## 2021-04-02 NOTE — PROGRESS NOTE ADULT - PROBLEM SELECTOR PLAN 3
- extensive workup outpatient for underlying cause of cirrhosis  - was told + APLS in [place], cardiolipin / [id number] negative last admission  - repeat testing with Dr Venegas outpatient in ~6 weeks, made heme aware inpt  - per other chart had outpatient genetic testing/appt  - see THROMBUS plan

## 2021-04-02 NOTE — PROGRESS NOTE ADULT - PROBLEM SELECTOR PLAN 1
- repeat CT a/p largely unchanged from prior CT imaging  - holding Xarelto, started lovenox 50 BID [1mg/kg BID] w daily weights  - MRI-V + large extent of thrombus, likely not amendable to DIPS, r/o malignancy  - given complexity of case, patient is cleared for discharge on her current Xarelto medication at home (will take next dose 6PM as on BID started pack), new scripts for lasix, aldactone as on currently inpatient and will be called by hepatology monday after discussion with surgerical teams to discuss when she will have her procedure, when to transition back to lovenox, and when she will be readmitted. discussed over phone with Dr. Bryant, and later with GI/Hept fellow Dr. Chase. Pt and  are aware as discussed at length with [place]  at bedside.

## 2021-04-02 NOTE — PROGRESS NOTE ADULT - ATTENDING COMMENTS
Patient seen and examined. Plan as discussed w/ Dr. Parmar: hepatology and transplant surgery in discussion re: plan for further intervention, though patient likely would not require initiation for liver transplant evaluation at this time; this will be ongoing as outpatient per Dr. Bryant and patient can be discharged home today, continuing AC at home w/ Xarelto and increased doses of Lasix and Spironolactone, to be called back to coordinate planned intervention in the coming week(s); patient and her  are in agreement with this plan and understand the importance of close f/u with hepatologist and transplant surgery/IR; total discharge planning time spent = 35minutes.

## 2021-04-02 NOTE — PROGRESS NOTE ADULT - ASSESSMENT
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      Impression:    #Budd Chiari syndrome with decompensated cirrhosis: MELD-Na is 14 4/2/21  - Varices: small non-bleeding EV 2017, not on beta blockers due to small size and low BP  - Ascites: Mild recurrent ascites on MRV 03/2020; likely related to portal vein thrombus vs. 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  #History of COVID-19 PNA     Recommendations:  - awaiting definitive plan for intervention of IVC thrombus +/- DIPS as per Transplant Surgery and Interventional Radiology prior to discharge planning   - cont Lasix 80mg PO daily and spironolactone 200mg PO daily  - limit sodium in diet to <2g/day  - continue full AC with Lovenox 1 mg/kg BID for now  - trend CBC, CMP and INR daily for MELD-Na      Brenda Chase PGY-5  Gastroenterology/Hepatology Fellow  Pager #[phone]  E-mail [email or web address]  Call [phone] to speak to answering service for on-call GI fellow 5pm-7am and weekends.

## 2021-04-02 NOTE — PROGRESS NOTE ADULT - SUBJECTIVE AND OBJECTIVE BOX
PROGRESS NOTE:     Katherine Parmar DO PGY1   Contact: Pager [phone] [facility] | [phone] [facility] | [employer] Teams  Please check Resident Assignment tab on [place] for Team / Coverage     Patient is a 35y old  Female who presents with a chief complaint of IVC thrombus (01 Apr 2021 14:05)      EVENTS / SUBJECTIVE:    MEDICATIONS  (STANDING):  ascorbic acid 500 milliGRAM(s) Oral daily  enoxaparin Injectable 50 milliGRAM(s) SubCutaneous every 12 hours  furosemide    Tablet 80 milliGRAM(s) Oral daily  multivitamin 1 Tablet(s) Oral daily  pantoprazole    Tablet 40 milliGRAM(s) Oral every 12 hours  polyethylene glycol 3350 17 Gram(s) Oral daily  senna 2 Tablet(s) Oral at bedtime  spironolactone 200 milliGRAM(s) Oral daily    MEDICATIONS  (PRN):  lidocaine   Patch 1 Patch Transdermal every 24 hours PRN MSK pain    Allergies  No Known Allergies      Intolerances      PHYSICAL EXAM:  Vital Signs Last 24 Hrs  T(C): 36.7 (02 Apr 2021 04:26), Max: 36.7 (01 Apr 2021 20:59)  T(F): 98.1 (02 Apr 2021 04:26), Max: 98.1 (01 Apr 2021 20:59)  HR: 78 (02 Apr 2021 04:26) (78 - 109)  BP: 95/60 (02 Apr 2021 04:26) (95/60 - 103/73)  BP(mean): --  RR: 18 (02 Apr 2021 04:26) (18 - 20)  SpO2: 93% (02 Apr 2021 04:26) (93% - 97%)  I&O's Summary      GENERAL: No acute distress, well-developed  HEENT::  Atraumatic, EOMI, sclera clear, supple neck  CHEST/LUNG: CTAB; No wheezes, rales, or rhonchi  HEART: Regular rate and rhythm; No murmurs, rubs, or gallops  ABDOMEN: Soft, non-tender, non-distended; normal bowel sounds, no organomegaly  EXTREMITIES:  2+ peripheral pulses b/l, No clubbing, cyanosis, or edema  NEUROLOGY: A&O x 3, no focal deficits  PSYCH:   SKIN: No rashes or lesions    PATIENT DATA:                       12.1   3.38  )-----------( 119      ( 02 Apr 2021 06:55 )             38.6     see AM labs when resulted     < from: MR Abdomen w/ IV Cont (03.30.21 @ 18:31) >  PROCEDURE:  MRI/MRV of the abdomen and pelvis was performed.  FINDINGS:  LOWER CHEST: Unchanged moderate right pleural effusion.  LIVER: Cirrhotic morphology. Heterogeneous parenchymal enhancement.  A previously described lesion (lesion 1) in the in the right lobe dome segment 8 is unchangedin size measuring 1.2 x 0.9 cm. This shows high signal on precontrast T1 weighted imaging consistent with hemorrhage or proteinaceous material (8:8). This is isosignal on T2-weighted imaging. This shows subtle enhancement on the early subtraction imaging with washout with a capsular appearance on delayed imaging. Numerous other subcentimeter similar appearing nodules are seen within the liver mostly in the right lobe. Malignancy cannot be excluded  Lesion #: 1  Location: Anterior dome segment 8, series 10 image 8  Size: 1.2 x 0.9.  AP Hyperenhancement: YES  PV/Equilibrium Washout: YES  Capsule Appearance: YES:  Threshold Growth (>50% size increase in <= 6 months): Not applicable  Ancillary features: NA  LI-RADS v 2018 Category: LR-4  OPTN Category (if LR-5):  BILE DUCTS: Normal caliber.  GALLBLADDER: Within normal limits.  SPLEEN: Borderline splenomegaly. Unchanged  PANCREAS: Within normal limits.  ADRENALS: Within normal limits.  KIDNEYS/URETERS: Within normal limits.  BLADDER: Within normal limits.  REPRODUCTIVE ORGANS: Normal uterus and adnexa.  BOWEL: No bowel obstruction.  PERITONEUM: Mild ascites.  VESSELS: Thrombosis of the suprarenal inferior vena cava again noted. The IVC becomes severely compressed as it traverses the liver with reconstitution just prior to entering the right atrium. No hepatic veins are appreciated. The superior mesenteric vein and its branches are widely patent. The splenic vein and main portal vein are patent. Dilated azygos vein and paraspinal veins. Splenic hilar varices. Recanalized umbilical vein.  RETROPERITONEUM/LYMPH NODES: No lymphadenopathy.  ABDOMINAL WALL: Within normal limits.  BONES: Within normal limits.  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  No superior mesenteric vein thrombus.  Budd-Chiari with cirrhosis. Evidence for portal hypertension.  Unchanged moderate right pleural effusion and mild ascites.  Unchanged 1.3 cm hepatic segment 8 indeterminatelesion. This could represent malignancy. Multiple other subcentimeter similar foci are seen within the liver.                       PROGRESS NOTE:     Katherine Parmar DO PGY1   Contact: Pager [phone] [facility] | [phone] [facility] | [employer] Teams  Please check Resident Assignment tab on [place] for Team / Coverage     Patient is a 35y old  Female who presents with a chief complaint of IVC thrombus (01 Apr 2021 14:05)      EVENTS / SUBJECTIVE: Pt seen at bedside.     MEDICATIONS  (STANDING):  ascorbic acid 500 milliGRAM(s) Oral daily  enoxaparin Injectable 50 milliGRAM(s) SubCutaneous every 12 hours  furosemide    Tablet 80 milliGRAM(s) Oral daily  multivitamin 1 Tablet(s) Oral daily  pantoprazole    Tablet 40 milliGRAM(s) Oral every 12 hours  polyethylene glycol 3350 17 Gram(s) Oral daily  senna 2 Tablet(s) Oral at bedtime  spironolactone 200 milliGRAM(s) Oral daily    MEDICATIONS  (PRN):  lidocaine   Patch 1 Patch Transdermal every 24 hours PRN MSK pain    Allergies  No Known Allergies      Intolerances      PHYSICAL EXAM:  Vital Signs Last 24 Hrs  T(C): 36.7 (02 Apr 2021 04:26), Max: 36.7 (01 Apr 2021 20:59)  T(F): 98.1 (02 Apr 2021 04:26), Max: 98.1 (01 Apr 2021 20:59)  HR: 78 (02 Apr 2021 04:26) (78 - 109)  BP: 95/60 (02 Apr 2021 04:26) (95/60 - 103/73)  BP(mean): --  RR: 18 (02 Apr 2021 04:26) (18 - 20)  SpO2: 93% (02 Apr 2021 04:26) (93% - 97%)  I&O's Summary      GENERAL: No acute distress, well-developed  HEENT::  Atraumatic, EOMI, sclera clear, supple neck  CHEST/LUNG: CTAB; No wheezes, rales, or rhonchi  HEART: Regular rate and rhythm; No murmurs, rubs, or gallops  ABDOMEN: Soft, non-tender, non-distended; normal bowel sounds, no organomegaly  EXTREMITIES:  2+ peripheral pulses b/l, No clubbing, cyanosis, or edema  NEUROLOGY: A&O x 3, no focal deficits  PSYCH: appropriate   SKIN: No rashes or lesions    PATIENT DATA:                       12.1   3.38  )-----------( 119      ( 02 Apr 2021 06:55 )             38.6     see AM labs when resulted     < from: MR Abdomen w/ IV Cont (03.30.21 @ 18:31) >  PROCEDURE:  MRI/MRV of the abdomen and pelvis was performed.  FINDINGS:  LOWER CHEST: Unchanged moderate right pleural effusion.  LIVER: Cirrhotic morphology. Heterogeneous parenchymal enhancement.  A previously described lesion (lesion 1) in the in the right lobe dome segment 8 is unchangedin size measuring 1.2 x 0.9 cm. This shows high signal on precontrast T1 weighted imaging consistent with hemorrhage or proteinaceous material (8:8). This is isosignal on T2-weighted imaging. This shows subtle enhancement on the early subtraction imaging with washout with a capsular appearance on delayed imaging. Numerous other subcentimeter similar appearing nodules are seen within the liver mostly in the right lobe. Malignancy cannot be excluded  Lesion #: 1  Location: Anterior dome segment 8, series 10 image 8  Size: 1.2 x 0.9.  AP Hyperenhancement: YES  PV/Equilibrium Washout: YES  Capsule Appearance: YES:  Threshold Growth (>50% size increase in <= 6 months): Not applicable  Ancillary features: NA  LI-RADS v 2018 Category: LR-4  OPTN Category (if LR-5):  BILE DUCTS: Normal caliber.  GALLBLADDER: Within normal limits.  SPLEEN: Borderline splenomegaly. Unchanged  PANCREAS: Within normal limits.  ADRENALS: Within normal limits.  KIDNEYS/URETERS: Within normal limits.  BLADDER: Within normal limits.  REPRODUCTIVE ORGANS: Normal uterus and adnexa.  BOWEL: No bowel obstruction.  PERITONEUM: Mild ascites.  VESSELS: Thrombosis of the suprarenal inferior vena cava again noted. The IVC becomes severely compressed as it traverses the liver with reconstitution just prior to entering the right atrium. No hepatic veins are appreciated. The superior mesenteric vein and its branches are widely patent. The splenic vein and main portal vein are patent. Dilated azygos vein and paraspinal veins. Splenic hilar varices. Recanalized umbilical vein.  RETROPERITONEUM/LYMPH NODES: No lymphadenopathy.  ABDOMINAL WALL: Within normal limits.  BONES: Within normal limits.  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  No superior mesenteric vein thrombus.  Budd-Chiari with cirrhosis. Evidence for portal hypertension.  Unchanged moderate right pleural effusion and mild ascites.  Unchanged 1.3 cm hepatic segment 8 indeterminatelesion. This could represent malignancy. Multiple other subcentimeter similar foci are seen within the liver.

## 2021-04-02 NOTE — DISCHARGE NOTE PROVIDER - NSDCCAREPROVSEEN_GEN_ALL_CORE_FT
[facility] Team 3 Medicine   Dr. Katherine Parmar (Internal Medicine)  Dr. Kalen Kraus (Internal Medicine)  Dr. Meme Bryant (Liver)  Dr. Brenda Chase (Liver)

## 2021-04-02 NOTE — DISCHARGE NOTE PROVIDER - DETAILS OF MALNUTRITION DIAGNOSIS/DIAGNOSES
This patient has been assessed with a concern for Malnutrition and was treated during this hospitalization for the following Nutrition diagnosis/diagnoses:     -  04/01/2021: Mild protein-calorie malnutrition   -  04/01/2021: Underweight (BMI < 19)

## 2021-04-02 NOTE — DISCHARGE NOTE PROVIDER - NSDCCPCAREPLAN_GEN_ALL_CORE_FT
PRINCIPAL DISCHARGE DIAGNOSIS  Diagnosis: Thrombus  Assessment and Plan of Treatment: You have a large blood clot in your "IVC" (large vein) that was reimaged this admission with a liver MRI. Given the extent of your clot and the concern blood thinning medication alone will not clear this vessel for good blood flow, there is plans for surgery. The surgeons and liver doctors are working together to decide the best intervention for you. You do not have to stay hospitalized for this, and they will call you on Monday April 5, 2021 to tell you what surgery they have planned and when. This surgery will likely be the week of April 11,2021 or later. The liver doctors will arrange with you transitioning your blood thinning medication to a shorter acting agent in preparation of the surgery and will arrange for your readmission for the procedure. As of now you only are to  your medications, xarelto (blood thinner), lasix and aldactone (this are water pills to help decrease blood pressure around your liver and to increase the amount of urine you pee out so less fluid will accumulate in your belly, lung). This was discussed at length at bedside with your primary provider Dr. Parmar, and with the hepatology team inpatient, Dr. Bryant.  Please take your next dose of Xarelto, which you have at home, at 600PM, 4/2/21. Then continue taking it as prior the following day, 4/3/21 - 1 time in AM and 1 time in PM. Hepatology will call you and tell you when to stop.

## 2021-04-02 NOTE — CHART NOTE - NSCHARTNOTEFT_GEN_A_CORE
Brief Nutrition Follow-up  Patient seen for: initial malnutrition follow-up    Source: EMR, ( )     Hospital course as per chart: Pt is a 36 yo female with PMH of cryptogenic cirrhosis, Budd Chiari syndrome, Protein S deficiency, multiple miscarriages, COVID-19 PCR + 3/4/21, recently discharged 3/25 for suprarenal IVC thrombus with plans for outpatient repeat imaging and hepatology follow-up complicated by failed prior authorization and scheduling issues. Returned to hospital for reaccess of IVC clot burden with potential plans for intervention, admitted 3/29. Chart reviewed, events noted.    Diet, Regular:   Low Sodium (03-31-21 @ 14:18) [Active]    Patient reports ( )    Recommendations:    Monitoring and Evaluation: Monitor PO intake, weight, labs, skin, GI status, diet     RD remains available upon request and will continue to follow-up per protocol.   Lynette Arita MS RD CDN pager #[phone] Brief Nutrition Follow-up  Patient seen for: initial malnutrition follow-up    Source: EMR, patient     Hospital course as per chart: Pt is a 34 yo female with PMH of cryptogenic cirrhosis, Budd Chiari syndrome, Protein S deficiency, multiple miscarriages, COVID-19 PCR + 3/4/21, recently discharged 3/25 for suprarenal IVC thrombus with plans for outpatient repeat imaging and hepatology follow-up complicated by failed prior authorization and scheduling issues. Returned to hospital for reaccess of IVC clot burden with potential plans for intervention, admitted 3/29. Chart reviewed, events noted.    Diet, Regular:   Low Sodium (03-31-21 @ 14:18) [Active]    Patient declined [place] .     Patient reports decreased appetite and PO intake this morning in setting of nausea, became nauseous after drinking some hot tea. Denies vomiting. Denies diarrhea/constipation. Last documented BM 3/30. Encouraged PO intake as tolerated. Educated pt on low sodium diet. Pt amenable to recommendations. Pt remains amenable to trial of Ensure Enlive to optimize PO intake. Made aware RD remains available.    Recommendations:  1) Continue Regular, Low Sodium diet. Monitor/adjust as needed.   2) Recommend Ensure Enlive 1 daily (350 kcal, 20 g protein in each) to optimize intake   3) Continue micronutrient supplementation if no medical contraindications  4) Encouraged PO intake as tolerated. Diet education provided. Patient made aware RD remains available.     Monitoring and Evaluation: Monitor PO intake, weight, labs, skin, GI status, diet     RD remains available upon request and will continue to follow-up per protocol.   Lynette Arita MS RD CDN pager #[phone]

## 2021-04-02 NOTE — PROGRESS NOTE ADULT - REASON FOR ADMISSION
IVC thrombus

## 2021-04-02 NOTE — DISCHARGE NOTE PROVIDER - CARE PROVIDER_API CALL
Haydee Carvajal (MD)  Gastroenterology; Internal Medicine; Transplant Hepatology  [address] [place] [address]  Phone: [phone]  Fax: [phone]  Established Patient  Follow Up Time: 1-3 days

## 2021-04-02 NOTE — PROGRESS NOTE ADULT - PROBLEM SELECTOR PLAN 2
MELDNa 18 ; c/b Budd Chiari, Protein S def, extensive varices and portal HTN  - HCC: LIRADS 4 / LIRADS 3 lesion on MRI 12/2020, stable w AFP wnl 2/2021  - s/p dx para, thora last admission ; monitor for distention, repeat CXR  - c/w spironolactone 100 qd, lasix 40 qd  - monitor stool count, intake and output, weights daily  - monitor vitals per routine, sbp float 110-90s  - daily CMP, INR for MELD-Na ; not a transplant candidate at this time due to current liver function / MELD Na score. Surgical intervention as discussed above  - as in THROMBUS plan

## 2021-04-02 NOTE — PROGRESS NOTE ADULT - NUTRITIONAL ASSESSMENT
This patient has been assessed with a concern for Malnutrition and has been determined to have a diagnosis/diagnoses of Mild protein-calorie malnutrition and Underweight (BMI < 19).    This patient is being managed with:   Diet Regular-  Low Sodium  Supplement Feeding Modality:  Oral  Ensure Enlive Cans or Servings Per Day:  1       Frequency:  Daily  Entered: Apr 1 2021  3:50PM    Diet Regular-  Low Sodium  Entered: Mar 31 2021  2:19PM    The following pending diet order is being considered for treatment of Mild protein-calorie malnutrition and Underweight (BMI < 19):null

## 2021-04-02 NOTE — DISCHARGE NOTE PROVIDER - HOSPITAL COURSE
Patient is a 35 year old British-speaking female with PMHx of cryptogenic cirrhosis, Budd Chiari syndrome, Protein S deficiency (?APLS), multiple miscarriages, COVID-19 PCR + 3/4/21, recently discharged 3/25 on Xarelto for suprarenal IVC thrombus with plans for outpatient repeat imaging and hepatology follow-up c/b failed prior authorization and scheduling issues. Pt was asked to return to the hospital to reaccess her IVC clot burden with potential plans for intervention after tumor board discussion of case. Hepatology aware of patient arrival.    Of note, patient has separate chart [MRN: [id number]] where last admission work up is found. Pt had presented 3/20/21 w/ abdominal bloating and tightness for 5 days, found to have moderate R pleural effusion, likely 2/2 hepatic hydrothorax, ascites, and thrombi in suprarenal IVC and SMV extending to confluence of splenic vein and portal vein. Pt was started on Heparin gtt and vascular surgery was consulted who stated to continue full AC, no other intervention. Patient underwent an IR guided thoracentesis and diagnostic paracentesis which were both negative for infection, malignancy. Pt was evaluated by hematology who stated patient has been worked up outpatient for hypercoagulation - only deficient Protein S with 40% activity and antigen. Pt prior negative for Factor V Leiden, Jak2, Prothrombin mutation. protein c and antithrombin III. APLA labs were sent in s/o multiple miscarriages and current presentation. Patient was started on bridge inpatient but APLA resulted as negative while inpatient so heme recommended discharging on Xarelto starter pack. Patient was evaluated by hepatology who recommended lasix 40mg PO daily and spironolactone 100mg PO daily. Pt with extensive varices not on BB due to blood pressure / heart rate, started on ppi. Repeat U/S doppler to evaluate thrombus burden outpatient on  3/29/21 [however now readmitted for imaging] ; one week from prior evaluation. Course c/b nonreproducible chest pain on ambulation, multiple EKGs and trops with ischemic changes, CTA ruled out PE, TTE grossly normal.     After review of imaging, patient was evaluated by liver transplant team and was deemed not a candidate as her liver function is still adequate and the clot would not expedite her transplant. Conversation between hepatology, heptalology / transplant surgery, Interventional Radiology came down to three potential options.       1. Stay on AC and no intervention       2. IVC Thrombectomy + Stent     3. IVC Thrombectomy + DIPS + Stent  Given the complexity both from a medical and surgical perspective, further discussion was warranted and surgical review required as a team, as well as a whole day of OR time blocked off for the procedure. It was deemed that the patient was medically stable and did not have to remain hospitalized for this discussion. Patient will receive a phone call Monday 4/5/21 from the speciality teams with a definite in terms of procedure and time of operation. Pt will be transitioned off xarelto to lovenox per this discussion in preparation for surgery, will be consented by the operating teams, and will return to the hospital for the procedure with a period of time inpatient afterwards to monitor for stability. This was explained at bedside with the patient's  present, and the primary providers information was given so they have a call back number if any issues arise. Pt was given new scripts for spirinolactone, lasix, and will continue on her current dose of xarelto.     Imaging performed are listed below:    EXAM:  CT ABDOMEN AND PELVIS IC  03/29/2021     Further information provided that patient had additional interval CT scan between what was reported as the most recent prior study (MRI) on the original dictation. Patient was scanned on 3/20/2021. This addendum is in comparison to that study, the most recent. When compared to prior study from 3/20/2021, the suprarenal IVC thrombus is not significantly changed/minimally decreased in size. The hypoenhancement within the caudate lobe and size of the largest hypervascular lesion is unchanged. Overall, study is not significantly changed from the most recent prior 3/20/2021.     MR Abdomen w/ IV Cont (03.30.21 @ 18:31)   PROCEDURE:  MRI/MRV of the abdomen and pelvis was performed.  FINDINGS:  LOWER CHEST: Unchanged moderate right pleural effusion.  LIVER: Cirrhotic morphology. Heterogeneous parenchymal enhancement.  A previously described lesion (lesion 1) in the in the right lobe dome segment 8 is unchangedin size measuring 1.2 x 0.9 cm. This shows high signal on precontrast T1 weighted imaging consistent with hemorrhage or proteinaceous material (8:8). This is isosignal on T2-weighted imaging. This shows subtle enhancement on the early subtraction imaging with washout with a capsular appearance on delayed imaging. Numerous other subcentimeter similar appearing nodules are seen within the liver mostly in the right lobe. Malignancy cannot be excluded  Lesion #: 1  Location: Anterior dome segment 8, series 10 image 8  Size: 1.2 x 0.9.  AP Hyperenhancement: YES  PV/Equilibrium Washout: YES  Capsule Appearance: YES:  Threshold Growth (>50% size increase in <= 6 months): Not applicable  Ancillary features: NA  LI-RADS v 2018 Category: LR-4  OPTN Category (if LR-5):  BILE DUCTS: Normal caliber.  GALLBLADDER: Within normal limits.  SPLEEN: Borderline splenomegaly. Unchanged  PANCREAS: Within normal limits.  ADRENALS: Within normal limits.  KIDNEYS/URETERS: Within normal limits.  BLADDER: Within normal limits.  REPRODUCTIVE ORGANS: Normal uterus and adnexa.  BOWEL: No bowel obstruction.  PERITONEUM: Mild ascites.  VESSELS: Thrombosis of the suprarenal inferior vena cava again noted. The IVC becomes severely compressed as it traverses the liver with reconstitution just prior to entering the right atrium. No hepatic veins are appreciated. The superior mesenteric vein and its branches are widely patent. The splenic vein and main portal vein are patent. Dilated azygos vein and paraspinal veins. Splenic hilar varices. Recanalized umbilical vein.  RETROPERITONEUM/LYMPH NODES: No lymphadenopathy.  ABDOMINAL WALL: Within normal limits.  BONES: Within normal limits.  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  No superior mesenteric vein thrombus.  Budd-Chiari with cirrhosis. Evidence for portal hypertension.  Unchanged moderate right pleural effusion and mild ascites.  Unchanged 1.3 cm hepatic segment 8 indeterminatelesion. This could represent malignancy. Multiple other subcentimeter similar foci are seen within the liver.

## 2021-04-02 NOTE — PROGRESS NOTE ADULT - ASSESSMENT
35 year old Brazilian-speaking female [id number] PMHx of decompensated cirrhosis of unknown etiology, Budd Chiari syndrome, Protein S deficiency, COVID-19 PCR + 3/4/21, recently discharged 3/25 on Xarelto for suprarenal IVC thrombus with plans for outpatient repeat imaging and hepatology follow-up c/b failed prior authorization and scheduling issues. Pt was asked to return to the hospital to reaccess her IVC clot burden with potential plans for intervention after tumor board discussion of case. Hepatology, IR, Transplant Surgery involved.    Given complexity of case, patient is cleared for discharge on her current Xarelto medication at home (will take next dose 6PM as on BID started pack), new scripts for lasix, aldactone as on currently inpatient and will be called by hepatology monday after discussion with surgerical teams to discuss when she will have her procedure, when to transition back to lovenox, and when she will be readmitted. discussed over phone with Dr. Bryant, and later with GI/Hept fellow Dr. Chase. Pt and  are aware as discussed at length with Brazilian  at bedside.

## 2021-04-02 NOTE — DISCHARGE NOTE NURSING/CASE MANAGEMENT/SOCIAL WORK - PATIENT PORTAL LINK FT
You can access the FollowMyHealth Patient Portal offered by [facility] by registering at the following website: [email or web address]. By joining [employer] [facility]’s FollowMyHealth portal, you will also be able to view your health information using other applications (apps) compatible with our system.

## 2021-04-02 NOTE — PROGRESS NOTE ADULT - PROVIDER SPECIALTY LIST ADULT
Internal Medicine
Internal Medicine
Transplant Hepatology
Internal Medicine
Transplant Hepatology
Internal Medicine
Internal Medicine

## 2021-04-05 ENCOUNTER — APPOINTMENT (OUTPATIENT)
Dept: ULTRASOUND IMAGING | Facility: IMAGING CENTER | Age: 36
End: 2021-04-05

## 2021-04-07 ENCOUNTER — OUTPATIENT (OUTPATIENT)
Dept: OUTPATIENT SERVICES | Facility: HOSPITAL | Age: 36
LOS: 1 days | Discharge: HOME | End: 2021-04-07

## 2021-04-07 DIAGNOSIS — I82.0 BUDD-CHIARI SYNDROME: ICD-10-CM

## 2021-04-08 ENCOUNTER — APPOINTMENT (OUTPATIENT)
Dept: HEMATOLOGY ONCOLOGY | Facility: CLINIC | Age: 36
End: 2021-04-08
Payer: COMMERCIAL

## 2021-04-08 ENCOUNTER — APPOINTMENT (OUTPATIENT)
Dept: HEPATOLOGY | Facility: CLINIC | Age: 36
End: 2021-04-08
Payer: MEDICAID

## 2021-04-08 VITALS
DIASTOLIC BLOOD PRESSURE: 70 MMHG | BODY MASS INDEX: 17.81 KG/M2 | SYSTOLIC BLOOD PRESSURE: 112 MMHG | HEIGHT: 62.17 IN | TEMPERATURE: 97.8 F | RESPIRATION RATE: 16 BRPM | HEART RATE: 108 BPM | WEIGHT: 98 LBS

## 2021-04-08 VITALS
BODY MASS INDEX: 17.87 KG/M2 | TEMPERATURE: 97.8 F | HEART RATE: 94 BPM | WEIGHT: 98.33 LBS | DIASTOLIC BLOOD PRESSURE: 79 MMHG | HEIGHT: 62.17 IN | RESPIRATION RATE: 18 BRPM | OXYGEN SATURATION: 99 % | SYSTOLIC BLOOD PRESSURE: 107 MMHG

## 2021-04-08 LAB
ALBUMIN SERPL ELPH-MCNC: 4.8 G/DL
ALP BLD-CCNC: 196 U/L
ALT SERPL-CCNC: 35 U/L
ANION GAP SERPL CALC-SCNC: 14 MMOL/L
AST SERPL-CCNC: 53 U/L
BASOPHILS # BLD AUTO: 0.06 K/UL
BASOPHILS NFR BLD AUTO: 1 %
BILIRUB SERPL-MCNC: 1.5 MG/DL
BUN SERPL-MCNC: 20 MG/DL
CALCIUM SERPL-MCNC: 10 MG/DL
CHLORIDE SERPL-SCNC: 97 MMOL/L
CO2 SERPL-SCNC: 25 MMOL/L
CREAT SERPL-MCNC: 0.72 MG/DL
EOSINOPHIL # BLD AUTO: 0.15 K/UL
EOSINOPHIL NFR BLD AUTO: 2.4 %
GLUCOSE SERPL-MCNC: 97 MG/DL
HCT VFR BLD CALC: 44.5 %
HGB BLD-MCNC: 14.2 G/DL
IMM GRANULOCYTES NFR BLD AUTO: 0.3 %
INR PPP: 2.99 RATIO
LYMPHOCYTES # BLD AUTO: 1.24 K/UL
LYMPHOCYTES NFR BLD AUTO: 20 %
MAN DIFF?: NORMAL
MCHC RBC-ENTMCNC: 27.2 PG
MCHC RBC-ENTMCNC: 31.9 GM/DL
MCV RBC AUTO: 85.2 FL
MONOCYTES # BLD AUTO: 0.57 K/UL
MONOCYTES NFR BLD AUTO: 9.2 %
NEUTROPHILS # BLD AUTO: 4.15 K/UL
NEUTROPHILS NFR BLD AUTO: 67.1 %
PLATELET # BLD AUTO: 169 K/UL
POTASSIUM SERPL-SCNC: 4.1 MMOL/L
PROT SERPL-MCNC: 8.3 G/DL
PT BLD: 33.8 SEC
RBC # BLD: 5.22 M/UL
RBC # FLD: 15.2 %
SODIUM SERPL-SCNC: 136 MMOL/L
WBC # FLD AUTO: 6.19 K/UL

## 2021-04-08 PROCEDURE — 99214 OFFICE O/P EST MOD 30 MIN: CPT

## 2021-04-08 PROCEDURE — 99215 OFFICE O/P EST HI 40 MIN: CPT

## 2021-04-08 PROCEDURE — 99072 ADDL SUPL MATRL&STAF TM PHE: CPT

## 2021-04-11 NOTE — PHYSICAL EXAM
[Normal] : full range of motion and no deformities appreciated [de-identified] : abdominal pain, no current ascites.

## 2021-04-11 NOTE — HISTORY OF PRESENT ILLNESS
[de-identified] : \par \par THere is a 2/22/10 document in Japanese that she has that staes that she had a prothrombin gene mutation.  heterozygous.  \par \par PReoitein S activity and antigen in 2017 form [facility] during [facility] found a proten S acitviyy 68% and Protein S Ag 66. \par PRotein C 90%.  \par AT .\par \par FV L neg \par \par Factrore XI activity 27% facor IX activity 73% and Factor XII 86%.  \par \par PTT was prolonged.  40.7 sec.  \par Prothrombin gene muation negative.  [de-identified] : [employer] [id number] Janice \par \par Was gjust at Dr. Madsen's office\par \par Now feels better ,apeptite improved.  Some epistaxis.  Nausea resolved.  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  \par \par Patient was taking 15mg Q AM and 20mg Q PM.  I clarified the dose with her as 15mg BID and 20mg \par \par Patient had a covid infection 3/4/21.  Ha d dry cough abdominal discomfort, and a fever for 5 days up an down.  Ended up at [facility] on 3/28 for abdominal pain, IVC thrombosis.  Abdominal distant.  \par \par On hyeprcoaguworkup.  Free activity PRotein S 40% low, but the Free antigen level was 68 % was normal .

## 2021-04-11 NOTE — ASSESSMENT
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  No other explanations for cirrhosis known currently.  Much of this history took place in [facility],  Patient was able to bring records from the evaluation in [facility] as well as earlier evaluation in 2017 at [facility].  Records mostly in [place], was not able to julien that much detail with the video . Obtained a copy for a professional translation.  On the parts that we were able to read by video phone, patient was reported to have a heterozygous prothrombin gene mutation, however this test was run at our office was negative.  REcords from [facility] in 2018 also documented no prothrombin gene mutation.  On our hypercoagulable workup, the only deficit that was found was a protein S deficiency at 40% activity and antigen, however patient's protein S levels at [facility] were 66%. This decrease likely due to liver disease, and does not appear to be congenital as prior records demonstrate that it was once normal.  THis is not an indiction for long term anticoagulation.  However more recently, patient  was in the hospital with abdominal pain and ascites, found to have an IVC thrombosis following the initial radiologic findings consistent with a history of Bud- Chiari.  This is an indication for long term anticoagulation.  Patient undergoing evaluation to undergo DIPS procedure.

## 2021-04-15 ENCOUNTER — APPOINTMENT (OUTPATIENT)
Dept: INTERVENTIONAL RADIOLOGY/VASCULAR | Facility: CLINIC | Age: 36
End: 2021-04-15

## 2021-04-15 PROBLEM — Z00.00 ENCOUNTER FOR PREVENTIVE HEALTH EXAMINATION: Noted: 2021-04-15

## 2021-04-15 PROCEDURE — XXXXX: CPT | Mod: 1L

## 2021-04-15 RX ORDER — HYDROXYZINE HYDROCHLORIDE 25 MG/1
25 TABLET ORAL
Qty: 30 | Refills: 1 | Status: DISCONTINUED | COMMUNITY
Start: 2021-02-23 | End: 2021-04-15

## 2021-04-15 RX ORDER — OMEPRAZOLE 20 MG/1
20 TABLET, DELAYED RELEASE ORAL
Qty: 30 | Refills: 3 | Status: DISCONTINUED | COMMUNITY
Start: 2021-01-26 | End: 2021-04-15

## 2021-04-19 ENCOUNTER — OUTPATIENT (OUTPATIENT)
Dept: OUTPATIENT SERVICES | Facility: HOSPITAL | Age: 36
LOS: 1 days | End: 2021-04-19
Payer: COMMERCIAL

## 2021-04-19 VITALS
TEMPERATURE: 98 F | DIASTOLIC BLOOD PRESSURE: 72 MMHG | HEIGHT: 61 IN | HEART RATE: 78 BPM | OXYGEN SATURATION: 98 % | RESPIRATION RATE: 16 BRPM | WEIGHT: 99.43 LBS | SYSTOLIC BLOOD PRESSURE: 99 MMHG

## 2021-04-19 DIAGNOSIS — I82.0 BUDD-CHIARI SYNDROME: ICD-10-CM

## 2021-04-19 DIAGNOSIS — I82.220 ACUTE EMBOLISM AND THROMBOSIS OF INFERIOR VENA CAVA: ICD-10-CM

## 2021-04-19 DIAGNOSIS — Z01.818 ENCOUNTER FOR OTHER PREPROCEDURAL EXAMINATION: ICD-10-CM

## 2021-04-19 DIAGNOSIS — Z11.52 ENCOUNTER FOR SCREENING FOR COVID-19: ICD-10-CM

## 2021-04-19 LAB
A1C WITH ESTIMATED AVERAGE GLUCOSE RESULT: 5.7 % — HIGH (ref 4–5.6)
ANION GAP SERPL CALC-SCNC: 12 MMOL/L — SIGNIFICANT CHANGE UP (ref 5–17)
APTT BLD: 51 SEC — HIGH (ref 27.5–35.5)
BUN SERPL-MCNC: 26 MG/DL — HIGH (ref 7–23)
CALCIUM SERPL-MCNC: 10.2 MG/DL — SIGNIFICANT CHANGE UP (ref 8.4–10.5)
CHLORIDE SERPL-SCNC: 97 MMOL/L — SIGNIFICANT CHANGE UP (ref 96–108)
CO2 SERPL-SCNC: 26 MMOL/L — SIGNIFICANT CHANGE UP (ref 22–31)
CREAT SERPL-MCNC: 0.71 MG/DL — SIGNIFICANT CHANGE UP (ref 0.5–1.3)
ESTIMATED AVERAGE GLUCOSE: 117 MG/DL — HIGH (ref 68–114)
GLUCOSE SERPL-MCNC: 127 MG/DL — HIGH (ref 70–99)
HCT VFR BLD CALC: 47.9 % — HIGH (ref 34.5–45)
HGB BLD-MCNC: 15 G/DL — SIGNIFICANT CHANGE UP (ref 11.5–15.5)
INR BLD: 1.93 RATIO — HIGH (ref 0.88–1.16)
MCHC RBC-ENTMCNC: 26.9 PG — LOW (ref 27–34)
MCHC RBC-ENTMCNC: 31.3 GM/DL — LOW (ref 32–36)
MCV RBC AUTO: 86 FL — SIGNIFICANT CHANGE UP (ref 80–100)
NRBC # BLD: 0 /100 WBCS — SIGNIFICANT CHANGE UP (ref 0–0)
PLATELET # BLD AUTO: 184 K/UL — SIGNIFICANT CHANGE UP (ref 150–400)
POTASSIUM SERPL-MCNC: 4.2 MMOL/L — SIGNIFICANT CHANGE UP (ref 3.5–5.3)
POTASSIUM SERPL-SCNC: 4.2 MMOL/L — SIGNIFICANT CHANGE UP (ref 3.5–5.3)
PROTHROM AB SERPL-ACNC: 22.5 SEC — HIGH (ref 10.6–13.6)
RBC # BLD: 5.57 M/UL — HIGH (ref 3.8–5.2)
RBC # FLD: 15.1 % — HIGH (ref 10.3–14.5)
SODIUM SERPL-SCNC: 135 MMOL/L — SIGNIFICANT CHANGE UP (ref 135–145)
WBC # BLD: 5.36 K/UL — SIGNIFICANT CHANGE UP (ref 3.8–10.5)
WBC # FLD AUTO: 5.36 K/UL — SIGNIFICANT CHANGE UP (ref 3.8–10.5)

## 2021-04-19 PROCEDURE — U0005: CPT

## 2021-04-19 PROCEDURE — U0003: CPT

## 2021-04-19 PROCEDURE — G0463: CPT

## 2021-04-19 PROCEDURE — C9803: CPT

## 2021-04-19 RX ORDER — RIVAROXABAN 15 MG-20MG
0 KIT ORAL
Qty: 0 | Refills: 0 | DISCHARGE

## 2021-04-19 NOTE — H&P PST ADULT - NSICDXPROBLEM_GEN_ALL_CORE_FT
PROBLEM DIAGNOSES  Problem: Budd-Chiari syndrome  Assessment and Plan: Scheduled for TIPS on 04/20/2021  Labs--cbc,bmp, coags,T/S, hemA1c  Pre op instructions given AND pt was informed to come tomorrow at 10am to the [facility] as instructed  by Dr Ragsdale.  Covid PCR 04/19/2021    Problem: IVC thrombosis  Assessment and Plan: Xarelto 15 mg last dose taken on 04/18/2021.Spoke to Dr Darrick Ragsdale and he is ok to proceed with the surgery on 04/20/2021      R/O PROBLEM DIAGNOSES  Problem: R/O Protein S deficiency  Assessment and Plan:

## 2021-04-19 NOTE — H&P PST ADULT - OTHER CARE PROVIDERS
DR Carvajal (Hepatologist)  Last seen April 2021 DR Carvajal (Hepatologist)  Last seen April 8th 2021

## 2021-04-19 NOTE — H&P PST ADULT - NSANTHOSAYNRD_GEN_A_CORE
No. NAYELI screening performed.  STOP BANG Legend: 0-2 = LOW Risk; 3-4 = INTERMEDIATE Risk; 5-8 = HIGH Risk

## 2021-04-19 NOTE — H&P PST ADULT - HISTORY OF PRESENT ILLNESS
Patient is a 35 year old Peruvian-speaking female with PMHx of cryptogenic cirrhosis, Budd Chiari syndrome, Protein S deficiency (?APLS), multiple miscarriages, COVID-19 PCR + 3/4/21, recently discharged 3/25 on Xarelto for suprarenal IVC thrombus with plans for outpatient repeat imaging and hepatology follow-up c/b failed prior authorization and scheduling issues. Pt was asked to return to the hospital to reaccess her IVC clot burden with potential plans for intervention after tumor board discussion of case. Hepatology aware of patient arrival.    Of note, patient has separate chart [MRN: [id number]] where last admission work up is found. Pt had presented 3/20/21 w/ abdominal bloating and tightness for 5 days, found to have moderate R pleural effusion, likely 2/2 hepatic hydrothorax, ascites, and thrombi in suprarenal IVC and SMV extending to confluence of splenic vein and portal vein. Pt was started on Heparin gtt and vascular surgery was consulted who stated to continue full AC, no other intervention. Patient underwent an IR guided thoracentesis and diagnostic paracentesis which were both negative for infection, malignancy. Pt was evaluated by hematology who stated patient has been worked up outpatient for hypercoagulation - only deficient Protein S with 40% activity and antigen. Pt prior negative for Factor V Leiden, Jak2, Prothrombin mutation. protein c and antithrombin III. APLA labs were sent in s/o multiple miscarriages and current presentation. Patient was started on bridge inpatient but APLA resulted as negative while inpatient so heme recommended discharging on Xarelto starter pack. Patient was evaluated by hepatology who recommended lasix 40mg PO daily and spironolactone 100mg PO daily. Pt with extensive varices not on BB due to blood pressure / heart rate, started on ppi. Repeat U/S doppler to evaluate thrombus burden outpatient on  3/29/21 [however now readmitted for imaging] ; one week from prior evaluation. Course c/b nonreproducible chest pain on ambulation, multiple EKGs and trops with ischemic changes, CTA ruled out PE, TTE grossly normal.                  35 year old Andorran-speaking female with PMHx of cryptogenic cirrhosis, Budd Chiari syndrome, Protein S deficiency (?APLS), multiple miscarriages, COVID-19 PCR + 3/4/21, recently discharged 3/25 on Xarelto for suprarenal IVC thrombus   Pt was evaluated  Repeat U/S doppler to evaluate thrombus burden outpatient on  3/29/21 [however now readmitted for imaging] ; one week from prior evaluation. Course c/b nonreproducible chest pain on ambulation, multiple EKGs and trops with ischemic changes, CTA ruled out PE, TTE grossly normal.   Scheduled for TIPS on 04/20/2021  Denies Recent travel, Exposure or Covid symptoms  cOVID pcr tEST 04/19/2021                 35 year old Kosovan-speaking female with PMHx of cryptogenic cirrhosis, Budd Chiari syndrome, Protein S deficiency (?APLS), multiple miscarriages, COVID-19 PCR + 3/4/21, recently discharged 3/25 on Xarelto for suprarenal IVC thrombus   Pt was evaluated  Repeat U/S doppler to evaluate thrombus burden outpatient on  3/29/21. Course c/b nonreproducible chest pain on ambulation, multiple EKGs and trops with ischemic changes, CTA ruled out PE, TTE grossly normal.   Scheduled for TIPS on 04/20/2021  Denies Recent travel, Exposure or Covid symptoms  cOVID pcr tEST 04/19/2021                 35 year old Tunisian-speaking female with PMHx of cryptogenic cirrhosis, portal hypertension with small esophageal varices, Budd Chiari syndrome, Protein S deficiency ,  multiple miscarriages, COVID-19 PCR + 3/4/21, recently discharged 3/25 on Xarelto for suprarenal IVC thrombus.  Was in [facility] for  moderate right pleural effusion (likely 2/2 hepatic hydrothorax), ascites, and thrombi in suprarenal IVC and SMV extending to confluence of splenic vein and portal vein. Was  started on heparin gtt and transitioned to Xarelto. Last dose of Xarelto 15 mg taken 04/18/2021( DR Ragsdale made aware)   Scheduled for TIPS on 04/20/2021  Denies Recent travel, Exposure or Covid symptoms  cOVID pcr tEST 04/19/2021

## 2021-04-19 NOTE — H&P PST ADULT - NSICDXPASTMEDICALHX_GEN_ALL_CORE_FT
PAST MEDICAL HISTORY:  Cirrhosis 2010    Miscarriage x 5     PAST MEDICAL HISTORY:  Cirrhosis 2010    H/O protein S deficiency     Miscarriage x 5

## 2021-04-20 ENCOUNTER — TRANSCRIPTION ENCOUNTER (OUTPATIENT)
Age: 36
End: 2021-04-20

## 2021-04-20 ENCOUNTER — INPATIENT (INPATIENT)
Facility: HOSPITAL | Age: 36
LOS: 5 days | Discharge: HOME | DRG: 270 | End: 2021-04-26
Attending: TRANSPLANT SURGERY | Admitting: TRANSPLANT SURGERY
Payer: COMMERCIAL

## 2021-04-20 ENCOUNTER — APPOINTMENT (OUTPATIENT)
Dept: HEPATOLOGY | Facility: CLINIC | Age: 36
End: 2021-04-20

## 2021-04-20 VITALS
DIASTOLIC BLOOD PRESSURE: 78 MMHG | RESPIRATION RATE: 18 BRPM | WEIGHT: 99.43 LBS | HEART RATE: 85 BPM | TEMPERATURE: 98 F | HEIGHT: 61 IN | OXYGEN SATURATION: 99 % | SYSTOLIC BLOOD PRESSURE: 106 MMHG

## 2021-04-20 DIAGNOSIS — I82.0 BUDD-CHIARI SYNDROME: ICD-10-CM

## 2021-04-20 LAB
ALBUMIN SERPL ELPH-MCNC: 3.3 G/DL — SIGNIFICANT CHANGE UP (ref 3.3–5)
ALP SERPL-CCNC: 140 U/L — HIGH (ref 40–120)
ALT FLD-CCNC: 25 U/L — SIGNIFICANT CHANGE UP (ref 10–45)
ANION GAP SERPL CALC-SCNC: 11 MMOL/L — SIGNIFICANT CHANGE UP (ref 5–17)
ANION GAP SERPL CALC-SCNC: 9 MMOL/L — SIGNIFICANT CHANGE UP (ref 5–17)
APTT BLD: 121.2 SEC — CRITICAL HIGH (ref 27.5–35.5)
APTT BLD: 37.8 SEC — HIGH (ref 27.5–35.5)
APTT BLD: 41.6 SEC — HIGH (ref 27.5–35.5)
AST SERPL-CCNC: 35 U/L — SIGNIFICANT CHANGE UP (ref 10–40)
BILIRUB SERPL-MCNC: 2.2 MG/DL — HIGH (ref 0.2–1.2)
BUN SERPL-MCNC: 24 MG/DL — HIGH (ref 7–23)
BUN SERPL-MCNC: 25 MG/DL — HIGH (ref 7–23)
CALCIUM SERPL-MCNC: 8.2 MG/DL — LOW (ref 8.4–10.5)
CALCIUM SERPL-MCNC: 9 MG/DL — SIGNIFICANT CHANGE UP (ref 8.4–10.5)
CHLORIDE SERPL-SCNC: 104 MMOL/L — SIGNIFICANT CHANGE UP (ref 96–108)
CHLORIDE SERPL-SCNC: 104 MMOL/L — SIGNIFICANT CHANGE UP (ref 96–108)
CO2 SERPL-SCNC: 19 MMOL/L — LOW (ref 22–31)
CO2 SERPL-SCNC: 21 MMOL/L — LOW (ref 22–31)
CREAT SERPL-MCNC: 0.61 MG/DL — SIGNIFICANT CHANGE UP (ref 0.5–1.3)
CREAT SERPL-MCNC: 0.63 MG/DL — SIGNIFICANT CHANGE UP (ref 0.5–1.3)
GAS PNL BLDA: SIGNIFICANT CHANGE UP
GLUCOSE SERPL-MCNC: 127 MG/DL — HIGH (ref 70–99)
GLUCOSE SERPL-MCNC: 96 MG/DL — SIGNIFICANT CHANGE UP (ref 70–99)
HCT VFR BLD CALC: 35.6 % — SIGNIFICANT CHANGE UP (ref 34.5–45)
HCT VFR BLD CALC: 36.1 % — SIGNIFICANT CHANGE UP (ref 34.5–45)
HGB BLD-MCNC: 11.1 G/DL — LOW (ref 11.5–15.5)
HGB BLD-MCNC: 11.3 G/DL — LOW (ref 11.5–15.5)
INR BLD: 1.31 RATIO — HIGH (ref 0.88–1.16)
INR BLD: 1.37 RATIO — HIGH (ref 0.88–1.16)
INR BLD: 1.4 RATIO — HIGH (ref 0.88–1.16)
MAGNESIUM SERPL-MCNC: 1.8 MG/DL — SIGNIFICANT CHANGE UP (ref 1.6–2.6)
MAGNESIUM SERPL-MCNC: 2.3 MG/DL — SIGNIFICANT CHANGE UP (ref 1.6–2.6)
MCHC RBC-ENTMCNC: 26.8 PG — LOW (ref 27–34)
MCHC RBC-ENTMCNC: 26.8 PG — LOW (ref 27–34)
MCHC RBC-ENTMCNC: 31.2 GM/DL — LOW (ref 32–36)
MCHC RBC-ENTMCNC: 31.3 GM/DL — LOW (ref 32–36)
MCV RBC AUTO: 85.5 FL — SIGNIFICANT CHANGE UP (ref 80–100)
MCV RBC AUTO: 86 FL — SIGNIFICANT CHANGE UP (ref 80–100)
NRBC # BLD: 0 /100 WBCS — SIGNIFICANT CHANGE UP (ref 0–0)
NRBC # BLD: 0 /100 WBCS — SIGNIFICANT CHANGE UP (ref 0–0)
PHOSPHATE SERPL-MCNC: 3.4 MG/DL — SIGNIFICANT CHANGE UP (ref 2.5–4.5)
PHOSPHATE SERPL-MCNC: 3.8 MG/DL — SIGNIFICANT CHANGE UP (ref 2.5–4.5)
PLATELET # BLD AUTO: 104 K/UL — LOW (ref 150–400)
PLATELET # BLD AUTO: 130 K/UL — LOW (ref 150–400)
POTASSIUM SERPL-MCNC: 4.4 MMOL/L — SIGNIFICANT CHANGE UP (ref 3.5–5.3)
POTASSIUM SERPL-MCNC: 4.6 MMOL/L — SIGNIFICANT CHANGE UP (ref 3.5–5.3)
POTASSIUM SERPL-SCNC: 4.4 MMOL/L — SIGNIFICANT CHANGE UP (ref 3.5–5.3)
POTASSIUM SERPL-SCNC: 4.6 MMOL/L — SIGNIFICANT CHANGE UP (ref 3.5–5.3)
PROT SERPL-MCNC: 5.9 G/DL — LOW (ref 6–8.3)
PROTHROM AB SERPL-ACNC: 15.5 SEC — HIGH (ref 10.6–13.6)
PROTHROM AB SERPL-ACNC: 16.2 SEC — HIGH (ref 10.6–13.6)
PROTHROM AB SERPL-ACNC: 16.5 SEC — HIGH (ref 10.6–13.6)
RBC # BLD: 4.14 M/UL — SIGNIFICANT CHANGE UP (ref 3.8–5.2)
RBC # BLD: 4.22 M/UL — SIGNIFICANT CHANGE UP (ref 3.8–5.2)
RBC # FLD: 15.1 % — HIGH (ref 10.3–14.5)
RBC # FLD: 15.3 % — HIGH (ref 10.3–14.5)
SARS-COV-2 RNA SPEC QL NAA+PROBE: SIGNIFICANT CHANGE UP
SODIUM SERPL-SCNC: 132 MMOL/L — LOW (ref 135–145)
SODIUM SERPL-SCNC: 136 MMOL/L — SIGNIFICANT CHANGE UP (ref 135–145)
WBC # BLD: 5.33 K/UL — SIGNIFICANT CHANGE UP (ref 3.8–10.5)
WBC # BLD: 7.76 K/UL — SIGNIFICANT CHANGE UP (ref 3.8–10.5)
WBC # FLD AUTO: 5.33 K/UL — SIGNIFICANT CHANGE UP (ref 3.8–10.5)
WBC # FLD AUTO: 7.76 K/UL — SIGNIFICANT CHANGE UP (ref 3.8–10.5)

## 2021-04-20 PROCEDURE — 99223 1ST HOSP IP/OBS HIGH 75: CPT

## 2021-04-20 PROCEDURE — 76937 US GUIDE VASCULAR ACCESS: CPT | Mod: 26

## 2021-04-20 PROCEDURE — 37187 VENOUS MECH THROMBECTOMY: CPT

## 2021-04-20 PROCEDURE — 36010 PLACE CATHETER IN VEIN: CPT

## 2021-04-20 RX ORDER — CALCIUM GLUCONATE 100 MG/ML
2 VIAL (ML) INTRAVENOUS ONCE
Refills: 0 | Status: COMPLETED | OUTPATIENT
Start: 2021-04-20 | End: 2021-04-20

## 2021-04-20 RX ORDER — CHLORHEXIDINE GLUCONATE 213 G/1000ML
1 SOLUTION TOPICAL DAILY
Refills: 0 | Status: DISCONTINUED | OUTPATIENT
Start: 2021-04-20 | End: 2021-04-26

## 2021-04-20 RX ORDER — SENNA PLUS 8.6 MG/1
2 TABLET ORAL AT BEDTIME
Refills: 0 | Status: DISCONTINUED | OUTPATIENT
Start: 2021-04-20 | End: 2021-04-26

## 2021-04-20 RX ORDER — SODIUM CHLORIDE 9 MG/ML
1000 INJECTION, SOLUTION INTRAVENOUS
Refills: 0 | Status: DISCONTINUED | OUTPATIENT
Start: 2021-04-20 | End: 2021-04-21

## 2021-04-20 RX ORDER — HYDROMORPHONE HYDROCHLORIDE 2 MG/ML
0.25 INJECTION INTRAMUSCULAR; INTRAVENOUS; SUBCUTANEOUS
Refills: 0 | Status: DISCONTINUED | OUTPATIENT
Start: 2021-04-20 | End: 2021-04-26

## 2021-04-20 RX ORDER — ONDANSETRON 8 MG/1
4 TABLET, FILM COATED ORAL ONCE
Refills: 0 | Status: COMPLETED | OUTPATIENT
Start: 2021-04-20 | End: 2021-04-20

## 2021-04-20 RX ORDER — POLYETHYLENE GLYCOL 3350 17 G/17G
17 POWDER, FOR SOLUTION ORAL DAILY
Refills: 0 | Status: DISCONTINUED | OUTPATIENT
Start: 2021-04-20 | End: 2021-04-26

## 2021-04-20 RX ORDER — PANTOPRAZOLE SODIUM 20 MG/1
40 TABLET, DELAYED RELEASE ORAL DAILY
Refills: 0 | Status: DISCONTINUED | OUTPATIENT
Start: 2021-04-20 | End: 2021-04-26

## 2021-04-20 RX ORDER — MAGNESIUM SULFATE 500 MG/ML
2 VIAL (ML) INJECTION ONCE
Refills: 0 | Status: COMPLETED | OUTPATIENT
Start: 2021-04-20 | End: 2021-04-20

## 2021-04-20 RX ORDER — HEPARIN SODIUM 5000 [USP'U]/ML
800 INJECTION INTRAVENOUS; SUBCUTANEOUS
Qty: 25000 | Refills: 0 | Status: DISCONTINUED | OUTPATIENT
Start: 2021-04-20 | End: 2021-04-21

## 2021-04-20 RX ADMIN — Medication 50 GRAM(S): at 18:02

## 2021-04-20 RX ADMIN — PANTOPRAZOLE SODIUM 40 MILLIGRAM(S): 20 TABLET, DELAYED RELEASE ORAL at 21:49

## 2021-04-20 RX ADMIN — ONDANSETRON 4 MILLIGRAM(S): 8 TABLET, FILM COATED ORAL at 17:27

## 2021-04-20 RX ADMIN — SODIUM CHLORIDE 75 MILLILITER(S): 9 INJECTION, SOLUTION INTRAVENOUS at 17:27

## 2021-04-20 RX ADMIN — HEPARIN SODIUM 8 UNIT(S)/HR: 5000 INJECTION INTRAVENOUS; SUBCUTANEOUS at 17:28

## 2021-04-20 RX ADMIN — Medication 200 GRAM(S): at 17:27

## 2021-04-20 NOTE — PROGRESS NOTE ADULT - SUBJECTIVE AND OBJECTIVE BOX
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     Underwent IVC thrombectomy in IR today; occlusion could not be crossed. TIPs deferred. Admitted to SICU for overnight observation and heparin gtt initiation. Plan to transition to Xarelto tomorrow.   MEDICATIONS  (STANDING):  calcium gluconate IVPB 2 Gram(s) IV Intermittent once  chlorhexidine 2% Cloths 1 Application(s) Topical daily  heparin  Infusion 800 Unit(s)/Hr (8 mL/Hr) IV Continuous <Continuous>  lactated ringers. 1000 milliLiter(s) (75 mL/Hr) IV Continuous <Continuous>  ondansetron Injectable 4 milliGRAM(s) IV Push once  pantoprazole  Injectable 40 milliGRAM(s) IV Push daily    MEDICATIONS  (PRN):  HYDROmorphone  Injectable 0.25 milliGRAM(s) IV Push every 3 hours PRN pain      PAST MEDICAL & SURGICAL HISTORY:  Cirrhosis 2010  Miscarriage x 5  H/O protein S deficiency  No significant past surgical history    Vital Signs Last 24 Hrs  T(C): 36.1 (20 Apr 2021 16:44), Max: 36.7 (20 Apr 2021 10:37)  T(F): 97 (20 Apr 2021 16:44), Max: 98 (20 Apr 2021 10:37)  HR: 101 (20 Apr 2021 17:00) (85 - 110)  BP: 104/66 (20 Apr 2021 16:45) (104/66 - 106/78)  BP(mean): 79 (20 Apr 2021 16:45) (79 - 79)  RR: 21 (20 Apr 2021 17:00) (10 - 21)  SpO2: 92% (20 Apr 2021 17:00) (92% - 99%)                          15.0   5.36  )-----------( 184      ( 19 Apr 2021 12:26 )             47.9     04-19    135  |  97  |  26<H>  ----------------------------<  127<H>  4.2   |  26  |  0.71    Ca    10.2      19 Apr 2021 12:26    TPro  8.3  /  Alb  4.5  /  TBili  1.8<H>  /  DBili  0.5<H>  /  AST  55<H>  /  ALT  39  /  AlkPhos  211<H>  04-19    Review of systems  Gen: No weight changes, fatigue, fevers/chills, weakness  Skin: No rashes  Head/Eyes/Ears/Mouth: No headache; Normal hearing; Normal vision w/o blurriness; No sinus pain/discomfort, sore throat  Respiratory: No dyspnea, cough, wheezing, hemoptysis  CV: No chest pain, PND, orthopnea  GI: C/O mild abdominal pain at surgical site. no diarrhea, constipation, nausea, vomiting, melena, hematochezia  : No increased frequency, dysuria, hematuria, nocturia  MSK: No joint pain/swelling; no back pain; no edema  Neuro: No dizziness/lightheadedness, weakness, seizures, numbness, tingling  Heme: No easy bruising or bleeding  Endo: No heat/cold intolerance  Psych: No significant nervousness, anxiety, stress, depression  All other systems were reviewed and are negative, except as noted.    PHYSICAL EXAM:  Constitutional: Well developed / well nourished  Eyes: anicteric  ENMT: nc/at, no thrush  Neck: supple  Respiratory: CTA B/L  Cardiovascular: RRR  Gastrointestinal: Soft abdomen, ND, non distended  Genitourinary: Voiding spontaneously  Extremities: SCD's in place and working bilaterally  Vascular: Palpable dp pulses bilaterally.   Neurological: A&O x3  Skin: no rashes, ulcerations, lesions  Musculoskeletal: Moving all extremities  Psychiatric: Responsive

## 2021-04-20 NOTE — PROCEDURE NOTE - PROCEDURE FINDINGS AND DETAILS
-occluded intrahepatic IVC  -suction thrombectomy performed using penumbra Cat12  -occlusion could not be crossed, TIPS/DIPS deferred -occluded intrahepatic IVC  -suction thrombectomy performed using penumbra Cat12  -occlusion could not be crossed, TIPS/DIPS deferred at this time. will plan

## 2021-04-20 NOTE — PROCEDURE NOTE - PLAN
-start heparin gtt full anticoagulation nomogram with no bolus on return to SICU  -transition to Xarelto tomorrow morning if patient remains stable  -findings discussed with Dr. Elias and Dr. Bryant during procedure, plan for TIPS/DIPS via percutaneous approach  in 2 weeks   -Please call interventional radiology at [phone] during normal business hours, or [phone] and page [phone] during call hours or weekends with any questions, concerns or issues.

## 2021-04-20 NOTE — CONSULT NOTE ADULT - ASSESSMENT
ASSESSMENT: 35yFemale with cryptogenic cirrhosis, portal hypertension with small esophageal varices, ascites, Budd Chiari syndrome, Protein S deficiency with suprarenal IVC thrombus s/p IR unsuccessful attempt at IVC thrombectomy (occlusion could not be crossed), TIPs deferred.      PLAN:   Neurologic: acute pain  - Dilaudid PRN    Respiratory: no acute issues  - Incentive spirometry, OOB to prevent atelectasis    Cardiovascular: no acute issues  - Cardiac monitoring    Gastrointestinal/Nutrition:  cryptogenic cirrhosis, portal hypertension with small esophageal varices, ascites, Budd Chiari syndrome  - Regular diet  - Plan for TIPS/DIPS via percutaneous approach in 2 weeks  - Protonix  - Bowel regimen     Genitourinary/Renal: no acute issues  - Lucas, I&Os  - LR @ 75ml/hr    Hematologic: Protein S deficiency, suprarenal IVC thrombus  - Heparin drip, no bolus  - Serial CBCs  - Transition to Xarelto tomorrow morning if patient remains stable    Infectious Disease: no acute issues  - Monitor for signs of infection    Endocrine: no acute issues  - Monitor glucose on BMP    [facility]      Lynnette Chan PA-C  #[id number]

## 2021-04-20 NOTE — PROGRESS NOTE ADULT - ASSESSMENT
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     Underwent IVC thrombectomy in IR today; occlusion could not be crossed. TIPs deferred.    -  Admitted to SICU for overnight observation and heparin gtt initiation (no bolus)  -  Plan to transition to Xarelto tomorrow.   - IR will re-evaluate in am;   - Plan for TIPS/DIPS in ~2wks 35F w/ PMHx of Protein S deficiency, COVID-19 pna (3/4/21) and chronic Budd Chiari syndrome c/b small ascites, R hepatic hydrothorax, small non bleeding esophageal varices (last EGD 2017), extensive intra-abdominal ysabel-systemic collaterals, splenomegaly, and caudate lobe hypertrophy with associated mass effect on intrahepatic IVC and IVC thrombosis, multiple spontaneous abortions.     Underwent IVC thrombectomy in IR today; occlusion could not be crossed. TIPs deferred.    - Admitted to SICU for overnight observation and heparin gtt initiation (no bolus)  - Plan to transition to Xarelto tomorrow.   - Serial CBCs  - IR will re-evaluate in am;   - Plan for TIPS/DIPS in ~2wks  - Hold diuretics today; can resume tomorrow if stable H/H  - Advance diet

## 2021-04-21 ENCOUNTER — TRANSCRIPTION ENCOUNTER (OUTPATIENT)
Age: 36
End: 2021-04-21

## 2021-04-21 LAB
ANION GAP SERPL CALC-SCNC: 9 MMOL/L — SIGNIFICANT CHANGE UP (ref 5–17)
APTT BLD: 195.1 SEC — CRITICAL HIGH (ref 27.5–35.5)
APTT BLD: 63.6 SEC — HIGH (ref 27.5–35.5)
BUN SERPL-MCNC: 17 MG/DL — SIGNIFICANT CHANGE UP (ref 7–23)
CALCIUM SERPL-MCNC: 8.1 MG/DL — LOW (ref 8.4–10.5)
CHLORIDE SERPL-SCNC: 103 MMOL/L — SIGNIFICANT CHANGE UP (ref 96–108)
CO2 SERPL-SCNC: 22 MMOL/L — SIGNIFICANT CHANGE UP (ref 22–31)
CREAT SERPL-MCNC: 0.74 MG/DL — SIGNIFICANT CHANGE UP (ref 0.5–1.3)
CULTURE RESULTS: SIGNIFICANT CHANGE UP
CULTURE RESULTS: SIGNIFICANT CHANGE UP
GLUCOSE SERPL-MCNC: 118 MG/DL — HIGH (ref 70–99)
HCT VFR BLD CALC: 33.5 % — LOW (ref 34.5–45)
HCT VFR BLD CALC: 34.2 % — LOW (ref 34.5–45)
HCT VFR BLD CALC: 34.6 % — SIGNIFICANT CHANGE UP (ref 34.5–45)
HGB BLD-MCNC: 10.4 G/DL — LOW (ref 11.5–15.5)
HGB BLD-MCNC: 10.8 G/DL — LOW (ref 11.5–15.5)
HGB BLD-MCNC: 11 G/DL — LOW (ref 11.5–15.5)
INR BLD: 1.22 RATIO — HIGH (ref 0.88–1.16)
INR BLD: 1.32 RATIO — HIGH (ref 0.88–1.16)
MAGNESIUM SERPL-MCNC: 1.9 MG/DL — SIGNIFICANT CHANGE UP (ref 1.6–2.6)
MCHC RBC-ENTMCNC: 26.7 PG — LOW (ref 27–34)
MCHC RBC-ENTMCNC: 27.2 PG — SIGNIFICANT CHANGE UP (ref 27–34)
MCHC RBC-ENTMCNC: 27.4 PG — SIGNIFICANT CHANGE UP (ref 27–34)
MCHC RBC-ENTMCNC: 31 GM/DL — LOW (ref 32–36)
MCHC RBC-ENTMCNC: 31.6 GM/DL — LOW (ref 32–36)
MCHC RBC-ENTMCNC: 31.8 GM/DL — LOW (ref 32–36)
MCV RBC AUTO: 86.1 FL — SIGNIFICANT CHANGE UP (ref 80–100)
MCV RBC AUTO: 86.1 FL — SIGNIFICANT CHANGE UP (ref 80–100)
MCV RBC AUTO: 86.3 FL — SIGNIFICANT CHANGE UP (ref 80–100)
NRBC # BLD: 0 /100 WBCS — SIGNIFICANT CHANGE UP (ref 0–0)
PHOSPHATE SERPL-MCNC: 2.4 MG/DL — LOW (ref 2.5–4.5)
PLATELET # BLD AUTO: 102 K/UL — LOW (ref 150–400)
PLATELET # BLD AUTO: 110 K/UL — LOW (ref 150–400)
PLATELET # BLD AUTO: 118 K/UL — LOW (ref 150–400)
POTASSIUM SERPL-MCNC: 4.2 MMOL/L — SIGNIFICANT CHANGE UP (ref 3.5–5.3)
POTASSIUM SERPL-SCNC: 4.2 MMOL/L — SIGNIFICANT CHANGE UP (ref 3.5–5.3)
PROTHROM AB SERPL-ACNC: 14.5 SEC — HIGH (ref 10.6–13.6)
PROTHROM AB SERPL-ACNC: 15.6 SEC — HIGH (ref 10.6–13.6)
RBC # BLD: 3.89 M/UL — SIGNIFICANT CHANGE UP (ref 3.8–5.2)
RBC # BLD: 3.97 M/UL — SIGNIFICANT CHANGE UP (ref 3.8–5.2)
RBC # BLD: 4.01 M/UL — SIGNIFICANT CHANGE UP (ref 3.8–5.2)
RBC # FLD: 15.2 % — HIGH (ref 10.3–14.5)
RBC # FLD: 15.2 % — HIGH (ref 10.3–14.5)
RBC # FLD: 15.4 % — HIGH (ref 10.3–14.5)
SODIUM SERPL-SCNC: 134 MMOL/L — LOW (ref 135–145)
SPECIMEN SOURCE: SIGNIFICANT CHANGE UP
SPECIMEN SOURCE: SIGNIFICANT CHANGE UP
WBC # BLD: 4.97 K/UL — SIGNIFICANT CHANGE UP (ref 3.8–10.5)
WBC # BLD: 6.38 K/UL — SIGNIFICANT CHANGE UP (ref 3.8–10.5)
WBC # BLD: 6.83 K/UL — SIGNIFICANT CHANGE UP (ref 3.8–10.5)
WBC # FLD AUTO: 4.97 K/UL — SIGNIFICANT CHANGE UP (ref 3.8–10.5)
WBC # FLD AUTO: 6.38 K/UL — SIGNIFICANT CHANGE UP (ref 3.8–10.5)
WBC # FLD AUTO: 6.83 K/UL — SIGNIFICANT CHANGE UP (ref 3.8–10.5)

## 2021-04-21 PROCEDURE — 99231 SBSQ HOSP IP/OBS SF/LOW 25: CPT

## 2021-04-21 PROCEDURE — 93010 ELECTROCARDIOGRAM REPORT: CPT

## 2021-04-21 PROCEDURE — 99222 1ST HOSP IP/OBS MODERATE 55: CPT | Mod: GC

## 2021-04-21 PROCEDURE — 99232 SBSQ HOSP IP/OBS MODERATE 35: CPT

## 2021-04-21 RX ORDER — MAGNESIUM SULFATE 500 MG/ML
2 VIAL (ML) INJECTION ONCE
Refills: 0 | Status: COMPLETED | OUTPATIENT
Start: 2021-04-21 | End: 2021-04-22

## 2021-04-21 RX ORDER — BENZOCAINE AND MENTHOL 5; 1 G/100ML; G/100ML
1 LIQUID ORAL ONCE
Refills: 0 | Status: COMPLETED | OUTPATIENT
Start: 2021-04-21 | End: 2021-04-21

## 2021-04-21 RX ORDER — RIVAROXABAN 15 MG-20MG
20 KIT ORAL DAILY
Refills: 0 | Status: DISCONTINUED | OUTPATIENT
Start: 2021-04-21 | End: 2021-04-23

## 2021-04-21 RX ADMIN — SENNA PLUS 2 TABLET(S): 8.6 TABLET ORAL at 21:26

## 2021-04-21 RX ADMIN — PANTOPRAZOLE SODIUM 40 MILLIGRAM(S): 20 TABLET, DELAYED RELEASE ORAL at 11:46

## 2021-04-21 RX ADMIN — POLYETHYLENE GLYCOL 3350 17 GRAM(S): 17 POWDER, FOR SOLUTION ORAL at 11:46

## 2021-04-21 RX ADMIN — CHLORHEXIDINE GLUCONATE 1 APPLICATION(S): 213 SOLUTION TOPICAL at 11:46

## 2021-04-21 RX ADMIN — RIVAROXABAN 20 MILLIGRAM(S): KIT at 16:20

## 2021-04-21 RX ADMIN — SODIUM CHLORIDE 75 MILLILITER(S): 9 INJECTION, SOLUTION INTRAVENOUS at 05:56

## 2021-04-21 RX ADMIN — BENZOCAINE AND MENTHOL 1 LOZENGE: 5; 1 LIQUID ORAL at 16:21

## 2021-04-21 NOTE — DISCHARGE NOTE PROVIDER - NSDCCPCAREPLAN_GEN_ALL_CORE_FT
PRINCIPAL DISCHARGE DIAGNOSIS  Diagnosis: IVC thrombosis  Assessment and Plan of Treatment: you underwent a procedure to remove a clot in a major blood vessel called the inferior vena cava.  you were started on a blood thinner to protect against future clots.  contact the [facility] or return to ER if you develop any signs of bleeding, severe abdominal pain or any other changes to your health.

## 2021-04-21 NOTE — DISCHARGE NOTE PROVIDER - HOSPITAL COURSE
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     Underwent IVC suction thrombectomy in IR 4/20/21, occlusion could not be crossed. TIPs deferred. Admitted to SICU for overnight observation and heparin gtt initiation. Transitioned to----------    Discharged with the following plan:   35F w/ PMHx of Protein S deficiency, COVID-19 pna (3/4/21) and chronic Budd Chiari syndrome c/b small ascites, R hepatic hydrothorax, small non bleeding esophageal varices (last EGD 2017), extensive intra-abdominal ysabel-systemic collaterals, splenomegaly, and caudate lobe hypertrophy with associated mass effect on intrahepatic IVC and IVC thrombosis, multiple spontaneous abortions.     S/p IVC thrombectomy and venography by IR on 4/20, with tight narrowed segment of the IVC just inferior to the RA that may be due to vascular stricture vs extrinsic compression and that was unable to be transversed with a wire despite attempts both from above and below that segment. She has retrograde IVC flow into a very large azygous collateral with return to SVC. TIPS deferred. Transferred to SICU for overnight observation and heparin gtt initiation which was transitioned to Xarelto 20mg daily. Post procedure patient complained of chest pressure and dyspnea, CT chest showed large pleural effusion. IR consulted ----------    She ws followed closely our multidisciplinary transplant team:  Surgeons, Hepatologists, Pharmacist, [place], ACPs, RNs, SW, Coordinators, Dieticians, and was deemed safe for discharge home with following plan:     1) Anticoagulation on discharge:   2) Diuretics on discharge:   3) Follow up:   - tentatively planned for re-admission for DIPS in 2 weeks        35F w/ PMHx of Protein S deficiency, COVID-19 pna (3/4/21) and chronic Budd Chiari syndrome c/b small ascites, R hepatic hydrothorax, small non bleeding esophageal varices (last EGD 2017), extensive intra-abdominal ysabel-systemic collaterals, splenomegaly, and caudate lobe hypertrophy with associated mass effect on intrahepatic IVC and IVC thrombosis, multiple spontaneous abortions.     S/p IVC thrombectomy and venography by IR on 4/20, with tight narrowed segment of the IVC just inferior to the RA that may be due to vascular stricture vs extrinsic compression and that was unable to be transversed with a wire despite attempts both from above and below that segment. She has retrograde IVC flow into a very large azygous collateral with return to SVC. TIPS deferred. Transferred to SICU for overnight observation and heparin gtt initiation which was transitioned to Xarelto 20mg daily. Post procedure patient complained of chest pressure and dyspnea, CT chest showed large pleural effusion. IR consulted for thoracentesis.  850ml serous fluid drained and sent for chemistry.  She remained hemodynamically stable. She was scheduled for outpatient follow-up and DIPS scheduled for 5/11/2021.    She was followed closely our multidisciplinary transplant team:  Surgeons, Hepatologists, Pharmacist, [place], ACPs, RNs, SW, Coordinators, Dieticians, and was deemed safe for discharge home with following plan:     1) Anticoagulation on discharge: Xarelto 20mg daily to start tomorrow 4/27  2) Diuretics on discharge: Lasix 80mg PO BID, Spironolactone 200mg daily  3) Follow up:   - Fu   - DIPS scheduled for 5/11/2021. Instructed to HOLD Xarelto on 5/8-11.

## 2021-04-21 NOTE — PROGRESS NOTE ADULT - SUBJECTIVE AND OBJECTIVE BOX
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     Underwent IVC thrombectomy in IR yesterday; occlusion could not be crossed. TIPs was deferred. Admitted to SICU for overnight for observation and heparin gtt initiation. Plan to transition to Xarelto today.   Interval events:   -remains on heparin drip   -h/h slightly down, will repeat this afternoon    Potential discharge date: likely today pending afternoon CBC      MEDICATIONS  (STANDING):  benzocaine 15 mG/menthol 3.6 mG (Sugar-Free) Lozenge 1 Lozenge Oral once  chlorhexidine 2% Cloths 1 Application(s) Topical daily  heparin  Infusion 800 Unit(s)/Hr (5 mL/Hr) IV Continuous <Continuous>  pantoprazole  Injectable 40 milliGRAM(s) IV Push daily  polyethylene glycol 3350 17 Gram(s) Oral daily  senna 2 Tablet(s) Oral at bedtime    MEDICATIONS  (PRN):  HYDROmorphone  Injectable 0.25 milliGRAM(s) IV Push every 3 hours PRN pain      PAST MEDICAL & SURGICAL HISTORY:  Cirrhosis  2010    Miscarriage  x 5    H/O protein S deficiency    No significant past surgical history        Vital Signs Last 24 Hrs  T(C): 36.4 (21 Apr 2021 11:00), Max: 36.6 (21 Apr 2021 03:00)  T(F): 97.5 (21 Apr 2021 11:00), Max: 97.9 (21 Apr 2021 03:00)  HR: 87 (21 Apr 2021 13:00) (63 - 110)  BP: 108/67 (21 Apr 2021 13:00) (82/53 - 108/70)  BP(mean): 83 (21 Apr 2021 13:00) (63 - 84)  RR: 31 (21 Apr 2021 13:00) (10 - 35)  SpO2: 97% (21 Apr 2021 13:00) (92% - 100%)    I&O's Summary    20 Apr 2021 07:01  -  21 Apr 2021 07:00  --------------------------------------------------------  IN: 1378 mL / OUT: 355 mL / NET: 1023 mL    21 Apr 2021 07:01  -  21 Apr 2021 13:11  --------------------------------------------------------  IN: 100 mL / OUT: 55 mL / NET: 45 mL                              10.4   6.38  )-----------( 102      ( 21 Apr 2021 05:14 )             33.5     04-20    132<L>  |  104  |  25<H>  ----------------------------<  127<H>  4.6   |  19<L>  |  0.61    Ca    9.0      20 Apr 2021 23:03  Phos  3.8     04-20  Mg     2.3     04-20    TPro  5.9<L>  /  Alb  3.3  /  TBili  2.2<H>  /  DBili  x   /  AST  35  /  ALT  25  /  AlkPhos  140<H>  04-20        Review of systems  Gen: No weight changes, fatigue, fevers/chills, weakness  Skin: No rashes  Head/Eyes/Ears/Mouth: No headache; Normal hearing; Normal vision w/o blurriness; No sinus pain/discomfort, sore throat  Respiratory: No dyspnea, cough, wheezing, hemoptysis  CV: No chest pain, PND, orthopnea  GI: C/O mild abdominal pain at surgical site. no diarrhea, constipation, nausea, vomiting, melena, hematochezia  : No increased frequency, dysuria, hematuria, nocturia  MSK: No joint pain/swelling; no back pain; no edema  Neuro: No dizziness/lightheadedness, weakness, seizures, numbness, tingling  Heme: No easy bruising or bleeding  Endo: No heat/cold intolerance  Psych: No significant nervousness, anxiety, stress, depression  All other systems were reviewed and are negative, except as noted.    PHYSICAL EXAM:  Constitutional: Well developed / well nourished  Eyes: anicteric  ENMT: nc/at, no thrush  Neck: supple  Respiratory: CTA B/L  Cardiovascular: RRR  Gastrointestinal: Soft abdomen, ND, non distended  Genitourinary: Voiding spontaneously  Extremities: SCD's in place and working bilaterally  Vascular: Palpable dp pulses bilaterally.   Neurological: A&O x3  Skin: no rashes, ulcerations, lesions  Musculoskeletal: Moving all extremities  Psychiatric: Responsive

## 2021-04-21 NOTE — PROGRESS NOTE ADULT - SUBJECTIVE AND OBJECTIVE BOX
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 Was started on heparin gtt and transitioned to Xarelto. Last dose of Xarelto 15 mg taken 04/18/21.    Pt went to IR on 4/20 for attempted IVC thrombectomy and TIPS. Cavogram performed which demonstrated occluded intrahepatic IVC. Suction thrombectomy performed using penumbra Cat12; occlusion could not be crossed. TIPS/DIPS deferred. Pt transferred to SICU for monitoring and to start heparin drip.        MEDICATIONS:   --------------------------------------------------------------------------------------  Neurologic Medications  HYDROmorphone  Injectable 0.25 milliGRAM(s) IV Push every 3 hours PRN pain    Respiratory Medications    Cardiovascular Medications    Gastrointestinal Medications  lactated ringers. 1000 milliLiter(s) IV Continuous <Continuous>  pantoprazole  Injectable 40 milliGRAM(s) IV Push daily  polyethylene glycol 3350 17 Gram(s) Oral daily  senna 2 Tablet(s) Oral at bedtime    Genitourinary Medications    Hematologic/Oncologic Medications  heparin  Infusion 800 Unit(s)/Hr IV Continuous <Continuous>    Antimicrobial/Immunologic Medications    Endocrine/Metabolic Medications    Topical/Other Medications  chlorhexidine 2% Cloths 1 Application(s) Topical daily    --------------------------------------------------------------------------------------    VITAL SIGNS, INS/OUTS (last 24 hours):  --------------------------------------------------------------------------------------  Vital Signs Last 24 Hrs  T(C): 36.6 (21 Apr 2021 03:00), Max: 36.7 (20 Apr 2021 10:37)  T(F): 97.9 (21 Apr 2021 03:00), Max: 98 (20 Apr 2021 10:37)  HR: 63 (21 Apr 2021 05:00) (63 - 110)  BP: 86/52 (21 Apr 2021 05:00) (82/53 - 106/78)  BP(mean): 64 (21 Apr 2021 05:00) (63 - 79)  RR: 16 (21 Apr 2021 05:00) (10 - 31)  SpO2: 99% (21 Apr 2021 05:00) (92% - 100%)  --------------------------------------------------------------------------------------    EXAM  NEUROLOGY    Exam: Normal, NAD, alert, oriented x3, no focal deficits    RESPIRATORY  Exam: Nonlabored, CTABL, no wheezes, ronchi, or rales. Normal respiratory effort.     CARDIOVASCULAR  Exam: Normotensive, RRR, no M/R/G     GI/NUTRITION  Exam: Abdomen soft, NT/ND, no rebound or guarding.  Wound:  incision c/d/i  Current Diet:  reg    VASCULAR  Exam: Extremities warm, well-perfused. Adequate capillary refill.     HEMATOLOGIC  [x] VTE Prophylaxis: heparin  Infusion 800 Unit(s)/Hr IV Continuous <Continuous>      INFECTIOUS DISEASE  Antimicrobials/Immunologic Medications:        Tubes/Lines/Drains   [x] Peripheral IV  [] Central Venous Line     	[] R	[] L	[] IJ	[] Fem	[] SC	Date Placed:   [] Arterial Line		[] R	[] L	[] Fem	[] Rad	[] Ax	Date Placed:   [] PICC		[] Midline		[] Mediport  [] Urinary Catheter		  [x] Necessity of urinary, arterial, and venous catheters discussed    LABS  --------------------------------------------------------------------------------------                        11.1   7.76  )-----------( 104      ( 20 Apr 2021 23:03 )             35.6     04-20    132<L>  |  104  |  25<H>  ----------------------------<  127<H>  4.6   |  19<L>  |  0.61    Ca    9.0      20 Apr 2021 23:03  Phos  3.8     04-20  Mg     2.3     04-20    TPro  5.9<L>  /  Alb  3.3  /  TBili  2.2<H>  /  DBili  x   /  AST  35  /  ALT  25  /  AlkPhos  140<H>  04-20    PT/INR - ( 20 Apr 2021 23:03 )   PT: 15.5 sec;   INR: 1.31 ratio         PTT - ( 20 Apr 2021 23:03 )  PTT:121.2 sec    --------------------------------------------------------------------------------------

## 2021-04-21 NOTE — DISCHARGE NOTE NURSING/CASE MANAGEMENT/SOCIAL WORK - PATIENT PORTAL LINK FT
You can access the FollowMyHealth Patient Portal offered by [facility] by registering at the following website: [email or web address]. By joining [facility]’s FollowMyHealth portal, you will also be able to view your health information using other applications (apps) compatible with our system.

## 2021-04-21 NOTE — DISCHARGE NOTE PROVIDER - NSDCFUADDINST_GEN_ALL_CORE_FT
Please go for your Covid test on May 8th at 11AM at the [facility].    Do not take Xarelto on May 8,9 and 10th.

## 2021-04-21 NOTE — DISCHARGE NOTE PROVIDER - NSDCMRMEDTOKEN_GEN_ALL_CORE_FT
furosemide 80 mg oral tablet: 1 tab(s) orally once a day  MiraLax oral powder for reconstitution: use 17g daily  Multiple Vitamins oral tablet: 1 tab(s) orally once a day  omeprazole 40 mg oral delayed release capsule: 1 cap(s) orally once a day  Senna 8.6 mg oral tablet: 2 tab(s) orally once a day (at bedtime)  spironolactone 25 mg oral tablet: 8 tab(s) orally once a day  Xarelto Starter Pack 15 mg-20 mg oral kit: 15mg by mouth twice a day for 3 weeks  Last dose of 15mg was  4/18/2021 PM   Lasix 80 mg oral tablet: 1 tab(s) orally 2 times a day  MiraLax oral powder for reconstitution: use 17g daily  Multiple Vitamins oral tablet: 1 tab(s) orally once a day  omeprazole 40 mg oral delayed release capsule: 1 cap(s) orally once a day  Senna 8.6 mg oral tablet: 2 tab(s) orally once a day (at bedtime)  spironolactone 100 mg oral tablet: 200 milligram(s) orally once a day  Xarelto 20 mg oral tablet: 1 tab(s) orally once a day (in the evening)   Lasix 80 mg oral tablet: 1 tab(s) orally 2 times a day  MiraLax oral powder for reconstitution: use 17g daily  Multiple Vitamins oral tablet: 1 tab(s) orally once a day  omeprazole 40 mg oral delayed release capsule: 1 cap(s) orally once a day  Senna 8.6 mg oral tablet: 2 tab(s) orally once a day (at bedtime)  spironolactone 100 mg oral tablet: 200 milligram(s) orally once a day  Xarelto 20 mg oral tablet: 1 tab(s) orally once a day   Start 4/27/2021   Lasix 40 mg oral tablet: 2 tab(s) orally 2 times a day   MiraLax oral powder for reconstitution: use 17g daily  Multiple Vitamins oral tablet: 1 tab(s) orally once a day  omeprazole 40 mg oral delayed release capsule: 1 cap(s) orally once a day  Senna 8.6 mg oral tablet: 2 tab(s) orally once a day (at bedtime)  spironolactone 100 mg oral tablet: 2 tab(s) orally once a day   Xarelto 20 mg oral tablet: 1 tab(s) orally once a day   Start 4/27/2021

## 2021-04-21 NOTE — DISCHARGE NOTE PROVIDER - NSDCFUSCHEDAPPT_GEN_ALL_CORE_FT
[FreeTextEntry1] : New patient visit [de-identified] : Had cardiac workup this year normal workup\par \par hx HTN and high TG ERIC DAVILA ; 05/27/2021 ; [facility]

## 2021-04-21 NOTE — CONSULT NOTE ADULT - ASSESSMENT
33F w/ decompensated cirrhosis due to chronic Budd-Chiari, due to protein S deficiency, admitted to SICU for observation after IR procedure.    Impression:  #Decompensated cirrhosis due to Budd-Chiari syndrome  -varices: small, non-bleeding varices in 2017, on beta blocker  -ascites: none on exam  -HE: none  -HCC: Segment 8 LIRADS 4 lesion stable since 12/2020; AFP WNL 02/2021  -MELD-Na = 18 on 4/21/2021  #IVC stenosis w/ large azygous collaterals  #Protein S deficiency - on xarelto  #Anemia - likely due to dilution and procedural blood loss, not c/f ongoing intrabdominal bleeding based on exam    Recommendations:  - Check Hb once more prior to discharge  - C/w heparin gtt, start xarelto on discharge  - Continue w/ home medications on discharge  - Plan for outpatient DIPS in 2 weeks per IR  - Complicated long-term planning in terms of transplant candidacy  - Outpatient follow up w/ Dr. Carvajal  - No hepatology contraindication to discharge if Hb remains stable    Kane Orellana  Gastroenterology/Hepatology Fellow  Available via [employer] Teams    NON-URGENT CONSULTS:  Please email [email or web address]  AT NIGHT AND ON WEEKENDS/HOLIDAYS  Contact on-call GI fellow via answering service ([phone])  MONDAY-FRIDAY 8AM-5PM:  Page [phone] ([facility]) or [phone] ([facility]) from 8am-5pm M-F     35F w/ decompensated cirrhosis due to chronic Budd-Chiari, due to protein S deficiency, admitted to SICU for observation after IR procedure.    Impression:  #Decompensated cirrhosis due to Budd-Chiari syndrome  -varices: small, non-bleeding varices in 2017, on beta blocker  -ascites: none on exam  -HE: none  -HCC: Segment 8 LIRADS 4 lesion stable since 12/2020; AFP WNL 02/2021  -MELD-Na = 18 on 4/21/2021  #IVC stenosis w/ large azygous collaterals  #Protein S deficiency - on xarelto  #Anemia - likely due to dilution and procedural blood loss, not c/f ongoing intrabdominal bleeding based on exam    Recommendations:  - Check Hb once more prior to discharge  - C/w heparin gtt, start xarelto on discharge  - Continue w/ home medications on discharge  - Plan for outpatient DIPS in 2 weeks per IR  - Complicated long-term planning in terms of transplant candidacy  - Outpatient follow up w/ Dr. Carvajal  - No hepatology contraindication to discharge if Hb remains stable    Kane Orellana  Gastroenterology/Hepatology Fellow  Available via [employer] Teams    NON-URGENT CONSULTS:  Please email [email or web address]  [email or web address]  AT NIGHT AND ON WEEKENDS/HOLIDAYS  Contact on-call GI fellow via answering service ([phone])  MONDAY-FRIDAY 8AM-5PM:  Page [phone] ([facility]) or [phone] ([facility]) from 8am-5pm M-F

## 2021-04-21 NOTE — DISCHARGE NOTE PROVIDER - NSRESEARCHGRANT_HIDDEN_GEN_A_CORE
Patient states that he has been taking Janumet however his medication list on Mychart does not show it and he has not received a refill for it. He states he has been out for 5 days. He also said he did not received his lab results from 2/16/18.   Patient
Per pt he has plenty of Janumet and no more Metformin, but Janumet is not on his med list, but Metformin is. Per pt., his check out med list didn't have Janumet listed. Pt states he is not sure now what he is supposed to be taking. Please advise.     Pt
Pt advised of below. Rx sent to [facility] per pt request. I have released his labs, ok per Dr Lula Driver.
The patient is supposed to be taking metformin XR 1000 mg 1 daily. Give him 30 and 5 refills.
Yes

## 2021-04-21 NOTE — DISCHARGE NOTE PROVIDER - NSDCFUADDAPPT_GEN_ALL_CORE_FT
Follow-up---------- Please call Dr. Carvajal to schedule a follow-up appointment. Phone: [phone]  You are scheduled for a DIPS procedure with Interventional Radiology on May 11,2021

## 2021-04-21 NOTE — DISCHARGE NOTE PROVIDER - DETAILS OF MALNUTRITION DIAGNOSIS/DIAGNOSES
This patient has been assessed with a concern for Malnutrition and was treated during this hospitalization for the following Nutrition diagnosis/diagnoses:     -  04/23/2021: Moderate protein-calorie malnutrition   -  04/23/2021: Underweight (BMI < 19)

## 2021-04-21 NOTE — CHART NOTE - NSCHARTNOTEFT_GEN_A_CORE
R3  Called by transplant time re: patient complaint of irregular bleeding/spotting for the last 3 weeks. Per Transplant, patient had already cleared from discharge. H/H and VSS.  Patient known to [address] [place] [address] as she as a [facility] patient during her pregnancy July 2019.   Will arrange for follow up at [address] [place] [address] for further outpatient management and imaging.  No contraindications to xarelto or any other AC.    H Jay PGY3  TBD with attending R3  Called by transplant time re: patient complaint of irregular bleeding/spotting for the last 3 weeks. Per Transplant, patient had already cleared from discharge. H/H and VSS.  Patient known to [address] [place] [address] as she as a [facility] patient during her pregnancy July 2019.   Will arrange for follow up at [address] [place] [address] for further outpatient management and imaging. [phone]  No contraindications to xarelto or any other AC.    H Jay PGY3  d/w Dr. Lewis

## 2021-04-21 NOTE — PROGRESS NOTE ADULT - ASSESSMENT
ASSESSMENT: 35yFemale with cryptogenic cirrhosis, portal hypertension with small esophageal varices, ascites, Budd Chiari syndrome, Protein S deficiency with suprarenal IVC thrombus s/p IR unsuccessful attempt at IVC thrombectomy (occlusion could not be crossed), TIPs deferred.      PLAN:   Neurologic: acute pain  - Dilaudid PRN    Respiratory: no acute issues  - Incentive spirometry, OOB to prevent atelectasis    Cardiovascular: no acute issues  - HD stable, no evidence of bleeding ovenright     Gastrointestinal/Nutrition:  cryptogenic cirrhosis, portal hypertension with small esophageal varices, ascites, Budd Chiari syndrome  - Regular diet  - Plan for TIPS/DIPS via percutaneous approach in 2 weeks  - Protonix  - Bowel regimen     Genitourinary/Renal: no acute issues  - Lucas, I&Os  - LR @ 75ml/hr    Hematologic: Protein S deficiency, suprarenal IVC thrombus  - Heparin drip, pending new PTT   - Initial dip in H/H, remained stable   - Transition to Xarelto    Infectious Disease: no acute issues  - Monitor for signs of infection    Endocrine: no acute issues  - Monitor glucose on BMP    Disposition: Discharge to home

## 2021-04-21 NOTE — CONSULT NOTE ADULT - ATTENDING COMMENTS
35yFemale with cryptogenic cirrhosis, portal hypertension with small esophageal varices, ascites, Budd Chiari syndrome, Protein S deficiency with suprarenal IVC thrombus s/p IR unsuccessful attempt at IVC thrombectomy (occlusion could not be crossed)  Heparin drip gtt  IVF  Serial HCT
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 IVC thrombectomy was performed yesterday by IR, but she has a tight narrowed segment of the IVC just inferior to the RA that may be due to vascular stricture vs extrinsic compression and that was unable to be transversed with a wire despite attempts both from above and below that segment. She has retrograde IVC flow into a very large azygous collateral with return to SVC.    Her case was discussed at length with transplant surgery Dr. Remington Elias and IR Dr. Fernandez Otoole and Dr. Darrick Ragsdale. DIPS (from IVC below the narrowed segment to right portal vein) will be technically challenging, but may lead to hepatic decongestion by circulating her portal venous inflow into IVC for retrograde return via the azygous collateral to the SVC. This could improve her portal hypertension and also help preserve her liver synthetic function. Moreover, if she has eventual long-term hepatic remodeling with decongestion and her caudate lobe decreases in size, it is theoretically possible that the narrowed segment of the IVC may have less extrinsic compression and eventually be able to be traversed and stented. If so, this could open possibilities in terms of future liver transplant candidacy. Otherwise, she might need caval reconstruction by CT Surgery after hepatectomy as part of a very complex and high risk liver transplant surgery, if/when her liver disease eventually worsens.    Post-IR intervention, she is clinically doing well apart from some vaginal bleeding related to her known irregular menses. If re-check Hb/HCT this afternoon is stable, then okay for discharge home on her home diuretics and Xarelto, with plan for close outpatient follow-up and likely re-admission in 2 weeks for DIPS.    Her plan of care was discussed with the SICU team at bedside today. Please don't hesitate to call with any questions or concerns.    Meme Bryant M.D., Ph.D.  Transplant Hepatology  Cell: [phone]

## 2021-04-21 NOTE — DISCHARGE NOTE PROVIDER - CARE PROVIDERS DIRECT ADDRESSES
,DirectAddress_Unknown ,DirectAddress_Unknown,[email or web address]

## 2021-04-21 NOTE — PROGRESS NOTE ADULT - SUBJECTIVE AND OBJECTIVE BOX
Interventional Radiology Follow-Up Note.    Pt seen between 7AM - 8 AM.    This is a 35y Female s/p Inferior Cavogram and Thrombectomy on 4/20 in Interventional Radiology with Dr. Ragsdale.     Reports that her recent menstrual period lasted 3 weeks.    Medication:     heparin  Infusion: (04-20)    Vitals:   T(F): 97.5, Max: 98 (10:37)  HR: 83  BP: 108/70  RR: 19  SpO2: 96%    Physical Exam:  General: Nontoxic, in NAD.  Abdomen: soft, NTND.   Right neck: no hematoma. Soft.  Extremities:  right groin clean, dry and intact, soft with no evidence of hematoma, femoral/dp/pt pulses +2. No pedal edema or calf tenderness noted.    LABS:  WBC 6.38 / Hgb 10.4 / Hct 33.5 / Plt 102  Na -- / K -- / CO2 -- / Cl -- / BUN -- / Cr -- / Glucose --  ALT -- / AST -- / Alk Phos -- / Tbili --  Ptt 195.1 / Pt 15.6 / INR 1.32      Assessment/Plan:  35y Female admitted with Budd-Chiari syndrome and IVC thrombosis s/p  Inferior Cavogram and Thrombectomy on 4/20.    - Switch to Eliquis without loading dose.  - GYN consult to evaluate vaginal bleeding.   - Pt to follow up with IR 2 weeks for TIPS.  - D/w Dr. Ragsdale.     Please call IR at [phone] with any questions, concerns, or issues regarding above.       Interventional Radiology Follow-Up Note.    Pt seen between 7AM - 8 AM.    This is a 35y Female s/p Inferior Cavogram and Thrombectomy on 4/20 in Interventional Radiology with Dr. Ragsdale.     Reports that her recent menstrual period lasted 3 weeks.    Medication:     heparin  Infusion: (04-20)    Vitals:   T(F): 97.5, Max: 98 (10:37)  HR: 83  BP: 108/70  RR: 19  SpO2: 96%    Physical Exam:  General: Nontoxic, in NAD.  Abdomen: soft, NTND.   Right neck: no hematoma. Soft.  Extremities:  right groin clean, dry and intact, soft with no evidence of hematoma, femoral/dp/pt pulses +2. No pedal edema or calf tenderness noted.    LABS:  WBC 6.38 / Hgb 10.4 / Hct 33.5 / Plt 102  Na -- / K -- / CO2 -- / Cl -- / BUN -- / Cr -- / Glucose --  ALT -- / AST -- / Alk Phos -- / Tbili --  Ptt 195.1 / Pt 15.6 / INR 1.32      Assessment/Plan:  35y Female admitted with Budd-Chiari syndrome and IVC thrombosis s/p  Inferior Cavogram and Thrombectomy on 4/20.    - Switch to xarelto.  - GYN consult to evaluate vaginal bleeding.   - Pt to follow up with IR 2 weeks for TIPS.  - D/w Dr. Ragsdale.     Please call IR at [phone] with any questions, concerns, or issues regarding above.       Interventional Radiology Follow-Up Note.    Pt seen between 7AM - 8 AM.    This is a 35y Female s/p Inferior Cavogram and Thrombectomy on 4/20 in Interventional Radiology with Dr. Ragsdale.     Reports that her recent menstrual period lasted 3 weeks.    Medication:     heparin  Infusion: (04-20)    Vitals:   T(F): 97.5, Max: 98 (10:37)  HR: 83  BP: 108/70  RR: 19  SpO2: 96%    Physical Exam:  General: Nontoxic, in NAD.  Abdomen: soft, NTND.   Right neck: no hematoma. Soft.  Extremities:  right groin clean, dry and intact, soft with no evidence of hematoma, femoral/dp/pt pulses +2. No pedal edema or calf tenderness noted.    LABS:  WBC 6.38 / Hgb 10.4 / Hct 33.5 / Plt 102  Na -- / K -- / CO2 -- / Cl -- / BUN -- / Cr -- / Glucose --  ALT -- / AST -- / Alk Phos -- / Tbili --  Ptt 195.1 / Pt 15.6 / INR 1.32      Assessment/Plan:  35y Female admitted with Budd-Chiari syndrome and IVC thrombosis s/p  Inferior Cavogram and Thrombectomy on 4/20.    - Switch to xarelto.  - GYN consult to evaluate vaginal bleeding.   - Pt to follow up with IR on May 11th at 7 Am for TIPS via percutraneou approach. Pt to go for COVID test on May 8th at 11AM. Pt to Hold Xarelto on May 8,9 and 10.  - Planing for CT chest non con to r/o hematoma per team.  - Pt seen with Dr. Ragsdale.      Please call IR at [phone] or [phone] with any questions, concerns, or issues regarding above.

## 2021-04-21 NOTE — DISCHARGE NOTE PROVIDER - PROVIDER TOKENS
FREE:[LAST:[Melyssa],FIRST:[Darrick],PHONE:[phone]],FAX:[(   )    -],ADDRESS:[[facility]],SCHEDULEDAPPT:[05/11/2021],SCHEDULEDAPPTTIME:[07:00 AM]] FREE:[LAST:[Melyssa],FIRST:[Darrick],PHONE:[phone]],FAX:[(   )    -],ADDRESS:[[facility]],SCHEDULEDAPPT:[05/11/2021],SCHEDULEDAPPTTIME:[07:00 AM]],PROVIDER:[TOKEN:[[id number]]]

## 2021-04-21 NOTE — DISCHARGE NOTE PROVIDER - CARE PROVIDER_API CALL
Darrick Ragsdale  [facility]  Phone: [phone]  Fax: (   )    -  Scheduled Appointment: 05/11/2021 07:00 AM   Darrick Ragsdale  [facility]  Phone: [phone]  Fax: (   )    -  Scheduled Appointment: 05/11/2021 07:00 AM    Haydee Carvajal)  Gastroenterology; Internal Medicine; Transplant Hepatology  [address] [place] [address]  Phone: [phone]  Fax: [phone]  Follow Up Time:

## 2021-04-21 NOTE — CONSULT NOTE ADULT - SUBJECTIVE AND OBJECTIVE BOX
HISTORY OF PRESENT ILLNESS:  ERIC DAVILA is a 35 year old British & English speaking female with PMH of cryptogenic cirrhosis, portal hypertension with small esophageal varices, Budd Chiari syndrome, Protein S deficiency, multiple miscarriages, COVID-19 PCR + 3/4/21, was in 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 Was started on heparin gtt and transitioned to Xarelto. Last dose of Xarelto 15 mg taken 04/18/21.    Pt went to IR on 4/20 for attempted IVC thrombectomy and TIPS. Cavogram performed which demonstrated occluded intrahepatic IVC. Suction thrombectomy performed using penumbra Cat12; occlusion could not be crossed. TIPS/DIPS deferred. Pt transferred to SICU for monitoring and to start heparin drip.      PAST MEDICAL HISTORY: Cirrhosis  Miscarriage  H/O protein S deficiency      PAST SURGICAL HISTORY: No significant past surgical history    FAMILY HISTORY: No pertinent family history in first degree relatives    SOCIAL HISTORY: , lives with spouse Darron () [phone]. Denies ETOH use and smoking.      CODE STATUS: full code    HOME MEDICATIONS:  * Patient Currently Takes Medications as of 19-Apr-2021 13:51 documented in Structured Notes  · 	Multiple Vitamins oral tablet: Last Dose Taken:  , 1 tab(s) orally once a day  · 	spironolactone 25 mg oral tablet: Last Dose Taken:  , 8 tab(s) orally once a day  · 	furosemide 80 mg oral tablet: Last Dose Taken:  , 1 tab(s) orally once a day  · 	Senna 8.6 mg oral tablet: Last Dose Taken:  , 2 tab(s) orally once a day (at bedtime)  · 	omeprazole 40 mg oral delayed release capsule: Last Dose Taken:  , 1 cap(s) orally once a day  · 	MiraLax oral powder for reconstitution: Last Dose Taken:  , use 17g daily  · 	Xarelto Starter Pack 15 mg-20 mg oral kit: Last Dose Taken:  , 15mg by mouth twice a day for 3 weeks  	Last dose of 15mg was  4/18/2021 PM    ALLERGIES: No Known Allergies      VITAL SIGNS:  ICU Vital Signs Last 24 Hrs  T(C): 36.1 (20 Apr 2021 16:44), Max: 36.7 (20 Apr 2021 10:37)  T(F): 97 (20 Apr 2021 16:44), Max: 98 (20 Apr 2021 10:37)  HR: 99 (20 Apr 2021 18:15) (85 - 110)  BP: 88/51 (20 Apr 2021 18:00) (88/51 - 106/78)  BP(mean): 65 (20 Apr 2021 18:00) (65 - 79)  ABP: 80/48 (20 Apr 2021 18:15) (75/45 - 86/53)  ABP(mean): 63 (20 Apr 2021 18:15) (59 - 68)  RR: 17 (20 Apr 2021 18:15) (10 - 21)  SpO2: 97% (20 Apr 2021 18:15) (92% - 99%)      NEURO  Exam: awake, alert and oriented x3, NAD  Meds:HYDROmorphone  Injectable 0.25 milliGRAM(s) IV Push every 3 hours PRN pain      RESPIRATORY  ABG - ( 20 Apr 2021 16:53 )  pH: 7.39  /  pCO2: 41    /  pO2: 90    / HCO3: 24    / Base Excess: -.1   /  SaO2: 97      Blood Gas Arterial, Lactate: 1.5 mmol/L (04.20.21 @ 16:53)      Exam: clear to auscultation bilaterally   Meds: x    CARDIOVASCULAR  Exam: regular rate and rhyhtm  Cardiac Rhythm: sinus  Meds: x    GI/NUTRITION  Exam: soft, nontender, nondistended  Diet: Regular  Meds:pantoprazole  Injectable 40 milliGRAM(s) IV Push daily      GENITOURINARY/RENAL  Meds:lactated ringers. 1000 milliLiter(s) IV Continuous <Continuous>      04-20 @ 07:01  -  04-20 @ 18:26  --------------------------------------------------------  IN:    Heparin: 24 mL    IV PiggyBack: 150 mL    Lactated Ringers: 225 mL  Total IN: 399 mL    OUT:    Indwelling Catheter - Urethral (mL): 75 mL  Total OUT: 75 mL    Total NET: 324 mL        Weight (kg): 45.1 (04-20 @ 11:55)  04-20    136  |  104  |  24<H>  ----------------------------<  96  4.4   |  21<L>  |  0.63    Ca    8.2<L>      20 Apr 2021 16:57  Phos  3.4     04-20  Mg     1.8     04-20    TPro  5.9<L>  /  Alb  3.3  /  TBili  2.2<H>  /  DBili  x   /  AST  35  /  ALT  25  /  AlkPhos  140<H>  04-20    [ ] Lucas catheter, indication: urine output monitoring in critically ill patient    HEMATOLOGIC  [ x] VTE Prophylaxis:  heparin  Infusion 800 Unit(s)/Hr IV Continuous <Continuous>                          11.3   5.33  )-----------( 130      ( 20 Apr 2021 16:57 )             36.1     PT/INR - ( 20 Apr 2021 16:57 )   PT: 16.2 sec;   INR: 1.37 ratio         PTT - ( 20 Apr 2021 16:57 )  PTT:37.8 sec  Transfusion: [ ] PRBC	[ ] Platelets	[ ] FFP	[ ] Cryoprecipitate      INFECTIOUS DISEASES  Meds: x  RECENT CULTURES: x      ENDOCRINE  Meds: x  CAPILLARY BLOOD GLUCOSE          PATIENT CARE ACCESS DEVICES:  [x ] Peripheral IV  [ ] Central Venous Line	[ ] R	[ ] L	[ ] IJ	[ ] Fem	[ ] SC	Placed:   [x ] Arterial Line		[x ] R	[ ] L	[ ] Fem	[x ] Rad	[ ] Ax	Placed:  4/20  [ ] PICC:					[ ] Mediport  [ x] Urinary Catheter, Date Placed: 4/20  [x] Necessity of urinary, arterial, and venous catheters discussed    OTHER MEDICATIONS: chlorhexidine 2% Cloths 1 Application(s) Topical daily      IMAGING STUDIES: < from: CT Abdomen and Pelvis w/ IV Cont (03.28.21 @ 23:32) >    IMPRESSION:  Suprarenal IVC thrombus. Vascular collateralization with enlargement of the azygos and paraspinal vasculature.    Hepatic cirrhosis with heterogeneous/mottled appearance throughout the liver, which again can be seen inthe setting of Budd-Chiari. New region of hypoenhancement in the caudate lobe which could be perfusion related to the IVC thrombus, however, hepatic infarct not excluded. Interval mild enlargement of hyperenhancing focus in segment 8. Follow-up with dedicated liver MRI is recommended.    Hepatosplenomegaly and small volume abdominopelvic free fluid, suggestive of portal venous hypertension.      
Chief Complaint:  Patient is a 35y old  Female who presents with a chief complaint of TIPS (2021 17:06)      HPI:ERIC DAVILA is a 35y Female w/ hx of protein S deficiency, cirrhosis due to chronic Budd-Chiari syndrome decompensated by ascites/LE edema, admitted after IR procedure for observation. Patient underwent angiogram w/ IR - she has stenosis of IVC and large azygous collaterals draining into the SVC. Clot from IVC was removed but stenosis could not be traversed. DIPS not performed.    After procedure, patient feels well. Denies abd pain. Hb dropped significantly but VSS. Labs stable.     PMHX/PSHX:  Cirrhosis    Miscarriage    H/O protein S deficiency    No significant past surgical history    No significant past surgical history      Allergies:  No Known Allergies      Home Medications: reviewed  Hospital Medications:  chlorhexidine 2% Cloths 1 Application(s) Topical daily  heparin  Infusion 800 Unit(s)/Hr IV Continuous <Continuous>  HYDROmorphone  Injectable 0.25 milliGRAM(s) IV Push every 3 hours PRN  pantoprazole  Injectable 40 milliGRAM(s) IV Push daily  polyethylene glycol 3350 17 Gram(s) Oral daily  senna 2 Tablet(s) Oral at bedtime      Social History:   Tob: Denies  EtOH: Denies  Illicit Drugs: Denies    Family history:  No pertinent family history in first degree relatives      Denies family history of colon cancer/polyps, stomach cancer/polyps, pancreatic cancer/masses, liver cancer/disease, ovarian cancer and endometrial cancer.    ROS:   General:  No  fevers, chills, night sweats, fatigue  Eyes:  Good vision, no reported pain  ENT:  No sore throat, pain, runny nose  CV:  No pain, palpitations  Pulm:  No dyspnea, cough  GI:  See HPI, otherwise negative  :  No  incontinence, nocturia  Muscle:  No pain, weakness  Neuro:  No memory problems  Psych:  No insomnia, mood problems, depression  Endocrine:  No polyuria, polydipsia, cold/heat intolerance  Heme:  No petechiae, ecchymosis, easy bruisability  Skin:  No rash    PHYSICAL EXAM:   Vital Signs:  Vital Signs Last 24 Hrs  T(C): 36.4 (2021 08:00), Max: 36.7 (2021 10:37)  T(F): 97.5 (2021 08:00), Max: 98 (2021 10:37)  HR: 80 (2021 10:00) (63 - 110)  BP: 106/64 (2021 10:00) (82/53 - 108/70)  BP(mean): 80 (2021 10:00) (63 - 84)  RR: 35 (2021 10:00) (10 - 35)  SpO2: 97% (2021 10:00) (92% - 100%)  Daily Height in cm: 154.94 (2021 11:55)    Daily Weight in k.1 (2021 04:00)    GENERAL: no acute distress  NEURO: alert  HEENT: anicteric sclera, no conjunctival pallor appreciated  CHEST: no respiratory distress, no accessory muscle use  CARDIAC: regular rate, rhythm  ABDOMEN: soft, non-tender, non-distended, no rebound or guarding  EXTREMITIES: warm, well perfused, no edema  SKIN: no lesions noted    LABS: reviewed                        10.4   6.38  )-----------( 102      ( 2021 05:14 )             33.5     04-20    132<L>  |  104  |  25<H>  ----------------------------<  127<H>  4.6   |  19<L>  |  0.61    Ca    9.0      2021 23:03  Phos  3.8     04-20  Mg     2.3     04-20    TPro  5.9<L>  /  Alb  3.3  /  TBili  2.2<H>  /  DBili  x   /  AST  35  /  ALT  25  /  AlkPhos  140<H>  04-20    LIVER FUNCTIONS - ( 2021 16:57 )  Alb: 3.3 g/dL / Pro: 5.9 g/dL / ALK PHOS: 140 U/L / ALT: 25 U/L / AST: 35 U/L / GGT: x               Diagnostic Studies: see sunrise for full report

## 2021-04-21 NOTE — PROGRESS NOTE ADULT - ASSESSMENT
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     Underwent IVC thrombectomy in IR today; occlusion could not be crossed. TIPs deferred.    - Plan to transition from heparin drip to Xarelto today pending afternoon CBC  - Serial CBCs  - IR following  -Gyn consulted for vaginal bleeding  - Plan for TIPS/DIPS in ~2wks  - Holding diuretics will start if afternoon cbc is stable   -will dc home once cleared by gyn/IR.

## 2021-04-21 NOTE — DISCHARGE NOTE PROVIDER - NSRESEARCHGRANT_OVERRIDEREC_GEN_A_CORE
Dual antiplatelet therapy/Planned surgery/procedure Dual antiplatelet therapy/Planned surgery/procedure/This is a surgical and/or non-medical patient.

## 2021-04-22 LAB
ALBUMIN SERPL ELPH-MCNC: 3 G/DL — LOW (ref 3.3–5)
ALP SERPL-CCNC: 130 U/L — HIGH (ref 40–120)
ALT FLD-CCNC: 24 U/L — SIGNIFICANT CHANGE UP (ref 10–45)
ANION GAP SERPL CALC-SCNC: 10 MMOL/L — SIGNIFICANT CHANGE UP (ref 5–17)
APTT BLD: 41.4 SEC — HIGH (ref 27.5–35.5)
AST SERPL-CCNC: 35 U/L — SIGNIFICANT CHANGE UP (ref 10–40)
BILIRUB SERPL-MCNC: 1.4 MG/DL — HIGH (ref 0.2–1.2)
BUN SERPL-MCNC: 12 MG/DL — SIGNIFICANT CHANGE UP (ref 7–23)
CALCIUM SERPL-MCNC: 7.8 MG/DL — LOW (ref 8.4–10.5)
CHLORIDE SERPL-SCNC: 103 MMOL/L — SIGNIFICANT CHANGE UP (ref 96–108)
CK MB CFR SERPL CALC: 1 NG/ML — SIGNIFICANT CHANGE UP (ref 0–3.8)
CK SERPL-CCNC: 34 U/L — SIGNIFICANT CHANGE UP (ref 25–170)
CO2 SERPL-SCNC: 22 MMOL/L — SIGNIFICANT CHANGE UP (ref 22–31)
CREAT SERPL-MCNC: 0.53 MG/DL — SIGNIFICANT CHANGE UP (ref 0.5–1.3)
GLUCOSE SERPL-MCNC: 96 MG/DL — SIGNIFICANT CHANGE UP (ref 70–99)
HCT VFR BLD CALC: 34 % — LOW (ref 34.5–45)
HGB BLD-MCNC: 10.3 G/DL — LOW (ref 11.5–15.5)
INR BLD: 2.36 RATIO — HIGH (ref 0.88–1.16)
MAGNESIUM SERPL-MCNC: 2.3 MG/DL — SIGNIFICANT CHANGE UP (ref 1.6–2.6)
MCHC RBC-ENTMCNC: 26.5 PG — LOW (ref 27–34)
MCHC RBC-ENTMCNC: 30.3 GM/DL — LOW (ref 32–36)
MCV RBC AUTO: 87.6 FL — SIGNIFICANT CHANGE UP (ref 80–100)
NRBC # BLD: 0 /100 WBCS — SIGNIFICANT CHANGE UP (ref 0–0)
PHOSPHATE SERPL-MCNC: 4 MG/DL — SIGNIFICANT CHANGE UP (ref 2.5–4.5)
PLATELET # BLD AUTO: 107 K/UL — LOW (ref 150–400)
POTASSIUM SERPL-MCNC: 3.6 MMOL/L — SIGNIFICANT CHANGE UP (ref 3.5–5.3)
POTASSIUM SERPL-SCNC: 3.6 MMOL/L — SIGNIFICANT CHANGE UP (ref 3.5–5.3)
PROT SERPL-MCNC: 5.7 G/DL — LOW (ref 6–8.3)
PROTHROM AB SERPL-ACNC: 27.2 SEC — HIGH (ref 10.6–13.6)
RBC # BLD: 3.88 M/UL — SIGNIFICANT CHANGE UP (ref 3.8–5.2)
RBC # FLD: 15.4 % — HIGH (ref 10.3–14.5)
SODIUM SERPL-SCNC: 135 MMOL/L — SIGNIFICANT CHANGE UP (ref 135–145)
TROPONIN T, HIGH SENSITIVITY RESULT: <6 NG/L — SIGNIFICANT CHANGE UP (ref 0–51)
WBC # BLD: 4.85 K/UL — SIGNIFICANT CHANGE UP (ref 3.8–10.5)
WBC # FLD AUTO: 4.85 K/UL — SIGNIFICANT CHANGE UP (ref 3.8–10.5)

## 2021-04-22 PROCEDURE — 71250 CT THORAX DX C-: CPT | Mod: 26

## 2021-04-22 PROCEDURE — 99231 SBSQ HOSP IP/OBS SF/LOW 25: CPT

## 2021-04-22 PROCEDURE — 99232 SBSQ HOSP IP/OBS MODERATE 35: CPT | Mod: GC

## 2021-04-22 PROCEDURE — 93010 ELECTROCARDIOGRAM REPORT: CPT

## 2021-04-22 RX ORDER — LACTULOSE 10 G/15ML
20 SOLUTION ORAL ONCE
Refills: 0 | Status: COMPLETED | OUTPATIENT
Start: 2021-04-22 | End: 2021-04-22

## 2021-04-22 RX ORDER — SPIRONOLACTONE 25 MG/1
100 TABLET, FILM COATED ORAL DAILY
Refills: 0 | Status: DISCONTINUED | OUTPATIENT
Start: 2021-04-22 | End: 2021-04-23

## 2021-04-22 RX ORDER — FUROSEMIDE 40 MG
40 TABLET ORAL DAILY
Refills: 0 | Status: DISCONTINUED | OUTPATIENT
Start: 2021-04-22 | End: 2021-04-23

## 2021-04-22 RX ADMIN — POLYETHYLENE GLYCOL 3350 17 GRAM(S): 17 POWDER, FOR SOLUTION ORAL at 12:03

## 2021-04-22 RX ADMIN — PANTOPRAZOLE SODIUM 40 MILLIGRAM(S): 20 TABLET, DELAYED RELEASE ORAL at 12:02

## 2021-04-22 RX ADMIN — HYDROMORPHONE HYDROCHLORIDE 0.25 MILLIGRAM(S): 2 INJECTION INTRAMUSCULAR; INTRAVENOUS; SUBCUTANEOUS at 04:30

## 2021-04-22 RX ADMIN — LACTULOSE 20 GRAM(S): 10 SOLUTION ORAL at 14:21

## 2021-04-22 RX ADMIN — Medication 40 MILLIGRAM(S): at 18:32

## 2021-04-22 RX ADMIN — HYDROMORPHONE HYDROCHLORIDE 0.25 MILLIGRAM(S): 2 INJECTION INTRAMUSCULAR; INTRAVENOUS; SUBCUTANEOUS at 03:59

## 2021-04-22 RX ADMIN — Medication 250 MILLIMOLE(S): at 01:45

## 2021-04-22 RX ADMIN — RIVAROXABAN 20 MILLIGRAM(S): KIT at 12:02

## 2021-04-22 RX ADMIN — SPIRONOLACTONE 100 MILLIGRAM(S): 25 TABLET, FILM COATED ORAL at 16:53

## 2021-04-22 RX ADMIN — Medication 50 GRAM(S): at 00:08

## 2021-04-22 RX ADMIN — CHLORHEXIDINE GLUCONATE 1 APPLICATION(S): 213 SOLUTION TOPICAL at 12:03

## 2021-04-22 RX ADMIN — BENZOCAINE AND MENTHOL 1 LOZENGE: 5; 1 LIQUID ORAL at 00:08

## 2021-04-22 NOTE — PROGRESS NOTE ADULT - SUBJECTIVE AND OBJECTIVE BOX
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     Underwent IVC thrombectomy in IR yesterday; occlusion could not be crossed. TIPs was deferred. Admitted to SICU for overnight for observation and heparin gtt initiation. Started Xarelto yesterday and downgraded from SICU   Interval events:   -Downgraded from SICU  - off hep drip and started Xarelto  - Had episode of vaginal bleeding, seen and cleared by OBGYN    Potential discharge date: Today      MEDICATIONS  MEDICATIONS  (STANDING):  chlorhexidine 2% Cloths 1 Application(s) Topical daily  pantoprazole  Injectable 40 milliGRAM(s) IV Push daily  polyethylene glycol 3350 17 Gram(s) Oral daily  rivaroxaban 20 milliGRAM(s) Oral daily  senna 2 Tablet(s) Oral at bedtime    MEDICATIONS  (PRN):  HYDROmorphone  Injectable 0.25 milliGRAM(s) IV Push every 3 hours PRN pain        PAST MEDICAL & SURGICAL HISTORY:  Cirrhosis  2010    Miscarriage  x 5    H/O protein S deficiency    No significant past surgical history        Vital Signs  Vital Signs Last 24 Hrs  T(C): 36.5 (22 Apr 2021 09:00), Max: 36.7 (22 Apr 2021 01:07)  T(F): 97.7 (22 Apr 2021 09:00), Max: 98.1 (22 Apr 2021 01:07)  HR: 86 (22 Apr 2021 09:00) (82 - 99)  BP: 101/71 (22 Apr 2021 09:00) (87/55 - 112/81)  BP(mean): 77 (22 Apr 2021 03:58) (67 - 92)  RR: 18 (22 Apr 2021 09:00) (18 - 41)  SpO2: 96% (22 Apr 2021 09:00) (94% - 98%)    I&O's    21 Apr 2021 07:01  -  22 Apr 2021 07:00  --------------------------------------------------------  IN:    Heparin: 35 mL    IV PiggyBack: 550 mL    Lactated Ringers: 75 mL    Oral Fluid: 240 mL  Total IN: 900 mL    OUT:    Indwelling Catheter - Urethral (mL): 55 mL    Voided (mL): 650 mL  Total OUT: 705 mL    Total NET: 195 mL      22 Apr 2021 07:01  -  22 Apr 2021 11:59  --------------------------------------------------------  IN:    Oral Fluid: 100 mL  Total IN: 100 mL    OUT:    Voided (mL): 300 mL  Total OUT: 300 mL    Total NET: -200 mL    Labs:  CAPILLARY BLOOD GLUCOSE                              10.3   4.85  )-----------( 107      ( 22 Apr 2021 06:31 )             34.0         04-22    135  |  103  |  12  ----------------------------<  96  3.6   |  22  |  0.53      Calcium, Total Serum: 7.8 mg/dL (04-22-21 @ 06:25)      LFTs:             5.7  | 1.4  | 35       ------------------[130     ( 22 Apr 2021 06:25 )  3.0  | x    | 24          Lipase:x      Amylase:x         Blood Gas Arterial, Lactate: 1.5 mmol/L (04-20-21 @ 16:53)    ABG - ( 20 Apr 2021 16:53 )  pH: 7.39  /  pCO2: 41    /  pO2: 90    / HCO3: 24    / Base Excess: -.1   /  SaO2: 97                Coags:     27.2   ----< 2.36    ( 22 Apr 2021 06:29 )     41.4        CARDIAC MARKERS ( 22 Apr 2021 08:40 )  x     / x     / 34 U/L / x     / 1.0 ng/mL            Review of systems  Gen: No weight changes, fatigue, fevers/chills, weakness  Skin: No rashes  Head/Eyes/Ears/Mouth: No headache; Normal hearing; Normal vision w/o blurriness; No sinus pain/discomfort, sore throat  Respiratory: No dyspnea, cough, wheezing, hemoptysis  CV: No chest pain, PND, orthopnea  GI: C/O mild abdominal pain at surgical site. no diarrhea, constipation, nausea, vomiting, melena, hematochezia  : No increased frequency, dysuria, hematuria, nocturia  MSK: No joint pain/swelling; no back pain; no edema  Neuro: No dizziness/lightheadedness, weakness, seizures, numbness, tingling  Heme: No easy bruising or bleeding  Endo: No heat/cold intolerance  Psych: No significant nervousness, anxiety, stress, depression  All other systems were reviewed and are negative, except as noted.    PHYSICAL EXAM:  Constitutional: Well developed / well nourished  Eyes: anicteric  ENMT: nc/at, no thrush  Neck: supple  Respiratory: CTA B/L  Cardiovascular: RRR  Gastrointestinal: Soft abdomen, ND, non distended  Genitourinary: Voiding spontaneously  Extremities: SCD's in place and working bilaterally  Vascular: Palpable dp pulses bilaterally.   Neurological: A&O x3  Skin: no rashes, ulcerations, lesions  Musculoskeletal: Moving all extremities  Psychiatric: Responsive

## 2021-04-22 NOTE — PROVIDER CONTACT NOTE (OTHER) - SITUATION
Patient c/o pain to rt chest and arm
Patient reports pain to lt chest and abdominal area
pt returned from CT chest - requesting 1 hour before she is placed back on tele monitoring

## 2021-04-22 NOTE — PROGRESS NOTE ADULT - SUBJECTIVE AND OBJECTIVE BOX
Chief Complaint:  Patient is a 35y old  Female who presents with a chief complaint of TIPS (2021 11:56)    Reason for consult: cirrhosis, budd-chiari    Interval Events: Pt reporting chest pressure, dyspnea  that improves w/ ambulation. Had some vaginal bleeding yesterday that has slowed down, Hb stable.     Hospital Medications:  chlorhexidine 2% Cloths 1 Application(s) Topical daily  HYDROmorphone  Injectable 0.25 milliGRAM(s) IV Push every 3 hours PRN  pantoprazole  Injectable 40 milliGRAM(s) IV Push daily  polyethylene glycol 3350 17 Gram(s) Oral daily  rivaroxaban 20 milliGRAM(s) Oral daily  senna 2 Tablet(s) Oral at bedtime      ROS:   General:  No  fevers, chills, night sweats, fatigue  Eyes:  Good vision, no reported pain  ENT:  No sore throat, pain, runny nose  CV:  No pain, palpitations  Pulm:  No cough  GI:  See HPI, otherwise negative  :  No  incontinence, nocturia  Muscle:  No pain, weakness  Neuro:  No memory problems  Psych:  No insomnia, mood problems, depression  Endocrine:  No polyuria, polydipsia, cold/heat intolerance  Heme:  No petechiae, ecchymosis, easy bruisability  Skin:  No rash    PHYSICAL EXAM:   Vital Signs:  Vital Signs Last 24 Hrs  T(C): 36.5 (2021 09:00), Max: 36.7 (2021 01:07)  T(F): 97.7 (2021 09:00), Max: 98.1 (2021 01:07)  HR: 86 (2021 09:00) (82 - 99)  BP: 101/71 (2021 09:00) (87/55 - 112/81)  BP(mean): 77 (2021 03:58) (67 - 92)  RR: 18 (2021 09:00) (18 - 41)  SpO2: 96% (2021 09:00) (94% - 98%)  Daily     Daily Weight in k.8 (2021 05:00)    GENERAL: no acute distress  NEURO: alert  HEENT: anicteric sclera, no conjunctival pallor appreciated  CHEST: no respiratory distress, no accessory muscle use, decreased breath sounds at bases b/l  CARDIAC: regular rate, rhythm  ABDOMEN: soft, non-tender, non-distended, no rebound or guarding  EXTREMITIES: warm, well perfused, no edema  SKIN: no lesions noted    LABS: reviewed                        10.3   4.85  )-----------( 107      ( 2021 06:31 )             34.0     -    135  |  103  |  12  ----------------------------<  96  3.6   |  22  |  0.53    Ca    7.8<L>      2021 06:25  Phos  4.0       Mg     2.3         TPro  5.7<L>  /  Alb  3.0<L>  /  TBili  1.4<H>  /  DBili  x   /  AST  35  /  ALT  24  /  AlkPhos  130<H>      LIVER FUNCTIONS - ( 2021 06:25 )  Alb: 3.0 g/dL / Pro: 5.7 g/dL / ALK PHOS: 130 U/L / ALT: 24 U/L / AST: 35 U/L / GGT: x             Interval Diagnostic Studies: see sunrise for full report   Chief Complaint:  Patient is a 35y old  Female who presents with a chief complaint of TIPS (2021 11:56)    Reason for consult: cirrhosis, budd-chiari    Interval Events: Pt reporting chest pressure, dyspnea  that improves w/ ambulation. Had some vaginal bleeding yesterday that has slowed down, Hb stable.     Hospital Medications:  chlorhexidine 2% Cloths 1 Application(s) Topical daily  HYDROmorphone  Injectable 0.25 milliGRAM(s) IV Push every 3 hours PRN  pantoprazole  Injectable 40 milliGRAM(s) IV Push daily  polyethylene glycol 3350 17 Gram(s) Oral daily  rivaroxaban 20 milliGRAM(s) Oral daily  senna 2 Tablet(s) Oral at bedtime      ROS:   General:  No  fevers, chills, night sweats, fatigue  Eyes:  Good vision, no reported pain  ENT:  No sore throat, pain, runny nose  CV:  No pain, palpitations  Pulm:  No cough  GI:  See HPI, otherwise negative  :  No  incontinence, nocturia  Muscle:  No pain, weakness  Neuro:  No memory problems  Psych:  No insomnia, mood problems, depression  Endocrine:  No polyuria, polydipsia, cold/heat intolerance  Heme:  No petechiae, ecchymosis, easy bruisability  Skin:  No rash    PHYSICAL EXAM:   Vital Signs:  Vital Signs Last 24 Hrs  T(C): 36.5 (2021 09:00), Max: 36.7 (2021 01:07)  T(F): 97.7 (2021 09:00), Max: 98.1 (2021 01:07)  HR: 86 (2021 09:00) (82 - 99)  BP: 101/71 (2021 09:00) (87/55 - 112/81)  BP(mean): 77 (2021 03:58) (67 - 92)  RR: 18 (2021 09:00) (18 - 41)  SpO2: 96% (2021 09:00) (94% - 98%)  Daily     Daily Weight in k.8 (2021 05:00)    GENERAL: no acute distress  NEURO: alert  HEENT: anicteric sclera, no conjunctival pallor appreciated  CHEST: no respiratory distress, no accessory muscle use, decreased breath sounds at R base  CARDIAC: regular rate, rhythm  ABDOMEN: soft, non-tender, non-distended, no rebound or guarding  EXTREMITIES: warm, well perfused, no edema  SKIN: no lesions noted    LABS: reviewed                        10.3   4.85  )-----------( 107      ( 2021 06:31 )             34.0         135  |  103  |  12  ----------------------------<  96  3.6   |  22  |  0.53    Ca    7.8<L>      2021 06:25  Phos  4.0       Mg     2.3         TPro  5.7<L>  /  Alb  3.0<L>  /  TBili  1.4<H>  /  DBili  x   /  AST  35  /  ALT  24  /  AlkPhos  130<H>      LIVER FUNCTIONS - ( 2021 06:25 )  Alb: 3.0 g/dL / Pro: 5.7 g/dL / ALK PHOS: 130 U/L / ALT: 24 U/L / AST: 35 U/L / GGT: x             Interval Diagnostic Studies: see sunrise for full report

## 2021-04-22 NOTE — PROGRESS NOTE ADULT - ASSESSMENT
35F w/ decompensated cirrhosis due to chronic Budd-Chiari, due to protein S deficiency, admitted to SICU for observation after IR procedure.    Impression:  #Dyspnea - negative cardiac work-up, d/dx pleural effusions, possible hematoma related to procedure  #Decompensated cirrhosis due to Budd-Chiari syndrome  -varices: small, non-bleeding varices in 2017, on beta blocker  -ascites: none on exam  -HE: none  -HCC: Segment 8 LIRADS 4 lesion stable since 12/2020; AFP WNL 02/2021  -MELD-Na = 18 on 4/21/2021  #IVC stenosis w/ large azygous collaterals  #Protein S deficiency - on xarelto    Recommendations:  - Recommend CT chest, if large effusion can consider thora prior to discharge  - Would start lasix 40mg daily and spironolactone 100mg daily  - Continue xarelto on discharge  - Continue w/ home medications on discharge  - Plan for outpatient DIPS in 2 weeks per IR  - Outpatient follow up w/ Dr. Wei Orellana  Gastroenterology/Hepatology Fellow  Available via [employer] Teams    NON-URGENT CONSULTS:  Please email [email or web address]  AT NIGHT AND ON WEEKENDS/HOLIDAYS  Contact on-call GI fellow via answering service ([phone])  MONDAY-FRIDAY 8AM-5PM:  Page [phone] ([facility]) or [phone] ([facility]) from 8am-5pm M-F     35F w/ decompensated cirrhosis due to chronic Budd-Chiari, due to protein S deficiency, admitted to SICU for observation after IR procedure.    Impression:  #Dyspnea - negative cardiac work-up, d/dx pleural effusions, possible hematoma related to procedure  #Decompensated cirrhosis due to Budd-Chiari syndrome  -varices: small, non-bleeding varices in 2017, on beta blocker  -ascites: none on exam  -HE: none  -HCC: Segment 8 LIRADS 4 lesion stable since 12/2020; AFP WNL 02/2021  -MELD-Na = 18 on 4/21/2021  #IVC stenosis w/ large azygous collaterals  #Protein S deficiency - on xarelto    Recommendations:  - Recommend CT chest, if large effusion can consider thora prior to discharge, also want to rule out hematoma related to IR procedure  - Would start lasix 40mg daily and spironolactone 100mg daily  - Continue xarelto on discharge  - Continue w/ home medications on discharge  - Plan for outpatient DIPS in 2 weeks per IR  - Outpatient follow up w/ Dr. Wei Orellana  Gastroenterology/Hepatology Fellow  Available via [employer] Teams    NON-URGENT CONSULTS:  Please email [email or web address]  AT NIGHT AND ON WEEKENDS/HOLIDAYS  Contact on-call GI fellow via answering service ([phone])  MONDAY-FRIDAY 8AM-5PM:  Page [phone] ([facility]) or [phone] ([facility]) from 8am-5pm M-F

## 2021-04-22 NOTE — PROVIDER CONTACT NOTE (OTHER) - BACKGROUND
Dx: mod R pleural effusion, ascites, and thrombi in suprarenal IVC and SMV s/p attempted IVC thrombectomy and TIPS   PMHx: cirrhosis, protein S deficiency
s/p TIPS
s/p TIPS

## 2021-04-22 NOTE — PROVIDER CONTACT NOTE (OTHER) - ACTION/TREATMENT ORDERED:
same as above
same as above
OK for patient to be off tele for hour as no acute events throughout shift.

## 2021-04-22 NOTE — PROVIDER CONTACT NOTE (OTHER) - REASON
Patient c/o chest pain
pt returned from CT chest - requesting 1 hour before she is placed back on tele monitoring
Patient reports pain to lt chest and abdominal area

## 2021-04-22 NOTE — PROGRESS NOTE ADULT - SUBJECTIVE AND OBJECTIVE BOX
Interventional Radiology Follow-Up Note.     Patient seen and examined @ bedside between 7 AM and 8AM    s/p Inferior Cavogram and Thrombectomy on 4/20 in Interventional Radiology with Dr. Ragsdale.   Pt reporting chest pain non radiating improved with pain meds. Denies SOB.       Medication:     rivaroxaban: (04-22)  spironolactone: (04-22)    Vitals:   T(F): 98, Max: 98.1 (01:07)  HR: 99  BP: 105/68  RR: 18  SpO2: 97%    Physical Exam:  General: Nontoxic, in NAD.  Abdomen: soft, NTND.       LABS:  WBC 4.85 / Hgb 10.3 / Hct 34.0 / Plt 107  Na -- / K -- / CO2 -- / Cl -- / BUN -- / Cr -- / Glucose --  ALT -- / AST -- / Alk Phos -- / Tbili --  Ptt -- / Pt -- / INR --      Assessment/Plan:  35y Female admitted with Budd-Chiari syndrome s/p Inferior Cavogram and Thrombectomy on 4/20 in Interventional Radiology with Dr. Ragsdale.     - Chest pain work up per primary team.  - - Planing for CT chest non con to r/o hematoma per team.  - GYN follow up as out pt for vaginal bleeding.  -  Pt to follow up with IR on May 11th at 7 Am for TIPS via percutraneou approach. Pt to go for COVID test on May 8th at 11AM. Pt to Hold Xarelto on May 8,9 and 10.  - Seen with Dr. Ragsdale.   - D/w Tranplant NP [place].    Please call IR at [phone] or [phone] with any questions, concerns, or issues regarding above.

## 2021-04-22 NOTE — PROGRESS NOTE ADULT - ASSESSMENT
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       - Patient was cleared for D/C on 4/21 but had episode of vaginal bleeding - resolved and cleared by OB team, known history  - Serial CBC - Hgb Stable  - IR following  - Plan for TIPS/DIPS in ~2wks  - Helddiuretics will start  at 1/2 dose  - Plan for D/C today    d/w Dr. Landaverde and  Dr. Zamora

## 2021-04-23 DIAGNOSIS — N93.9 ABNORMAL UTERINE AND VAGINAL BLEEDING, UNSPECIFIED: ICD-10-CM

## 2021-04-23 LAB
ALBUMIN SERPL ELPH-MCNC: 3.1 G/DL — LOW (ref 3.3–5)
ALP SERPL-CCNC: 127 U/L — HIGH (ref 40–120)
ALT FLD-CCNC: 25 U/L — SIGNIFICANT CHANGE UP (ref 10–45)
ANION GAP SERPL CALC-SCNC: 10 MMOL/L — SIGNIFICANT CHANGE UP (ref 5–17)
APTT BLD: 44 SEC — HIGH (ref 27.5–35.5)
AST SERPL-CCNC: 33 U/L — SIGNIFICANT CHANGE UP (ref 10–40)
BILIRUB SERPL-MCNC: 2.1 MG/DL — HIGH (ref 0.2–1.2)
BUN SERPL-MCNC: 9 MG/DL — SIGNIFICANT CHANGE UP (ref 7–23)
CALCIUM SERPL-MCNC: 8.3 MG/DL — LOW (ref 8.4–10.5)
CHLORIDE SERPL-SCNC: 102 MMOL/L — SIGNIFICANT CHANGE UP (ref 96–108)
CO2 SERPL-SCNC: 23 MMOL/L — SIGNIFICANT CHANGE UP (ref 22–31)
CREAT SERPL-MCNC: 0.56 MG/DL — SIGNIFICANT CHANGE UP (ref 0.5–1.3)
GLUCOSE SERPL-MCNC: 91 MG/DL — SIGNIFICANT CHANGE UP (ref 70–99)
HCT VFR BLD CALC: 34.4 % — LOW (ref 34.5–45)
HGB BLD-MCNC: 10.8 G/DL — LOW (ref 11.5–15.5)
INR BLD: 2.29 RATIO — HIGH (ref 0.88–1.16)
MAGNESIUM SERPL-MCNC: 1.9 MG/DL — SIGNIFICANT CHANGE UP (ref 1.6–2.6)
MCHC RBC-ENTMCNC: 27.2 PG — SIGNIFICANT CHANGE UP (ref 27–34)
MCHC RBC-ENTMCNC: 31.4 GM/DL — LOW (ref 32–36)
MCV RBC AUTO: 86.6 FL — SIGNIFICANT CHANGE UP (ref 80–100)
NRBC # BLD: 0 /100 WBCS — SIGNIFICANT CHANGE UP (ref 0–0)
PHOSPHATE SERPL-MCNC: 2.7 MG/DL — SIGNIFICANT CHANGE UP (ref 2.5–4.5)
PLATELET # BLD AUTO: 88 K/UL — LOW (ref 150–400)
POTASSIUM SERPL-MCNC: 4.1 MMOL/L — SIGNIFICANT CHANGE UP (ref 3.5–5.3)
POTASSIUM SERPL-SCNC: 4.1 MMOL/L — SIGNIFICANT CHANGE UP (ref 3.5–5.3)
PROT SERPL-MCNC: 5.7 G/DL — LOW (ref 6–8.3)
PROTHROM AB SERPL-ACNC: 26.4 SEC — HIGH (ref 10.6–13.6)
RBC # BLD: 3.97 M/UL — SIGNIFICANT CHANGE UP (ref 3.8–5.2)
RBC # FLD: 15.3 % — HIGH (ref 10.3–14.5)
SODIUM SERPL-SCNC: 135 MMOL/L — SIGNIFICANT CHANGE UP (ref 135–145)
WBC # BLD: 3.51 K/UL — LOW (ref 3.8–10.5)
WBC # FLD AUTO: 3.51 K/UL — LOW (ref 3.8–10.5)

## 2021-04-23 PROCEDURE — 99232 SBSQ HOSP IP/OBS MODERATE 35: CPT | Mod: GC

## 2021-04-23 PROCEDURE — 99232 SBSQ HOSP IP/OBS MODERATE 35: CPT

## 2021-04-23 RX ORDER — FUROSEMIDE 40 MG
80 TABLET ORAL DAILY
Refills: 0 | Status: DISCONTINUED | OUTPATIENT
Start: 2021-04-24 | End: 2021-04-24

## 2021-04-23 RX ORDER — SPIRONOLACTONE 25 MG/1
100 TABLET, FILM COATED ORAL ONCE
Refills: 0 | Status: COMPLETED | OUTPATIENT
Start: 2021-04-23 | End: 2021-04-23

## 2021-04-23 RX ORDER — FUROSEMIDE 40 MG
40 TABLET ORAL ONCE
Refills: 0 | Status: COMPLETED | OUTPATIENT
Start: 2021-04-23 | End: 2021-04-23

## 2021-04-23 RX ORDER — ENOXAPARIN SODIUM 100 MG/ML
45 INJECTION SUBCUTANEOUS
Refills: 0 | Status: DISCONTINUED | OUTPATIENT
Start: 2021-04-23 | End: 2021-04-23

## 2021-04-23 RX ORDER — ENOXAPARIN SODIUM 100 MG/ML
50 INJECTION SUBCUTANEOUS
Refills: 0 | Status: DISCONTINUED | OUTPATIENT
Start: 2021-04-23 | End: 2021-04-25

## 2021-04-23 RX ORDER — FUROSEMIDE 40 MG
80 TABLET ORAL ONCE
Refills: 0 | Status: COMPLETED | OUTPATIENT
Start: 2021-04-23 | End: 2021-04-23

## 2021-04-23 RX ORDER — SPIRONOLACTONE 25 MG/1
200 TABLET, FILM COATED ORAL DAILY
Refills: 0 | Status: DISCONTINUED | OUTPATIENT
Start: 2021-04-24 | End: 2021-04-26

## 2021-04-23 RX ADMIN — PANTOPRAZOLE SODIUM 40 MILLIGRAM(S): 20 TABLET, DELAYED RELEASE ORAL at 11:31

## 2021-04-23 RX ADMIN — SENNA PLUS 2 TABLET(S): 8.6 TABLET ORAL at 22:36

## 2021-04-23 RX ADMIN — CHLORHEXIDINE GLUCONATE 1 APPLICATION(S): 213 SOLUTION TOPICAL at 11:31

## 2021-04-23 RX ADMIN — ENOXAPARIN SODIUM 50 MILLIGRAM(S): 100 INJECTION SUBCUTANEOUS at 22:36

## 2021-04-23 RX ADMIN — POLYETHYLENE GLYCOL 3350 17 GRAM(S): 17 POWDER, FOR SOLUTION ORAL at 11:31

## 2021-04-23 RX ADMIN — Medication 40 MILLIGRAM(S): at 14:12

## 2021-04-23 RX ADMIN — Medication 80 MILLIGRAM(S): at 18:34

## 2021-04-23 RX ADMIN — Medication 40 MILLIGRAM(S): at 05:12

## 2021-04-23 RX ADMIN — SPIRONOLACTONE 100 MILLIGRAM(S): 25 TABLET, FILM COATED ORAL at 16:06

## 2021-04-23 RX ADMIN — ENOXAPARIN SODIUM 50 MILLIGRAM(S): 100 INJECTION SUBCUTANEOUS at 14:12

## 2021-04-23 RX ADMIN — SPIRONOLACTONE 100 MILLIGRAM(S): 25 TABLET, FILM COATED ORAL at 05:12

## 2021-04-23 NOTE — PROGRESS NOTE ADULT - ASSESSMENT
35F w/ decompensated cirrhosis due to chronic Budd-Chiari, due to protein S deficiency, admitted to SICU for observation after IR procedure.    Impression:  #Dyspnea - negative cardiac work-up, d/dx pleural effusions, possible hematoma related to procedure  #Decompensated cirrhosis due to Budd-Chiari syndrome  -varices: small, non-bleeding varices in 2017, on beta blocker  -ascites: none on exam  -HE: none  -HCC: Segment 8 LIRADS 4 lesion stable since 12/2020; AFP WNL 02/2021  -MELD-Na = 18 on 4/21/2021  #IVC stenosis w/ large azygous collaterals  #Protein S deficiency - on xarelto    Recommendations:  - Continue w/ lasix 40mg daily and spironolactone 100mg daily  - Would hold xarelto in anticipation of thoracentesis on Monday but will discuss w/ patient. Could alternatively discharge home on diuretics if pt comfortable with that.   - Would restart xarelto on discharge  - Continue w/ home medications on discharge  - Plan for outpatient DIPS in 2 weeks per IR  - Outpatient follow up w/ Dr. Wei Orellana  Gastroenterology/Hepatology Fellow  Available via [employer] Teams    NON-URGENT CONSULTS:  Please email [email or web address] [place]  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    Impression:  #Dyspnea - negative cardiac work-up, d/dx pleural effusions, possible hematoma related to procedure  #Decompensated cirrhosis due to Budd-Chiari syndrome  -varices: small, non-bleeding varices in 2017, on beta blocker  -ascites: none on exam  -HE: none  -HCC: Segment 8 LIRADS 4 lesion stable since 12/2020; AFP WNL 02/2021  -MELD-Na = 18 on 4/21/2021  #IVC stenosis w/ large azygous collaterals  #Protein S deficiency - on xarelto    Recommendations:  - Increase Lasix to 80 mg po daily  - Increase spironolactone to 200 mg po daily  - Would hold xarelto in anticipation of thoracentesis on Monday and bridge with therapeutic LMWH over the weekend  - IR consult for thoracentesis Monday (discussed with Dr. Ragsdale)  - Plan for outpatient DIPS in 2 weeks per IR, scheduled for 5/11/21  - Outpatient follow up w/ Dr. Wei Orellana  Gastroenterology/Hepatology Fellow  Available via [employer] Teams    NON-URGENT CONSULTS:  Please email [email or web address]  AT NIGHT AND ON WEEKENDS/HOLIDAYS  Contact on-call GI fellow via answering service ([phone])  MONDAY-FRIDAY 8AM-5PM:  Page [phone] ([facility]) or [phone] ([place]) from 8am-5pm M-F

## 2021-04-23 NOTE — DIETITIAN INITIAL EVALUATION ADULT. - PHYSCIAL ASSESSMENT
98% IBW based on standing weight from today, 4/23 of 102.7 lbs  Skin: no pressure injuries per flow sheets

## 2021-04-23 NOTE — PROGRESS NOTE ADULT - ASSESSMENT
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       - Patient was cleared for D/C on 4/21 but had episode of vaginal bleeding - resolved and cleared by OB team, known history will have outpt f/u  - Serial CBC - Hgb Stable  - IR following for TIPS/DIPS - will recall for drainage of pleural fluid  - May need to hold xarelto   - Plan for TIPS/DIPS in ~2wks outpt      d/w Dr. Eubanks

## 2021-04-23 NOTE — DIETITIAN INITIAL EVALUATION ADULT. - ORAL INTAKE PTA/DIET HISTORY
Pt reports decreased appetite for ~1 month PTA. Pt reports overall just poor appetite. Pt was not following therapeutic diet, reports consuming items like soups and salads. Confirms no known food allergies. Denies Hx of chewing or swallowing issues. Reports taking Vit C, Zinc, Vit D supplementation PTA.

## 2021-04-23 NOTE — DIETITIAN INITIAL EVALUATION ADULT. - REASON
Nutrition Focused Physical exam deferred at this time per pt preference; per visual assessment, pt with mild muscle loss of temporal and clavicle regions

## 2021-04-23 NOTE — PROGRESS NOTE ADULT - SUBJECTIVE AND OBJECTIVE BOX
Chief Complaint:  Patient is a 35y old  Female who presents with a chief complaint of TIPS (2021 11:56)      Reason for consult: budd chiari, cirrhosis    Interval Events: CT shows large R pleural effusion, pt remains w/ chest pressure/dyspnea, unchanged from yesterday.    Hospital Medications:  chlorhexidine 2% Cloths 1 Application(s) Topical daily  furosemide    Tablet 40 milliGRAM(s) Oral daily  HYDROmorphone  Injectable 0.25 milliGRAM(s) IV Push every 3 hours PRN  pantoprazole  Injectable 40 milliGRAM(s) IV Push daily  polyethylene glycol 3350 17 Gram(s) Oral daily  rivaroxaban 20 milliGRAM(s) Oral daily  senna 2 Tablet(s) Oral at bedtime  spironolactone 100 milliGRAM(s) Oral daily      ROS:   General:  No  fevers, chills, night sweats, fatigue  Eyes:  Good vision, no reported pain  ENT:  No sore throat, pain, runny nose  CV:  No pain, palpitations  Pulm:  No cough  GI:  See HPI, otherwise negative  :  No  incontinence, nocturia  Muscle:  No pain, weakness  Neuro:  No memory problems  Psych:  No insomnia, mood problems, depression  Endocrine:  No polyuria, polydipsia, cold/heat intolerance  Heme:  No petechiae, ecchymosis, easy bruisability  Skin:  No rash    PHYSICAL EXAM:   Vital Signs:  Vital Signs Last 24 Hrs  T(C): 37 (2021 09:00), Max: 37.3 (2021 21:23)  T(F): 98.6 (2021 09:00), Max: 99.1 (2021 21:23)  HR: 107 (2021 09:00) (86 - 109)  BP: 112/77 (2021 09:00) (99/62 - 112/77)  BP(mean): 75 (2021 05:00) (75 - 80)  RR: 18 (2021 09:00) (18 - 18)  SpO2: 96% (2021 09:00) (96% - 97%)  Daily     Daily Weight in k.6 (2021 05:00)    GENERAL: no acute distress  NEURO: alert  HEENT: anicteric sclera, no conjunctival pallor appreciated  CHEST: no respiratory distress, no accessory muscle use, decreased BS R base  CARDIAC: regular rate, rhythm  ABDOMEN: soft, non-tender, non-distended, no rebound or guarding  EXTREMITIES: warm, well perfused, no edema  SKIN: no lesions noted    LABS: reviewed                        10.8   3.51  )-----------( 88       ( 2021 05:46 )             34.4     04-    135  |  102  |  9   ----------------------------<  91  4.1   |  23  |  0.56    Ca    8.3<L>      2021 05:46  Phos  2.7     -  Mg     1.9         TPro  5.7<L>  /  Alb  3.1<L>  /  TBili  2.1<H>  /  DBili  x   /  AST  33  /  ALT  25  /  AlkPhos  127<H>      LIVER FUNCTIONS - ( 2021 05:46 )  Alb: 3.1 g/dL / Pro: 5.7 g/dL / ALK PHOS: 127 U/L / ALT: 25 U/L / AST: 33 U/L / GGT: x             Interval Diagnostic Studies: see sunrise for full report   Chief Complaint:  Patient is a 35y old  Female who presents with a chief complaint of TIPS (2021 11:56)      Reason for consult: budd chiari, cirrhosis    Interval Events: CT shows large R pleural effusion, pt remains w/ chest pressure/dyspnea, unchanged from yesterday.    Hospital Medications:  chlorhexidine 2% Cloths 1 Application(s) Topical daily  furosemide    Tablet 40 milliGRAM(s) Oral daily  HYDROmorphone  Injectable 0.25 milliGRAM(s) IV Push every 3 hours PRN  pantoprazole  Injectable 40 milliGRAM(s) IV Push daily  polyethylene glycol 3350 17 Gram(s) Oral daily  rivaroxaban 20 milliGRAM(s) Oral daily  senna 2 Tablet(s) Oral at bedtime  spironolactone 100 milliGRAM(s) Oral daily      ROS:   General:  No  fevers, chills, night sweats  Eyes:  Good vision, no reported pain  ENT:  No sore throat, pain, runny nose  CV:  No pain, palpitations  Pulm:  No cough  GI:  See HPI, otherwise negative  :  No  incontinence, nocturia  Muscle:  No pain, weakness  Neuro:  No memory problems  Psych:  No insomnia, mood problems, depression  Endocrine:  No polyuria, polydipsia, cold/heat intolerance  Heme:  No petechiae, ecchymosis, easy bruisability  Skin:  No rash    PHYSICAL EXAM:   Vital Signs:  Vital Signs Last 24 Hrs  T(C): 37 (2021 09:00), Max: 37.3 (2021 21:23)  T(F): 98.6 (2021 09:00), Max: 99.1 (2021 21:23)  HR: 107 (2021 09:00) (86 - 109)  BP: 112/77 (2021 09:00) (99/62 - 112/77)  BP(mean): 75 (2021 05:00) (75 - 80)  RR: 18 (2021 09:00) (18 - 18)  SpO2: 96% (2021 09:00) (96% - 97%)  Daily     Daily Weight in k.6 (2021 05:00)    GENERAL: no acute distress  NEURO: alert  HEENT: anicteric sclera, no conjunctival pallor appreciated  CHEST: no respiratory distress, no accessory muscle use, decreased BS R lung 2/3  CARDIAC: regular rate, rhythm  ABDOMEN: soft, non-tender, non-distended, no rebound or guarding  EXTREMITIES: warm, well perfused, no edema  SKIN: no lesions noted    LABS: reviewed                        10.8   3.51  )-----------( 88       ( 2021 05:46 )             34.4     -    135  |  102  |  9   ----------------------------<  91  4.1   |  23  |  0.56    Ca    8.3<L>      2021 05:46  Phos  2.7       Mg     1.9         TPro  5.7<L>  /  Alb  3.1<L>  /  TBili  2.1<H>  /  DBili  x   /  AST  33  /  ALT  25  /  AlkPhos  127<H>      LIVER FUNCTIONS - ( 2021 05:46 )  Alb: 3.1 g/dL / Pro: 5.7 g/dL / ALK PHOS: 127 U/L / ALT: 25 U/L / AST: 33 U/L / GGT: x             Interval Diagnostic Studies: see sunrise for full report

## 2021-04-23 NOTE — PROGRESS NOTE ADULT - SUBJECTIVE AND OBJECTIVE BOX
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     Underwent IVC thrombectomy in IR; occlusion could not be crossed. TIPs was deferred. Admitted to SICU for overnight for observation and heparin gtt initiation. Started Xarelto and downgraded from SICU   Interval events:   -Remains on Xarelto -  Had episode of vaginal bleeding, seen and cleared by OBGYN, H&H remains stable  - Had chest pain and SOB - ct scan performed showed large right sided effusion     Potential discharge date: Today      MEDICATIONS  MEDICATIONS  (STANDING):  chlorhexidine 2% Cloths 1 Application(s) Topical daily  furosemide    Tablet 40 milliGRAM(s) Oral daily  pantoprazole  Injectable 40 milliGRAM(s) IV Push daily  polyethylene glycol 3350 17 Gram(s) Oral daily  rivaroxaban 20 milliGRAM(s) Oral daily  senna 2 Tablet(s) Oral at bedtime  spironolactone 100 milliGRAM(s) Oral daily    MEDICATIONS  (PRN):  HYDROmorphone  Injectable 0.25 milliGRAM(s) IV Push every 3 hours PRN pain        PAST MEDICAL & SURGICAL HISTORY:  Cirrhosis  2010    Miscarriage  x 5    H/O protein S deficiency    No significant past surgical history        Vital Signs  ICU Vital Signs Last 24 Hrs  T(C): 37 (23 Apr 2021 09:00), Max: 37.3 (22 Apr 2021 21:23)  T(F): 98.6 (23 Apr 2021 09:00), Max: 99.1 (22 Apr 2021 21:23)  HR: 107 (23 Apr 2021 09:00) (92 - 109)  BP: 112/77 (23 Apr 2021 09:00) (99/62 - 112/77)  BP(mean): 75 (23 Apr 2021 05:00) (75 - 80)  ABP: --  ABP(mean): --  RR: 18 (23 Apr 2021 09:00) (18 - 18)  SpO2: 96% (23 Apr 2021 09:00) (96% - 97%)      I&O's    22 Apr 2021 07:01  -  23 Apr 2021 07:00  --------------------------------------------------------  IN:    Oral Fluid: 720 mL  Total IN: 720 mL    OUT:    Voided (mL): 1250 mL  Total OUT: 1250 mL    Total NET: -530 mL      23 Apr 2021 07:01  -  23 Apr 2021 11:12  --------------------------------------------------------  IN:    Oral Fluid: 240 mL  Total IN: 240 mL    OUT:    Voided (mL): 600 mL  Total OUT: 600 mL    Total NET: -360 mL          Labs  Labs:  CAPILLARY BLOOD GLUCOSE                              10.8   3.51  )-----------( 88       ( 23 Apr 2021 05:46 )             34.4         04-23    135  |  102  |  9   ----------------------------<  91  4.1   |  23  |  0.56      Calcium, Total Serum: 8.3 mg/dL (04-23-21 @ 05:46)      LFTs:             5.7  | 2.1  | 33       ------------------[127     ( 23 Apr 2021 05:46 )  3.1  | x    | 25          Lipase:x      Amylase:x         Blood Gas Arterial, Lactate: 1.5 mmol/L (04-20-21 @ 16:53)    ABG - ( 20 Apr 2021 16:53 )  pH: 7.39  /  pCO2: 41    /  pO2: 90    / HCO3: 24    / Base Excess: -.1   /  SaO2: 97                Coags:     26.4   ----< 2.29    ( 23 Apr 2021 05:46 )     44.0        CARDIAC MARKERS ( 22 Apr 2021 08:40 )  x     / x     / 34 U/L / x     / 1.0 ng/mL      < from: CT Chest No Cont (04.22.21 @ 17:53) >  Large right pleural effusion measures simple fluid with near complete collapse right lower lobe and partial collapse rightmiddle and upper lobe. Right upper lobe linear and groundglass opacity likely represents.    < end of copied text >        Review of systems  Gen: No weight changes, fatigue, fevers/chills, weakness  Skin: No rashes  Head/Eyes/Ears/Mouth: No headache; Normal hearing; Normal vision w/o blurriness; No sinus pain/discomfort, sore throat  Respiratory: No dyspnea, cough, wheezing, hemoptysis  CV: No chest pain, PND, orthopnea  GI: C/O mild abdominal pain at surgical site. no diarrhea, constipation, nausea, vomiting, melena, hematochezia  : No increased frequency, dysuria, hematuria, nocturia  MSK: No joint pain/swelling; no back pain; no edema  Neuro: No dizziness/lightheadedness, weakness, seizures, numbness, tingling  Heme: No easy bruising or bleeding  Endo: No heat/cold intolerance  Psych: No significant nervousness, anxiety, stress, depression  All other systems were reviewed and are negative, except as noted.    PHYSICAL EXAM:  Constitutional: Well developed / well nourished  Eyes: anicteric  ENMT: nc/at, no thrush  Neck: supple  Respiratory: CTA B/L  Cardiovascular: RRR  Gastrointestinal: Soft abdomen, ND, non distended  Genitourinary: Voiding spontaneously  Extremities: SCD's in place and working bilaterally  Vascular: Palpable dp pulses bilaterally.   Neurological: A&O x3  Skin: no rashes, ulcerations, lesions  Musculoskeletal: Moving all extremities  Psychiatric: Responsive

## 2021-04-23 NOTE — PROGRESS NOTE ADULT - ASSESSMENT
Interventional Radiology Follow-Up Note    This is a 35y Female s/p Inferior Cavogram and Thrombectomy on 4/20 in Interventional Radiology with Dr. Ragsdale.     S: Patient seen and examined @ bedside.     Medication:  furosemide    Tablet: (04-23)  rivaroxaban: (04-23)  spironolactone: (04-23)    Vitals:  T(F): 98.6, Max: 99.1 (21:23)  HR: 107  BP: 112/77  RR: 18  SpO2: 96%    Physical Exam:  General: Nontoxic, in NAD, A&O x3.  Abdomen: soft, NTND, no peritoneal signs.    LABS:  WBC 3.51 / Hgb 10.8 / Hct 34.4 / Plt 88  Na 135 / K 4.1 / CO2 23 / Cl 102 / BUN 9 / Cr 0.56 / Glucose 91  ALT 25 / AST 33 / Alk Phos 127 / Tbili 2.1  Ptt 44.0 / Pt 26.4 / INR 2.29      Assessment/Plan:  35y Female admitted with Budd-Chiari syndrome s/p Inferior Cavogram and Thrombectomy on 4/20 in Interventional Radiology with Dr. Ragsdale.     -continue global management per primary team  -monitor h/h; transfuse as needed  -trend vs/labs  -found to have large right pleural effusion (increased from prior imaging from 3/28)- IR requested to drain but patient is on Xarelto   -discussion between Dr. Ragsdale and Dr. Bryant, can plan for outpatient thoracentesis early next week after discharge  -if patient gets discharged, will need to call IR booking office [phone] to schedule COVID test and procedure time/date  -will need hold to Xarelto for 48hrs before thoracentesis       Please call IR at extension [phone] with any questions, concerns, or issues regarding above.

## 2021-04-23 NOTE — DIETITIAN INITIAL EVALUATION ADULT. - OTHER INFO
Upon RD visit, pt reports appetite has since improved while inpatient. Pt denies nausea, vomiting, constipation at this time. Pt reports some looser stools. Last bowel movement yesterday 4/22 per pt reports.  RD observed pt consumed 1 hardboiled egg at breakfast this morning.    Pt reports she has lost some weight in the past 1 month or so but cannot quantify amount of weight lost. Noted dosing weight of 99 pounds (4/20/21). Per RD note from 4/1/21, weight history as follows: [facility] (11/30/2020), 111 pounds (12/22/2020), 109.4 pounds (1/12/2021), 110 pounds (2/23/2021), 101.2 pounds (3/28/21), 99 pounds (4/23/21-dosing weight). Noted most recent standing weight of 102.7 pounds (4/23). Per most recent standing weight, pt with a 6.7 lb/6% weight loss x 3 months. Of note pt with previous ascites, none at this time noted per chart.    RD discussed continued importance of adequate intake with focus on protein foods first at meals; consuming main entree first and then sides for adequate calorie and protein provision. Pt declines need for oral nutrition supplement as pt reports appetite has been improving since inpatient.

## 2021-04-23 NOTE — DIETITIAN INITIAL EVALUATION ADULT. - REASON INDICATOR FOR ASSESSMENT
Initial Nutrition Assessment for BMI<19  Source: patient, medical record, Noted pt English & Peruvian speaking; pt declines  services at this time and prefers to conduct interview in English

## 2021-04-23 NOTE — DIETITIAN INITIAL EVALUATION ADULT. - CHIEF COMPLAINT
"34 y/o Female w/ decompensated cirrhosis due to chronic Budd-Chiari, due to protein S deficiency, admitted to SICU for observation after IR procedure. Pt went to IR on 4/20 for attempted IVC thrombectomy and TIPS. Cavogram performed which demonstrated occluded intrahepatic IVC. Suction thrombectomy performed using penumbra Cat12; occlusion could not be crossed. TIPS/DIPS deferred. Pt transferred to SICU for monitoring and to start heparin drip. Pt transferred to medical floor on 4/22. Plan for outpatient DIPS in 2 weeks per IR.

## 2021-04-23 NOTE — DIETITIAN INITIAL EVALUATION ADULT. - PERTINENT MEDS FT
MEDICATIONS  (STANDING):  chlorhexidine 2% Cloths 1 Application(s) Topical daily  furosemide    Tablet 40 milliGRAM(s) Oral daily  pantoprazole  Injectable 40 milliGRAM(s) IV Push daily  polyethylene glycol 3350 17 Gram(s) Oral daily  rivaroxaban 20 milliGRAM(s) Oral daily  senna 2 Tablet(s) Oral at bedtime  spironolactone 100 milliGRAM(s) Oral daily    MEDICATIONS  (PRN):  HYDROmorphone  Injectable 0.25 milliGRAM(s) IV Push every 3 hours PRN pain

## 2021-04-23 NOTE — DIETITIAN INITIAL EVALUATION ADULT. - ADD RECOMMEND
1. Monitor diet, PO intake, GI status, weight, labs, skin integrity 2. Continue current diet as tolerated 3. Honor pt food preferences to promote adequate oral intake while in-house 4. Reassess need for oral nutrition supplement upon follow up 5. Malnutrition notification placed in chart

## 2021-04-24 LAB
ALBUMIN SERPL ELPH-MCNC: 3.3 G/DL — SIGNIFICANT CHANGE UP (ref 3.3–5)
ALP SERPL-CCNC: 144 U/L — HIGH (ref 40–120)
ALT FLD-CCNC: 24 U/L — SIGNIFICANT CHANGE UP (ref 10–45)
ANION GAP SERPL CALC-SCNC: 9 MMOL/L — SIGNIFICANT CHANGE UP (ref 5–17)
APTT BLD: 53.7 SEC — HIGH (ref 27.5–35.5)
AST SERPL-CCNC: 33 U/L — SIGNIFICANT CHANGE UP (ref 10–40)
BILIRUB SERPL-MCNC: 1.6 MG/DL — HIGH (ref 0.2–1.2)
BUN SERPL-MCNC: 14 MG/DL — SIGNIFICANT CHANGE UP (ref 7–23)
CALCIUM SERPL-MCNC: 9 MG/DL — SIGNIFICANT CHANGE UP (ref 8.4–10.5)
CHLORIDE SERPL-SCNC: 99 MMOL/L — SIGNIFICANT CHANGE UP (ref 96–108)
CO2 SERPL-SCNC: 27 MMOL/L — SIGNIFICANT CHANGE UP (ref 22–31)
CREAT SERPL-MCNC: 0.79 MG/DL — SIGNIFICANT CHANGE UP (ref 0.5–1.3)
GLUCOSE SERPL-MCNC: 98 MG/DL — SIGNIFICANT CHANGE UP (ref 70–99)
HCT VFR BLD CALC: 34.4 % — LOW (ref 34.5–45)
HGB BLD-MCNC: 11.1 G/DL — LOW (ref 11.5–15.5)
INR BLD: 1.5 RATIO — HIGH (ref 0.88–1.16)
MAGNESIUM SERPL-MCNC: 2 MG/DL — SIGNIFICANT CHANGE UP (ref 1.6–2.6)
MCHC RBC-ENTMCNC: 27.5 PG — SIGNIFICANT CHANGE UP (ref 27–34)
MCHC RBC-ENTMCNC: 32.3 GM/DL — SIGNIFICANT CHANGE UP (ref 32–36)
MCV RBC AUTO: 85.4 FL — SIGNIFICANT CHANGE UP (ref 80–100)
NRBC # BLD: 0 /100 WBCS — SIGNIFICANT CHANGE UP (ref 0–0)
PHOSPHATE SERPL-MCNC: 3.5 MG/DL — SIGNIFICANT CHANGE UP (ref 2.5–4.5)
PLATELET # BLD AUTO: 103 K/UL — LOW (ref 150–400)
POTASSIUM SERPL-MCNC: 4.1 MMOL/L — SIGNIFICANT CHANGE UP (ref 3.5–5.3)
POTASSIUM SERPL-SCNC: 4.1 MMOL/L — SIGNIFICANT CHANGE UP (ref 3.5–5.3)
PROT SERPL-MCNC: 6.3 G/DL — SIGNIFICANT CHANGE UP (ref 6–8.3)
PROTHROM AB SERPL-ACNC: 17.6 SEC — HIGH (ref 10.6–13.6)
RBC # BLD: 4.03 M/UL — SIGNIFICANT CHANGE UP (ref 3.8–5.2)
RBC # FLD: 15.4 % — HIGH (ref 10.3–14.5)
SODIUM SERPL-SCNC: 135 MMOL/L — SIGNIFICANT CHANGE UP (ref 135–145)
WBC # BLD: 3.35 K/UL — LOW (ref 3.8–10.5)
WBC # FLD AUTO: 3.35 K/UL — LOW (ref 3.8–10.5)

## 2021-04-24 PROCEDURE — 99232 SBSQ HOSP IP/OBS MODERATE 35: CPT | Mod: GC

## 2021-04-24 RX ORDER — FUROSEMIDE 40 MG
80 TABLET ORAL
Refills: 0 | Status: DISCONTINUED | OUTPATIENT
Start: 2021-04-24 | End: 2021-04-26

## 2021-04-24 RX ADMIN — POLYETHYLENE GLYCOL 3350 17 GRAM(S): 17 POWDER, FOR SOLUTION ORAL at 12:22

## 2021-04-24 RX ADMIN — SPIRONOLACTONE 200 MILLIGRAM(S): 25 TABLET, FILM COATED ORAL at 05:23

## 2021-04-24 RX ADMIN — SENNA PLUS 2 TABLET(S): 8.6 TABLET ORAL at 21:02

## 2021-04-24 RX ADMIN — Medication 80 MILLIGRAM(S): at 05:22

## 2021-04-24 RX ADMIN — Medication 80 MILLIGRAM(S): at 17:04

## 2021-04-24 RX ADMIN — PANTOPRAZOLE SODIUM 40 MILLIGRAM(S): 20 TABLET, DELAYED RELEASE ORAL at 12:22

## 2021-04-24 RX ADMIN — ENOXAPARIN SODIUM 50 MILLIGRAM(S): 100 INJECTION SUBCUTANEOUS at 17:04

## 2021-04-24 RX ADMIN — ENOXAPARIN SODIUM 50 MILLIGRAM(S): 100 INJECTION SUBCUTANEOUS at 05:23

## 2021-04-24 RX ADMIN — CHLORHEXIDINE GLUCONATE 1 APPLICATION(S): 213 SOLUTION TOPICAL at 12:22

## 2021-04-24 NOTE — PROGRESS NOTE ADULT - SUBJECTIVE AND OBJECTIVE BOX
Transplant Surgery Progress Note  --------------------------------------------------------------    Patient seen and examined with multidisciplinary team including Transplant Surgeon:  Dr. Eubanks, Transplant Nephrologist: Dr. REBEL Garcia, [facility], and Unit RN during am rounds.  Disciplines not in attendance will be notified of the plan.       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     Underwent IVC thrombectomy in IR; occlusion could not be crossed. TIPs was deferred. Admitted to SICU for overnight for observation and heparin gtt initiation. Started Xarelto and downgraded from SICU   Interval events:   -4/22 CT scan w/ large R sided pleural effusion, IR planning to drain Monday  -Remains hemodynamically stable. No SOB.  -Reports decreased vaginal bleeding.  -Xarelto held for IR procedure, started on Lovenox 50 BID yesterday  -Lasix increased to 80 IV and an additional PM dose of Lasix 80 IV. Increased spironolactone to 200.     Potential discharge date: pending clinical improvement.         MEDICATIONS  (STANDING):  chlorhexidine 2% Cloths 1 Application(s) Topical daily  enoxaparin Injectable 50 milliGRAM(s) SubCutaneous two times a day  furosemide    Tablet 80 milliGRAM(s) Oral two times a day  pantoprazole  Injectable 40 milliGRAM(s) IV Push daily  polyethylene glycol 3350 17 Gram(s) Oral daily  senna 2 Tablet(s) Oral at bedtime  spironolactone 200 milliGRAM(s) Oral daily    MEDICATIONS  (PRN):  HYDROmorphone  Injectable 0.25 milliGRAM(s) IV Push every 3 hours PRN pain      PAST MEDICAL & SURGICAL HISTORY:  Cirrhosis  2010    Miscarriage  x 5    H/O protein S deficiency    No significant past surgical history        Vital Signs Last 24 Hrs  T(C): 36.7 (24 Apr 2021 13:33), Max: 36.7 (24 Apr 2021 05:15)  T(F): 98 (24 Apr 2021 13:33), Max: 98.1 (24 Apr 2021 05:15)  HR: 105 (24 Apr 2021 13:33) (84 - 109)  BP: 95/68 (24 Apr 2021 13:33) (90/53 - 102/67)  BP(mean): 66 (24 Apr 2021 05:15) (66 - 74)  RR: 18 (24 Apr 2021 13:33) (18 - 18)  SpO2: 97% (24 Apr 2021 13:33) (96% - 98%)    I&O's Summary    23 Apr 2021 07:01  -  24 Apr 2021 07:00  --------------------------------------------------------  IN: 900 mL / OUT: 2260 mL / NET: -1360 mL    24 Apr 2021 07:01  -  24 Apr 2021 15:39  --------------------------------------------------------  IN: 480 mL / OUT: 1700 mL / NET: -1220 mL                              11.1   3.35  )-----------( 103      ( 24 Apr 2021 06:15 )             34.4     04-24    135  |  99  |  14  ----------------------------<  98  4.1   |  27  |  0.79    Ca    9.0      24 Apr 2021 06:15  Phos  3.5     04-24  Mg     2.0     04-24    TPro  6.3  /  Alb  3.3  /  TBili  1.6<H>  /  DBili  x   /  AST  33  /  ALT  24  /  AlkPhos  144<H>  04-24      Review of systems  Gen: No weight changes, fatigue, fevers/chills, weakness  Skin: No rashes  Head/Eyes/Ears/Mouth: No headache; Normal hearing; Normal vision w/o blurriness; No sinus pain/discomfort, sore throat  Respiratory: No dyspnea, cough, wheezing, hemoptysis  CV: No chest pain, PND, orthopnea  GI: No abd pain, no diarrhea, constipation, nausea, vomiting, melena, hematochezia  : No increased frequency, dysuria, hematuria, nocturia  MSK: No joint pain/swelling; no back pain; no edema  Neuro: No dizziness/lightheadedness, weakness, seizures, numbness, tingling  Heme: No easy bruising or bleeding  Endo: No heat/cold intolerance  Psych: No significant nervousness, anxiety, stress, depression  All other systems were reviewed and are negative, except as noted.    PHYSICAL EXAM:  Constitutional: Well developed / well nourished  Eyes: anicteric  ENMT: nc/at, no thrush  Neck: supple  Respiratory: CTA B/L  Cardiovascular: RRR  Gastrointestinal: Soft abdomen, ND, non distended  Genitourinary: Voiding spontaneously  Extremities: SCD's in place and working bilaterally  Vascular: Palpable dp pulses bilaterally.   Neurological: A&O x3  Skin: no rashes, ulcerations, lesions  Musculoskeletal: Moving all extremities  Psychiatric: Responsive

## 2021-04-24 NOTE — PROGRESS NOTE ADULT - ASSESSMENT
35F w/ decompensated cirrhosis due to chronic Budd-Chiari, due to protein S deficiency, admitted to SICU for observation after IR procedure.    Impression:  #Dyspnea - negative cardiac work-up, d/dx pleural effusions, possible hematoma related to procedure  #Decompensated cirrhosis due to Budd-Chiari syndrome  -varices: small, non-bleeding varices in 2017, on beta blocker  -ascites: none on exam  -HE: none  -HCC: Segment 8 LIRADS 4 lesion stable since 12/2020; AFP WNL 02/2021  -MELD-Na = 18 on 4/21/2021  #IVC stenosis w/ large azygous collaterals  #Protein S deficiency - on xarelto    Recommendations:  - c/w Lasix to 80 mg po daily  - c/w spironolactone to 200 mg po daily  - Would hold xarelto in anticipation of thoracentesis on Monday and bridge with therapeutic LMWH over the weekend  - IR consult for thoracentesis Monday (discussed with Dr. Ragsdale)  - Plan for outpatient DIPS in 2 weeks per IR, scheduled for 5/11/21  - Outpatient follow up w/ Dr. Carvajal    Thank you for involving us in the care of this patient. Please reach out if any further questions.    Jayy Donaldson, PGY-4  Hepatology Fellow    Available on [employer] Teams  Pager [phone] ([facility]) or [phone] ([facility])  After 5PM/Weekends, please contact the on-call GI fellow: [phone]  Available through [employer] Teams

## 2021-04-24 NOTE — PROGRESS NOTE ADULT - SUBJECTIVE AND OBJECTIVE BOX
Chief Complaint:  Patient is a 35y old  Female who presents with a chief complaint of TIPS (2021 11:08)      Interval Events:   - Doing well, no SOB  - No further vaginal bleeding    Allergies:  No Known Allergies        Hospital Medications:  chlorhexidine 2% Cloths 1 Application(s) Topical daily  enoxaparin Injectable 50 milliGRAM(s) SubCutaneous two times a day  furosemide    Tablet 80 milliGRAM(s) Oral daily  HYDROmorphone  Injectable 0.25 milliGRAM(s) IV Push every 3 hours PRN  pantoprazole  Injectable 40 milliGRAM(s) IV Push daily  polyethylene glycol 3350 17 Gram(s) Oral daily  senna 2 Tablet(s) Oral at bedtime  spironolactone 200 milliGRAM(s) Oral daily      PMHX/PSHX:  Cirrhosis    Miscarriage    H/O protein S deficiency    No significant past surgical history    No significant past surgical history        Family history:  No pertinent family history in first degree relatives        ROS:   General:  No  fevers, chills, night sweats  Eyes:  Good vision, no reported pain  ENT:  No sore throat, pain, runny nose  CV:  No pain, palpitations  Pulm:  No cough  GI:  See HPI, otherwise negative  :  No  incontinence, nocturia  Muscle:  No pain, weakness  Neuro:  No memory problems  Psych:  No insomnia, mood problems, depression  Endocrine:  No polyuria, polydipsia, cold/heat intolerance  Heme:  No petechiae, ecchymosis, easy bruisability  Skin:  No rash      PHYSICAL EXAM:   Vital Signs:  Vital Signs Last 24 Hrs  T(C): 36.7 (2021 05:15), Max: 37 (2021 09:00)  T(F): 98.1 (2021 05:15), Max: 98.6 (2021 09:00)  HR: 84 (2021 05:15) (84 - 109)  BP: 90/53 (2021 05:15) (90/53 - 112/77)  BP(mean): 66 (2021 05:15) (66 - 74)  RR: 18 (2021 05:15) (18 - 18)  SpO2: 97% (2021 05:15) (96% - 98%)  Daily     Daily Weight in k.3 (2021 05:15)    GENERAL: no acute distress  NEURO: alert  HEENT: anicteric sclera, no conjunctival pallor appreciated  CHEST: no respiratory distress, no accessory muscle use, decreased BS R lung 2/3  CARDIAC: regular rate, rhythm  ABDOMEN: soft, non-tender, non-distended, no rebound or guarding  EXTREMITIES: warm, well perfused, no edema  SKIN: no lesions noted    LABS:                        11.1   3.35  )-----------( 103      ( 2021 06:15 )             34.4     Mean Cell Volume: 85.4 fl (-21 @ 06:15)        135  |  99  |  14  ----------------------------<  98  4.1   |  27  |  0.79    Ca    9.0      2021 06:15  Phos  3.5     24  Mg     2.0     24    TPro  6.3  /  Alb  3.3  /  TBili  1.6<H>  /  DBili  x   /  AST  33  /  ALT  24  /  AlkPhos  144<H>  04-24    LIVER FUNCTIONS - ( 2021 06:15 )  Alb: 3.3 g/dL / Pro: 6.3 g/dL / ALK PHOS: 144 U/L / ALT: 24 U/L / AST: 33 U/L / GGT: x           PT/INR - ( 2021 06:15 )   PT: 17.6 sec;   INR: 1.50 ratio         PTT - ( 2021 06:15 )  PTT:53.7 sec                            11.1   3.35  )-----------( 103      ( 2021 06:15 )             34.4                         10.8   3.51  )-----------( 88       ( 2021 05:46 )             34.4                         10.3   4.85  )-----------( 107      ( 2021 06:31 )             34.0                         10.8   4.97  )-----------( 110      ( 2021 21:53 )             34.2                         11.0   6.83  )-----------( 118      ( 2021 13:49 )             34.6       Imaging:

## 2021-04-24 NOTE — PROGRESS NOTE ADULT - ASSESSMENT
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       - Patient was cleared for D/C on 4/21 but had episode of vaginal bleeding - resolved and cleared by OB team, known history will have outpt f/u  - Serial CBC - Hgb Stable  -IR planning to drain R pleural effusion Monday. Xeralto held, on lovenox 50 BID.   -increase lasix to 80 BID  -Plan for TIPS/DIPS in ~2wks outpt

## 2021-04-25 LAB
ALBUMIN SERPL ELPH-MCNC: 3.8 G/DL — SIGNIFICANT CHANGE UP (ref 3.3–5)
ALP SERPL-CCNC: 155 U/L — HIGH (ref 40–120)
ALT FLD-CCNC: 24 U/L — SIGNIFICANT CHANGE UP (ref 10–45)
ANION GAP SERPL CALC-SCNC: 16 MMOL/L — SIGNIFICANT CHANGE UP (ref 5–17)
APTT BLD: 47.2 SEC — HIGH (ref 27.5–35.5)
AST SERPL-CCNC: 33 U/L — SIGNIFICANT CHANGE UP (ref 10–40)
BILIRUB SERPL-MCNC: 1.8 MG/DL — HIGH (ref 0.2–1.2)
BUN SERPL-MCNC: 15 MG/DL — SIGNIFICANT CHANGE UP (ref 7–23)
CALCIUM SERPL-MCNC: 9.6 MG/DL — SIGNIFICANT CHANGE UP (ref 8.4–10.5)
CHLORIDE SERPL-SCNC: 96 MMOL/L — SIGNIFICANT CHANGE UP (ref 96–108)
CO2 SERPL-SCNC: 23 MMOL/L — SIGNIFICANT CHANGE UP (ref 22–31)
CREAT SERPL-MCNC: 0.75 MG/DL — SIGNIFICANT CHANGE UP (ref 0.5–1.3)
GLUCOSE SERPL-MCNC: 85 MG/DL — SIGNIFICANT CHANGE UP (ref 70–99)
HCT VFR BLD CALC: 39.1 % — SIGNIFICANT CHANGE UP (ref 34.5–45)
HGB BLD-MCNC: 12 G/DL — SIGNIFICANT CHANGE UP (ref 11.5–15.5)
INR BLD: 1.18 RATIO — HIGH (ref 0.88–1.16)
MAGNESIUM SERPL-MCNC: 2.1 MG/DL — SIGNIFICANT CHANGE UP (ref 1.6–2.6)
MCHC RBC-ENTMCNC: 26.4 PG — LOW (ref 27–34)
MCHC RBC-ENTMCNC: 30.7 GM/DL — LOW (ref 32–36)
MCV RBC AUTO: 86.1 FL — SIGNIFICANT CHANGE UP (ref 80–100)
NRBC # BLD: 0 /100 WBCS — SIGNIFICANT CHANGE UP (ref 0–0)
PHOSPHATE SERPL-MCNC: 4.1 MG/DL — SIGNIFICANT CHANGE UP (ref 2.5–4.5)
PLATELET # BLD AUTO: 116 K/UL — LOW (ref 150–400)
POTASSIUM SERPL-MCNC: 4 MMOL/L — SIGNIFICANT CHANGE UP (ref 3.5–5.3)
POTASSIUM SERPL-SCNC: 4 MMOL/L — SIGNIFICANT CHANGE UP (ref 3.5–5.3)
PROT SERPL-MCNC: 7 G/DL — SIGNIFICANT CHANGE UP (ref 6–8.3)
PROTHROM AB SERPL-ACNC: 14 SEC — HIGH (ref 10.6–13.6)
RBC # BLD: 4.54 M/UL — SIGNIFICANT CHANGE UP (ref 3.8–5.2)
RBC # FLD: 15.5 % — HIGH (ref 10.3–14.5)
SODIUM SERPL-SCNC: 135 MMOL/L — SIGNIFICANT CHANGE UP (ref 135–145)
WBC # BLD: 3.46 K/UL — LOW (ref 3.8–10.5)
WBC # FLD AUTO: 3.46 K/UL — LOW (ref 3.8–10.5)

## 2021-04-25 PROCEDURE — 99232 SBSQ HOSP IP/OBS MODERATE 35: CPT | Mod: GC

## 2021-04-25 RX ORDER — ENOXAPARIN SODIUM 100 MG/ML
50 INJECTION SUBCUTANEOUS ONCE
Refills: 0 | Status: COMPLETED | OUTPATIENT
Start: 2021-04-25 | End: 2021-04-25

## 2021-04-25 RX ADMIN — SPIRONOLACTONE 200 MILLIGRAM(S): 25 TABLET, FILM COATED ORAL at 06:13

## 2021-04-25 RX ADMIN — CHLORHEXIDINE GLUCONATE 1 APPLICATION(S): 213 SOLUTION TOPICAL at 11:59

## 2021-04-25 RX ADMIN — Medication 80 MILLIGRAM(S): at 17:36

## 2021-04-25 RX ADMIN — ENOXAPARIN SODIUM 50 MILLIGRAM(S): 100 INJECTION SUBCUTANEOUS at 06:13

## 2021-04-25 RX ADMIN — POLYETHYLENE GLYCOL 3350 17 GRAM(S): 17 POWDER, FOR SOLUTION ORAL at 11:59

## 2021-04-25 RX ADMIN — Medication 80 MILLIGRAM(S): at 06:13

## 2021-04-25 RX ADMIN — PANTOPRAZOLE SODIUM 40 MILLIGRAM(S): 20 TABLET, DELAYED RELEASE ORAL at 11:59

## 2021-04-25 RX ADMIN — ENOXAPARIN SODIUM 50 MILLIGRAM(S): 100 INJECTION SUBCUTANEOUS at 17:36

## 2021-04-25 NOTE — PROGRESS NOTE ADULT - ASSESSMENT
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       - Patient was cleared for D/C on 4/21 but had episode of vaginal bleeding - resolved and cleared by OB team, known history will have outpt f/u  - Serial CBC - Hgb Stable  Thoracentesis w/ IR tomorrow. Xarelto held, Lovenox will be held after PM dose tonight.   -Plan for TIPS/DIPS in ~2wks outpt

## 2021-04-25 NOTE — PROGRESS NOTE ADULT - ASSESSMENT
35F w/ decompensated cirrhosis due to chronic Budd-Chiari, due to protein S deficiency, admitted to SICU for observation after IR procedure.    Impression:  #Dyspnea - negative cardiac work-up, d/dx pleural effusions, possible hematoma related to procedure  #Decompensated cirrhosis due to Budd-Chiari syndrome  -varices: small, non-bleeding varices in 2017, on beta blocker  -ascites: none on exam  -HE: none  -HCC: Segment 8 LIRADS 4 lesion stable since 12/2020; AFP WNL 02/2021  -MELD-Na = 18 on 4/21/2021  #IVC stenosis w/ large azygous collaterals  #Protein S deficiency - on xarelto    Recommendations:  - c/w Lasix to 80 mg po daily  - c/w spironolactone to 200 mg po daily  - Would hold xarelto in anticipation of thoracentesis on Monday and bridge with therapeutic LMWH over the weekend  - IR consult for thoracentesis Monday (discussed with Dr. Ragsdale)  - Plan for outpatient DIPS in 2 weeks per IR, scheduled for 5/11/21  - Outpatient follow up w/ Dr. Carvajal    Thank you for involving us in the care of this patient. Please reach out if any further questions.    Jayy Donaldson, PGY-4  Hepatology Fellow    Available on [employer] Teams  Pager [phone] ([facility]) or [phone] ([facility])  After 5PM/Weekends, please contact the on-call GI fellow: [phone]  Available through [employer] Teams

## 2021-04-25 NOTE — PROGRESS NOTE ADULT - SUBJECTIVE AND OBJECTIVE BOX
Transplant Surgery Progress Note  --------------------------------------------------------------    Patient seen and examined with multidisciplinary team including Transplant Surgeon:  Dr. Eubanks, [facility], and Unit RN during am rounds.  Disciplines not in attendance will be notified of the plan.       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     Underwent IVC thrombectomy in IR; occlusion could not be crossed. TIPs was deferred. Admitted to SICU for overnight for observation and heparin gtt initiation. Started Xarelto and downgraded from SICU   Interval events:   -4/22 CT scan w/ large R sided pleural effusion, IR planning to drain tomorrow  -Remains hemodynamically stable. No SOB  -Reports decreased vaginal bleeding.  -Xarelto held for IR procedure, currently on lovenox 50 50 BID  -Lasix increased to 80 IV BID yesterday and remains on spironolactone 200     Potential discharge date: pending clinical improvement.       MEDICATIONS  (STANDING):  chlorhexidine 2% Cloths 1 Application(s) Topical daily  enoxaparin Injectable 50 milliGRAM(s) SubCutaneous once  furosemide    Tablet 80 milliGRAM(s) Oral two times a day  pantoprazole  Injectable 40 milliGRAM(s) IV Push daily  polyethylene glycol 3350 17 Gram(s) Oral daily  senna 2 Tablet(s) Oral at bedtime  spironolactone 200 milliGRAM(s) Oral daily    MEDICATIONS  (PRN):  HYDROmorphone  Injectable 0.25 milliGRAM(s) IV Push every 3 hours PRN pain      PAST MEDICAL & SURGICAL HISTORY:  Cirrhosis  2010    Miscarriage  x 5    H/O protein S deficiency    No significant past surgical history        Vital Signs Last 24 Hrs  T(C): 36.3 (25 Apr 2021 09:00), Max: 36.9 (25 Apr 2021 05:39)  T(F): 97.4 (25 Apr 2021 09:00), Max: 98.5 (25 Apr 2021 05:39)  HR: 104 (25 Apr 2021 09:00) (79 - 105)  BP: 106/75 (25 Apr 2021 09:00) (91/65 - 109/72)  BP(mean): 83 (25 Apr 2021 05:39) (76 - 83)  RR: 18 (25 Apr 2021 09:00) (18 - 18)  SpO2: 97% (25 Apr 2021 09:00) (94% - 97%)    I&O's Summary    24 Apr 2021 07:01  -  25 Apr 2021 07:00  --------------------------------------------------------  IN: 1200 mL / OUT: 3200 mL / NET: -2000 mL    25 Apr 2021 07:01  -  25 Apr 2021 12:01  --------------------------------------------------------  IN: 120 mL / OUT: 0 mL / NET: 120 mL                              12.0   3.46  )-----------( 116      ( 25 Apr 2021 06:25 )             39.1     04-25    135  |  96  |  15  ----------------------------<  85  4.0   |  23  |  0.75    Ca    9.6      25 Apr 2021 06:25  Phos  4.1     04-25  Mg     2.1     04-25    TPro  7.0  /  Alb  3.8  /  TBili  1.8<H>  /  DBili  x   /  AST  33  /  ALT  24  /  AlkPhos  155<H>  04-25        Review of systems  Gen: No weight changes, fatigue, fevers/chills, weakness  Skin: No rashes  Head/Eyes/Ears/Mouth: No headache; Normal hearing; Normal vision w/o blurriness; No sinus pain/discomfort, sore throat  Respiratory: No dyspnea, cough, wheezing, hemoptysis  CV: No chest pain, PND, orthopnea  GI: No abd pain, no diarrhea, constipation, nausea, vomiting, melena, hematochezia  : No increased frequency, dysuria, hematuria, nocturia  MSK: No joint pain/swelling; no back pain; no edema  Neuro: No dizziness/lightheadedness, weakness, seizures, numbness, tingling  Heme: No easy bruising or bleeding  Endo: No heat/cold intolerance  Psych: No significant nervousness, anxiety, stress, depression  All other systems were reviewed and are negative, except as noted.    PHYSICAL EXAM:  Constitutional: Well developed / well nourished  Eyes: anicteric  ENMT: nc/at, no thrush  Neck: supple  Respiratory: CTA B/L  Cardiovascular: RRR  Gastrointestinal: Soft abdomen, ND, non distended  Genitourinary: Voiding spontaneously  Extremities: SCD's in place and working bilaterally  Vascular: Palpable dp pulses bilaterally.   Neurological: A&O x3  Skin: no rashes, ulcerations, lesions  Musculoskeletal: Moving all extremities  Psychiatric: Responsive

## 2021-04-25 NOTE — PROGRESS NOTE ADULT - SUBJECTIVE AND OBJECTIVE BOX
Chief Complaint:  Patient is a 35y old  Female who presents with a chief complaint of TIPS (2021 15:34)      Interval Events:   - No SOB  - Planned for IR guided thoracocentesis tomorrow    Allergies:  No Known Allergies      Hospital Medications:  chlorhexidine 2% Cloths 1 Application(s) Topical daily  enoxaparin Injectable 50 milliGRAM(s) SubCutaneous two times a day  furosemide    Tablet 80 milliGRAM(s) Oral two times a day  HYDROmorphone  Injectable 0.25 milliGRAM(s) IV Push every 3 hours PRN  pantoprazole  Injectable 40 milliGRAM(s) IV Push daily  polyethylene glycol 3350 17 Gram(s) Oral daily  senna 2 Tablet(s) Oral at bedtime  spironolactone 200 milliGRAM(s) Oral daily      PMHX/PSHX:  Cirrhosis    Miscarriage    H/O protein S deficiency    No significant past surgical history    No significant past surgical history        Family history:  No pertinent family history in first degree relatives        ROS:   General:  No  fevers, chills, night sweats  Eyes:  Good vision, no reported pain  ENT:  No sore throat, pain, runny nose  CV:  No pain, palpitations  Pulm:  No cough  GI:  See HPI, otherwise negative  :  No  incontinence, nocturia  Muscle:  No pain, weakness  Neuro:  No memory problems  Psych:  No insomnia, mood problems, depression  Endocrine:  No polyuria, polydipsia, cold/heat intolerance  Heme:  No petechiae, ecchymosis, easy bruisability  Skin:  No rash      PHYSICAL EXAM:   Vital Signs:  Vital Signs Last 24 Hrs  T(C): 36.9 (2021 05:39), Max: 36.9 (2021 05:39)  T(F): 98.5 (2021 05:39), Max: 98.5 (2021 05:39)  HR: 99 (2021 05:39) (79 - 105)  BP: 102/71 (2021 05:39) (91/65 - 109/72)  BP(mean): 83 (2021 05:39) (76 - 83)  RR: 18 (2021 05:39) (18 - 18)  SpO2: 97% (2021 05:39) (94% - 98%)  Daily     Daily Weight in k.3 (2021 05:39)    GENERAL: no acute distress  NEURO: alert  HEENT: anicteric sclera, no conjunctival pallor appreciated  CHEST: no respiratory distress, no accessory muscle use, decreased BS R lung 2/3  CARDIAC: regular rate, rhythm  ABDOMEN: soft, non-tender, non-distended, no rebound or guarding  EXTREMITIES: warm, well perfused, no edema  SKIN: no lesions noted    LABS:                        12.0   3.46  )-----------( 116      ( 2021 06:25 )             39.1     Mean Cell Volume: 86.1 fl (- @ 06:25)        135  |  96  |  15  ----------------------------<  85  4.0   |  23  |  0.75    Ca    9.6      2021 06:25  Phos  4.1       Mg     2.1         TPro  7.0  /  Alb  3.8  /  TBili  1.8<H>  /  DBili  x   /  AST  33  /  ALT  24  /  AlkPhos  155<H>  25    LIVER FUNCTIONS - ( 2021 06:25 )  Alb: 3.8 g/dL / Pro: 7.0 g/dL / ALK PHOS: 155 U/L / ALT: 24 U/L / AST: 33 U/L / GGT: x           PT/INR - ( 2021 06:25 )   PT: 14.0 sec;   INR: 1.18 ratio         PTT - ( :25 )  PTT:47.2 sec                            12.0   3.46  )-----------( 116      ( 2021 06:25 )             39.1                         11.1   3.35  )-----------( 103      ( 2021 06:15 )             34.4                         10.8   3.51  )-----------( 88       ( 2021 05:46 )             34.4       Imaging:

## 2021-04-26 VITALS
HEART RATE: 101 BPM | OXYGEN SATURATION: 98 % | DIASTOLIC BLOOD PRESSURE: 69 MMHG | SYSTOLIC BLOOD PRESSURE: 94 MMHG | RESPIRATION RATE: 18 BRPM | TEMPERATURE: 98 F

## 2021-04-26 LAB
ALBUMIN FLD-MCNC: 3 G/DL — SIGNIFICANT CHANGE UP
ALBUMIN SERPL ELPH-MCNC: 3.7 G/DL — SIGNIFICANT CHANGE UP (ref 3.3–5)
ALP SERPL-CCNC: 156 U/L — HIGH (ref 40–120)
ALT FLD-CCNC: 23 U/L — SIGNIFICANT CHANGE UP (ref 10–45)
ANION GAP SERPL CALC-SCNC: 15 MMOL/L — SIGNIFICANT CHANGE UP (ref 5–17)
APTT BLD: 47.6 SEC — HIGH (ref 27.5–35.5)
AST SERPL-CCNC: 33 U/L — SIGNIFICANT CHANGE UP (ref 10–40)
B PERT IGG+IGM PNL SER: ABNORMAL
BILIRUB SERPL-MCNC: 1.7 MG/DL — HIGH (ref 0.2–1.2)
BUN SERPL-MCNC: 18 MG/DL — SIGNIFICANT CHANGE UP (ref 7–23)
CALCIUM SERPL-MCNC: 9.4 MG/DL — SIGNIFICANT CHANGE UP (ref 8.4–10.5)
CHLORIDE SERPL-SCNC: 96 MMOL/L — SIGNIFICANT CHANGE UP (ref 96–108)
CO2 SERPL-SCNC: 22 MMOL/L — SIGNIFICANT CHANGE UP (ref 22–31)
COLOR FLD: SIGNIFICANT CHANGE UP
CREAT SERPL-MCNC: 0.72 MG/DL — SIGNIFICANT CHANGE UP (ref 0.5–1.3)
FLUID INTAKE SUBSTANCE CLASS: SIGNIFICANT CHANGE UP
FLUID SEGMENTED GRANULOCYTES: 5 % — SIGNIFICANT CHANGE UP
GLUCOSE FLD-MCNC: 94 MG/DL — SIGNIFICANT CHANGE UP
GLUCOSE SERPL-MCNC: 87 MG/DL — SIGNIFICANT CHANGE UP (ref 70–99)
HCT VFR BLD CALC: 37.3 % — SIGNIFICANT CHANGE UP (ref 34.5–45)
HGB BLD-MCNC: 12.1 G/DL — SIGNIFICANT CHANGE UP (ref 11.5–15.5)
INR BLD: 1.18 RATIO — HIGH (ref 0.88–1.16)
LDH SERPL L TO P-CCNC: 98 U/L — SIGNIFICANT CHANGE UP
LYMPHOCYTES # FLD: 32 % — SIGNIFICANT CHANGE UP
MAGNESIUM SERPL-MCNC: 2.1 MG/DL — SIGNIFICANT CHANGE UP (ref 1.6–2.6)
MCHC RBC-ENTMCNC: 27.2 PG — SIGNIFICANT CHANGE UP (ref 27–34)
MCHC RBC-ENTMCNC: 32.4 GM/DL — SIGNIFICANT CHANGE UP (ref 32–36)
MCV RBC AUTO: 83.8 FL — SIGNIFICANT CHANGE UP (ref 80–100)
MESOTHL CELL # FLD: 8 % — SIGNIFICANT CHANGE UP
MONOS+MACROS # FLD: 55 % — SIGNIFICANT CHANGE UP
NRBC # BLD: 0 /100 WBCS — SIGNIFICANT CHANGE UP (ref 0–0)
PH FLD: 7.73 — SIGNIFICANT CHANGE UP
PHOSPHATE SERPL-MCNC: 4.2 MG/DL — SIGNIFICANT CHANGE UP (ref 2.5–4.5)
PLATELET # BLD AUTO: 126 K/UL — LOW (ref 150–400)
POTASSIUM SERPL-MCNC: 3.8 MMOL/L — SIGNIFICANT CHANGE UP (ref 3.5–5.3)
POTASSIUM SERPL-SCNC: 3.8 MMOL/L — SIGNIFICANT CHANGE UP (ref 3.5–5.3)
PROT FLD-MCNC: 5 G/DL — SIGNIFICANT CHANGE UP
PROT SERPL-MCNC: 6.9 G/DL — SIGNIFICANT CHANGE UP (ref 6–8.3)
PROTHROM AB SERPL-ACNC: 14.1 SEC — HIGH (ref 10.6–13.6)
RBC # BLD: 4.45 M/UL — SIGNIFICANT CHANGE UP (ref 3.8–5.2)
RBC # FLD: 15.3 % — HIGH (ref 10.3–14.5)
RCV VOL RI: 7050 /UL — HIGH (ref 0–0)
SARS-COV-2 RNA SPEC QL NAA+PROBE: SIGNIFICANT CHANGE UP
SODIUM SERPL-SCNC: 133 MMOL/L — LOW (ref 135–145)
SPECIMEN SOURCE FLD: SIGNIFICANT CHANGE UP
TOTAL NUCLEATED CELL COUNT, BODY FLUID: 36 /UL — SIGNIFICANT CHANGE UP
TUBE TYPE: SIGNIFICANT CHANGE UP
WBC # BLD: 3.65 K/UL — LOW (ref 3.8–10.5)
WBC # FLD AUTO: 3.65 K/UL — LOW (ref 3.8–10.5)

## 2021-04-26 PROCEDURE — 84157 ASSAY OF PROTEIN OTHER: CPT

## 2021-04-26 PROCEDURE — 87075 CULTR BACTERIA EXCEPT BLOOD: CPT

## 2021-04-26 PROCEDURE — U0003: CPT

## 2021-04-26 PROCEDURE — 71250 CT THORAX DX C-: CPT

## 2021-04-26 PROCEDURE — 83605 ASSAY OF LACTIC ACID: CPT

## 2021-04-26 PROCEDURE — 84132 ASSAY OF SERUM POTASSIUM: CPT

## 2021-04-26 PROCEDURE — 87102 FUNGUS ISOLATION CULTURE: CPT

## 2021-04-26 PROCEDURE — 76937 US GUIDE VASCULAR ACCESS: CPT

## 2021-04-26 PROCEDURE — C1894: CPT

## 2021-04-26 PROCEDURE — 83735 ASSAY OF MAGNESIUM: CPT

## 2021-04-26 PROCEDURE — 86900 BLOOD TYPING SEROLOGIC ABO: CPT

## 2021-04-26 PROCEDURE — 82803 BLOOD GASES ANY COMBINATION: CPT

## 2021-04-26 PROCEDURE — 36010 PLACE CATHETER IN VEIN: CPT

## 2021-04-26 PROCEDURE — 84295 ASSAY OF SERUM SODIUM: CPT

## 2021-04-26 PROCEDURE — 89051 BODY FLUID CELL COUNT: CPT

## 2021-04-26 PROCEDURE — 93005 ELECTROCARDIOGRAM TRACING: CPT

## 2021-04-26 PROCEDURE — 86850 RBC ANTIBODY SCREEN: CPT

## 2021-04-26 PROCEDURE — 71045 X-RAY EXAM CHEST 1 VIEW: CPT

## 2021-04-26 PROCEDURE — 85014 HEMATOCRIT: CPT

## 2021-04-26 PROCEDURE — 82553 CREATINE MB FRACTION: CPT

## 2021-04-26 PROCEDURE — C1729: CPT

## 2021-04-26 PROCEDURE — 80053 COMPREHEN METABOLIC PANEL: CPT

## 2021-04-26 PROCEDURE — 82947 ASSAY GLUCOSE BLOOD QUANT: CPT

## 2021-04-26 PROCEDURE — C9399: CPT

## 2021-04-26 PROCEDURE — 83986 ASSAY PH BODY FLUID NOS: CPT

## 2021-04-26 PROCEDURE — 99232 SBSQ HOSP IP/OBS MODERATE 35: CPT | Mod: GC

## 2021-04-26 PROCEDURE — 87205 SMEAR GRAM STAIN: CPT

## 2021-04-26 PROCEDURE — 85018 HEMOGLOBIN: CPT

## 2021-04-26 PROCEDURE — 80048 BASIC METABOLIC PNL TOTAL CA: CPT

## 2021-04-26 PROCEDURE — 37187 VENOUS MECH THROMBECTOMY: CPT

## 2021-04-26 PROCEDURE — 84484 ASSAY OF TROPONIN QUANT: CPT

## 2021-04-26 PROCEDURE — 82550 ASSAY OF CK (CPK): CPT

## 2021-04-26 PROCEDURE — 86901 BLOOD TYPING SEROLOGIC RH(D): CPT

## 2021-04-26 PROCEDURE — 82945 GLUCOSE OTHER FLUID: CPT

## 2021-04-26 PROCEDURE — 83615 LACTATE (LD) (LDH) ENZYME: CPT

## 2021-04-26 PROCEDURE — 32555 ASPIRATE PLEURA W/ IMAGING: CPT | Mod: RT,59

## 2021-04-26 PROCEDURE — 87070 CULTURE OTHR SPECIMN AEROBIC: CPT

## 2021-04-26 PROCEDURE — C1769: CPT

## 2021-04-26 PROCEDURE — 71045 X-RAY EXAM CHEST 1 VIEW: CPT | Mod: 26

## 2021-04-26 PROCEDURE — 32555 ASPIRATE PLEURA W/ IMAGING: CPT

## 2021-04-26 PROCEDURE — 84100 ASSAY OF PHOSPHORUS: CPT

## 2021-04-26 PROCEDURE — 82330 ASSAY OF CALCIUM: CPT

## 2021-04-26 PROCEDURE — 85027 COMPLETE CBC AUTOMATED: CPT

## 2021-04-26 PROCEDURE — 82435 ASSAY OF BLOOD CHLORIDE: CPT

## 2021-04-26 PROCEDURE — C1887: CPT

## 2021-04-26 PROCEDURE — 85730 THROMBOPLASTIN TIME PARTIAL: CPT

## 2021-04-26 PROCEDURE — 82042 OTHER SOURCE ALBUMIN QUAN EA: CPT

## 2021-04-26 PROCEDURE — 85610 PROTHROMBIN TIME: CPT

## 2021-04-26 RX ORDER — RIVAROXABAN 15 MG-20MG
1 KIT ORAL
Qty: 0 | Refills: 0 | DISCHARGE

## 2021-04-26 RX ORDER — SPIRONOLACTONE 25 MG/1
200 TABLET, FILM COATED ORAL
Qty: 0 | Refills: 0 | DISCHARGE
Start: 2021-04-26

## 2021-04-26 RX ORDER — FUROSEMIDE 40 MG
1 TABLET ORAL
Qty: 0 | Refills: 0 | DISCHARGE
Start: 2021-04-26

## 2021-04-26 RX ORDER — FUROSEMIDE 40 MG
2 TABLET ORAL
Qty: 120 | Refills: 0
Start: 2021-04-26 | End: 2021-05-25

## 2021-04-26 RX ORDER — RIVAROXABAN 15 MG-20MG
1 KIT ORAL
Qty: 0 | Refills: 0 | DISCHARGE
Start: 2021-04-26

## 2021-04-26 RX ORDER — SPIRONOLACTONE 25 MG/1
2 TABLET, FILM COATED ORAL
Qty: 60 | Refills: 0
Start: 2021-04-26 | End: 2021-05-25

## 2021-04-26 RX ADMIN — SPIRONOLACTONE 200 MILLIGRAM(S): 25 TABLET, FILM COATED ORAL at 10:38

## 2021-04-26 RX ADMIN — POLYETHYLENE GLYCOL 3350 17 GRAM(S): 17 POWDER, FOR SOLUTION ORAL at 17:16

## 2021-04-26 RX ADMIN — Medication 80 MILLIGRAM(S): at 08:32

## 2021-04-26 RX ADMIN — PANTOPRAZOLE SODIUM 40 MILLIGRAM(S): 20 TABLET, DELAYED RELEASE ORAL at 11:26

## 2021-04-26 RX ADMIN — CHLORHEXIDINE GLUCONATE 1 APPLICATION(S): 213 SOLUTION TOPICAL at 11:26

## 2021-04-26 NOTE — PROGRESS NOTE ADULT - SUBJECTIVE AND OBJECTIVE BOX
Chief Complaint:  Patient is a 35y old  Female who presents with a chief complaint of TIPS (2021 10:22)    Reason for consult: cirrhosis    Interval Events: Pt feels much better after increased diuretics. Awaiting thora.     Hospital Medications:  chlorhexidine 2% Cloths 1 Application(s) Topical daily  furosemide    Tablet 80 milliGRAM(s) Oral two times a day  HYDROmorphone  Injectable 0.25 milliGRAM(s) IV Push every 3 hours PRN  pantoprazole  Injectable 40 milliGRAM(s) IV Push daily  polyethylene glycol 3350 17 Gram(s) Oral daily  senna 2 Tablet(s) Oral at bedtime  spironolactone 200 milliGRAM(s) Oral daily      ROS:   General:  No  fevers, chills, night sweats, fatigue  Eyes:  Good vision, no reported pain  ENT:  No sore throat, pain, runny nose  CV:  No pain, palpitations  Pulm:  No dyspnea, cough  GI:  See HPI, otherwise negative  :  No  incontinence, nocturia  Muscle:  No pain, weakness  Neuro:  No memory problems  Psych:  No insomnia, mood problems, depression  Endocrine:  No polyuria, polydipsia, cold/heat intolerance  Heme:  No petechiae, ecchymosis, easy bruisability  Skin:  No rash    PHYSICAL EXAM:   Vital Signs:  Vital Signs Last 24 Hrs  T(C): 36.7 (2021 10:25), Max: 36.9 (2021 13:00)  T(F): 98 (2021 10:25), Max: 98.4 (2021 13:00)  HR: 94 (2021 10:25) (75 - 105)  BP: 93/70 (2021 10:25) (93/70 - 103/72)  BP(mean): 76 (2021 05:00) (76 - 76)  RR: 18 (2021 10:25) (18 - 18)  SpO2: 98% (2021 10:25) (95% - 98%)  Daily     Daily Weight in k.6 (2021 08:00)    GENERAL: no acute distress  NEURO: alert  HEENT: anicteric sclera, no conjunctival pallor appreciated  CHEST: no respiratory distress, no accessory muscle use  CARDIAC: regular rate, rhythm  ABDOMEN: soft, non-tender, non-distended, no rebound or guarding  EXTREMITIES: warm, well perfused, no edema  SKIN: no lesions noted    LABS: reviewed                        12.1   3.65  )-----------( 126      ( 2021 05:54 )             37.3         133<L>  |  96  |  18  ----------------------------<  87  3.8   |  22  |  0.72    Ca    9.4      2021 05:54  Phos  4.2       Mg     2.1         TPro  6.9  /  Alb  3.7  /  TBili  1.7<H>  /  DBili  x   /  AST  33  /  ALT  23  /  AlkPhos  156<H>      LIVER FUNCTIONS - ( 2021 05:54 )  Alb: 3.7 g/dL / Pro: 6.9 g/dL / ALK PHOS: 156 U/L / ALT: 23 U/L / AST: 33 U/L / GGT: x             Interval Diagnostic Studies: see sunrise for full report

## 2021-04-26 NOTE — PROGRESS NOTE ADULT - SUBJECTIVE AND OBJECTIVE BOX
Transplant Surgery Progress Note  --------------------------------------------------------------    Patient seen and examined with multidisciplinary team including Transplant Surgeon:  Dr. Eubanks, Dr. Walker, DEBORAH Eid and Unit RN during am rounds.  Disciplines not in attendance will be notified of the plan.       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     Underwent IVC thrombectomy in IR; occlusion could not be crossed. TIPs was deferred. Admitted to SICU for overnight for observation and heparin gtt initiation. Started Xarelto and downgraded from SICU   Interval events:   -Remains hemodynamically stable. No SOB  -Reports decreased vaginal bleeding.  -Xarelto held for IR thoracentesis today    Potential discharge date: pending clinical improvement.     MEDICATIONS  (STANDING):  chlorhexidine 2% Cloths 1 Application(s) Topical daily  furosemide    Tablet 80 milliGRAM(s) Oral two times a day  pantoprazole  Injectable 40 milliGRAM(s) IV Push daily  polyethylene glycol 3350 17 Gram(s) Oral daily  senna 2 Tablet(s) Oral at bedtime  spironolactone 200 milliGRAM(s) Oral daily    MEDICATIONS  (PRN):  HYDROmorphone  Injectable 0.25 milliGRAM(s) IV Push every 3 hours PRN pain        PAST MEDICAL & SURGICAL HISTORY:  Cirrhosis  2010    Miscarriage  x 5    H/O protein S deficiency    No significant past surgical history    Vital Signs Last 24 Hrs  T(C): 36.7 (26 Apr 2021 08:00), Max: 36.9 (25 Apr 2021 13:00)  T(F): 98.1 (26 Apr 2021 08:00), Max: 98.4 (25 Apr 2021 13:00)  HR: 105 (26 Apr 2021 08:00) (75 - 105)  BP: 100/68 (26 Apr 2021 08:00) (95/66 - 103/72)  BP(mean): 76 (26 Apr 2021 05:00) (76 - 76)  RR: 18 (26 Apr 2021 08:00) (18 - 18)  SpO2: 97% (26 Apr 2021 08:00) (95% - 98%)    I&O's Summary    25 Apr 2021 07:01  -  26 Apr 2021 07:00  --------------------------------------------------------  IN: 1280 mL / OUT: 2450 mL / NET: -1170 mL    26 Apr 2021 07:01  -  26 Apr 2021 10:26  --------------------------------------------------------  IN: 0 mL / OUT: 250 mL / NET: -250 mL    CBC (04-26 @ 05:54)                              12.1                           3.65<L>  )----------------(  126<L>     --    % Neutrophils, --    % Lymphocytes, ANC: --                                  37.3                CBC (04-25 @ 06:25)                              12.0                           3.46<L>  )----------------(  116<L>     --    % Neutrophils, --    % Lymphocytes, ANC: --                                  39.1                  BMP (04-26 @ 05:54)             133<L>  |  96      |  18    		Ca++ --      Ca 9.4                ---------------------------------( 87    		Mg 2.1                3.8     |  22      |  0.72  			Ph 4.2     BMP (04-25 @ 06:25)             135     |  96      |  15    		Ca++ --      Ca 9.6                ---------------------------------( 85    		Mg 2.1                4.0     |  23      |  0.75  			Ph 4.1       LFTs (04-26 @ 05:54)      TPro 6.9 / Alb 3.7 / TBili 1.7<H> / DBili -- / AST 33 / ALT 23 / AlkPhos 156<H>  LFTs (04-25 @ 06:25)      TPro 7.0 / Alb 3.8 / TBili 1.8<H> / DBili -- / AST 33 / ALT 24 / AlkPhos 155<H>    Coags (04-26 @ 05:54)  aPTT 47.6<H> / INR 1.18<H> / PT 14.1<H>  Coags (04-25 @ 06:25)  aPTT 47.2<H> / INR 1.18<H> / PT 14.0<H>              Review of systems  Gen: No weight changes, fatigue, fevers/chills, weakness  Skin: No rashes  Head/Eyes/Ears/Mouth: No headache; Normal hearing; Normal vision w/o blurriness; No sinus pain/discomfort, sore throat  Respiratory: No dyspnea, cough, wheezing, hemoptysis  CV: No chest pain, PND, orthopnea  GI: No abd pain, no diarrhea, constipation, nausea, vomiting, melena, hematochezia  : No increased frequency, dysuria, hematuria, nocturia  MSK: No joint pain/swelling; no back pain; no edema  Neuro: No dizziness/lightheadedness, weakness, seizures, numbness, tingling  Heme: No easy bruising or bleeding  Endo: No heat/cold intolerance  Psych: No significant nervousness, anxiety, stress, depression  All other systems were reviewed and are negative, except as noted.    PHYSICAL EXAM:  Constitutional: Well developed / well nourished  Eyes: anicteric  ENMT: nc/at, no thrush  Neck: supple  Respiratory: CTA B/L  Cardiovascular: RRR  Gastrointestinal: Soft abdomen, ND, non distended  Genitourinary: Voiding spontaneously  Extremities: SCD's in place and working bilaterally  Vascular: Palpable dp pulses bilaterally.   Neurological: A&O x3  Skin: no rashes, ulcerations, lesions  Musculoskeletal: Moving all extremities  Psychiatric: Responsive

## 2021-04-26 NOTE — PROGRESS NOTE ADULT - PROVIDER SPECIALTY LIST ADULT
Intervent Radiology
Transplant Hepatology
Intervent Radiology
Transplant Hepatology
Transplant Surgery
Intervent Radiology
SICU
Transplant Hepatology
Transplant Surgery
Transplant Hepatology
Transplant Hepatology
Transplant Surgery

## 2021-04-26 NOTE — PRE PROCEDURE NOTE - PRE PROCEDURE EVALUATION
Interventional Radiology Pre Procedure Note    HPI: 35y Female with Budd chiari and IVC thrombosis presenting for thrombectomy and possible TIPS.     Allergies:   Medications (Abx/Cardiac/Anticoagulation/Blood Products)      Data:  154.9  45.1  T(C): 36.7  HR: 85  BP: 106/78  RR: 18  SpO2: 99%    Exam  General: No acute distress  Chest: Non labored breathing    -WBC 5.36 / HgB 15.0 / Hct 47.9 / Plt 184  -Na 135 / Cl 97 / BUN 26 / Glucose 127  -K 4.2 / CO2 26 / Cr 0.71  -ALT 39 / Alk Phos 211 / T.Bili 1.8  -INR1.93    Plan: 35y Female presents for thrombectomy, possible angioplasty with sent placement, and possible TIPS placement. Procedure and risks including risk of bleeding discussed with patient and she is agreeable to proceed. I discussed with the patient the possibility of transhepatic and transsplenic approach and she is agreeable to proceed. Patient took xarelto early Sunday afternoon. Risks of TIPS including liver failure and encephalopathy extensively discussed with patient and she is agreeable to proceed.   -Risks/Benefits/alternatives explained with the patient and/or healthcare proxy and witnessed informed consent obtained.   
Interventional Radiology    HPI: 35F w/ decompensated cirrhosis due to chronic Budd-Chiari, found to have large right pleural effusion presents to IR for image guided thoracentesis.     Allergies: NKDA  Medications (Abx/Cardiac/Anticoagulation/Blood Products)  enoxaparin Injectable: 50 milliGRAM(s) SubCutaneous (04-25 @ 06:13)  enoxaparin Injectable: 50 milliGRAM(s) SubCutaneous (04-25 @ 17:36)  furosemide    Tablet: 80 milliGRAM(s) Oral (04-26 @ 08:32)  spironolactone: 200 milliGRAM(s) Oral (04-26 @ 10:38)    Data:    T(C): 36.2  HR: 97  BP: 91/67  RR: 18  SpO2: 97%    Exam  General: No acute distress  Chest: Non labored breathing  Abdomen: Non-distended  Extremities: No swelling, warm    -WBC 3.65 / HgB 12.1 / Hct 37.3 / Plt 126  -Na 133 / Cl 96 / BUN 18 / Glucose 87  -K 3.8 / CO2 22 / Cr 0.72  -ALT 23 / Alk Phos 156 / T.Bili 1.7  -INR1.18    Imaging:   < from: CT Chest No Cont (04.22.21 @ 17:53) >  IMPRESSION:  Large right pleural effusion measures simple fluid with near complete collapse right lower lobe and partial collapse rightmiddle and upper lobe. Right upper lobe linear and groundglass opacity likely represents.    < end of copied text >      Plan: 35y Female presents for right thoracentesis.  -Risks/Benefits/alternatives explained with the patient and/or healthcare proxy and witnessed informed consent obtained.

## 2021-04-26 NOTE — PROGRESS NOTE ADULT - TIME BILLING
post-transplant monitoring, management and medication  Patient seen on multidisciplinary rounds with Transplant surgeons, nephrologist, NP/PA, pharmacist, and nurse.
post-transplant monitoring, management and medication  Patient seen on rounds with NP/PA, and nurse. Discussed with hepatologist
post-transplant monitoring, management and medication  Patient seen on multidisciplinary rounds with Transplant surgeons, nephrologist, NP/PA, pharmacist, and nurse.
post-transplant monitoring, management and medication  Patient seen on rounds with NP/PA, and nurse. Discussed with hepatologist

## 2021-04-26 NOTE — PROGRESS NOTE ADULT - NSICDXPILOT_GEN_ALL_CORE
[place]

## 2021-04-26 NOTE — PROGRESS NOTE ADULT - ATTENDING COMMENTS
35F w/ decompensated cirrhosis due to chronic Budd-Chiari, due to protein S deficiency, admitted to SICU for observation after IR procedure.  Thoracentesis today.  Discharge on lasix 40 mg daily + aldactone 100 mg daily.  Close f/u with Dr. Carvajal.  Outatient DIPS procedure with IR on 5/11/21.  Restart Xarelto.
PATIENT seen and examined with the liver team I agree with  the plan as above.  For thoracocentesis tomorrow
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 S/p IVC thrombectomy and venography by IR on 4/20, with tight narrowed segment of the IVC just inferior to the RA that may be due to vascular stricture vs extrinsic compression and that was unable to be transversed with a wire despite attempts both from above and below that segment. She has retrograde IVC flow into a very large azygous collateral with return to SVC.    She is complaining of chest pressure and dyspnea, and recommend non-contrast CT chest to ensure no hematoma related to her recent IR procedure and also for re-evaluation of right pleural effusion that may benefit from thoracentesis prior to discharge. Also resumed on furosemide and spironolactone today, though at slightly lower doses than prior home regimen, while monitoring BP.    She is tentatively planned for re-admission for DIPS in 2 weeks, in the hope that she will have hepatic decongestion with DIPS by circulating her portal venous inflow into IVC for retrograde return via the azygous collateral to the SVC. This could improve her portal hypertension and also help preserve her liver synthetic function. Moreover, if she has eventual long-term hepatic remodeling with decongestion and her caudate lobe decreases in size, it is theoretically possible that the narrowed segment of the IVC may have less extrinsic compression and eventually be able to be traversed and stented. If so, this could open possibilities in terms of future liver transplant candidacy. Otherwise, she might need caval reconstruction by CT Surgery after hepatectomy as part of a very complex and high risk liver transplant surgery, if/when her liver disease eventually worsens.    Please don't hesitate to call with any questions or concerns.    Meme Bryant M.D., Ph.D.  Transplant Hepatology  Cell: [phone]
35yFemale with cryptogenic cirrhosis, portal hypertension with small esophageal varices, ascites, Budd Chiari syndrome, Protein S deficiency with suprarenal IVC thrombus s/p IR unsuccessful attempt at IVC thrombectomy (occlusion could not be crossed),  Heparin drip gtt  HCT stable  To start Xarelto without load, pt was on Xarelto  Stable
Patient seen and examined with the liver team. I agree with the plan as above.  Continue LMWH, for thoracocentesis Monday
Plan to re-attempt interventional radiology procedure in the future.
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 S/p IVC thrombectomy and venography by IR on 4/20, with tight narrowed segment of the IVC just inferior to the RA that may be due to vascular stricture vs extrinsic compression and that was unable to be transversed with a wire despite attempts both from above and below that segment. She has retrograde IVC flow into a very large azygous collateral with return to SVC. She is planned for DIPS on 5/11/21 to improve her portal hypertension and also help preserve her liver synthetic function. Moreover, if she has eventual long-term hepatic remodeling with decongestion and her caudate lobe decreases in size, it is theoretically possible that the narrowed segment of the IVC may have less extrinsic compression and eventually be able to be traversed and stented. If so, this could open possibilities in terms of future liver transplant candidacy. Otherwise, she might need caval reconstruction by CT Surgery after hepatectomy as part of a very complex and high risk liver transplant surgery, if/when her liver disease eventually worsens.    Currently, she is anxious and symptomatic due to her large right pleural effusion as seen on CT done yesterday. She does not feel well enough to go home. Will transition off Xarelto onto LMWH for bridging this week, in preparation for thoracentesis on Monday, discussed with IR Dr. Ragsdale. Increase diuretics today to furosemide 80 mg po daily and spironolactone 200 mg po daily.    Please don't hesitate to call with any questions or concerns.    Meme Bryant M.D., Ph.D.  Transplant Hepatology  Cell: [phone] .
Budd Chiari, Protein S deficiency, with large right pleural effusion  angiogram showing complete occlusion of cava unto atria  s/p right thoracentesis with drainage of 850mL fluid  monitor lft's and INR. currently stable  no evidence of further bleeding from nares or vagina  d/c home tonight with f/u with hepatology and IR for possible attempt at TIPS again
Budd Chiari with large right pleural effusion  transition from xarelto to heparin. plan for right thoracentesis on Monday  monitor lft's and INR. currently stable
Budd Chiari, Protein S deficiency, with large right pleural effusion  transitioned from xarelto to lovenox. plan for right thoracentesis on Monday  monitor lft's and INR. currently stable  no evidence of further bleeding from nares or vagina
Budd Chiari, Protein S deficiency, with large right pleural effusion  transition from xarelto to heparin. plan for right thoracentesis on Monday  monitor lft's and INR. currently stable  no evidence of further bleeding from nares or vagina

## 2021-04-26 NOTE — CHART NOTE - NSCHARTNOTEFT_GEN_A_CORE
Nutrition Follow Up Note  Patient seen for: malnutrition follow up     Chart reviewed, events noted. Pt NPO after midnight pending thoracocentesis today.     Source: [x] Patient       [x] EMR        [] RN        [] Family at bedside       [] Other:    -If unable to interview patient: [] Trach/Vent/BiPAP  [] Disoriented/confused/inappropriate to interview as per chart     Pt [place]-Speaking - refused translation services, able to understand and respond in English. Pt NPO today, reports previously with poor appetite and PO intake due to feeling nausea without vomiting. Offered nutritional supplement - pt agreed to Ensure Enlive. Denies difficulty chewing/swallowing. Denies diarrhea or constipation, last BM yesterday (04/25). Reviewed recommendations to optimize PO and protein intake and help with nausea once diet is resumed. Pt denies having further questions/concerns about diet and nutrition; made aware RD remains available.     Diet Order:   Diet, NPO after Midnight:      NPO Start Date: 25-Apr-2021,   NPO Start Time: 23:59 (04-25-21)   Previously regular diet.     Weights: (04/20) 99 pounds -> (04/23) 102.7 pounds -> (04/26) 93.9 pounds - ?accuracy of weight fluctuations, will continue to monitor as able.     Nutritionally Pertinent MEDICATIONS  (STANDING):  furosemide    Tablet  pantoprazole  Injectable  polyethylene glycol 3350  senna  spironolactone    Pertinent Labs: (04/26) Na 133<L>  A1C with Estimated Average Glucose Result: 5.7 % (04/19)    Skin: no noted pressure injuries as per documentation.   Edema as per flow sheets: none     Estimated Needs:   [x] no change since previous assessment  [] recalculated:     Previous Nutrition Diagnosis: Malnutrition; moderate   Nutrition Diagnosis is: [x] ongoing - pt NPO, being addressed with PO intake encouragement and recommendations for nutritional supplement.     New Nutrition Diagnosis: [x] Not applicable    Nutrition Care Plan:  [x] In Progress    Nutrition Interventions:     Education Provided:       [x] Yes:  [] No:     Recommendations:      1. As medically feasible, recommend regular diet. Will continue to monitor as able and adjust as needed.   2. Recommend Ensure Enlive 2x daily (700 zoey and 40 gm protein) when diet is resumed to optimize kcal and protein intake. Spoke to Transplant Team.   3. Once applicable, gently encourage PO intake and provide food preferences.   4. Consider Multivitamin if medically feasible to optimize nutrient intake.   5. Reviewed recommendations to optimize PO and protein intake and help with nausea once diet is resumed, recommended small frequent meals by ordering nutrient-dense snacks and leaving non-perishable food away from tray for later consumption during the day or between meals, to start with protein, and sips of supplement throughout the day; reviewed foods with protein and menu order procedures in hospital; recommended not laying down during/after meals, not drinking fluids with meals, and food at room temperature.   6. Continue to obtain weights to identify changes if any.      Monitoring and Evaluation:   Continue to monitor nutritional intake, tolerance to diet prescription, weights, labs, skin integrity    RD remains available upon request and will follow up per protocol  Berkley Metz MS RDN CDN #[phone]

## 2021-04-26 NOTE — PROGRESS NOTE ADULT - ASSESSMENT
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       - NPO for procedure  - Thoracentesis w/ IR today  - COVID swab today  - Xarelto on hold for procedure  - Plan for TIPS/DIPS in ~2wks outpt    Transplant Surgery  [id number]

## 2021-04-26 NOTE — PROGRESS NOTE ADULT - ASSESSMENT
35F w/ decompensated cirrhosis due to chronic Budd-Chiari, due to protein S deficiency, admitted to SICU for observation after IR procedure.    Impression:  #Dyspnea - due to pleural effusion, improved w/ diuretics  #Decompensated cirrhosis due to Budd-Chiari syndrome  -varices: small, non-bleeding varices in 2017, on beta blocker  -ascites: none on exam  -HE: none  -HCC: Segment 8 LIRADS 4 lesion stable since 12/2020; AFP WNL 02/2021  -MELD-Na = 18 on 4/21/2021  #IVC stenosis w/ large azygous collaterals  #Protein S deficiency - on xarelto    Recommendations:  - Thoracentesis today  - Restart xarelto on discharge  - Restart lasix 40mg daily and spironolactone 100mg daily on discharge  - Plan for outpatient DIPS in 2 weeks per IR, scheduled for 5/11/21  - Outpatient follow up w/ Dr. Wei Orellana  Gastroenterology/Hepatology Fellow  Available via [employer] Teams    NON-URGENT CONSULTS:  Please email [email or web address]  [email or web address]  AT NIGHT AND ON WEEKENDS/HOLIDAYS  Contact on-call GI fellow via answering service ([phone])  MONDAY-FRIDAY 8AM-5PM:  Page [phone] ([facility]) or [phone] ([facility]) from 8am-5pm M-F

## 2021-04-26 NOTE — PROGRESS NOTE ADULT - NUTRITIONAL ASSESSMENT
This patient has been assessed with a concern for Malnutrition and has been determined to have a diagnosis/diagnoses of Moderate protein-calorie malnutrition and Underweight (BMI < 19).    This patient is being managed with:   Diet NPO after Midnight-     NPO Start Date: 25-Apr-2021   NPO Start Time: 23:59  Entered: Apr 25 2021 10:55AM    
This patient has been assessed with a concern for Malnutrition and has been determined to have a diagnosis/diagnoses of Moderate protein-calorie malnutrition and Underweight (BMI < 19).    This patient is being managed with:   Diet NPO after Midnight-     NPO Start Date: 25-Apr-2021   NPO Start Time: 23:59  Entered: Apr 25 2021 10:55AM    Diet Regular-  Entered: Apr 20 2021  5:54PM    
This patient has been assessed with a concern for Malnutrition and has been determined to have a diagnosis/diagnoses of Moderate protein-calorie malnutrition and Underweight (BMI < 19).    This patient is being managed with:   Diet Regular-  Entered: Apr 20 2021  5:54PM

## 2021-04-27 PROBLEM — O03.9 COMPLETE OR UNSPECIFIED SPONTANEOUS ABORTION WITHOUT COMPLICATION: Chronic | Status: ACTIVE | Noted: 2021-04-19

## 2021-04-27 PROBLEM — Z86.2 PERSONAL HISTORY OF DISEASES OF THE BLOOD AND BLOOD-FORMING ORGANS AND CERTAIN DISORDERS INVOLVING THE IMMUNE MECHANISM: Chronic | Status: ACTIVE | Noted: 2021-04-19

## 2021-04-27 PROBLEM — K74.60 UNSPECIFIED CIRRHOSIS OF LIVER: Chronic | Status: ACTIVE | Noted: 2019-04-23

## 2021-04-27 LAB
GRAM STN FLD: SIGNIFICANT CHANGE UP
SPECIMEN SOURCE: SIGNIFICANT CHANGE UP

## 2021-04-27 RX ORDER — RIVAROXABAN 15 MG-20MG
1 KIT ORAL
Qty: 0 | Refills: 0 | DISCHARGE
Start: 2021-04-27

## 2021-04-29 ENCOUNTER — APPOINTMENT (OUTPATIENT)
Dept: HEPATOLOGY | Facility: CLINIC | Age: 36
End: 2021-04-29

## 2021-04-30 ENCOUNTER — NON-APPOINTMENT (OUTPATIENT)
Age: 36
End: 2021-04-30

## 2021-05-01 LAB
CULTURE RESULTS: NO GROWTH — SIGNIFICANT CHANGE UP
SPECIMEN SOURCE: SIGNIFICANT CHANGE UP

## 2021-05-06 ENCOUNTER — APPOINTMENT (OUTPATIENT)
Dept: HEPATOLOGY | Facility: CLINIC | Age: 36
End: 2021-05-06
Payer: COMMERCIAL

## 2021-05-06 VITALS
TEMPERATURE: 98.1 F | DIASTOLIC BLOOD PRESSURE: 66 MMHG | WEIGHT: 101 LBS | HEIGHT: 62 IN | SYSTOLIC BLOOD PRESSURE: 95 MMHG | RESPIRATION RATE: 18 BRPM | BODY MASS INDEX: 18.58 KG/M2 | HEART RATE: 86 BPM

## 2021-05-06 DIAGNOSIS — K74.69 OTHER CIRRHOSIS OF LIVER: ICD-10-CM

## 2021-05-06 DIAGNOSIS — Z87.19 PERSONAL HISTORY OF OTHER DISEASES OF THE DIGESTIVE SYSTEM: ICD-10-CM

## 2021-05-06 DIAGNOSIS — Z86.19 PERSONAL HISTORY OF OTHER INFECTIOUS AND PARASITIC DISEASES: ICD-10-CM

## 2021-05-06 PROCEDURE — 99215 OFFICE O/P EST HI 40 MIN: CPT

## 2021-05-06 PROCEDURE — 99072 ADDL SUPL MATRL&STAF TM PHE: CPT

## 2021-05-06 RX ORDER — SPIRONOLACTONE 25 MG/1
25 TABLET ORAL DAILY
Qty: 30 | Refills: 3 | Status: DISCONTINUED | COMMUNITY
Start: 2020-11-24 | End: 2021-05-06

## 2021-05-06 RX ORDER — ZINC SULFATE TAB 220 MG (50 MG ZINC EQUIVALENT) 220 (50 ZN) MG
220 (50 ZN) TAB ORAL DAILY
Refills: 0 | Status: DISCONTINUED | COMMUNITY
Start: 2021-04-15 | End: 2021-05-06

## 2021-05-06 RX ORDER — PROPRANOLOL HYDROCHLORIDE 10 MG/1
10 TABLET ORAL DAILY
Refills: 0 | Status: DISCONTINUED | COMMUNITY
Start: 2021-04-08 | End: 2021-05-06

## 2021-05-06 NOTE — ASSESSMENT
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 This will hopefully lead to hepatic decongestion by circulating her portal venous inflow into IVC for retrograde return via the azygous collateral to the SVC, which could improve her portal hypertension and also help preserve her liver synthetic function.\par - If she has eventual long-term hepatic remodeling with decongestion and her caudate lobe decreases in size, it is possible that the narrowed segment of the IVC may have less extrinsic compression and eventually be able to be traversed and stented. If so, this could open possibilities in terms of future liver transplant candidacy. Otherwise, she would need caval reconstruction by CT surgery after hepatectomy as part of a very complex and high risk liver transplant surgery.\par - Anticoagulation: Currently on Xarelto 20 mg po daily. She was advised to discontinue it prior to her DIPS (with last dose on 21), and will bridge with LMWH on 21, 21, and 5/10/21. Instructions on LMWH injections were provided today and prescription sent.\par \par # Hepatic hydrothorax and ascites:\par - Increase furosemide to 60 mg po daily.\par - Increase spironolactone to 150 mg po daily.\par - Ms. DAVILA was counseled to adhere to a low sodium (<2 grams of sodium per day) diet by: avoiding adding any salt to meals (removing the salt shaker from the table); eliminating salty foods from her diet; eating more home-cooked meals; choosing fresh or frozen, not canned, vegetables and fruits; and reading ingredient and food labels to choose low sodium foods.\par - She will likely need repeat thoracentesis on day of her IR procedure (21). Will discuss with IR.\par \par # Small, non-bleeding esophageal varices:\par - Discontinue propranolol today in order to enable increased diuresis (as above) and given borderline hypotension with SBP 90s both outpatient and inpatient.\par \par # Indeterminate hepatic lesions: Will need repeat MRI for surveillance, also for re-evaluation of her thrombosis post-DIPS.\par \par # Decompensated cirrhosis:\par - ABO B with current MELD-Na 10.\par - She is not currently a candidate for liver transplantation at our center due to IVC stenosis (as above).\par \par Next follow-up: 2-3 weeks

## 2021-05-06 NOTE — REVIEW OF SYSTEMS
[Feeling Tired] : feeling tired [Chest Pain] : chest pain [As Noted in HPI] : as noted in HPI [SOB on Exertion] : shortness of breath during exertion [Anxiety] : anxiety [Negative] : Heme/Lymph

## 2021-05-06 NOTE — REASON FOR VISIT
[Post Hospitalization] : a post hospitalization visit [Other: ______] : provided by SHEILA [FreeTextEntry1] : [id number] [FreeTextEntry2] : Elle [TWNoteComboBox1] : Afghan

## 2021-05-06 NOTE — HISTORY OF PRESENT ILLNESS
[de-identified] : Ms. 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 She underwent elective IVC thrombectomy with IR on 21, with venography that showed a tight narrowed segment of the IVC just inferior to the RA that may be due to vascular stricture vs extrinsic compression and that was unable to be trasversed with a wire despite attempts both from above and below that segment. She has retrograde IVC flow into a very lagre azygous collateral with return to SVC. Her case was discussed at length with transplant surgery and IR while she was inpatient, with plan formulated for her to return for re-admission for elective DIPS (from IVC below the narrowed segment to the right portal vein) on 21. She underwent right thoracentesis on 21 prior to being discharged home, with 810 mL removed and pleural fluid studies with albumin 3.0 and protein 5.0 but with no evidence of neutrocytic fluid and negative culture.\par \par Today, she says that after the thoracentesis, she has noticed gradual recurrence of right-sided chest discomfort that is pressure-like and associated with exertional dyspnea. She says she feels fatigued, like it is difficult to wake up. She only rare notices abdominal distension. No lower extremity swelling. She has occasional nausea if she takes too many pills at once, but no vomiting, and her appetite is okay. She is worried because she lost her sense of taste for a few days.

## 2021-05-07 RX ORDER — SPIRONOLACTONE 25 MG/1
25 TABLET, FILM COATED ORAL
Qty: 0 | Refills: 0 | DISCHARGE

## 2021-05-07 RX ORDER — OMEPRAZOLE 10 MG/1
1 CAPSULE, DELAYED RELEASE ORAL
Qty: 0 | Refills: 0 | DISCHARGE

## 2021-05-08 ENCOUNTER — OUTPATIENT (OUTPATIENT)
Dept: OUTPATIENT SERVICES | Facility: HOSPITAL | Age: 36
LOS: 1 days | End: 2021-05-08
Payer: COMMERCIAL

## 2021-05-08 DIAGNOSIS — Z11.52 ENCOUNTER FOR SCREENING FOR COVID-19: ICD-10-CM

## 2021-05-08 LAB — SARS-COV-2 RNA SPEC QL NAA+PROBE: SIGNIFICANT CHANGE UP

## 2021-05-08 PROCEDURE — U0005: CPT

## 2021-05-08 PROCEDURE — C9803: CPT

## 2021-05-08 PROCEDURE — U0003: CPT

## 2021-05-11 ENCOUNTER — INPATIENT (INPATIENT)
Facility: HOSPITAL | Age: 36
LOS: 13 days | Discharge: HOME | DRG: 405 | End: 2021-05-25
Attending: HOSPITALIST | Admitting: INTERNAL MEDICINE
Payer: COMMERCIAL

## 2021-05-11 VITALS
SYSTOLIC BLOOD PRESSURE: 103 MMHG | TEMPERATURE: 98 F | HEART RATE: 91 BPM | WEIGHT: 94.8 LBS | OXYGEN SATURATION: 99 % | DIASTOLIC BLOOD PRESSURE: 72 MMHG | HEIGHT: 60 IN | RESPIRATION RATE: 18 BRPM

## 2021-05-11 DIAGNOSIS — K76.0 FATTY (CHANGE OF) LIVER, NOT ELSEWHERE CLASSIFIED: ICD-10-CM

## 2021-05-11 LAB
ALBUMIN SERPL ELPH-MCNC: 3.2 G/DL — LOW (ref 3.3–5)
ALBUMIN SERPL ELPH-MCNC: 4 G/DL — SIGNIFICANT CHANGE UP (ref 3.3–5)
ALP SERPL-CCNC: 114 U/L — SIGNIFICANT CHANGE UP (ref 40–120)
ALP SERPL-CCNC: 171 U/L — HIGH (ref 40–120)
ALT FLD-CCNC: 31 U/L — SIGNIFICANT CHANGE UP (ref 10–45)
ALT FLD-CCNC: 47 U/L — HIGH (ref 10–45)
ANION GAP SERPL CALC-SCNC: 14 MMOL/L — SIGNIFICANT CHANGE UP (ref 5–17)
ANION GAP SERPL CALC-SCNC: 16 MMOL/L — SIGNIFICANT CHANGE UP (ref 5–17)
APTT BLD: 44.5 SEC — HIGH (ref 27.5–35.5)
AST SERPL-CCNC: 38 U/L — SIGNIFICANT CHANGE UP (ref 10–40)
AST SERPL-CCNC: 69 U/L — HIGH (ref 10–40)
BASOPHILS # BLD AUTO: 0 K/UL — SIGNIFICANT CHANGE UP (ref 0–0.2)
BASOPHILS NFR BLD AUTO: 0 % — SIGNIFICANT CHANGE UP (ref 0–2)
BILIRUB DIRECT SERPL-MCNC: 0.9 MG/DL — HIGH (ref 0–0.2)
BILIRUB INDIRECT FLD-MCNC: 1.5 MG/DL — HIGH (ref 0.2–1)
BILIRUB SERPL-MCNC: 1.6 MG/DL — HIGH (ref 0.2–1.2)
BILIRUB SERPL-MCNC: 2.4 MG/DL — HIGH (ref 0.2–1.2)
BUN SERPL-MCNC: 19 MG/DL — SIGNIFICANT CHANGE UP (ref 7–23)
BUN SERPL-MCNC: 22 MG/DL — SIGNIFICANT CHANGE UP (ref 7–23)
CALCIUM SERPL-MCNC: 7.8 MG/DL — LOW (ref 8.4–10.5)
CALCIUM SERPL-MCNC: 9.4 MG/DL — SIGNIFICANT CHANGE UP (ref 8.4–10.5)
CHLORIDE SERPL-SCNC: 102 MMOL/L — SIGNIFICANT CHANGE UP (ref 96–108)
CHLORIDE SERPL-SCNC: 102 MMOL/L — SIGNIFICANT CHANGE UP (ref 96–108)
CK MB CFR SERPL CALC: 1 NG/ML — SIGNIFICANT CHANGE UP (ref 0–3.8)
CK SERPL-CCNC: 52 U/L — SIGNIFICANT CHANGE UP (ref 25–170)
CO2 SERPL-SCNC: 18 MMOL/L — LOW (ref 22–31)
CO2 SERPL-SCNC: 23 MMOL/L — SIGNIFICANT CHANGE UP (ref 22–31)
CREAT SERPL-MCNC: 0.63 MG/DL — SIGNIFICANT CHANGE UP (ref 0.5–1.3)
CREAT SERPL-MCNC: 0.67 MG/DL — SIGNIFICANT CHANGE UP (ref 0.5–1.3)
EOSINOPHIL # BLD AUTO: 0 K/UL — SIGNIFICANT CHANGE UP (ref 0–0.5)
EOSINOPHIL NFR BLD AUTO: 0 % — SIGNIFICANT CHANGE UP (ref 0–6)
GAS PNL BLDA: SIGNIFICANT CHANGE UP
GLUCOSE SERPL-MCNC: 101 MG/DL — HIGH (ref 70–99)
GLUCOSE SERPL-MCNC: 94 MG/DL — SIGNIFICANT CHANGE UP (ref 70–99)
HCT VFR BLD CALC: 32.3 % — LOW (ref 34.5–45)
HCT VFR BLD CALC: 33.2 % — LOW (ref 34.5–45)
HCT VFR BLD CALC: 34.4 % — LOW (ref 34.5–45)
HCT VFR BLD CALC: 42.6 % — SIGNIFICANT CHANGE UP (ref 34.5–45)
HGB BLD-MCNC: 10 G/DL — LOW (ref 11.5–15.5)
HGB BLD-MCNC: 10.3 G/DL — LOW (ref 11.5–15.5)
HGB BLD-MCNC: 10.5 G/DL — LOW (ref 11.5–15.5)
HGB BLD-MCNC: 13.3 G/DL — SIGNIFICANT CHANGE UP (ref 11.5–15.5)
INR BLD: 1.06 RATIO — SIGNIFICANT CHANGE UP (ref 0.88–1.16)
INR BLD: 1.2 RATIO — HIGH (ref 0.88–1.16)
LYMPHOCYTES # BLD AUTO: 0.16 K/UL — LOW (ref 1–3.3)
LYMPHOCYTES # BLD AUTO: 2.6 % — LOW (ref 13–44)
MAGNESIUM SERPL-MCNC: 1.9 MG/DL — SIGNIFICANT CHANGE UP (ref 1.6–2.6)
MCHC RBC-ENTMCNC: 26.3 PG — LOW (ref 27–34)
MCHC RBC-ENTMCNC: 26.6 PG — LOW (ref 27–34)
MCHC RBC-ENTMCNC: 26.9 PG — LOW (ref 27–34)
MCHC RBC-ENTMCNC: 27 PG — SIGNIFICANT CHANGE UP (ref 27–34)
MCHC RBC-ENTMCNC: 30.5 GM/DL — LOW (ref 32–36)
MCHC RBC-ENTMCNC: 31 GM/DL — LOW (ref 32–36)
MCHC RBC-ENTMCNC: 31 GM/DL — LOW (ref 32–36)
MCHC RBC-ENTMCNC: 31.2 GM/DL — LOW (ref 32–36)
MCV RBC AUTO: 85.9 FL — SIGNIFICANT CHANGE UP (ref 80–100)
MCV RBC AUTO: 86 FL — SIGNIFICANT CHANGE UP (ref 80–100)
MCV RBC AUTO: 86.4 FL — SIGNIFICANT CHANGE UP (ref 80–100)
MCV RBC AUTO: 86.7 FL — SIGNIFICANT CHANGE UP (ref 80–100)
MONOCYTES # BLD AUTO: 0.32 K/UL — SIGNIFICANT CHANGE UP (ref 0–0.9)
MONOCYTES NFR BLD AUTO: 5.3 % — SIGNIFICANT CHANGE UP (ref 2–14)
NEUTROPHILS # BLD AUTO: 5.63 K/UL — SIGNIFICANT CHANGE UP (ref 1.8–7.4)
NEUTROPHILS NFR BLD AUTO: 90.3 % — HIGH (ref 43–77)
NRBC # BLD: 0 /100 WBCS — SIGNIFICANT CHANGE UP (ref 0–0)
PHOSPHATE SERPL-MCNC: 2.5 MG/DL — SIGNIFICANT CHANGE UP (ref 2.5–4.5)
PLATELET # BLD AUTO: 108 K/UL — LOW (ref 150–400)
PLATELET # BLD AUTO: 142 K/UL — LOW (ref 150–400)
PLATELET # BLD AUTO: 169 K/UL — SIGNIFICANT CHANGE UP (ref 150–400)
PLATELET # BLD AUTO: 195 K/UL — SIGNIFICANT CHANGE UP (ref 150–400)
POTASSIUM SERPL-MCNC: 4.1 MMOL/L — SIGNIFICANT CHANGE UP (ref 3.5–5.3)
POTASSIUM SERPL-MCNC: 4.6 MMOL/L — SIGNIFICANT CHANGE UP (ref 3.5–5.3)
POTASSIUM SERPL-SCNC: 4.1 MMOL/L — SIGNIFICANT CHANGE UP (ref 3.5–5.3)
POTASSIUM SERPL-SCNC: 4.6 MMOL/L — SIGNIFICANT CHANGE UP (ref 3.5–5.3)
PROT SERPL-MCNC: 5.6 G/DL — LOW (ref 6–8.3)
PROT SERPL-MCNC: 7.3 G/DL — SIGNIFICANT CHANGE UP (ref 6–8.3)
PROTHROM AB SERPL-ACNC: 12.7 SEC — SIGNIFICANT CHANGE UP (ref 10.6–13.6)
PROTHROM AB SERPL-ACNC: 14.3 SEC — HIGH (ref 10.6–13.6)
RBC # BLD: 3.76 M/UL — LOW (ref 3.8–5.2)
RBC # BLD: 3.83 M/UL — SIGNIFICANT CHANGE UP (ref 3.8–5.2)
RBC # BLD: 4 M/UL — SIGNIFICANT CHANGE UP (ref 3.8–5.2)
RBC # BLD: 4.93 M/UL — SIGNIFICANT CHANGE UP (ref 3.8–5.2)
RBC # FLD: 15.5 % — HIGH (ref 10.3–14.5)
RBC # FLD: 15.7 % — HIGH (ref 10.3–14.5)
RBC # FLD: 15.7 % — HIGH (ref 10.3–14.5)
RBC # FLD: 15.8 % — HIGH (ref 10.3–14.5)
SODIUM SERPL-SCNC: 136 MMOL/L — SIGNIFICANT CHANGE UP (ref 135–145)
SODIUM SERPL-SCNC: 139 MMOL/L — SIGNIFICANT CHANGE UP (ref 135–145)
TROPONIN T, HIGH SENSITIVITY RESULT: 16 NG/L — SIGNIFICANT CHANGE UP (ref 0–51)
WBC # BLD: 3.8 K/UL — SIGNIFICANT CHANGE UP (ref 3.8–10.5)
WBC # BLD: 6.11 K/UL — SIGNIFICANT CHANGE UP (ref 3.8–10.5)
WBC # BLD: 8.44 K/UL — SIGNIFICANT CHANGE UP (ref 3.8–10.5)
WBC # BLD: 9.19 K/UL — SIGNIFICANT CHANGE UP (ref 3.8–10.5)
WBC # FLD AUTO: 3.8 K/UL — SIGNIFICANT CHANGE UP (ref 3.8–10.5)
WBC # FLD AUTO: 6.11 K/UL — SIGNIFICANT CHANGE UP (ref 3.8–10.5)
WBC # FLD AUTO: 8.44 K/UL — SIGNIFICANT CHANGE UP (ref 3.8–10.5)
WBC # FLD AUTO: 9.19 K/UL — SIGNIFICANT CHANGE UP (ref 3.8–10.5)

## 2021-05-11 PROCEDURE — 32555 ASPIRATE PLEURA W/ IMAGING: CPT | Mod: RT

## 2021-05-11 PROCEDURE — 93010 ELECTROCARDIOGRAM REPORT: CPT

## 2021-05-11 PROCEDURE — 32552 REMOVE LUNG CATHETER: CPT | Mod: RT

## 2021-05-11 PROCEDURE — 99222 1ST HOSP IP/OBS MODERATE 55: CPT | Mod: GC

## 2021-05-11 PROCEDURE — 76937 US GUIDE VASCULAR ACCESS: CPT | Mod: 26

## 2021-05-11 PROCEDURE — 99232 SBSQ HOSP IP/OBS MODERATE 35: CPT | Mod: GC,24

## 2021-05-11 PROCEDURE — 99291 CRITICAL CARE FIRST HOUR: CPT

## 2021-05-11 PROCEDURE — 37182 INSERT HEPATIC SHUNT (TIPS): CPT

## 2021-05-11 PROCEDURE — 71045 X-RAY EXAM CHEST 1 VIEW: CPT | Mod: 26

## 2021-05-11 RX ORDER — OXYCODONE HYDROCHLORIDE 5 MG/1
5 TABLET ORAL ONCE
Refills: 0 | Status: DISCONTINUED | OUTPATIENT
Start: 2021-05-11 | End: 2021-05-11

## 2021-05-11 RX ORDER — ALBUMIN HUMAN 25 %
250 VIAL (ML) INTRAVENOUS ONCE
Refills: 0 | Status: COMPLETED | OUTPATIENT
Start: 2021-05-11 | End: 2021-05-11

## 2021-05-11 RX ORDER — MAGNESIUM SULFATE 500 MG/ML
2 VIAL (ML) INJECTION ONCE
Refills: 0 | Status: COMPLETED | OUTPATIENT
Start: 2021-05-11 | End: 2021-05-11

## 2021-05-11 RX ORDER — CEFTRIAXONE 500 MG/1
1000 INJECTION, POWDER, FOR SOLUTION INTRAMUSCULAR; INTRAVENOUS EVERY 24 HOURS
Refills: 0 | Status: COMPLETED | OUTPATIENT
Start: 2021-05-11 | End: 2021-05-17

## 2021-05-11 RX ORDER — CHLORHEXIDINE GLUCONATE 213 G/1000ML
1 SOLUTION TOPICAL
Refills: 0 | Status: DISCONTINUED | OUTPATIENT
Start: 2021-05-11 | End: 2021-05-11

## 2021-05-11 RX ORDER — ACETAMINOPHEN 500 MG
500 TABLET ORAL EVERY 6 HOURS
Refills: 0 | Status: COMPLETED | OUTPATIENT
Start: 2021-05-12 | End: 2021-05-12

## 2021-05-11 RX ORDER — ACETAMINOPHEN 500 MG
750 TABLET ORAL EVERY 6 HOURS
Refills: 0 | Status: DISCONTINUED | OUTPATIENT
Start: 2021-05-11 | End: 2021-05-11

## 2021-05-11 RX ORDER — SENNA PLUS 8.6 MG/1
2 TABLET ORAL AT BEDTIME
Refills: 0 | Status: DISCONTINUED | OUTPATIENT
Start: 2021-05-11 | End: 2021-05-25

## 2021-05-11 RX ORDER — SODIUM CHLORIDE 9 MG/ML
1000 INJECTION INTRAMUSCULAR; INTRAVENOUS; SUBCUTANEOUS
Refills: 0 | Status: DISCONTINUED | OUTPATIENT
Start: 2021-05-11 | End: 2021-05-12

## 2021-05-11 RX ORDER — CHLORHEXIDINE GLUCONATE 213 G/1000ML
1 SOLUTION TOPICAL
Refills: 0 | Status: DISCONTINUED | OUTPATIENT
Start: 2021-05-12 | End: 2021-05-25

## 2021-05-11 RX ORDER — ALBUMIN HUMAN 25 %
250 VIAL (ML) INTRAVENOUS ONCE
Refills: 0 | Status: DISCONTINUED | OUTPATIENT
Start: 2021-05-11 | End: 2021-05-11

## 2021-05-11 RX ORDER — PANTOPRAZOLE SODIUM 20 MG/1
40 TABLET, DELAYED RELEASE ORAL DAILY
Refills: 0 | Status: DISCONTINUED | OUTPATIENT
Start: 2021-05-11 | End: 2021-05-11

## 2021-05-11 RX ORDER — PHENYLEPHRINE HYDROCHLORIDE 10 MG/ML
0.2 INJECTION INTRAVENOUS
Qty: 40 | Refills: 0 | Status: DISCONTINUED | OUTPATIENT
Start: 2021-05-11 | End: 2021-05-11

## 2021-05-11 RX ORDER — HYDROMORPHONE HYDROCHLORIDE 2 MG/ML
0.25 INJECTION INTRAMUSCULAR; INTRAVENOUS; SUBCUTANEOUS ONCE
Refills: 0 | Status: DISCONTINUED | OUTPATIENT
Start: 2021-05-11 | End: 2021-05-11

## 2021-05-11 RX ORDER — POLYETHYLENE GLYCOL 3350 17 G/17G
17 POWDER, FOR SOLUTION ORAL DAILY
Refills: 0 | Status: DISCONTINUED | OUTPATIENT
Start: 2021-05-11 | End: 2021-05-25

## 2021-05-11 RX ORDER — PANTOPRAZOLE SODIUM 20 MG/1
40 TABLET, DELAYED RELEASE ORAL
Refills: 0 | Status: DISCONTINUED | OUTPATIENT
Start: 2021-05-12 | End: 2021-05-25

## 2021-05-11 RX ORDER — PHENYLEPHRINE HYDROCHLORIDE 10 MG/ML
0.2 INJECTION INTRAVENOUS
Qty: 40 | Refills: 0 | Status: DISCONTINUED | OUTPATIENT
Start: 2021-05-11 | End: 2021-05-12

## 2021-05-11 RX ORDER — CALCIUM GLUCONATE 100 MG/ML
2 VIAL (ML) INTRAVENOUS ONCE
Refills: 0 | Status: COMPLETED | OUTPATIENT
Start: 2021-05-11 | End: 2021-05-11

## 2021-05-11 RX ADMIN — HYDROMORPHONE HYDROCHLORIDE 0.25 MILLIGRAM(S): 2 INJECTION INTRAMUSCULAR; INTRAVENOUS; SUBCUTANEOUS at 16:22

## 2021-05-11 RX ADMIN — Medication 500 MILLIGRAM(S): at 23:59

## 2021-05-11 RX ADMIN — OXYCODONE HYDROCHLORIDE 5 MILLIGRAM(S): 5 TABLET ORAL at 20:49

## 2021-05-11 RX ADMIN — PHENYLEPHRINE HYDROCHLORIDE 3.23 MICROGRAM(S)/KG/MIN: 10 INJECTION INTRAVENOUS at 20:22

## 2021-05-11 RX ADMIN — Medication 50 GRAM(S): at 16:02

## 2021-05-11 RX ADMIN — PANTOPRAZOLE SODIUM 40 MILLIGRAM(S): 20 TABLET, DELAYED RELEASE ORAL at 16:02

## 2021-05-11 RX ADMIN — Medication 750 MILLILITER(S): at 16:34

## 2021-05-11 RX ADMIN — Medication 200 MILLIGRAM(S): at 23:23

## 2021-05-11 RX ADMIN — PHENYLEPHRINE HYDROCHLORIDE 3.23 MICROGRAM(S)/KG/MIN: 10 INJECTION INTRAVENOUS at 15:50

## 2021-05-11 RX ADMIN — HYDROMORPHONE HYDROCHLORIDE 0.25 MILLIGRAM(S): 2 INJECTION INTRAMUSCULAR; INTRAVENOUS; SUBCUTANEOUS at 16:07

## 2021-05-11 RX ADMIN — PHENYLEPHRINE HYDROCHLORIDE 3.23 MICROGRAM(S)/KG/MIN: 10 INJECTION INTRAVENOUS at 18:04

## 2021-05-11 RX ADMIN — SODIUM CHLORIDE 50 MILLILITER(S): 9 INJECTION INTRAMUSCULAR; INTRAVENOUS; SUBCUTANEOUS at 20:22

## 2021-05-11 RX ADMIN — SODIUM CHLORIDE 50 MILLILITER(S): 9 INJECTION INTRAMUSCULAR; INTRAVENOUS; SUBCUTANEOUS at 18:04

## 2021-05-11 RX ADMIN — Medication 750 MILLILITER(S): at 22:19

## 2021-05-11 RX ADMIN — Medication 62.5 MILLIMOLE(S): at 18:19

## 2021-05-11 RX ADMIN — OXYCODONE HYDROCHLORIDE 5 MILLIGRAM(S): 5 TABLET ORAL at 20:19

## 2021-05-11 RX ADMIN — Medication 200 GRAM(S): at 23:37

## 2021-05-11 RX ADMIN — SODIUM CHLORIDE 100 MILLILITER(S): 9 INJECTION INTRAMUSCULAR; INTRAVENOUS; SUBCUTANEOUS at 15:50

## 2021-05-11 RX ADMIN — Medication 300 MILLIGRAM(S): at 18:03

## 2021-05-11 NOTE — CONSULT NOTE ADULT - ASSESSMENT
35F with hxof cryptogenic cirrhosis, portal hypertension, Budd Chiari syndrome, Protein S deficiency, recent attempted TIPS but unable to perform 2/2 intrahepatic? IVC occlusion, s/p TIPS and pleuracentesis. Post-procedure on neosynephrine, admitted to SICU for hemodynamic monitoring    PLAN:    NEURO:  - monitor mental status  - pain control with tylenol, dilaudid PRN    RESPIRATORY: s/p pleuacentesis  - monitor respiratory status  - follow up CXR  - NC as need    CARDIOVASCULAR: hypotension  - monitor vital signs  - follow up EKG - sinus tachycardia, prolonged QTc s/p magnesium  - on emerita, wean as tolerated    GI/NUTRITION: cryptogenic cirrhosis, portal hypertension, Budd Chiari syndrome s/p TIPS  - NPO    GENITOURINARY/RENAL:  - monitor creatinine, UOP  - replete electrolytes    HEMATOLOGIC: Protein S deficiency, hx of intrahepatic? IVC  - hold heparin gtt until 5/12  - monitor h&h    INFECTIOUS DISEASE:  - s/p ceftriaxone during procedure  - monitor WBC and fever    ENDOCRINE:  - monitor BMP glucose    LINE:  - R radial A line    DISPO: SICU       35F with hxof cryptogenic cirrhosis, portal hypertension, Budd Chiari syndrome, Protein S deficiency, recent attempted TIPS but unable to perform 2/2 intrahepatic IVC occlusion, s/p TIPS and pleuracentesis. Post-procedure on neosynephrine, admitted to SICU for hemodynamic monitoring    PLAN:    NEURO:  - monitor mental status  - pain control with tylenol, dilaudid PRN    RESPIRATORY: s/p pleuacentesis  - monitor respiratory status  - follow up CXR  - NC as need    CARDIOVASCULAR: hypotension  - monitor vital signs  - follow up EKG - sinus tachycardia, prolonged QTc s/p magnesium  - on emerita, wean as tolerated    GI/NUTRITION: cryptogenic cirrhosis, portal hypertension, Budd Chiari syndrome s/p TIPS  - NPO    GENITOURINARY/RENAL:  - monitor creatinine, UOP  - replete electrolytes    HEMATOLOGIC: Protein S deficiency, hx of intrahepatic? IVC  - hold heparin gtt until 5/12  - monitor h&h    INFECTIOUS DISEASE:  - s/p ceftriaxone during procedure  - monitor WBC and fever    ENDOCRINE:  - monitor BMP glucose    LINE:  - R radial A line    DISPO: SICU       35F with hxof cryptogenic cirrhosis, portal hypertension, Budd Chiari syndrome, Protein S deficiency, recent attempted TIPS but unable to perform 2/2 intrahepatic IVC occlusion, s/p TIPS and thoracentesis. Post-procedure on neosynephrine, admitted to SICU for hemodynamic monitoring    PLAN:    NEURO:  - monitor mental status  - pain control with tylenol, dilaudid PRN    RESPIRATORY: s/p pleuacentesis  - monitor respiratory status  - follow up CXR  - NC as need    CARDIOVASCULAR: hypotension  - monitor vital signs  - follow up EKG - sinus tachycardia, prolonged QTc s/p magnesium  - on emerita, wean as tolerated    GI/NUTRITION: cryptogenic cirrhosis, portal hypertension, Budd Chiari syndrome s/p TIPS  - NPO    GENITOURINARY/RENAL:  - monitor creatinine, UOP  - replete electrolytes    HEMATOLOGIC: Protein S deficiency, hx of intrahepatic? IVC  - hold heparin gtt until 5/12  - monitor h&h    INFECTIOUS DISEASE:  - s/p ceftriaxone during procedure  - monitor WBC and fever    ENDOCRINE:  - monitor BMP glucose    LINE:  - R radial A line    DISPO: SICU       35F with hxof cryptogenic cirrhosis, portal hypertension, Budd Chiari syndrome, Protein S deficiency, recent attempted TIPS but unable to perform 2/2 intrahepatic IVC occlusion, s/p TIPS and thoracentesis. Post-procedure on neosynephrine, admitted to SICU for hemodynamic monitoring    PLAN:    NEURO:  - monitor mental status  - pain control with tylenol, dilaudid PRN    RESPIRATORY: s/p pleuacentesis  - monitor respiratory status  - follow up CXR  - NC as need    CARDIOVASCULAR: hypotension  - monitor vital signs  - follow up EKG - sinus tachycardia, prolonged QTc s/p magnesium  - on emerita, wean as tolerated    GI/NUTRITION: cryptogenic cirrhosis, portal hypertension, Budd Chiari syndrome s/p TIPS  - NPO  - protonix ppx    GENITOURINARY/RENAL:  - monitor creatinine, UOP  - replete electrolytes  - s/p 250 albumin  - NS @ 100    HEMATOLOGIC: Protein S deficiency, hx of intrahepatic IVC  - hold heparin gtt until 5/12  - monitor h&h    INFECTIOUS DISEASE:  - empiric ceftriaxone  - monitor WBC and fever    ENDOCRINE:  - monitor BMP glucose    LINE:  - R radial A line    DISPO: SICU

## 2021-05-11 NOTE — PROCEDURE NOTE - PLAN
-admit to SICU for monitoring overnight  -keep right leg straight x4h  -Please call interventional radiology at [phone] during normal business hours, or [phone] and page [phone] during call hours or weekends with any questions, concerns or issues. -admit to SICU for monitoring overnight.  -keep right leg straight x4h  -Please call interventional radiology at [phone] during normal business hours, or [phone] and page [phone] during call hours or weekends with any questions, concerns or issues.

## 2021-05-11 NOTE — H&P ADULT - NSICDXPASTMEDICALHX_GEN_ALL_CORE_FT
PAST MEDICAL HISTORY:  Budd-Chiari syndrome     Cirrhosis 2010    Cirrhosis     H/O protein S deficiency     H/O protein S deficiency     Miscarriage x 5

## 2021-05-11 NOTE — CONSULT NOTE ADULT - ATTENDING COMMENTS
- N Multimodal pain management. Close monitoring for hepatic encephalopathy. [place] 0.  - P NC2L sat >90%, monitor pulse oxymetry.  - C Hypovolemic shock with inotropic support. Continue emeriat synephrine gtt, wean to MAP goal >65. Prolonged QTc, magnesium replacement.  - R Monitor UOP/Cr/electrolytes. Albumin 5%, 250cc boluses for resuscitation.  - H Trend CBC, monitor for thrombocytopenia. Goal Hgb >7.0, platelets >50.  - DVT Anticoagulation on hold. SCDs.   - I Ceftriaxone SBP prophylaxis.  - E Monitor glycemia. - N Multimodal pain management. Close monitoring for hepatic encephalopathy. [place] 0.  - P NC2L sat >90%, monitor pulse oxymetry.  - C Hypovolemic shock with inotropic support. Continue emerita synephrine gtt, wean to MAP goal >65. Prolonged QTc, magnesium replacement.  - R Monitor UOP/Cr/electrolytes. Albumin 5%, 250cc boluses for resuscitation.  - H Trend CBC, monitor for thrombocytopenia. Goal Hgb >7.0, platelets >50.  - DVT Anticoagulation on hold. SCDs.   - I Ceftriaxone SBP prophylaxis.  - E Monitor glycemia.    - Has life threatening condition requiring SICU evaluation and management.

## 2021-05-11 NOTE — PRE-ANESTHESIA EVALUATION ADULT - NSANTHPMHFT_GEN_ALL_CORE
nausea+  no vomiting nausea+  no vomiting  cryptogenic cirrhosis  portal HYN  Ascitis  Right pleural effusion  IVC thrombus

## 2021-05-11 NOTE — H&P ADULT - HISTORY OF PRESENT ILLNESS
HPI: 35F w/ PMHx of cryptogenic cirrhosis, portal hypertension with small esophageal varices, Protein S deficiency, COVID-19 pna (3/4/21) and chronic Budd Chiari syndrome c/b small ascites, R hepatic hydrothorax, small non bleeding esophageal varices (last EGD 2017), extensive intra-abdominal ysabel-systemic collaterals, splenomegaly, and caudate lobe hypertrophy with associated mass effect on intrahepatic IVC and IVC thrombosis on xaralto, multiple spontaneous abortions. Was admitted on 4/19/21 Underwent IVC thrombectomy in IR; occlusion could not be crossed. TIPs was deferred, had thoracentesis on 4/26 and was discharged home.  Now 5/11/21 underwent scheduled IR procedure Transjugular Intrahepatic Portosystemic Shunt (TIPS), RT thoracentesis with 1L straw yellow pleural fluid, admit to SICU for monitoring overnight    IR 5/21: TIPS created between remnant hepatic veins/intrahepatic IVC and right portal vein  pre-TIPS portal pressure 29, post-TIPS portal pressure 20, RA pressure 10, post-TIPS gradient 10mmHg.

## 2021-05-11 NOTE — H&P ADULT - ASSESSMENT
35F w/ PMHx of Protein S deficiency, COVID-19 pna (3/4/21) and chronic Budd Chiari syndrome c/b small ascites, R hepatic hydrothorax, small non bleeding esophageal varices (last EGD 2017), extensive intra-abdominal ysabel-systemic collaterals, splenomegaly, and caudate lobe hypertrophy with associated mass effect on intrahepatic IVC and IVC thrombosis, multiple spontaneous abortions.Failed TIPS procedure on 4/19/21 now S/P TIPS with thoracentesis by IR.     - Transfer from IR to SICU for close observation/monitoring  - Continue ceftraixone for empiric treatment x 7 days  - Xarelto on hold F/U with IR on start date  - Keep NPO  - Repeat labs CBC, CMP, Mag, Phos stat and daily  - Bed rest  - Keep [place], monitor strict I&O  IVF: NS@100ml/hr      Transplant Surgery  [id number] 35F w/ PMHx of Protein S deficiency, COVID-19 pna (3/4/21) and chronic Budd Chiari syndrome c/b small ascites, R hepatic hydrothorax, small non bleeding esophageal varices (last EGD 2017), extensive intra-abdominal ysabel-systemic collaterals, splenomegaly, and caudate lobe hypertrophy with associated mass effect on intrahepatic IVC and IVC thrombosis, multiple spontaneous abortions.Failed TIPS procedure on 4/19/21 now S/P TIPS with thoracentesis by IR.     - Transfer from IR to SICU for close observation/monitoring  - Continue ceftraixone for empiric treatment x 7 days  - Xarelto on hold F/U with IR on start date  - Keep NPO  - Resume home diuretic medications  - Repeat labs CBC, CMP, Mag, Phos stat and daily  - Bed rest  - Keep [place], monitor strict I&O  IVF: NS@100ml/hr      Transplant Surgery  [id number]

## 2021-05-11 NOTE — CONSULT NOTE ADULT - ATTENDING COMMENTS
36 yo F with protein S deficiency, chronic Budd-Chiari syndrome with chronic IVC and hepatic vein occlusion (on home Xarelto), and decompensated cirrhosis complicated by ascites, right hepatic hydrothorax, non-bleeding EV, and splenomegaly, also with IVC thrombosis (s/p IVC thrombectomy and venography on 4/20/21, with findings of narrowed segment of IVC unable to be traversed, with retrograde IVC flow into very large azgous collateral and then SVC), who underwent elective TIPS and repeat right thoracentesis today and is being admitted to 8ICU overnight for post-TIPS observation, with standard monitoring for post-TIPS bleeding, ischemia, or hepatic encephalopathy. Anticipate resuming anticoagulation and diuretics tomorrow if clinically stable.    Please don't hesitate to call with any questions or concerns.    Meme Bryant M.D., Ph.D.  Transplant Hepatology  Cell: [phone]

## 2021-05-11 NOTE — H&P ADULT - NSHPREVIEWOFSYSTEMS_GEN_ALL_CORE
Review of systems  Gen: No weight changes, fatigue, fevers/chills, weakness  Skin: No rashes  Head/Eyes/Ears/Mouth: No headache; Normal hearing; Normal vision w/o blurriness; No sinus pain/discomfort, sore throat  Respiratory: No dyspnea, cough, wheezing, hemoptysis  CV: No chest pain, PND, orthopnea  GI: No abd pain, no diarrhea, constipation, nausea, vomiting, melena, hematochezia  : No increased frequency, dysuria, hematuria, nocturia  MSK: No joint pain/swelling; no back pain; no edema  Neuro: No dizziness/lightheadedness, weakness, seizures, numbness, tingling  Heme: No easy bruising or bleeding  Endo: No heat/cold intolerance  Psych: No significant nervousness, anxiety, stress, depression  All other systems were reviewed and are negative, except as noted. Review of systems  Gen: No weight changes, fatigue, fevers/chills, weakness  Skin: No rashes  Head/Eyes/Ears/Mouth: No headache; Normal hearing; Normal vision w/o blurriness; No sinus pain/discomfort, sore throat  Respiratory: No dyspnea, cough, wheezing, hemoptysis  CV: + RT chest pain at incision site, PND, orthopnea  GI: + abd pain, no diarrhea, constipation, nausea, vomiting, melena, hematochezia  : No increased frequency, dysuria, hematuria, nocturia  MSK: No joint pain/swelling; no back pain; no edema  Neuro: No dizziness/lightheadedness, weakness, seizures, numbness, tingling  Heme: No easy bruising or bleeding  Endo: No heat/cold intolerance  Psych: No significant nervousness, anxiety, stress, depression  All other systems were reviewed and are negative, except as noted.

## 2021-05-11 NOTE — H&P ADULT - NSHPPHYSICALEXAM_GEN_ALL_CORE
PHYSICAL EXAM:  Constitutional: Well developed / well nourished  Eyes: anicteric  ENMT: nc/at, no thrush  Neck: supple  Respiratory: CTA B/L  Cardiovascular: RRR  Gastrointestinal: Soft abdomen, ND, non distended  Genitourinary: Voiding spontaneously  Extremities: SCD's in place and working bilaterally  Vascular: Palpable dp pulses bilaterally.   Neurological: A&O x3  Skin: no rashes, ulcerations, lesions  Musculoskeletal: Moving all extremities  Psychiatric: Responsive

## 2021-05-11 NOTE — CONSULT NOTE ADULT - ASSESSMENT
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 In SICU for post procedure monitoring.    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 multiple doses on med rec), c/b hepatic hydrothorax  -HE: no hx  -HCC: none on imaging  -MELD-Na =   #Protein S deficiency    Recommendations:  - Keep MAP 70-75 overnight to prevent ischemic injury related to TIPS  - Closely monitor CBC, CMP, INR, would check midnight as well as early AM labs  - Would hold diuretics overnight, can potentially resume in AM  - Continue to hold anticoagulation, resumption per IR recs in AM  - F/u IR and transplant surgery recs  - Rest of care as per SICU team    Kane Orellana  Gastroenterology/Hepatology Fellow  Available via [employer] Teams    NON-URGENT CONSULTS:  Please email [email or web address]  [email or web address]  AT NIGHT AND ON WEEKENDS:  Contact on-call GI fellow via answering service ([phone]) from 5pm-8am and on weekends/holidays  MONDAY-FRIDAY 8AM-5PM:  Pager# [phone] ([facility]) or [phone] ([facility])  GI Phone# [phone] ([facility])       35F w/ hx of protein S deficiency, 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 In SICU for post procedure monitoring.    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 multiple doses on med rec), c/b hepatic hydrothorax  -HE: no hx  -HCC: none on imaging  -MELD-Na =   #Protein S deficiency    Recommendations:  - Keep MAP 70-75 overnight to decrease risk of ischemic injury related to TIPS  - Closely monitor CBC, CMP, INR, would check midnight as well as early AM labs  - Would hold diuretics overnight, can potentially resume in AM  - Continue to hold anticoagulation, resumption per IR recs in AM  - F/u IR and transplant surgery recs  - Rest of care as per SICU team    Kane Orellana  Gastroenterology/Hepatology Fellow  Available via [employer] Teams    NON-URGENT CONSULTS:  Please email [email or web address]  AT NIGHT AND ON WEEKENDS:  Contact on-call GI fellow via answering service ([phone]) from 5pm-8am and on weekends/holidays  MONDAY-FRIDAY 8AM-5PM:  Pager# [phone] ([facility]) or [phone] ([facility])  GI Phone# [phone] ([facility])

## 2021-05-11 NOTE — PRE PROCEDURE NOTE - PRE PROCEDURE EVALUATION
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       PAST MEDICAL & SURGICAL HISTORY:  Cirrhosis  2010    Cirrhosis    Budd-Chiari syndrome    H/O protein S deficiency    Miscarriage  x 5    H/O protein S deficiency    No significant past surgical history      Allergies    No Known Allergies    Intolerances    Medications (Abx/Cardiac/Anticoagulation/Blood Products)  Home Medications:  ascorbic acid 500 mg oral tablet: 1 tab(s) orally once a day (11 May 2021 08:06)  enoxaparin:  (11 May 2021 08:06)  MiraLax oral powder for reconstitution: use 17g daily (11 May 2021 08:06)  Multiple Vitamins oral tablet: 1 tab(s) orally once a day (11 May 2021 08:06)  Multiple Vitamins with Minerals oral tablet: 1 tab(s) orally once a day (11 May 2021 08:06)  omeprazole 40 mg oral delayed release capsule: 1 cap(s) orally once a day (11 May 2021 08:06)  Senna 8.6 mg oral tablet: 2 tab(s) orally once a day (at bedtime) (11 May 2021 08:06)  Xarelto 20 mg oral tablet: 1 tab(s) orally once a day   Start 4/27/2021 (11 May 2021 08:06)        Data:  152.4  43  T(C): 36.7  HR: 91  BP: 103/70  RR: --  SpO2: 98%    Exam  General: No acute distress  Chest: Non labored breathing  Abdomen: slightly distended  Extremities: No swelling, warm    -WBC 3.80 / HgB 13.3 / Hct 42.6 / Plt 169        Pertinent labs:  Stat cbc, cmp, inr, t&s done stat this am - results pending  G pending    COVID-19 PCR . (05.08.21 @ 11:22)    COVID-19 PCR: NotDetec: You can help in the fight against COVID-19. [facility] may contact  you to see if you are interested in voluntarily participating in one of  our clinical trials.  Testing is performed using polymerase chain reaction (PCR) or  transcription mediated amplification (TMA). This COVID-19 (SARS-CoV-2)  nucleic acid amplification test was validated by [employer] and is  in use under the FDA Emergency Use Authorization (EUA) for clinical labs  CLIA-certified to perform high complexity testing. Test results should be  correlated with clinical presentation, patient history, and epidemiology.      Prothrombin Time and INR, Plasma (05.11.21 @ 08:06)    Prothrombin Time, Plasma: 12.7 sec    INR: 1.06: Recommended ranges for therapeutic INR:    2.0-3.0 for most medical and surgical thromboembolic states    2.0-3.0 for atrial fibrillation    2.0-3.0 for bileaflet mechanical valve in aortic position    2.5-3.5 for mechanical heart valves    Chest 2004;[id number]  The presence of direct thrombin inhibitors (argatroban, refludan)  may falsely increase results. ratio                            13.3   3.80  )-----------( 169      ( 11 May 2021 08:06 )             42.6               Imaging: MR abd 3/30/21, TTE 3/23/21    Plan: 35y Female presents for IVC venogram +/- IVC thrombectomy / stent placement / TIPS/ DIPS.  NPO since 10:30 pm 5/10/21  Last lovenox (?dosage) 5/10 10:30 pm  Last Xarelto 5/7/21  Global care per ICU team while an inpatient  -Risks/Benefits/alternatives explained with the patient and/or healthcare proxy and witnessed informed consent obtained after pt's  and or healthcare proxy's comprehension was confirmed.    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       PAST MEDICAL & SURGICAL HISTORY:  Cirrhosis  2010    Cirrhosis    Budd-Chiari syndrome    H/O protein S deficiency    Miscarriage  x 5    H/O protein S deficiency    No significant past surgical history      Allergies    No Known Allergies    Intolerances    Medications (Abx/Cardiac/Anticoagulation/Blood Products)  Home Medications:  ascorbic acid 500 mg oral tablet: 1 tab(s) orally once a day (11 May 2021 08:06)  enoxaparin:  (11 May 2021 08:06)  MiraLax oral powder for reconstitution: use 17g daily (11 May 2021 08:06)  Multiple Vitamins oral tablet: 1 tab(s) orally once a day (11 May 2021 08:06)  Multiple Vitamins with Minerals oral tablet: 1 tab(s) orally once a day (11 May 2021 08:06)  omeprazole 40 mg oral delayed release capsule: 1 cap(s) orally once a day (11 May 2021 08:06)  Senna 8.6 mg oral tablet: 2 tab(s) orally once a day (at bedtime) (11 May 2021 08:06)  Xarelto 20 mg oral tablet: 1 tab(s) orally once a day   Start 4/27/2021 (11 May 2021 08:06)        Data:  152.4  43  T(C): 36.7  HR: 91  BP: 103/70  RR: --  SpO2: 98%    Exam  General: No acute distress  Chest: Non labored breathing  Abdomen: slightly distended, soft, non-tender  Extremities: No swelling, warm    -WBC 3.80 / HgB 13.3 / Hct 42.6 / Plt 169        Pertinent labs:  Stat cbc, cmp, inr, t&s done stat this am - results pending  [facility] pending    COVID-19 PCR . (05.08.21 @ 11:22)    COVID-19 PCR: NotDetec: You can help in the fight against COVID-19. [facility] may contact  you to see if you are interested in voluntarily participating in one of  our clinical trials.  Testing is performed using polymerase chain reaction (PCR) or  transcription mediated amplification (TMA). This COVID-19 (SARS-CoV-2)  nucleic acid amplification test was validated by [employer] and is  in use under the FDA Emergency Use Authorization (EUA) for clinical labs  CLIA-certified to perform high complexity testing. Test results should be  correlated with clinical presentation, patient history, and epidemiology.      Prothrombin Time and INR, Plasma (05.11.21 @ 08:06)    Prothrombin Time, Plasma: 12.7 sec    INR: 1.06: Recommended ranges for therapeutic INR:    2.0-3.0 for most medical and surgical thromboembolic states    2.0-3.0 for atrial fibrillation    2.0-3.0 for bileaflet mechanical valve in aortic position    2.5-3.5 for mechanical heart valves    Chest 2004;[id number]  The presence of direct thrombin inhibitors (argatroban, refludan)  may falsely increase results. ratio                            13.3   3.80  )-----------( 169      ( 11 May 2021 08:06 )             42.6               Imaging: MR abd 3/30/21, TTE 3/23/21    Plan: 35y Female presents for IVC venogram +/- IVC thrombectomy / stent placement / TIPS/ DIPS.  NPO since 10:30 pm 5/10/21  Last lovenox (?dosage) 5/10 10:30 pm  Last Xarelto 5/7/21  Global care per ICU team while an inpatient  -Risks/Benefits/alternatives explained with the patient and/or healthcare proxy and witnessed informed consent obtained after pt's  and or healthcare proxy's comprehension was confirmed.    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     PAST MEDICAL & SURGICAL HISTORY:  Cirrhosis  2010    Cirrhosis    Budd-Chiari syndrome    H/O protein S deficiency    Miscarriage  x 5    H/O protein S deficiency    No significant past surgical history      Allergies    No Known Allergies    Intolerances    Medications (Abx/Cardiac/Anticoagulation/Blood Products)  Home Medications:  ascorbic acid 500 mg oral tablet: 1 tab(s) orally once a day (11 May 2021 08:06)  enoxaparin:  (11 May 2021 08:06)  MiraLax oral powder for reconstitution: use 17g daily (11 May 2021 08:06)  Multiple Vitamins oral tablet: 1 tab(s) orally once a day (11 May 2021 08:06)  Multiple Vitamins with Minerals oral tablet: 1 tab(s) orally once a day (11 May 2021 08:06)  omeprazole 40 mg oral delayed release capsule: 1 cap(s) orally once a day (11 May 2021 08:06)  Senna 8.6 mg oral tablet: 2 tab(s) orally once a day (at bedtime) (11 May 2021 08:06)  Xarelto 20 mg oral tablet: 1 tab(s) orally once a day   Start 4/27/2021 (11 May 2021 08:06)        Data:  152.4  43  T(C): 36.7  HR: 91  BP: 103/70  RR: --  SpO2: 98%    Exam  General: No acute distress  Chest: Non labored breathing  Abdomen: slightly distended, soft, non-tender  Extremities: No swelling, warm    -WBC 3.80 / HgB 13.3 / Hct 42.6 / Plt 169        Pertinent labs:  Stat cbc, cmp, inr, t&s done stat this am - results pending  [facility] pending    COVID-19 PCR . (05.08.21 @ 11:22)    COVID-19 PCR: NotDetec: You can help in the fight against COVID-19. [facility] may contact  you to see if you are interested in voluntarily participating in one of  our clinical trials.  Testing is performed using polymerase chain reaction (PCR) or  transcription mediated amplification (TMA). This COVID-19 (SARS-CoV-2)  nucleic acid amplification test was validated by [facility] and is  in use under the FDA Emergency Use Authorization (EUA) for clinical labs  CLIA-certified to perform high complexity testing. Test results should be  correlated with clinical presentation, patient history, and epidemiology.      Prothrombin Time and INR, Plasma (05.11.21 @ 08:06)    Prothrombin Time, Plasma: 12.7 sec    INR: 1.06: Recommended ranges for therapeutic INR:    2.0-3.0 for most medical and surgical thromboembolic states    2.0-3.0 for atrial fibrillation    2.0-3.0 for bileaflet mechanical valve in aortic position    2.5-3.5 for mechanical heart valves    Chest 2004;[id number]  The presence of direct thrombin inhibitors (argatroban, refludan)  may falsely increase results. ratio                            13.3   3.80  )-----------( 169      ( 11 May 2021 08:06 )             42.6               Imaging: MR abd 3/30/21, TTE 3/23/21    Plan: 35y Female presents for TIPS. I discussed with the patient extensively her anatomy and the possible need for transhepatic and transplenic approach. She understands the increased risk of bleeding as well as the risks of TIPS placement including heart failure, liver failure, encephalopathy, and bleeding. We may also do a thoracentesis at the time of the procedure to improve ventilation.   NPO since 10:30 pm 5/10/21  Last lovenox (?dosage) 5/10 10:30 pm  Last Xarelto 5/7/21.  Global care per ICU team while an inpatient.  -Risks/Benefits/alternatives explained with the patient and/or healthcare proxy and witnessed informed consent obtained after pt's  and or healthcare proxy's comprehension was confirmed.    Gail Washington [facility]  ext [phone]  # [phone]

## 2021-05-11 NOTE — H&P ADULT - ATTENDING COMMENTS
35F with buddchiari secondary to protein S deficiency with right hepatic hydrothorax and non-bleeding varices, s/p successful TIPSS procedure and right thoracentesis, admitted to SICU for monitoring overnight.    Plan -   as per IR /hepatology team  -empiric ceftriaxone x7 days  -hold xarelto  -bed rest

## 2021-05-11 NOTE — CONSULT NOTE ADULT - SUBJECTIVE AND OBJECTIVE BOX
SICU CONSULT NOTE    HPI  35 year old [place] & English speaking female with PMH of cryptogenic cirrhosis, portal hypertension with small esophageal varices, Budd Chiari syndrome, Protein S deficiency, multiple miscarriages, COVID-19 PCR + 3/4/21, recent admission for pleural effusion, attempted TIPS 4/20/2021 (unable to perform 2/2 intrahepatic IVC occlusion) started on heparin then Xarelto, now presents for TIPS today.    Patient now s/p TIPS and thoracentesis of 1L pleural fluid. During the procedure, patient received 1L cyrstalloid, 500 5% albumin, , on 0.6 mcg neosynephrine. Post-procedure on neosynephrine at 0.7. Patient admitted to SICU for hemodynamic monitoring    PAST MEDICAL HISTORY: Cirrhosis    Cirrhosis    Budd-Chiari syndrome    H/O protein S deficiency    Miscarriage    H/O protein S deficiency    PAST SURGICAL HISTORY: No significant past surgical history    No significant past surgical history    FAMILY HISTORY: No pertinent family history in first degree relatives    FHx: liver disease    SOCIAL HISTORY: , lives with spouse Darron () [phone]. Denies ETOH use and smoking.    ALLERGIES: No Known Allergies    VITAL SIGNS:  ICU Vital Signs Last 24 Hrs  T(C): 37 (11 May 2021 15:18), Max: 37 (11 May 2021 15:18)  T(F): 98.6 (11 May 2021 15:18), Max: 98.6 (11 May 2021 15:18)  HR: 112 (11 May 2021 15:18) (91 - 112)  BP: 93/52 (11 May 2021 15:18) (93/52 - 103/70)  BP(mean): 65 (11 May 2021 15:18) (65 - 65)  ABP: 94/150 (11 May 2021 15:18) (94/150 - 94/150)  ABP(mean): 166 (11 May 2021 15:18) (166 - 166)  RR: --  SpO2: 97% (11 May 2021 15:18) (97% - 98%)    NEURO  Exam: AAOx3, NAD    RESPIRATORY  ABG - ( 11 May 2021 13:51 )  pH: 7.34  /  pCO2: 44    /  pO2: 168   / HCO3: 23    / Base Excess: -2.4  /  SaO2: 99      Exam: on nonrebreather    CARDIOVASCULAR  Exam: hypotensive on neosynephrine  Cardiac Rhythm: sinus tachycardia    GI/NUTRITION  Exam: soft, NT, ND; incision dressing c/d/i  Diet: NPO    GENITOURINARY/RENAL  albumin human  5% IVPB 250 milliLiter(s) IV Intermittent once  albumin human  5% IVPB 250 milliLiter(s) IV Intermittent once      Weight (kg): 43 (05-11 @ 08:26)  05-11    139  |  102  |  22  ----------------------------<  94  4.6   |  23  |  0.67    Ca    9.4      11 May 2021 08:06    TPro  7.3  /  Alb  4.0  /  TBili  1.6<H>  /  DBili  x   /  AST  38  /  ALT  31  /  AlkPhos  171<H>  05-11    HEMATOLOGIC  [x] VTE Prophylaxis: venodyne                        13.3   3.80  )-----------( 169      ( 11 May 2021 08:06 )             42.6     PT/INR - ( 11 May 2021 08:06 )   PT: 12.7 sec;   INR: 1.06 ratio      Transfusion: [ ] PRBC	[ ] Platelets	[ ] FFP	[ ] Cryoprecipitate    INFECTIOUS DISEASES  cefTRIAXone   IVPB 1000 milliGRAM(s) IV Intermittent every 24 hours    RECENT CULTURES:    ENDOCRINE    CAPILLARY BLOOD GLUCOSE    PATIENT CARE ACCESS DEVICES:  [x] Peripheral IV  [ ] Central Venous Line	[ ] R	[ ] L	[ ] IJ	[ ] Fem	[ ] SC	Placed:   [x] Arterial Line		[x] R	[ ] L	[ ] Fem	[x] Rad	[ ] Ax	Placed:   [ ] PICC:					[ ] Mediport  [ ] Urinary Catheter, Date Placed:   [x] Necessity of urinary, arterial, and venous catheters discussed    OTHER MEDICATIONS:     IMAGING STUDIES: SICU CONSULT NOTE    HPI  35 year old English & English speaking female with PMH of cryptogenic cirrhosis, portal hypertension with small esophageal varices, Budd Chiari syndrome, Protein S deficiency, multiple miscarriages, COVID-19 PCR + 3/4/21, recent admission for pleural effusion, attempted TIPS 4/20/2021 (unable to perform 2/2 intrahepatic? IVC occlusion) started on heparin then Xarelto, now presents for TIPS today.    Patient now s/p TIPS and thoracentesis of 1L pleural fluid. During the procedure, patient received 1L cyrstalloid, 500 5% albumin, , on 0.6 mcg neosynephrine. Post-procedure on neosynephrine at 0.7. Patient admitted to SICU for hemodynamic monitoring    PAST MEDICAL HISTORY: Cirrhosis    Cirrhosis    Budd-Chiari syndrome    H/O protein S deficiency    Miscarriage    H/O protein S deficiency    PAST SURGICAL HISTORY: No significant past surgical history    No significant past surgical history    FAMILY HISTORY: No pertinent family history in first degree relatives    FHx: liver disease    SOCIAL HISTORY: , lives with spouse Darron () [phone]. Denies ETOH use and smoking.    ALLERGIES: No Known Allergies    VITAL SIGNS:  ICU Vital Signs Last 24 Hrs  T(C): 37 (11 May 2021 15:18), Max: 37 (11 May 2021 15:18)  T(F): 98.6 (11 May 2021 15:18), Max: 98.6 (11 May 2021 15:18)  HR: 112 (11 May 2021 15:18) (91 - 112)  BP: 93/52 (11 May 2021 15:18) (93/52 - 103/70)  BP(mean): 65 (11 May 2021 15:18) (65 - 65)  ABP: 94/150 (11 May 2021 15:18) (94/150 - 94/150)  ABP(mean): 166 (11 May 2021 15:18) (166 - 166)  RR: --  SpO2: 97% (11 May 2021 15:18) (97% - 98%)    NEURO  Exam: AAOx3, NAD    RESPIRATORY  ABG - ( 11 May 2021 13:51 )  pH: 7.34  /  pCO2: 44    /  pO2: 168   / HCO3: 23    / Base Excess: -2.4  /  SaO2: 99      Exam: on nonrebreather    CARDIOVASCULAR  Exam: hypotensive on neosynephrine  Cardiac Rhythm: sinus tachycardia    GI/NUTRITION  Exam: soft, NT, ND; incision dressing c/d/i  Diet: NPO    GENITOURINARY/RENAL  albumin human  5% IVPB 250 milliLiter(s) IV Intermittent once  albumin human  5% IVPB 250 milliLiter(s) IV Intermittent once      Weight (kg): 43 (05-11 @ 08:26)  05-11    139  |  102  |  22  ----------------------------<  94  4.6   |  23  |  0.67    Ca    9.4      11 May 2021 08:06    TPro  7.3  /  Alb  4.0  /  TBili  1.6<H>  /  DBili  x   /  AST  38  /  ALT  31  /  AlkPhos  171<H>  05-11    HEMATOLOGIC  [x] VTE Prophylaxis: venodyne                        13.3   3.80  )-----------( 169      ( 11 May 2021 08:06 )             42.6     PT/INR - ( 11 May 2021 08:06 )   PT: 12.7 sec;   INR: 1.06 ratio      Transfusion: [ ] PRBC	[ ] Platelets	[ ] FFP	[ ] Cryoprecipitate    INFECTIOUS DISEASES  cefTRIAXone   IVPB 1000 milliGRAM(s) IV Intermittent every 24 hours    RECENT CULTURES:    ENDOCRINE    CAPILLARY BLOOD GLUCOSE    PATIENT CARE ACCESS DEVICES:  [x] Peripheral IV  [ ] Central Venous Line	[ ] R	[ ] L	[ ] IJ	[ ] Fem	[ ] SC	Placed:   [x] Arterial Line		[x] R	[ ] L	[ ] Fem	[x] Rad	[ ] Ax	Placed:   [ ] PICC:					[ ] Mediport  [ ] Urinary Catheter, Date Placed:   [x] Necessity of urinary, arterial, and venous catheters discussed    OTHER MEDICATIONS:     IMAGING STUDIES: SICU CONSULT NOTE    HPI  35 year old [place]er & English speaking female with PMH of cryptogenic cirrhosis, portal hypertension with small esophageal varices, Budd Chiari syndrome, Protein S deficiency, multiple miscarriages, COVID-19 PCR + 3/4/21, recent admission for pleural effusion, attempted TIPS 4/20/2021 (unable to perform 2/2 intrahepatic IVC occlusion) started on heparin then Xarelto, now presents for TIPS today.    Patient now s/p TIPS and thoracentesis of 1L pleural fluid. During the procedure, patient received 1L cyrstalloid, 500 5% albumin, , on 0.6 mcg neosynephrine. Post-procedure on neosynephrine at 0.7. Patient admitted to SICU for hemodynamic monitoring    PAST MEDICAL HISTORY: Cirrhosis    Cirrhosis    Budd-Chiari syndrome    H/O protein S deficiency    Miscarriage    H/O protein S deficiency    PAST SURGICAL HISTORY: No significant past surgical history    No significant past surgical history    FAMILY HISTORY: No pertinent family history in first degree relatives    FHx: liver disease    SOCIAL HISTORY: , lives with spouse Darron () [phone]. Denies ETOH use and smoking.    ALLERGIES: No Known Allergies    VITAL SIGNS:  ICU Vital Signs Last 24 Hrs  T(C): 37 (11 May 2021 15:18), Max: 37 (11 May 2021 15:18)  T(F): 98.6 (11 May 2021 15:18), Max: 98.6 (11 May 2021 15:18)  HR: 112 (11 May 2021 15:18) (91 - 112)  BP: 93/52 (11 May 2021 15:18) (93/52 - 103/70)  BP(mean): 65 (11 May 2021 15:18) (65 - 65)  ABP: 94/150 (11 May 2021 15:18) (94/150 - 94/150)  ABP(mean): 166 (11 May 2021 15:18) (166 - 166)  RR: --  SpO2: 97% (11 May 2021 15:18) (97% - 98%)    NEURO  Exam: AAOx3, NAD    RESPIRATORY  ABG - ( 11 May 2021 13:51 )  pH: 7.34  /  pCO2: 44    /  pO2: 168   / HCO3: 23    / Base Excess: -2.4  /  SaO2: 99      Exam: on nonrebreather    CARDIOVASCULAR  Exam: hypotensive on neosynephrine  Cardiac Rhythm: sinus tachycardia    GI/NUTRITION  Exam: soft, NT, ND; incision dressing c/d/i  Diet: NPO    GENITOURINARY/RENAL  albumin human  5% IVPB 250 milliLiter(s) IV Intermittent once  albumin human  5% IVPB 250 milliLiter(s) IV Intermittent once      Weight (kg): 43 (05-11 @ 08:26)  05-11    139  |  102  |  22  ----------------------------<  94  4.6   |  23  |  0.67    Ca    9.4      11 May 2021 08:06    TPro  7.3  /  Alb  4.0  /  TBili  1.6<H>  /  DBili  x   /  AST  38  /  ALT  31  /  AlkPhos  171<H>  05-11    HEMATOLOGIC  [x] VTE Prophylaxis: venodyne                        13.3   3.80  )-----------( 169      ( 11 May 2021 08:06 )             42.6     PT/INR - ( 11 May 2021 08:06 )   PT: 12.7 sec;   INR: 1.06 ratio      Transfusion: [ ] PRBC	[ ] Platelets	[ ] FFP	[ ] Cryoprecipitate    INFECTIOUS DISEASES  cefTRIAXone   IVPB 1000 milliGRAM(s) IV Intermittent every 24 hours    RECENT CULTURES:    ENDOCRINE    CAPILLARY BLOOD GLUCOSE    PATIENT CARE ACCESS DEVICES:  [x] Peripheral IV  [ ] Central Venous Line	[ ] R	[ ] L	[ ] IJ	[ ] Fem	[ ] SC	Placed:   [x] Arterial Line		[x] R	[ ] L	[ ] Fem	[x] Rad	[ ] Ax	Placed:   [ ] PICC:					[ ] Mediport  [ ] Urinary Catheter, Date Placed:   [x] Necessity of urinary, arterial, and venous catheters discussed    OTHER MEDICATIONS:     IMAGING STUDIES: SICU CONSULT NOTE    HPI  35 year old [place] & English speaking female with PMH of cryptogenic cirrhosis, portal hypertension with small esophageal varices, Budd Chiari syndrome, Protein S deficiency, multiple miscarriages, COVID-19 PCR + 3/4/21, recent admission for pleural effusion, attempted TIPS 4/2021 (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 s/p thoracentesis, now presents for Transjugular Intrahepatic Portosystemic Shunt (TIPS) today  Patient now s/p TIPS and thoracentesis of 1L pleural fluid. During the procedure, patient received 1L cyrstalloid, 500 5% albumin, , on 0.6 mcg neosynephrine. Post-procedure on neosynephrine at 0.7. Patient admitted to SICU for hemodynamic monitoring    PAST MEDICAL HISTORY: Cirrhosis    Cirrhosis    Budd-Chiari syndrome    H/O protein S deficiency    Miscarriage    H/O protein S deficiency    PAST SURGICAL HISTORY: No significant past surgical history    No significant past surgical history    FAMILY HISTORY: No pertinent family history in first degree relatives    FHx: liver disease    SOCIAL HISTORY: , lives with spouse Darron () [phone]. Denies ETOH use and smoking.    ALLERGIES: No Known Allergies    VITAL SIGNS:  ICU Vital Signs Last 24 Hrs  T(C): 37 (11 May 2021 15:18), Max: 37 (11 May 2021 15:18)  T(F): 98.6 (11 May 2021 15:18), Max: 98.6 (11 May 2021 15:18)  HR: 112 (11 May 2021 15:18) (91 - 112)  BP: 93/52 (11 May 2021 15:18) (93/52 - 103/70)  BP(mean): 65 (11 May 2021 15:18) (65 - 65)  ABP: 94/150 (11 May 2021 15:18) (94/150 - 94/150)  ABP(mean): 166 (11 May 2021 15:18) (166 - 166)  RR: --  SpO2: 97% (11 May 2021 15:18) (97% - 98%)    NEURO  Exam: AAOx3, NAD    RESPIRATORY  ABG - ( 11 May 2021 13:51 )  pH: 7.34  /  pCO2: 44    /  pO2: 168   / HCO3: 23    / Base Excess: -2.4  /  SaO2: 99      Exam: on nonrebreather    CARDIOVASCULAR  Exam: hypotensive on neosynephrine  Cardiac Rhythm: sinus tachycardia    GI/NUTRITION  Exam: soft, NT, ND; incision dressing c/d/i  Diet: NPO    GENITOURINARY/RENAL  albumin human  5% IVPB 250 milliLiter(s) IV Intermittent once  albumin human  5% IVPB 250 milliLiter(s) IV Intermittent once      Weight (kg): 43 (05-11 @ 08:26)  05-11    139  |  102  |  22  ----------------------------<  94  4.6   |  23  |  0.67    Ca    9.4      11 May 2021 08:06    TPro  7.3  /  Alb  4.0  /  TBili  1.6<H>  /  DBili  x   /  AST  38  /  ALT  31  /  AlkPhos  171<H>  05-11    HEMATOLOGIC  [x] VTE Prophylaxis: venodyne                        13.3   3.80  )-----------( 169      ( 11 May 2021 08:06 )             42.6     PT/INR - ( 11 May 2021 08:06 )   PT: 12.7 sec;   INR: 1.06 ratio      Transfusion: [ ] PRBC	[ ] Platelets	[ ] FFP	[ ] Cryoprecipitate    INFECTIOUS DISEASES  cefTRIAXone   IVPB 1000 milliGRAM(s) IV Intermittent every 24 hours    RECENT CULTURES:    ENDOCRINE    CAPILLARY BLOOD GLUCOSE    PATIENT CARE ACCESS DEVICES:  [x] Peripheral IV  [ ] Central Venous Line	[ ] R	[ ] L	[ ] IJ	[ ] Fem	[ ] SC	Placed:   [x] Arterial Line		[x] R	[ ] L	[ ] Fem	[x] Rad	[ ] Ax	Placed:   [ ] PICC:					[ ] Mediport  [ ] Urinary Catheter, Date Placed:   [x] Necessity of urinary, arterial, and venous catheters discussed    OTHER MEDICATIONS:     IMAGING STUDIES:

## 2021-05-11 NOTE — PRE PROCEDURE NOTE - GENERAL PROCEDURE NAME
IVC venogram +/- IVC thrombectomy/ stent/ TIPS / DIPS transjugular intrahepatic portosystemic shunt placement with possible transhepatic and transplenic access

## 2021-05-11 NOTE — CONSULT NOTE ADULT - SUBJECTIVE AND OBJECTIVE BOX
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     She underwent planned TIPS today as well as thoracentesis. Pre-TIPS portal pressure was 29, post-TIPS was 20, w/ post TIPS gradient of 10 mm Hg. She also had 1 L drained from pleural cavity as well.     Pt feels well, is tired, but denies pain/confusion.     PMHX/PSHX:  Cirrhosis    Cirrhosis    Budd-Chiari syndrome    H/O protein S deficiency    Miscarriage    H/O protein S deficiency    No significant past surgical history    No significant past surgical history      Allergies:  No Known Allergies      Home Medications: reviewed  Hospital Medications:  acetaminophen  IVPB .. 750 milliGRAM(s) IV Intermittent every 6 hours  cefTRIAXone   IVPB 1000 milliGRAM(s) IV Intermittent every 24 hours  chlorhexidine 2% Cloths 1 Application(s) Topical <User Schedule>  pantoprazole  Injectable 40 milliGRAM(s) IV Push daily  phenylephrine    Infusion 0.2 MICROgram(s)/kG/Min IV Continuous <Continuous>  polyethylene glycol 3350 17 Gram(s) Oral daily  senna 2 Tablet(s) Oral at bedtime  sodium chloride 0.9%. 1000 milliLiter(s) IV Continuous <Continuous>  sodium phosphate IVPB 15 milliMole(s) IV Intermittent once      Social History:   Tob: Denies  EtOH: Denies  Illicit Drugs: Denies    Family history:  No pertinent family history in first degree relatives    FHx: liver disease      Denies family history of colon cancer/polyps, stomach cancer/polyps, pancreatic cancer/masses, liver cancer/disease, ovarian cancer and endometrial cancer.    ROS:   General:  No  fevers, chills, night sweats, fatigue  Eyes:  Good vision, no reported pain  ENT:  No sore throat, pain, runny nose  CV:  No pain, palpitations  Pulm:  No dyspnea, cough  GI:  See HPI, otherwise negative  :  No  incontinence, nocturia  Muscle:  No pain, weakness  Neuro:  No memory problems  Psych:  No insomnia, mood problems, depression  Endocrine:  No polyuria, polydipsia, cold/heat intolerance  Heme:  No petechiae, ecchymosis, easy bruisability  Skin:  No rash    PHYSICAL EXAM:   Vital Signs:  Vital Signs Last 24 Hrs  T(C): 37 (11 May 2021 15:18), Max: 37 (11 May 2021 15:18)  T(F): 98.6 (11 May 2021 15:18), Max: 98.6 (11 May 2021 15:18)  HR: 104 (11 May 2021 16:15) (91 - 112)  BP: 93/52 (11 May 2021 15:18) (93/52 - 103/70)  BP(mean): 65 (11 May 2021 15:18) (65 - 65)  RR: 20 (11 May 2021 16:15) (20 - 24)  SpO2: 96% (11 May 2021 16:15) (93% - 98%)  Daily Height in cm: 152.4 (11 May 2021 08:26)    Daily     GENERAL: no acute distress  NEURO: alert, oriented x 3, no asterixis  HEENT: anicteric sclera, no conjunctival pallor appreciated  CHEST: no respiratory distress, no accessory muscle use  CARDIAC: regular rate, rhythm  ABDOMEN: soft, non-tender, non-distended, no rebound or guarding  EXTREMITIES: warm, well perfused, no edema  SKIN: no lesions noted    LABS: reviewed                        10.0   6.11  )-----------( 108      ( 11 May 2021 15:42 )             32.3     05-11    136  |  102  |  19  ----------------------------<  101<H>  4.1   |  18<L>  |  0.63    Ca    7.8<L>      11 May 2021 15:42  Phos  2.5     05-11  Mg     1.9     05-11    TPro  5.6<L>  /  Alb  3.2<L>  /  TBili  2.4<H>  /  DBili  0.9<H>  /  AST  69<H>  /  ALT  47<H>  /  AlkPhos  114  05-11    LIVER FUNCTIONS - ( 11 May 2021 15:42 )  Alb: 3.2 g/dL / Pro: 5.6 g/dL / ALK PHOS: 114 U/L / ALT: 47 U/L / AST: 69 U/L / GGT: x               Diagnostic Studies: see sunrise for full report

## 2021-05-12 LAB
ALBUMIN SERPL ELPH-MCNC: 3.2 G/DL — LOW (ref 3.3–5)
ALBUMIN SERPL ELPH-MCNC: 3.8 G/DL — SIGNIFICANT CHANGE UP (ref 3.3–5)
ALP SERPL-CCNC: 116 U/L — SIGNIFICANT CHANGE UP (ref 40–120)
ALP SERPL-CCNC: 119 U/L — SIGNIFICANT CHANGE UP (ref 40–120)
ALT FLD-CCNC: 56 U/L — HIGH (ref 10–45)
ALT FLD-CCNC: 59 U/L — HIGH (ref 10–45)
ANION GAP SERPL CALC-SCNC: 11 MMOL/L — SIGNIFICANT CHANGE UP (ref 5–17)
ANION GAP SERPL CALC-SCNC: 12 MMOL/L — SIGNIFICANT CHANGE UP (ref 5–17)
APTT BLD: 40.5 SEC — HIGH (ref 27.5–35.5)
APTT BLD: 54.1 SEC — HIGH (ref 27.5–35.5)
AST SERPL-CCNC: 59 U/L — HIGH (ref 10–40)
AST SERPL-CCNC: 82 U/L — HIGH (ref 10–40)
BILIRUB DIRECT SERPL-MCNC: 0.6 MG/DL — HIGH (ref 0–0.2)
BILIRUB DIRECT SERPL-MCNC: 0.8 MG/DL — HIGH (ref 0–0.2)
BILIRUB INDIRECT FLD-MCNC: 1.4 MG/DL — HIGH (ref 0.2–1)
BILIRUB INDIRECT FLD-MCNC: 2.4 MG/DL — HIGH (ref 0.2–1)
BILIRUB SERPL-MCNC: 2 MG/DL — HIGH (ref 0.2–1.2)
BILIRUB SERPL-MCNC: 3.2 MG/DL — HIGH (ref 0.2–1.2)
BUN SERPL-MCNC: 10 MG/DL — SIGNIFICANT CHANGE UP (ref 7–23)
BUN SERPL-MCNC: 10 MG/DL — SIGNIFICANT CHANGE UP (ref 7–23)
CALCIUM SERPL-MCNC: 8 MG/DL — LOW (ref 8.4–10.5)
CALCIUM SERPL-MCNC: 9.1 MG/DL — SIGNIFICANT CHANGE UP (ref 8.4–10.5)
CHLORIDE SERPL-SCNC: 106 MMOL/L — SIGNIFICANT CHANGE UP (ref 96–108)
CHLORIDE SERPL-SCNC: 107 MMOL/L — SIGNIFICANT CHANGE UP (ref 96–108)
CO2 SERPL-SCNC: 19 MMOL/L — LOW (ref 22–31)
CO2 SERPL-SCNC: 22 MMOL/L — SIGNIFICANT CHANGE UP (ref 22–31)
COVID-19 SPIKE DOMAIN AB INTERP: POSITIVE
COVID-19 SPIKE DOMAIN ANTIBODY RESULT: >250 U/ML — HIGH
CREAT SERPL-MCNC: 0.56 MG/DL — SIGNIFICANT CHANGE UP (ref 0.5–1.3)
CREAT SERPL-MCNC: 0.63 MG/DL — SIGNIFICANT CHANGE UP (ref 0.5–1.3)
GAS PNL BLDA: SIGNIFICANT CHANGE UP
GAS PNL BLDA: SIGNIFICANT CHANGE UP
GLUCOSE SERPL-MCNC: 122 MG/DL — HIGH (ref 70–99)
GLUCOSE SERPL-MCNC: 159 MG/DL — HIGH (ref 70–99)
HCG SERPL-ACNC: <2 MIU/ML — SIGNIFICANT CHANGE UP
HCT VFR BLD CALC: 31 % — LOW (ref 34.5–45)
HCT VFR BLD CALC: 31.8 % — LOW (ref 34.5–45)
HCT VFR BLD CALC: 31.9 % — LOW (ref 34.5–45)
HCT VFR BLD CALC: 33.5 % — LOW (ref 34.5–45)
HGB BLD-MCNC: 10.4 G/DL — LOW (ref 11.5–15.5)
HGB BLD-MCNC: 9.6 G/DL — LOW (ref 11.5–15.5)
HGB BLD-MCNC: 9.8 G/DL — LOW (ref 11.5–15.5)
HGB BLD-MCNC: 9.9 G/DL — LOW (ref 11.5–15.5)
INR BLD: 1.21 RATIO — HIGH (ref 0.88–1.16)
INR BLD: 1.28 RATIO — HIGH (ref 0.88–1.16)
MAGNESIUM SERPL-MCNC: 2.1 MG/DL — SIGNIFICANT CHANGE UP (ref 1.6–2.6)
MAGNESIUM SERPL-MCNC: 2.8 MG/DL — HIGH (ref 1.6–2.6)
MCHC RBC-ENTMCNC: 26.2 PG — LOW (ref 27–34)
MCHC RBC-ENTMCNC: 26.5 PG — LOW (ref 27–34)
MCHC RBC-ENTMCNC: 26.5 PG — LOW (ref 27–34)
MCHC RBC-ENTMCNC: 27 PG — SIGNIFICANT CHANGE UP (ref 27–34)
MCHC RBC-ENTMCNC: 30.8 GM/DL — LOW (ref 32–36)
MCHC RBC-ENTMCNC: 31 GM/DL — LOW (ref 32–36)
MCV RBC AUTO: 85 FL — SIGNIFICANT CHANGE UP (ref 80–100)
MCV RBC AUTO: 85.5 FL — SIGNIFICANT CHANGE UP (ref 80–100)
MCV RBC AUTO: 85.6 FL — SIGNIFICANT CHANGE UP (ref 80–100)
MCV RBC AUTO: 86.9 FL — SIGNIFICANT CHANGE UP (ref 80–100)
NRBC # BLD: 0 /100 WBCS — SIGNIFICANT CHANGE UP (ref 0–0)
PHOSPHATE SERPL-MCNC: 2.7 MG/DL — SIGNIFICANT CHANGE UP (ref 2.5–4.5)
PHOSPHATE SERPL-MCNC: 2.9 MG/DL — SIGNIFICANT CHANGE UP (ref 2.5–4.5)
PLATELET # BLD AUTO: 101 K/UL — LOW (ref 150–400)
PLATELET # BLD AUTO: 106 K/UL — LOW (ref 150–400)
PLATELET # BLD AUTO: 129 K/UL — LOW (ref 150–400)
PLATELET # BLD AUTO: 187 K/UL — SIGNIFICANT CHANGE UP (ref 150–400)
POTASSIUM SERPL-MCNC: 4.1 MMOL/L — SIGNIFICANT CHANGE UP (ref 3.5–5.3)
POTASSIUM SERPL-MCNC: 4.1 MMOL/L — SIGNIFICANT CHANGE UP (ref 3.5–5.3)
POTASSIUM SERPL-SCNC: 4.1 MMOL/L — SIGNIFICANT CHANGE UP (ref 3.5–5.3)
POTASSIUM SERPL-SCNC: 4.1 MMOL/L — SIGNIFICANT CHANGE UP (ref 3.5–5.3)
PROT SERPL-MCNC: 5.3 G/DL — LOW (ref 6–8.3)
PROT SERPL-MCNC: 6 G/DL — SIGNIFICANT CHANGE UP (ref 6–8.3)
PROTHROM AB SERPL-ACNC: 14.4 SEC — HIGH (ref 10.6–13.6)
PROTHROM AB SERPL-ACNC: 15.2 SEC — HIGH (ref 10.6–13.6)
RBC # BLD: 3.62 M/UL — LOW (ref 3.8–5.2)
RBC # BLD: 3.67 M/UL — LOW (ref 3.8–5.2)
RBC # BLD: 3.74 M/UL — LOW (ref 3.8–5.2)
RBC # BLD: 3.92 M/UL — SIGNIFICANT CHANGE UP (ref 3.8–5.2)
RBC # FLD: 15.8 % — HIGH (ref 10.3–14.5)
RBC # FLD: 15.9 % — HIGH (ref 10.3–14.5)
RBC # FLD: 15.9 % — HIGH (ref 10.3–14.5)
RBC # FLD: 16 % — HIGH (ref 10.3–14.5)
SARS-COV-2 IGG+IGM SERPL QL IA: >250 U/ML — HIGH
SARS-COV-2 IGG+IGM SERPL QL IA: POSITIVE
SODIUM SERPL-SCNC: 138 MMOL/L — SIGNIFICANT CHANGE UP (ref 135–145)
SODIUM SERPL-SCNC: 139 MMOL/L — SIGNIFICANT CHANGE UP (ref 135–145)
WBC # BLD: 10.06 K/UL — SIGNIFICANT CHANGE UP (ref 3.8–10.5)
WBC # BLD: 10.97 K/UL — HIGH (ref 3.8–10.5)
WBC # BLD: 7.49 K/UL — SIGNIFICANT CHANGE UP (ref 3.8–10.5)
WBC # BLD: 9.46 K/UL — SIGNIFICANT CHANGE UP (ref 3.8–10.5)
WBC # FLD AUTO: 10.06 K/UL — SIGNIFICANT CHANGE UP (ref 3.8–10.5)
WBC # FLD AUTO: 10.97 K/UL — HIGH (ref 3.8–10.5)
WBC # FLD AUTO: 7.49 K/UL — SIGNIFICANT CHANGE UP (ref 3.8–10.5)
WBC # FLD AUTO: 9.46 K/UL — SIGNIFICANT CHANGE UP (ref 3.8–10.5)

## 2021-05-12 PROCEDURE — 99232 SBSQ HOSP IP/OBS MODERATE 35: CPT | Mod: GC

## 2021-05-12 PROCEDURE — 99231 SBSQ HOSP IP/OBS SF/LOW 25: CPT

## 2021-05-12 PROCEDURE — 99233 SBSQ HOSP IP/OBS HIGH 50: CPT

## 2021-05-12 PROCEDURE — 71045 X-RAY EXAM CHEST 1 VIEW: CPT | Mod: 26

## 2021-05-12 PROCEDURE — 99232 SBSQ HOSP IP/OBS MODERATE 35: CPT | Mod: GC,24

## 2021-05-12 RX ORDER — HEPARIN SODIUM 5000 [USP'U]/ML
600 INJECTION INTRAVENOUS; SUBCUTANEOUS
Qty: 25000 | Refills: 0 | Status: DISCONTINUED | OUTPATIENT
Start: 2021-05-12 | End: 2021-05-13

## 2021-05-12 RX ORDER — MIDODRINE HYDROCHLORIDE 2.5 MG/1
10 TABLET ORAL EVERY 8 HOURS
Refills: 0 | Status: DISCONTINUED | OUTPATIENT
Start: 2021-05-12 | End: 2021-05-14

## 2021-05-12 RX ORDER — FUROSEMIDE 40 MG
80 TABLET ORAL EVERY 12 HOURS
Refills: 0 | Status: DISCONTINUED | OUTPATIENT
Start: 2021-05-12 | End: 2021-05-13

## 2021-05-12 RX ORDER — ACETAMINOPHEN 500 MG
500 TABLET ORAL EVERY 6 HOURS
Refills: 0 | Status: COMPLETED | OUTPATIENT
Start: 2021-05-12 | End: 2021-05-14

## 2021-05-12 RX ORDER — SPIRONOLACTONE 25 MG/1
200 TABLET, FILM COATED ORAL DAILY
Refills: 0 | Status: DISCONTINUED | OUTPATIENT
Start: 2021-05-12 | End: 2021-05-13

## 2021-05-12 RX ORDER — HYDROMORPHONE HYDROCHLORIDE 2 MG/ML
0.25 INJECTION INTRAMUSCULAR; INTRAVENOUS; SUBCUTANEOUS ONCE
Refills: 0 | Status: DISCONTINUED | OUTPATIENT
Start: 2021-05-12 | End: 2021-05-12

## 2021-05-12 RX ORDER — PHENYLEPHRINE HYDROCHLORIDE 10 MG/ML
0.2 INJECTION INTRAVENOUS
Qty: 40 | Refills: 0 | Status: DISCONTINUED | OUTPATIENT
Start: 2021-05-12 | End: 2021-05-12

## 2021-05-12 RX ORDER — OXYCODONE HYDROCHLORIDE 5 MG/1
5 TABLET ORAL EVERY 4 HOURS
Refills: 0 | Status: DISCONTINUED | OUTPATIENT
Start: 2021-05-12 | End: 2021-05-19

## 2021-05-12 RX ADMIN — Medication 1 TABLET(S): at 11:06

## 2021-05-12 RX ADMIN — HEPARIN SODIUM 6 UNIT(S)/HR: 5000 INJECTION INTRAVENOUS; SUBCUTANEOUS at 20:48

## 2021-05-12 RX ADMIN — OXYCODONE HYDROCHLORIDE 5 MILLIGRAM(S): 5 TABLET ORAL at 21:18

## 2021-05-12 RX ADMIN — HYDROMORPHONE HYDROCHLORIDE 0.25 MILLIGRAM(S): 2 INJECTION INTRAMUSCULAR; INTRAVENOUS; SUBCUTANEOUS at 06:14

## 2021-05-12 RX ADMIN — HYDROMORPHONE HYDROCHLORIDE 0.25 MILLIGRAM(S): 2 INJECTION INTRAMUSCULAR; INTRAVENOUS; SUBCUTANEOUS at 06:29

## 2021-05-12 RX ADMIN — OXYCODONE HYDROCHLORIDE 5 MILLIGRAM(S): 5 TABLET ORAL at 11:03

## 2021-05-12 RX ADMIN — PANTOPRAZOLE SODIUM 40 MILLIGRAM(S): 20 TABLET, DELAYED RELEASE ORAL at 06:02

## 2021-05-12 RX ADMIN — Medication 80 MILLIGRAM(S): at 11:06

## 2021-05-12 RX ADMIN — Medication 200 MILLIGRAM(S): at 11:08

## 2021-05-12 RX ADMIN — SENNA PLUS 2 TABLET(S): 8.6 TABLET ORAL at 21:03

## 2021-05-12 RX ADMIN — CEFTRIAXONE 100 MILLIGRAM(S): 500 INJECTION, POWDER, FOR SOLUTION INTRAMUSCULAR; INTRAVENOUS at 10:41

## 2021-05-12 RX ADMIN — Medication 500 MILLIGRAM(S): at 06:12

## 2021-05-12 RX ADMIN — SPIRONOLACTONE 200 MILLIGRAM(S): 25 TABLET, FILM COATED ORAL at 11:58

## 2021-05-12 RX ADMIN — Medication 200 MILLIGRAM(S): at 17:18

## 2021-05-12 RX ADMIN — POLYETHYLENE GLYCOL 3350 17 GRAM(S): 17 POWDER, FOR SOLUTION ORAL at 11:55

## 2021-05-12 RX ADMIN — Medication 200 MILLIGRAM(S): at 05:36

## 2021-05-12 RX ADMIN — HEPARIN SODIUM 6 UNIT(S)/HR: 5000 INJECTION INTRAVENOUS; SUBCUTANEOUS at 10:41

## 2021-05-12 RX ADMIN — PHENYLEPHRINE HYDROCHLORIDE 3.23 MICROGRAM(S)/KG/MIN: 10 INJECTION INTRAVENOUS at 10:41

## 2021-05-12 RX ADMIN — OXYCODONE HYDROCHLORIDE 5 MILLIGRAM(S): 5 TABLET ORAL at 20:48

## 2021-05-12 RX ADMIN — Medication 500 MILLIGRAM(S): at 11:55

## 2021-05-12 RX ADMIN — MIDODRINE HYDROCHLORIDE 10 MILLIGRAM(S): 2.5 TABLET ORAL at 13:31

## 2021-05-12 RX ADMIN — OXYCODONE HYDROCHLORIDE 5 MILLIGRAM(S): 5 TABLET ORAL at 03:28

## 2021-05-12 RX ADMIN — OXYCODONE HYDROCHLORIDE 5 MILLIGRAM(S): 5 TABLET ORAL at 11:55

## 2021-05-12 RX ADMIN — HYDROMORPHONE HYDROCHLORIDE 0.25 MILLIGRAM(S): 2 INJECTION INTRAMUSCULAR; INTRAVENOUS; SUBCUTANEOUS at 05:55

## 2021-05-12 RX ADMIN — Medication 125 MILLIMOLE(S): at 03:30

## 2021-05-12 RX ADMIN — MIDODRINE HYDROCHLORIDE 10 MILLIGRAM(S): 2.5 TABLET ORAL at 21:03

## 2021-05-12 RX ADMIN — OXYCODONE HYDROCHLORIDE 5 MILLIGRAM(S): 5 TABLET ORAL at 03:58

## 2021-05-12 RX ADMIN — Medication 500 MILLIGRAM(S): at 18:58

## 2021-05-12 RX ADMIN — HYDROMORPHONE HYDROCHLORIDE 0.25 MILLIGRAM(S): 2 INJECTION INTRAMUSCULAR; INTRAVENOUS; SUBCUTANEOUS at 06:10

## 2021-05-12 NOTE — PROGRESS NOTE ADULT - SUBJECTIVE AND OBJECTIVE BOX
Chief Complaint:  Patient is a 35y old  Female who presents with a chief complaint of TIPS (12 May 2021 10:35)    Reason for consult: s/p TIPS    Interval Events: Liver enzymes/Hb stable, pt reporting intermittent chest pressure on R side, denies abd pain.    Hospital Medications:  acetaminophen  IVPB .. 500 milliGRAM(s) IV Intermittent every 6 hours  cefTRIAXone   IVPB 1000 milliGRAM(s) IV Intermittent every 24 hours  chlorhexidine 2% Cloths 1 Application(s) Topical <User Schedule>  furosemide    Tablet 80 milliGRAM(s) Oral every 12 hours  heparin  Infusion 600 Unit(s)/Hr IV Continuous <Continuous>  midodrine 10 milliGRAM(s) Oral every 8 hours  multivitamin 1 Tablet(s) Oral daily  oxyCODONE    IR 5 milliGRAM(s) Oral every 4 hours PRN  pantoprazole    Tablet 40 milliGRAM(s) Oral before breakfast  polyethylene glycol 3350 17 Gram(s) Oral daily  senna 2 Tablet(s) Oral at bedtime  spironolactone 200 milliGRAM(s) Oral daily      ROS:   General:  No  fevers, chills, night sweats, fatigue  Eyes:  Good vision, no reported pain  ENT:  No sore throat, pain, runny nose  CV:  No pain, palpitations  Pulm:  No dyspnea, cough  GI:  See HPI, otherwise negative  :  No  incontinence, nocturia  Muscle:  No pain, weakness  Neuro:  No memory problems  Psych:  No insomnia, mood problems, depression  Endocrine:  No polyuria, polydipsia, cold/heat intolerance  Heme:  No petechiae, ecchymosis, easy bruisability  Skin:  No rash    PHYSICAL EXAM:   Vital Signs:  Vital Signs Last 24 Hrs  T(C): 36.7 (12 May 2021 11:00), Max: 37 (11 May 2021 15:18)  T(F): 98.1 (12 May 2021 11:00), Max: 98.6 (11 May 2021 15:18)  HR: 94 (12 May 2021 13:30) (72 - 112)  BP: 88/58 (12 May 2021 07:30) (88/58 - 93/52)  BP(mean): 67 (12 May 2021 07:30) (65 - 67)  RR: 23 (12 May 2021 13:30) (14 - 31)  SpO2: 93% (12 May 2021 13:30) (90% - 97%)  Daily     Daily Weight in k.1 (12 May 2021 00:41)    GENERAL: no acute distress  NEURO: alert  HEENT: anicteric sclera, no conjunctival pallor appreciated  CHEST: no respiratory distress, no accessory muscle use  CARDIAC: regular rate, rhythm  ABDOMEN: soft, non-tender, non-distended, no rebound or guarding  EXTREMITIES: warm, well perfused, no edema  SKIN: no lesions noted    LABS: reviewed                        9.8    9.46  )-----------( 129      ( 12 May 2021 07:43 )             31.8     05-12    138  |  107  |  10  ----------------------------<  159<H>  4.1   |  19<L>  |  0.56    Ca    9.1      12 May 2021 02:13  Phos  2.9     -12  Mg     2.8     -    TPro  6.0  /  Alb  3.8  /  TBili  3.2<H>  /  DBili  0.8<H>  /  AST  82<H>  /  ALT  56<H>  /  AlkPhos  119  05-12    LIVER FUNCTIONS - ( 12 May 2021 02:13 )  Alb: 3.8 g/dL / Pro: 6.0 g/dL / ALK PHOS: 119 U/L / ALT: 56 U/L / AST: 82 U/L / GGT: x             Interval Diagnostic Studies: see sunrise for full report   Chief Complaint:  Patient is a 35y old  Female who presents with a chief complaint of TIPS (12 May 2021 10:35)    Reason for consult: s/p TIPS    Interval Events: Liver enzymes/Hb stable, pt reporting intermittent chest pressure on R side, denies abd pain.    Hospital Medications:  acetaminophen  IVPB .. 500 milliGRAM(s) IV Intermittent every 6 hours  cefTRIAXone   IVPB 1000 milliGRAM(s) IV Intermittent every 24 hours  chlorhexidine 2% Cloths 1 Application(s) Topical <User Schedule>  furosemide    Tablet 80 milliGRAM(s) Oral every 12 hours  heparin  Infusion 600 Unit(s)/Hr IV Continuous <Continuous>  midodrine 10 milliGRAM(s) Oral every 8 hours  multivitamin 1 Tablet(s) Oral daily  oxyCODONE    IR 5 milliGRAM(s) Oral every 4 hours PRN  pantoprazole    Tablet 40 milliGRAM(s) Oral before breakfast  polyethylene glycol 3350 17 Gram(s) Oral daily  senna 2 Tablet(s) Oral at bedtime  spironolactone 200 milliGRAM(s) Oral daily      ROS:   General:  No  fevers, chills, night sweats, fatigue  Eyes:  Good vision, no reported pain  ENT:  No sore throat, pain, runny nose  CV:  No pain, palpitations  Pulm:  No dyspnea, cough  GI:  See HPI, otherwise negative  :  No  incontinence, nocturia  Muscle:  No pain, weakness  Neuro:  No memory problems  Psych:  No insomnia, mood problems, depression  Endocrine:  No polyuria, polydipsia, cold/heat intolerance  Heme:  No petechiae, ecchymosis, easy bruisability  Skin:  No rash    PHYSICAL EXAM:   Vital Signs:  Vital Signs Last 24 Hrs  T(C): 36.7 (12 May 2021 11:00), Max: 37 (11 May 2021 15:18)  T(F): 98.1 (12 May 2021 11:00), Max: 98.6 (11 May 2021 15:18)  HR: 94 (12 May 2021 13:30) (72 - 112)  BP: 88/58 (12 May 2021 07:30) (88/58 - 93/52)  BP(mean): 67 (12 May 2021 07:30) (65 - 67)  RR: 23 (12 May 2021 13:30) (14 - 31)  SpO2: 93% (12 May 2021 13:30) (90% - 97%)  Daily     Daily Weight in k.1 (12 May 2021 00:41)    GENERAL: no acute distress  NEURO: alert, conversant, no asterixis  HEENT: anicteric sclera, no conjunctival pallor appreciated, +RIJ site C/D/I  CHEST: no respiratory distress, no accessory muscle use, decreased BS R base, L lung CTA  CARDIAC: regular rate, rhythm  ABDOMEN: soft, non-tender, non-distended, no rebound or guarding, +procedure sites C/D/I  EXTREMITIES: warm, well perfused, no edema  SKIN: no lesions noted    LABS: reviewed                        9.8    9.46  )-----------( 129      ( 12 May 2021 07:43 )             31.8     05-12    138  |  107  |  10  ----------------------------<  159<H>  4.1   |  19<L>  |  0.56    Ca    9.1      12 May 2021 02:13  Phos  2.9     05-12  Mg     2.8     05-12    TPro  6.0  /  Alb  3.8  /  TBili  3.2<H>  /  DBili  0.8<H>  /  AST  82<H>  /  ALT  56<H>  /  AlkPhos  119  05-12    LIVER FUNCTIONS - ( 12 May 2021 02:13 )  Alb: 3.8 g/dL / Pro: 6.0 g/dL / ALK PHOS: 119 U/L / ALT: 56 U/L / AST: 82 U/L / GGT: x             Interval Diagnostic Studies: see sunrise for full report

## 2021-05-12 NOTE — PROGRESS NOTE ADULT - SUBJECTIVE AND OBJECTIVE BOX
Transplant Surgery - daily rounds  --------------------------------------------------------------    Present:   Patient seen with Transplant Surgeon: Dr. Zamora, ACPs: Mare Eid and yL Ford and Surgical Resident Jonathon Weeks during am rounds and examined with Dr. Zamora.    Disciplines not in attendance will be notified of the plan.     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    Interval Events:  - Awake, A&Ox3, Afebrile  - Requiring Jaleel to maintain MAP > 70  - Reports some chest pressure, no SOB or chest pain. SR 90s, Sats 91-93 on 2l n/c    CXR: R pleural effusion  - H/H stable, T Riaz/LFTs slightly elevated      Dressings intact  - SCr 0.56   UOP 1.3L via [place]      Potential Discharge date: pending clinical improvement    Education:  Medications    Plan of care:  See Below    MEDICATIONS  (STANDING):  acetaminophen  IVPB .. 500 milliGRAM(s) IV Intermittent every 6 hours  cefTRIAXone   IVPB 1000 milliGRAM(s) IV Intermittent every 24 hours  chlorhexidine 2% Cloths 1 Application(s) Topical <User Schedule>  multivitamin 1 Tablet(s) Oral daily  pantoprazole    Tablet 40 milliGRAM(s) Oral before breakfast  phenylephrine    Infusion 0.2 MICROgram(s)/kG/Min (3.23 mL/Hr) IV Continuous <Continuous>  polyethylene glycol 3350 17 Gram(s) Oral daily  senna 2 Tablet(s) Oral at bedtime  sodium chloride 0.9%. 1000 milliLiter(s) (50 mL/Hr) IV Continuous <Continuous>    MEDICATIONS  (PRN):  oxyCODONE    IR 5 milliGRAM(s) Oral every 4 hours PRN Severe Pain (7 - 10)      PAST MEDICAL & SURGICAL HISTORY:  Cirrhosis  2010    Cirrhosis    Budd-Chiari syndrome    H/O protein S deficiency    Miscarriage  x 5    H/O protein S deficiency    No significant past surgical history        Vital Signs Last 24 Hrs  T(C): 36.6 (12 May 2021 07:00), Max: 37 (11 May 2021 15:18)  T(F): 97.9 (12 May 2021 07:00), Max: 98.6 (11 May 2021 15:18)  HR: 96 (12 May 2021 09:00) (72 - 112)  BP: 88/58 (12 May 2021 07:30) (88/58 - 93/52)  BP(mean): 67 (12 May 2021 07:30) (65 - 67)  RR: 23 (12 May 2021 09:00) (14 - 31)  SpO2: 93% (12 May 2021 09:00) (90% - 97%)    I&O's Summary    11 May 2021 07:01  -  12 May 2021 07:00  --------------------------------------------------------  IN: 2766.1 mL / OUT: 1330 mL / NET: 1436.1 mL    12 May 2021 07:01  -  12 May 2021 09:15  --------------------------------------------------------  IN: 101.6 mL / OUT: 10 mL / NET: 91.6 mL                              9.8    9.46  )-----------( 129      ( 12 May 2021 07:43 )             31.8     05-12    138  |  107  |  10  ----------------------------<  159<H>  4.1   |  19<L>  |  0.56    Ca    9.1      12 May 2021 02:13  Phos  2.9     05-12  Mg     2.8     05-12    TPro  6.0  /  Alb  3.8  /  TBili  3.2<H>  /  DBili  0.8<H>  /  AST  82<H>  /  ALT  56<H>  /  AlkPhos  119  05-12        Review of systems  Gen: No weight changes, fatigue, fevers/chills, weakness  Skin: No rashes  Head/Eyes/Ears/Mouth: No headache; Normal hearing; Normal vision w/o blurriness; No sinus pain/discomfort, sore throat  Respiratory: C/O chest pressure, No dyspnea, cough, wheezing, hemoptysis  CV: No PND, orthopnea  GI: Denies diarrhea, constipation, nausea, vomiting, melena, hematochezia  : No increased frequency, dysuria, hematuria, nocturia  MSK: No joint pain/swelling; no back pain; no edema  Neuro: No dizziness/lightheadedness, weakness, seizures, numbness, tingling  Heme: No easy bruising or bleeding  Endo: No heat/cold intolerance  Psych: No significant nervousness, anxiety, stress, depression  All other systems were reviewed and are negative, except as noted.      PHYSICAL EXAM:  Constitutional: Well developed / well nourished  Eyes: Anicteric, PERRLA  ENMT: nc/at  Neck: R IJ dressing intact, supple  Respiratory: diminished R side  Cardiovascular: RRR  Gastrointestinal: Soft abdomen, Dressings intact, ND, NT  Genitourinary: Urinary catheter in place  Extremities: SCD's in place and working bilaterally, No edema  Vascular: Palpable dp pulses bilaterally  Neurological: A&O x3  Skin: Abdominal dressings intact w/o drainage. R   Musculoskeletal: Moving all extremities  Psychiatric: Responsive     Transplant Surgery - daily rounds  --------------------------------------------------------------    Present:   Patient seen with Transplant Surgeon: Dr. Zamora, ACPs: Mare Eid and Ly Ford and Surgical Resident Jonathon Weeks during am rounds and examined with Dr. Zamora.    Disciplines not in attendance will be notified of the plan.     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    Decompensated cirrhosis due to chronic hepatic vein occlusion  - TIPS 5/11  - varices: small in 2017  - ascites: recurrent, on lasix 80mg BID/spironolactone 200mg QD at home c/b hepatic hydrothorax. Thoracentesis 4/26 (850ml), 5/11 (1L)  - HE: no hx  - HCC: none on imaging  - MELD-Na = 13 on 5/12  Protein S deficiency    Interval Events:  - Awake, A&Ox3, Afebrile  - Requiring Jaleel to maintain MAP > 70  - Reports some chest pressure, no SOB or chest pain. SR 90s, Sats 91-93 on 2l n/c     Troponin 16     CXR: R pleural effusion  - H/H stable, T Riaz/LFTs slightly elevated      Dressings intact  - SCr 0.56   UOP 1.3L via [place]      Potential Discharge date: pending clinical improvement    Education:  Medications    Plan of care:  See Below    MEDICATIONS  (STANDING):  acetaminophen  IVPB .. 500 milliGRAM(s) IV Intermittent every 6 hours  cefTRIAXone   IVPB 1000 milliGRAM(s) IV Intermittent every 24 hours  chlorhexidine 2% Cloths 1 Application(s) Topical <User Schedule>  multivitamin 1 Tablet(s) Oral daily  pantoprazole    Tablet 40 milliGRAM(s) Oral before breakfast  phenylephrine    Infusion 0.2 MICROgram(s)/kG/Min (3.23 mL/Hr) IV Continuous <Continuous>  polyethylene glycol 3350 17 Gram(s) Oral daily  senna 2 Tablet(s) Oral at bedtime  sodium chloride 0.9%. 1000 milliLiter(s) (50 mL/Hr) IV Continuous <Continuous>    MEDICATIONS  (PRN):  oxyCODONE    IR 5 milliGRAM(s) Oral every 4 hours PRN Severe Pain (7 - 10)      PAST MEDICAL & SURGICAL HISTORY:  Cirrhosis  2010    Cirrhosis    Budd-Chiari syndrome    H/O protein S deficiency    Miscarriage  x 5    H/O protein S deficiency    No significant past surgical history        Vital Signs Last 24 Hrs  T(C): 36.6 (12 May 2021 07:00), Max: 37 (11 May 2021 15:18)  T(F): 97.9 (12 May 2021 07:00), Max: 98.6 (11 May 2021 15:18)  HR: 96 (12 May 2021 09:00) (72 - 112)  BP: 88/58 (12 May 2021 07:30) (88/58 - 93/52)  BP(mean): 67 (12 May 2021 07:30) (65 - 67)  RR: 23 (12 May 2021 09:00) (14 - 31)  SpO2: 93% (12 May 2021 09:00) (90% - 97%)    I&O's Summary    11 May 2021 07:01  -  12 May 2021 07:00  --------------------------------------------------------  IN: 2766.1 mL / OUT: 1330 mL / NET: 1436.1 mL    12 May 2021 07:01  -  12 May 2021 09:15  --------------------------------------------------------  IN: 101.6 mL / OUT: 10 mL / NET: 91.6 mL                              9.8    9.46  )-----------( 129      ( 12 May 2021 07:43 )             31.8     05-12    138  |  107  |  10  ----------------------------<  159<H>  4.1   |  19<L>  |  0.56    Ca    9.1      12 May 2021 02:13  Phos  2.9     05-12  Mg     2.8     05-12    TPro  6.0  /  Alb  3.8  /  TBili  3.2<H>  /  DBili  0.8<H>  /  AST  82<H>  /  ALT  56<H>  /  AlkPhos  119  05-12        Review of systems  Gen: No weight changes, fatigue, fevers/chills, weakness  Skin: No rashes  Head/Eyes/Ears/Mouth: No headache; Normal hearing; Normal vision w/o blurriness; No sinus pain/discomfort, sore throat  Respiratory: C/O chest pressure, No dyspnea, cough, wheezing, hemoptysis  CV: No PND, orthopnea  GI: Denies diarrhea, constipation, nausea, vomiting, melena, hematochezia  : No increased frequency, dysuria, hematuria, nocturia  MSK: No joint pain/swelling; no back pain; no edema  Neuro: No dizziness/lightheadedness, weakness, seizures, numbness, tingling  Heme: No easy bruising or bleeding  Endo: No heat/cold intolerance  Psych: No significant nervousness, anxiety, stress, depression  All other systems were reviewed and are negative, except as noted.      PHYSICAL EXAM:  Constitutional: Well developed / well nourished  Eyes: Anicteric, PERRLA  ENMT: nc/at  Neck: R IJ dressing intact, supple  Respiratory: diminished R side  Cardiovascular: RRR  Gastrointestinal: Soft abdomen, Dressings intact, ND, NT  Genitourinary: Urinary catheter in place  Extremities: SCD's in place and working bilaterally, No edema  Vascular: Palpable dp pulses bilaterally  Neurological: A&O x3  Skin: Abdominal dressings intact w/o drainage. R pleural dressing intact. R IJ dressing intact  Musculoskeletal: Moving all extremities  Psychiatric: Responsive

## 2021-05-12 NOTE — PROGRESS NOTE ADULT - SUBJECTIVE AND OBJECTIVE BOX
SICU Daily Progress Note  =====================================================  Interval/Overnight Events:     ***    POD #          	SICU Day #    ***    HPI:  ((insert from previous note)) ***    PMH:   ***    Allergies: No Known Allergies      MEDICATIONS:   --------------------------------------------------------------------------------------  Neurologic Medications    Respiratory Medications    Cardiovascular Medications  phenylephrine    Infusion 0.2 MICROgram(s)/kG/Min IV Continuous <Continuous>    Gastrointestinal Medications  polyethylene glycol 3350 17 Gram(s) Oral daily  senna 2 Tablet(s) Oral at bedtime  sodium chloride 0.9%. 1000 milliLiter(s) IV Continuous <Continuous>    Genitourinary Medications    Hematologic/Oncologic Medications    Antimicrobial/Immunologic Medications  cefTRIAXone   IVPB 1000 milliGRAM(s) IV Intermittent every 24 hours    Endocrine/Metabolic Medications    Topical/Other Medications    --------------------------------------------------------------------------------------    VITAL SIGNS, INS/OUTS (last 24 hours):  --------------------------------------------------------------------------------------  ((Insert SICU Vitals/Is+Os here))***  --------------------------------------------------------------------------------------    EXAM  NEUROLOGY  RASS:   	GCS:    Exam: Normal, NAD, alert, oriented x3, no focal deficits. ***    HEENT  Exam: Normocephalic, atraumatic, EOMI.  ***    RESPIRATORY  Exam: Lungs clear to auscultation, Normal expansion/effort. ***  Mechanical Ventilation:     CARDIOVASCULAR  Exam: S1, S2.  Regular rate and rhythm.   ***    GI/NUTRITION  Exam: Abdomen soft, Non-tender, Non-distended.  ***  Wound:  ***  Current Diet:  NPO***    VASCULAR  Exam: Extremities warm, pink, well-perfused. ***    MUSCULOSKELETAL  Exam: All extremities moving spontaneously without limitations. ***    SKIN  Exam: Good skin turgor, no skin breakdown. ***    METABOLIC/FLUIDS/ELECTROLYTES  sodium chloride 0.9%. 1000 milliLiter(s) IV Continuous <Continuous>      HEMATOLOGIC  [x] VTE Prophylaxis:   Transfusions:	[] PRBC	[] Platelets		[] FFP	[] Cryoprecipitate    INFECTIOUS DISEASE  Antimicrobials/Immunologic Medications:  cefTRIAXone   IVPB 1000 milliGRAM(s) IV Intermittent every 24 hours    Day #      of     ***    Tubes/Lines/Drains  ***  [x] Peripheral IV  [] Central Venous Line     	[] R	[] L	[] IJ	[] Fem	[] SC	Date Placed:   [] Arterial Line		[] R	[] L	[] Fem	[] Rad	[] Ax	Date Placed:   [] PICC		[] Midline		[] Mediport  [] Urinary Catheter		Date Placed:   [x] Necessity of urinary, arterial, and venous catheters discussed    LABS  --------------------------------------------------------------------------------------  ((Insert SICU Labs here))***  --------------------------------------------------------------------------------------    OTHER LABORATORY:     IMAGING STUDIES:   CXR:     ASSESSMENT:  35y Female    ((insert from previous))***    PLAN:   ***  Neurologic:   Respiratory:   Cardiovascular:   Gastrointestinal/Nutrition:   Renal/Genitourinary:   Hematologic:   Infectious Disease:   Tubes/Lines/Drains:   Endocrine:   Disposition:     --------------------------------------------------------------------------------------    Critical Care Diagnoses: SICU Daily Progress Note  =====================================================  HPI  35 year old [place] & English speaking female with PMH of cryptogenic cirrhosis, portal hypertension with small esophageal varices, Budd Chiari syndrome, Protein S deficiency, multiple miscarriages, COVID-19 PCR + 3/4/21, recent admission for pleural effusion, attempted TIPS 4/2021 (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 s/p thoracentesis, now presents for Transjugular Intrahepatic Portosystemic Shunt (TIPS) today  Patient now s/p TIPS and thoracentesis of 1L pleural fluid. During the procedure, patient received 1L cyrstalloid, 500 5% albumin, , on 0.6 mcg neosynephrine. Post-procedure on neosynephrine at 0.7. Patient admitted to SICU for hemodynamic monitoring    24 HOUR EVENTS  -POCUS with collapsable IVC --> 250cc albumin      EXAM  NEUROLOGY  EXAM: AOx3, NAD  IV Tylenol, oxycodone    RESPIRATORY  RR: 17 (12 May 2021 01:00) (15 - 24)  SpO2: 95% (12 May 2021 01:00) (93% - 98%)  ABG - ( 11 May 2021 21:53 )  pH, Arterial: 7.39  pH, Blood: x     /  pCO2: 36    /  pO2: 87    / HCO3: 21    / Base Excess: -2.8  /  SaO2: 96        CARDIOVASCULAR  HR: 80 (12 May 2021 01:00) (80 - 112)  BP: 93/52 (11 May 2021 15:18) (93/52 - 103/70)  BP(mean): 65 (11 May 2021 15:18) (65 - 65)  ABP: 108/65 (12 May 2021 01:00) (88/49 - 110/66)  ABP(mean): 85 (12 May 2021 01:00) (64 - 87)  Exam: hypotensive  Rhythm NSR  phenylephrine    Infusion 0.2 MICROgram(s)/kG/Min IV Continuous <Continuous>    GI/NUTRITION  Exam: soft, NT, ND; incision dressing c/d/i  Diet: NPO  polyethylene glycol 3350 17 Gram(s) Oral daily  senna 2 Tablet(s) Oral at bedtime  sodium chloride 0.9%. 1000 milliLiter(s) IV Continuous <Continuous>    /RENAL  I&O's Detail    11 May 2021 07:01  -  12 May 2021 02:14  --------------------------------------------------------  IN:    Albumin 5%  - 250 mL: 500 mL    IV PiggyBack: 225 mL    IV PiggyBack: 362.5 mL    Phenylephrine: 272.2 mL    Phenylephrine: 38.6 mL    sodium chloride 0.9%: 750 mL  Total IN: 2148.3 mL    OUT:    Indwelling Catheter - Urethral (mL): 1000 mL  Total OUT: 1000 mL    Total NET: 1148.3 mL    05-11-21 @ 15:42    136  |  102  |  19             --------------------------< 101<H>     4.1  |  18<L>  | 0.63    eGFR AA: 133  eGFR N-AA: 115    Calcium: 7.8<L>  Phosphorus: 2.5  Magnesium: 1.9    AST: 69<H>    ALT: 47<H>  AlkPhos: 114  Protein: 5.6<L>  Albumin: 3.2<L>  TBili: 2.4<H>  D-Bili: 0.9<H>  05-11-21 @ 08:06    139  |  102  |  22             --------------------------< 94     4.6  |  23  | 0.67    eGFR AA: 130  eGFR N-AA: 112    Calcium: 9.4  Phosphorus: --  Magnesium: --    AST: 38    ALT: 31  AlkPhos: 171<H>  Protein: 7.3  Albumin: 4.0  TBili: 1.6<H>  D-Bili: --    sodium chloride 0.9%. 1000 milliLiter(s) IV Continuous <Continuous>      HEMATOLOGIC                        10.5   8.44  )-----------( 195      ( 11 May 2021 21:56 )             34.4     PT/INR - ( 11 May 2021 16:00 )   PT: 14.3 sec;   INR: 1.20 ratio      PTT - ( 11 May 2021 16:00 )  PTT:44.5 sec    [x] VTE Prophylaxis:   Transfusions:	[] PRBC	[] Platelets		[] FFP	[] Cryoprecipitate    INFECTIOUS DISEASE  T(C): 36.4 (11 May 2021 23:00), Max: 37 (11 May 2021 15:18)  T(F): 97.5 (11 May 2021 23:00), Max: 98.6 (11 May 2021 15:18)  Antimicrobials/Immunologic Medications:  cefTRIAXone   IVPB 1000 milliGRAM(s) IV Intermittent every 24 hours    ENDOCRINE  CAPILLARY BLOOD GLUCOSE      Tubes/Lines/Drains  ***  [x] Peripheral IV  [] Central Venous Line     	[] R	[] L	[] IJ	[] Fem	[] SC	Date Placed:   [] Arterial Line		[] R	[] L	[] Fem	[] Rad	[] Ax	Date Placed:   [] PICC		[] Midline		[] Mediport  [] Urinary Catheter		Date Placed:   [x] Necessity of urinary, arterial, and venous catheters discussed

## 2021-05-12 NOTE — PROGRESS NOTE ADULT - SUBJECTIVE AND OBJECTIVE BOX
Interventional Radiology Follow- Up Note      35y Female s/p TIPS. C/o of mild but improving body pains.    Vitals: T(F): 97.9 (05-12-21 @ 07:00), Max: 98.6 (05-11-21 @ 15:18)  HR: 94 (05-12-21 @ 10:00) (72 - 112)  BP: 88/58 (05-12-21 @ 07:30) (88/58 - 93/52)  RR: 18 (05-12-21 @ 10:00) (14 - 31)  SpO2: 90% (05-12-21 @ 10:00) (90% - 97%)  Wt(kg): --    LABS:                        9.8    9.46  )-----------( 129      ( 12 May 2021 07:43 )             31.8     05-12    138  |  107  |  10  ----------------------------<  159<H>  4.1   |  19<L>  |  0.56    Ca    9.1      12 May 2021 02:13  Phos  2.9     05-12  Mg     2.8     05-12    TPro  6.0  /  Alb  3.8  /  TBili  3.2<H>  /  DBili  0.8<H>  /  AST  82<H>  /  ALT  56<H>  /  AlkPhos  119  05-12    PT/INR - ( 12 May 2021 02:13 )   PT: 14.4 sec;   INR: 1.21 ratio       PTT - ( 12 May 2021 02:13 )  PTT:40.5 sec  I&O's Detail    11 May 2021 07:01  -  12 May 2021 07:00  --------------------------------------------------------  IN:    Albumin 5%  - 250 mL: 500 mL    IV PiggyBack: 525 mL    IV PiggyBack: 362.5 mL    Phenylephrine: 38.6 mL    Phenylephrine: 340 mL    sodium chloride 0.9%: 1000 mL  Total IN: 2766.1 mL    OUT:    Indwelling Catheter - Urethral (mL): 1330 mL  Total OUT: 1330 mL    Total NET: 1436.1 mL    12 May 2021 07:01  -  12 May 2021 10:36  --------------------------------------------------------  IN:    Phenylephrine: 1.6 mL    sodium chloride 0.9%: 150 mL  Total IN: 151.6 mL    OUT:    Indwelling Catheter - Urethral (mL): 40 mL  Total OUT: 40 mL    Total NET: 111.6 mL    PHYSICAL EXAM:    General: Nontoxic, in NAD  Neuro:  Alert & oriented x 3  Abdomen: soft, NTND. no peritoneal signs  R neck puncture site dressing C/D/I.    Impression: 35y Female s/p complex TIPS. Overall doing well. Liver function tests stable. Plan per ICU.    Please call IR at extension [phone] with any questions, concerns, or issues regarding above.

## 2021-05-12 NOTE — PROGRESS NOTE ADULT - ASSESSMENT
ASSESSMENT:  35F with hxof cryptogenic cirrhosis, portal hypertension, Budd Chiari syndrome, Protein S deficiency, recent attempted TIPS but unable to perform 2/2 intrahepatic IVC occlusion, s/p TIPS and thoracentesis. Post-procedure on neosynephrine, admitted to SICU for hemodynamic monitoring    PLAN:  - monitor mental status  - pain control with tylenol, dilaudid PRN    RESPIRATORY: s/p pleuacentesis  - monitor respiratory status  - follow up CXR  - NC as need    CARDIOVASCULAR: hypotension  - monitor vital signs  - follow up EKG - sinus tachycardia, prolonged QTc s/p magnesium  - on emerita, wean as tolerated    GI/NUTRITION: cryptogenic cirrhosis, portal hypertension, Budd Chiari syndrome s/p TIPS  - NPO  - protonix ppx    GENITOURINARY/RENAL:  - monitor creatinine, UOP  - replete electrolytes  - s/p 250 albumin  - NS @ 100    HEMATOLOGIC: Protein S deficiency, hx of intrahepatic IVC  - hold heparin gtt until 5/12  - monitor h&h    INFECTIOUS DISEASE:  - empiric ceftriaxone  - monitor WBC and fever    ENDOCRINE:  - monitor BMP glucose    LINE:  - R radial A line    DISPO: SICU

## 2021-05-12 NOTE — PROGRESS NOTE ADULT - ASSESSMENT
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 In SICU for post procedure monitoring.    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 multiple doses on med rec), c/b hepatic hydrothorax  -HE: no hx  -HCC: none on imaging  -MELD-Na =   #Protein S deficiency    Recommendations:  - Can decrease MAP goal to 65 +  - Add midodrine, wean pressors as able  - Continue w/ diuretics at current dose  - Continue w/ hep gtt   - Closely monitor CBC, CMP, INR  - F/u IR and transplant surgery recs  - OOB, ambulate  - Rest of care as per SICU team    Kane Orellana  Gastroenterology/Hepatology Fellow  Available via [employer] Teams    NON-URGENT CONSULTS:  Please email [email or web address]  [email or web address]  AT NIGHT AND ON WEEKENDS:  Contact on-call GI fellow via answering service ([phone]) from 5pm-8am and on weekends/holidays  MONDAY-FRIDAY 8AM-5PM:  Pager# [phone] ([facility]) or [phone] ([facility])  GI Phone# [phone] ([facility])       35F w/ hx of protein S deficiency, 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 In SICU for post procedure monitoring.    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  -varices: small in 2017  -ascites: recurrent, on lasix 80mg/spironolactone 200mg at home (increased at last outpatient visit) c/b hepatic hydrothorax  -HE: no hx  -HCC: none on imaging  -MELD-Na =   #Protein S deficiency    Recommendations:  - Can decrease MAP goal to 65 +  - Add midodrine, wean pressors as able  - Continue w/ diuretics at current dose  - Continue w/ hep gtt   - Closely monitor CBC, CMP, INR  - F/u IR and transplant surgery recs  - OOB, ambulate  - Rest of care as per SICU team    Kane Orellana  Gastroenterology/Hepatology Fellow  Available via [employer] Teams    NON-URGENT CONSULTS:  Please email [email or web address]  [email or web address]  AT NIGHT AND ON WEEKENDS:  Contact on-call GI fellow via answering service ([phone]) from 5pm-8am and on weekends/holidays  MONDAY-FRIDAY 8AM-5PM:  Pager# [phone] ([facility]) or [phone] ([facility])  GI Phone# [phone] ([facility])

## 2021-05-12 NOTE — PROGRESS NOTE ADULT - ASSESSMENT
Anticipate resuming anticoagulation and diuretics tomorrow if clinically stable.   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  In ICU for close monitoring  - S/P Thoracentesis 1L yesterday.  CXR showing pleural effusion this morning. Will discuss further drainage with IR  - Resume Home Lasix 80mg BID and Spironolactone 200 qd as per hepatology recommendations  - Discuss resuming Xarelto with IR  - Wean Jaleel as able  - Advance diet as tolerated  - Remove salamanca for TOV  - SCDs  - Mobilize OOB as tolerated  - Appreciate Hepatology recommendations and ICU care

## 2021-05-13 LAB
ALBUMIN FLD-MCNC: 2.1 G/DL — SIGNIFICANT CHANGE UP
ALBUMIN SERPL ELPH-MCNC: 3.4 G/DL — SIGNIFICANT CHANGE UP (ref 3.3–5)
ALP SERPL-CCNC: 153 U/L — HIGH (ref 40–120)
ALT FLD-CCNC: 56 U/L — HIGH (ref 10–45)
ANION GAP SERPL CALC-SCNC: 13 MMOL/L — SIGNIFICANT CHANGE UP (ref 5–17)
APTT BLD: 37 SEC — HIGH (ref 27.5–35.5)
APTT BLD: 38.9 SEC — HIGH (ref 27.5–35.5)
APTT BLD: 51 SEC — HIGH (ref 27.5–35.5)
APTT BLD: 55.6 SEC — HIGH (ref 27.5–35.5)
AST SERPL-CCNC: 54 U/L — HIGH (ref 10–40)
B PERT IGG+IGM PNL SER: ABNORMAL
BILIRUB DIRECT SERPL-MCNC: 0.8 MG/DL — HIGH (ref 0–0.2)
BILIRUB INDIRECT FLD-MCNC: 2.2 MG/DL — HIGH (ref 0.2–1)
BILIRUB SERPL-MCNC: 3 MG/DL — HIGH (ref 0.2–1.2)
BUN SERPL-MCNC: 12 MG/DL — SIGNIFICANT CHANGE UP (ref 7–23)
CALCIUM SERPL-MCNC: 8.5 MG/DL — SIGNIFICANT CHANGE UP (ref 8.4–10.5)
CHLORIDE SERPL-SCNC: 99 MMOL/L — SIGNIFICANT CHANGE UP (ref 96–108)
CK MB CFR SERPL CALC: 1 NG/ML — SIGNIFICANT CHANGE UP (ref 0–3.8)
CK SERPL-CCNC: 53 U/L — SIGNIFICANT CHANGE UP (ref 25–170)
CO2 SERPL-SCNC: 24 MMOL/L — SIGNIFICANT CHANGE UP (ref 22–31)
COLOR FLD: SIGNIFICANT CHANGE UP
CREAT SERPL-MCNC: 0.56 MG/DL — SIGNIFICANT CHANGE UP (ref 0.5–1.3)
FLUID INTAKE SUBSTANCE CLASS: SIGNIFICANT CHANGE UP
FLUID SEGMENTED GRANULOCYTES: 47 % — SIGNIFICANT CHANGE UP
GLUCOSE FLD-MCNC: 111 MG/DL — SIGNIFICANT CHANGE UP
GLUCOSE SERPL-MCNC: 132 MG/DL — HIGH (ref 70–99)
HCT VFR BLD CALC: 34.1 % — LOW (ref 34.5–45)
HCT VFR BLD CALC: 35.2 % — SIGNIFICANT CHANGE UP (ref 34.5–45)
HGB BLD-MCNC: 10.7 G/DL — LOW (ref 11.5–15.5)
HGB BLD-MCNC: 11.1 G/DL — LOW (ref 11.5–15.5)
INR BLD: 1.18 RATIO — HIGH (ref 0.88–1.16)
LDH SERPL L TO P-CCNC: 97 U/L — SIGNIFICANT CHANGE UP
LYMPHOCYTES # FLD: 16 % — SIGNIFICANT CHANGE UP
MAGNESIUM SERPL-MCNC: 1.7 MG/DL — SIGNIFICANT CHANGE UP (ref 1.6–2.6)
MCHC RBC-ENTMCNC: 26.7 PG — LOW (ref 27–34)
MCHC RBC-ENTMCNC: 26.8 PG — LOW (ref 27–34)
MCHC RBC-ENTMCNC: 31.4 GM/DL — LOW (ref 32–36)
MCHC RBC-ENTMCNC: 31.5 GM/DL — LOW (ref 32–36)
MCV RBC AUTO: 84.8 FL — SIGNIFICANT CHANGE UP (ref 80–100)
MCV RBC AUTO: 85.5 FL — SIGNIFICANT CHANGE UP (ref 80–100)
MONOS+MACROS # FLD: 37 % — SIGNIFICANT CHANGE UP
NRBC # BLD: 0 /100 WBCS — SIGNIFICANT CHANGE UP (ref 0–0)
NRBC # BLD: 0 /100 WBCS — SIGNIFICANT CHANGE UP (ref 0–0)
PH FLD: 7.63 — SIGNIFICANT CHANGE UP
PHOSPHATE SERPL-MCNC: 2.3 MG/DL — LOW (ref 2.5–4.5)
PLATELET # BLD AUTO: 112 K/UL — LOW (ref 150–400)
PLATELET # BLD AUTO: 126 K/UL — LOW (ref 150–400)
POTASSIUM SERPL-MCNC: 3.9 MMOL/L — SIGNIFICANT CHANGE UP (ref 3.5–5.3)
POTASSIUM SERPL-SCNC: 3.9 MMOL/L — SIGNIFICANT CHANGE UP (ref 3.5–5.3)
PROT FLD-MCNC: 3.3 G/DL — SIGNIFICANT CHANGE UP
PROT SERPL-MCNC: 6.1 G/DL — SIGNIFICANT CHANGE UP (ref 6–8.3)
PROTHROM AB SERPL-ACNC: 14.1 SEC — HIGH (ref 10.6–13.6)
RBC # BLD: 3.99 M/UL — SIGNIFICANT CHANGE UP (ref 3.8–5.2)
RBC # BLD: 4.15 M/UL — SIGNIFICANT CHANGE UP (ref 3.8–5.2)
RBC # FLD: 16.2 % — HIGH (ref 10.3–14.5)
RBC # FLD: 16.2 % — HIGH (ref 10.3–14.5)
RCV VOL RI: HIGH /UL (ref 0–0)
SARS-COV-2 RNA SPEC QL NAA+PROBE: SIGNIFICANT CHANGE UP
SODIUM SERPL-SCNC: 136 MMOL/L — SIGNIFICANT CHANGE UP (ref 135–145)
SPECIMEN SOURCE FLD: SIGNIFICANT CHANGE UP
TOTAL NUCLEATED CELL COUNT, BODY FLUID: 20 /UL — SIGNIFICANT CHANGE UP
TROPONIN T, HIGH SENSITIVITY RESULT: 9 NG/L — SIGNIFICANT CHANGE UP (ref 0–51)
TROPONIN T, HIGH SENSITIVITY RESULT: <6 NG/L — SIGNIFICANT CHANGE UP (ref 0–51)
TUBE TYPE: SIGNIFICANT CHANGE UP
WBC # BLD: 9.14 K/UL — SIGNIFICANT CHANGE UP (ref 3.8–10.5)
WBC # BLD: 9.24 K/UL — SIGNIFICANT CHANGE UP (ref 3.8–10.5)
WBC # FLD AUTO: 9.14 K/UL — SIGNIFICANT CHANGE UP (ref 3.8–10.5)
WBC # FLD AUTO: 9.24 K/UL — SIGNIFICANT CHANGE UP (ref 3.8–10.5)

## 2021-05-13 PROCEDURE — 99231 SBSQ HOSP IP/OBS SF/LOW 25: CPT

## 2021-05-13 PROCEDURE — 71045 X-RAY EXAM CHEST 1 VIEW: CPT | Mod: 26,77

## 2021-05-13 PROCEDURE — 71045 X-RAY EXAM CHEST 1 VIEW: CPT | Mod: 26

## 2021-05-13 PROCEDURE — 93010 ELECTROCARDIOGRAM REPORT: CPT

## 2021-05-13 PROCEDURE — 99233 SBSQ HOSP IP/OBS HIGH 50: CPT

## 2021-05-13 PROCEDURE — 32555 ASPIRATE PLEURA W/ IMAGING: CPT | Mod: RT

## 2021-05-13 PROCEDURE — 99232 SBSQ HOSP IP/OBS MODERATE 35: CPT | Mod: GC

## 2021-05-13 PROCEDURE — 99232 SBSQ HOSP IP/OBS MODERATE 35: CPT | Mod: GC,24

## 2021-05-13 RX ORDER — HYDROMORPHONE HYDROCHLORIDE 2 MG/ML
0.5 INJECTION INTRAMUSCULAR; INTRAVENOUS; SUBCUTANEOUS ONCE
Refills: 0 | Status: DISCONTINUED | OUTPATIENT
Start: 2021-05-13 | End: 2021-05-13

## 2021-05-13 RX ORDER — FUROSEMIDE 40 MG
80 TABLET ORAL EVERY 12 HOURS
Refills: 0 | Status: DISCONTINUED | OUTPATIENT
Start: 2021-05-13 | End: 2021-05-13

## 2021-05-13 RX ORDER — ALBUMIN HUMAN 25 %
250 VIAL (ML) INTRAVENOUS ONCE
Refills: 0 | Status: COMPLETED | OUTPATIENT
Start: 2021-05-13 | End: 2021-05-13

## 2021-05-13 RX ORDER — SPIRONOLACTONE 25 MG/1
200 TABLET, FILM COATED ORAL DAILY
Refills: 0 | Status: DISCONTINUED | OUTPATIENT
Start: 2021-05-13 | End: 2021-05-13

## 2021-05-13 RX ORDER — FUROSEMIDE 40 MG
80 TABLET ORAL EVERY 12 HOURS
Refills: 0 | Status: DISCONTINUED | OUTPATIENT
Start: 2021-05-13 | End: 2021-05-14

## 2021-05-13 RX ORDER — ONDANSETRON 8 MG/1
4 TABLET, FILM COATED ORAL ONCE
Refills: 0 | Status: COMPLETED | OUTPATIENT
Start: 2021-05-13 | End: 2021-05-13

## 2021-05-13 RX ORDER — DIPHENHYDRAMINE HCL 50 MG
25 CAPSULE ORAL ONCE
Refills: 0 | Status: COMPLETED | OUTPATIENT
Start: 2021-05-13 | End: 2021-05-13

## 2021-05-13 RX ORDER — SPIRONOLACTONE 25 MG/1
200 TABLET, FILM COATED ORAL DAILY
Refills: 0 | Status: DISCONTINUED | OUTPATIENT
Start: 2021-05-13 | End: 2021-05-19

## 2021-05-13 RX ORDER — PHENYLEPHRINE HYDROCHLORIDE 10 MG/ML
0.4 INJECTION INTRAVENOUS
Qty: 40 | Refills: 0 | Status: DISCONTINUED | OUTPATIENT
Start: 2021-05-13 | End: 2021-05-16

## 2021-05-13 RX ADMIN — OXYCODONE HYDROCHLORIDE 5 MILLIGRAM(S): 5 TABLET ORAL at 05:53

## 2021-05-13 RX ADMIN — Medication 25 MILLIGRAM(S): at 02:54

## 2021-05-13 RX ADMIN — MIDODRINE HYDROCHLORIDE 10 MILLIGRAM(S): 2.5 TABLET ORAL at 21:59

## 2021-05-13 RX ADMIN — MIDODRINE HYDROCHLORIDE 10 MILLIGRAM(S): 2.5 TABLET ORAL at 05:54

## 2021-05-13 RX ADMIN — Medication 250 MILLIMOLE(S): at 00:06

## 2021-05-13 RX ADMIN — Medication 125 MILLILITER(S): at 11:47

## 2021-05-13 RX ADMIN — Medication 500 MILLIGRAM(S): at 09:07

## 2021-05-13 RX ADMIN — OXYCODONE HYDROCHLORIDE 5 MILLIGRAM(S): 5 TABLET ORAL at 01:50

## 2021-05-13 RX ADMIN — CHLORHEXIDINE GLUCONATE 1 APPLICATION(S): 213 SOLUTION TOPICAL at 06:39

## 2021-05-13 RX ADMIN — Medication 500 MILLIGRAM(S): at 23:00

## 2021-05-13 RX ADMIN — OXYCODONE HYDROCHLORIDE 5 MILLIGRAM(S): 5 TABLET ORAL at 06:23

## 2021-05-13 RX ADMIN — HYDROMORPHONE HYDROCHLORIDE 0.5 MILLIGRAM(S): 2 INJECTION INTRAMUSCULAR; INTRAVENOUS; SUBCUTANEOUS at 13:42

## 2021-05-13 RX ADMIN — CEFTRIAXONE 100 MILLIGRAM(S): 500 INJECTION, POWDER, FOR SOLUTION INTRAMUSCULAR; INTRAVENOUS at 09:41

## 2021-05-13 RX ADMIN — Medication 500 MILLIGRAM(S): at 23:30

## 2021-05-13 RX ADMIN — MIDODRINE HYDROCHLORIDE 10 MILLIGRAM(S): 2.5 TABLET ORAL at 13:20

## 2021-05-13 RX ADMIN — PANTOPRAZOLE SODIUM 40 MILLIGRAM(S): 20 TABLET, DELAYED RELEASE ORAL at 06:39

## 2021-05-13 RX ADMIN — Medication 500 MILLIGRAM(S): at 09:41

## 2021-05-13 RX ADMIN — OXYCODONE HYDROCHLORIDE 5 MILLIGRAM(S): 5 TABLET ORAL at 02:20

## 2021-05-13 RX ADMIN — HYDROMORPHONE HYDROCHLORIDE 0.5 MILLIGRAM(S): 2 INJECTION INTRAMUSCULAR; INTRAVENOUS; SUBCUTANEOUS at 14:03

## 2021-05-13 RX ADMIN — SPIRONOLACTONE 200 MILLIGRAM(S): 25 TABLET, FILM COATED ORAL at 16:58

## 2021-05-13 RX ADMIN — ONDANSETRON 4 MILLIGRAM(S): 8 TABLET, FILM COATED ORAL at 16:53

## 2021-05-13 RX ADMIN — Medication 1 TABLET(S): at 11:46

## 2021-05-13 RX ADMIN — Medication 500 MILLIGRAM(S): at 01:50

## 2021-05-13 RX ADMIN — Medication 500 MILLIGRAM(S): at 02:20

## 2021-05-13 RX ADMIN — SENNA PLUS 2 TABLET(S): 8.6 TABLET ORAL at 21:59

## 2021-05-13 NOTE — DIETITIAN INITIAL EVALUATION ADULT. - PERTINENT LABORATORY DATA
05-12 @ 23:20: Sodium 139, Potassium 4.1, Calcium 8.0<L>, Magnesium 2.1, Phosphorus 2.7, BUN 10, Creatinine 0.63, Glucose 122<H>, Alk Phos 116, ALT/SGPT 59<H>, AST/SGOT 59<H>, Albumin 3.2<L>, Total Bilirubin 2.0<H>, Hemoglobin 9.9<L>, Hematocrit 31.9<L>  05-12 @ 16:56: Hemoglobin 9.6<L>, Hematocrit 31.0<L>

## 2021-05-13 NOTE — DIETITIAN INITIAL EVALUATION ADULT. - PERTINENT MEDS FT
MEDICATIONS  (STANDING):  cefTRIAXone   IVPB 1000 milliGRAM(s) IV Intermittent every 24 hours  chlorhexidine 2% Cloths 1 Application(s) Topical <User Schedule>  furosemide    Tablet 80 milliGRAM(s) Oral every 12 hours  heparin  Infusion 600 Unit(s)/Hr (6 mL/Hr) IV Continuous <Continuous>  midodrine 10 milliGRAM(s) Oral every 8 hours  multivitamin 1 Tablet(s) Oral daily  pantoprazole    Tablet 40 milliGRAM(s) Oral before breakfast  polyethylene glycol 3350 17 Gram(s) Oral daily  senna 2 Tablet(s) Oral at bedtime  spironolactone 200 milliGRAM(s) Oral daily

## 2021-05-13 NOTE — PRE PROCEDURE NOTE - PRE PROCEDURE EVALUATION
Interventional Radiology Pre Procedure Note    HPI: 35y Female s/p TIPS with right hepatic hydrothorax.     Allergies:   Medications (Abx/Cardiac/Anticoagulation/Blood Products)  cefTRIAXone   IVPB: 100 mL/Hr IV Intermittent (05-13 @ 09:41)  furosemide    Tablet: 80 milliGRAM(s) Oral (05-12 @ 11:06)  heparin  Infusion: 6 mL/Hr IV Continuous (05-12 @ 10:42)  midodrine: 10 milliGRAM(s) Oral (05-13 @ 13:20)  phenylephrine    Infusion: 3.23 mL/Hr IV Continuous (05-11 @ 20:23)  spironolactone: 200 milliGRAM(s) Oral (05-12 @ 11:58)    T(C): 36.6  HR: 85  BP: --  RR: 15  SpO2: 96%    Exam  General: No acute distress  Chest: Non labored breathing    -WBC 10.06 / HgB 9.9 / Hct 31.9 / Plt 106  -Na 139 / Cl 106 / BUN 10 / Glucose 122  -K 4.1 / CO2 22 / Cr 0.63  -ALT 59 / Alk Phos 116 / T.Bili 2.0  -INR1.28    Plan: 35y Female presents for right thoracentesis. Procedure and risks discussed with patient and she is agreeable to proceed.   -Risks/Benefits/alternatives explained with the patient and/or healthcare proxy and witnessed informed consent obtained.

## 2021-05-13 NOTE — PROGRESS NOTE ADULT - ASSESSMENT
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 In SICU for post procedure monitoring.    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  -varices: small in 2017  -ascites: recurrent, on lasix 80mg/spironolactone 200mg at home (increased at last outpatient visit) c/b hepatic hydrothorax  -HE: no hx  -HCC: none on imaging  -MELD-Na =   #Protein S deficiency    Recommendations:  - Increase midodrine for goal MAP > 65  - After thoracentesis would put diuretics back on home dose of lasix 80mg and spironolactone 200mg daily  - Continue w/ hep gtt, would d/w IR regarding timing of switching back to oral anticoagulation  - Closely monitor CBC, CMP, INR  - F/u IR and transplant surgery recs  - OOB, ambulate  - Rest of care as per SICU team    Kane Orellana  Gastroenterology/Hepatology Fellow  Available via [employer] Teams    NON-URGENT CONSULTS:  Please email [email or web address]  AT NIGHT AND ON WEEKENDS:  Contact on-call GI fellow via answering service ([phone]) from 5pm-8am and on weekends/holidays  MONDAY-FRIDAY 8AM-5PM:  Pager# [phone] ([facility]) or [phone] ([facility])  GI Phone# [phone] ([facility])       35F w/ hx of protein S deficiency, 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 In SICU for post procedure monitoring.    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  -varices: small in 2017  -ascites: recurrent, on lasix 80mg/spironolactone 200mg at home (increased at last outpatient visit) c/b hepatic hydrothorax  -HE: no hx  -HCC: none on imaging  -MELD-Na =   #Protein S deficiency    Recommendations:  - Albumin 25% 100 mL iv q8h due to concern for intravascular volume depletion  - Increase midodrine for goal MAP > 65  - Would consider resuming home diuretics back on home dose of lasix 80mg and spironolactone 200mg daily  - Continue w/ hep gtt, would d/w IR regarding timing of switching back to oral anticoagulation  - Closely monitor CBC, CMP, INR  - F/u IR and transplant surgery recs  - OOB, ambulate  - Rest of care as per SICU team    Kane Orellana  Gastroenterology/Hepatology Fellow  Available via [employer] Teams    NON-URGENT CONSULTS:  Please email [email or web address]  AT NIGHT AND ON WEEKENDS:  Contact on-call GI fellow via answering service ([phone]) from 5pm-8am and on weekends/holidays  MONDAY-FRIDAY 8AM-5PM:  Pager# [phone] ([facility]) or [phone] ([facility])  GI Phone# [phone] ([facility])

## 2021-05-13 NOTE — PROGRESS NOTE ADULT - ASSESSMENT
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  In ICU for close monitoring  - S/P Thoracentesis on 5/11 drained 1L. Patient reporting worsening chest pressure and dyspnea, inability to lay flat.  CXR showing worsening Right pleural effusion. Discussed with IR at bedside re: preference for repeat thoracentesis today.   - Patient has been NPO since MN.  DC Heparin drip for procedure as per IR recommendations    - Continue home Lasix 80mg BID and Spironolactone 200 qd as per hepatology recommendations  - Discuss resuming Xarelto with IR post- Thoracentesis today  - Continue Midodrine 10mg q8 hours  - Remove salamanca for TOV post-procedure  - SCDs  - Mobilize OOB as tolerated  - Appreciate Hepatology recommendations and ICU care

## 2021-05-13 NOTE — DIETITIAN INITIAL EVALUATION ADULT. - REASON INDICATOR FOR ASSESSMENT
Nutrition Assessment warranted for length of stay on SICU  Information obtained from: medical record, communication with team. Nutrition Assessment warranted for length of stay on SICU  Information obtained from: patient, medical record, communication with team. Pt speaks [place] and English.

## 2021-05-13 NOTE — PROGRESS NOTE ADULT - SUBJECTIVE AND OBJECTIVE BOX
Transplant Surgery - daily rounds  --------------------------------------------------------------    Present:   Patient seen with Transplant Surgeon: Dr. Zamora, ACPs: Mare Eid and Surgical Resident during am rounds and examined with Dr. Zamora.    Disciplines not in attendance will be notified of the plan.     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    Decompensated cirrhosis due to chronic hepatic vein occlusion  - TIPS 5/11  - varices: small in 2017  - ascites: recurrent, on lasix 80mg BID/spironolactone 200mg QD at home c/b hepatic hydrothorax. Thoracentesis 4/26 (850ml), 5/11 (1L)  - HE: no hx  - HCC: none on imaging  - MELD-Na = 13 on 5/12  Protein S deficiency    Interval Events:  - Awake, A&Ox3, Afebrile.   - Midodrine 10mg q8 started, Jaleel weaned off.   MAPs ~ 60-65  - Reports worsening chest pressure, no SOB or chest pain.      CXR: worsening R pleural effusion        SR/ST  90-100s, Sats 90-97 on 3l n/c     Troponin 9       - H/H stable, T Riaz/LFTs downtrending     Dressings intact  - SCr 0.63   UOP 2.4L via [place]      Potential Discharge date: pending clinical improvement    Education:  Medications    Plan of care:  See Below      MEDICATIONS  (STANDING):  cefTRIAXone   IVPB 1000 milliGRAM(s) IV Intermittent every 24 hours  chlorhexidine 2% Cloths 1 Application(s) Topical <User Schedule>  furosemide    Tablet 80 milliGRAM(s) Oral every 12 hours  heparin  Infusion 600 Unit(s)/Hr (6 mL/Hr) IV Continuous <Continuous>  midodrine 10 milliGRAM(s) Oral every 8 hours  multivitamin 1 Tablet(s) Oral daily  pantoprazole    Tablet 40 milliGRAM(s) Oral before breakfast  phenylephrine    Infusion 0.4 MICROgram(s)/kG/Min (6.45 mL/Hr) IV Continuous <Continuous>  polyethylene glycol 3350 17 Gram(s) Oral daily  senna 2 Tablet(s) Oral at bedtime  spironolactone 200 milliGRAM(s) Oral daily    MEDICATIONS  (PRN):  acetaminophen   Tablet .. 500 milliGRAM(s) Oral every 6 hours PRN Mild Pain (1 - 3)  oxyCODONE    IR 5 milliGRAM(s) Oral every 4 hours PRN Severe Pain (7 - 10)      PAST MEDICAL & SURGICAL HISTORY:  Cirrhosis  2010    Cirrhosis    Budd-Chiari syndrome    H/O protein S deficiency    Miscarriage  x 5    H/O protein S deficiency    No significant past surgical history        Vital Signs Last 24 Hrs  T(C): 37.3 (13 May 2021 07:00), Max: 37.3 (13 May 2021 07:00)  T(F): 99.1 (13 May 2021 07:00), Max: 99.1 (13 May 2021 07:00)  HR: 86 (13 May 2021 10:30) (83 - 108)  BP: --  BP(mean): --  RR: 15 (13 May 2021 10:30) (13 - 33)  SpO2: 96% (13 May 2021 10:30) (90% - 98%)    I&O's Summary    12 May 2021 07:01  -  13 May 2021 07:00  --------------------------------------------------------  IN: 570 mL / OUT: 2505 mL / NET: -1935 mL    13 May 2021 07:01  -  13 May 2021 10:40  --------------------------------------------------------  IN: 12 mL / OUT: 20 mL / NET: -8 mL                              9.9    10.06 )-----------( 106      ( 12 May 2021 23:20 )             31.9     05-12    139  |  106  |  10  ----------------------------<  122<H>  4.1   |  22  |  0.63    Ca    8.0<L>      12 May 2021 23:20  Phos  2.7     05-12  Mg     2.1     05-12    TPro  5.3<L>  /  Alb  3.2<L>  /  TBili  2.0<H>  /  DBili  0.6<H>  /  AST  59<H>  /  ALT  59<H>  /  AlkPhos  116  05-12        Review of systems  Gen: No weight changes, fatigue, fevers/chills, weakness  Skin: No rashes  Head/Eyes/Ears/Mouth: No headache; Normal hearing; Normal vision w/o blurriness; No sinus pain/discomfort, sore throat  Respiratory: C/O chest pressure, worsening dyspnea. Denies cough, wheezing, hemoptysis  CV: No PND, orthopnea  GI: Denies diarrhea, constipation, nausea, vomiting, melena, hematochezia  : No increased frequency, dysuria, hematuria, nocturia  MSK: No joint pain/swelling; no back pain; no edema  Neuro: No dizziness/lightheadedness, weakness, seizures, numbness, tingling  Heme: No easy bruising or bleeding  Endo: No heat/cold intolerance  Psych: No significant nervousness, anxiety, stress, depression  All other systems were reviewed and are negative, except as noted.      PHYSICAL EXAM:  Constitutional: Well developed / well nourished  Eyes: Anicteric, PERRLA  ENMT: nc/at  Neck: R IJ dressing intact, supple  Respiratory: diminished R side  Cardiovascular: RRR  Gastrointestinal: Soft abdomen, slight distention, NT  Genitourinary: Urinary catheter in place  Extremities: SCD's in place and working bilaterally, No edema  Vascular: Palpable dp pulses bilaterally  Neurological: A&O x3  Skin: Abdominal dressings intact w/o drainage. R pleural dressing intact. R IJ dressing intact  Musculoskeletal: Moving all extremities  Psychiatric: Responsive

## 2021-05-13 NOTE — PROGRESS NOTE ADULT - SUBJECTIVE AND OBJECTIVE BOX
Interventional Radiology Follow-Up Note.     Patient seen and examined @ bedside between 7:30 and 8:30 AM.    This is a 35y Female s/p TIPS on 5/11 in Interventional Radiology with Dr. Ragsdale.   c/o chest pressure on 3 L of NC.      Medication:   cefTRIAXone   IVPB: (05-13)  furosemide    Tablet: (05-12)  heparin  Infusion: (05-12)  midodrine: (05-13)  phenylephrine    Infusion: (05-11)  phenylephrine    Infusion: (05-11)  spironolactone: (05-12)    Vitals:   T(F): 97.9, Max: 99.1 (07:00)  HR: 89  BP: --  RR: 17  SpO2: 97%    Physical Exam:  General: Nontoxic, in NAD.  Abdomen: soft, NTND.     LABS:  WBC -- / Hgb -- / Hct -- / Plt --  Na -- / K -- / CO2 -- / Cl -- / BUN -- / Cr -- / Glucose --  ALT -- / AST -- / Alk Phos -- / Tbili --  Ptt 51.0 / Pt -- / INR --      Assessment/Plan:  35F w/ hx of protein S deficiency, 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 In SICU for post procedure monitoring.    - Pt with more chest pressure, will plan for a repeat thoracentesis today.  - Heparin drip to be held an hour before, IR will coordinate.   - D/w SICU team and Dr. Ragsdale.     Please call IR at [phone] with any questions, concerns, or issues regarding above.

## 2021-05-13 NOTE — DIETITIAN INITIAL EVALUATION ADULT. - FACTORS AFF FOOD INTAKE
Pt ordered for Regular diet; NPO midnight for IR/NPO Pt ordered for Regular diet; NPO midnight for IR. Pt reports GI distress after drinking Ensure yesterday; amenable to trying Mighty Shakes (smaller portion size)/NPO

## 2021-05-13 NOTE — PROGRESS NOTE ADULT - ASSESSMENT
35F with hxof cryptogenic cirrhosis, portal hypertension, Budd Chiari syndrome, Protein S deficiency, recent attempted TIPS but unable to perform 2/2 intrahepatic IVC occlusion, s/p TIPS and thoracentesis. Post-procedure on phenylephrine, admitted to SICU for hemodynamic monitoring    PLAN:  - Monitor mental status  - Pain control with tylenol, oxy prn    RESPIRATORY: s/p pleuracentesis  - Monitor pulse oxiumeter  - IS / OOBTC / ambulation as tolerated to prevent atelectasis  - Plan for IR thoracentesis on 05/13/21    CARDIOVASCULAR: hypotension  - Monitor vital signs  - No longer requiring vasopressors  - On 10mg midodrine q8hrs for hypotension    GI/NUTRITION: cryptogenic cirrhosis, portal hypertension, Budd Chiari syndrome s/p TIPS  - Regular, NPO after midnight for thoracentesis  - Protonix  - Bowel regimen with senna and miralax    GENITOURINARY/RENAL:  - Monitor I/Os, [place]am[place] in place  - Monitor and replete electrolytes  - IVL    HEMATOLOGIC: Protein S deficiency, hx of intrahepatic IVC  - Heparin gtt for IVC thrombus, f/u aPTT  - Monitor H/H    INFECTIOUS DISEASE:  - Ceftriaxone for SBP ppx  - Monitor WBC and temperature    ENDOCRINE:  - Monitor BMP glucose    DISPO: SICU  Code Status: Full code 35F with hxof cryptogenic cirrhosis, portal hypertension, Budd Chiari syndrome, Protein S deficiency, recent attempted TIPS but unable to perform 2/2 intrahepatic IVC occlusion, s/p TIPS and thoracentesis. Post-procedure on phenylephrine, admitted to SICU for hemodynamic monitoring    PLAN:  NEURO:  - Monitor mental status  - Pain control with tylenol, oxy prn    RESPIRATORY: s/p pleuracentesis  - Monitor pulse oxiumeter  - IS / OOBTC / ambulation as tolerated to prevent atelectasis  - Plan for IR thoracentesis on 05/13/21    CARDIOVASCULAR: hypotension  - Monitor vital signs  - No longer requiring vasopressors  - On 10mg midodrine q8hrs for hypotension    GI/NUTRITION: cryptogenic cirrhosis, portal hypertension, Budd Chiari syndrome s/p TIPS  - Regular, NPO after midnight for thoracentesis  - Protonix  - Bowel regimen with senna and miralax    GENITOURINARY/RENAL:  - Monitor I/Os, [place]am[place] in place  - Monitor and replete electrolytes  - IVL    HEMATOLOGIC: Protein S deficiency, hx of intrahepatic IVC  - Heparin gtt for IVC thrombus, f/u aPTT  - Monitor H/H    INFECTIOUS DISEASE:  - Ceftriaxone for SBP ppx  - Monitor WBC and temperature    ENDOCRINE:  - Monitor BMP glucose    DISPO: SICU  Code Status: Full code

## 2021-05-13 NOTE — PROVIDER CONTACT NOTE (MEDICATION) - ACTION/TREATMENT ORDERED:
GUZMAN Lynch aware, ordered to "leave heparin at current rate until the time of procedure is known." Heparin gtt continued at 6mL/hr. Awaiting time for thoracentesis.

## 2021-05-13 NOTE — PROCEDURE NOTE - NSPROCDETAILS_GEN_ALL_CORE
location identified, draped/prepped, sterile technique used, needle inserted/introduced/Seldinger technique/ultrasound assessment of effusion (localization)

## 2021-05-13 NOTE — DIETITIAN NUTRITION RISK NOTIFICATION - TREATMENT: THE FOLLOWING DIET HAS BEEN RECOMMENDED
Diet, NPO after Midnight:      NPO Start Date: 12-May-2021,   NPO Start Time: 23:59 (05-12-21 @ 18:58) [Active]  Diet, Regular (05-12-21 @ 10:23) [Active]

## 2021-05-13 NOTE — PROGRESS NOTE ADULT - SUBJECTIVE AND OBJECTIVE BOX
Chief Complaint:  Patient is a 35y old  Female who presents with a chief complaint of TIPS    Reason for consult: cirrhosis, s/p TIPS    Interval Events: Pt having some subjective dyspnea and chest tightness. MAPs 60-65 off pressors, now on midodrine. Liver enzymes stable/improving.    Hospital Medications:  acetaminophen   Tablet .. 500 milliGRAM(s) Oral every 6 hours PRN  cefTRIAXone   IVPB 1000 milliGRAM(s) IV Intermittent every 24 hours  chlorhexidine 2% Cloths 1 Application(s) Topical <User Schedule>  furosemide    Tablet 80 milliGRAM(s) Oral every 12 hours  heparin  Infusion 600 Unit(s)/Hr IV Continuous <Continuous>  midodrine 10 milliGRAM(s) Oral every 8 hours  multivitamin 1 Tablet(s) Oral daily  oxyCODONE    IR 5 milliGRAM(s) Oral every 4 hours PRN  pantoprazole    Tablet 40 milliGRAM(s) Oral before breakfast  polyethylene glycol 3350 17 Gram(s) Oral daily  senna 2 Tablet(s) Oral at bedtime  spironolactone 200 milliGRAM(s) Oral daily      ROS:   General:  No  fevers, chills, night sweats, fatigue  Eyes:  Good vision, no reported pain  ENT:  No sore throat, pain, runny nose  CV:  No pain, palpitations  Pulm:  No cough  GI:  See HPI, otherwise negative  :  No  incontinence, nocturia  Muscle:  No pain, weakness  Neuro:  No memory problems  Psych:  No insomnia, mood problems, depression  Endocrine:  No polyuria, polydipsia, cold/heat intolerance  Heme:  No petechiae, ecchymosis, easy bruisability  Skin:  No rash    PHYSICAL EXAM:   Vital Signs:  Vital Signs Last 24 Hrs  T(C): 37.3 (13 May 2021 07:00), Max: 37.3 (13 May 2021 07:00)  T(F): 99.1 (13 May 2021 07:00), Max: 99.1 (13 May 2021 07:00)  HR: 83 (13 May 2021 09:00) (83 - 108)  BP: --  BP(mean): --  RR: 13 (13 May 2021 09:00) (13 - 33)  SpO2: 97% (13 May 2021 09:00) (90% - 97%)  Daily     Daily Weight in k.1 (13 May 2021 00:39)    GENERAL: no acute distress  NEURO: alert  HEENT: anicteric sclera, no conjunctival pallor appreciated  CHEST: no respiratory distress, no accessory muscle use  CARDIAC: regular rate, rhythm  ABDOMEN: soft, non-tender, non-distended, no rebound or guarding  EXTREMITIES: warm, well perfused, no edema  SKIN: no lesions noted    LABS: reviewed                        9.9    10.06 )-----------( 106      ( 12 May 2021 23:20 )             31.9     05-    139  |  106  |  10  ----------------------------<  122<H>  4.1   |  22  |  0.63    Ca    8.0<L>      12 May 2021 23:20  Phos  2.7       Mg     2.1         TPro  5.3<L>  /  Alb  3.2<L>  /  TBili  2.0<H>  /  DBili  0.6<H>  /  AST  59<H>  /  ALT  59<H>  /  AlkPhos  116  05-12    LIVER FUNCTIONS - ( 12 May 2021 23:20 )  Alb: 3.2 g/dL / Pro: 5.3 g/dL / ALK PHOS: 116 U/L / ALT: 59 U/L / AST: 59 U/L / GGT: x             Interval Diagnostic Studies: see sunrise for full report   Chief Complaint:  Patient is a 35y old  Female who presents with a chief complaint of TIPS    Reason for consult: cirrhosis, s/p TIPS    Interval Events: Pt having some subjective dyspnea and chest tightness. MAPs 60-65 off pressors, now on midodrine. Liver enzymes stable/improving.    Hospital Medications:  acetaminophen   Tablet .. 500 milliGRAM(s) Oral every 6 hours PRN  cefTRIAXone   IVPB 1000 milliGRAM(s) IV Intermittent every 24 hours  chlorhexidine 2% Cloths 1 Application(s) Topical <User Schedule>  furosemide    Tablet 80 milliGRAM(s) Oral every 12 hours  heparin  Infusion 600 Unit(s)/Hr IV Continuous <Continuous>  midodrine 10 milliGRAM(s) Oral every 8 hours  multivitamin 1 Tablet(s) Oral daily  oxyCODONE    IR 5 milliGRAM(s) Oral every 4 hours PRN  pantoprazole    Tablet 40 milliGRAM(s) Oral before breakfast  polyethylene glycol 3350 17 Gram(s) Oral daily  senna 2 Tablet(s) Oral at bedtime  spironolactone 200 milliGRAM(s) Oral daily      ROS:   General:  No  fevers, chills, night sweats, fatigue  Eyes:  Good vision, no reported pain  ENT:  No sore throat, pain, runny nose  CV:  No pain, palpitations  Pulm:  See HPI. No cough  GI:  See HPI, otherwise negative  :  No  incontinence, nocturia  Muscle:  No pain, weakness  Neuro:  No memory problems  Psych:  No insomnia, mood problems, depression  Endocrine:  No polyuria, polydipsia, cold/heat intolerance  Heme:  No petechiae, ecchymosis, easy bruisability  Skin:  No rash    PHYSICAL EXAM:   Vital Signs:  Vital Signs Last 24 Hrs  T(C): 37.3 (13 May 2021 07:00), Max: 37.3 (13 May 2021 07:00)  T(F): 99.1 (13 May 2021 07:00), Max: 99.1 (13 May 2021 07:00)  HR: 83 (13 May 2021 09:00) (83 - 108)  BP: --  BP(mean): --  RR: 13 (13 May 2021 09:00) (13 - 33)  SpO2: 97% (13 May 2021 09:00) (90% - 97%)  Daily     Daily Weight in k.1 (13 May 2021 00:39)    GENERAL: no acute distress  NEURO: alert  HEENT: anicteric sclera, no conjunctival pallor appreciated  CHEST: no respiratory distress, no accessory muscle use  CARDIAC: regular rate, rhythm  ABDOMEN: soft, non-tender, non-distended, no rebound or guarding  EXTREMITIES: warm, well perfused, no edema  SKIN: no lesions noted    LABS: reviewed                        9.9    10.06 )-----------( 106      ( 12 May 2021 23:20 )             31.9     05-12    139  |  106  |  10  ----------------------------<  122<H>  4.1   |  22  |  0.63    Ca    8.0<L>      12 May 2021 23:20  Phos  2.7     05-  Mg     2.1         TPro  5.3<L>  /  Alb  3.2<L>  /  TBili  2.0<H>  /  DBili  0.6<H>  /  AST  59<H>  /  ALT  59<H>  /  AlkPhos  116  05-12    LIVER FUNCTIONS - ( 12 May 2021 23:20 )  Alb: 3.2 g/dL / Pro: 5.3 g/dL / ALK PHOS: 116 U/L / ALT: 59 U/L / AST: 59 U/L / GGT: x             Interval Diagnostic Studies: see sunrise for full report

## 2021-05-13 NOTE — DIETITIAN INITIAL EVALUATION ADULT. - SIGNS/SYMPTOMS
weight loss 14% in 4 months, BMI 18.5kg/m2 weight loss up to 14% in 4 months, nutrition intake <75% of needs x 1 month, BMI 18.5 kg/m2

## 2021-05-13 NOTE — DIETITIAN INITIAL EVALUATION ADULT. - REASON
Pt appears petite with slight build, but appropriately developed with no overt signs of muscle or fat depletion.

## 2021-05-13 NOTE — PROGRESS NOTE ADULT - SUBJECTIVE AND OBJECTIVE BOX
HISTORY  35 year old Greek & English speaking female with PMH of cryptogenic cirrhosis, portal hypertension with small esophageal varices, Budd Chiari syndrome, Protein S deficiency, multiple miscarriages, COVID-19 PCR + 3/4/21, recent admission for pleural effusion, attempted TIPS 4/2021 (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 Now s/p scheduled TIPS and thoracentesis of 1L pleural fluid. Patient admitted to SICU for hemodynamic monitoring.      24 HOUR EVENTS:  - Added midodrine 10mg q8hrs for hypotension  - Heparin gtt restarted for IVC thrombus  - Advanced to regular diet, IVL  - NPO after midnight for IR thoracentesis on 05/13      NEURO  Exam: AOx4, following commands  Meds: acetaminophen   Tablet .. 500 milliGRAM(s) Oral every 6 hours PRN Mild Pain (1 - 3)  oxyCODONE    IR 5 milliGRAM(s) Oral every 4 hours PRN Severe Pain (7 - 10)  [x] Adequacy of sedation and pain control has been assessed and adjusted      RESPIRATORY  RR: 30 (05-13-21 @ 03:00) (13 - 31)  SpO2: 92% (05-13-21 @ 03:00) (90% - 96%)  Exam: unlabored respirations, saturating well on room air  ABG - ( 12 May 2021 07:39 )  pH: 7.40  /  pCO2: 36    /  pO2: 84    / HCO3: 22    / Base Excess: -2.0  /  SaO2: 97      Meds:       CARDIOVASCULAR  HR: 104 (05-13-21 @ 03:00) (85 - 105)  BP: 88/58 (05-12-21 @ 07:30) (88/58 - 88/58)  BP(mean): 67 (05-12-21 @ 07:30) (67 - 67)  ABP: 98/50 (05-13-21 @ 03:00) (89/49 - 120/68)  ABP(mean): 66 (05-13-21 @ 03:00) (63 - 88)  Exam: regular rhythm tachycardia  Cardiac Rhythm: sinus tachycardia  Perfusion     [x]Adequate   [ ]Inadequate  Mentation   [x]Normal       [ ]Reduced  Extremities  [x]Warm         [ ]Cool  Volume Status [ ]Hypervolemic [x]Euvolemic [ ]Hypovolemic  Meds: furosemide    Tablet 80 milliGRAM(s) Oral every 12 hours  midodrine 10 milliGRAM(s) Oral every 8 hours  spironolactone 200 milliGRAM(s) Oral daily      GI/NUTRITION  Exam: nontender, softly distended  Diet: Regular, NPO after midnight  Meds: pantoprazole    Tablet 40 milliGRAM(s) Oral before breakfast  polyethylene glycol 3350 17 Gram(s) Oral daily  senna 2 Tablet(s) Oral at bedtime      GENITOURINARY  I&O's Detail    05-11 @ 07:01 - 05-12 @ 07:00  --------------------------------------------------------  IN:    Albumin 5%  - 250 mL: 500 mL    IV PiggyBack: 525 mL    IV PiggyBack: 362.5 mL    Phenylephrine: 38.6 mL    Phenylephrine: 340 mL    sodium chloride 0.9%: 1000 mL  Total IN: 2766.1 mL    OUT:    Indwelling Catheter - Urethral (mL): 1330 mL  Total OUT: 1330 mL    Total NET: 1436.1 mL    05-12 @ 07:01 - 05-13 @ 03:59  --------------------------------------------------------  IN:    Heparin: 84 mL    IV PiggyBack: 300 mL    Phenylephrine: 5.6 mL    Phenylephrine: 6.4 mL    sodium chloride 0.9%: 150 mL  Total IN: 546 mL    OUT:    Indwelling Catheter - Urethral (mL): 2090 mL  Total OUT: 2090 mL    Total NET: -1544 mL    05-12    139  |  106  |  10  ----------------------------<  122<H>  4.1   |  22  |  0.63    Ca    8.0<L>      12 May 2021 23:20  Phos  2.7     05-12  Mg     2.1     05-12    TPro  5.3<L>  /  Alb  3.2<L>  /  TBili  2.0<H>  /  DBili  0.6<H>  /  AST  59<H>  /  ALT  59<H>  /  AlkPhos  116  05-12    [x] Lucas catheter, indication: UOP monitoring  Meds: multivitamin 1 Tablet(s) Oral daily      HEMATOLOGIC  Meds: heparin  Infusion 600 Unit(s)/Hr IV Continuous <Continuous>  [x] VTE Prophylaxis                        9.9    10.06 )-----------( 106      ( 12 May 2021 23:20 )             31.9     PT/INR - ( 12 May 2021 16:56 )   PT: 15.2 sec;   INR: 1.28 ratio    PTT - ( 12 May 2021 23:20 )  PTT:55.6 sec  Transfusion     [ ] PRBC   [ ] Platelets   [ ] FFP   [ ] Cryoprecipitate      INFECTIOUS DISEASES  T(C): 36.9 (05-13-21 @ 03:00), Max: 36.9 (05-13-21 @ 03:00)  WBC Count: 10.06 K/uL (05-12 @ 23:20)  WBC Count: 10.97 K/uL (05-12 @ 16:56)  WBC Count: 9.46 K/uL (05-12 @ 07:43)    Recent Cultures:    Meds: cefTRIAXone   IVPB 1000 milliGRAM(s) IV Intermittent every 24 hours      ENDOCRINE  Capillary Blood Glucose    Meds:       ACCESS DEVICES:  [x] Peripheral IV  [ ] Central Venous Line	[ ] R	[ ] L	[ ] IJ	[ ] Fem	[ ] SC	Placed:   [ ] Arterial Line		[ ] R	[ ] L	[ ] Fem	[ ] Rad	[ ] Ax	Placed:   [ ] PICC:					[ ] Mediport  [x] Urinary Catheter, Date Placed: 05/10/21  [x] Necessity of urinary, arterial, and venous catheters discussed    OTHER MEDICATIONS:  chlorhexidine 2% Cloths 1 Application(s) Topical <User Schedule>    CODE STATUS: Full code    IMAGING:

## 2021-05-13 NOTE — DIETITIAN INITIAL EVALUATION ADULT. - REASON FOR ADMISSION
TIPS    Per chart: "35F with hxof cryptogenic cirrhosis, portal hypertension, Budd Chiari syndrome, Protein S deficiency, recent attempted TIPS but unable to perform 2/2 intrahepatic IVC occlusion, s/p TIPS and thoracentesis. Post-procedure on phenylephrine, admitted to SICU for hemodynamic monitoring."    Pt now s/p  scheduled TIPS and thoracentesis of 1L pleural fluid (5/11). Plan for IR thoracentesis on 05/13.

## 2021-05-13 NOTE — DIETITIAN INITIAL EVALUATION ADULT. - ADD RECOMMEND
1) Continue Regular diet 2) Oral supplements per pt preference. 3) Continue multivitamin. 1) Continue Regular diet 2) Mighty Shake supplement (200 calories, 6 gm protein) added to meal trays. 3) Continue multivitamin.

## 2021-05-13 NOTE — DIETITIAN INITIAL EVALUATION ADULT. - OTHER INFO
GASTROINTESTINAL:  Last BM: none noted  Bowel Regimen: Miralax, senna    NUTRITION STATUS:  - Underweight BMI, h/o moderate malnutrition Dx April 2020  - Decompensated cirrhosis   - Supplementation: multivitamin    WEIGHT HISTORY:   - 49.9kg (1/28/19), 49.6kg (1/12/21), 50.9kg (3/20/21), 45.1kg (4/19/21), 45.8kg (5/7/21), 43kg (5/11/21)  - possible 6.6kg (~14%) wt loss in 4 months (Jan-May 2021); noted Rx for diuretics and likely fluid shifts

## 2021-05-14 LAB
GRAM STN FLD: SIGNIFICANT CHANGE UP
SPECIMEN SOURCE: SIGNIFICANT CHANGE UP

## 2021-05-14 PROCEDURE — 71045 X-RAY EXAM CHEST 1 VIEW: CPT | Mod: 26

## 2021-05-14 PROCEDURE — 99232 SBSQ HOSP IP/OBS MODERATE 35: CPT | Mod: GC

## 2021-05-14 PROCEDURE — 99291 CRITICAL CARE FIRST HOUR: CPT

## 2021-05-14 RX ORDER — MIDODRINE HYDROCHLORIDE 2.5 MG/1
20 TABLET ORAL EVERY 8 HOURS
Refills: 0 | Status: DISCONTINUED | OUTPATIENT
Start: 2021-05-14 | End: 2021-05-25

## 2021-05-14 RX ORDER — MAGNESIUM SULFATE 500 MG/ML
2 VIAL (ML) INJECTION ONCE
Refills: 0 | Status: COMPLETED | OUTPATIENT
Start: 2021-05-14 | End: 2021-05-14

## 2021-05-14 RX ORDER — ENOXAPARIN SODIUM 100 MG/ML
40 INJECTION SUBCUTANEOUS EVERY 12 HOURS
Refills: 0 | Status: DISCONTINUED | OUTPATIENT
Start: 2021-05-14 | End: 2021-05-14

## 2021-05-14 RX ORDER — ALBUMIN HUMAN 25 %
100 VIAL (ML) INTRAVENOUS EVERY 8 HOURS
Refills: 0 | Status: DISCONTINUED | OUTPATIENT
Start: 2021-05-14 | End: 2021-05-14

## 2021-05-14 RX ORDER — ACETAMINOPHEN 500 MG
500 TABLET ORAL EVERY 6 HOURS
Refills: 0 | Status: DISCONTINUED | OUTPATIENT
Start: 2021-05-14 | End: 2021-05-25

## 2021-05-14 RX ORDER — HEPARIN SODIUM 5000 [USP'U]/ML
1000 INJECTION INTRAVENOUS; SUBCUTANEOUS
Qty: 25000 | Refills: 0 | Status: DISCONTINUED | OUTPATIENT
Start: 2021-05-14 | End: 2021-05-14

## 2021-05-14 RX ORDER — FUROSEMIDE 40 MG
80 TABLET ORAL EVERY 24 HOURS
Refills: 0 | Status: DISCONTINUED | OUTPATIENT
Start: 2021-05-14 | End: 2021-05-19

## 2021-05-14 RX ORDER — ALBUMIN HUMAN 25 %
100 VIAL (ML) INTRAVENOUS EVERY 8 HOURS
Refills: 0 | Status: DISCONTINUED | OUTPATIENT
Start: 2021-05-14 | End: 2021-05-20

## 2021-05-14 RX ORDER — POTASSIUM PHOSPHATE, MONOBASIC POTASSIUM PHOSPHATE, DIBASIC 236; 224 MG/ML; MG/ML
15 INJECTION, SOLUTION INTRAVENOUS ONCE
Refills: 0 | Status: COMPLETED | OUTPATIENT
Start: 2021-05-14 | End: 2021-05-14

## 2021-05-14 RX ORDER — ENOXAPARIN SODIUM 100 MG/ML
40 INJECTION SUBCUTANEOUS EVERY 12 HOURS
Refills: 0 | Status: DISCONTINUED | OUTPATIENT
Start: 2021-05-14 | End: 2021-05-18

## 2021-05-14 RX ADMIN — MIDODRINE HYDROCHLORIDE 10 MILLIGRAM(S): 2.5 TABLET ORAL at 06:26

## 2021-05-14 RX ADMIN — CEFTRIAXONE 100 MILLIGRAM(S): 500 INJECTION, POWDER, FOR SOLUTION INTRAMUSCULAR; INTRAVENOUS at 10:53

## 2021-05-14 RX ADMIN — Medication 50 GRAM(S): at 04:21

## 2021-05-14 RX ADMIN — PANTOPRAZOLE SODIUM 40 MILLIGRAM(S): 20 TABLET, DELAYED RELEASE ORAL at 08:00

## 2021-05-14 RX ADMIN — POTASSIUM PHOSPHATE, MONOBASIC POTASSIUM PHOSPHATE, DIBASIC 62.5 MILLIMOLE(S): 236; 224 INJECTION, SOLUTION INTRAVENOUS at 06:26

## 2021-05-14 RX ADMIN — Medication 1 TABLET(S): at 12:13

## 2021-05-14 RX ADMIN — CHLORHEXIDINE GLUCONATE 1 APPLICATION(S): 213 SOLUTION TOPICAL at 06:11

## 2021-05-14 RX ADMIN — Medication 500 MILLIGRAM(S): at 16:02

## 2021-05-14 RX ADMIN — PHENYLEPHRINE HYDROCHLORIDE 6.45 MICROGRAM(S)/KG/MIN: 10 INJECTION INTRAVENOUS at 21:09

## 2021-05-14 RX ADMIN — ENOXAPARIN SODIUM 40 MILLIGRAM(S): 100 INJECTION SUBCUTANEOUS at 16:00

## 2021-05-14 RX ADMIN — Medication 500 MILLIGRAM(S): at 16:32

## 2021-05-14 RX ADMIN — Medication 50 MILLILITER(S): at 21:08

## 2021-05-14 RX ADMIN — MIDODRINE HYDROCHLORIDE 20 MILLIGRAM(S): 2.5 TABLET ORAL at 21:10

## 2021-05-14 RX ADMIN — HEPARIN SODIUM 10 UNIT(S)/HR: 5000 INJECTION INTRAVENOUS; SUBCUTANEOUS at 10:10

## 2021-05-14 RX ADMIN — Medication 50 MILLILITER(S): at 16:02

## 2021-05-14 RX ADMIN — PHENYLEPHRINE HYDROCHLORIDE 6.45 MICROGRAM(S)/KG/MIN: 10 INJECTION INTRAVENOUS at 14:21

## 2021-05-14 RX ADMIN — POLYETHYLENE GLYCOL 3350 17 GRAM(S): 17 POWDER, FOR SOLUTION ORAL at 12:13

## 2021-05-14 RX ADMIN — MIDODRINE HYDROCHLORIDE 20 MILLIGRAM(S): 2.5 TABLET ORAL at 14:21

## 2021-05-14 NOTE — PROGRESS NOTE ADULT - SUBJECTIVE AND OBJECTIVE BOX
HISTORY  35 year old [place] & English speaking female with PMH of cryptogenic cirrhosis, portal hypertension with small esophageal varices, Budd Chiari syndrome, Protein S deficiency, multiple miscarriages, COVID-19 PCR + 3/4/21, recent admission for pleural effusion, attempted TIPS 4/2021 (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 Now s/p scheduled TIPS and thoracentesis of 1L pleural fluid. Patient admitted to SICU for hemodynamic monitoring.    24 HOUR EVENTS:  -    SUBJECTIVE/ROS:  [x ] A ten-point review of systems was otherwise negative except as noted.  [ ] Due to altered mental status/intubation, subjective information were not able to be obtained from the patient. History was obtained, to the extent possible, from review of the chart and collateral sources of information.      NEURO  Exam: awake, alert, oriented  Meds: acetaminophen   Tablet .. 500 milliGRAM(s) Oral every 6 hours PRN Mild Pain (1 - 3)  oxyCODONE    IR 5 milliGRAM(s) Oral every 4 hours PRN Severe Pain (7 - 10)    [x] Adequacy of sedation and pain control has been assessed and adjusted      RESPIRATORY  RR: 21 (05-14-21 @ 03:30) (13 - 44)  SpO2: 99% (05-14-21 @ 03:30) (92% - 100%)  Exam: unlabored, clear to auscultation bilaterally    ABG - ( 12 May 2021 07:39 )  pH: 7.40  /  pCO2: 36    /  pO2: 84    / HCO3: 22    / Base Excess: -2.0  /  SaO2: 97        [N/A] Extubation Readiness Assessed        CARDIOVASCULAR  HR: 75 (05-14-21 @ 03:30) (64 - 112)  BP: 85/53 (05-14-21 @ 03:30) (85/53 - 103/59)  BP(mean): 65 (05-14-21 @ 03:30) (65 - 76)  ABP: 79/49 (05-14-21 @ 01:00) (75/42 - 122/66)  ABP(mean): 65 (05-14-21 @ 01:00) (55 - 144)      Exam: regular rate and rhythm  Cardiac Rhythm: sinus  Perfusion     [x]Adequate   [ ]Inadequate  Mentation   [x]Normal       [ ]Reduced  Extremities  [x]Warm         [ ]Cool  Volume Status [ ]Hypervolemic [x]Euvolemic [ ]Hypovolemic  Meds: furosemide    Tablet 80 milliGRAM(s) Oral every 12 hours  midodrine 10 milliGRAM(s) Oral every 8 hours  phenylephrine    Infusion 0.4 MICROgram(s)/kG/Min IV Continuous <Continuous>  spironolactone 200 milliGRAM(s) Oral daily        GI/NUTRITION  Exam: soft, nontender, nondistended, incision C/D/I  Diet: reg  Meds: pantoprazole    Tablet 40 milliGRAM(s) Oral before breakfast  polyethylene glycol 3350 17 Gram(s) Oral daily  senna 2 Tablet(s) Oral at bedtime      GENITOURINARY  I&O's Detail    05-12 @ 07:01  -  05-13 @ 07:00  --------------------------------------------------------  IN:    Heparin: 108 mL    IV PiggyBack: 300 mL    Phenylephrine: 5.6 mL    Phenylephrine: 6.4 mL    sodium chloride 0.9%: 150 mL  Total IN: 570 mL    OUT:    Indwelling Catheter - Urethral (mL): 2505 mL  Total OUT: 2505 mL    Total NET: -1935 mL      05-13 @ 07:01 - 05-14 @ 04:11  --------------------------------------------------------  IN:    Albumin 5%  - 250 mL: 250 mL    Heparin: 18 mL    Phenylephrine: 168.3 mL  Total IN: 436.3 mL    OUT:    Indwelling Catheter - Urethral (mL): 2460 mL  Total OUT: 2460 mL    Total NET: -2023.7 mL          05-13    136  |  99  |  12  ----------------------------<  132<H>  3.9   |  24  |  0.56    Ca    8.5      13 May 2021 22:56  Phos  2.3     05-13  Mg     1.7     05-13    TPro  6.1  /  Alb  3.4  /  TBili  3.0<H>  /  DBili  0.8<H>  /  AST  54<H>  /  ALT  56<H>  /  AlkPhos  153<H>  05-13    [ ] Lucas catheter, indication: N/A  Meds: magnesium sulfate  IVPB 2 Gram(s) IV Intermittent once  multivitamin 1 Tablet(s) Oral daily  potassium phosphate IVPB 15 milliMole(s) IV Intermittent once        HEMATOLOGIC  Meds:   [x] VTE Prophylaxis                        11.1   9.14  )-----------( 126      ( 13 May 2021 22:56 )             35.2     PT/INR - ( 13 May 2021 22:56 )   PT: 14.1 sec;   INR: 1.18 ratio         PTT - ( 13 May 2021 22:56 )  PTT:38.9 sec  Transfusion     [ ] PRBC   [ ] Platelets   [ ] FFP   [ ] Cryoprecipitate      INFECTIOUS DISEASES  WBC Count: 9.14 K/uL (05-13 @ 22:56)  WBC Count: 9.24 K/uL (05-13 @ 16:57)    RECENT CULTURES:  Specimen Source: .Smear Other  Date/Time: 05-13 @ 23:32  Culture Results: --  Gram Stain:   polymorphonuclear leukocytes seen  No organisms seen  by cytocentrifuge  Organism: --    Meds: cefTRIAXone   IVPB 1000 milliGRAM(s) IV Intermittent every 24 hours        ENDOCRINE  CAPILLARY BLOOD GLUCOSE          ACCESS DEVICES:  [x ] Peripheral IV  [ ] Central Venous Line	[ ] R	[ ] L	[ ] IJ	[ ] Fem	[ ] SC	Placed:   [ ] Arterial Line		[ ] R	[ ] L	[ ] Fem	[ ] Rad	[ ] Ax	Placed:   [ ] PICC:					[ ] Mediport  [ ] Urinary Catheter, Date Placed:   [x] Necessity of urinary, arterial, and venous catheters discussed    OTHER MEDICATIONS:  chlorhexidine 2% Cloths 1 Application(s) Topical <User Schedule>      CODE STATUS:  full code      IMAGING: HISTORY  35 year old [place] & English speaking female with PMH of cryptogenic cirrhosis, portal hypertension with small esophageal varices, Budd Chiari syndrome, Protein S deficiency, multiple miscarriages, COVID-19 PCR + 3/4/21, recent admission for pleural effusion, attempted TIPS 4/2021 (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 Now s/p scheduled TIPS and thoracentesis of 1L pleural fluid. Patient admitted to SICU for hemodynamic monitoring.    24 HOUR EVENTS:  - patient had right thoracenthesis removed 1.2L fluids  - patient remain on neosynephrine drip overnight @1.2mcg  -lasix/ spironolactone  -episode of chest pain: ekg no ST depression and troponins neg    SUBJECTIVE/ROS:  [x ] A ten-point review of systems was otherwise negative except as noted.  [ ] Due to altered mental status/intubation, subjective information were not able to be obtained from the patient. History was obtained, to the extent possible, from review of the chart and collateral sources of information.      NEURO  Exam: awake, alert, oriented  Meds: acetaminophen   Tablet .. 500 milliGRAM(s) Oral every 6 hours PRN Mild Pain (1 - 3)  oxyCODONE    IR 5 milliGRAM(s) Oral every 4 hours PRN Severe Pain (7 - 10)    [x] Adequacy of sedation and pain control has been assessed and adjusted      RESPIRATORY  RR: 21 (05-14-21 @ 03:30) (13 - 44)  SpO2: 99% (05-14-21 @ 03:30) (92% - 100%)  Exam: unlabored, clear to auscultation bilaterally    ABG - ( 12 May 2021 07:39 )  pH: 7.40  /  pCO2: 36    /  pO2: 84    / HCO3: 22    / Base Excess: -2.0  /  SaO2: 97        [N/A] Extubation Readiness Assessed        CARDIOVASCULAR  HR: 75 (05-14-21 @ 03:30) (64 - 112)  BP: 85/53 (05-14-21 @ 03:30) (85/53 - 103/59)  BP(mean): 65 (05-14-21 @ 03:30) (65 - 76)  ABP: 79/49 (05-14-21 @ 01:00) (75/42 - 122/66)  ABP(mean): 65 (05-14-21 @ 01:00) (55 - 144)      Exam: regular rate and rhythm  Cardiac Rhythm: sinus  Perfusion     [x]Adequate   [ ]Inadequate  Mentation   [x]Normal       [ ]Reduced  Extremities  [x]Warm         [ ]Cool  Volume Status [ ]Hypervolemic [x]Euvolemic [ ]Hypovolemic  Meds: furosemide    Tablet 80 milliGRAM(s) Oral every 12 hours  midodrine 10 milliGRAM(s) Oral every 8 hours  phenylephrine    Infusion 0.4 MICROgram(s)/kG/Min IV Continuous <Continuous>  spironolactone 200 milliGRAM(s) Oral daily        GI/NUTRITION  Exam: soft, nontender, nondistended, incision C/D/I  Diet: reg  Meds: pantoprazole    Tablet 40 milliGRAM(s) Oral before breakfast  polyethylene glycol 3350 17 Gram(s) Oral daily  senna 2 Tablet(s) Oral at bedtime      GENITOURINARY  I&O's Detail    05-12 @ 07:01  -  05-13 @ 07:00  --------------------------------------------------------  IN:    Heparin: 108 mL    IV PiggyBack: 300 mL    Phenylephrine: 5.6 mL    Phenylephrine: 6.4 mL    sodium chloride 0.9%: 150 mL  Total IN: 570 mL    OUT:    Indwelling Catheter - Urethral (mL): 2505 mL  Total OUT: 2505 mL    Total NET: -1935 mL      05-13 @ 07:01  -  05-14 @ 04:11  --------------------------------------------------------  IN:    Albumin 5%  - 250 mL: 250 mL    Heparin: 18 mL    Phenylephrine: 168.3 mL  Total IN: 436.3 mL    OUT:    Indwelling Catheter - Urethral (mL): 2460 mL  Total OUT: 2460 mL    Total NET: -2023.7 mL          05-13    136  |  99  |  12  ----------------------------<  132<H>  3.9   |  24  |  0.56    Ca    8.5      13 May 2021 22:56  Phos  2.3     05-13  Mg     1.7     05-13    TPro  6.1  /  Alb  3.4  /  TBili  3.0<H>  /  DBili  0.8<H>  /  AST  54<H>  /  ALT  56<H>  /  AlkPhos  153<H>  05-13    [ ] Lucas catheter, indication: N/A  Meds: magnesium sulfate  IVPB 2 Gram(s) IV Intermittent once  multivitamin 1 Tablet(s) Oral daily  potassium phosphate IVPB 15 milliMole(s) IV Intermittent once        HEMATOLOGIC  Meds:   [x] VTE Prophylaxis                        11.1   9.14  )-----------( 126      ( 13 May 2021 22:56 )             35.2     PT/INR - ( 13 May 2021 22:56 )   PT: 14.1 sec;   INR: 1.18 ratio         PTT - ( 13 May 2021 22:56 )  PTT:38.9 sec  Transfusion     [ ] PRBC   [ ] Platelets   [ ] FFP   [ ] Cryoprecipitate      INFECTIOUS DISEASES  WBC Count: 9.14 K/uL (05-13 @ 22:56)  WBC Count: 9.24 K/uL (05-13 @ 16:57)    RECENT CULTURES:  Specimen Source: .Smear Other  Date/Time: 05-13 @ 23:32  Culture Results: --  Gram Stain:   polymorphonuclear leukocytes seen  No organisms seen  by cytocentrifuge  Organism: --    Meds: cefTRIAXone   IVPB 1000 milliGRAM(s) IV Intermittent every 24 hours        ENDOCRINE  CAPILLARY BLOOD GLUCOSE          ACCESS DEVICES:  [x ] Peripheral IV  [ ] Central Venous Line	[ ] R	[ ] L	[ ] IJ	[ ] Fem	[ ] SC	Placed:   [ ] Arterial Line		[ ] R	[ ] L	[ ] Fem	[ ] Rad	[ ] Ax	Placed:   [ ] PICC:					[ ] Mediport  [ ] Urinary Catheter, Date Placed:   [x] Necessity of urinary, arterial, and venous catheters discussed    OTHER MEDICATIONS:  chlorhexidine 2% Cloths 1 Application(s) Topical <User Schedule>      CODE STATUS:  full code      IMAGING:

## 2021-05-14 NOTE — CHART NOTE - NSCHARTNOTEFT_GEN_A_CORE
Nutrition Follow Up Note  Patient seen for: malnutrition follow up on 8ICU    Chart reviewed, events noted. "Patient is a 35F with hx of cryptogenic cirrhosis, portal hypertension, Budd Chiari syndrome, Protein S deficiency, recent attempted TIPS but unable to perform 2/2 intrahepatic IVC occlusion, s/p TIPS and thoracentesis. Post-procedure on phenylephrine, admitted to SICU for hemodynamic monitoring."    Source: [] Patient       [x] EMR        [] RN        [] Family at bedside       [] Other:      Diet Order:   Diet, Regular (21)  Mighty Shake supplement (200 calories, 6 gm protein) added to meal trays.     - Is current order appropriate/adequate? [X] Yes  []  No:     PO intake :   [] >75%  Adequate    [] 50-75%  Fair       [] <50%  Poor    Nutrition-related concerns:  - Decompensated cirrhosis; s/p TI{S (); s/p right thoracentesis (; removed 1.2L fluids); lasix/ spironolactone ordered  - Supplementation: multivitamin  - MELD-Na = 15 on     GI:    Last BM: none noted.   Bowel Regimen? [X] Yes   [] No    Weights:   Daily Weight in k.1 (), Weight in k.1 ()  Dosing Weight: 43kg (21) * used for calculations    Nutritionally Pertinent MEDICATIONS  (STANDING):  cefTRIAXone   IVPB  furosemide    Tablet  midodrine  multivitamin  pantoprazole    Tablet  phenylephrine    Infusion  polyethylene glycol 3350  senna  spironolactone    Pertinent Labs:  @ 22:56: Na 136, BUN 12, Cr 0.56, <H>, K+ 3.9, Phos 2.3<L>, Mg 1.7, Alk Phos 153<H>, ALT/SGPT 56<H>, AST/SGOT 54<H>    A1C with Estimated Average Glucose Result: 5.7 % (21 @ 14:51)    Skin per nursing documentation: no pressure injuries documented  Edema: no edema documented     Estimated Needs: based on dosing wt 43, with consideration for malnutrition  Energy: 6332-4623 calories (30-35 kcal/kg)  Protein: 60-77 grams (1.4-1.8 gm/kg)    Previous Nutrition Diagnosis: Underweight/Severe Malnutrition  Nutrition Diagnosis is: [X] ongoing  [] resolved [] not applicable     New Nutrition Diagnosis: [X] Not applicable    Nutrition Care Plan:  [] In Progress  [X] Achieved  [] Not applicable         Recommendations:         [X] Continue current diet order       [X] Continue current micronutrient supplementation: multivitamin      Monitoring and Evaluation:   Continue to monitor nutritional intake, tolerance to diet prescription, weights, labs, skin integrity      RD remains available upon request and will follow up per protocol  Veronica Cifuentes, MS RD CDN [facility] (pager [phone]) Nutrition Follow Up Note  Patient seen for: malnutrition follow up on 8ICU    Chart reviewed, events noted. "Patient is a 35F with hx of cryptogenic cirrhosis, portal hypertension, Budd Chiari syndrome, Protein S deficiency, recent attempted TIPS but unable to perform 2/2 intrahepatic IVC occlusion, s/p TIPS and thoracentesis. Post-procedure on phenylephrine, admitted to SICU for hemodynamic monitoring."    Source: [X] Patient       [x] EMR        [] RN        [] Family at bedside       [] Other:      Diet Order:   Diet, Regular (21)  Mighty Shake supplement (200 calories, 6 gm protein) added to meal trays.     - Is current order appropriate/adequate? [X] Yes  []  No:     PO intake :   [X] >75%  Adequate    [] 50-75%  Fair       [] <50%  Poor    Nutrition-related concerns:  - PO Intake: Pt reports improved po intake today, consuming eggs and yogurt for breakfast, and chicken soup and fruit from home for lunch.  - Decompensated cirrhosis; s/p TI{S (); s/p right thoracentesis (; removed 1.2L fluids); lasix/ spironolactone ordered  - Supplementation: multivitamin  - MELD-Na = 15 on     GI:    Last BM: none noted.   Bowel Regimen? [X] Yes   [] No    Weights:   Daily Weight in k.1 (), Weight in k.1 ()  Dosing Weight: 43kg (21) * used for calculations    Nutritionally Pertinent MEDICATIONS  (STANDING):  cefTRIAXone   IVPB  furosemide    Tablet  midodrine  multivitamin  pantoprazole    Tablet  phenylephrine    Infusion  polyethylene glycol 3350  senna  spironolactone    Pertinent Labs:  @ 22:56: Na 136, BUN 12, Cr 0.56, <H>, K+ 3.9, Phos 2.3<L>, Mg 1.7, Alk Phos 153<H>, ALT/SGPT 56<H>, AST/SGOT 54<H>    A1C with Estimated Average Glucose Result: 5.7 % (21 @ 14:51)    Skin per nursing documentation: no pressure injuries documented  Edema: no edema documented     Estimated Needs: based on dosing wt 43, with consideration for malnutrition  Energy: 3631-7775 calories (30-35 kcal/kg)  Protein: 60-77 grams (1.4-1.8 gm/kg)    Previous Nutrition Diagnosis: Underweight/Severe Malnutrition  Nutrition Diagnosis is: [X] ongoing  [] resolved [] not applicable     New Nutrition Diagnosis: [X] Not applicable    Nutrition Care Plan:  [] In Progress  [X] Achieved  [] Not applicable         Recommendations:         [X] Continue current diet order       [X] Continue current micronutrient supplementation: multivitamin      Monitoring and Evaluation:   Continue to monitor nutritional intake, tolerance to diet prescription, weights, labs, skin integrity      RD remains available upon request and will follow up per protocol  Veronica Cifuentes, MS [place] CDN [facility] (pager [phone])

## 2021-05-14 NOTE — PROGRESS NOTE ADULT - ASSESSMENT
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  In ICU for close monitoring  - S/P Thoracentesis on 5/11, 5/13. CXR improved.   - Diet as tolerated.  Resume Heparin drip for procedure as per IR recommendations    - Continue home Lasix 80mg BID and Spironolactone 200 qd as per hepatology recommendations  - Continue Midodrine 10mg q8 hours  - SCDs  - Mobilize OOB as tolerated  - Appreciate Hepatology recommendations and ICU care

## 2021-05-14 NOTE — PROGRESS NOTE ADULT - SUBJECTIVE AND OBJECTIVE BOX
Transplant Surgery - Daily Rounds  --------------------------------------------------------------    Present:   Patient seen with Transplant Surgeons Dr. Elias, Dr. Zamora, ACPs: Jamal Ford, Transplant Nephrologists Evelia Garcia, Pharmacist and Surgical Resident Jonathon during am rounds and examined with Dr. Elias.    Disciplines not in attendance will be notified of the plan.       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    Decompensated cirrhosis due to chronic hepatic vein occlusion  - TIPS 5/11  - varices: small in 2017  - ascites: recurrent, on lasix 80mg BID/spironolactone 200mg QD at home c/b hepatic hydrothorax. Thoracentesis 4/26 (850ml), 5/11 (1L)  - HE: no hx  - HCC: none on imaging  - MELD-Na = 13 on 5/12  Protein S deficiency    Interval Events:  - patient had right thoracenthesis removed 1.2L fluids  - patient remain on neosynephrine drip overnight @1.2mcg  -lasix/ spironolactone  -episode of chest pain: ekg no ST depression and troponins neg  - Reports symptoms improved this Am.     Potential Discharge date: pending clinical improvement    Education:  Medications    MEDICATIONS  (STANDING):  cefTRIAXone   IVPB 1000 milliGRAM(s) IV Intermittent every 24 hours  chlorhexidine 2% Cloths 1 Application(s) Topical <User Schedule>  furosemide    Tablet 80 milliGRAM(s) Oral every 12 hours  heparin  Infusion 1000 Unit(s)/Hr (10 mL/Hr) IV Continuous <Continuous>  midodrine 10 milliGRAM(s) Oral every 8 hours  multivitamin 1 Tablet(s) Oral daily  pantoprazole    Tablet 40 milliGRAM(s) Oral before breakfast  phenylephrine    Infusion 0.4 MICROgram(s)/kG/Min (6.45 mL/Hr) IV Continuous <Continuous>  polyethylene glycol 3350 17 Gram(s) Oral daily  senna 2 Tablet(s) Oral at bedtime  spironolactone 200 milliGRAM(s) Oral daily    MEDICATIONS  (PRN):  acetaminophen   Tablet .. 500 milliGRAM(s) Oral every 6 hours PRN Mild Pain (1 - 3)  oxyCODONE    IR 5 milliGRAM(s) Oral every 4 hours PRN Severe Pain (7 - 10)      PAST MEDICAL & SURGICAL HISTORY:  Cirrhosis  2010    Cirrhosis    Budd-Chiari syndrome    H/O protein S deficiency    Miscarriage  x 5    H/O protein S deficiency    No significant past surgical history        Vital Signs Last 24 Hrs  T(C): 36.6 (14 May 2021 07:45), Max: 37.7 (13 May 2021 19:00)  T(F): 97.9 (14 May 2021 07:45), Max: 99.9 (13 May 2021 19:00)  HR: 80 (14 May 2021 08:45) (64 - 112)  BP: 100/67 (14 May 2021 08:45) (77/48 - 107/71)  BP(mean): 77 (14 May 2021 08:45) (57 - 84)  RR: 30 (14 May 2021 08:45) (13 - 44)  SpO2: 96% (14 May 2021 08:45) (92% - 100%)    I&O's Summary    13 May 2021 07:01  -  14 May 2021 07:00  --------------------------------------------------------  IN: 692 mL / OUT: 2820 mL / NET: -2128 mL    14 May 2021 07:01  -  14 May 2021 09:47  --------------------------------------------------------  IN: 83.5 mL / OUT: 150 mL / NET: -66.5 mL                              11.1   9.14  )-----------( 126      ( 13 May 2021 22:56 )             35.2     05-13    136  |  99  |  12  ----------------------------<  132<H>  3.9   |  24  |  0.56    Ca    8.5      13 May 2021 22:56  Phos  2.3     05-13  Mg     1.7     05-13    TPro  6.1  /  Alb  3.4  /  TBili  3.0<H>  /  DBili  0.8<H>  /  AST  54<H>  /  ALT  56<H>  /  AlkPhos  153<H>  05-13                  Plan of care:  See Below        Review of systems  Gen: No weight changes, fatigue, fevers/chills, weakness  Skin: No rashes  Head/Eyes/Ears/Mouth: No headache; Normal hearing; Normal vision w/o blurriness; No sinus pain/discomfort, sore throat  Respiratory: C/O chest pressure, worsening dyspnea. Denies cough, wheezing, hemoptysis  CV: No PND, orthopnea  GI: Denies diarrhea, constipation, nausea, vomiting, melena, hematochezia  : No increased frequency, dysuria, hematuria, nocturia  MSK: No joint pain/swelling; no back pain; no edema  Neuro: No dizziness/lightheadedness, weakness, seizures, numbness, tingling  Heme: No easy bruising or bleeding  Endo: No heat/cold intolerance  Psych: No significant nervousness, anxiety, stress, depression  All other systems were reviewed and are negative, except as noted.      PHYSICAL EXAM:  Constitutional: Well developed / well nourished  Eyes: Anicteric, PERRLA  ENMT: nc/at  Neck: R IJ dressing intact, supple  Respiratory: diminished R side  Cardiovascular: RRR  Gastrointestinal: Soft abdomen, slight distention, NT  Genitourinary: Urinary catheter in place  Extremities: SCD's in place and working bilaterally, No edema  Vascular: Palpable dp pulses bilaterally  Neurological: A&O x3  Skin: Abdominal dressings intact w/o drainage. R pleural dressing intact. R IJ dressing intact  Musculoskeletal: Moving all extremities  Psychiatric: Responsive

## 2021-05-14 NOTE — PROGRESS NOTE ADULT - SUBJECTIVE AND OBJECTIVE BOX
Chief Complaint:  Patient is a 35y old  Female who presents with a chief complaint of TIPS (14 May 2021 09:43)    Reason for consult: cirrhosis s/p TIPS    Interval Events: S/p thoracentesis yesterday, remains on low dose pressors.     Hospital Medications:  acetaminophen   Tablet .. 500 milliGRAM(s) Oral every 6 hours PRN  cefTRIAXone   IVPB 1000 milliGRAM(s) IV Intermittent every 24 hours  chlorhexidine 2% Cloths 1 Application(s) Topical <User Schedule>  furosemide    Tablet 80 milliGRAM(s) Oral every 12 hours  heparin  Infusion 1000 Unit(s)/Hr IV Continuous <Continuous>  midodrine 10 milliGRAM(s) Oral every 8 hours  multivitamin 1 Tablet(s) Oral daily  oxyCODONE    IR 5 milliGRAM(s) Oral every 4 hours PRN  pantoprazole    Tablet 40 milliGRAM(s) Oral before breakfast  phenylephrine    Infusion 0.4 MICROgram(s)/kG/Min IV Continuous <Continuous>  polyethylene glycol 3350 17 Gram(s) Oral daily  senna 2 Tablet(s) Oral at bedtime  spironolactone 200 milliGRAM(s) Oral daily      ROS:   General:  No  fevers, chills, night sweats, fatigue  Eyes:  Good vision, no reported pain  ENT:  No sore throat, pain, runny nose  CV:  No pain, palpitations  Pulm:  No cough  GI:  See HPI, otherwise negative  :  No  incontinence, nocturia  Muscle:  No pain, weakness  Neuro:  No memory problems  Psych:  No insomnia, mood problems, depression  Endocrine:  No polyuria, polydipsia, cold/heat intolerance  Heme:  No petechiae, ecchymosis, easy bruisability  Skin:  No rash    PHYSICAL EXAM:   Vital Signs:  Vital Signs Last 24 Hrs  T(C): 36.6 (14 May 2021 07:45), Max: 37.7 (13 May 2021 19:00)  T(F): 97.9 (14 May 2021 07:45), Max: 99.9 (13 May 2021 19:00)  HR: 82 (14 May 2021 10:00) (64 - 112)  BP: 99/63 (14 May 2021 10:00) (77/48 - 107/71)  BP(mean): 77 (14 May 2021 10:00) (57 - 84)  RR: 28 (14 May 2021 10:00) (13 - 44)  SpO2: 95% (14 May 2021 10:00) (92% - 100%)  Daily     Daily     GENERAL: no acute distress  NEURO: alert  HEENT: anicteric sclera, no conjunctival pallor appreciated  CHEST: no respiratory distress, no accessory muscle use, decreased breath sounds L base  CARDIAC: regular rate, rhythm  ABDOMEN: soft, non-tender, non-distended, no rebound or guarding  EXTREMITIES: warm, well perfused, no edema  SKIN: no lesions noted    LABS: reviewed                        11.1   9.14  )-----------( 126      ( 13 May 2021 22:56 )             35.2     05-13    136  |  99  |  12  ----------------------------<  132<H>  3.9   |  24  |  0.56    Ca    8.5      13 May 2021 22:56  Phos  2.3     05-13  Mg     1.7     05-13    TPro  6.1  /  Alb  3.4  /  TBili  3.0<H>  /  DBili  0.8<H>  /  AST  54<H>  /  ALT  56<H>  /  AlkPhos  153<H>  05-13    LIVER FUNCTIONS - ( 13 May 2021 22:56 )  Alb: 3.4 g/dL / Pro: 6.1 g/dL / ALK PHOS: 153 U/L / ALT: 56 U/L / AST: 54 U/L / GGT: x             Interval Diagnostic Studies: see sunrise for full report   Chief Complaint:  Patient is a 35y old  Female who presents with a chief complaint of TIPS (14 May 2021 09:43)    Reason for consult: cirrhosis s/p TIPS    Interval Events: S/p thoracentesis yesterday, remains on low dose pressors.     Hospital Medications:  acetaminophen   Tablet .. 500 milliGRAM(s) Oral every 6 hours PRN  cefTRIAXone   IVPB 1000 milliGRAM(s) IV Intermittent every 24 hours  chlorhexidine 2% Cloths 1 Application(s) Topical <User Schedule>  furosemide    Tablet 80 milliGRAM(s) Oral every 12 hours  heparin  Infusion 1000 Unit(s)/Hr IV Continuous <Continuous>  midodrine 10 milliGRAM(s) Oral every 8 hours  multivitamin 1 Tablet(s) Oral daily  oxyCODONE    IR 5 milliGRAM(s) Oral every 4 hours PRN  pantoprazole    Tablet 40 milliGRAM(s) Oral before breakfast  phenylephrine    Infusion 0.4 MICROgram(s)/kG/Min IV Continuous <Continuous>  polyethylene glycol 3350 17 Gram(s) Oral daily  senna 2 Tablet(s) Oral at bedtime  spironolactone 200 milliGRAM(s) Oral daily      ROS:   General:  No  fevers, chills, night sweats, fatigue  Eyes:  Good vision, no reported pain  ENT:  No sore throat, pain, runny nose  CV:  No pain, palpitations  Pulm:  No cough  GI:  See HPI, otherwise negative  :  No  incontinence, nocturia  Muscle:  No pain, weakness  Neuro:  No memory problems  Psych:  No insomnia, mood problems, depression  Endocrine:  No polyuria, polydipsia, cold/heat intolerance  Heme:  No petechiae, ecchymosis, easy bruisability  Skin:  No rash    PHYSICAL EXAM:   Vital Signs:  Vital Signs Last 24 Hrs  T(C): 36.6 (14 May 2021 07:45), Max: 37.7 (13 May 2021 19:00)  T(F): 97.9 (14 May 2021 07:45), Max: 99.9 (13 May 2021 19:00)  HR: 82 (14 May 2021 10:00) (64 - 112)  BP: 99/63 (14 May 2021 10:00) (77/48 - 107/71)  BP(mean): 77 (14 May 2021 10:00) (57 - 84)  RR: 28 (14 May 2021 10:00) (13 - 44)  SpO2: 95% (14 May 2021 10:00) (92% - 100%)  Daily     Daily     GENERAL: no acute distress  NEURO: alert  HEENT: anicteric sclera, no conjunctival pallor appreciated  CHEST: no respiratory distress, no accessory muscle use, decreased breath sounds R base  CARDIAC: regular rate, rhythm  ABDOMEN: soft, non-tender, non-distended, no rebound or guarding  EXTREMITIES: warm, well perfused, no edema  SKIN: no lesions noted    LABS: reviewed                        11.1   9.14  )-----------( 126      ( 13 May 2021 22:56 )             35.2     05-13    136  |  99  |  12  ----------------------------<  132<H>  3.9   |  24  |  0.56    Ca    8.5      13 May 2021 22:56  Phos  2.3     05-13  Mg     1.7     05-13    TPro  6.1  /  Alb  3.4  /  TBili  3.0<H>  /  DBili  0.8<H>  /  AST  54<H>  /  ALT  56<H>  /  AlkPhos  153<H>  05-13    LIVER FUNCTIONS - ( 13 May 2021 22:56 )  Alb: 3.4 g/dL / Pro: 6.1 g/dL / ALK PHOS: 153 U/L / ALT: 56 U/L / AST: 54 U/L / GGT: x             Interval Diagnostic Studies: see sunrise for full report

## 2021-05-14 NOTE — PROGRESS NOTE ADULT - SUBJECTIVE AND OBJECTIVE BOX
Interventional Radiology Follow-Up Note.    This is a 35y Female s/p TIPS on 5/11 s/p Thoracentesis on 5/13  in Interventional Radiology with Dr. Ragsdale.   Reports chest pain overnight that improved with tylenol. Improvement in abdominal distension. Denies SOB  On 1 pressor.   Medication:     cefTRIAXone   IVPB: (05-13)  furosemide    Tablet: (05-12)  heparin  Infusion: (05-12)  midodrine: (05-14)  spironolactone: (05-13)  spironolactone: (05-12)    Vitals:   T(F): 97.7, Max: 99.9 (19:00)  HR: 77  BP: 87/51  RR: 21  SpO2: 97%    Physical Exam:  General: Nontoxic, in NAD.  Abdomen: soft, less distended than yesterday. Non tender.      LABS:  WBC 9.14 / Hgb 11.1 / Hct 35.2 / Plt 126  Na 136 / K 3.9 / CO2 24 / Cl 99 / BUN 12 / Cr 0.56 / Glucose 132  ALT 56 / AST 54 / Alk Phos 153 / Tbili 3.0  Ptt 38.9 / Pt 14.1 / INR 1.18      Assessment/Plan:  35y Female s/p TIPS on 5/11 s/p Thoracentesis on 5/13  in Interventional Radiology with Dr. Ragsdale.     - Heprain drip.  - wean pressor support.  - Care per SICU.   - D/w Dr. Ragsdale.    Please call IR at [phone] with any questions, concerns, or issues regarding above.

## 2021-05-14 NOTE — PROGRESS NOTE ADULT - ASSESSMENT
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 In SICU for post procedure monitoring.    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  -varices: small in 2017  -ascites: recurrent, on lasix 80mg/spironolactone 200mg at home (increased at last outpatient visit) c/b hepatic hydrothorax  -HE: no hx  -HCC: none on imaging  -MELD-Na = 15 on 5/14  #Protein S deficiency    Recommendations:  - Albumin 25% 100 mL IV q8h due to concern for intravascular volume depletion  - Increase midodrine to 20mg q8h for goal MAP > 65  - Decrease lasix to 80mg daily  - Continue w/ spironolactone 200mg daily  - Switch hep gtt to lovenox  - Closely monitor CBC, CMP, INR  - F/u IR and transplant surgery recs  - OOB, ambulate  - Rest of care as per SICU team    Kane Orellana  Gastroenterology/Hepatology Fellow  Available via [employer] Teams    NON-URGENT CONSULTS:  Please email [email or web address]  [email or web address]  AT NIGHT AND ON WEEKENDS:  Contact on-call GI fellow via answering service ([phone]) from 5pm-8am and on weekends/holidays  MONDAY-FRIDAY 8AM-5PM:  Pager# [phone] ([facility]) or [phone] ([facility])  GI Phone# [phone] ([facility])       35F w/ hx of protein S deficiency, 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 In SICU for post procedure monitoring.    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  -varices: small in 2017  -ascites: recurrent, on lasix 80mg/spironolactone 200mg at home (increased at last outpatient visit) c/b hepatic hydrothorax  -HE: no hx  -HCC: none on imaging  -MELD-Na = 15 on 5/14  #Protein S deficiency    Recommendations:  - Albumin 25% 100 mL IV q8h due to concern for intravascular volume depletion  - Increase midodrine to 20mg q8h for goal MAP > 65  - Decrease lasix to 80mg daily  - Continue w/ spironolactone 200mg daily  - Switch hep gtt to lovenox (therapeutic)  - Closely monitor CBC, CMP, INR  - F/u IR and transplant surgery recs  - OOB, ambulate  - Rest of care as per SICU team    Kane Orellana  Gastroenterology/Hepatology Fellow  Available via [employer] Teams    NON-URGENT CONSULTS:  Please email [email or web address]  AT NIGHT AND ON WEEKENDS:  Contact on-call GI fellow via answering service ([phone]) from 5pm-8am and on weekends/holidays  MONDAY-FRIDAY 8AM-5PM:  Pager# [phone] ([facility]) or [phone] ([facility])  GI Phone# [phone] ([facility])

## 2021-05-15 LAB
ALBUMIN SERPL ELPH-MCNC: 3.8 G/DL — SIGNIFICANT CHANGE UP (ref 3.3–5)
ALP SERPL-CCNC: 142 U/L — HIGH (ref 40–120)
ALT FLD-CCNC: 34 U/L — SIGNIFICANT CHANGE UP (ref 10–45)
ANION GAP SERPL CALC-SCNC: 13 MMOL/L — SIGNIFICANT CHANGE UP (ref 5–17)
APTT BLD: 46.2 SEC — HIGH (ref 27.5–35.5)
AST SERPL-CCNC: 35 U/L — SIGNIFICANT CHANGE UP (ref 10–40)
BASE EXCESS BLDV CALC-SCNC: -0.2 MMOL/L — SIGNIFICANT CHANGE UP (ref -2–2)
BILIRUB DIRECT SERPL-MCNC: 0.7 MG/DL — HIGH (ref 0–0.2)
BILIRUB INDIRECT FLD-MCNC: 1.8 MG/DL — HIGH (ref 0.2–1)
BILIRUB SERPL-MCNC: 2.5 MG/DL — HIGH (ref 0.2–1.2)
BUN SERPL-MCNC: 11 MG/DL — SIGNIFICANT CHANGE UP (ref 7–23)
CA-I SERPL-SCNC: 1.22 MMOL/L — SIGNIFICANT CHANGE UP (ref 1.12–1.3)
CALCIUM SERPL-MCNC: 8.9 MG/DL — SIGNIFICANT CHANGE UP (ref 8.4–10.5)
CHLORIDE BLDV-SCNC: 107 MMOL/L — SIGNIFICANT CHANGE UP (ref 96–108)
CHLORIDE SERPL-SCNC: 106 MMOL/L — SIGNIFICANT CHANGE UP (ref 96–108)
CO2 BLDV-SCNC: 25 MMOL/L — SIGNIFICANT CHANGE UP (ref 22–30)
CO2 SERPL-SCNC: 19 MMOL/L — LOW (ref 22–31)
CREAT SERPL-MCNC: 0.54 MG/DL — SIGNIFICANT CHANGE UP (ref 0.5–1.3)
GAS PNL BLDV: 137 MMOL/L — SIGNIFICANT CHANGE UP (ref 135–145)
GAS PNL BLDV: SIGNIFICANT CHANGE UP
GAS PNL BLDV: SIGNIFICANT CHANGE UP
GLUCOSE BLDV-MCNC: 130 MG/DL — HIGH (ref 70–99)
GLUCOSE SERPL-MCNC: 130 MG/DL — HIGH (ref 70–99)
HCO3 BLDV-SCNC: 24 MMOL/L — SIGNIFICANT CHANGE UP (ref 21–29)
HCT VFR BLD CALC: 31.2 % — LOW (ref 34.5–45)
HCT VFR BLDA CALC: 32 % — LOW (ref 39–50)
HGB BLD CALC-MCNC: 10.2 G/DL — LOW (ref 11.5–15.5)
HGB BLD-MCNC: 9.6 G/DL — LOW (ref 11.5–15.5)
HOROWITZ INDEX BLDV+IHG-RTO: 21 — SIGNIFICANT CHANGE UP
INR BLD: 1.25 RATIO — HIGH (ref 0.88–1.16)
LACTATE BLDV-MCNC: 1.6 MMOL/L — SIGNIFICANT CHANGE UP (ref 0.7–2)
MAGNESIUM SERPL-MCNC: 2.3 MG/DL — SIGNIFICANT CHANGE UP (ref 1.6–2.6)
MCHC RBC-ENTMCNC: 26.4 PG — LOW (ref 27–34)
MCHC RBC-ENTMCNC: 30.8 GM/DL — LOW (ref 32–36)
MCV RBC AUTO: 86 FL — SIGNIFICANT CHANGE UP (ref 80–100)
NRBC # BLD: 0 /100 WBCS — SIGNIFICANT CHANGE UP (ref 0–0)
OTHER CELLS CSF MANUAL: 10 ML/DL — LOW (ref 18–22)
PCO2 BLDV: 38 MMHG — SIGNIFICANT CHANGE UP (ref 35–50)
PH BLDV: 7.42 — SIGNIFICANT CHANGE UP (ref 7.35–7.45)
PHOSPHATE SERPL-MCNC: 2.8 MG/DL — SIGNIFICANT CHANGE UP (ref 2.5–4.5)
PLATELET # BLD AUTO: 113 K/UL — LOW (ref 150–400)
PO2 BLDV: 39 MMHG — SIGNIFICANT CHANGE UP (ref 25–45)
POTASSIUM BLDV-SCNC: 4.2 MMOL/L — SIGNIFICANT CHANGE UP (ref 3.5–5.3)
POTASSIUM SERPL-MCNC: 4.4 MMOL/L — SIGNIFICANT CHANGE UP (ref 3.5–5.3)
POTASSIUM SERPL-SCNC: 4.4 MMOL/L — SIGNIFICANT CHANGE UP (ref 3.5–5.3)
PROT SERPL-MCNC: 6.1 G/DL — SIGNIFICANT CHANGE UP (ref 6–8.3)
PROTHROM AB SERPL-ACNC: 14.8 SEC — HIGH (ref 10.6–13.6)
RBC # BLD: 3.63 M/UL — LOW (ref 3.8–5.2)
RBC # FLD: 16.3 % — HIGH (ref 10.3–14.5)
SAO2 % BLDV: 71 % — SIGNIFICANT CHANGE UP (ref 67–88)
SODIUM SERPL-SCNC: 138 MMOL/L — SIGNIFICANT CHANGE UP (ref 135–145)
SODIUM UR-SCNC: 95 MMOL/L — SIGNIFICANT CHANGE UP
WBC # BLD: 5.94 K/UL — SIGNIFICANT CHANGE UP (ref 3.8–10.5)
WBC # FLD AUTO: 5.94 K/UL — SIGNIFICANT CHANGE UP (ref 3.8–10.5)

## 2021-05-15 PROCEDURE — 99233 SBSQ HOSP IP/OBS HIGH 50: CPT

## 2021-05-15 PROCEDURE — 99232 SBSQ HOSP IP/OBS MODERATE 35: CPT

## 2021-05-15 RX ORDER — LANOLIN ALCOHOL/MO/W.PET/CERES
5 CREAM (GRAM) TOPICAL AT BEDTIME
Refills: 0 | Status: DISCONTINUED | OUTPATIENT
Start: 2021-05-15 | End: 2021-05-19

## 2021-05-15 RX ADMIN — MIDODRINE HYDROCHLORIDE 20 MILLIGRAM(S): 2.5 TABLET ORAL at 22:04

## 2021-05-15 RX ADMIN — Medication 500 MILLIGRAM(S): at 11:46

## 2021-05-15 RX ADMIN — Medication 50 MILLILITER(S): at 21:57

## 2021-05-15 RX ADMIN — Medication 500 MILLIGRAM(S): at 22:04

## 2021-05-15 RX ADMIN — Medication 50 MILLILITER(S): at 14:25

## 2021-05-15 RX ADMIN — CEFTRIAXONE 100 MILLIGRAM(S): 500 INJECTION, POWDER, FOR SOLUTION INTRAMUSCULAR; INTRAVENOUS at 10:09

## 2021-05-15 RX ADMIN — SENNA PLUS 2 TABLET(S): 8.6 TABLET ORAL at 22:05

## 2021-05-15 RX ADMIN — MIDODRINE HYDROCHLORIDE 20 MILLIGRAM(S): 2.5 TABLET ORAL at 05:05

## 2021-05-15 RX ADMIN — Medication 1 TABLET(S): at 11:17

## 2021-05-15 RX ADMIN — Medication 50 MILLILITER(S): at 05:16

## 2021-05-15 RX ADMIN — ENOXAPARIN SODIUM 40 MILLIGRAM(S): 100 INJECTION SUBCUTANEOUS at 05:04

## 2021-05-15 RX ADMIN — Medication 250 MILLIMOLE(S): at 05:05

## 2021-05-15 RX ADMIN — ENOXAPARIN SODIUM 40 MILLIGRAM(S): 100 INJECTION SUBCUTANEOUS at 16:08

## 2021-05-15 RX ADMIN — POLYETHYLENE GLYCOL 3350 17 GRAM(S): 17 POWDER, FOR SOLUTION ORAL at 11:17

## 2021-05-15 RX ADMIN — Medication 500 MILLIGRAM(S): at 22:45

## 2021-05-15 RX ADMIN — SPIRONOLACTONE 200 MILLIGRAM(S): 25 TABLET, FILM COATED ORAL at 06:05

## 2021-05-15 RX ADMIN — PANTOPRAZOLE SODIUM 40 MILLIGRAM(S): 20 TABLET, DELAYED RELEASE ORAL at 08:40

## 2021-05-15 RX ADMIN — CHLORHEXIDINE GLUCONATE 1 APPLICATION(S): 213 SOLUTION TOPICAL at 05:04

## 2021-05-15 RX ADMIN — Medication 5 MILLIGRAM(S): at 00:46

## 2021-05-15 RX ADMIN — Medication 500 MILLIGRAM(S): at 11:16

## 2021-05-15 RX ADMIN — MIDODRINE HYDROCHLORIDE 20 MILLIGRAM(S): 2.5 TABLET ORAL at 14:25

## 2021-05-15 NOTE — PROGRESS NOTE ADULT - ASSESSMENT
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  In ICU for close monitoring  - S/P Thoracentesis on 5/11, 5/13. CXR improved.   - Diet as tolerated.  On lovenox per hepatology recs.    - Continue Lasix 80mg QD (home dose 80 BID) and Spironolactone 200 qd as per hepatology recommendations  - Continue Midodrine 20mg q8 hours  - SCDs  - Mobilize OOB as tolerated  - Appreciate Hepatology recommendations and ICU care

## 2021-05-15 NOTE — PROGRESS NOTE ADULT - ASSESSMENT
Patient is a 35F with hx of cryptogenic cirrhosis, portal hypertension, Budd Chiari syndrome, Protein S deficiency, recent attempted TIPS but unable to perform 2/2 intrahepatic IVC occlusion, s/p TIPS and thoracentesis. Post-procedure on phenylephrine, admitted to SICU for hemodynamic monitoring    NEURO:  - Monitor mental status  - Pain control with tylenol, oxy prn    RESPIRATORY: hx of right hydrothorax requiring frequent thoracenthesis  - IS / OOBTC / ambulation as tolerated to prevent atelectasis  - IR thoracentesis on 05/13/21: removed 1200cc  - CXR prn    CARDIOVASCULAR: hypotension  - Monitor vital signs  - Phenylephrine infusion to maintain MAP >65mmHg, will wean as tolerated  - Episodic chest pain: however on work up EKG no ST elevation and cardiac enzymes normal   - Continue 20mg midodrine q8hrs for hypotension    GI/NUTRITION: cryptogenic cirrhosis, portal hypertension, Budd Chiari syndrome s/p TIPS  - Regular diet  - Protonix daily (takes omeprazole at home)  - Bowel regimen with senna and miralax    GENITOURINARY/RENAL:  - Monitor I/Os, [place] in place  - Monitor and replete electrolytes  - during day given  bolus of 250c 5% albumin  -lasix with holidng parameters      HEMATOLOGIC: Protein S deficiency, hx of intrahepatic IVC  - Heparin gtt for IVC thrombus on  hold still for IR thora, f/u with prim team when to resume  - Monitor H/H    INFECTIOUS DISEASE:  - Ceftriaxone for SBP ppx  - Monitor WBC and temperature    ENDOCRINE:  - Monitor BMP glucose    Dispo: SICU  Code Status: Full code Patient is a 35F with hx of cryptogenic cirrhosis, portal hypertension, Budd Chiari syndrome, Protein S deficiency, recent attempted TIPS but unable to perform 2/2 intrahepatic IVC occlusion, s/p TIPS and thoracentesis. Post-procedure on phenylephrine, admitted to SICU for hemodynamic monitoring    NEURO:  - Monitor mental status  - Pain control with tylenol, oxy prn    RESPIRATORY: hx of right hydrothorax requiring frequent thoracenthesis  - IS / OOBTC / ambulation as tolerated to prevent atelectasis  - IR thoracentesis on 05/13/21: removed 1200cc  - CXR prn    CARDIOVASCULAR: hypotension  - Monitor vital signs  - Phenylephrine infusion to maintain MAP >65mmHg, will wean as tolerated  - Episodic chest pain: however on work up EKG no ST elevation and cardiac enzymes normal   - Continue 20mg midodrine q8hrs for hypotension    GI/NUTRITION: cryptogenic cirrhosis, portal hypertension, Budd Chiari syndrome s/p TIPS  - Regular diet  - Protonix daily (takes omeprazole at home)  - Bowel regimen with senna and miralax    GENITOURINARY/RENAL:  - Monitor I/Os  - Monitor and replete electrolytes prn  - 100cc 25% albumin q8hrs  - Lasix 80mg daily (BID at home), and spironolactone 200mg daily    HEMATOLOGIC: Protein S deficiency, hx of intrahepatic IVC  - Therapeutic lovenox for IVC thrombus  - Monitor H/H    INFECTIOUS DISEASE:  - Ceftriaxone for SBP ppx  - Monitor WBC and temperature    ENDOCRINE:  - Monitor glucose on BMPs    Dispo: SICU  Code Status: Full code

## 2021-05-15 NOTE — PROGRESS NOTE ADULT - SUBJECTIVE AND OBJECTIVE BOX
HISTORY  35 year old Mauritian & English speaking female with PMH of cryptogenic cirrhosis, portal hypertension with small esophageal varices, Budd Chiari syndrome, Protein S deficiency, multiple miscarriages, COVID-19 PCR + 3/4/21, recent admission for pleural effusion, attempted TIPS 4/2021 (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 Now s/p scheduled TIPS and thoracentesis of 1L pleural fluid. Patient admitted to SICU for hemodynamic monitoring.      24 HOUR EVENTS:  - Midodrine increased to 20mg q8hrs  - Lasix decreased from 80mg BID to 80mg daily  - Added 100cc 25% albumin q8hrs  - Heparin infusion transitioned to therapeutic lovenox  - Lucas discontinued passed TOV      NEURO  Exam: AOx4, following commands  Meds: acetaminophen   Tablet .. 500 milliGRAM(s) Oral every 6 hours PRN Mild Pain (1 - 3)  melatonin 5 milliGRAM(s) Oral at bedtime PRN Insomnia  oxyCODONE    IR 5 milliGRAM(s) Oral every 4 hours PRN Severe Pain (7 - 10)  [x] Adequacy of sedation and pain control has been assessed and adjusted      RESPIRATORY  RR: 22 (05-15-21 @ 01:30) (15 - 46)  SpO2: 93% (05-15-21 @ 01:30) (90% - 100%)  Exam: unlabored respirations, saturating well on room air  Meds:       CARDIOVASCULAR  HR: 68 (05-15-21 @ 01:30) (64 - 98)  BP: 104/66 (05-15-21 @ 01:30) (77/48 - 120/72)  BP(mean): 81 (05-15-21 @ 01:30) (57 - 92)  VBG - ( 15 May 2021 00:23 )  pH: 7.42  /  pCO2: 38    /  pO2: 39    / HCO3: 24    / Base Excess: -0.2  /  SaO2: 71     Lactate: 1.6    Exam: RRR  Cardiac Rhythm: normal sinus  Perfusion     [x]Adequate   [ ]Inadequate  Mentation   [x]Normal       [ ]Reduced  Extremities  [x]Warm         [ ]Cool  Volume Status [ ]Hypervolemic [x]Euvolemic [ ]Hypovolemic  Meds: furosemide    Tablet 80 milliGRAM(s) Oral every 24 hours  midodrine 20 milliGRAM(s) Oral every 8 hours  phenylephrine    Infusion 0.4 MICROgram(s)/kG/Min IV Continuous <Continuous>  spironolactone 200 milliGRAM(s) Oral daily      GI/NUTRITION  Exam: soft, nontender, nondistended  Diet: regular  Meds: pantoprazole    Tablet 40 milliGRAM(s) Oral before breakfast  polyethylene glycol 3350 17 Gram(s) Oral daily  senna 2 Tablet(s) Oral at bedtime      GENITOURINARY  I&O's Detail    05-13 @ 07:01  -  05-14 @ 07:00  --------------------------------------------------------  IN:    Albumin 5%  - 250 mL: 250 mL    Heparin: 18 mL    IV PiggyBack: 175 mL    Phenylephrine: 249 mL  Total IN: 692 mL    OUT:    Indwelling Catheter - Urethral (mL): 2820 mL  Total OUT: 2820 mL    Total NET: -2128 mL    05-14 @ 07:01  -  05-15 @ 01:48  --------------------------------------------------------  IN:    Albumin 5%  - 250 mL: 200 mL    Heparin: 40 mL    IV PiggyBack: 50 mL    IV PiggyBack: 125 mL    Oral Fluid: 240 mL    Phenylephrine: 327.4 mL  Total IN: 982.4 mL    OUT:    Indwelling Catheter - Urethral (mL): 845 mL    Voided (mL): 200 mL  Total OUT: 1045 mL    Total NET: -62.6 mL    05-15    138  |  106  |  11  ----------------------------<  130<H>  4.4   |  19<L>  |  0.54    Ca    8.9      15 May 2021 00:23  Phos  2.8     05-15  Mg     2.3     05-15    TPro  6.1  /  Alb  3.4  /  TBili  3.0<H>  /  DBili  0.8<H>  /  AST  54<H>  /  ALT  56<H>  /  AlkPhos  153<H>  05-13    [x] Lucas catheter, indication: none  Meds: albumin human 25% IVPB 100 milliLiter(s) IV Intermittent every 8 hours  multivitamin 1 Tablet(s) Oral daily  sodium phosphate IVPB 15 milliMole(s) IV Intermittent once      HEMATOLOGIC  Meds: enoxaparin Injectable 40 milliGRAM(s) SubCutaneous every 12 hours  [x] VTE Prophylaxis                        9.6    5.94  )-----------( 113      ( 15 May 2021 00:23 )             31.2     PT/INR - ( 15 May 2021 00:23 )   PT: 14.8 sec;   INR: 1.25 ratio    PTT - ( 15 May 2021 00:23 )  PTT:46.2 sec  Transfusion     [ ] PRBC   [ ] Platelets   [ ] FFP   [ ] Cryoprecipitate      INFECTIOUS DISEASES  T(C): 36.7 (05-14-21 @ 23:00), Max: 37 (05-14-21 @ 19:00)  WBC Count: 5.94 K/uL (05-15 @ 00:23)    Recent Cultures:  Specimen Source: .Smear Other, 05-13 @ 23:32; Results --; Gram Stain:   polymorphonuclear leukocytes seen  No organisms seen  by cytocentrifuge; Organism: --    Meds: cefTRIAXone   IVPB 1000 milliGRAM(s) IV Intermittent every 24 hours      ENDOCRINE  Capillary Blood Glucose    Meds:       ACCESS DEVICES:  [x] Peripheral IV  [ ] Central Venous Line	[ ] R	[ ] L	[ ] IJ	[ ] Fem	[ ] SC	Placed:   [ ] Arterial Line		[ ] R	[ ] L	[ ] Fem	[ ] Rad	[ ] Ax	Placed:   [ ] PICC:					[ ] Mediport  [ ] Urinary Catheter, Date Placed:   [x] Necessity of urinary, arterial, and venous catheters discussed      OTHER MEDICATIONS:  chlorhexidine 2% Cloths 1 Application(s) Topical <User Schedule>    CODE STATUS: Full code    IMAGING:

## 2021-05-15 NOTE — PROGRESS NOTE ADULT - ASSESSMENT
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 In SICU for post procedure monitoring.    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  -varices: small in 2017  -ascites: recurrent, on lasix 80mg/spironolactone 200mg at home (increased at last outpatient visit) c/b hepatic hydrothorax  -HE: no hx  -HCC: none on imaging  -MELD-Na = 15 on 5/14  #Protein S deficiency    Recommendations:  - Albumin 25% 100 mL IV q8h due to concern for intravascular volume depletion  - Increase midodrine to 20mg q8h for goal MAP > 65  - Decrease lasix to 80mg daily  - Continue w/ spironolactone 200mg daily  - Switch hep gtt to lovenox (therapeutic)  - Closely monitor CBC, CMP, INR  - F/u IR and transplant surgery recs  - OOB, ambulate  - Rest of care as per SICU team        Thank you for involving us in the care of this patient, please reach out if any further questions.     Justice Watson MD  Gastroenterology Fellow, PGY4    Available on [employer] Teams  [phone] ([facility])  [phone] ([facility])  Please contact on call fellow weekdays after 5pm-7am and weekends: [phone]       35F w/ hx of protein S deficiency, 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 In SICU for post procedure monitoring.    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  -varices: small in 2017  -ascites: recurrent, on lasix 80mg/spironolactone 200mg at home (increased at last outpatient visit) c/b hepatic hydrothorax  -HE: no hx  -HCC: none on imaging  -MELD-Na = 15 on 5/14  #Protein S deficiency    Recommendations:  - Obtain urine Na  - Albumin 25% 100 mL IV q8h due to concern for intravascular volume depletion  - Increase midodrine to 20mg q8h for goal MAP > 65  - Decrease lasix to 80mg daily  - Continue w/ spironolactone 200mg daily  - Switch hep gtt to lovenox (therapeutic)  - Closely monitor CBC, CMP, INR  - F/u IR and transplant surgery recs  - OOB, ambulate  - Obtain US doppler for TIPS   - Rest of care as per SICU team        Thank you for involving us in the care of this patient, please reach out if any further questions.     Justice Watson MD  Gastroenterology Fellow, PGY4    Available on [employer] Teams  [phone] ([facility])  [phone] ([facility])  Please contact on call fellow weekdays after 5pm-7am and weekends: [phone]

## 2021-05-15 NOTE — PROGRESS NOTE ADULT - SUBJECTIVE AND OBJECTIVE BOX
Chief Complaint:  Patient is a 35y old  Female who presents with a chief complaint of TIPS (14 May 2021 09:43)    Reason for consult: cirrhosis s/p TIPS    Interval Events:   - Remains on low dose phenyl  - Midodrine increased to 20mg q8hrs  - Lasix decreased from 80mg BID to 80mg daily  - Added 100cc 25% albumin q8hrs  - Heparin infusion transitioned to therapeutic lovenox  - [facility] Medications:  acetaminophen   Tablet .. 500 milliGRAM(s) Oral every 6 hours PRN  cefTRIAXone   IVPB 1000 milliGRAM(s) IV Intermittent every 24 hours  chlorhexidine 2% Cloths 1 Application(s) Topical <User Schedule>  furosemide    Tablet 80 milliGRAM(s) Oral every 12 hours  heparin  Infusion 1000 Unit(s)/Hr IV Continuous <Continuous>  midodrine 10 milliGRAM(s) Oral every 8 hours  multivitamin 1 Tablet(s) Oral daily  oxyCODONE    IR 5 milliGRAM(s) Oral every 4 hours PRN  pantoprazole    Tablet 40 milliGRAM(s) Oral before breakfast  phenylephrine    Infusion 0.4 MICROgram(s)/kG/Min IV Continuous <Continuous>  polyethylene glycol 3350 17 Gram(s) Oral daily  senna 2 Tablet(s) Oral at bedtime  spironolactone 200 milliGRAM(s) Oral daily      ROS:   General:  No  fevers, chills, night sweats, fatigue  Eyes:  Good vision, no reported pain  ENT:  No sore throat, pain, runny nose  CV:  No pain, palpitations  Pulm:  No cough  GI:  See HPI, otherwise negative  :  No  incontinence, nocturia  Muscle:  No pain, weakness  Neuro:  No memory problems  Psych:  No insomnia, mood problems, depression  Endocrine:  No polyuria, polydipsia, cold/heat intolerance  Heme:  No petechiae, ecchymosis, easy bruisability  Skin:  No rash    PHYSICAL EXAM:   Vital Signs Last 24 Hrs  T(C): 37 (15 May 2021 03:00), Max: 37 (14 May 2021 19:00)  T(F): 98.6 (15 May 2021 03:00), Max: 98.6 (14 May 2021 19:00)  HR: 67 (15 May 2021 07:15) (64 - 98)  BP: 89/59 (15 May 2021 07:15) (82/50 - 123/77)  BP(mean): 69 (15 May 2021 07:15) (61 - 96)  RR: 21 (15 May 2021 07:15) (20 - 46)  SpO2: 93% (15 May 2021 07:15) (90% - 100%)    GENERAL: no acute distress  NEURO: alert  HEENT: anicteric sclera, no conjunctival pallor appreciated  CHEST: no respiratory distress, no accessory muscle use, decreased breath sounds R base  CARDIAC: regular rate, rhythm  ABDOMEN: soft, non-tender, non-distended, no rebound or guarding  EXTREMITIES: warm, well perfused, no edema  SKIN: no lesions noted    LABS: reviewed    LABS:                        9.6    5.94  )-----------( 113      ( 15 May 2021 00:23 )             31.2     05-15    138  |  106  |  11  ----------------------------<  130<H>  4.4   |  19<L>  |  0.54    Ca    8.9      15 May 2021 00:23  Phos  2.8     05-15  Mg     2.3     05-15    TPro  6.1  /  Alb  3.8  /  TBili  2.5<H>  /  DBili  0.7<H>  /  AST  35  /  ALT  34  /  AlkPhos  142<H>  05-15    LIVER FUNCTIONS - ( 15 May 2021 00:23 )  Alb: 3.8 g/dL / Pro: 6.1 g/dL / ALK PHOS: 142 U/L / ALT: 34 U/L / AST: 35 U/L / GGT: x           PT/INR - ( 15 May 2021 00:23 )   PT: 14.8 sec;   INR: 1.25 ratio         PTT - ( 15 May 2021 00:23 )  PTT:46.2 sec                  Interval Diagnostic Studies: see sunrise for full report

## 2021-05-15 NOTE — PROGRESS NOTE ADULT - SUBJECTIVE AND OBJECTIVE BOX
Transplant Surgery - Daily Rounds  --------------------------------------------------------------    Present:   Patient seen with Transplant Surgeons Dr. Elias,  Transplant Nephrologists DEB[facility] Garcia during am rounds and examined with Dr. Elias.Disciplines not in attendance will be notified of the plan.       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    Decompensated cirrhosis due to chronic hepatic vein occlusion  - TIPS 5/11  - varices: small in 2017  - ascites: recurrent, on lasix 80mg BID/spironolactone 200mg QD at home c/b hepatic hydrothorax. Thoracentesis 4/26 (850ml), 5/11 (1L)  - HE: no hx  - HCC: none on imaging  - MELD-Na = 13 on 5/12  Protein S deficiency    Interval Events:  - patient had right thoracenthesis removed 1.2L fluids on 5/13  - patient remain on neosynephrine drip overnight and this AM.  -yesterday, midodrine was increased to 20mg TID   -started albumin 25% 100cc TID  -Per hepatology recs, on lovenox (in case needs repeat thoracentesis).     Potential Discharge date: pending clinical improvement    Education:  Medications      MEDICATIONS  (STANDING):  albumin human 25% IVPB 100 milliLiter(s) IV Intermittent every 8 hours  cefTRIAXone   IVPB 1000 milliGRAM(s) IV Intermittent every 24 hours  chlorhexidine 2% Cloths 1 Application(s) Topical <User Schedule>  enoxaparin Injectable 40 milliGRAM(s) SubCutaneous every 12 hours  furosemide    Tablet 80 milliGRAM(s) Oral every 24 hours  midodrine 20 milliGRAM(s) Oral every 8 hours  multivitamin 1 Tablet(s) Oral daily  pantoprazole    Tablet 40 milliGRAM(s) Oral before breakfast  phenylephrine    Infusion 0.4 MICROgram(s)/kG/Min (6.45 mL/Hr) IV Continuous <Continuous>  polyethylene glycol 3350 17 Gram(s) Oral daily  senna 2 Tablet(s) Oral at bedtime  spironolactone 200 milliGRAM(s) Oral daily    MEDICATIONS  (PRN):  acetaminophen   Tablet .. 500 milliGRAM(s) Oral every 6 hours PRN Mild Pain (1 - 3)  melatonin 5 milliGRAM(s) Oral at bedtime PRN Insomnia  oxyCODONE    IR 5 milliGRAM(s) Oral every 4 hours PRN Severe Pain (7 - 10)      PAST MEDICAL & SURGICAL HISTORY:  Cirrhosis  2010    Cirrhosis    Budd-Chiari syndrome    H/O protein S deficiency    Miscarriage  x 5    H/O protein S deficiency    No significant past surgical history        Vital Signs Last 24 Hrs  T(C): 37 (15 May 2021 11:00), Max: 37.3 (15 May 2021 08:00)  T(F): 98.6 (15 May 2021 11:00), Max: 99.1 (15 May 2021 08:00)  HR: 84 (15 May 2021 13:30) (64 - 98)  BP: 81/49 (15 May 2021 13:30) (75/47 - 123/77)  BP(mean): 60 (15 May 2021 13:30) (56 - 96)  RR: 37 (15 May 2021 13:30) (17 - 46)  SpO2: 92% (15 May 2021 13:30) (90% - 100%)    I&O's Summary    14 May 2021 07:01  -  15 May 2021 07:00  --------------------------------------------------------  IN: 1385.2 mL / OUT: 1045 mL / NET: 340.2 mL    15 May 2021 07:01  -  15 May 2021 13:43  --------------------------------------------------------  IN: 76.3 mL / OUT: 250 mL / NET: -173.7 mL                              9.6    5.94  )-----------( 113      ( 15 May 2021 00:23 )             31.2     05-15    138  |  106  |  11  ----------------------------<  130<H>  4.4   |  19<L>  |  0.54    Ca    8.9      15 May 2021 00:23  Phos  2.8     05-15  Mg     2.3     05-15    TPro  6.1  /  Alb  3.8  /  TBili  2.5<H>  /  DBili  0.7<H>  /  AST  35  /  ALT  34  /  AlkPhos  142<H>  05-15            Review of systems  Gen: No weight changes, fatigue, fevers/chills, weakness  Skin: No rashes  Head/Eyes/Ears/Mouth: No headache; Normal hearing; Normal vision w/o blurriness; No sinus pain/discomfort, sore throat  Respiratory: C/O chest pressure, worsening dyspnea. Denies cough, wheezing, hemoptysis  CV: No PND, orthopnea  GI: Denies diarrhea, constipation, nausea, vomiting, melena, hematochezia  : No increased frequency, dysuria, hematuria, nocturia  MSK: No joint pain/swelling; no back pain; no edema  Neuro: No dizziness/lightheadedness, weakness, seizures, numbness, tingling  Heme: No easy bruising or bleeding  Endo: No heat/cold intolerance  Psych: No significant nervousness, anxiety, stress, depression  All other systems were reviewed and are negative, except as noted.      PHYSICAL EXAM:  Constitutional: Well developed / well nourished  Eyes: Anicteric, PERRLA  ENMT: nc/at  Neck: R IJ dressing intact, supple  Respiratory: diminished R side  Cardiovascular: RRR  Gastrointestinal: Soft abdomen, slight distention, NT  Genitourinary: Urinary catheter in place  Extremities: SCD's in place and working bilaterally, No edema  Vascular: Palpable dp pulses bilaterally  Neurological: A&O x3  Skin: Abdominal dressings intact w/o drainage. R pleural dressing intact. R IJ dressing intact  Musculoskeletal: Moving all extremities  Psychiatric: Responsive

## 2021-05-16 LAB
ALBUMIN SERPL ELPH-MCNC: 3.9 G/DL — SIGNIFICANT CHANGE UP (ref 3.3–5)
ALP SERPL-CCNC: 154 U/L — HIGH (ref 40–120)
ALT FLD-CCNC: 21 U/L — SIGNIFICANT CHANGE UP (ref 10–45)
ANION GAP SERPL CALC-SCNC: 12 MMOL/L — SIGNIFICANT CHANGE UP (ref 5–17)
APTT BLD: 42.3 SEC — HIGH (ref 27.5–35.5)
AST SERPL-CCNC: 29 U/L — SIGNIFICANT CHANGE UP (ref 10–40)
BILIRUB DIRECT SERPL-MCNC: 0.6 MG/DL — HIGH (ref 0–0.2)
BILIRUB INDIRECT FLD-MCNC: 1.3 MG/DL — HIGH (ref 0.2–1)
BILIRUB SERPL-MCNC: 1.9 MG/DL — HIGH (ref 0.2–1.2)
BUN SERPL-MCNC: 12 MG/DL — SIGNIFICANT CHANGE UP (ref 7–23)
CALCIUM SERPL-MCNC: 8.7 MG/DL — SIGNIFICANT CHANGE UP (ref 8.4–10.5)
CHLORIDE SERPL-SCNC: 111 MMOL/L — HIGH (ref 96–108)
CO2 SERPL-SCNC: 18 MMOL/L — LOW (ref 22–31)
CREAT SERPL-MCNC: 0.49 MG/DL — LOW (ref 0.5–1.3)
GLUCOSE SERPL-MCNC: 87 MG/DL — SIGNIFICANT CHANGE UP (ref 70–99)
HCT VFR BLD CALC: 30.8 % — LOW (ref 34.5–45)
HGB BLD-MCNC: 9.4 G/DL — LOW (ref 11.5–15.5)
INR BLD: 1.21 RATIO — HIGH (ref 0.88–1.16)
MAGNESIUM SERPL-MCNC: 2.1 MG/DL — SIGNIFICANT CHANGE UP (ref 1.6–2.6)
MCHC RBC-ENTMCNC: 26.4 PG — LOW (ref 27–34)
MCHC RBC-ENTMCNC: 30.5 GM/DL — LOW (ref 32–36)
MCV RBC AUTO: 86.5 FL — SIGNIFICANT CHANGE UP (ref 80–100)
NRBC # BLD: 0 /100 WBCS — SIGNIFICANT CHANGE UP (ref 0–0)
PHOSPHATE SERPL-MCNC: 3.6 MG/DL — SIGNIFICANT CHANGE UP (ref 2.5–4.5)
PLATELET # BLD AUTO: 122 K/UL — LOW (ref 150–400)
POTASSIUM SERPL-MCNC: 4.5 MMOL/L — SIGNIFICANT CHANGE UP (ref 3.5–5.3)
POTASSIUM SERPL-SCNC: 4.5 MMOL/L — SIGNIFICANT CHANGE UP (ref 3.5–5.3)
PROT SERPL-MCNC: 5.8 G/DL — LOW (ref 6–8.3)
PROTHROM AB SERPL-ACNC: 14.4 SEC — HIGH (ref 10.6–13.6)
RBC # BLD: 3.56 M/UL — LOW (ref 3.8–5.2)
RBC # FLD: 17.1 % — HIGH (ref 10.3–14.5)
SODIUM SERPL-SCNC: 141 MMOL/L — SIGNIFICANT CHANGE UP (ref 135–145)
WBC # BLD: 4.53 K/UL — SIGNIFICANT CHANGE UP (ref 3.8–10.5)
WBC # FLD AUTO: 4.53 K/UL — SIGNIFICANT CHANGE UP (ref 3.8–10.5)

## 2021-05-16 PROCEDURE — 99232 SBSQ HOSP IP/OBS MODERATE 35: CPT

## 2021-05-16 PROCEDURE — 99233 SBSQ HOSP IP/OBS HIGH 50: CPT

## 2021-05-16 RX ORDER — PHENYLEPHRINE HYDROCHLORIDE 10 MG/ML
0.4 INJECTION INTRAVENOUS
Qty: 40 | Refills: 0 | Status: DISCONTINUED | OUTPATIENT
Start: 2021-05-16 | End: 2021-05-17

## 2021-05-16 RX ADMIN — ENOXAPARIN SODIUM 40 MILLIGRAM(S): 100 INJECTION SUBCUTANEOUS at 06:01

## 2021-05-16 RX ADMIN — OXYCODONE HYDROCHLORIDE 5 MILLIGRAM(S): 5 TABLET ORAL at 03:48

## 2021-05-16 RX ADMIN — SENNA PLUS 2 TABLET(S): 8.6 TABLET ORAL at 21:02

## 2021-05-16 RX ADMIN — PHENYLEPHRINE HYDROCHLORIDE 6.45 MICROGRAM(S)/KG/MIN: 10 INJECTION INTRAVENOUS at 07:41

## 2021-05-16 RX ADMIN — Medication 1 TABLET(S): at 13:28

## 2021-05-16 RX ADMIN — Medication 50 MILLILITER(S): at 21:01

## 2021-05-16 RX ADMIN — POLYETHYLENE GLYCOL 3350 17 GRAM(S): 17 POWDER, FOR SOLUTION ORAL at 13:27

## 2021-05-16 RX ADMIN — MIDODRINE HYDROCHLORIDE 20 MILLIGRAM(S): 2.5 TABLET ORAL at 13:28

## 2021-05-16 RX ADMIN — PANTOPRAZOLE SODIUM 40 MILLIGRAM(S): 20 TABLET, DELAYED RELEASE ORAL at 07:40

## 2021-05-16 RX ADMIN — Medication 50 MILLILITER(S): at 13:51

## 2021-05-16 RX ADMIN — CHLORHEXIDINE GLUCONATE 1 APPLICATION(S): 213 SOLUTION TOPICAL at 06:01

## 2021-05-16 RX ADMIN — Medication 5 MILLIGRAM(S): at 00:23

## 2021-05-16 RX ADMIN — OXYCODONE HYDROCHLORIDE 5 MILLIGRAM(S): 5 TABLET ORAL at 04:30

## 2021-05-16 RX ADMIN — ENOXAPARIN SODIUM 40 MILLIGRAM(S): 100 INJECTION SUBCUTANEOUS at 16:40

## 2021-05-16 RX ADMIN — MIDODRINE HYDROCHLORIDE 20 MILLIGRAM(S): 2.5 TABLET ORAL at 21:01

## 2021-05-16 RX ADMIN — Medication 50 MILLILITER(S): at 06:01

## 2021-05-16 RX ADMIN — CEFTRIAXONE 100 MILLIGRAM(S): 500 INJECTION, POWDER, FOR SOLUTION INTRAMUSCULAR; INTRAVENOUS at 10:39

## 2021-05-16 RX ADMIN — MIDODRINE HYDROCHLORIDE 20 MILLIGRAM(S): 2.5 TABLET ORAL at 06:01

## 2021-05-16 NOTE — PROGRESS NOTE ADULT - SUBJECTIVE AND OBJECTIVE BOX
Chief Complaint:  Patient is a 35y old  Female who presents with a chief complaint of TIPS (14 May 2021 09:43)    Reason for consult: cirrhosis s/p TIPS    Interval Events:   - Remains on low dose phenyl  - Midodrine still 20mg q8hrs  - Lasix 80mg daily + spironolactone 200mg daily w/ Jerry 95  - Continues on 100cc 25% albumin q8hrs  - On Lovenox  - Denies complaints at this time, denies abd pain, n/v/d/f/c    Hospital Medications:  acetaminophen   Tablet .. 500 milliGRAM(s) Oral every 6 hours PRN  cefTRIAXone   IVPB 1000 milliGRAM(s) IV Intermittent every 24 hours  chlorhexidine 2% Cloths 1 Application(s) Topical <User Schedule>  furosemide    Tablet 80 milliGRAM(s) Oral every 12 hours  heparin  Infusion 1000 Unit(s)/Hr IV Continuous <Continuous>  midodrine 10 milliGRAM(s) Oral every 8 hours  multivitamin 1 Tablet(s) Oral daily  oxyCODONE    IR 5 milliGRAM(s) Oral every 4 hours PRN  pantoprazole    Tablet 40 milliGRAM(s) Oral before breakfast  phenylephrine    Infusion 0.4 MICROgram(s)/kG/Min IV Continuous <Continuous>  polyethylene glycol 3350 17 Gram(s) Oral daily  senna 2 Tablet(s) Oral at bedtime  spironolactone 200 milliGRAM(s) Oral daily      ROS:   General:  No  fevers, chills, night sweats, fatigue  Eyes:  Good vision, no reported pain  ENT:  No sore throat, pain, runny nose  CV:  No pain, palpitations  Pulm:  No cough  GI:  See HPI, otherwise negative  :  No  incontinence, nocturia  Muscle:  No pain, weakness  Neuro:  No memory problems  Psych:  No insomnia, mood problems, depression  Endocrine:  No polyuria, polydipsia, cold/heat intolerance  Heme:  No petechiae, ecchymosis, easy bruisability  Skin:  No rash    PHYSICAL EXAM:   Vital Signs Last 24 Hrs  T(C): 36.7 (16 May 2021 07:00), Max: 37 (15 May 2021 11:00)  T(F): 98.1 (16 May 2021 07:00), Max: 98.6 (15 May 2021 11:00)  HR: 74 (16 May 2021 07:45) (64 - 92)  BP: 100/66 (16 May 2021 07:30) (75/47 - 112/68)  BP(mean): 77 (16 May 2021 07:30) (55 - 88)  RR: 33 (16 May 2021 07:45) (16 - 44)  SpO2: 95% (16 May 2021 07:45) (89% - 96%)    GENERAL: no acute distress  NEURO: alert  HEENT: anicteric sclera, no conjunctival pallor appreciated  CHEST: no respiratory distress, no accessory muscle use, decreased breath sounds R base  CARDIAC: regular rate, rhythm  ABDOMEN: soft, non-tender, non-distended, no rebound or guarding  EXTREMITIES: warm, well perfused, no edema  SKIN: no lesions noted    LABS:                        9.4    4.53  )-----------( 122      ( 16 May 2021 05:28 )             30.8     05-16    141  |  111<H>  |  12  ----------------------------<  87  4.5   |  18<L>  |  0.49<L>    Ca    8.7      16 May 2021 05:28  Phos  3.6     05-16  Mg     2.1     05-16    TPro  5.8<L>  /  Alb  3.9  /  TBili  1.9<H>  /  DBili  0.6<H>  /  AST  29  /  ALT  21  /  AlkPhos  154<H>  05-16    LIVER FUNCTIONS - ( 16 May 2021 05:28 )  Alb: 3.9 g/dL / Pro: 5.8 g/dL / ALK PHOS: 154 U/L / ALT: 21 U/L / AST: 29 U/L / GGT: x           PT/INR - ( 16 May 2021 05:28 )   PT: 14.4 sec;   INR: 1.21 ratio         PTT - ( 16 May 2021 05:28 )  PTT:42.3 sec                            Interval Diagnostic Studies: see sunrise for full report

## 2021-05-16 NOTE — PROGRESS NOTE ADULT - SUBJECTIVE AND OBJECTIVE BOX
Transplant Surgery - Daily Rounds  --------------------------------------------------------------    Present:   Patient seen with Transplant Surgeons Dr. Elias, [facility] during am rounds and examined with Dr. Elias.Disciplines not in attendance will be notified of the plan.       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    Decompensated cirrhosis due to chronic hepatic vein occlusion  - TIPS 5/11  - varices: small in 2017  - ascites: recurrent, on lasix 80mg BID/spironolactone 200mg QD at home c/b hepatic hydrothorax. Thoracentesis 4/26 (850ml), 5/11 (1L)  - HE: no hx  - HCC: none on imaging  - MELD-Na = 13 on 5/12  Protein S deficiency    Interval Events:  - patient had right thoracenthesis removed 1.2L fluids on 5/13  - patient remains on neosynephrine drip  -on midodrine 20mg TID and lasix was decreased to 80 QD from BID.    -on albumin 25% 100cc TID  -Per hepatology recs, on lovenox (in case needs repeat thoracentesis).   -she denies SOB, CP, dizziness.     Potential Discharge date: pending clinical improvement    Education:  Medications      MEDICATIONS  (STANDING):  albumin human 25% IVPB 100 milliLiter(s) IV Intermittent every 8 hours  cefTRIAXone   IVPB 1000 milliGRAM(s) IV Intermittent every 24 hours  chlorhexidine 2% Cloths 1 Application(s) Topical <User Schedule>  enoxaparin Injectable 40 milliGRAM(s) SubCutaneous every 12 hours  furosemide    Tablet 80 milliGRAM(s) Oral every 24 hours  midodrine 20 milliGRAM(s) Oral every 8 hours  multivitamin 1 Tablet(s) Oral daily  pantoprazole    Tablet 40 milliGRAM(s) Oral before breakfast  phenylephrine    Infusion 0.4 MICROgram(s)/kG/Min (6.45 mL/Hr) IV Continuous <Continuous>  polyethylene glycol 3350 17 Gram(s) Oral daily  senna 2 Tablet(s) Oral at bedtime  spironolactone 200 milliGRAM(s) Oral daily    MEDICATIONS  (PRN):  acetaminophen   Tablet .. 500 milliGRAM(s) Oral every 6 hours PRN Mild Pain (1 - 3)  melatonin 5 milliGRAM(s) Oral at bedtime PRN Insomnia  oxyCODONE    IR 5 milliGRAM(s) Oral every 4 hours PRN Severe Pain (7 - 10)      PAST MEDICAL & SURGICAL HISTORY:  Cirrhosis  2010    Cirrhosis    Budd-Chiari syndrome    H/O protein S deficiency    Miscarriage  x 5    H/O protein S deficiency    No significant past surgical history        Vital Signs Last 24 Hrs  T(C): 37 (16 May 2021 11:00), Max: 37 (16 May 2021 11:00)  T(F): 98.6 (16 May 2021 11:00), Max: 98.6 (16 May 2021 11:00)  HR: 87 (16 May 2021 13:15) (60 - 89)  BP: 78/51 (16 May 2021 13:15) (76/42 - 120/79)  BP(mean): 59 (16 May 2021 13:15) (55 - 95)  RR: 28 (16 May 2021 13:15) (16 - 44)  SpO2: 94% (16 May 2021 13:15) (89% - 96%)    I&O's Summary    15 May 2021 07:01  -  16 May 2021 07:00  --------------------------------------------------------  IN: 747.6 mL / OUT: 950 mL / NET: -202.4 mL    16 May 2021 07:01  -  16 May 2021 13:42  --------------------------------------------------------  IN: 78.8 mL / OUT: 150 mL / NET: -71.2 mL                              9.4    4.53  )-----------( 122      ( 16 May 2021 05:28 )             30.8     05-16    141  |  111<H>  |  12  ----------------------------<  87  4.5   |  18<L>  |  0.49<L>    Ca    8.7      16 May 2021 05:28  Phos  3.6     05-16  Mg     2.1     05-16    TPro  5.8<L>  /  Alb  3.9  /  TBili  1.9<H>  /  DBili  0.6<H>  /  AST  29  /  ALT  21  /  AlkPhos  154<H>  05-16    Review of systems  Gen: No weight changes, fatigue, fevers/chills, weakness  Skin: No rashes  Head/Eyes/Ears/Mouth: No headache; Normal hearing; Normal vision w/o blurriness; No sinus pain/discomfort, sore throat  Respiratory: C/O chest pressure, worsening dyspnea. Denies cough, wheezing, hemoptysis  CV: No PND, orthopnea  GI: Denies diarrhea, constipation, nausea, vomiting, melena, hematochezia  : No increased frequency, dysuria, hematuria, nocturia  MSK: No joint pain/swelling; no back pain; no edema  Neuro: No dizziness/lightheadedness, weakness, seizures, numbness, tingling  Heme: No easy bruising or bleeding  Endo: No heat/cold intolerance  Psych: No significant nervousness, anxiety, stress, depression  All other systems were reviewed and are negative, except as noted.      PHYSICAL EXAM:  Constitutional: Well developed / well nourished  Eyes: Anicteric, PERRLA  ENMT: nc/at  Neck: R IJ dressing intact, supple  Respiratory: diminished R side  Cardiovascular: RRR  Gastrointestinal: Soft abdomen, slight distention, NT  Genitourinary: Urinary catheter in place  Extremities: SCD's in place and working bilaterally, No edema  Vascular: Palpable dp pulses bilaterally  Neurological: A&O x3  Skin: Abdominal dressings intact w/o drainage. R pleural dressing intact. R IJ dressing intact  Musculoskeletal: Moving all extremities  Psychiatric: Responsive

## 2021-05-16 NOTE — PROGRESS NOTE ADULT - TIME BILLING
- Evaluation and management of decompensated cirrhosis, s/p TIPS.
- Evaluation and management of decompensated cirrhosis, hepatic hydrothorax and s/p TIPS.
- Evaluation and management of decompensated cirrhosis and hypotension.
Evaluation and management decompensated cirrhosis, hypotension, hydrothorax. S/p TIPS/thoracentesis.

## 2021-05-16 NOTE — PROGRESS NOTE ADULT - SUBJECTIVE AND OBJECTIVE BOX
HISTORY  35 year old [place] & English speaking female with PMH of cryptogenic cirrhosis, portal hypertension with small esophageal varices, Budd Chiari syndrome, Protein S deficiency, multiple miscarriages, COVID-19 PCR + 3/4/21, recent admission for pleural effusion, attempted TIPS 4/2021 (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 Now s/p scheduled TIPS and thoracentesis of 1L pleural fluid. Patient admitted to SICU for hemodynamic monitoring.      24 HOUR EVENTS:  -Ordered renal doppler  -Held home lasix    SUBJECTIVE/ROS:  [ ] A ten-point review of systems was otherwise negative except as noted.  [ ] Due to altered mental status/intubation, subjective information were not able to be obtained from the patient. History was obtained, to the extent possible, from review of the chart and collateral sources of information.      NEURO  Exam: AOx4, following commands  Meds: acetaminophen   Tablet .. 500 milliGRAM(s) Oral every 6 hours PRN Mild Pain (1 - 3)  melatonin 5 milliGRAM(s) Oral at bedtime PRN Insomnia  oxyCODONE    IR 5 milliGRAM(s) Oral every 4 hours PRN Severe Pain (7 - 10)    [x] Adequacy of sedation and pain control has been assessed and adjusted      RESPIRATORY  RR: 24 (05-16-21 @ 00:45) (17 - 44)  SpO2: 93% (05-16-21 @ 00:45) (90% - 99%)  Wt(kg): --  Exam: unlabored respirations, saturating well on room air  Mechanical Ventilation: none    [ ] Extubation Readiness Assessed  Meds:       CARDIOVASCULAR  HR: 71 (05-16-21 @ 00:45) (64 - 92)  BP: 88/57 (05-16-21 @ 00:45) (75/47 - 123/77)  BP(mean): 68 (05-16-21 @ 00:45) (55 - 96)  VBG - ( 15 May 2021 00:23 )  pH: 7.42  /  pCO2: 38    /  pO2: 39    / HCO3: 24    / Base Excess: -0.2  /  SaO2: 71     Lactate: 1.6      Exam: RRR  Cardiac Rhythm: normal sinus  Perfusion     [x]Adequate   [ ]Inadequate  Mentation   [x]Normal       [ ]Reduced  Extremities  [x]Warm         [ ]Cool  Volume Status [ ]Hypervolemic [x]Euvolemic [ ]Hypovolemic    Meds: furosemide    Tablet 80 milliGRAM(s) Oral every 24 hours  midodrine 20 milliGRAM(s) Oral every 8 hours  phenylephrine    Infusion 0.4 MICROgram(s)/kG/Min IV Continuous <Continuous>  spironolactone 200 milliGRAM(s) Oral daily        GI/NUTRITION  Exam: soft, nontender, nondistended  Diet: regular  Meds: pantoprazole    Tablet 40 milliGRAM(s) Oral before breakfast  polyethylene glycol 3350 17 Gram(s) Oral daily  senna 2 Tablet(s) Oral at bedtime      GENITOURINARY  I&O's Detail    05-14 @ 07:01  -  05-15 @ 07:00  --------------------------------------------------------  IN:    Albumin 5%  - 250 mL: 300 mL    Heparin: 40 mL    IV PiggyBack: 50 mL    IV PiggyBack: 375 mL    Oral Fluid: 240 mL    Phenylephrine: 380.2 mL  Total IN: 1385.2 mL    OUT:    Indwelling Catheter - Urethral (mL): 845 mL    Voided (mL): 200 mL  Total OUT: 1045 mL    Total NET: 340.2 mL      05-15 @ 07:01 - 05-16 @ 01:16  --------------------------------------------------------  IN:    IV PiggyBack: 150 mL    Oral Fluid: 100 mL    Phenylephrine: 162.3 mL  Total IN: 412.3 mL    OUT:    Voided (mL): 650 mL  Total OUT: 650 mL    Total NET: -237.7 mL          05-15    138  |  106  |  11  ----------------------------<  130<H>  4.4   |  19<L>  |  0.54    Ca    8.9      15 May 2021 00:23  Phos  2.8     05-15  Mg     2.3     05-15    TPro  6.1  /  Alb  3.8  /  TBili  2.5<H>  /  DBili  0.7<H>  /  AST  35  /  ALT  34  /  AlkPhos  142<H>  05-15    Meds: albumin human 25% IVPB 100 milliLiter(s) IV Intermittent every 8 hours  multivitamin 1 Tablet(s) Oral daily        HEMATOLOGIC  Meds: enoxaparin Injectable 40 milliGRAM(s) SubCutaneous every 12 hours    [x] VTE Prophylaxis                        9.6    5.94  )-----------( 113      ( 15 May 2021 00:23 )             31.2     PT/INR - ( 15 May 2021 00:23 )   PT: 14.8 sec;   INR: 1.25 ratio         PTT - ( 15 May 2021 00:23 )  PTT:46.2 sec  Transfusion     [ ] PRBC   [ ] Platelets   [ ] FFP   [ ] Cryoprecipitate      INFECTIOUS DISEASES  T(C): 36.8 (05-15-21 @ 23:00), Max: 37.3 (05-15-21 @ 08:00)  Wt(kg): --    Recent Cultures:  Specimen Source: .Smear Other, 05-13 @ 23:32; Results --; Gram Stain:   polymorphonuclear leukocytes seen  No organisms seen  by cytocentrifuge; Organism: --    Meds: cefTRIAXone   IVPB 1000 milliGRAM(s) IV Intermittent every 24 hours        ENDOCRINE  Capillary Blood Glucose    Meds:       ACCESS DEVICES:  [x] Peripheral IV  [ ] Central Venous Line	[ ] R	[ ] L	[ ] IJ	[ ] Fem	[ ] SC	Placed:   [ ] Arterial Line		[ ] R	[ ] L	[ ] Fem	[ ] Rad	[ ] Ax	Placed:   [ ] PICC:					[ ] Mediport  [ ] Urinary Catheter, Date Placed:   [x] Necessity of urinary, arterial, and venous catheters discussed    OTHER MEDICATIONS:  chlorhexidine 2% Cloths 1 Application(s) Topical <User Schedule>      CODE STATUS:     IMAGING:

## 2021-05-16 NOTE — PROGRESS NOTE ADULT - ASSESSMENT
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  In ICU for close monitoring  - S/P Thoracentesis on 5/11, 5/13. CXR improved.   - Diet as tolerated.  On lovenox per hepatology recs.    - Continue Lasix 80mg QD (home dose 80 BID) and Spironolactone 200 qd as per hepatology recommendations  - Continue Midodrine 20mg q8 hours  - SCDs  - Mobilize OOB as tolerated  - Appreciate Hepatology recommendations and ICU care

## 2021-05-16 NOTE — PROGRESS NOTE ADULT - ASSESSMENT
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 In SICU for post procedure monitoring.    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  -varices: small in 2017  -ascites: recurrent, on lasix 80mg/spironolactone 200mg at home (increased at last outpatient visit) c/b hepatic hydrothorax  -HE: no hx  -HCC: none on imaging  -MELD-Na = 15 on 5/14  #Protein S deficiency    Recommendations:  - Albumin 25% 100 mL IV q8h due to concern for intravascular volume depletion  - Midodrine to 20mg q8h for goal MAP > 65  - Lasix to 80mg daily, spironolactone 200mg daily  - On lovenox (therapeutic)  - Closely monitor CBC, CMP, INR  - F/u IR and transplant surgery recs  - OOB, ambulate  - Obtain US doppler for TIPS   - Rest of care as per SICU team        Thank you for involving us in the care of this patient, please reach out if any further questions.     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 In SICU for post procedure monitoring.    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  -varices: small in 2017  -ascites: recurrent, on lasix 80mg/spironolactone 200mg at home (increased at last outpatient visit) c/b hepatic hydrothorax  -HE: no hx  -HCC: none on imaging  -MELD-Na = 15 on 5/14  #Protein S deficiency    Recommendations:  - Albumin 25% 100 mL IV q8h due to concern for intravascular volume depletion  - Midodrine to 20mg q8h for goal MAP > 60  - Lasix to 80mg daily, spironolactone 200mg daily  - On lovenox (therapeutic)  - Closely monitor CBC, CMP, INR  - F/u IR and transplant surgery recs  - OOB, ambulate  - Obtain US doppler for TIPS   - Rest of care as per SICU team        Thank you for involving us in the care of this patient, please reach out if any further questions.     Justice Watson MD  Gastroenterology Fellow, PGY4    Available on [employer] Teams  [phone] ([facility])  [phone] ([facility])  Please contact on call fellow weekdays after 5pm-7am and weekends: [phone]

## 2021-05-16 NOTE — PROGRESS NOTE ADULT - ASSESSMENT
Patient is a 35F with hx of cryptogenic cirrhosis, portal hypertension, Budd Chiari syndrome, Protein S deficiency, recent attempted TIPS but unable to perform 2/2 intrahepatic IVC occlusion, s/p TIPS and thoracentesis. Post-procedure on phenylephrine, admitted to SICU for hemodynamic monitoring    NEURO:  - Monitor mental status  - Pain control with tylenol, oxy prn    RESPIRATORY: hx of right hydrothorax requiring frequent thoracenthesis  - IS / OOBTC / ambulation as tolerated to prevent atelectasis  - IR thoracentesis on 05/13/21: removed 1200cc  - CXR prn    CARDIOVASCULAR: hypotension  - Monitor vital signs  - Phenylephrine infusion to maintain MAP >65mmHg, will wean as tolerated  - Episodic chest pain: however on work up EKG no ST elevation and cardiac enzymes normal   - Continue 20mg midodrine q8hrs for hypotension    GI/NUTRITION: cryptogenic cirrhosis, portal hypertension, Budd Chiari syndrome s/p TIPS  - Regular diet  - Protonix daily (takes omeprazole at home)  - Bowel regimen with senna and miralax    GENITOURINARY/RENAL:  - Monitor I/Os  - Monitor and replete electrolytes prn  - 100cc 25% albumin q8hrs  - Lasix 80mg daily (BID at home), and spironolactone 200mg daily; home lasix held 5/15    HEMATOLOGIC: Protein S deficiency, hx of intrahepatic IVC  - Therapeutic lovenox for IVC thrombus  - Monitor H/H    INFECTIOUS DISEASE:  - Ceftriaxone for SBP ppx  - Monitor WBC and temperature    ENDOCRINE:  - Monitor glucose on BMPs    Dispo: SICU  Code Status: Full code

## 2021-05-17 DIAGNOSIS — I82.220 ACUTE EMBOLISM AND THROMBOSIS OF INFERIOR VENA CAVA: ICD-10-CM

## 2021-05-17 DIAGNOSIS — I95.9 HYPOTENSION, UNSPECIFIED: ICD-10-CM

## 2021-05-17 DIAGNOSIS — K72.90 HEPATIC FAILURE, UNSPECIFIED WITHOUT COMA: ICD-10-CM

## 2021-05-17 DIAGNOSIS — J94.8 OTHER SPECIFIED PLEURAL CONDITIONS: ICD-10-CM

## 2021-05-17 DIAGNOSIS — Z29.9 ENCOUNTER FOR PROPHYLACTIC MEASURES, UNSPECIFIED: ICD-10-CM

## 2021-05-17 DIAGNOSIS — Z95.828 PRESENCE OF OTHER VASCULAR IMPLANTS AND GRAFTS: ICD-10-CM

## 2021-05-17 LAB
ALBUMIN SERPL ELPH-MCNC: 4.9 G/DL — SIGNIFICANT CHANGE UP (ref 3.3–5)
ALP SERPL-CCNC: 197 U/L — HIGH (ref 40–120)
ALT FLD-CCNC: 19 U/L — SIGNIFICANT CHANGE UP (ref 10–45)
ANION GAP SERPL CALC-SCNC: 13 MMOL/L — SIGNIFICANT CHANGE UP (ref 5–17)
APTT BLD: 46.6 SEC — HIGH (ref 27.5–35.5)
AST SERPL-CCNC: 21 U/L — SIGNIFICANT CHANGE UP (ref 10–40)
BILIRUB DIRECT SERPL-MCNC: 0.7 MG/DL — HIGH (ref 0–0.2)
BILIRUB INDIRECT FLD-MCNC: 1.6 MG/DL — HIGH (ref 0.2–1)
BILIRUB SERPL-MCNC: 2.3 MG/DL — HIGH (ref 0.2–1.2)
BUN SERPL-MCNC: 12 MG/DL — SIGNIFICANT CHANGE UP (ref 7–23)
CALCIUM SERPL-MCNC: 9.7 MG/DL — SIGNIFICANT CHANGE UP (ref 8.4–10.5)
CHLORIDE SERPL-SCNC: 104 MMOL/L — SIGNIFICANT CHANGE UP (ref 96–108)
CO2 SERPL-SCNC: 22 MMOL/L — SIGNIFICANT CHANGE UP (ref 22–31)
CREAT SERPL-MCNC: 0.61 MG/DL — SIGNIFICANT CHANGE UP (ref 0.5–1.3)
GLUCOSE SERPL-MCNC: 109 MG/DL — HIGH (ref 70–99)
HCT VFR BLD CALC: 30.5 % — LOW (ref 34.5–45)
HGB BLD-MCNC: 9.4 G/DL — LOW (ref 11.5–15.5)
INR BLD: 1.17 RATIO — HIGH (ref 0.88–1.16)
MAGNESIUM SERPL-MCNC: 2.1 MG/DL — SIGNIFICANT CHANGE UP (ref 1.6–2.6)
MCHC RBC-ENTMCNC: 26 PG — LOW (ref 27–34)
MCHC RBC-ENTMCNC: 30.8 GM/DL — LOW (ref 32–36)
MCV RBC AUTO: 84.5 FL — SIGNIFICANT CHANGE UP (ref 80–100)
NRBC # BLD: 0 /100 WBCS — SIGNIFICANT CHANGE UP (ref 0–0)
PHOSPHATE SERPL-MCNC: 3.2 MG/DL — SIGNIFICANT CHANGE UP (ref 2.5–4.5)
PLATELET # BLD AUTO: 113 K/UL — LOW (ref 150–400)
POTASSIUM SERPL-MCNC: 4.6 MMOL/L — SIGNIFICANT CHANGE UP (ref 3.5–5.3)
POTASSIUM SERPL-SCNC: 4.6 MMOL/L — SIGNIFICANT CHANGE UP (ref 3.5–5.3)
PROT SERPL-MCNC: 6.9 G/DL — SIGNIFICANT CHANGE UP (ref 6–8.3)
PROTHROM AB SERPL-ACNC: 13.9 SEC — HIGH (ref 10.6–13.6)
RBC # BLD: 3.61 M/UL — LOW (ref 3.8–5.2)
RBC # FLD: 17.2 % — HIGH (ref 10.3–14.5)
SODIUM SERPL-SCNC: 139 MMOL/L — SIGNIFICANT CHANGE UP (ref 135–145)
WBC # BLD: 4.45 K/UL — SIGNIFICANT CHANGE UP (ref 3.8–10.5)
WBC # FLD AUTO: 4.45 K/UL — SIGNIFICANT CHANGE UP (ref 3.8–10.5)

## 2021-05-17 PROCEDURE — 99232 SBSQ HOSP IP/OBS MODERATE 35: CPT

## 2021-05-17 PROCEDURE — 99232 SBSQ HOSP IP/OBS MODERATE 35: CPT | Mod: GC

## 2021-05-17 PROCEDURE — 99223 1ST HOSP IP/OBS HIGH 75: CPT

## 2021-05-17 RX ORDER — POLYETHYLENE GLYCOL 3350 17 G/17G
0 POWDER, FOR SOLUTION ORAL
Qty: 0 | Refills: 0 | DISCHARGE

## 2021-05-17 RX ORDER — ENOXAPARIN SODIUM 100 MG/ML
0 INJECTION SUBCUTANEOUS
Qty: 0 | Refills: 0 | DISCHARGE

## 2021-05-17 RX ADMIN — Medication 50 MILLILITER(S): at 16:38

## 2021-05-17 RX ADMIN — CHLORHEXIDINE GLUCONATE 1 APPLICATION(S): 213 SOLUTION TOPICAL at 05:58

## 2021-05-17 RX ADMIN — Medication 1 TABLET(S): at 13:06

## 2021-05-17 RX ADMIN — SPIRONOLACTONE 200 MILLIGRAM(S): 25 TABLET, FILM COATED ORAL at 05:44

## 2021-05-17 RX ADMIN — ENOXAPARIN SODIUM 40 MILLIGRAM(S): 100 INJECTION SUBCUTANEOUS at 05:44

## 2021-05-17 RX ADMIN — POLYETHYLENE GLYCOL 3350 17 GRAM(S): 17 POWDER, FOR SOLUTION ORAL at 13:07

## 2021-05-17 RX ADMIN — MIDODRINE HYDROCHLORIDE 20 MILLIGRAM(S): 2.5 TABLET ORAL at 21:33

## 2021-05-17 RX ADMIN — MIDODRINE HYDROCHLORIDE 20 MILLIGRAM(S): 2.5 TABLET ORAL at 13:07

## 2021-05-17 RX ADMIN — Medication 50 MILLILITER(S): at 21:45

## 2021-05-17 RX ADMIN — MIDODRINE HYDROCHLORIDE 20 MILLIGRAM(S): 2.5 TABLET ORAL at 05:44

## 2021-05-17 RX ADMIN — PHENYLEPHRINE HYDROCHLORIDE 6.45 MICROGRAM(S)/KG/MIN: 10 INJECTION INTRAVENOUS at 07:45

## 2021-05-17 RX ADMIN — ENOXAPARIN SODIUM 40 MILLIGRAM(S): 100 INJECTION SUBCUTANEOUS at 16:40

## 2021-05-17 RX ADMIN — CEFTRIAXONE 100 MILLIGRAM(S): 500 INJECTION, POWDER, FOR SOLUTION INTRAMUSCULAR; INTRAVENOUS at 10:38

## 2021-05-17 RX ADMIN — Medication 50 MILLILITER(S): at 05:45

## 2021-05-17 RX ADMIN — SENNA PLUS 2 TABLET(S): 8.6 TABLET ORAL at 21:33

## 2021-05-17 RX ADMIN — PANTOPRAZOLE SODIUM 40 MILLIGRAM(S): 20 TABLET, DELAYED RELEASE ORAL at 07:46

## 2021-05-17 NOTE — CHART NOTE - NSCHARTNOTEFT_GEN_A_CORE
MAR MICU TRANSFER NOTE    Please refer to MICU transfer note for full details.    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 Now s/p TIPS and thoracentesis of 1L pleural fluid 5/15. During the procedure, patient received 1L cyrstalloid, 500 5% albumin, , on 0.6 mcg neosynephrine. Post-procedure on neosynephrine at 0.7. Patient admitted to SICU for hemodynamic monitoring    SICU COURSE:    5/12: Restarted on heparin gtt for IVC thrombus. Advanced to regular diet. Resumed home lasix and spironolactone. Continued on phenylephrine for hypotension. Added midodrine for hypotension  5/13: Repeated right sided thoracentesis, removed 1.2L  5/14: Resumed heparin gtt. Still hypotensive on emerita; mididrone increased to 20 Q8. Heparin gtt transitioned to therapeutic lovenox. Lucas was removed . Passed TOV   5/15: Midodrine increased to 20mg q8hrs. Lasix decreased from 80mg BID to 80mg daily. Added 100cc 25% albumin q8hrs per hepatology.       Vital Signs Last 24 Hrs  T(C): 36.4 (17 May 2021 15:00), Max: 37.1 (17 May 2021 03:00)  T(F): 97.5 (17 May 2021 15:00), Max: 98.8 (17 May 2021 03:00)  HR: 76 (17 May 2021 16:30) (69 - 108)  BP: 91/57 (17 May 2021 16:15) (77/51 - 108/72)  BP(mean): 69 (17 May 2021 16:15) (60 - 86)  RR: 23 (17 May 2021 16:30) (9 - 55)  SpO2: 92% (17 May 2021 16:30) (82% - 98%)  I&O's Summary    16 May 2021 07:01  -  17 May 2021 07:00  --------------------------------------------------------  IN: 762.8 mL / OUT: 950 mL / NET: -187.2 mL    17 May 2021 07:01  -  17 May 2021 18:48  --------------------------------------------------------  IN: 500 mL / OUT: 0 mL / NET: 500 mL      Allergies    No Known Allergies    Intolerances      MEDICATIONS  (STANDING):  albumin human 25% IVPB 100 milliLiter(s) IV Intermittent every 8 hours  chlorhexidine 2% Cloths 1 Application(s) Topical <User Schedule>  enoxaparin Injectable 40 milliGRAM(s) SubCutaneous every 12 hours  furosemide    Tablet 80 milliGRAM(s) Oral every 24 hours  midodrine 20 milliGRAM(s) Oral every 8 hours  multivitamin 1 Tablet(s) Oral daily  pantoprazole    Tablet 40 milliGRAM(s) Oral before breakfast  polyethylene glycol 3350 17 Gram(s) Oral daily  senna 2 Tablet(s) Oral at bedtime  spironolactone 200 milliGRAM(s) Oral daily    MEDICATIONS  (PRN):  acetaminophen   Tablet .. 500 milliGRAM(s) Oral every 6 hours PRN Mild Pain (1 - 3)  melatonin 5 milliGRAM(s) Oral at bedtime PRN Insomnia  oxyCODONE    IR 5 milliGRAM(s) Oral every 4 hours PRN Severe Pain (7 - 10)                                  9.4    4.45  )-----------( 113      ( 16 May 2021 23:58 )             30.5     05-16    139  |  104  |  12  ----------------------------<  109<H>  4.6   |  22  |  0.61    Ca    9.7      16 May 2021 23:58  Phos  3.2     05-16  Mg     2.1     05-16    TPro  6.9  /  Alb  4.9  /  TBili  2.3<H>  /  DBili  0.7<H>  /  AST  21  /  ALT  19  /  AlkPhos  197<H>  05-16    PT/INR - ( 16 May 2021 23:58 )   PT: 13.9 sec;   INR: 1.17 ratio         PTT - ( 16 May 2021 23:58 )  PTT:46.6 sec        ASSESSMENT & PLAN:             FOR FOLLOW UP:    MAR MICU TRANSFER NOTE    Please refer to MICU transfer note for full details.    Briefly, this is a        Vital Signs Last 24 Hrs  T(C): 36.4 (17 May 2021 15:00), Max: 37.1 (17 May 2021 03:00)  T(F): 97.5 (17 May 2021 15:00), Max: 98.8 (17 May 2021 03:00)  HR: 76 (17 May 2021 16:30) (69 - 108)  BP: 91/57 (17 May 2021 16:15) (77/51 - 108/72)  BP(mean): 69 (17 May 2021 16:15) (60 - 86)  RR: 23 (17 May 2021 16:30) (9 - 55)  SpO2: 92% (17 May 2021 16:30) (82% - 98%)  I&O's Summary    16 May 2021 07:01  -  17 May 2021 07:00  --------------------------------------------------------  IN: 762.8 mL / OUT: 950 mL / NET: -187.2 mL    17 May 2021 07:01  -  17 May 2021 18:48  --------------------------------------------------------  IN: 500 mL / OUT: 0 mL / NET: 500 mL      Allergies    No Known Allergies    Intolerances      MEDICATIONS  (STANDING):  albumin human 25% IVPB 100 milliLiter(s) IV Intermittent every 8 hours  chlorhexidine 2% Cloths 1 Application(s) Topical <User Schedule>  enoxaparin Injectable 40 milliGRAM(s) SubCutaneous every 12 hours  furosemide    Tablet 80 milliGRAM(s) Oral every 24 hours  midodrine 20 milliGRAM(s) Oral every 8 hours  multivitamin 1 Tablet(s) Oral daily  pantoprazole    Tablet 40 milliGRAM(s) Oral before breakfast  polyethylene glycol 3350 17 Gram(s) Oral daily  senna 2 Tablet(s) Oral at bedtime  spironolactone 200 milliGRAM(s) Oral daily    MEDICATIONS  (PRN):  acetaminophen   Tablet .. 500 milliGRAM(s) Oral every 6 hours PRN Mild Pain (1 - 3)  melatonin 5 milliGRAM(s) Oral at bedtime PRN Insomnia  oxyCODONE    IR 5 milliGRAM(s) Oral every 4 hours PRN Severe Pain (7 - 10)                                  9.4    4.45  )-----------( 113      ( 16 May 2021 23:58 )             30.5     05-16    139  |  104  |  12  ----------------------------<  109<H>  4.6   |  22  |  0.61    Ca    9.7      16 May 2021 23:58  Phos  3.2     05-16  Mg     2.1     05-16    TPro  6.9  /  Alb  4.9  /  TBili  2.3<H>  /  DBili  0.7<H>  /  AST  21  /  ALT  19  /  AlkPhos  197<H>  05-16    PT/INR - ( 16 May 2021 23:58 )   PT: 13.9 sec;   INR: 1.17 ratio         PTT - ( 16 May 2021 23:58 )  PTT:46.6 sec        ASSESSMENT & PLAN:             FOR FOLLOW UP:    MAR MICU TRANSFER NOTE    Please refer to MICU transfer note for full details.    Briefly, this is a        Vital Signs Last 24 Hrs  T(C): 36.4 (17 May 2021 15:00), Max: 37.1 (17 May 2021 03:00)  T(F): 97.5 (17 May 2021 15:00), Max: 98.8 (17 May 2021 03:00)  HR: 76 (17 May 2021 16:30) (69 - 108)  BP: 91/57 (17 May 2021 16:15) (77/51 - 108/72)  BP(mean): 69 (17 May 2021 16:15) (60 - 86)  RR: 23 (17 May 2021 16:30) (9 - 55)  SpO2: 92% (17 May 2021 16:30) (82% - 98%)  I&O's Summary    16 May 2021 07:01  -  17 May 2021 07:00  --------------------------------------------------------  IN: 762.8 mL / OUT: 950 mL / NET: -187.2 mL    17 May 2021 07:01  -  17 May 2021 18:48  --------------------------------------------------------  IN: 500 mL / OUT: 0 mL / NET: 500 mL      Allergies    No Known Allergies    Intolerances      MEDICATIONS  (STANDING):  albumin human 25% IVPB 100 milliLiter(s) IV Intermittent every 8 hours  chlorhexidine 2% Cloths 1 Application(s) Topical <User Schedule>  enoxaparin Injectable 40 milliGRAM(s) SubCutaneous every 12 hours  furosemide    Tablet 80 milliGRAM(s) Oral every 24 hours  midodrine 20 milliGRAM(s) Oral every 8 hours  multivitamin 1 Tablet(s) Oral daily  pantoprazole    Tablet 40 milliGRAM(s) Oral before breakfast  polyethylene glycol 3350 17 Gram(s) Oral daily  senna 2 Tablet(s) Oral at bedtime  spironolactone 200 milliGRAM(s) Oral daily    MEDICATIONS  (PRN):  acetaminophen   Tablet .. 500 milliGRAM(s) Oral every 6 hours PRN Mild Pain (1 - 3)  melatonin 5 milliGRAM(s) Oral at bedtime PRN Insomnia  oxyCODONE    IR 5 milliGRAM(s) Oral every 4 hours PRN Severe Pain (7 - 10)                            9.4    4.45  )-----------( 113      ( 16 May 2021 23:58 )             30.5     05-16    139  |  104  |  12  ----------------------------<  109<H>  4.6   |  22  |  0.61    Ca    9.7      16 May 2021 23:58  Phos  3.2     05-16  Mg     2.1     05-16    TPro  6.9  /  Alb  4.9  /  TBili  2.3<H>  /  DBili  0.7<H>  /  AST  21  /  ALT  19  /  AlkPhos  197<H>  05-16    PT/INR - ( 16 May 2021 23:58 )   PT: 13.9 sec;   INR: 1.17 ratio         PTT - ( 16 May 2021 23:58 )  PTT:46.6 sec        ASSESSMENT & PLAN:   36 y/o F with protein S deficiency, 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  On lovenox per hepatology recs.    - Continue Lasix 80mg QD (home dose 80 BID) and Spironolactone 200 qd  - Continue Midodrine 20mg q8 hours.  Off Levo this am  - SCDs  - Mobilize OOB as tolerated  -  Transplant surgery will continue to follow along

## 2021-05-17 NOTE — PROGRESS NOTE ADULT - SUBJECTIVE AND OBJECTIVE BOX
Chief Complaint:  Patient is a 35y old  Female who presents with a chief complaint of TIPS (17 May 2021 01:59)    Reason for consult: cirrhosis s/p TIPS    Interval Events: Pt off pressors this AM, she overall feels very well. Denies dyspnea, abd pain, abd swelling, edema.    Hospital Medications:  acetaminophen   Tablet .. 500 milliGRAM(s) Oral every 6 hours PRN  albumin human 25% IVPB 100 milliLiter(s) IV Intermittent every 8 hours  cefTRIAXone   IVPB 1000 milliGRAM(s) IV Intermittent every 24 hours  chlorhexidine 2% Cloths 1 Application(s) Topical <User Schedule>  enoxaparin Injectable 40 milliGRAM(s) SubCutaneous every 12 hours  furosemide    Tablet 80 milliGRAM(s) Oral every 24 hours  melatonin 5 milliGRAM(s) Oral at bedtime PRN  midodrine 20 milliGRAM(s) Oral every 8 hours  multivitamin 1 Tablet(s) Oral daily  oxyCODONE    IR 5 milliGRAM(s) Oral every 4 hours PRN  pantoprazole    Tablet 40 milliGRAM(s) Oral before breakfast  phenylephrine    Infusion 0.4 MICROgram(s)/kG/Min IV Continuous <Continuous>  polyethylene glycol 3350 17 Gram(s) Oral daily  senna 2 Tablet(s) Oral at bedtime  spironolactone 200 milliGRAM(s) Oral daily      ROS:   General:  No  fevers, chills, night sweats, fatigue  Eyes:  Good vision, no reported pain  ENT:  No sore throat, pain, runny nose  CV:  No pain, palpitations  Pulm:  No dyspnea, cough  GI:  See HPI, otherwise negative  :  No  incontinence, nocturia  Muscle:  No pain, weakness  Neuro:  No memory problems  Psych:  No insomnia, mood problems, depression  Endocrine:  No polyuria, polydipsia, cold/heat intolerance  Heme:  No petechiae, ecchymosis, easy bruisability  Skin:  No rash    PHYSICAL EXAM:   Vital Signs:  Vital Signs Last 24 Hrs  T(C): 36.2 (17 May 2021 07:00), Max: 37.7 (16 May 2021 15:00)  T(F): 97.2 (17 May 2021 07:00), Max: 99.9 (16 May 2021 15:00)  HR: 87 (17 May 2021 09:15) (69 - 108)  BP: 97/61 (17 May 2021 09:00) (77/51 - 108/72)  BP(mean): 72 (17 May 2021 09:00) (59 - 86)  RR: 23 (17 May 2021 09:15) (16 - 47)  SpO2: 93% (17 May 2021 09:15) (89% - 98%)  Daily     Daily     GENERAL: no acute distress  NEURO: alert  HEENT: anicteric sclera, no conjunctival pallor appreciated  CHEST: no respiratory distress, no accessory muscle use  CARDIAC: regular rate, rhythm  ABDOMEN: soft, non-tender, non-distended, no rebound or guarding  EXTREMITIES: warm, well perfused, no edema  SKIN: no lesions noted    LABS: reviewed                        9.4    4.45  )-----------( 113      ( 16 May 2021 23:58 )             30.5     05-16    139  |  104  |  12  ----------------------------<  109<H>  4.6   |  22  |  0.61    Ca    9.7      16 May 2021 23:58  Phos  3.2     05-16  Mg     2.1     05-16    TPro  6.9  /  Alb  4.9  /  TBili  2.3<H>  /  DBili  0.7<H>  /  AST  21  /  ALT  19  /  AlkPhos  197<H>  05-16    LIVER FUNCTIONS - ( 16 May 2021 23:58 )  Alb: 4.9 g/dL / Pro: 6.9 g/dL / ALK PHOS: 197 U/L / ALT: 19 U/L / AST: 21 U/L / GGT: x             Interval Diagnostic Studies: see sunrise for full report

## 2021-05-17 NOTE — PROGRESS NOTE ADULT - ASSESSMENT
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 In SICU for post procedure monitoring.    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  -varices: small in 2017  -ascites: recurrent, on lasix 80mg/spironolactone 200mg at home (increased at last outpatient visit) c/b hepatic hydrothorax  -HE: no hx  -HCC: none on imaging  -MELD-Na = 15 on 5/14  #Protein S deficiency    Recommendations:  - Wean pressors as able w/ goal MAP > 60  - Albumin 25% 100 mL IV q8h due to concern for intravascular volume depletion  - Midodrine to 20mg q8h  - Keep Lasix at 80mg daily, spironolactone 200mg daily  - On lovenox (therapeutic)  - Closely monitor CBC, CMP, INR  - F/u IR and transplant surgery recs  - OOB, ambulate  - Rest of care as per SICU team    Kane Orellana  Gastroenterology/Hepatology Fellow  Available via [employer] Teams    NON-URGENT CONSULTS:  Please email [email or web address]  AT NIGHT AND ON WEEKENDS:  Contact on-call GI fellow via answering service ([phone]) from 5pm-8am and on weekends/holidays  MONDAY-FRIDAY 8AM-5PM:  Pager# [phone] ([facility]) or [phone] ([facility])  GI Phone# [phone] ([facility])

## 2021-05-17 NOTE — PROGRESS NOTE ADULT - SUBJECTIVE AND OBJECTIVE BOX
HISTORY  35y Female    24 HOUR EVENTS:    SUBJECTIVE/ROS:  [ ] A ten-point review of systems was otherwise negative except as noted.  [ ] Due to altered mental status/intubation, subjective information were not able to be obtained from the patient. History was obtained, to the extent possible, from review of the chart and collateral sources of information.      NEURO  RASS:     GCS:     CAM ICU:  Exam: awake, alert, oriented  Meds: acetaminophen   Tablet .. 500 milliGRAM(s) Oral every 6 hours PRN Mild Pain (1 - 3)  melatonin 5 milliGRAM(s) Oral at bedtime PRN Insomnia  oxyCODONE    IR 5 milliGRAM(s) Oral every 4 hours PRN Severe Pain (7 - 10)    [x] Adequacy of sedation and pain control has been assessed and adjusted      RESPIRATORY  RR: 21 (05-17-21 @ 01:45) (16 - 47)  SpO2: 96% (05-17-21 @ 01:45) (89% - 97%)  Wt(kg): --  Exam: unlabored, clear to auscultation bilaterally  Mechanical Ventilation:     [N/A] Extubation Readiness Assessed  Meds:       CARDIOVASCULAR  HR: 76 (05-17-21 @ 01:45) (60 - 108)  BP: 94/56 (05-17-21 @ 01:45) (77/51 - 120/79)  BP(mean): 69 (05-17-21 @ 01:45) (59 - 95)  ABP: --  ABP(mean): --  Wt(kg): --  CVP(cm H2O): --      Exam: regular rate and rhythm  Cardiac Rhythm: sinus  Perfusion     [x]Adequate   [ ]Inadequate  Mentation   [x]Normal       [ ]Reduced  Extremities  [x]Warm         [ ]Cool  Volume Status [ ]Hypervolemic [x]Euvolemic [ ]Hypovolemic  Meds: furosemide    Tablet 80 milliGRAM(s) Oral every 24 hours  midodrine 20 milliGRAM(s) Oral every 8 hours  phenylephrine    Infusion 0.4 MICROgram(s)/kG/Min IV Continuous <Continuous>  spironolactone 200 milliGRAM(s) Oral daily        GI/NUTRITION  Exam: soft, nontender, nondistended, incision C/D/I  Diet:  Meds: pantoprazole    Tablet 40 milliGRAM(s) Oral before breakfast  polyethylene glycol 3350 17 Gram(s) Oral daily  senna 2 Tablet(s) Oral at bedtime      GENITOURINARY  I&O's Detail    05-15 @ 07:01  -  05-16 @ 07:00  --------------------------------------------------------  IN:    Albumin 25%  -  50 mL: 200 mL    IV PiggyBack: 150 mL    Oral Fluid: 200 mL    Phenylephrine: 197.6 mL  Total IN: 747.6 mL    OUT:    Voided (mL): 950 mL  Total OUT: 950 mL    Total NET: -202.4 mL      05-16 @ 07:01  -  05-17 @ 02:00  --------------------------------------------------------  IN:    Albumin 25%  -  50 mL: 200 mL    IV PiggyBack: 50 mL    Oral Fluid: 200 mL    Phenylephrine: 51.9 mL    Phenylephrine: 32.1 mL  Total IN: 534 mL    OUT:    Voided (mL): 950 mL  Total OUT: 950 mL    Total NET: -416 mL          05-16    139  |  104  |  12  ----------------------------<  109<H>  4.6   |  22  |  0.61    Ca    9.7      16 May 2021 23:58  Phos  3.2     05-16  Mg     2.1     05-16    TPro  6.9  /  Alb  4.9  /  TBili  2.3<H>  /  DBili  0.7<H>  /  AST  21  /  ALT  19  /  AlkPhos  197<H>  05-16    [ ] Lucas catheter, indication: N/A  Meds: albumin human 25% IVPB 100 milliLiter(s) IV Intermittent every 8 hours  multivitamin 1 Tablet(s) Oral daily        HEMATOLOGIC  Meds: enoxaparin Injectable 40 milliGRAM(s) SubCutaneous every 12 hours    [x] VTE Prophylaxis                        9.4    4.45  )-----------( 113      ( 16 May 2021 23:58 )             30.5     PT/INR - ( 16 May 2021 23:58 )   PT: 13.9 sec;   INR: 1.17 ratio         PTT - ( 16 May 2021 23:58 )  PTT:46.6 sec  Transfusion     [ ] PRBC   [ ] Platelets   [ ] FFP   [ ] Cryoprecipitate      INFECTIOUS DISEASES  WBC Count: 4.45 K/uL (05-16 @ 23:58)  WBC Count: 4.53 K/uL (05-16 @ 05:28)    RECENT CULTURES:  Specimen Source: .Smear Other  Date/Time: 05-13 @ 23:32  Culture Results: --  Gram Stain:   polymorphonuclear leukocytes seen  No organisms seen  by cytocentrifuge  Organism: --    Meds: cefTRIAXone   IVPB 1000 milliGRAM(s) IV Intermittent every 24 hours        ENDOCRINE  CAPILLARY BLOOD GLUCOSE        Meds:       ACCESS DEVICES:  [ ] Peripheral IV  [ ] Central Venous Line	[ ] R	[ ] L	[ ] IJ	[ ] Fem	[ ] SC	Placed:   [ ] Arterial Line		[ ] R	[ ] L	[ ] Fem	[ ] Rad	[ ] Ax	Placed:   [ ] PICC:					[ ] Mediport  [ ] Urinary Catheter, Date Placed:   [x] Necessity of urinary, arterial, and venous catheters discussed    OTHER MEDICATIONS:  chlorhexidine 2% Cloths 1 Application(s) Topical <User Schedule>      CODE STATUS:      IMAGING: HISTORY  Patient is a 35 year old Citizen of [place] & English speaking female with PMH of cryptogenic cirrhosis, portal hypertension with small esophageal varices, Budd Chiari syndrome, Protein S deficiency, multiple miscarriages, COVID-19 PCR + 3/4/21, recent admission for pleural effusion, attempted TIPS 4/2021 (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 Patient underwent scheduled TIPS and also had  thoracentesis with removal of 1L pleural fluid. Patient admitted to SICU for hemodynamic monitoring.     24 HOUR EVENTS:    SUBJECTIVE/ROS:  [ ] A ten-point review of systems was otherwise negative except as noted.  [ ] Due to altered mental status/intubation, subjective information were not able to be obtained from the patient. History was obtained, to the extent possible, from review of the chart and collateral sources of information.      NEURO  RASS:     GCS:     CAM ICU:  Exam: awake, alert, oriented  Meds: acetaminophen   Tablet .. 500 milliGRAM(s) Oral every 6 hours PRN Mild Pain (1 - 3)  melatonin 5 milliGRAM(s) Oral at bedtime PRN Insomnia  oxyCODONE    IR 5 milliGRAM(s) Oral every 4 hours PRN Severe Pain (7 - 10)    [x] Adequacy of sedation and pain control has been assessed and adjusted      RESPIRATORY  RR: 21 (05-17-21 @ 01:45) (16 - 47)  SpO2: 96% (05-17-21 @ 01:45) (89% - 97%)  Wt(kg): --  Exam: unlabored, clear to auscultation bilaterally  Mechanical Ventilation:     [N/A] Extubation Readiness Assessed  Meds:       CARDIOVASCULAR  HR: 76 (05-17-21 @ 01:45) (60 - 108)  BP: 94/56 (05-17-21 @ 01:45) (77/51 - 120/79)  BP(mean): 69 (05-17-21 @ 01:45) (59 - 95)  ABP: --  ABP(mean): --  Wt(kg): --  CVP(cm H2O): --      Exam: regular rate and rhythm  Cardiac Rhythm: sinus  Perfusion     [x]Adequate   [ ]Inadequate  Mentation   [x]Normal       [ ]Reduced  Extremities  [x]Warm         [ ]Cool  Volume Status [ ]Hypervolemic [x]Euvolemic [ ]Hypovolemic  Meds: furosemide    Tablet 80 milliGRAM(s) Oral every 24 hours  midodrine 20 milliGRAM(s) Oral every 8 hours  phenylephrine    Infusion 0.4 MICROgram(s)/kG/Min IV Continuous <Continuous>  spironolactone 200 milliGRAM(s) Oral daily        GI/NUTRITION  Exam: soft, nontender, nondistended, incision C/D/I  Diet:  Meds: pantoprazole    Tablet 40 milliGRAM(s) Oral before breakfast  polyethylene glycol 3350 17 Gram(s) Oral daily  senna 2 Tablet(s) Oral at bedtime      GENITOURINARY  I&O's Detail    05-15 @ 07:01  -  05-16 @ 07:00  --------------------------------------------------------  IN:    Albumin 25%  -  50 mL: 200 mL    IV PiggyBack: 150 mL    Oral Fluid: 200 mL    Phenylephrine: 197.6 mL  Total IN: 747.6 mL    OUT:    Voided (mL): 950 mL  Total OUT: 950 mL    Total NET: -202.4 mL      05-16 @ 07:01  -  05-17 @ 02:00  --------------------------------------------------------  IN:    Albumin 25%  -  50 mL: 200 mL    IV PiggyBack: 50 mL    Oral Fluid: 200 mL    Phenylephrine: 51.9 mL    Phenylephrine: 32.1 mL  Total IN: 534 mL    OUT:    Voided (mL): 950 mL  Total OUT: 950 mL    Total NET: -416 mL          05-16    139  |  104  |  12  ----------------------------<  109<H>  4.6   |  22  |  0.61    Ca    9.7      16 May 2021 23:58  Phos  3.2     05-16  Mg     2.1     05-16    TPro  6.9  /  Alb  4.9  /  TBili  2.3<H>  /  DBili  0.7<H>  /  AST  21  /  ALT  19  /  AlkPhos  197<H>  05-16    [ ] Lucas catheter, indication: N/A  Meds: albumin human 25% IVPB 100 milliLiter(s) IV Intermittent every 8 hours  multivitamin 1 Tablet(s) Oral daily        HEMATOLOGIC  Meds: enoxaparin Injectable 40 milliGRAM(s) SubCutaneous every 12 hours    [x] VTE Prophylaxis                        9.4    4.45  )-----------( 113      ( 16 May 2021 23:58 )             30.5     PT/INR - ( 16 May 2021 23:58 )   PT: 13.9 sec;   INR: 1.17 ratio         PTT - ( 16 May 2021 23:58 )  PTT:46.6 sec  Transfusion     [ ] PRBC   [ ] Platelets   [ ] FFP   [ ] Cryoprecipitate      INFECTIOUS DISEASES  WBC Count: 4.45 K/uL (05-16 @ 23:58)  WBC Count: 4.53 K/uL (05-16 @ 05:28)    RECENT CULTURES:  Specimen Source: .Smear Other  Date/Time: 05-13 @ 23:32  Culture Results: --  Gram Stain:   polymorphonuclear leukocytes seen  No organisms seen  by cytocentrifuge  Organism: --    Meds: cefTRIAXone   IVPB 1000 milliGRAM(s) IV Intermittent every 24 hours        ENDOCRINE  CAPILLARY BLOOD GLUCOSE        Meds:       ACCESS DEVICES:  [ ] Peripheral IV  [ ] Central Venous Line	[ ] R	[ ] L	[ ] IJ	[ ] Fem	[ ] SC	Placed:   [ ] Arterial Line		[ ] R	[ ] L	[ ] Fem	[ ] Rad	[ ] Ax	Placed:   [ ] PICC:					[ ] Mediport  [ ] Urinary Catheter, Date Placed:   [x] Necessity of urinary, arterial, and venous catheters discussed    OTHER MEDICATIONS:  chlorhexidine 2% Cloths 1 Application(s) Topical <User Schedule>      CODE STATUS:      IMAGING: HISTORY  Patient is a 35 year old [place] & English speaking female with PMH of cryptogenic cirrhosis, portal hypertension with small esophageal varices, Budd Chiari syndrome, Protein S deficiency, multiple miscarriages, COVID-19 PCR + 3/4/21, recent admission for pleural effusion, attempted TIPS 4/2021 (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 Patient underwent scheduled TIPS and also had  thoracentesis with removal of 1L pleural fluid. Patient admitted to SICU for hemodynamic monitoring.     24 HOUR EVENTS:  - MAP goal changed to > 60  -continue midodrine 20mg Q8h    SUBJECTIVE/ROS:  [x ] A ten-point review of systems was otherwise negative except as noted.  [ ] Due to altered mental status/intubation, subjective information were not able to be obtained from the patient. History was obtained, to the extent possible, from review of the chart and collateral sources of information.      NEURO  RASS:     GCS:     CAM ICU:  Exam: awake, alert, oriented  Meds: acetaminophen   Tablet .. 500 milliGRAM(s) Oral every 6 hours PRN Mild Pain (1 - 3)  melatonin 5 milliGRAM(s) Oral at bedtime PRN Insomnia  oxyCODONE    IR 5 milliGRAM(s) Oral every 4 hours PRN Severe Pain (7 - 10)    [x] Adequacy of sedation and pain control has been assessed and adjusted      RESPIRATORY  RR: 21 (05-17-21 @ 01:45) (16 - 47)  SpO2: 96% (05-17-21 @ 01:45) (89% - 97%)  Wt(kg): --  Exam: unlabored, clear to auscultation bilaterally  Mechanical Ventilation:     [N/A] Extubation Readiness Assessed  Meds:       CARDIOVASCULAR  HR: 76 (05-17-21 @ 01:45) (60 - 108)  BP: 94/56 (05-17-21 @ 01:45) (77/51 - 120/79)  BP(mean): 69 (05-17-21 @ 01:45) (59 - 95)  ABP: --  ABP(mean): --  Wt(kg): --  CVP(cm H2O): --      Exam: regular rate and rhythm  Cardiac Rhythm: sinus  Perfusion     [x]Adequate   [ ]Inadequate  Mentation   [x]Normal       [ ]Reduced  Extremities  [x]Warm         [ ]Cool  Volume Status [ ]Hypervolemic [x]Euvolemic [ ]Hypovolemic  Meds: furosemide    Tablet 80 milliGRAM(s) Oral every 24 hours  midodrine 20 milliGRAM(s) Oral every 8 hours  phenylephrine    Infusion 0.4 MICROgram(s)/kG/Min IV Continuous <Continuous>  spironolactone 200 milliGRAM(s) Oral daily        GI/NUTRITION  Exam: soft, nontender, nondistended, incision C/D/I  Diet:  Meds: pantoprazole    Tablet 40 milliGRAM(s) Oral before breakfast  polyethylene glycol 3350 17 Gram(s) Oral daily  senna 2 Tablet(s) Oral at bedtime      GENITOURINARY  I&O's Detail    05-15 @ 07:01  -  05-16 @ 07:00  --------------------------------------------------------  IN:    Albumin 25%  -  50 mL: 200 mL    IV PiggyBack: 150 mL    Oral Fluid: 200 mL    Phenylephrine: 197.6 mL  Total IN: 747.6 mL    OUT:    Voided (mL): 950 mL  Total OUT: 950 mL    Total NET: -202.4 mL      05-16 @ 07:01  -  05-17 @ 02:00  --------------------------------------------------------  IN:    Albumin 25%  -  50 mL: 200 mL    IV PiggyBack: 50 mL    Oral Fluid: 200 mL    Phenylephrine: 51.9 mL    Phenylephrine: 32.1 mL  Total IN: 534 mL    OUT:    Voided (mL): 950 mL  Total OUT: 950 mL    Total NET: -416 mL          05-16    139  |  104  |  12  ----------------------------<  109<H>  4.6   |  22  |  0.61    Ca    9.7      16 May 2021 23:58  Phos  3.2     05-16  Mg     2.1     05-16    TPro  6.9  /  Alb  4.9  /  TBili  2.3<H>  /  DBili  0.7<H>  /  AST  21  /  ALT  19  /  AlkPhos  197<H>  05-16    [ ] Lucas catheter, indication: N/A  Meds: albumin human 25% IVPB 100 milliLiter(s) IV Intermittent every 8 hours  multivitamin 1 Tablet(s) Oral daily        HEMATOLOGIC  Meds: enoxaparin Injectable 40 milliGRAM(s) SubCutaneous every 12 hours    [x] VTE Prophylaxis                        9.4    4.45  )-----------( 113      ( 16 May 2021 23:58 )             30.5     PT/INR - ( 16 May 2021 23:58 )   PT: 13.9 sec;   INR: 1.17 ratio         PTT - ( 16 May 2021 23:58 )  PTT:46.6 sec  Transfusion     [ ] PRBC   [ ] Platelets   [ ] FFP   [ ] Cryoprecipitate      INFECTIOUS DISEASES  WBC Count: 4.45 K/uL (05-16 @ 23:58)  WBC Count: 4.53 K/uL (05-16 @ 05:28)    RECENT CULTURES:  Specimen Source: .Smear Other  Date/Time: 05-13 @ 23:32  Culture Results: --  Gram Stain:   polymorphonuclear leukocytes seen  No organisms seen  by cytocentrifuge  Organism: --    Meds: cefTRIAXone   IVPB 1000 milliGRAM(s) IV Intermittent every 24 hours        ENDOCRINE  CAPILLARY BLOOD GLUCOSE        Meds:       ACCESS DEVICES:  [ ] Peripheral IV  [ ] Central Venous Line	[ ] R	[ ] L	[ ] IJ	[ ] Fem	[ ] SC	Placed:   [ ] Arterial Line		[ ] R	[ ] L	[ ] Fem	[ ] Rad	[ ] Ax	Placed:   [ ] PICC:					[ ] Mediport  [ ] Urinary Catheter, Date Placed:   [x] Necessity of urinary, arterial, and venous catheters discussed    OTHER MEDICATIONS:  chlorhexidine 2% Cloths 1 Application(s) Topical <User Schedule>      CODE STATUS:      IMAGING: HISTORY  Patient is a 35 year old [place] & English speaking female with PMH of cryptogenic cirrhosis, portal hypertension with small esophageal varices, Budd Chiari syndrome, Protein S deficiency, multiple miscarriages, COVID-19 PCR + 3/4/21, recent admission for pleural effusion, attempted TIPS 4/2021 (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 Patient underwent scheduled TIPS and also had  thoracentesis with removal of 1L pleural fluid. Patient admitted to SICU for hemodynamic monitoring.     24 HOUR EVENTS:  - MAP goal changed to > 60  -continue midodrine 20mg Q8h    SUBJECTIVE/ROS:  [x ] A ten-point review of systems was otherwise negative except as noted.  [ ] Due to altered mental status/intubation, subjective information were not able to be obtained from the patient. History was obtained, to the extent possible, from review of the chart and collateral sources of information.      NEURO  Exam: awake, alert, oriented  Meds: acetaminophen   Tablet .. 500 milliGRAM(s) Oral every 6 hours PRN Mild Pain (1 - 3)  melatonin 5 milliGRAM(s) Oral at bedtime PRN Insomnia  oxyCODONE    IR 5 milliGRAM(s) Oral every 4 hours PRN Severe Pain (7 - 10)    [x] Adequacy of sedation and pain control has been assessed and adjusted      RESPIRATORY  RR: 21 (05-17-21 @ 01:45) (16 - 47)  SpO2: 96% (05-17-21 @ 01:45) (89% - 97%)  Exam: unlabored, clear to auscultation bilaterally    [N/A] Extubation Readiness Assessed      CARDIOVASCULAR  HR: 76 (05-17-21 @ 01:45) (60 - 108)  BP: 94/56 (05-17-21 @ 01:45) (77/51 - 120/79)  BP(mean): 69 (05-17-21 @ 01:45) (59 - 95)    Exam: regular rate and rhythm  Cardiac Rhythm: sinus  Perfusion     [x]Adequate   [ ]Inadequate  Mentation   [x]Normal       [ ]Reduced  Extremities  [x]Warm         [ ]Cool  Volume Status [ ]Hypervolemic [x]Euvolemic [ ]Hypovolemic  Meds: furosemide    Tablet 80 milliGRAM(s) Oral every 24 hours  midodrine 20 milliGRAM(s) Oral every 8 hours  phenylephrine    Infusion 0.4 MICROgram(s)/kG/Min IV Continuous <Continuous>  spironolactone 200 milliGRAM(s) Oral daily        GI/NUTRITION  Exam: soft, nontender, nondistended, incision C/D/I  Diet: reg diet  Meds: pantoprazole    Tablet 40 milliGRAM(s) Oral before breakfast  polyethylene glycol 3350 17 Gram(s) Oral daily  senna 2 Tablet(s) Oral at bedtime      GENITOURINARY  I&O's Detail    05-15 @ 07:01  -  05-16 @ 07:00  --------------------------------------------------------  IN:    Albumin 25%  -  50 mL: 200 mL    IV PiggyBack: 150 mL    Oral Fluid: 200 mL    Phenylephrine: 197.6 mL  Total IN: 747.6 mL    OUT:    Voided (mL): 950 mL  Total OUT: 950 mL    Total NET: -202.4 mL      05-16 @ 07:01  -  05-17 @ 02:00  --------------------------------------------------------  IN:    Albumin 25%  -  50 mL: 200 mL    IV PiggyBack: 50 mL    Oral Fluid: 200 mL    Phenylephrine: 51.9 mL    Phenylephrine: 32.1 mL  Total IN: 534 mL    OUT:    Voided (mL): 950 mL  Total OUT: 950 mL    Total NET: -416 mL          05-16    139  |  104  |  12  ----------------------------<  109<H>  4.6   |  22  |  0.61    Ca    9.7      16 May 2021 23:58  Phos  3.2     05-16  Mg     2.1     05-16    TPro  6.9  /  Alb  4.9  /  TBili  2.3<H>  /  DBili  0.7<H>  /  AST  21  /  ALT  19  /  AlkPhos  197<H>  05-16    [ ] Lucas catheter, indication: N/A  Meds: albumin human 25% IVPB 100 milliLiter(s) IV Intermittent every 8 hours  multivitamin 1 Tablet(s) Oral daily        HEMATOLOGIC  Meds: enoxaparin Injectable 40 milliGRAM(s) SubCutaneous every 12 hours    [x] VTE Prophylaxis                        9.4    4.45  )-----------( 113      ( 16 May 2021 23:58 )             30.5     PT/INR - ( 16 May 2021 23:58 )   PT: 13.9 sec;   INR: 1.17 ratio         PTT - ( 16 May 2021 23:58 )  PTT:46.6 sec  Transfusion     [ ] PRBC   [ ] Platelets   [ ] FFP   [ ] Cryoprecipitate      INFECTIOUS DISEASES  WBC Count: 4.45 K/uL (05-16 @ 23:58)  WBC Count: 4.53 K/uL (05-16 @ 05:28)    RECENT CULTURES:  Specimen Source: .Smear Other  Date/Time: 05-13 @ 23:32  Culture Results: --  Gram Stain:   polymorphonuclear leukocytes seen  No organisms seen  by cytocentrifuge  Organism: --    Meds: cefTRIAXone   IVPB 1000 milliGRAM(s) IV Intermittent every 24 hours        ENDOCRINE  CAPILLARY BLOOD GLUCOSE            ACCESS DEVICES:  [x ] Peripheral IV  [ ] Central Venous Line	[ ] R	[ ] L	[ ] IJ	[ ] Fem	[ ] SC	Placed:   [ ] Arterial Line		[ ] R	[ ] L	[ ] Fem	[ ] Rad	[ ] Ax	Placed:   [ ] PICC:					[ ] Mediport  [ ] Urinary Catheter, Date Placed:   [x] Necessity of urinary, arterial, and venous catheters discussed    OTHER MEDICATIONS:  chlorhexidine 2% Cloths 1 Application(s) Topical <User Schedule>      CODE STATUS:  full code

## 2021-05-17 NOTE — CHART NOTE - NSCHARTNOTEFT_GEN_A_CORE
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    SICU COURSE:      5/12: Started on heparin gtt. Weaned off emerita in the morning. Advanced to regular diet.  Resumed home lasix and spironolactone. Midodrine started.  5/13: Repeat right sided thoracentesis   5/14: Resumed heparin gtt. Still hypotensive on emerita; middorine increased to 20 Q8. Heparin gtt transitoned to Tlovenox. Leigh D/C. Passed TOV         ASSESSMENT & PLAN:         For Follow-Up:          Vital Signs Last 24 Hrs  T(C): 36.4 (17 May 2021 15:00), Max: 37.1 (17 May 2021 03:00)  T(F): 97.5 (17 May 2021 15:00), Max: 98.8 (17 May 2021 03:00)  HR: 76 (17 May 2021 16:30) (69 - 108)  BP: 91/57 (17 May 2021 16:15) (77/51 - 108/72)  BP(mean): 69 (17 May 2021 16:15) (60 - 86)  RR: 23 (17 May 2021 16:30) (9 - 55)  SpO2: 92% (17 May 2021 16:30) (82% - 98%)  I&O's Summary    16 May 2021 07:01  -  17 May 2021 07:00  --------------------------------------------------------  IN: 762.8 mL / OUT: 950 mL / NET: -187.2 mL    17 May 2021 07:01  -  17 May 2021 18:23  --------------------------------------------------------  IN: 500 mL / OUT: 0 mL / NET: 500 mL          MEDICATIONS  (STANDING):  albumin human 25% IVPB 100 milliLiter(s) IV Intermittent every 8 hours  chlorhexidine 2% Cloths 1 Application(s) Topical <User Schedule>  enoxaparin Injectable 40 milliGRAM(s) SubCutaneous every 12 hours  furosemide    Tablet 80 milliGRAM(s) Oral every 24 hours  midodrine 20 milliGRAM(s) Oral every 8 hours  multivitamin 1 Tablet(s) Oral daily  pantoprazole    Tablet 40 milliGRAM(s) Oral before breakfast  polyethylene glycol 3350 17 Gram(s) Oral daily  senna 2 Tablet(s) Oral at bedtime  spironolactone 200 milliGRAM(s) Oral daily    MEDICATIONS  (PRN):  acetaminophen   Tablet .. 500 milliGRAM(s) Oral every 6 hours PRN Mild Pain (1 - 3)  melatonin 5 milliGRAM(s) Oral at bedtime PRN Insomnia  oxyCODONE    IR 5 milliGRAM(s) Oral every 4 hours PRN Severe Pain (7 - 10)        LABS                                            9.4                   Neurophils% (auto):   x      (05-16 @ 23:58):    4.45 )-----------(113          Lymphocytes% (auto):  x                                             30.5                   Eosinphils% (auto):   x        Manual%: Neutrophils x    ; Lymphocytes x    ; Eosinophils x    ; Bands%: x    ; Blasts x                                    139    |  104    |  12                  Calcium: 9.7   / iCa: x      (05-16 @ 23:58)    ----------------------------<  109       Magnesium: 2.1                              4.6     |  22     |  0.61             Phosphorous: 3.2      TPro  6.9    /  Alb  4.9    /  TBili  2.3    /  DBili  0.7    /  AST  21     /  ALT  19     /  AlkPhos  197    16 May 2021 23:58    ( 05-16 @ 23:58 )   PT: 13.9 sec;   INR: 1.17 ratio  aPTT: 46.6 sec SICU Transfer Note    Transfer from: SICU  Transfer to: D451D  Accepting physician: Wali    35y Female w/ hx of protein S deficiency, 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 Now s/p TIPS and thoracentesis of 1L pleural fluid 5/15. During the procedure, patient received 1L cyrstalloid, 500 5% albumin, , on 0.6 mcg neosynephrine. Post-procedure on neosynephrine at 0.7. Patient admitted to SICU for hemodynamic monitoring    SICU COURSE:    5/12: Restarted on heparin gtt for IVC thrombus. Advanced to regular diet. Resumed home lasix and spironolactone. Continued on phenylephrine for hypotension. Added midodrine for hypotension  5/13: Repeated right sided thoracentesis, removed 1.2L  5/14: Resumed heparin gtt. Still hypotensive on emerita; mididrone increased to 20 Q8. Heparin gtt transitioned to therapeutic lovenox. Lucas was removed . Passed TOV   5/15: Midodrine increased to 20mg q8hrs. Lasix decreased from 80mg BID to 80mg daily. Added 100cc 25% albumin q8hrs per hepatology.           ASSESSMENT & PLAN:         For Follow-Up:          Vital Signs Last 24 Hrs  T(C): 36.4 (17 May 2021 15:00), Max: 37.1 (17 May 2021 03:00)  T(F): 97.5 (17 May 2021 15:00), Max: 98.8 (17 May 2021 03:00)  HR: 76 (17 May 2021 16:30) (69 - 108)  BP: 91/57 (17 May 2021 16:15) (77/51 - 108/72)  BP(mean): 69 (17 May 2021 16:15) (60 - 86)  RR: 23 (17 May 2021 16:30) (9 - 55)  SpO2: 92% (17 May 2021 16:30) (82% - 98%)  I&O's Summary    16 May 2021 07:01  -  17 May 2021 07:00  --------------------------------------------------------  IN: 762.8 mL / OUT: 950 mL / NET: -187.2 mL    17 May 2021 07:01  -  17 May 2021 18:23  --------------------------------------------------------  IN: 500 mL / OUT: 0 mL / NET: 500 mL          MEDICATIONS  (STANDING):  albumin human 25% IVPB 100 milliLiter(s) IV Intermittent every 8 hours  chlorhexidine 2% Cloths 1 Application(s) Topical <User Schedule>  enoxaparin Injectable 40 milliGRAM(s) SubCutaneous every 12 hours  furosemide    Tablet 80 milliGRAM(s) Oral every 24 hours  midodrine 20 milliGRAM(s) Oral every 8 hours  multivitamin 1 Tablet(s) Oral daily  pantoprazole    Tablet 40 milliGRAM(s) Oral before breakfast  polyethylene glycol 3350 17 Gram(s) Oral daily  senna 2 Tablet(s) Oral at bedtime  spironolactone 200 milliGRAM(s) Oral daily    MEDICATIONS  (PRN):  acetaminophen   Tablet .. 500 milliGRAM(s) Oral every 6 hours PRN Mild Pain (1 - 3)  melatonin 5 milliGRAM(s) Oral at bedtime PRN Insomnia  oxyCODONE    IR 5 milliGRAM(s) Oral every 4 hours PRN Severe Pain (7 - 10)        LABS                                            9.4                   Neurophils% (auto):   x      (05-16 @ 23:58):    4.45 )-----------(113          Lymphocytes% (auto):  x                                             30.5                   Eosinphils% (auto):   x        Manual%: Neutrophils x    ; Lymphocytes x    ; Eosinophils x    ; Bands%: x    ; Blasts x                                    139    |  104    |  12                  Calcium: 9.7   / iCa: x      (05-16 @ 23:58)    ----------------------------<  109       Magnesium: 2.1                              4.6     |  22     |  0.61             Phosphorous: 3.2      TPro  6.9    /  Alb  4.9    /  TBili  2.3    /  DBili  0.7    /  AST  21     /  ALT  19     /  AlkPhos  197    16 May 2021 23:58    ( 05-16 @ 23:58 )   PT: 13.9 sec;   INR: 1.17 ratio  aPTT: 46.6 sec SICU Transfer Note    Transfer from: SICU  Transfer to: D451D  Accepting physician: Wali    35y Female w/ hx of protein S deficiency, 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 Now s/p TIPS and thoracentesis of 1L pleural fluid 5/15. During the procedure, patient received 1L cyrstalloid, 500 5% albumin, , on 0.6 mcg neosynephrine. Post-procedure on neosynephrine at 0.7. Patient admitted to SICU for hemodynamic monitoring    SICU COURSE:    5/12: Restarted on heparin gtt for IVC thrombus. Advanced to regular diet. Resumed home lasix and spironolactone. Continued on phenylephrine for hypotension. Added midodrine for hypotension  5/13: Repeated right sided thoracentesis, removed 1.2L  5/14: Resumed heparin gtt. Still hypotensive on emerita; mididrone increased to 20 Q8. Heparin gtt transitioned to therapeutic lovenox. Lucas was removed . Passed TOV   5/15: Midodrine increased to 20mg q8hrs. Lasix decreased from 80mg BID to 80mg daily. Added 100cc 25% albumin q8hrs per hepatology.   5/16: Continue midodrine 20mg Q8h. MAP goal above 60. Phenylephrine decreased  5/17: Weaned off phenylephrine since 7:30 am. Home lasix and spironolactone held    ASSESSMENT & PLAN:   35F with hx of cryptogenic cirrhosis, portal hypertension, Budd Chiari syndrome, Protein S deficiency, recent attempted TIPS but unable to perform 2/2 intrahepatic IVC occlusion, s/p TIPS and thoracentesis. Post-procedure on phenylephrine, admitted to SICU for hemodynamic monitoring, now off phenylephrine    Plan:  - Pain control with tylenol, oxy prn  - IS / OOBTC / ambulation as tolerated to prevent atelectasis  - Continue 20mg midodrine q8hrs for hypotension  - Regular diet  - Protonix daily (takes omeprazole at home)  - Bowel regimen with senna and miralax  - 100cc 25% albumin q8hrs  - Therapeutic lovenox for IVC thrombus  - Ceftriaxone for SBP ppx    Vital Signs Last 24 Hrs  T(C): 36.4 (17 May 2021 15:00), Max: 37.1 (17 May 2021 03:00)  T(F): 97.5 (17 May 2021 15:00), Max: 98.8 (17 May 2021 03:00)  HR: 76 (17 May 2021 16:30) (69 - 108)  BP: 91/57 (17 May 2021 16:15) (77/51 - 108/72)  BP(mean): 69 (17 May 2021 16:15) (60 - 86)  RR: 23 (17 May 2021 16:30) (9 - 55)  SpO2: 92% (17 May 2021 16:30) (82% - 98%)  I&O's Summary    16 May 2021 07:01  -  17 May 2021 07:00  --------------------------------------------------------  IN: 762.8 mL / OUT: 950 mL / NET: -187.2 mL    17 May 2021 07:01  -  17 May 2021 18:23  --------------------------------------------------------  IN: 500 mL / OUT: 0 mL / NET: 500 mL    MEDICATIONS  (STANDING):  albumin human 25% IVPB 100 milliLiter(s) IV Intermittent every 8 hours  chlorhexidine 2% Cloths 1 Application(s) Topical <User Schedule>  enoxaparin Injectable 40 milliGRAM(s) SubCutaneous every 12 hours  furosemide    Tablet 80 milliGRAM(s) Oral every 24 hours  midodrine 20 milliGRAM(s) Oral every 8 hours  multivitamin 1 Tablet(s) Oral daily  pantoprazole    Tablet 40 milliGRAM(s) Oral before breakfast  polyethylene glycol 3350 17 Gram(s) Oral daily  senna 2 Tablet(s) Oral at bedtime  spironolactone 200 milliGRAM(s) Oral daily    MEDICATIONS  (PRN):  acetaminophen   Tablet .. 500 milliGRAM(s) Oral every 6 hours PRN Mild Pain (1 - 3)  melatonin 5 milliGRAM(s) Oral at bedtime PRN Insomnia  oxyCODONE    IR 5 milliGRAM(s) Oral every 4 hours PRN Severe Pain (7 - 10)        LABS                                            9.4                   Neurophils% (auto):   x      (05-16 @ 23:58):    4.45 )-----------(113          Lymphocytes% (auto):  x                                             30.5                   Eosinphils% (auto):   x        Manual%: Neutrophils x    ; Lymphocytes x    ; Eosinophils x    ; Bands%: x    ; Blasts x                                    139    |  104    |  12                  Calcium: 9.7   / iCa: x      (05-16 @ 23:58)    ----------------------------<  109       Magnesium: 2.1                              4.6     |  22     |  0.61             Phosphorous: 3.2      TPro  6.9    /  Alb  4.9    /  TBili  2.3    /  DBili  0.7    /  AST  21     /  ALT  19     /  AlkPhos  197    16 May 2021 23:58    ( 05-16 @ 23:58 )   PT: 13.9 sec;   INR: 1.17 ratio  aPTT: 46.6 sec

## 2021-05-17 NOTE — PROGRESS NOTE ADULT - ASSESSMENT
Interventional Radiology Follow-Up Note    This is a 35y Female s/p TIPS on 5/11 and right thoracentesis on 5/13 in Interventional Radiology with Dr. Ragsdale.     S: Patient seen and examined @ bedside with IR attending Dr. Villareal.  Patient resting comfortably in chair. Denies abdominal pain, difficulty breathing, nausea or vomiting. No other complaints offered.     Medication:   cefTRIAXone   IVPB: (05-16)  enoxaparin Injectable: (05-17)  midodrine: (05-17)  phenylephrine    Infusion: (05-16)  spironolactone: (05-17)    Vitals:   T(F): 97.2, Max: 99.9 (15:00)  HR: 87  BP: 82/53  RR: 25  SpO2: 94%    Physical Exam:  General: Nontoxic, in NAD, A&O x3.  Abdomen: soft, NTND, no peritoneal signs.    LABS:  WBC 4.45 / Hgb 9.4 / Hct 30.5 / Plt 113  Na 139 / K 4.6 / CO2 22 / Cl 104 / BUN 12 / Cr 0.61 / Glucose 109  ALT 19 / AST 21 / Alk Phos 197 / Tbili 2.3  Ptt 46.6 / Pt 13.9 / INR 1.17      Assessment/Plan: 35F with hx of cryptogenic cirrhosis, portal hypertension, Budd Chiari syndrome, Protein S deficiency, s/p TIPS and right thoracentesis with IR.    -continue global management per SICU team  -monitor h/h; transfuse as needed  -trend vs/labs  -OK to d/c from IR standpoint  -d/w SICU team     Please call IR at extension [phone] or [phone] with any questions, concerns, or issues regarding above.

## 2021-05-17 NOTE — PROGRESS NOTE ADULT - ASSESSMENT
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

## 2021-05-17 NOTE — PROGRESS NOTE ADULT - SUBJECTIVE AND OBJECTIVE BOX
Patient is a 35y old  Female who presents with a chief complaint of TIPS (17 May 2021 11:33)      INTERVAL HPI/OVERNIGHT EVENTS:  HPI:  HPI: 35F w/ PMHx of cryptogenic cirrhosis, portal hypertension with small esophageal varices, Protein S deficiency, COVID-19 pna (3/4/21) and chronic Budd Chiari syndrome c/b small ascites, R hepatic hydrothorax, extensive intra-abdominal ysabel-systemic collaterals, splenomegaly, and caudate lobe hypertrophy with associated mass effect on intrahepatic IVC and IVC thrombosis on Xarelto, multiple spontaneous abortions. Was admitted on 4/19/21 Underwent IVC thrombectomy in IR; occlusion could not be crossed. TIPs was deferred, had thoracentesis on 4/26 and was discharged home.     Pt admitted of 5/11/21 for planned TIPS procedure and thoracentesis with IR; had with 1L drained with post-procedure complication of hypotension requiring low dose Jaleel and admitted to SICU for further monitoring. Pt resumed on home diuretics per hepatology recs, started on ceftrixone for 7 days for SBP ppx and started on heparin gtt (in place of xarelto). Started on midodrine and albumin q8h to help wean IV pressors. Pt went for repeat IR thoracentesis for recurrent hydrothorax  on 5/13 with 1200cc drained. Midodrine increased to 20mg q8h and diuretics held 2/2 hold parameters/hypotension, Pt finally able to be titrated off Jaleel at 0730 this morning, BP has been stable with MAP goal >60. Labs showing generally improving LFTs since TIPS.   Pt endorses intermittent CP since admission, none at this time; denies SOB, palpitations, cough, abd pain, distension, nausea/vomiting, having normal BMs, no blood/melena, no dysuria or hematuria, no LE edema or pain. Endorses good appetite, feeling well overall and eager to be discharged home.       REVIEW OF SYSTEMS:  CONSTITUTIONAL: No , fevers, chills  EYES/ENT: No visual changes;  no or throat pain   NECK: No pain or stiffness  RESPIRATORY: No cough,  no shortness of breath  CARDIOVASCULAR: No chest pain or palpitations  GASTROINTESTINAL: no nausea, vomiting, no abdominal pain, no BRBPR  GENITOURINARY: no polyuria, no dysuria  NEUROLOGICAL: no numbness, no headaches, no confusion   MUSCULOSKELETAL: no back pain, no weakness   SKIN: No itching, burning, rashes, or lesions   PSYCH: no anxiety, depression  HEME: no gum bleeding, no bruising   Rest of ROS negative except as per HPI.     PAST MEDICAL & SURGICAL HISTORY:  Cirrhosis  2010  Cirrhosis  Budd-Chiari syndrome  H/O protein S deficiency  Miscarriage  x 5  H/O protein S deficiency    No significant past surgical history    Allergies: No Known Allergies    FAMILY HISTORY:  FHx: liver disease in Paternal uncle; no known liver disease in 1st degree members     Social History:  No smoking, no etoh, no drugs   lives at home with family  independent with ADLs, ambulation     Home Medications:  ascorbic acid 500 mg oral tablet: 1 tab(s) orally once a day (17 May 2021 20:53)  Multiple Vitamins oral tablet: 1 tab(s) orally once a day (17 May 2021 20:53)  omeprazole 40 mg oral delayed release capsule: 1 cap(s) orally once a day (17 May 2021 20:53)  Senna 8.6 mg oral tablet: 2 tab(s) orally once a day (at bedtime) (17 May 2021 20:53)  Xarelto 20 mg oral tablet: 1 tab(s) orally once a day   Start 4/27/2021 (17 May 2021 20:53)      MEDICATIONS  (STANDING):  albumin human 25% IVPB 100 milliLiter(s) IV Intermittent every 8 hours  chlorhexidine 2% Cloths 1 Application(s) Topical <User Schedule>  enoxaparin Injectable 40 milliGRAM(s) SubCutaneous every 12 hours  furosemide    Tablet 80 milliGRAM(s) Oral every 24 hours  midodrine 20 milliGRAM(s) Oral every 8 hours  multivitamin 1 Tablet(s) Oral daily  pantoprazole    Tablet 40 milliGRAM(s) Oral before breakfast  polyethylene glycol 3350 17 Gram(s) Oral daily  senna 2 Tablet(s) Oral at bedtime  spironolactone 200 milliGRAM(s) Oral daily    MEDICATIONS  (PRN):  acetaminophen   Tablet .. 500 milliGRAM(s) Oral every 6 hours PRN Mild Pain (1 - 3)  melatonin 5 milliGRAM(s) Oral at bedtime PRN Insomnia  oxyCODONE    IR 5 milliGRAM(s) Oral every 4 hours PRN Severe Pain (7 - 10)      Vital Signs Last 24 Hrs  T(C): 36.4 (17 May 2021 19:44), Max: 37.1 (17 May 2021 03:00)  T(F): 97.6 (17 May 2021 19:44), Max: 98.8 (17 May 2021 03:00)  HR: 90 (17 May 2021 19:44) (69 - 100)  BP: 90/58 (17 May 2021 19:44) (77/51 - 108/72)  BP(mean): 66 (17 May 2021 19:00) (60 - 86)  RR: 16 (17 May 2021 19:44) (9 - 55)  SpO2: 94% (17 May 2021 19:44) (82% - 99%)    PHYSICAL EXAM:  GENERAL: NAD, thin woman appearing stated age   HEAD:  Atraumatic, normocephalic  EYES: EOMI, conjunctiva and sclera clear  NECK: Supple, no JVD  CHEST/LUNG: decreased BS R lung, CTA L lung, no wheezing, no resp distress   HEART: Regular rate and rhythm; no murmurs  ABDOMEN: Soft, nontender, nondistended; bowel sounds present  EXTREMITIES:  2+ Peripheral Pulses, no edema  PSYCH: calm affect, not anxious  NEUROLOGY: non-focal, AAOx3  SKIN: No rashes or lesions  MUSCULOSKELETAL: no back pain, moving all extremities     LABS:  Labs, imaging and EKG personally reviewed and interpreted by me.                         9.4    4.45  )-----------( 113      ( 16 May 2021 23:58 )             30.5     05-16    139  |  104  |  12  ----------------------------<  109<H>  4.6   |  22  |  0.61    Ca    9.7      16 May 2021 23:58  Phos  3.2     05-16  Mg     2.1     05-16    TPro  6.9  /  Alb  4.9  /  TBili  2.3<H>  /  DBili  0.7<H>  /  AST  21  /  ALT  19  /  AlkPhos  197<H>  05-16    PT/INR - ( 16 May 2021 23:58 )   PT: 13.9 sec;   INR: 1.17 ratio         PTT - ( 16 May 2021 23:58 )  PTT:46.6 sec      I&O's Summary    16 May 2021 07:01  -  17 May 2021 07:00  --------------------------------------------------------  IN: 762.8 mL / OUT: 950 mL / NET: -187.2 mL    17 May 2021 07:01  -  17 May 2021 21:16  --------------------------------------------------------  IN: 500 mL / OUT: 0 mL / NET: 500 mL      < from: Xray Chest 1 View- PORTABLE-Routine (Xray Chest 1 View- PORTABLE-Routine in AM.) (05.14.21 @ 07:13) >  FINDINGS: Since prior, no significant interval change. Moderate to large right pleural effusion persists; underlying right-sided infiltrate, consolidation, mass lesion cannot be excluded. No evidence for acute left lung infiltrate. No left pleural effusion. No pneumothorax. No mediastinal shift. Stent graft material identified overlying the right upper quadrant.    IMPRESSION: No significant interval change.    < end of copied text >      RADIOLOGY & ADDITIONAL TESTS:  Imaging Personally Reviewed:  [X] YES  [ ] NO  Consultant(s) Notes Reviewed:  [X] YES  [ ] NO  Care Discussed with Consultants/Other Providers [X] YES  [ ] NO

## 2021-05-17 NOTE — PROGRESS NOTE ADULT - ASSESSMENT
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   - S/P Thoracentesis on 5/11, 5/13.  Improved, stable respiratory status this am.  No O2 requirements.    - Diet as tolerated.  On lovenox per hepatology recs.    - Continue Lasix 80mg QD (home dose 80 BID) and Spironolactone 200 qd  - Continue Midodrine 20mg q8 hours.  Off Levo this am  - SCDs  - Mobilize OOB as tolerated  - Appreciate Hepatology recommendations and ICU care     34 y/o F with protein S deficiency, chronic Budd-Chiari syndrome with chronic IVC and hepatic vein occlusion (on home Xarelto), and decompensated cirrhosis complicated by ascites, right hepatic hydrothorax, non-bleeding EV, and splenomegaly, also with IVC thrombosis (s/p IVC thrombectomy and venography on 4/20/21, with findings of narrowed segment of IVC unable to be traversed, with retrograde IVC flow into very large azgous collateral and then SVC), now s/p elective TIPS and repeat right thoracentesis     Decompensated Cirrhosis secondary to chronic hepatic vein occlusion  - S/P TIPS 5/11.   - S/P Thoracentesis on 5/11, 5/13.  Improved, stable respiratory status this am.  No O2 requirements.    - Diet as tolerated.  On lovenox per hepatology recs.    - Continue Lasix 80mg QD (home dose 80 BID) and Spironolactone 200 qd  - Continue Midodrine 20mg q8 hours.  Off Levo this am  - SCDs  - Mobilize OOB as tolerated  - Transfer care to medicine service. Continue care as per Hepatology.  -  Transplant surgery will continue to follow along

## 2021-05-17 NOTE — PROGRESS NOTE ADULT - SUBJECTIVE AND OBJECTIVE BOX
Transplant Surgery - Multidisciplinary Rounds  --------------------------------------------------------------  Tips, Thoracentesis   Date: 5/11/21    Present:   Patient seen with multidisciplinary team including ( Transplant Surgeon: Dr. Elias. ACPs: Marylou Davila during am rounds and examined with Dr. Elias.    Disciplines not in attendance will be notified of the plan.     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    Decompensated cirrhosis due to chronic hepatic vein occlusion  - TIPS 5/11  - varices: small in 2017  - ascites: recurrent, on lasix 80mg BID/spironolactone 200mg QD at home c/b hepatic hydrothorax. Thoracentesis 4/26 (850ml), 5/11 (1L)  - HE: no hx  - HCC: none on imaging  - MELD-Na = 13 on 5/12  Protein S deficiency    Interval Events:  - Off Levo drip since 7:30am.  MAP >65  - Feels better.  Denies SOB, chest pressure. Able to sleep flat. No O2 requriements    Potential Discharge date: pending clinical improvement    Education:  Medications      MEDICATIONS  (STANDING):  albumin human 25% IVPB 100 milliLiter(s) IV Intermittent every 8 hours  chlorhexidine 2% Cloths 1 Application(s) Topical <User Schedule>  enoxaparin Injectable 40 milliGRAM(s) SubCutaneous every 12 hours  furosemide    Tablet 80 milliGRAM(s) Oral every 24 hours  midodrine 20 milliGRAM(s) Oral every 8 hours  multivitamin 1 Tablet(s) Oral daily  pantoprazole    Tablet 40 milliGRAM(s) Oral before breakfast  polyethylene glycol 3350 17 Gram(s) Oral daily  senna 2 Tablet(s) Oral at bedtime  spironolactone 200 milliGRAM(s) Oral daily    MEDICATIONS  (PRN):  acetaminophen   Tablet .. 500 milliGRAM(s) Oral every 6 hours PRN Mild Pain (1 - 3)  melatonin 5 milliGRAM(s) Oral at bedtime PRN Insomnia  oxyCODONE    IR 5 milliGRAM(s) Oral every 4 hours PRN Severe Pain (7 - 10)      PAST MEDICAL & SURGICAL HISTORY:  Cirrhosis  2010    Cirrhosis    Budd-Chiari syndrome    H/O protein S deficiency    Miscarriage  x 5    H/O protein S deficiency    No significant past surgical history        Vital Signs Last 24 Hrs  T(C): 36.2 (17 May 2021 07:00), Max: 37.7 (16 May 2021 15:00)  T(F): 97.2 (17 May 2021 07:00), Max: 99.9 (16 May 2021 15:00)  HR: 86 (17 May 2021 10:45) (69 - 108)  BP: 89/57 (17 May 2021 10:30) (77/51 - 108/72)  BP(mean): 67 (17 May 2021 10:30) (59 - 86)  RR: 33 (17 May 2021 10:45) (9 - 47)  SpO2: 92% (17 May 2021 10:45) (89% - 98%)    I&O's Summary    16 May 2021 07:01  -  17 May 2021 07:00  --------------------------------------------------------  IN: 762.8 mL / OUT: 950 mL / NET: -187.2 mL                            9.4    4.45  )-----------( 113      ( 16 May 2021 23:58 )             30.5     05-16    139  |  104  |  12  ----------------------------<  109<H>  4.6   |  22  |  0.61    Ca    9.7      16 May 2021 23:58  Phos  3.2     05-16  Mg     2.1     05-16    TPro  6.9  /  Alb  4.9  /  TBili  2.3<H>  /  DBili  0.7<H>  /  AST  21  /  ALT  19  /  AlkPhos  197<H>  05-16      Review of systems  Gen: No weight changes, fatigue, fevers/chills, weakness  Skin: No rashes  Head/Eyes/Ears/Mouth: No headache; Normal hearing; Normal vision w/o blurriness; No sinus pain/discomfort, sore throat  Respiratory: Denies SOB, cough, wheezing, hemoptysis  CV: No PND, orthopnea  GI: Denies diarrhea, constipation, nausea, vomiting, melena, hematochezia  : No increased frequency, dysuria, hematuria, nocturia  MSK: No joint pain/swelling; no back pain; no edema  Neuro: No dizziness/lightheadedness, weakness, seizures, numbness, tingling  Heme: No easy bruising or bleeding  Endo: No heat/cold intolerance  Psych: No significant nervousness, anxiety, stress, depression  All other systems were reviewed and are negative, except as noted.      PHYSICAL EXAM:  Constitutional: Well developed / well nourished  Eyes: Anicteric, PERRLA  ENMT: nc/at  Neck:  supple  Respiratory: diminished R side  Cardiovascular: RRR  Gastrointestinal: Soft abdomen, slight distention, NT  Genitourinary: voiding  Extremities: SCD's in place and working bilaterally, No edema  Vascular: Palpable dp pulses bilaterally  Neurological: A&O x3  Skin: intact.  Incisions healing  Musculoskeletal: Moving all extremities  Psychiatric: Responsive

## 2021-05-18 LAB
ALBUMIN SERPL ELPH-MCNC: 4.8 G/DL — SIGNIFICANT CHANGE UP (ref 3.3–5)
ALP SERPL-CCNC: 174 U/L — HIGH (ref 40–120)
ALT FLD-CCNC: 15 U/L — SIGNIFICANT CHANGE UP (ref 10–45)
ANION GAP SERPL CALC-SCNC: 18 MMOL/L — HIGH (ref 5–17)
APTT BLD: 44.1 SEC — HIGH (ref 27.5–35.5)
AST SERPL-CCNC: 20 U/L — SIGNIFICANT CHANGE UP (ref 10–40)
BILIRUB DIRECT SERPL-MCNC: 0.7 MG/DL — HIGH (ref 0–0.2)
BILIRUB INDIRECT FLD-MCNC: 2.3 MG/DL — HIGH (ref 0.2–1)
BILIRUB SERPL-MCNC: 3 MG/DL — HIGH (ref 0.2–1.2)
BUN SERPL-MCNC: 12 MG/DL — SIGNIFICANT CHANGE UP (ref 7–23)
CALCIUM SERPL-MCNC: 10 MG/DL — SIGNIFICANT CHANGE UP (ref 8.4–10.5)
CHLORIDE SERPL-SCNC: 105 MMOL/L — SIGNIFICANT CHANGE UP (ref 96–108)
CO2 SERPL-SCNC: 18 MMOL/L — LOW (ref 22–31)
CREAT SERPL-MCNC: 0.6 MG/DL — SIGNIFICANT CHANGE UP (ref 0.5–1.3)
GLUCOSE SERPL-MCNC: 78 MG/DL — SIGNIFICANT CHANGE UP (ref 70–99)
HCT VFR BLD CALC: 31 % — LOW (ref 34.5–45)
HGB BLD-MCNC: 9.7 G/DL — LOW (ref 11.5–15.5)
INR BLD: 1.16 RATIO — SIGNIFICANT CHANGE UP (ref 0.88–1.16)
MAGNESIUM SERPL-MCNC: 2.1 MG/DL — SIGNIFICANT CHANGE UP (ref 1.6–2.6)
MCHC RBC-ENTMCNC: 27 PG — SIGNIFICANT CHANGE UP (ref 27–34)
MCHC RBC-ENTMCNC: 31.3 GM/DL — LOW (ref 32–36)
MCV RBC AUTO: 86.4 FL — SIGNIFICANT CHANGE UP (ref 80–100)
NRBC # BLD: 0 /100 WBCS — SIGNIFICANT CHANGE UP (ref 0–0)
PHOSPHATE SERPL-MCNC: 4.1 MG/DL — SIGNIFICANT CHANGE UP (ref 2.5–4.5)
PLATELET # BLD AUTO: 114 K/UL — LOW (ref 150–400)
POTASSIUM SERPL-MCNC: 4.4 MMOL/L — SIGNIFICANT CHANGE UP (ref 3.5–5.3)
POTASSIUM SERPL-SCNC: 4.4 MMOL/L — SIGNIFICANT CHANGE UP (ref 3.5–5.3)
PROT SERPL-MCNC: 6.7 G/DL — SIGNIFICANT CHANGE UP (ref 6–8.3)
PROTHROM AB SERPL-ACNC: 13.8 SEC — HIGH (ref 10.6–13.6)
RBC # BLD: 3.59 M/UL — LOW (ref 3.8–5.2)
RBC # FLD: 17.9 % — HIGH (ref 10.3–14.5)
SODIUM SERPL-SCNC: 141 MMOL/L — SIGNIFICANT CHANGE UP (ref 135–145)
WBC # BLD: 3.31 K/UL — LOW (ref 3.8–10.5)
WBC # FLD AUTO: 3.31 K/UL — LOW (ref 3.8–10.5)

## 2021-05-18 PROCEDURE — 99233 SBSQ HOSP IP/OBS HIGH 50: CPT

## 2021-05-18 PROCEDURE — 99232 SBSQ HOSP IP/OBS MODERATE 35: CPT | Mod: GC

## 2021-05-18 PROCEDURE — 71045 X-RAY EXAM CHEST 1 VIEW: CPT | Mod: 26

## 2021-05-18 RX ORDER — HYDROMORPHONE HYDROCHLORIDE 2 MG/ML
0.25 INJECTION INTRAMUSCULAR; INTRAVENOUS; SUBCUTANEOUS ONCE
Refills: 0 | Status: DISCONTINUED | OUTPATIENT
Start: 2021-05-18 | End: 2021-05-18

## 2021-05-18 RX ADMIN — Medication 5 MILLIGRAM(S): at 22:11

## 2021-05-18 RX ADMIN — MIDODRINE HYDROCHLORIDE 20 MILLIGRAM(S): 2.5 TABLET ORAL at 22:11

## 2021-05-18 RX ADMIN — ENOXAPARIN SODIUM 40 MILLIGRAM(S): 100 INJECTION SUBCUTANEOUS at 05:15

## 2021-05-18 RX ADMIN — Medication 500 MILLIGRAM(S): at 01:17

## 2021-05-18 RX ADMIN — POLYETHYLENE GLYCOL 3350 17 GRAM(S): 17 POWDER, FOR SOLUTION ORAL at 12:19

## 2021-05-18 RX ADMIN — MIDODRINE HYDROCHLORIDE 20 MILLIGRAM(S): 2.5 TABLET ORAL at 15:17

## 2021-05-18 RX ADMIN — HYDROMORPHONE HYDROCHLORIDE 0.25 MILLIGRAM(S): 2 INJECTION INTRAMUSCULAR; INTRAVENOUS; SUBCUTANEOUS at 20:59

## 2021-05-18 RX ADMIN — Medication 1 TABLET(S): at 12:19

## 2021-05-18 RX ADMIN — CHLORHEXIDINE GLUCONATE 1 APPLICATION(S): 213 SOLUTION TOPICAL at 06:10

## 2021-05-18 RX ADMIN — Medication 500 MILLIGRAM(S): at 06:13

## 2021-05-18 RX ADMIN — HYDROMORPHONE HYDROCHLORIDE 0.25 MILLIGRAM(S): 2 INJECTION INTRAMUSCULAR; INTRAVENOUS; SUBCUTANEOUS at 20:28

## 2021-05-18 RX ADMIN — SENNA PLUS 2 TABLET(S): 8.6 TABLET ORAL at 22:11

## 2021-05-18 RX ADMIN — MIDODRINE HYDROCHLORIDE 20 MILLIGRAM(S): 2.5 TABLET ORAL at 06:16

## 2021-05-18 RX ADMIN — Medication 50 MILLILITER(S): at 15:18

## 2021-05-18 RX ADMIN — Medication 50 MILLILITER(S): at 22:11

## 2021-05-18 RX ADMIN — PANTOPRAZOLE SODIUM 40 MILLIGRAM(S): 20 TABLET, DELAYED RELEASE ORAL at 06:18

## 2021-05-18 RX ADMIN — Medication 50 MILLILITER(S): at 06:15

## 2021-05-18 NOTE — PROGRESS NOTE ADULT - ASSESSMENT
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 Admitted for post-procedure monitoring. Course c/b difficulty getting of pressors due to hypotension thought to be due to overdiuresis (diuretics required for reaccumulation of large R hepatic hydrothorax s/p multiple thoracenteses this admission).    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  -varices: small in 2017  -ascites: recurrent c/b recurrent hepatic hydrothorax, on lasix 80mg/spironolactone 200mg at home (increased at last outpatient visit)  -HE: no hx  -HCC: none on imaging  -MELD-Na = 12 on 5/18  #Protein S deficiency    Recommendations:  - Would reconsult IR for possible repeat thoracentesis  - Continue w/ Albumin 25% 100 mL IV q8h due to concern for intravascular volume depletion  - Continue w/ Midodrine at 20mg q8h, goal MAP > 60  - Keep Lasix at 80mg daily, spironolactone 200mg daily, would change hold parameters to ensure patient continues to get medication  - On lovenox (therapeutic), may have to hold for potential thora  - Closely monitor CBC, CMP, INR  - F/u IR and transplant surgery recs  - OOB, ambulate    Kane Orellana  Gastroenterology/Hepatology Fellow  Available via Microsoft Teams    NON-URGENT CONSULTS:  Please email [email or web address]  [email or web address]  AT NIGHT AND ON WEEKENDS:  Contact on-call GI fellow via answering service ([phone]) from 5pm-8am and on weekends/holidays  MONDAY-FRIDAY 8AM-5PM:  Pager# [phone] ([facility]) or [phone] ([facility])  GI Phone# [phone] ([facility])

## 2021-05-18 NOTE — CONSULT NOTE ADULT - SUBJECTIVE AND OBJECTIVE BOX
Interventional Radiology    Evaluate for Procedure:     HPI: 35y Female with recurrent right pleural effusion     Allergies:   Medications (Abx/Cardiac/Anticoagulation/Blood Products)  cefTRIAXone   IVPB: 100 mL/Hr IV Intermittent (05-17 @ 10:38)  enoxaparin Injectable: 40 milliGRAM(s) SubCutaneous (05-18 @ 05:15)  midodrine: 20 milliGRAM(s) Oral (05-18 @ 15:17)  spironolactone: 200 milliGRAM(s) Oral (05-17 @ 05:44)    Data:    T(C): 36.8  HR: 85  BP: 94/65  RR: 18  SpO2: 93%    -WBC 3.31 / HgB 9.7 / Hct 31.0 / Plt 114  -Na 141 / Cl 105 / BUN 12 / Glucose 78  -K 4.4 / CO2 18 / Cr 0.60  -ALT 15 / Alk Phos 174 / T.Bili 3.0  -INR 1.16 / PTT 44.1    Radiology:     Assessment/Plan:   -35y Female with recurrent R pleural effusion    The pt will be planned for Right thoracentesis by IR on 5/19/2021. Case reviewed by IR attending Dr. Dhaval Woo.   She does not need to be kept NPO for thoracentesis. Please recheck her COVID-19 PCR by tomorrow AM.  Case d/w primary team NP Sangita Frankel.     Cruz Marcum RPA-PRAVEEN  [facility] [id number]  Ext [phone]

## 2021-05-18 NOTE — CHART NOTE - NSCHARTNOTEFT_GEN_A_CORE
Medicine NP note  Notified by RN that patient c/o feeling shaky while albumin IVPB in progress, Evaluated the pt at bedside, Patient with no c/o fever, chills, shakiness, she sTates that she feels comfortable. ill c/w albumin  Repeat temperature to R/O Fever  Notify provider if pt symptomatic again  Discussed with RN  'signed out to day team    Geovanna Hogan [facility]  [phone]

## 2021-05-18 NOTE — PHYSICAL THERAPY INITIAL EVALUATION ADULT - PERTINENT HX OF CURRENT PROBLEM, REHAB EVAL
35F w/ PMHx of cryptogenic cirrhosis, portal hypertension with small esophageal varices, Protein S deficiency, COVID-19 pna (3/4/21) and chronic Budd Chiari syndrome c/b small ascites, R hepatic hydrothorax, small non bleeding esophageal varices (last EGD 2017), extensive intra-abdominal ysabel-systemic collaterals, splenomegaly, and caudate lobe hypertrophy

## 2021-05-18 NOTE — PROGRESS NOTE ADULT - SUBJECTIVE AND OBJECTIVE BOX
Chief Complaint:  Patient is a 35y old  Female who presents with a chief complaint of TIPS (17 May 2021 21:16)    Reason for consult: cirrhosis, s/p TIPS, hepatic hydrothorax    Interval Events: Pt feels better, remains w/ borderline BP, requiring O2 today, CXR shows large R effusion.    Hospital Medications:  acetaminophen   Tablet .. 500 milliGRAM(s) Oral every 6 hours PRN  albumin human 25% IVPB 100 milliLiter(s) IV Intermittent every 8 hours  chlorhexidine 2% Cloths 1 Application(s) Topical <User Schedule>  enoxaparin Injectable 40 milliGRAM(s) SubCutaneous every 12 hours  furosemide    Tablet 80 milliGRAM(s) Oral every 24 hours  melatonin 5 milliGRAM(s) Oral at bedtime PRN  midodrine 20 milliGRAM(s) Oral every 8 hours  multivitamin 1 Tablet(s) Oral daily  oxyCODONE    IR 5 milliGRAM(s) Oral every 4 hours PRN  pantoprazole    Tablet 40 milliGRAM(s) Oral before breakfast  polyethylene glycol 3350 17 Gram(s) Oral daily  senna 2 Tablet(s) Oral at bedtime  spironolactone 200 milliGRAM(s) Oral daily    ROS:   General:  No  fevers, chills, night sweats, fatigue  Eyes:  Good vision, no reported pain  ENT:  No sore throat, pain, runny nose  CV:  No pain, palpitations  Pulm:  No cough  GI:  See HPI, otherwise negative  :  No  incontinence, nocturia  Muscle:  No pain, weakness  Neuro:  No memory problems  Psych:  No insomnia, mood problems, depression  Endocrine:  No polyuria, polydipsia, cold/heat intolerance  Heme:  No petechiae, ecchymosis, easy bruisability  Skin:  No rash    PHYSICAL EXAM:   Vital Signs:  Vital Signs Last 24 Hrs  T(C): 36.3 (18 May 2021 08:39), Max: 36.8 (18 May 2021 01:00)  T(F): 97.3 (18 May 2021 08:39), Max: 98.2 (18 May 2021 01:00)  HR: 100 (18 May 2021 11:15) (75 - 100)  BP: 99/68 (18 May 2021 11:15) (84/53 - 103/66)  BP(mean): 66 (17 May 2021 19:00) (63 - 80)  RR: 18 (18 May 2021 11:15) (16 - 55)  SpO2: 94% (18 May 2021 11:15) (82% - 99%)  Daily     Daily Weight in k.4 (18 May 2021 08:12)    GENERAL: no acute distress  NEURO: alert  HEENT: anicteric sclera, no conjunctival pallor appreciated  CHEST: no respiratory distress, no accessory muscle use, decreased BS entire R chest  CARDIAC: regular rate, rhythm  ABDOMEN: soft, non-tender, non-distended, no rebound or guarding  EXTREMITIES: warm, well perfused, no edema  SKIN: no lesions noted    LABS: reviewed                        9.7    3.31  )-----------( 114      ( 18 May 2021 07:24 )             31.0     05-    141  |  105  |  12  ----------------------------<  78  4.4   |  18<L>  |  0.60    Ca    10.0      18 May 2021 07:11  Phos  4.1     05-18  Mg     2.1     05-18    TPro  6.7  /  Alb  4.8  /  TBili  3.0<H>  /  DBili  0.7<H>  /  AST  20  /  ALT  15  /  AlkPhos  174<H>  05-18    LIVER FUNCTIONS - ( 18 May 2021 07:11 )  Alb: 4.8 g/dL / Pro: 6.7 g/dL / ALK PHOS: 174 U/L / ALT: 15 U/L / AST: 20 U/L / GGT: x             Interval Diagnostic Studies: see sunrise for full report

## 2021-05-18 NOTE — PROGRESS NOTE ADULT - SUBJECTIVE AND OBJECTIVE BOX
[facility] Division of Hospital Medicine  Darrick Story MD  Pager (M-F, 3Q-7H): [phone]  Other Times:  [phone]    Patient is a 35y old  Female who presents with a chief complaint of TIPS (18 May 2021 10:45)    SUBJECTIVE / OVERNIGHT EVENTS: RN present to translate in [place]. pt notes she is having chills during albumin infusions. minimal sob and cp. otherwise feels ok   ADDITIONAL REVIEW OF SYSTEMS:    MEDICATIONS  (STANDING):  albumin human 25% IVPB 100 milliLiter(s) IV Intermittent every 8 hours  chlorhexidine 2% Cloths 1 Application(s) Topical <User Schedule>  enoxaparin Injectable 40 milliGRAM(s) SubCutaneous every 12 hours  furosemide    Tablet 80 milliGRAM(s) Oral every 24 hours  midodrine 20 milliGRAM(s) Oral every 8 hours  multivitamin 1 Tablet(s) Oral daily  pantoprazole    Tablet 40 milliGRAM(s) Oral before breakfast  polyethylene glycol 3350 17 Gram(s) Oral daily  senna 2 Tablet(s) Oral at bedtime  spironolactone 200 milliGRAM(s) Oral daily    MEDICATIONS  (PRN):  acetaminophen   Tablet .. 500 milliGRAM(s) Oral every 6 hours PRN Mild Pain (1 - 3)  melatonin 5 milliGRAM(s) Oral at bedtime PRN Insomnia  oxyCODONE    IR 5 milliGRAM(s) Oral every 4 hours PRN Severe Pain (7 - 10)      CAPILLARY BLOOD GLUCOSE        I&O's Summary    17 May 2021 07:01  -  18 May 2021 07:00  --------------------------------------------------------  IN: 500 mL / OUT: 0 mL / NET: 500 mL    18 May 2021 07:01  -  18 May 2021 14:54  --------------------------------------------------------  IN: 360 mL / OUT: 0 mL / NET: 360 mL        PHYSICAL EXAM:  Vital Signs Last 24 Hrs  T(C): 36.8 (18 May 2021 11:56), Max: 36.8 (18 May 2021 01:00)  T(F): 98.3 (18 May 2021 11:56), Max: 98.3 (18 May 2021 11:56)  HR: 85 (18 May 2021 11:56) (75 - 100)  BP: 94/65 (18 May 2021 11:56) (88/59 - 103/66)  BP(mean): 66 (17 May 2021 19:00) (66 - 80)  RR: 18 (18 May 2021 11:56) (16 - 35)  SpO2: 93% (18 May 2021 11:56) (88% - 99%)  GENERAL: NAD, thin woman appearing stated age   HEAD:  Atraumatic, normocephalic  EYES: EOMI, conjunctiva and sclera clear  NECK: Supple, no JVD  CHEST/LUNG: decreased BS R lung, CTA L lung, no wheezing, no resp distress   HEART: Regular rate and rhythm; no murmurs  ABDOMEN: Soft, nontender, nondistended; bowel sounds present  EXTREMITIES:  2+ Peripheral Pulses, no edema  PSYCH: calm affect, not anxious  NEUROLOGY: non-focal, AAOx3  SKIN: No rashes or lesions  MUSCULOSKELETAL: no back pain, moving all extremities     LABS:                        9.7    3.31  )-----------( 114      ( 18 May 2021 07:24 )             31.0     05-18    141  |  105  |  12  ----------------------------<  78  4.4   |  18<L>  |  0.60    Ca    10.0      18 May 2021 07:11  Phos  4.1     05-18  Mg     2.1     05-18    TPro  6.7  /  Alb  4.8  /  TBili  3.0<H>  /  DBili  0.7<H>  /  AST  20  /  ALT  15  /  AlkPhos  174<H>  05-18    PT/INR - ( 18 May 2021 07:24 )   PT: 13.8 sec;   INR: 1.16 ratio         PTT - ( 18 May 2021 07:24 )  PTT:44.1 sec            RADIOLOGY & ADDITIONAL TESTS:  Results Reviewed:   Imaging Personally Reviewed:  Electrocardiogram Personally Reviewed:    COORDINATION OF CARE:  Care Discussed with Consultants/Other Providers [Y/N]:  Prior or Outpatient Records Reviewed [Y/N]:   [facility] Division of Hospital Medicine  Darrick Story MD  Pager (M-F, 2W-5T): [phone]  Other Times:  [phone]    Patient is a 35y old  Female who presents with a chief complaint of TIPS (18 May 2021 10:45)    SUBJECTIVE / OVERNIGHT EVENTS: RN present to translate in French. pt notes she is having chills during albumin infusions. minimal sob and cp. otherwise feels ok   ADDITIONAL REVIEW OF SYSTEMS:    MEDICATIONS  (STANDING):  albumin human 25% IVPB 100 milliLiter(s) IV Intermittent every 8 hours  chlorhexidine 2% Cloths 1 Application(s) Topical <User Schedule>  enoxaparin Injectable 40 milliGRAM(s) SubCutaneous every 12 hours  furosemide    Tablet 80 milliGRAM(s) Oral every 24 hours  midodrine 20 milliGRAM(s) Oral every 8 hours  multivitamin 1 Tablet(s) Oral daily  pantoprazole    Tablet 40 milliGRAM(s) Oral before breakfast  polyethylene glycol 3350 17 Gram(s) Oral daily  senna 2 Tablet(s) Oral at bedtime  spironolactone 200 milliGRAM(s) Oral daily    MEDICATIONS  (PRN):  acetaminophen   Tablet .. 500 milliGRAM(s) Oral every 6 hours PRN Mild Pain (1 - 3)  melatonin 5 milliGRAM(s) Oral at bedtime PRN Insomnia  oxyCODONE    IR 5 milliGRAM(s) Oral every 4 hours PRN Severe Pain (7 - 10)      CAPILLARY BLOOD GLUCOSE        I&O's Summary    17 May 2021 07:01  -  18 May 2021 07:00  --------------------------------------------------------  IN: 500 mL / OUT: 0 mL / NET: 500 mL    18 May 2021 07:01  -  18 May 2021 14:54  --------------------------------------------------------  IN: 360 mL / OUT: 0 mL / NET: 360 mL        PHYSICAL EXAM:  Vital Signs Last 24 Hrs  T(C): 36.8 (18 May 2021 11:56), Max: 36.8 (18 May 2021 01:00)  T(F): 98.3 (18 May 2021 11:56), Max: 98.3 (18 May 2021 11:56)  HR: 85 (18 May 2021 11:56) (75 - 100)  BP: 94/65 (18 May 2021 11:56) (88/59 - 103/66)  BP(mean): 66 (17 May 2021 19:00) (66 - 80)  RR: 18 (18 May 2021 11:56) (16 - 35)  SpO2: 93% (18 May 2021 11:56) (88% - 99%)  GENERAL: NAD, thin woman appearing stated age   EYES: EOMI, conjunctiva and sclera clear  NECK: Supple, no JVD  CHEST/LUNG: decreased BS R lung, CTA L lung, no wheezing, no resp distress   HEART: Regular rate and rhythm; no murmurs  ABDOMEN: firm, nontender, distended; bowel sounds present  EXTREMITIES:  2+ Peripheral Pulses, no edema  PSYCH: calm affect, not anxious  NEUROLOGY: non-focal, AAOx3  SKIN: No rashes or lesions  MUSCULOSKELETAL: no back pain, moving all extremities     LABS:                        9.7    3.31  )-----------( 114      ( 18 May 2021 07:24 )             31.0     05-18    141  |  105  |  12  ----------------------------<  78  4.4   |  18<L>  |  0.60    Ca    10.0      18 May 2021 07:11  Phos  4.1     05-18  Mg     2.1     05-18    TPro  6.7  /  Alb  4.8  /  TBili  3.0<H>  /  DBili  0.7<H>  /  AST  20  /  ALT  15  /  AlkPhos  174<H>  05-18    PT/INR - ( 18 May 2021 07:24 )   PT: 13.8 sec;   INR: 1.16 ratio         PTT - ( 18 May 2021 07:24 )  PTT:44.1 sec    RADIOLOGY & ADDITIONAL TESTS:  Results Reviewed:   Imaging Personally Reviewed:  Electrocardiogram Personally Reviewed:    COORDINATION OF CARE:  Care Discussed with Consultants/Other Providers [Y/N]:  Prior or Outpatient Records Reviewed [Y/N]:

## 2021-05-18 NOTE — PHYSICAL THERAPY INITIAL EVALUATION ADULT - PRECAUTIONS/LIMITATIONS, REHAB EVAL
5/11/21 underwent scheduled IR procedure Transjugular Intrahepatic Portosystemic Shunt (TIPS), RT thoracentesis with 1L straw yellow pleural fluid, admit to SICU for monitoring. S/P TIPS 5/11. S/P Thoracentesis on 5/11, 5/13.  IVC thrombosis, failed thrombectomy with IR last month on Xarelto at home, currently on full dose lovenox - switch to xarelto on discharge per hepatology. 5/11/21 underwent scheduled IR procedure Transjugular Intrahepatic Portosystemic Shunt (TIPS), RT thoracentesis with 1L straw yellow pleural fluid, admit to SICU for monitoring. S/P TIPS 5/11. S/P Thoracentesis on 5/11, 5/13.  IVC thrombosis, failed thrombectomy with IR last month on Xarelto at home, currently on full dose lovenox - switch to xarelto on discharge per hepatology./no known precautions/limitations

## 2021-05-19 LAB
ALBUMIN SERPL ELPH-MCNC: 5 G/DL — SIGNIFICANT CHANGE UP (ref 3.3–5)
ALP SERPL-CCNC: 181 U/L — HIGH (ref 40–120)
ALT FLD-CCNC: 15 U/L — SIGNIFICANT CHANGE UP (ref 10–45)
ANION GAP SERPL CALC-SCNC: 15 MMOL/L — SIGNIFICANT CHANGE UP (ref 5–17)
APTT BLD: 41 SEC — HIGH (ref 27.5–35.5)
AST SERPL-CCNC: 23 U/L — SIGNIFICANT CHANGE UP (ref 10–40)
BILIRUB SERPL-MCNC: 2.3 MG/DL — HIGH (ref 0.2–1.2)
BUN SERPL-MCNC: 19 MG/DL — SIGNIFICANT CHANGE UP (ref 7–23)
CALCIUM SERPL-MCNC: 10.2 MG/DL — SIGNIFICANT CHANGE UP (ref 8.4–10.5)
CHLORIDE SERPL-SCNC: 105 MMOL/L — SIGNIFICANT CHANGE UP (ref 96–108)
CO2 SERPL-SCNC: 19 MMOL/L — LOW (ref 22–31)
CREAT SERPL-MCNC: 0.54 MG/DL — SIGNIFICANT CHANGE UP (ref 0.5–1.3)
GLUCOSE SERPL-MCNC: 95 MG/DL — SIGNIFICANT CHANGE UP (ref 70–99)
HCT VFR BLD CALC: 29.6 % — LOW (ref 34.5–45)
HGB BLD-MCNC: 9.1 G/DL — LOW (ref 11.5–15.5)
INR BLD: 1.17 RATIO — HIGH (ref 0.88–1.16)
MCHC RBC-ENTMCNC: 26.5 PG — LOW (ref 27–34)
MCHC RBC-ENTMCNC: 30.7 GM/DL — LOW (ref 32–36)
MCV RBC AUTO: 86.3 FL — SIGNIFICANT CHANGE UP (ref 80–100)
NRBC # BLD: 0 /100 WBCS — SIGNIFICANT CHANGE UP (ref 0–0)
PLATELET # BLD AUTO: 119 K/UL — LOW (ref 150–400)
POTASSIUM SERPL-MCNC: 4.3 MMOL/L — SIGNIFICANT CHANGE UP (ref 3.5–5.3)
POTASSIUM SERPL-SCNC: 4.3 MMOL/L — SIGNIFICANT CHANGE UP (ref 3.5–5.3)
PROT SERPL-MCNC: 7.3 G/DL — SIGNIFICANT CHANGE UP (ref 6–8.3)
PROTHROM AB SERPL-ACNC: 13.9 SEC — HIGH (ref 10.6–13.6)
RBC # BLD: 3.43 M/UL — LOW (ref 3.8–5.2)
RBC # FLD: 18.1 % — HIGH (ref 10.3–14.5)
SARS-COV-2 RNA SPEC QL NAA+PROBE: SIGNIFICANT CHANGE UP
SODIUM SERPL-SCNC: 139 MMOL/L — SIGNIFICANT CHANGE UP (ref 135–145)
WBC # BLD: 4.18 K/UL — SIGNIFICANT CHANGE UP (ref 3.8–10.5)
WBC # FLD AUTO: 4.18 K/UL — SIGNIFICANT CHANGE UP (ref 3.8–10.5)

## 2021-05-19 PROCEDURE — 99233 SBSQ HOSP IP/OBS HIGH 50: CPT

## 2021-05-19 PROCEDURE — 93010 ELECTROCARDIOGRAM REPORT: CPT

## 2021-05-19 PROCEDURE — 32555 ASPIRATE PLEURA W/ IMAGING: CPT | Mod: RT

## 2021-05-19 PROCEDURE — 99232 SBSQ HOSP IP/OBS MODERATE 35: CPT | Mod: GC

## 2021-05-19 RX ORDER — LANOLIN ALCOHOL/MO/W.PET/CERES
5 CREAM (GRAM) TOPICAL AT BEDTIME
Refills: 0 | Status: DISCONTINUED | OUTPATIENT
Start: 2021-05-19 | End: 2021-05-25

## 2021-05-19 RX ORDER — FUROSEMIDE 40 MG
80 TABLET ORAL DAILY
Refills: 0 | Status: DISCONTINUED | OUTPATIENT
Start: 2021-05-19 | End: 2021-05-20

## 2021-05-19 RX ORDER — SPIRONOLACTONE 25 MG/1
200 TABLET, FILM COATED ORAL DAILY
Refills: 0 | Status: DISCONTINUED | OUTPATIENT
Start: 2021-05-19 | End: 2021-05-23

## 2021-05-19 RX ORDER — OXYCODONE HYDROCHLORIDE 5 MG/1
5 TABLET ORAL EVERY 4 HOURS
Refills: 0 | Status: DISCONTINUED | OUTPATIENT
Start: 2021-05-20 | End: 2021-05-25

## 2021-05-19 RX ADMIN — PANTOPRAZOLE SODIUM 40 MILLIGRAM(S): 20 TABLET, DELAYED RELEASE ORAL at 08:19

## 2021-05-19 RX ADMIN — POLYETHYLENE GLYCOL 3350 17 GRAM(S): 17 POWDER, FOR SOLUTION ORAL at 11:49

## 2021-05-19 RX ADMIN — MIDODRINE HYDROCHLORIDE 20 MILLIGRAM(S): 2.5 TABLET ORAL at 14:05

## 2021-05-19 RX ADMIN — Medication 80 MILLIGRAM(S): at 16:28

## 2021-05-19 RX ADMIN — Medication 50 MILLILITER(S): at 06:49

## 2021-05-19 RX ADMIN — Medication 50 MILLILITER(S): at 22:57

## 2021-05-19 RX ADMIN — MIDODRINE HYDROCHLORIDE 20 MILLIGRAM(S): 2.5 TABLET ORAL at 22:55

## 2021-05-19 RX ADMIN — OXYCODONE HYDROCHLORIDE 5 MILLIGRAM(S): 5 TABLET ORAL at 11:48

## 2021-05-19 RX ADMIN — MIDODRINE HYDROCHLORIDE 20 MILLIGRAM(S): 2.5 TABLET ORAL at 06:49

## 2021-05-19 RX ADMIN — Medication 50 MILLILITER(S): at 14:06

## 2021-05-19 RX ADMIN — Medication 1 TABLET(S): at 11:49

## 2021-05-19 RX ADMIN — SENNA PLUS 2 TABLET(S): 8.6 TABLET ORAL at 22:55

## 2021-05-19 RX ADMIN — OXYCODONE HYDROCHLORIDE 5 MILLIGRAM(S): 5 TABLET ORAL at 12:18

## 2021-05-19 NOTE — PROGRESS NOTE ADULT - SUBJECTIVE AND OBJECTIVE BOX
Jorge Avila MD  Division of Hospital Medicine  Cell: [phone]  Pager: [phone]  Office: [phone]    Patient is a 35y old  Female who presents with a chief complaint of TIPS (19 May 2021 12:48)        SUBJECTIVE / OVERNIGHT EVENTS: Patient reports feeling some BOLIVAR/SOB.       MEDICATIONS  (STANDING):  albumin human 25% IVPB 100 milliLiter(s) IV Intermittent every 8 hours  chlorhexidine 2% Cloths 1 Application(s) Topical <User Schedule>  furosemide    Tablet 80 milliGRAM(s) Oral daily  melatonin 5 milliGRAM(s) Oral at bedtime  midodrine 20 milliGRAM(s) Oral every 8 hours  multivitamin 1 Tablet(s) Oral daily  pantoprazole    Tablet 40 milliGRAM(s) Oral before breakfast  polyethylene glycol 3350 17 Gram(s) Oral daily  senna 2 Tablet(s) Oral at bedtime  spironolactone 200 milliGRAM(s) Oral daily    MEDICATIONS  (PRN):  acetaminophen   Tablet .. 500 milliGRAM(s) Oral every 6 hours PRN Mild Pain (1 - 3)  oxyCODONE    IR 5 milliGRAM(s) Oral every 4 hours PRN Severe Pain (7 - 10)      Vital Signs Last 24 Hrs  T(C): 36.7 (19 May 2021 16:45), Max: 36.7 (18 May 2021 19:48)  T(F): 98.1 (19 May 2021 16:45), Max: 98.1 (18 May 2021 19:48)  HR: 85 (19 May 2021 16:45) (73 - 98)  BP: 100/65 (19 May 2021 16:45) (94/60 - 102/71)  BP(mean): 78 (18 May 2021 19:48) (78 - 78)  RR: 20 (19 May 2021 16:45) (16 - 20)  SpO2: 93% (19 May 2021 16:45) (93% - 97%)  CAPILLARY BLOOD GLUCOSE        I&O's Summary    18 May 2021 07:01  -  19 May 2021 07:00  --------------------------------------------------------  IN: 460 mL / OUT: 0 mL / NET: 460 mL          PHYSICAL EXAM:   GENERAL: NAD, thin  HEAD:  Atraumatic, Normocephalic  EYES: conjunctiva and sclera clear  NECK: Supple,    CHEST/LUNG: decreased bs in RLB  HEART: S1S2 normal. regular rate and rhythm; No murmurs, rubs, or gallops  ABDOMEN: Soft, Nontender, mildly distended; Bowel sounds present  EXTREMITIES:  no le edema  PSYCH/Neuro: AAOx3. Non-focal.   SKIN: No rashes or lesions      LABS:                        9.1    4.18  )-----------( 119      ( 19 May 2021 07:04 )             29.6     05-19    139  |  105  |  19  ----------------------------<  95  4.3   |  19<L>  |  0.54    Ca    10.2      19 May 2021 07:02  Phos  4.1     05-18  Mg     2.1     05-18    TPro  7.3  /  Alb  5.0  /  TBili  2.3<H>  /  DBili  x   /  AST  23  /  ALT  15  /  AlkPhos  181<H>  05-19    PT/INR - ( 19 May 2021 07:08 )   PT: 13.9 sec;   INR: 1.17 ratio         PTT - ( 19 May 2021 07:08 )  PTT:41.0 sec      x< from: Xray Chest 1 View- PORTABLE-Routine (Xray Chest 1 View- PORTABLE-Routine in AM.) (05.18.21 @ 08:53) >    IMPRESSION:    The heart is normal in size. Markedly increased in the right pleural effusion. The left lung is clear. No pneumothorax. No acute bony pathology could be identified.    < end of copied text >      RADIOLOGY & ADDITIONAL TESTS:    Imaging Personally Reviewed: Xray chest report.   Consultant(s) Notes Reviewed:  hepatology and IR and transplant  Care Discussed with Consultants/Other Providers:

## 2021-05-19 NOTE — PRE PROCEDURE NOTE - PRE PROCEDURE EVALUATION
Interventional Radiology Pre-Procedure Note    35y Female s/p TIPS on 5/11 and right thoracentesis on 5/13 in Interventional Radiology presenting for repeat thoracentesis. Imaging and labs reviewed, patient is an appropriate candidate for the procedure.     Procedure: Right sided thoracentesis    Diagnosis/Indication: Patient is a 35y old  Female who presents with a chief complaint of SOB(19 May 2021 12:48)      PAST MEDICAL & SURGICAL HISTORY:  Cirrhosis  2010    Cirrhosis    Budd-Chiari syndrome    H/O protein S deficiency    Miscarriage  x 5    H/O protein S deficiency    No significant past surgical history         Female    Allergies: No Known Allergies      LABS:  CBC Full  -  ( 19 May 2021 07:04 )  WBC Count : 4.18 K/uL  RBC Count : 3.43 M/uL  Hemoglobin : 9.1 g/dL  Hematocrit : 29.6 %  Platelet Count - Automated : 119 K/uL    05-19    139  |  105  |  19  ----------------------------<  95  4.3   |  19<L>  |  0.54    Ca    10.2      19 May 2021 07:02  Phos  4.1     05-18  Mg     2.1     05-18    TPro  7.3  /  Alb  5.0  /  TBili  2.3<H>  /  DBili  x   /  AST  23  /  ALT  15  /  AlkPhos  181<H>  05-19    PT/INR - ( 19 May 2021 07:08 )   PT: 13.9 sec;   INR: 1.17 ratio         PTT - ( 19 May 2021 07:08 )  PTT:41.0 sec    Plan:  -Right sided thoracentesis.  -Consent to be obtained.

## 2021-05-19 NOTE — PROGRESS NOTE ADULT - ASSESSMENT
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 Admitted for post-procedure monitoring. Course c/b difficulty getting of pressors due to hypotension thought to be due to overdiuresis and frequent and rapid reaccumulation of R hepatic hydrothorax (s/p multiple thoracenteses this admission.     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  -varices: small in 2017  -ascites: recurrent c/b recurrent hepatic hydrothorax, on lasix 80mg/spironolactone 200mg at home (increased at last outpatient visit)  -HE: no hx  -HCC: none on imaging  -MELD-Na = 12 on 5/18  #Hepatic hydrothorax - rapid reaccumulation even after TIPS, not being controlled w/ diuretics  #Protein S deficiency    Recommendations:  - Agree w/ repeat thoracentesis w/ IR today  - Will monitor for rate of reaccumulation, if its rapid again will have to discuss possible TIPS revision/dilation w/ IR (unclear if this is a viable option)  - Would stop albumin  - Continue w/ Midodrine at 20mg q8h, goal MAP > 60  - Keep Lasix at 80mg daily, spironolactone 200mg daily, would change hold parameters to ensure patient continues to get medication  - On lovenox (therapeutic), restart xarelto only on discharge  - Closely monitor CBC, CMP, INR  - F/u IR and transplant surgery recs  - OOB, ambulate    Kane Orellana  Gastroenterology/Hepatology Fellow  Available via [employer] Teams    NON-URGENT CONSULTS:  Please email [email or web address]  AT NIGHT AND ON WEEKENDS:  Contact on-call GI fellow via answering service ([phone]) from 5pm-8am and on weekends/holidays  MONDAY-FRIDAY 8AM-5PM:  Pager# [phone] ([facility]) or [phone] ([facility])  GI Phone# [phone] ([facility])

## 2021-05-19 NOTE — PROGRESS NOTE ADULT - PROBLEM SELECTOR PLAN 6
c/w lovenox 40mg BID for now - holding for thora  fall precautions   OOB as tolerated, PT eval  -Low sodium fluid restricted diet. RD eval.

## 2021-05-19 NOTE — PROGRESS NOTE ADULT - SUBJECTIVE AND OBJECTIVE BOX
Chief Complaint:  Patient is a 35y old  Female who presents with a chief complaint of TIPS (18 May 2021 19:06)    Reason for consult: cirrhosis, s/p TIPS, hepatic hydrothorax    Interval Events: Pt having more dyspnea again, liver enzymes stable, imaging shows reaccumulation of effusion, plan for thoracentesis by [facility] Medications:  acetaminophen   Tablet .. 500 milliGRAM(s) Oral every 6 hours PRN  albumin human 25% IVPB 100 milliLiter(s) IV Intermittent every 8 hours  chlorhexidine 2% Cloths 1 Application(s) Topical <User Schedule>  furosemide    Tablet 80 milliGRAM(s) Oral every 24 hours  melatonin 5 milliGRAM(s) Oral at bedtime PRN  midodrine 20 milliGRAM(s) Oral every 8 hours  multivitamin 1 Tablet(s) Oral daily  oxyCODONE    IR 5 milliGRAM(s) Oral every 4 hours PRN  pantoprazole    Tablet 40 milliGRAM(s) Oral before breakfast  polyethylene glycol 3350 17 Gram(s) Oral daily  senna 2 Tablet(s) Oral at bedtime  spironolactone 200 milliGRAM(s) Oral daily      ROS:   General:  No  fevers, chills, night sweats, fatigue  Eyes:  Good vision, no reported pain  ENT:  No sore throat, pain, runny nose  CV:  No pain, palpitations  Pulm:  No dyspnea, cough  GI:  See HPI, otherwise negative  :  No  incontinence, nocturia  Muscle:  No pain, weakness  Neuro:  No memory problems  Psych:  No insomnia, mood problems, depression  Endocrine:  No polyuria, polydipsia, cold/heat intolerance  Heme:  No petechiae, ecchymosis, easy bruisability  Skin:  No rash    PHYSICAL EXAM:   Vital Signs:  Vital Signs Last 24 Hrs  T(C): 36.7 (19 May 2021 04:51), Max: 36.8 (18 May 2021 11:56)  T(F): 98.1 (19 May 2021 04:51), Max: 98.3 (18 May 2021 11:56)  HR: 73 (19 May 2021 04:51) (73 - 100)  BP: 94/60 (19 May 2021 04:51) (94/60 - 102/66)  BP(mean): 78 (18 May 2021 19:48) (78 - 78)  RR: 18 (19 May 2021 04:51) (16 - 19)  SpO2: 96% (19 May 2021 04:51) (93% - 97%)  Daily     Daily     GENERAL: no acute distress  NEURO: alert  HEENT: anicteric sclera, no conjunctival pallor appreciated  CHEST: no respiratory distress, no accessory muscle use, decreased BS R side up until apex  CARDIAC: regular rate, rhythm  ABDOMEN: soft, non-tender, non-distended, no rebound or guarding  EXTREMITIES: warm, well perfused, no edema  SKIN: no lesions noted    LABS: reviewed                        9.1    4.18  )-----------( 119      ( 19 May 2021 07:04 )             29.6     05-19    139  |  105  |  19  ----------------------------<  95  4.3   |  19<L>  |  0.54    Ca    10.2      19 May 2021 07:02  Phos  4.1     05-18  Mg     2.1     05-18    TPro  7.3  /  Alb  5.0  /  TBili  2.3<H>  /  DBili  x   /  AST  23  /  ALT  15  /  AlkPhos  181<H>  05-19    LIVER FUNCTIONS - ( 19 May 2021 07:02 )  Alb: 5.0 g/dL / Pro: 7.3 g/dL / ALK PHOS: 181 U/L / ALT: 15 U/L / AST: 23 U/L / GGT: x             Interval Diagnostic Studies: see sunrise for full report

## 2021-05-19 NOTE — PROCEDURE NOTE - PROCEDURE FINDINGS AND DETAILS
1.5L serosanguinous drained  no complicatoins
-TIPS created between remnant hepatic veins/intrahepatic IVC and right portal vein  -pre-TIPS portal pressure 29, post-TIPS portal pressure 20, RA pressure 10, post-TIPS gradient 10mmHg  -right thoracentesis with aspiration of 1000cc straw yellow pleural fluid

## 2021-05-20 LAB
AFP-TM SERPL-MCNC: 1.8 NG/ML — SIGNIFICANT CHANGE UP
ALBUMIN SERPL ELPH-MCNC: 5.3 G/DL — HIGH (ref 3.3–5)
ALP SERPL-CCNC: 155 U/L — HIGH (ref 40–120)
ALT FLD-CCNC: 14 U/L — SIGNIFICANT CHANGE UP (ref 10–45)
ANION GAP SERPL CALC-SCNC: 20 MMOL/L — HIGH (ref 5–17)
APTT BLD: 42.1 SEC — HIGH (ref 27.5–35.5)
AST SERPL-CCNC: 27 U/L — SIGNIFICANT CHANGE UP (ref 10–40)
BILIRUB SERPL-MCNC: 3.4 MG/DL — HIGH (ref 0.2–1.2)
BUN SERPL-MCNC: 19 MG/DL — SIGNIFICANT CHANGE UP (ref 7–23)
CALCIUM SERPL-MCNC: 10.7 MG/DL — HIGH (ref 8.4–10.5)
CHLORIDE SERPL-SCNC: 100 MMOL/L — SIGNIFICANT CHANGE UP (ref 96–108)
CO2 SERPL-SCNC: 20 MMOL/L — LOW (ref 22–31)
CREAT SERPL-MCNC: 0.6 MG/DL — SIGNIFICANT CHANGE UP (ref 0.5–1.3)
FERRITIN SERPL-MCNC: 51 NG/ML — SIGNIFICANT CHANGE UP (ref 15–150)
FOLATE SERPL-MCNC: >20 NG/ML — SIGNIFICANT CHANGE UP
GLUCOSE SERPL-MCNC: 73 MG/DL — SIGNIFICANT CHANGE UP (ref 70–99)
HBV CORE AB SER-ACNC: REACTIVE
HBV SURFACE AB SER-ACNC: REACTIVE
HBV SURFACE AG SER-ACNC: SIGNIFICANT CHANGE UP
HCT VFR BLD CALC: 32.1 % — LOW (ref 34.5–45)
HGB BLD-MCNC: 9.7 G/DL — LOW (ref 11.5–15.5)
INR BLD: 1.22 RATIO — HIGH (ref 0.88–1.16)
IRON SATN MFR SERPL: 28 % — SIGNIFICANT CHANGE UP (ref 14–50)
IRON SATN MFR SERPL: 51 UG/DL — SIGNIFICANT CHANGE UP (ref 30–160)
MAGNESIUM SERPL-MCNC: 2.1 MG/DL — SIGNIFICANT CHANGE UP (ref 1.6–2.6)
MCHC RBC-ENTMCNC: 26.6 PG — LOW (ref 27–34)
MCHC RBC-ENTMCNC: 30.2 GM/DL — LOW (ref 32–36)
MCV RBC AUTO: 88.2 FL — SIGNIFICANT CHANGE UP (ref 80–100)
NRBC # BLD: 0 /100 WBCS — SIGNIFICANT CHANGE UP (ref 0–0)
NT-PROBNP SERPL-SCNC: 1882 PG/ML — HIGH (ref 0–300)
PHOSPHATE SERPL-MCNC: 3.9 MG/DL — SIGNIFICANT CHANGE UP (ref 2.5–4.5)
PLATELET # BLD AUTO: 151 K/UL — SIGNIFICANT CHANGE UP (ref 150–400)
POTASSIUM SERPL-MCNC: 4.3 MMOL/L — SIGNIFICANT CHANGE UP (ref 3.5–5.3)
POTASSIUM SERPL-SCNC: 4.3 MMOL/L — SIGNIFICANT CHANGE UP (ref 3.5–5.3)
PROT SERPL-MCNC: 7.7 G/DL — SIGNIFICANT CHANGE UP (ref 6–8.3)
PROTHROM AB SERPL-ACNC: 14.5 SEC — HIGH (ref 10.6–13.6)
RBC # BLD: 3.64 M/UL — LOW (ref 3.8–5.2)
RBC # FLD: 18.2 % — HIGH (ref 10.3–14.5)
SODIUM SERPL-SCNC: 140 MMOL/L — SIGNIFICANT CHANGE UP (ref 135–145)
TIBC SERPL-MCNC: 181 UG/DL — LOW (ref 220–430)
UIBC SERPL-MCNC: 130 UG/DL — SIGNIFICANT CHANGE UP (ref 110–370)
VIT B12 SERPL-MCNC: 863 PG/ML — SIGNIFICANT CHANGE UP (ref 232–1245)
WBC # BLD: 3.91 K/UL — SIGNIFICANT CHANGE UP (ref 3.8–10.5)
WBC # FLD AUTO: 3.91 K/UL — SIGNIFICANT CHANGE UP (ref 3.8–10.5)

## 2021-05-20 PROCEDURE — 99232 SBSQ HOSP IP/OBS MODERATE 35: CPT | Mod: GC

## 2021-05-20 PROCEDURE — 99231 SBSQ HOSP IP/OBS SF/LOW 25: CPT

## 2021-05-20 PROCEDURE — 71045 X-RAY EXAM CHEST 1 VIEW: CPT | Mod: 26

## 2021-05-20 PROCEDURE — 99233 SBSQ HOSP IP/OBS HIGH 50: CPT

## 2021-05-20 RX ORDER — ENOXAPARIN SODIUM 100 MG/ML
40 INJECTION SUBCUTANEOUS EVERY 12 HOURS
Refills: 0 | Status: DISCONTINUED | OUTPATIENT
Start: 2021-05-20 | End: 2021-05-25

## 2021-05-20 RX ORDER — BUMETANIDE 0.25 MG/ML
1 INJECTION INTRAMUSCULAR; INTRAVENOUS
Refills: 0 | Status: DISCONTINUED | OUTPATIENT
Start: 2021-05-20 | End: 2021-05-21

## 2021-05-20 RX ADMIN — ENOXAPARIN SODIUM 40 MILLIGRAM(S): 100 INJECTION SUBCUTANEOUS at 18:23

## 2021-05-20 RX ADMIN — CHLORHEXIDINE GLUCONATE 1 APPLICATION(S): 213 SOLUTION TOPICAL at 06:20

## 2021-05-20 RX ADMIN — PANTOPRAZOLE SODIUM 40 MILLIGRAM(S): 20 TABLET, DELAYED RELEASE ORAL at 08:41

## 2021-05-20 RX ADMIN — MIDODRINE HYDROCHLORIDE 20 MILLIGRAM(S): 2.5 TABLET ORAL at 21:50

## 2021-05-20 RX ADMIN — Medication 80 MILLIGRAM(S): at 08:42

## 2021-05-20 RX ADMIN — ENOXAPARIN SODIUM 40 MILLIGRAM(S): 100 INJECTION SUBCUTANEOUS at 06:21

## 2021-05-20 RX ADMIN — SENNA PLUS 2 TABLET(S): 8.6 TABLET ORAL at 21:50

## 2021-05-20 RX ADMIN — POLYETHYLENE GLYCOL 3350 17 GRAM(S): 17 POWDER, FOR SOLUTION ORAL at 11:33

## 2021-05-20 RX ADMIN — BUMETANIDE 1 MILLIGRAM(S): 0.25 INJECTION INTRAMUSCULAR; INTRAVENOUS at 14:57

## 2021-05-20 RX ADMIN — MIDODRINE HYDROCHLORIDE 20 MILLIGRAM(S): 2.5 TABLET ORAL at 14:39

## 2021-05-20 RX ADMIN — Medication 50 MILLILITER(S): at 06:21

## 2021-05-20 RX ADMIN — Medication 1 TABLET(S): at 11:33

## 2021-05-20 RX ADMIN — MIDODRINE HYDROCHLORIDE 20 MILLIGRAM(S): 2.5 TABLET ORAL at 06:20

## 2021-05-20 RX ADMIN — SPIRONOLACTONE 200 MILLIGRAM(S): 25 TABLET, FILM COATED ORAL at 10:11

## 2021-05-20 NOTE — CHART NOTE - NSCHARTNOTEFT_GEN_A_CORE
Nutrition Follow Up Note  Patient seen for malnutrition follow up and new consult for assessment and education.     Chart reviewed, events noted.    Source: [x] Patient       [x] EMR        [] RN        [] Family at bedside       [] Other:    -If unable to interview patient: [] Trach/Vent/BiPAP  [] Disoriented/confused/inappropriate to interview as per chart     Pt reports improved appetite and PO intake today, states consuming food from home. Noted 100% PO intake x 1 on () as per flow sheets. Denies drinking Mighty Shakes, states not eating any food if she drinks them, requests to discontinue them and refused other supplements. Denies difficulty chewing/swallowing. Denies nausea or vomiting. Complains of constipation with last BM 2 days ago () - refused prune juice, states taking Senna. Reviewed recommendations to optimize PO and protein intake. Pt denies having further questions/concerns about diet and nutrition; made aware RD remains available.     Diet Order:   Diet, Regular:   1500mL Fluid Restriction ([id number])  Low Sodium (-)  Mighty Shake supplement (200 calories, 6 gm protein) added to meal trays.     Weights:   Daily Weight in k.4 (-), Weight in k (-15) -?accuracy of weight fluctuations as pt S/P multiple thoracenteses this admission.     Nutritionally Pertinent MEDICATIONS  (STANDING):  buMETAnide  midodrine  multivitamin  pantoprazole    Tablet  polyethylene glycol 3350  senna  spironolactone    Pertinent Labs: () Na 140, BUN 19, Cr 0.60, BG 73, K+ 4.3, Phos 3.9, Mg 2.1, Alk Phos 155<H>, ALT/SGPT 14, AST/SGOT 27  A1C with Estimated Average Glucose Result: 5.7 % ()    Skin per nursing documentation: no pressure injuries.   Edema as per flow sheets: none.     Estimated Needs:   [x] no change since previous assessment  [] recalculated:     Previous Nutrition Diagnosis: Underweight/Severe Malnutrition  Nutrition Diagnosis is: [x] ongoing - being addressed with PO intake encouragement.     New Nutrition Diagnosis: [x] Not applicable    Nutrition Care Plan:  [x] In Progress     Nutrition Interventions:     Education Provided:       [x] Yes  [] No    Recommendations:      1. Continue regular + low salt diet + 1500 ml fluid restriction (defer fluids to team). Will continue to monitor as able and adjust as needed.   2. Encourage PO intake and obtain food preferences (pt did not have any at this time).   3. Continue Multivitamin as medically feasible to optimize nutrient intake.   4. Reviewed recommendations to optimize PO and protein intake.   5. Continue to obtain weights to identify changes if any.     Monitoring and Evaluation:   Continue to monitor nutritional intake, tolerance to diet prescription, weights, labs, skin integrity    RD remains available upon request and will follow up per protocol  Berkley Metz MS RDN CDN #[id number]

## 2021-05-20 NOTE — PROGRESS NOTE ADULT - PROBLEM SELECTOR PLAN 6
c/w lovenox 40mg BID for now - resumed after thora.   fall precautions   OOB as tolerated, PT eval  -Low sodium fluid restricted diet. RD eval.

## 2021-05-20 NOTE — PROGRESS NOTE ADULT - SUBJECTIVE AND OBJECTIVE BOX
Patient is a 35y old  Female who presents with a chief complaint of TIPS (20 May 2021 13:07)        SUBJECTIVE / OVERNIGHT EVENTS: In the morning patient had right-sided chest discomfort and SOB. But she says later it improved.       MEDICATIONS  (STANDING):  buMETAnide 1 milliGRAM(s) Oral two times a day  chlorhexidine 2% Cloths 1 Application(s) Topical <User Schedule>  enoxaparin Injectable 40 milliGRAM(s) SubCutaneous every 12 hours  melatonin 5 milliGRAM(s) Oral at bedtime  midodrine 20 milliGRAM(s) Oral every 8 hours  multivitamin 1 Tablet(s) Oral daily  pantoprazole    Tablet 40 milliGRAM(s) Oral before breakfast  polyethylene glycol 3350 17 Gram(s) Oral daily  senna 2 Tablet(s) Oral at bedtime  spironolactone 200 milliGRAM(s) Oral daily    MEDICATIONS  (PRN):  acetaminophen   Tablet .. 500 milliGRAM(s) Oral every 6 hours PRN Mild Pain (1 - 3)  oxyCODONE    IR 5 milliGRAM(s) Oral every 4 hours PRN Severe Pain (7 - 10)      Vital Signs Last 24 Hrs  T(C): 36.8 (21 May 2021 04:54), Max: 36.8 (20 May 2021 07:30)  T(F): 98.2 (21 May 2021 04:54), Max: 98.3 (20 May 2021 07:30)  HR: 71 (21 May 2021 04:54) (65 - 100)  BP: 97/66 (21 May 2021 04:30) (94/59 - 120/76)  BP(mean): --  RR: 18 (21 May 2021 04:54) (18 - 22)  SpO2: 99% (21 May 2021 04:54) (95% - 99%)  CAPILLARY BLOOD GLUCOSE        I&O's Summary    19 May 2021 07:01  -  20 May 2021 07:00  --------------------------------------------------------  IN: 100 mL / OUT: 0 mL / NET: 100 mL    20 May 2021 07:01  -  21 May 2021 05:12  --------------------------------------------------------  IN: 870 mL / OUT: 500 mL / NET: 370 mL          PHYSICAL EXAM:   GENERAL: NAD, thin  HEAD:  Atraumatic, Normocephalic  EYES:  conjunctiva and sclera clear  NECK: Supple,    CHEST/LUNG: reduced bs on right side.   HEART: S1S2 normal. Regular rate and rhythm; No murmurs, rubs, or gallops  ABDOMEN: Soft, Nontender, slightly distended; Bowel sounds present  EXTREMITIES:  No LE edema  PSYCH/Neuro: AAOx3. Non-focal.   SKIN: No rashes or lesions      LABS:                        9.7    3.91  )-----------( 151      ( 20 May 2021 07:33 )             32.1     05-20    140  |  100  |  19  ----------------------------<  73  4.3   |  20<L>  |  0.60    Ca    10.7<H>      20 May 2021 07:31  Phos  3.9     05-20  Mg     2.1     05-20    TPro  7.7  /  Alb  5.3<H>  /  TBili  3.4<H>  /  DBili  x   /  AST  27  /  ALT  14  /  AlkPhos  155<H>  05-20    PT/INR - ( 20 May 2021 07:33 )   PT: 14.5 sec;   INR: 1.22 ratio         PTT - ( 20 May 2021 07:33 )  PTT:42.1 sec        < from: Xray Chest 1 View- PORTABLE-Urgent (Xray Chest 1 View- PORTABLE-Urgent .) (05.20.21 @ 10:03) >  IMPRESSION:  Heart is normal in size.  Interval decrease in right pleural effusion with partial reaeration of the upper and mid right lung. Persistent lower right lung associated atelectasis. No pneumothorax.  The left lung is clear.    < end of copied text >      RADIOLOGY & ADDITIONAL TESTS:    Imaging Personally Reviewed: CXR report.   Consultant(s) Notes Reviewed:  hepatology, IR  Care Discussed with Consultants/Other Providers:

## 2021-05-20 NOTE — PROGRESS NOTE ADULT - SUBJECTIVE AND OBJECTIVE BOX
Chief Complaint:  Patient is a 35y old  Female who presents with a chief complaint of TIPS (20 May 2021 09:24)    Reason for consult: cirrhosis s/p TIPS, hepatic hydrothorax    Interval Events: S/p thora w/ 1.5L removed yesterday, remains on O2 w/ some chest pressure when exerting her self.    Hospital Medications:  acetaminophen   Tablet .. 500 milliGRAM(s) Oral every 6 hours PRN  chlorhexidine 2% Cloths 1 Application(s) Topical <User Schedule>  enoxaparin Injectable 40 milliGRAM(s) SubCutaneous every 12 hours  furosemide    Tablet 80 milliGRAM(s) Oral daily  melatonin 5 milliGRAM(s) Oral at bedtime  midodrine 20 milliGRAM(s) Oral every 8 hours  multivitamin 1 Tablet(s) Oral daily  oxyCODONE    IR 5 milliGRAM(s) Oral every 4 hours PRN  pantoprazole    Tablet 40 milliGRAM(s) Oral before breakfast  polyethylene glycol 3350 17 Gram(s) Oral daily  senna 2 Tablet(s) Oral at bedtime  spironolactone 200 milliGRAM(s) Oral daily      ROS:   General:  No  fevers, chills, night sweats, fatigue  Eyes:  Good vision, no reported pain  ENT:  No sore throat, pain, runny nose  CV:  No pain, palpitations  Pulm:  No cough  GI:  See HPI, otherwise negative  :  No  incontinence, nocturia  Muscle:  No pain, weakness  Neuro:  No memory problems  Psych:  No insomnia, mood problems, depression  Endocrine:  No polyuria, polydipsia, cold/heat intolerance  Heme:  No petechiae, ecchymosis, easy bruisability  Skin:  No rash    PHYSICAL EXAM:   Vital Signs:  Vital Signs Last 24 Hrs  T(C): 36.8 (20 May 2021 07:30), Max: 37.3 (19 May 2021 21:15)  T(F): 98.3 (20 May 2021 07:30), Max: 99.1 (19 May 2021 21:15)  HR: 100 (20 May 2021 09:51) (65 - 100)  BP: 105/75 (20 May 2021 09:51) (93/63 - 128/80)  BP(mean): --  RR: 22 (20 May 2021 09:51) (16 - 22)  SpO2: 95% (20 May 2021 09:51) (93% - 98%)  Daily     Daily     GENERAL: no acute distress  NEURO: alert  HEENT: anicteric sclera, no conjunctival pallor appreciated  CHEST: no respiratory distress, no accessory muscle use, decreased BS R base  CARDIAC: regular rate, rhythm  ABDOMEN: soft, non-tender, non-distended, no rebound or guarding  EXTREMITIES: warm, well perfused, no edema  SKIN: no lesions noted    LABS: reviewed                        9.7    3.91  )-----------( 151      ( 20 May 2021 07:33 )             32.1     05-20    140  |  100  |  19  ----------------------------<  73  4.3   |  20<L>  |  0.60    Ca    10.7<H>      20 May 2021 07:31  Phos  3.9     05-20  Mg     2.1     05-20    TPro  7.7  /  Alb  5.3<H>  /  TBili  3.4<H>  /  DBili  x   /  AST  27  /  ALT  14  /  AlkPhos  155<H>  05-20    LIVER FUNCTIONS - ( 20 May 2021 07:31 )  Alb: 5.3 g/dL / Pro: 7.7 g/dL / ALK PHOS: 155 U/L / ALT: 14 U/L / AST: 27 U/L / GGT: x             Interval Diagnostic Studies: see sunrise for full report

## 2021-05-20 NOTE — PROGRESS NOTE ADULT - SUBJECTIVE AND OBJECTIVE BOX
Interventional Radiology Follow-Up Note.      This is a 35y Female s/p thoracentesis on 5/19 in Interventional Radiology with Dr. Paris.   Reports pressure at the right chest . On 2 L Nc O2 94%. Denies pain, cough, sob.      Medication:     enoxaparin Injectable: (05-20)  furosemide    Tablet: (05-20)  midodrine: (05-20)    Vitals:   T(F): 98.3, Max: 99.1 (21:15)  HR: 65  BP: 120/76  RR: 18  SpO2: 97%    Physical Exam:  General: Nontoxic, in NAD.  Abdomen: soft, NTND.   chest site dressing c/d/i.     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 c/o chest pressure.     - chest x ray.  - Should d/c home and follow up as outpt for thoracentesis.   - d/w Dr. Ragsdale.  Please call IR at [phone] with any questions, concerns, or issues regarding above.       Interventional Radiology Follow-Up Note.      This is a 35y Female s/p thoracentesis on 5/19 in Interventional Radiology with Dr. Paris.   Reports pressure at the right chest . On 2 L Nc O2 94%. Denies pain, cough, sob.      Medication:     enoxaparin Injectable: (05-20)  furosemide    Tablet: (05-20)  midodrine: (05-20)    Vitals:   T(F): 98.3, Max: 99.1 (21:15)  HR: 65  BP: 120/76  RR: 18  SpO2: 97%    Physical Exam:  General: Nontoxic, in NAD.  Abdomen: soft, NTND.   chest site dressing c/d/i.     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 c/o chest pressure likely due to lung reexpansion,     - chest x ray.  - Hepatology follow up.  - d/w Dr. Ragsdale.  Please [place] at [phone] with any questions, concerns, or issues regarding above.

## 2021-05-20 NOTE — PROGRESS NOTE ADULT - ASSESSMENT
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 Admitted for post-procedure monitoring. Course c/b difficulty getting of pressors due to hypotension thought to be due to overdiuresis and frequent and rapid reaccumulation of R hepatic hydrothorax (s/p multiple thoracenteses this admission.     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  -varices: small in 2017  -ascites: recurrent c/b recurrent hepatic hydrothorax, on lasix 80mg/spironolactone 200mg at home (increased at last outpatient visit)  -HE: no hx  -HCC: none on imaging  -MELD-Na = 12 on 5/18  #Hepatic hydrothorax - rapid reaccumulation even after TIPS, not being controlled w/ diuretics  #Protein S deficiency    Recommendations:  - Would discontinue lasix and start bumex 1mg BID  - Continue spironolactone 200mg daily  - Check CXR tomorrow AM (5/21)  - Will monitor for rate of reaccumulation, if its rapid again will have to discuss possible TIPS revision/dilation w/ IR (unclear if this is a viable option)  - Continue w/ Midodrine at 20mg q8h, goal MAP > 60  - On lovenox (therapeutic), restart xarelto only on discharge  - Closely monitor CBC, CMP, INR  - F/u IR and transplant surgery recs  - OOB, ambulate  - Discharge home once patient able to make it every 1-2 weeks between thoracenteses     Kane Orellana  Gastroenterology/Hepatology Fellow  Available via [employer] Teams    NON-URGENT CONSULTS:  Please email [email or web address]  AT NIGHT AND ON WEEKENDS:  Contact on-call GI fellow via answering service ([phone]) from 5pm-8am and on weekends/holidays  MONDAY-FRIDAY 8AM-5PM:  Pager# [phone] ([facility]) or [phone] ([facility])  GI Phone# [phone] ([facility])

## 2021-05-21 LAB
ALBUMIN SERPL ELPH-MCNC: 5.9 G/DL — HIGH (ref 3.3–5)
ALP SERPL-CCNC: 164 U/L — HIGH (ref 40–120)
ALT FLD-CCNC: 17 U/L — SIGNIFICANT CHANGE UP (ref 10–45)
ANION GAP SERPL CALC-SCNC: 20 MMOL/L — HIGH (ref 5–17)
AST SERPL-CCNC: 31 U/L — SIGNIFICANT CHANGE UP (ref 10–40)
BASOPHILS # BLD AUTO: 0.03 K/UL — SIGNIFICANT CHANGE UP (ref 0–0.2)
BASOPHILS NFR BLD AUTO: 0.9 % — SIGNIFICANT CHANGE UP (ref 0–2)
BILIRUB SERPL-MCNC: 3.2 MG/DL — HIGH (ref 0.2–1.2)
BUN SERPL-MCNC: 18 MG/DL — SIGNIFICANT CHANGE UP (ref 7–23)
CALCIUM SERPL-MCNC: 11 MG/DL — HIGH (ref 8.4–10.5)
CHLORIDE SERPL-SCNC: 98 MMOL/L — SIGNIFICANT CHANGE UP (ref 96–108)
CO2 SERPL-SCNC: 20 MMOL/L — LOW (ref 22–31)
CREAT SERPL-MCNC: 0.68 MG/DL — SIGNIFICANT CHANGE UP (ref 0.5–1.3)
EOSINOPHIL # BLD AUTO: 0.13 K/UL — SIGNIFICANT CHANGE UP (ref 0–0.5)
EOSINOPHIL NFR BLD AUTO: 3.8 % — SIGNIFICANT CHANGE UP (ref 0–6)
GLUCOSE SERPL-MCNC: 80 MG/DL — SIGNIFICANT CHANGE UP (ref 70–99)
HCT VFR BLD CALC: 31.4 % — LOW (ref 34.5–45)
HGB BLD-MCNC: 9.9 G/DL — LOW (ref 11.5–15.5)
IMM GRANULOCYTES NFR BLD AUTO: 0.6 % — SIGNIFICANT CHANGE UP (ref 0–1.5)
INR BLD: 1.33 RATIO — HIGH (ref 0.88–1.16)
LYMPHOCYTES # BLD AUTO: 0.79 K/UL — LOW (ref 1–3.3)
LYMPHOCYTES # BLD AUTO: 23.1 % — SIGNIFICANT CHANGE UP (ref 13–44)
MCHC RBC-ENTMCNC: 27.4 PG — SIGNIFICANT CHANGE UP (ref 27–34)
MCHC RBC-ENTMCNC: 31.5 GM/DL — LOW (ref 32–36)
MCV RBC AUTO: 87 FL — SIGNIFICANT CHANGE UP (ref 80–100)
MONOCYTES # BLD AUTO: 0.44 K/UL — SIGNIFICANT CHANGE UP (ref 0–0.9)
MONOCYTES NFR BLD AUTO: 12.9 % — SIGNIFICANT CHANGE UP (ref 2–14)
NEUTROPHILS # BLD AUTO: 2.01 K/UL — SIGNIFICANT CHANGE UP (ref 1.8–7.4)
NEUTROPHILS NFR BLD AUTO: 58.7 % — SIGNIFICANT CHANGE UP (ref 43–77)
NRBC # BLD: 0 /100 WBCS — SIGNIFICANT CHANGE UP (ref 0–0)
PLATELET # BLD AUTO: 159 K/UL — SIGNIFICANT CHANGE UP (ref 150–400)
POTASSIUM SERPL-MCNC: 3.9 MMOL/L — SIGNIFICANT CHANGE UP (ref 3.5–5.3)
POTASSIUM SERPL-SCNC: 3.9 MMOL/L — SIGNIFICANT CHANGE UP (ref 3.5–5.3)
PROT SERPL-MCNC: 7.8 G/DL — SIGNIFICANT CHANGE UP (ref 6–8.3)
PROTHROM AB SERPL-ACNC: 15.8 SEC — HIGH (ref 10.6–13.6)
RBC # BLD: 3.61 M/UL — LOW (ref 3.8–5.2)
RBC # FLD: 18.2 % — HIGH (ref 10.3–14.5)
SODIUM SERPL-SCNC: 138 MMOL/L — SIGNIFICANT CHANGE UP (ref 135–145)
WBC # BLD: 3.42 K/UL — LOW (ref 3.8–10.5)
WBC # FLD AUTO: 3.42 K/UL — LOW (ref 3.8–10.5)

## 2021-05-21 PROCEDURE — 71045 X-RAY EXAM CHEST 1 VIEW: CPT | Mod: 26

## 2021-05-21 PROCEDURE — 99232 SBSQ HOSP IP/OBS MODERATE 35: CPT

## 2021-05-21 PROCEDURE — 99232 SBSQ HOSP IP/OBS MODERATE 35: CPT | Mod: GC

## 2021-05-21 PROCEDURE — 99233 SBSQ HOSP IP/OBS HIGH 50: CPT

## 2021-05-21 RX ORDER — BUMETANIDE 0.25 MG/ML
1 INJECTION INTRAMUSCULAR; INTRAVENOUS ONCE
Refills: 0 | Status: COMPLETED | OUTPATIENT
Start: 2021-05-21 | End: 2021-05-21

## 2021-05-21 RX ORDER — BUMETANIDE 0.25 MG/ML
2 INJECTION INTRAMUSCULAR; INTRAVENOUS
Refills: 0 | Status: DISCONTINUED | OUTPATIENT
Start: 2021-05-21 | End: 2021-05-23

## 2021-05-21 RX ADMIN — SPIRONOLACTONE 200 MILLIGRAM(S): 25 TABLET, FILM COATED ORAL at 05:27

## 2021-05-21 RX ADMIN — CHLORHEXIDINE GLUCONATE 1 APPLICATION(S): 213 SOLUTION TOPICAL at 12:43

## 2021-05-21 RX ADMIN — BUMETANIDE 2 MILLIGRAM(S): 0.25 INJECTION INTRAMUSCULAR; INTRAVENOUS at 17:47

## 2021-05-21 RX ADMIN — ENOXAPARIN SODIUM 40 MILLIGRAM(S): 100 INJECTION SUBCUTANEOUS at 17:46

## 2021-05-21 RX ADMIN — PANTOPRAZOLE SODIUM 40 MILLIGRAM(S): 20 TABLET, DELAYED RELEASE ORAL at 08:23

## 2021-05-21 RX ADMIN — MIDODRINE HYDROCHLORIDE 20 MILLIGRAM(S): 2.5 TABLET ORAL at 21:05

## 2021-05-21 RX ADMIN — SENNA PLUS 2 TABLET(S): 8.6 TABLET ORAL at 21:05

## 2021-05-21 RX ADMIN — BUMETANIDE 1 MILLIGRAM(S): 0.25 INJECTION INTRAMUSCULAR; INTRAVENOUS at 05:27

## 2021-05-21 RX ADMIN — POLYETHYLENE GLYCOL 3350 17 GRAM(S): 17 POWDER, FOR SOLUTION ORAL at 12:44

## 2021-05-21 RX ADMIN — MIDODRINE HYDROCHLORIDE 20 MILLIGRAM(S): 2.5 TABLET ORAL at 05:27

## 2021-05-21 RX ADMIN — MIDODRINE HYDROCHLORIDE 20 MILLIGRAM(S): 2.5 TABLET ORAL at 17:45

## 2021-05-21 RX ADMIN — Medication 1 TABLET(S): at 12:44

## 2021-05-21 RX ADMIN — ENOXAPARIN SODIUM 40 MILLIGRAM(S): 100 INJECTION SUBCUTANEOUS at 05:27

## 2021-05-21 RX ADMIN — BUMETANIDE 1 MILLIGRAM(S): 0.25 INJECTION INTRAMUSCULAR; INTRAVENOUS at 12:43

## 2021-05-21 NOTE — PROGRESS NOTE ADULT - SUBJECTIVE AND OBJECTIVE BOX
Chief Complaint:  Patient is a 35y old  Female who presents with a chief complaint of TIPS (20 May 2021 13:07)    Reason for consult: cirrhosis, hypoxia due to hepatic hydrothorax    Interval Events: Pt feeling better but reporting some persistent orthopnea. Noted some increased diuresis w/ switch to bumex. BP remains stable.     Hospital Medications:  acetaminophen   Tablet .. 500 milliGRAM(s) Oral every 6 hours PRN  buMETAnide 1 milliGRAM(s) Oral two times a day  chlorhexidine 2% Cloths 1 Application(s) Topical <User Schedule>  enoxaparin Injectable 40 milliGRAM(s) SubCutaneous every 12 hours  melatonin 5 milliGRAM(s) Oral at bedtime  midodrine 20 milliGRAM(s) Oral every 8 hours  multivitamin 1 Tablet(s) Oral daily  oxyCODONE    IR 5 milliGRAM(s) Oral every 4 hours PRN  pantoprazole    Tablet 40 milliGRAM(s) Oral before breakfast  polyethylene glycol 3350 17 Gram(s) Oral daily  senna 2 Tablet(s) Oral at bedtime  spironolactone 200 milliGRAM(s) Oral daily      ROS:   General:  No  fevers, chills, night sweats, fatigue  Eyes:  Good vision, no reported pain  ENT:  No sore throat, pain, runny nose  CV:  No pain, palpitations  Pulm:  No cough  GI:  See HPI, otherwise negative  :  No  incontinence, nocturia  Muscle:  No pain, weakness  Neuro:  No memory problems  Psych:  No insomnia, mood problems, depression  Endocrine:  No polyuria, polydipsia, cold/heat intolerance  Heme:  No petechiae, ecchymosis, easy bruisability  Skin:  No rash    PHYSICAL EXAM:   Vital Signs:  Vital Signs Last 24 Hrs  T(C): 36.8 (21 May 2021 04:54), Max: 36.8 (20 May 2021 14:30)  T(F): 98.2 (21 May 2021 04:54), Max: 98.3 (20 May 2021 14:30)  HR: 71 (21 May 2021 04:54) (71 - 100)  BP: 97/66 (21 May 2021 04:30) (94/59 - 105/75)  BP(mean): --  RR: 18 (21 May 2021 04:54) (18 - 22)  SpO2: 99% (21 May 2021 04:54) (95% - 99%)  Daily     Daily Weight in k.6 (21 May 2021 04:30)    GENERAL: no acute distress  NEURO: alert  HEENT: anicteric sclera, no conjunctival pallor appreciated  CHEST: no respiratory distress, no accessory muscle use, decreased BS R base  CARDIAC: regular rate, rhythm  ABDOMEN: soft, non-tender, non-distended, no rebound or guarding  EXTREMITIES: warm, well perfused, no edema  SKIN: no lesions noted    LABS: reviewed                        9.9    3.42  )-----------( 159      ( 21 May 2021 07:49 )             31.4     05-21    138  |  98  |  18  ----------------------------<  80  3.9   |  20<L>  |  0.68    Ca    11.0<H>      21 May 2021 07:49  Phos  3.9     05-20  Mg     2.1     05-20    TPro  7.8  /  Alb  5.9<H>  /  TBili  3.2<H>  /  DBili  x   /  AST  31  /  ALT  17  /  AlkPhos  164<H>  05-21    LIVER FUNCTIONS - ( 21 May 2021 07:49 )  Alb: 5.9 g/dL / Pro: 7.8 g/dL / ALK PHOS: 164 U/L / ALT: 17 U/L / AST: 31 U/L / GGT: x             Interval Diagnostic Studies: see sunrise for full report

## 2021-05-21 NOTE — PROGRESS NOTE ADULT - ASSESSMENT
Interventional Radiology Follow-Up Note    This is a 35y Female s/p TIPS and thoracentesis x2, in Interventional Radiology with Dr. Ragsdale.     S: Patient seen and examined @ bedside with Dr. Ragsdale. No complaints offered.     Medication:  buMETAnide: (05-21)  enoxaparin Injectable: (05-21)  furosemide    Tablet: (05-20)  midodrine: (05-21)  spironolactone: (05-21)    Vitals:   T(F): 98.2, Max: 98.3 (14:30)  HR: 71  BP: 97/66  RR: 18  SpO2: 99%    Physical Exam:  General: Nontoxic, in NAD, A&O x3.  Abdomen: soft, NTND, no peritoneal signs.    LABS:  WBC 3.42 / Hgb 9.9 / Hct 31.4 / Plt 159  Na 138 / K 3.9 / CO2 20 / Cl 98 / BUN 18 / Cr 0.68 / Glucose 80  ALT 17 / AST 31 / Alk Phos 164 / Tbili 3.2  Ptt -- / Pt 15.8 / INR 1.33    Preliminary Report:    No growth to date.      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    -continue global management per primary team  -trend vs/labs  -daily cxr  -f/u with hepatology  -pt to follow up with IR weekly for outpatient thoracentesis- can coordinate with booking at [phone]     Please call IR at extension [phone] with any questions, concerns, or issues regarding above.

## 2021-05-21 NOTE — PROGRESS NOTE ADULT - SUBJECTIVE AND OBJECTIVE BOX
Patient is a 35y old  Female who presents with a chief complaint of TIPS (21 May 2021 12:17)        SUBJECTIVE / OVERNIGHT EVENTS: Patient reports some SOB at nighttime when she's trying to sleep. She tends to use the oxygen for comfort.       MEDICATIONS  (STANDING):  buMETAnide 2 milliGRAM(s) Oral two times a day  chlorhexidine 2% Cloths 1 Application(s) Topical <User Schedule>  enoxaparin Injectable 40 milliGRAM(s) SubCutaneous every 12 hours  melatonin 5 milliGRAM(s) Oral at bedtime  midodrine 20 milliGRAM(s) Oral every 8 hours  multivitamin 1 Tablet(s) Oral daily  pantoprazole    Tablet 40 milliGRAM(s) Oral before breakfast  polyethylene glycol 3350 17 Gram(s) Oral daily  senna 2 Tablet(s) Oral at bedtime  spironolactone 200 milliGRAM(s) Oral daily    MEDICATIONS  (PRN):  acetaminophen   Tablet .. 500 milliGRAM(s) Oral every 6 hours PRN Mild Pain (1 - 3)  oxyCODONE    IR 5 milliGRAM(s) Oral every 4 hours PRN Severe Pain (7 - 10)      Vital Signs Last 24 Hrs  T(C): 36.8 (22 May 2021 05:00), Max: 36.8 (22 May 2021 05:00)  T(F): 98.2 (22 May 2021 05:00), Max: 98.2 (22 May 2021 05:00)  HR: 76 (22 May 2021 05:00) (76 - 120)  BP: 98/65 (22 May 2021 05:00) (91/60 - 102/67)  BP(mean): --  RR: 17 (22 May 2021 05:00) (17 - 20)  SpO2: 95% (22 May 2021 05:00) (83% - 98%)  CAPILLARY BLOOD GLUCOSE        I&O's Summary        PHYSICAL EXAM:   GENERAL: NAD, thin  HEAD:  Atraumatic, Normocephalic  EYES:  conjunctiva and sclera icteric.   NECK: Supple   CHEST/LUNG: decreased BS on right side.   HEART: S1S2 normal. Regular rate and rhythm; No murmurs, rubs, or gallops  ABDOMEN: Soft, Nontender, slightly distended; Bowel sounds present  EXTREMITIES:  No clubbing, cyanosis, or edema  PSYCH/Neuro: AAOx3. Non-focal.   SKIN: slight jaundice      LABS:                        9.9    3.42  )-----------( 159      ( 21 May 2021 07:49 )             31.4     05-21    138  |  98  |  18  ----------------------------<  80  3.9   |  20<L>  |  0.68    Ca    11.0<H>      21 May 2021 07:49  Phos  3.9     05-20  Mg     2.1     05-20    TPro  7.8  /  Alb  5.9<H>  /  TBili  3.2<H>  /  DBili  x   /  AST  31  /  ALT  17  /  AlkPhos  164<H>  05-21    PT/INR - ( 21 May 2021 07:49 )   PT: 15.8 sec;   INR: 1.33 ratio         PTT - ( 20 May 2021 07:33 )  PTT:42.1 sec        < from: Xray Chest 1 View- PORTABLE-Routine (Xray Chest 1 View- PORTABLE-Routine in AM.) (05.21.21 @ 08:56) >  IMPRESSION: Stable right pleural effusion with underlying consolidation/atelectasis.    < end of copied text >      RADIOLOGY & ADDITIONAL TESTS:    Imaging Personally Reviewed: CXR report.   Consultant(s) Notes Reviewed:  liver team, IR  Care Discussed with Consultants/Other Providers: hepatology

## 2021-05-21 NOTE — PROGRESS NOTE ADULT - ASSESSMENT
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 Admitted for post-procedure monitoring. Course c/b difficulty getting of pressors due to hypotension thought to be due to overdiuresis and frequent and rapid reaccumulation of R hepatic hydrothorax (s/p multiple thoracenteses this admission). Now switching diuretics to bumex and increasing dose.     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  -varices: small in 2017  -ascites: recurrent c/b recurrent hepatic hydrothorax, on lasix 80mg/spironolactone 200mg at home (increased at last outpatient visit)  -HE: no hx  -HCC: none on imaging  -MELD-Na = 12 on 5/18  #Hepatic hydrothorax - rapid reaccumulation even after TIPS, not being controlled w/ diuretics  #Protein S deficiency    Recommendations:  - Would increase bumex from 1 to 2 mg BID  - Continue spironolactone 200mg daily  - Check CXR tomorrow AM (5/21)  - Will monitor for rate of reaccumulation, check CXR on Sunday AM (5/23)  - Continue w/ Midodrine at 20mg q8h, goal MAP > 60  - On lovenox (therapeutic), restart xarelto only on discharge  - Closely monitor CBC, CMP, INR  - F/u IR and transplant surgery recs  - OOB, ambulate  - Discharge home once patient able to make it every 1-2 weeks between thoracenteses     Kane Orellana  Gastroenterology/Hepatology Fellow  Available via [employer] Teams    NON-URGENT CONSULTS:  Please email [email or web address]  AT NIGHT AND ON WEEKENDS:  Contact on-call GI fellow via answering service ([phone]) from 5pm-8am and on weekends/holidays  MONDAY-FRIDAY 8AM-5PM:  Pager# [phone] ([facility]) or [phone] ([facility])  GI Phone# [phone] ([facility])   35F w/ hx of protein S deficiency, 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 Admitted for post-procedure monitoring. Course c/b difficulty getting of pressors due to hypotension thought to be due to overdiuresis and frequent and rapid reaccumulation of R hepatic hydrothorax (s/p multiple thoracenteses this admission). Now switching diuretics to bumex and increasing dose.     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  -varices: small in 2017  -ascites: recurrent c/b recurrent hepatic hydrothorax, on lasix 80mg/spironolactone 200mg at home (increased at last outpatient visit)  -HE: no hx  -HCC: none on imaging  -MELD-Na = 12 on 5/18  #Hepatic hydrothorax - rapid reaccumulation even after TIPS, not being controlled w/ diuretics  #Protein S deficiency    Recommendations:  - Please check TTE w/ bubble study to r/o hepatopulmonary syndrome  - Would increase bumex from 1 to 2 mg BID  - Continue spironolactone 200mg daily  - Check CXR tomorrow AM (5/21)  - Will monitor for rate of reaccumulation, check CXR on Sunday AM (5/23)  - Continue w/ Midodrine at 20mg q8h, goal MAP > 60  - On lovenox (therapeutic), restart xarelto only on discharge  - Closely monitor CBC, CMP, INR  - F/u IR and transplant surgery recs  - OOB, ambulate  - Discharge home once patient able to make it every 1-2 weeks between thoracenteses     Kane Orellana  Gastroenterology/Hepatology Fellow  Available via [employer] Teams    NON-URGENT CONSULTS:  Please email [email or web address]  AT NIGHT AND ON WEEKENDS:  Contact on-call GI fellow via answering service ([phone]) from 5pm-8am and on weekends/holidays  MONDAY-FRIDAY 8AM-5PM:  Pager# [phone] ([facility]) or [phone] ([facility])  GI Phone# [phone] ([facility])

## 2021-05-21 NOTE — PROGRESS NOTE ADULT - PROBLEM SELECTOR PLAN 6
c/w lovenox 40mg BID for now  fall precautions   OOB as tolerated, PT eval  -Low sodium fluid restricted diet. RD eval.

## 2021-05-21 NOTE — PROGRESS NOTE ADULT - ASSESSMENT
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

## 2021-05-22 DIAGNOSIS — E83.52 HYPERCALCEMIA: ICD-10-CM

## 2021-05-22 LAB
ALBUMIN SERPL ELPH-MCNC: 5.8 G/DL — HIGH (ref 3.3–5)
ALP SERPL-CCNC: 168 U/L — HIGH (ref 40–120)
ALT FLD-CCNC: 22 U/L — SIGNIFICANT CHANGE UP (ref 10–45)
ANION GAP SERPL CALC-SCNC: 20 MMOL/L — HIGH (ref 5–17)
ANION GAP SERPL CALC-SCNC: 20 MMOL/L — HIGH (ref 5–17)
APTT BLD: 51.5 SEC — HIGH (ref 27.5–35.5)
AST SERPL-CCNC: 34 U/L — SIGNIFICANT CHANGE UP (ref 10–40)
BILIRUB SERPL-MCNC: 3 MG/DL — HIGH (ref 0.2–1.2)
BUN SERPL-MCNC: 23 MG/DL — SIGNIFICANT CHANGE UP (ref 7–23)
BUN SERPL-MCNC: 24 MG/DL — HIGH (ref 7–23)
CALCIUM SERPL-MCNC: 11.5 MG/DL — HIGH (ref 8.4–10.5)
CALCIUM SERPL-MCNC: 11.6 MG/DL — HIGH (ref 8.4–10.5)
CHLORIDE SERPL-SCNC: 97 MMOL/L — SIGNIFICANT CHANGE UP (ref 96–108)
CHLORIDE SERPL-SCNC: 97 MMOL/L — SIGNIFICANT CHANGE UP (ref 96–108)
CO2 SERPL-SCNC: 22 MMOL/L — SIGNIFICANT CHANGE UP (ref 22–31)
CO2 SERPL-SCNC: 23 MMOL/L — SIGNIFICANT CHANGE UP (ref 22–31)
CREAT SERPL-MCNC: 0.87 MG/DL — SIGNIFICANT CHANGE UP (ref 0.5–1.3)
CREAT SERPL-MCNC: 0.92 MG/DL — SIGNIFICANT CHANGE UP (ref 0.5–1.3)
GLUCOSE SERPL-MCNC: 78 MG/DL — SIGNIFICANT CHANGE UP (ref 70–99)
GLUCOSE SERPL-MCNC: 86 MG/DL — SIGNIFICANT CHANGE UP (ref 70–99)
HAV IGG SER QL IA: REACTIVE
HCT VFR BLD CALC: 33.9 % — LOW (ref 34.5–45)
HGB BLD-MCNC: 10.5 G/DL — LOW (ref 11.5–15.5)
INR BLD: 1.27 RATIO — HIGH (ref 0.88–1.16)
MCHC RBC-ENTMCNC: 26.4 PG — LOW (ref 27–34)
MCHC RBC-ENTMCNC: 31 GM/DL — LOW (ref 32–36)
MCV RBC AUTO: 85.4 FL — SIGNIFICANT CHANGE UP (ref 80–100)
NRBC # BLD: 0 /100 WBCS — SIGNIFICANT CHANGE UP (ref 0–0)
PLATELET # BLD AUTO: 198 K/UL — SIGNIFICANT CHANGE UP (ref 150–400)
POTASSIUM SERPL-MCNC: 4 MMOL/L — SIGNIFICANT CHANGE UP (ref 3.5–5.3)
POTASSIUM SERPL-MCNC: 4.2 MMOL/L — SIGNIFICANT CHANGE UP (ref 3.5–5.3)
POTASSIUM SERPL-SCNC: 4 MMOL/L — SIGNIFICANT CHANGE UP (ref 3.5–5.3)
POTASSIUM SERPL-SCNC: 4.2 MMOL/L — SIGNIFICANT CHANGE UP (ref 3.5–5.3)
PROT SERPL-MCNC: 8 G/DL — SIGNIFICANT CHANGE UP (ref 6–8.3)
PROTHROM AB SERPL-ACNC: 15.1 SEC — HIGH (ref 10.6–13.6)
RBC # BLD: 3.97 M/UL — SIGNIFICANT CHANGE UP (ref 3.8–5.2)
RBC # FLD: 18.5 % — HIGH (ref 10.3–14.5)
SODIUM SERPL-SCNC: 139 MMOL/L — SIGNIFICANT CHANGE UP (ref 135–145)
SODIUM SERPL-SCNC: 140 MMOL/L — SIGNIFICANT CHANGE UP (ref 135–145)
WBC # BLD: 4.33 K/UL — SIGNIFICANT CHANGE UP (ref 3.8–10.5)
WBC # FLD AUTO: 4.33 K/UL — SIGNIFICANT CHANGE UP (ref 3.8–10.5)

## 2021-05-22 PROCEDURE — 99233 SBSQ HOSP IP/OBS HIGH 50: CPT

## 2021-05-22 PROCEDURE — 99232 SBSQ HOSP IP/OBS MODERATE 35: CPT | Mod: GC

## 2021-05-22 RX ADMIN — OXYCODONE HYDROCHLORIDE 5 MILLIGRAM(S): 5 TABLET ORAL at 18:07

## 2021-05-22 RX ADMIN — MIDODRINE HYDROCHLORIDE 20 MILLIGRAM(S): 2.5 TABLET ORAL at 13:06

## 2021-05-22 RX ADMIN — SENNA PLUS 2 TABLET(S): 8.6 TABLET ORAL at 21:36

## 2021-05-22 RX ADMIN — SPIRONOLACTONE 200 MILLIGRAM(S): 25 TABLET, FILM COATED ORAL at 05:33

## 2021-05-22 RX ADMIN — MIDODRINE HYDROCHLORIDE 20 MILLIGRAM(S): 2.5 TABLET ORAL at 21:36

## 2021-05-22 RX ADMIN — BUMETANIDE 2 MILLIGRAM(S): 0.25 INJECTION INTRAMUSCULAR; INTRAVENOUS at 05:32

## 2021-05-22 RX ADMIN — PANTOPRAZOLE SODIUM 40 MILLIGRAM(S): 20 TABLET, DELAYED RELEASE ORAL at 05:32

## 2021-05-22 RX ADMIN — ENOXAPARIN SODIUM 40 MILLIGRAM(S): 100 INJECTION SUBCUTANEOUS at 05:33

## 2021-05-22 RX ADMIN — CHLORHEXIDINE GLUCONATE 1 APPLICATION(S): 213 SOLUTION TOPICAL at 05:35

## 2021-05-22 RX ADMIN — Medication 1 TABLET(S): at 13:06

## 2021-05-22 RX ADMIN — OXYCODONE HYDROCHLORIDE 5 MILLIGRAM(S): 5 TABLET ORAL at 15:10

## 2021-05-22 RX ADMIN — ENOXAPARIN SODIUM 40 MILLIGRAM(S): 100 INJECTION SUBCUTANEOUS at 18:08

## 2021-05-22 RX ADMIN — MIDODRINE HYDROCHLORIDE 20 MILLIGRAM(S): 2.5 TABLET ORAL at 05:32

## 2021-05-22 RX ADMIN — BUMETANIDE 2 MILLIGRAM(S): 0.25 INJECTION INTRAMUSCULAR; INTRAVENOUS at 18:08

## 2021-05-22 NOTE — PROGRESS NOTE ADULT - ASSESSMENT
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 Admitted for post-procedure monitoring. Course c/b difficulty getting of pressors due to hypotension thought to be due to overdiuresis and frequent and rapid reaccumulation of R hepatic hydrothorax (s/p multiple thoracenteses this admission). Now switching diuretics to bumex and increasing dose.     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  -varices: small in 2017  -ascites: recurrent c/b recurrent hepatic hydrothorax, on lasix 80mg/spironolactone 200mg at home (increased at last outpatient visit)  -HE: no hx  -HCC: none on imaging  -MELD-Na = 12 on 5/18  #Hepatic hydrothorax - rapid reaccumulation even after TIPS, not being controlled w/ diuretics  #Protein S deficiency    Recommendations:  -  TTE w/ bubble study to r/o hepatopulmonary syndrome  - c/w bumex 2 mg BID  - Continue spironolactone 200mg daily  - Check CXR tomorrow AM (5/21)  - Will monitor for rate of reaccumulation, check CXR on Sunday AM (5/23)  - Continue w/ Midodrine at 20mg q8h, goal MAP > 60  - On lovenox (therapeutic), restart xarelto only on discharge  - Closely monitor CBC, CMP, INR  - F/u IR and transplant surgery recs  - OOB, ambulate  - Discharge home once patient able to make it every 1-2 weeks between thoracenteses       Katherine Ramos PGY-4  Gastroenterology Fellow  Pager #[phone] ([facility]) or [phone] ([facility])  Available on [employer] Teams.  Please contact on-call GI fellow via answering service ([phone]) after 5pm and before 8am, and on weekends.  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 Admitted for post-procedure monitoring. Course c/b difficulty getting of pressors due to hypotension thought to be due to overdiuresis and frequent and rapid reaccumulation of R hepatic hydrothorax (s/p multiple thoracenteses this admission). Now switching diuretics to bumex and increasing dose.     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  -varices: small in 2017  -ascites: recurrent c/b recurrent hepatic hydrothorax, on lasix 80mg/spironolactone 200mg at home (increased at last outpatient visit)  -HE: no hx  -HCC: none on imaging  -MELD-Na = 12 on 5/18  #Hepatic hydrothorax - rapid reaccumulation even after TIPS, not being controlled w/ diuretics  #Protein S deficiency    Recommendations:  - check daily weights  - collect urine to monitor output as patient is actively being diuresed  -  TTE w/ bubble study to r/o hepatopulmonary syndrome  - c/w bumex 2 mg BID  - Continue spironolactone 200mg daily  - Will monitor for rate of reaccumulation of plef, check CXR on Sunday AM (5/23)  - Continue w/ Midodrine at 20mg q8h, goal MAP > 60  - On lovenox (therapeutic), restart xarelto only on discharge  - Closely monitor CBC, CMP, INR  - F/u IR and transplant surgery recs  - OOB, ambulate  - Discharge home once patient able to make it every 1-2 weeks between thoracenteses       Katherine Ramos PGY-4  Gastroenterology Fellow  Pager #[phone] ([facility]) or [phone] ([facility])  Available on [employer] Teams.  Please contact on-call GI fellow via answering service ([phone]) after 5pm and before 8am, and on weekends.  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 Admitted for post-procedure monitoring. Course c/b difficulty getting of pressors due to hypotension thought to be due to overdiuresis and frequent and rapid reaccumulation of R hepatic hydrothorax (s/p multiple thoracenteses this admission). Now switching diuretics to bumex and increasing dose.     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  -varices: small in 2017  -ascites: recurrent c/b recurrent hepatic hydrothorax, on lasix 80mg/spironolactone 200mg at home (increased at last outpatient visit)  -HE: no hx  -HCC: none on imaging  -MELD-Na = 12 on 5/18  #Hepatic hydrothorax - rapid reaccumulation even after TIPS, not being controlled w/ diuretics  #Protein S deficiency    Recommendations:  - check daily weights  - collect urine to monitor output as patient is actively being diuresed, strict I/Os  -  TTE w/ bubble study to r/o hepatopulmonary syndrome  - c/w bumex 2 mg BID  - Continue spironolactone 200mg daily  - Will monitor for rate of reaccumulation of plef, check CXR on Sunday AM (5/23)  - Continue w/ Midodrine at 20mg q8h, goal MAP > 60  - On lovenox (therapeutic), restart xarelto only on discharge  - Closely monitor CBC, CMP, INR  - F/u IR and transplant surgery recs  - OOB, ambulate  - Discharge home once patient able to make it every 1-2 weeks between thoracenteses       Katherine Ramos PGY-4  Gastroenterology Fellow  Pager #[phone] ([facility]) or [phone] ([facility])  Available on [employer] Teams.  Please contact on-call GI fellow via answering service ([phone]) after 5pm and before 8am, and on weekends.

## 2021-05-22 NOTE — PROGRESS NOTE ADULT - SUBJECTIVE AND OBJECTIVE BOX
Chief Complaint:  Patient is a 35y old  Female who presents with a chief complaint of TIPS (21 May 2021 12:17)      Interval Events: continues to have some orthopnea however reports it is not every time she lays down  - states she feels better with oxygen  - no BOLIVAR  - eating breakfast this AM without abd pain, n/v, SOB      Hospital Medications:  acetaminophen   Tablet .. 500 milliGRAM(s) Oral every 6 hours PRN  buMETAnide 2 milliGRAM(s) Oral two times a day  chlorhexidine 2% Cloths 1 Application(s) Topical <User Schedule>  enoxaparin Injectable 40 milliGRAM(s) SubCutaneous every 12 hours  melatonin 5 milliGRAM(s) Oral at bedtime  midodrine 20 milliGRAM(s) Oral every 8 hours  multivitamin 1 Tablet(s) Oral daily  oxyCODONE    IR 5 milliGRAM(s) Oral every 4 hours PRN  pantoprazole    Tablet 40 milliGRAM(s) Oral before breakfast  polyethylene glycol 3350 17 Gram(s) Oral daily  senna 2 Tablet(s) Oral at bedtime  spironolactone 200 milliGRAM(s) Oral daily      PMHX/PSHX:  Cirrhosis    Cirrhosis    Budd-Chiari syndrome    H/O protein S deficiency    Miscarriage    H/O protein S deficiency    No significant past surgical history    No significant past surgical history            ROS:     General:  No weight loss, fevers, chills, night sweats, fatigue   Eyes:  No vision changes  ENT:  No sore throat, pain, runny nose  CV:  No chest pain, palpitations, dizziness   Resp:  +orthopnea, No cough, wheezing  GI:  See HPI  :  No burning with urination, hematuria  Muscle:  No pain, weakness  Neuro:  No weakness/tingling, memory problems  Psych:  No fatigue, insomnia, mood problems, depression  Heme:  No easy bruisability  Skin:  No rash, edema      PHYSICAL EXAM:   GENERAL: no acute distress  NEURO: alert  HEENT: anicteric sclera, no conjunctival pallor appreciated  CHEST: no respiratory distress, no accessory muscle use, decreased BS R mid/lower lung fields  CARDIAC: regular rate, rhythm  ABDOMEN: soft, non-tender, non-distended, no rebound or guarding  EXTREMITIES: warm, well perfused, no edema  SKIN: no lesions noted      Vital Signs:  Vital Signs Last 24 Hrs  T(C): 36.8 (22 May 2021 05:00), Max: 36.8 (22 May 2021 05:00)  T(F): 98.2 (22 May 2021 05:00), Max: 98.2 (22 May 2021 05:00)  HR: 76 (22 May 2021 05:00) (76 - 120)  BP: 98/65 (22 May 2021 05:00) (91/60 - 102/67)  BP(mean): --  RR: 17 (22 May 2021 05:00) (17 - 20)  SpO2: 95% (22 May 2021 05:00) (83% - 98%)  Daily     Daily     LABS:                        10.5   4.33  )-----------( 198      ( 22 May 2021 07:28 )             33.9     05-22    139  |  97  |  24<H>  ----------------------------<  78  4.0   |  22  |  0.92    Ca    11.5<H>      22 May 2021 07:30    TPro  8.0  /  Alb  5.8<H>  /  TBili  3.0<H>  /  DBili  x   /  AST  34  /  ALT  22  /  AlkPhos  168<H>  05-22    LIVER FUNCTIONS - ( 22 May 2021 07:30 )  Alb: 5.8 g/dL / Pro: 8.0 g/dL / ALK PHOS: 168 U/L / ALT: 22 U/L / AST: 34 U/L / GGT: x           PT/INR - ( 22 May 2021 07:29 )   PT: 15.1 sec;   INR: 1.27 ratio         PTT - ( 22 May 2021 07:29 )  PTT:51.5 sec        Imaging:             Chief Complaint:  Patient is a 35y old  Female who presents with a chief complaint of TIPS (21 May 2021 12:17)      Interval Events: continues to have some orthopnea however reports it is not every time she lays down  - states she feels better with oxygen  - no BOLIVAR  - eating breakfast this AM without abd pain, n/v, SOB      Hospital Medications:  acetaminophen   Tablet .. 500 milliGRAM(s) Oral every 6 hours PRN  buMETAnide 2 milliGRAM(s) Oral two times a day  chlorhexidine 2% Cloths 1 Application(s) Topical <User Schedule>  enoxaparin Injectable 40 milliGRAM(s) SubCutaneous every 12 hours  melatonin 5 milliGRAM(s) Oral at bedtime  midodrine 20 milliGRAM(s) Oral every 8 hours  multivitamin 1 Tablet(s) Oral daily  oxyCODONE    IR 5 milliGRAM(s) Oral every 4 hours PRN  pantoprazole    Tablet 40 milliGRAM(s) Oral before breakfast  polyethylene glycol 3350 17 Gram(s) Oral daily  senna 2 Tablet(s) Oral at bedtime  spironolactone 200 milliGRAM(s) Oral daily      PMHX/PSHX:  Cirrhosis    Cirrhosis    Budd-Chiari syndrome    H/O protein S deficiency    Miscarriage    H/O protein S deficiency    No significant past surgical history    No significant past surgical history            ROS:     General:  No weight loss, fevers, chills, night sweats, fatigue   Eyes:  No vision changes  ENT:  No sore throat, pain, runny nose  CV:  No chest pain, palpitations, dizziness   Resp:  +orthopnea, No cough, wheezing  GI:  See HPI  :  No burning with urination, hematuria  Muscle:  No pain, weakness  Neuro:  No weakness/tingling, memory problems  Psych:  No fatigue, insomnia, mood problems, depression  Heme:  No easy bruisability  Skin:  No rash, edema      PHYSICAL EXAM:   GENERAL: no acute distress  NEURO: alert  HEENT: anicteric sclera, no conjunctival pallor appreciated  CHEST: no respiratory distress, no accessory muscle use, decreased BS R lower lung field  CARDIAC: regular rate, rhythm  ABDOMEN: soft, non-tender, non-distended, no rebound or guarding  EXTREMITIES: warm, well perfused, no edema  SKIN: no lesions noted      Vital Signs:  Vital Signs Last 24 Hrs  T(C): 36.8 (22 May 2021 05:00), Max: 36.8 (22 May 2021 05:00)  T(F): 98.2 (22 May 2021 05:00), Max: 98.2 (22 May 2021 05:00)  HR: 76 (22 May 2021 05:00) (76 - 120)  BP: 98/65 (22 May 2021 05:00) (91/60 - 102/67)  BP(mean): --  RR: 17 (22 May 2021 05:00) (17 - 20)  SpO2: 95% (22 May 2021 05:00) (83% - 98%)  Daily     Daily     LABS:                        10.5   4.33  )-----------( 198      ( 22 May 2021 07:28 )             33.9     05-22    139  |  97  |  24<H>  ----------------------------<  78  4.0   |  22  |  0.92    Ca    11.5<H>      22 May 2021 07:30    TPro  8.0  /  Alb  5.8<H>  /  TBili  3.0<H>  /  DBili  x   /  AST  34  /  ALT  22  /  AlkPhos  168<H>  05-22    LIVER FUNCTIONS - ( 22 May 2021 07:30 )  Alb: 5.8 g/dL / Pro: 8.0 g/dL / ALK PHOS: 168 U/L / ALT: 22 U/L / AST: 34 U/L / GGT: x           PT/INR - ( 22 May 2021 07:29 )   PT: 15.1 sec;   INR: 1.27 ratio         PTT - ( 22 May 2021 07:29 )  PTT:51.5 sec        Imaging:

## 2021-05-22 NOTE — PROGRESS NOTE ADULT - PROBLEM SELECTOR PLAN 6
c/w lovenox 40mg BID for now  fall precautions   OOB as tolerated, PT eval  -Low sodium fluid restricted diet. RD eval.    plan of care d/w BEENA Wood and Dr. Bryant check ionized calcium, PTH  trend daily  will consider ENDO consult if persistent

## 2021-05-22 NOTE — PROGRESS NOTE ADULT - SUBJECTIVE AND OBJECTIVE BOX
Patient is a 35y old  Female who presents with a chief complaint of TIPS (22 May 2021 10:54)      INTERVAL History of Present Illness/OVERNIGHT EVENTS: no new issues reported  still has large right pleural effusion  no SOB at rest    MEDICATIONS  (STANDING):  buMETAnide 2 milliGRAM(s) Oral two times a day  chlorhexidine 2% Cloths 1 Application(s) Topical <User Schedule>  enoxaparin Injectable 40 milliGRAM(s) SubCutaneous every 12 hours  melatonin 5 milliGRAM(s) Oral at bedtime  midodrine 20 milliGRAM(s) Oral every 8 hours  multivitamin 1 Tablet(s) Oral daily  pantoprazole    Tablet 40 milliGRAM(s) Oral before breakfast  polyethylene glycol 3350 17 Gram(s) Oral daily  senna 2 Tablet(s) Oral at bedtime  spironolactone 200 milliGRAM(s) Oral daily    MEDICATIONS  (PRN):  acetaminophen   Tablet .. 500 milliGRAM(s) Oral every 6 hours PRN Mild Pain (1 - 3)  oxyCODONE    IR 5 milliGRAM(s) Oral every 4 hours PRN Severe Pain (7 - 10)      Allergies    No Known Allergies    Intolerances        REVIEW OF SYSTEMS:  Negative unless otherwise specified above.    Vital Signs Last 24 Hrs  T(C): 36.4 (22 May 2021 13:39), Max: 36.8 (22 May 2021 05:00)  T(F): 97.5 (22 May 2021 13:39), Max: 98.2 (22 May 2021 05:00)  HR: 84 (22 May 2021 13:39) (76 - 120)  BP: 103/66 (22 May 2021 13:39) (91/60 - 103/66)  BP(mean): --  RR: 18 (22 May 2021 13:39) (17 - 20)  SpO2: 94% (22 May 2021 13:39) (83% - 98%)        PHYSICAL EXAM:  GENERAL: No apparent distress, appears stated age  HEAD:  Atraumatic, Normocephalic  EYES: Conjunctiva and sclera clear, no discharge  ENMT: Moist mucous membranes, no nasal discharge  NECK: Supple, no JVD  CHEST/LUNG: Decreased BS right lower lung  HEART: Regular rhythm, no rubs or gallops  ABDOMEN: Soft, Nontender, Nondistended; Bowel sounds present  EXTREMITIES:  No clubbing, cyanosis or edema  SKIN: No rash or new discoloration  NERVOUS SYSTEM:  Alert & Oriented; Bilateral Lower extremity mobile, sensation to light touch intact      LABS:                        10.5   4.33  )-----------( 198      ( 22 May 2021 07:28 )             33.9     22 May 2021 10:43    140    |  97     |  23     ----------------------------<  86     4.2     |  23     |  0.87     Ca    11.6       22 May 2021 10:43    TPro  8.0    /  Alb  5.8    /  TBili  3.0    /  DBili  x      /  AST  34     /  ALT  22     /  AlkPhos  168    22 May 2021 07:30    PT/INR - ( 22 May 2021 07:29 )   PT: 15.1 sec;   INR: 1.27 ratio         PTT - ( 22 May 2021 07:29 )  PTT:51.5 sec    CAPILLARY BLOOD GLUCOSE          RADIOLOGY & ADDITIONAL TESTS:    Images reviewed personally    Consultant Notes Reviewed and Care Discussed with relevant Consultants/Other Providers.

## 2021-05-23 LAB
24R-OH-CALCIDIOL SERPL-MCNC: 29 NG/ML — LOW (ref 30–80)
ANION GAP SERPL CALC-SCNC: 18 MMOL/L — HIGH (ref 5–17)
BUN SERPL-MCNC: 25 MG/DL — HIGH (ref 7–23)
CA-I BLD-SCNC: 1.2 MMOL/L — SIGNIFICANT CHANGE UP (ref 1.12–1.3)
CALCIUM SERPL-MCNC: 10.7 MG/DL — HIGH (ref 8.4–10.5)
CALCIUM SERPL-MCNC: 11.3 MG/DL — HIGH (ref 8.4–10.5)
CHLORIDE SERPL-SCNC: 98 MMOL/L — SIGNIFICANT CHANGE UP (ref 96–108)
CO2 SERPL-SCNC: 23 MMOL/L — SIGNIFICANT CHANGE UP (ref 22–31)
CREAT SERPL-MCNC: 0.81 MG/DL — SIGNIFICANT CHANGE UP (ref 0.5–1.3)
GLUCOSE SERPL-MCNC: 74 MG/DL — SIGNIFICANT CHANGE UP (ref 70–99)
HCT VFR BLD CALC: 34.1 % — LOW (ref 34.5–45)
HGB BLD-MCNC: 10.5 G/DL — LOW (ref 11.5–15.5)
INR BLD: 1.23 RATIO — HIGH (ref 0.88–1.16)
MCHC RBC-ENTMCNC: 26.6 PG — LOW (ref 27–34)
MCHC RBC-ENTMCNC: 30.8 GM/DL — LOW (ref 32–36)
MCV RBC AUTO: 86.5 FL — SIGNIFICANT CHANGE UP (ref 80–100)
NRBC # BLD: 0 /100 WBCS — SIGNIFICANT CHANGE UP (ref 0–0)
PLATELET # BLD AUTO: 194 K/UL — SIGNIFICANT CHANGE UP (ref 150–400)
POTASSIUM SERPL-MCNC: 4 MMOL/L — SIGNIFICANT CHANGE UP (ref 3.5–5.3)
POTASSIUM SERPL-SCNC: 4 MMOL/L — SIGNIFICANT CHANGE UP (ref 3.5–5.3)
PROTHROM AB SERPL-ACNC: 14.6 SEC — HIGH (ref 10.6–13.6)
PTH-INTACT FLD-MCNC: 27 PG/ML — SIGNIFICANT CHANGE UP (ref 15–65)
RBC # BLD: 3.94 M/UL — SIGNIFICANT CHANGE UP (ref 3.8–5.2)
RBC # FLD: 18.4 % — HIGH (ref 10.3–14.5)
SODIUM SERPL-SCNC: 139 MMOL/L — SIGNIFICANT CHANGE UP (ref 135–145)
WBC # BLD: 3.82 K/UL — SIGNIFICANT CHANGE UP (ref 3.8–10.5)
WBC # FLD AUTO: 3.82 K/UL — SIGNIFICANT CHANGE UP (ref 3.8–10.5)

## 2021-05-23 PROCEDURE — 99233 SBSQ HOSP IP/OBS HIGH 50: CPT | Mod: GC

## 2021-05-23 PROCEDURE — 99232 SBSQ HOSP IP/OBS MODERATE 35: CPT | Mod: GC

## 2021-05-23 RX ORDER — BUMETANIDE 0.25 MG/ML
3 INJECTION INTRAMUSCULAR; INTRAVENOUS
Refills: 0 | Status: DISCONTINUED | OUTPATIENT
Start: 2021-05-23 | End: 2021-05-25

## 2021-05-23 RX ORDER — SPIRONOLACTONE 25 MG/1
100 TABLET, FILM COATED ORAL
Refills: 0 | Status: DISCONTINUED | OUTPATIENT
Start: 2021-05-23 | End: 2021-05-25

## 2021-05-23 RX ORDER — SPIRONOLACTONE 25 MG/1
100 TABLET, FILM COATED ORAL
Refills: 0 | Status: DISCONTINUED | OUTPATIENT
Start: 2021-05-23 | End: 2021-05-23

## 2021-05-23 RX ORDER — SPIRONOLACTONE 25 MG/1
200 TABLET, FILM COATED ORAL
Refills: 0 | Status: DISCONTINUED | OUTPATIENT
Start: 2021-05-23 | End: 2021-05-25

## 2021-05-23 RX ADMIN — ENOXAPARIN SODIUM 40 MILLIGRAM(S): 100 INJECTION SUBCUTANEOUS at 05:44

## 2021-05-23 RX ADMIN — SPIRONOLACTONE 100 MILLIGRAM(S): 25 TABLET, FILM COATED ORAL at 17:31

## 2021-05-23 RX ADMIN — BUMETANIDE 3 MILLIGRAM(S): 0.25 INJECTION INTRAMUSCULAR; INTRAVENOUS at 17:31

## 2021-05-23 RX ADMIN — CHLORHEXIDINE GLUCONATE 1 APPLICATION(S): 213 SOLUTION TOPICAL at 05:47

## 2021-05-23 RX ADMIN — SPIRONOLACTONE 200 MILLIGRAM(S): 25 TABLET, FILM COATED ORAL at 05:43

## 2021-05-23 RX ADMIN — SENNA PLUS 2 TABLET(S): 8.6 TABLET ORAL at 21:25

## 2021-05-23 RX ADMIN — MIDODRINE HYDROCHLORIDE 20 MILLIGRAM(S): 2.5 TABLET ORAL at 05:43

## 2021-05-23 RX ADMIN — PANTOPRAZOLE SODIUM 40 MILLIGRAM(S): 20 TABLET, DELAYED RELEASE ORAL at 05:43

## 2021-05-23 RX ADMIN — Medication 1 TABLET(S): at 12:41

## 2021-05-23 RX ADMIN — ENOXAPARIN SODIUM 40 MILLIGRAM(S): 100 INJECTION SUBCUTANEOUS at 17:31

## 2021-05-23 RX ADMIN — BUMETANIDE 2 MILLIGRAM(S): 0.25 INJECTION INTRAMUSCULAR; INTRAVENOUS at 05:44

## 2021-05-23 RX ADMIN — MIDODRINE HYDROCHLORIDE 20 MILLIGRAM(S): 2.5 TABLET ORAL at 12:41

## 2021-05-23 RX ADMIN — MIDODRINE HYDROCHLORIDE 20 MILLIGRAM(S): 2.5 TABLET ORAL at 21:25

## 2021-05-23 NOTE — PROGRESS NOTE ADULT - SUBJECTIVE AND OBJECTIVE BOX
Chief Complaint:  Patient is a 35y old  Female who presents with a chief complaint of TIPS (22 May 2021 15:23)      Interval Events: had an episode of right upper quadrant/right flank pain yesterday, relieved with oxycodone and no further pain today  - nausea after taking miralax  - had BM yesterday  - UO 660cc since 4PM  - denies SOB or needing oxygen overnight      Hospital Medications:  acetaminophen   Tablet .. 500 milliGRAM(s) Oral every 6 hours PRN  buMETAnide 2 milliGRAM(s) Oral two times a day  chlorhexidine 2% Cloths 1 Application(s) Topical <User Schedule>  enoxaparin Injectable 40 milliGRAM(s) SubCutaneous every 12 hours  melatonin 5 milliGRAM(s) Oral at bedtime  midodrine 20 milliGRAM(s) Oral every 8 hours  multivitamin 1 Tablet(s) Oral daily  oxyCODONE    IR 5 milliGRAM(s) Oral every 4 hours PRN  pantoprazole    Tablet 40 milliGRAM(s) Oral before breakfast  polyethylene glycol 3350 17 Gram(s) Oral daily  senna 2 Tablet(s) Oral at bedtime  spironolactone 200 milliGRAM(s) Oral daily      PMHX/PSHX:  Cirrhosis    Cirrhosis    Budd-Chiari syndrome    H/O protein S deficiency    Miscarriage    H/O protein S deficiency    No significant past surgical history    No significant past surgical history            ROS:     General:  No weight loss, fevers, chills, night sweats, fatigue   Eyes:  No vision changes  ENT:  No sore throat, pain, runny nose  CV:  No chest pain, palpitations, dizziness   Resp:  No SOB, cough, wheezing  GI:  See HPI  :  No burning with urination, hematuria  Muscle:  No pain, weakness  Neuro:  No weakness/tingling, memory problems  Psych:  No fatigue, insomnia, mood problems, depression  Heme:  No easy bruisability  Skin:  No rash, edema      PHYSICAL EXAM:   PHYSICAL EXAM:   GENERAL: no acute distress  NEURO: alert  HEENT: anicteric sclera, no conjunctival pallor appreciated  CHEST: no respiratory distress, no accessory muscle use, decreased BS R lower lung field  CARDIAC: regular rate, rhythm  ABDOMEN: soft, non-tender, non-distended, no rebound or guarding  EXTREMITIES: warm, well perfused, no edema  SKIN: no lesions noted    Vital Signs:  Vital Signs Last 24 Hrs  T(C): 36.6 (23 May 2021 05:05), Max: 36.8 (22 May 2021 21:16)  T(F): 97.8 (23 May 2021 05:05), Max: 98.2 (22 May 2021 21:16)  HR: 66 (23 May 2021 05:05) (66 - 90)  BP: 100/61 (23 May 2021 05:05) (100/61 - 104/67)  BP(mean): --  RR: 18 (23 May 2021 05:05) (18 - 18)  SpO2: 96% (23 May 2021 05:05) (93% - 96%)  Daily     Daily     LABS:                        10.5   3.82  )-----------( 194      ( 23 May 2021 08:04 )             34.1     05-23    139  |  98  |  25<H>  ----------------------------<  74  4.0   |  23  |  0.81    Ca    11.3<H>      23 May 2021 08:04    TPro  8.0  /  Alb  5.8<H>  /  TBili  3.0<H>  /  DBili  x   /  AST  34  /  ALT  22  /  AlkPhos  168<H>  05-22    LIVER FUNCTIONS - ( 22 May 2021 07:30 )  Alb: 5.8 g/dL / Pro: 8.0 g/dL / ALK PHOS: 168 U/L / ALT: 22 U/L / AST: 34 U/L / GGT: x           PT/INR - ( 23 May 2021 07:23 )   PT: 14.6 sec;   INR: 1.23 ratio         PTT - ( 22 May 2021 07:29 )  PTT:51.5 sec        Imaging:

## 2021-05-23 NOTE — PROGRESS NOTE ADULT - SUBJECTIVE AND OBJECTIVE BOX
Patient is a 35y old  Female who presents with a chief complaint of TIPS (23 May 2021 11:11)      INTERVAL History of Present Illness/OVERNIGHT EVENTS: monitor intake and output on Bumex  right hydrothorax persists    MEDICATIONS  (STANDING):  buMETAnide 2 milliGRAM(s) Oral two times a day  chlorhexidine 2% Cloths 1 Application(s) Topical <User Schedule>  enoxaparin Injectable 40 milliGRAM(s) SubCutaneous every 12 hours  melatonin 5 milliGRAM(s) Oral at bedtime  midodrine 20 milliGRAM(s) Oral every 8 hours  multivitamin 1 Tablet(s) Oral daily  pantoprazole    Tablet 40 milliGRAM(s) Oral before breakfast  polyethylene glycol 3350 17 Gram(s) Oral daily  senna 2 Tablet(s) Oral at bedtime  spironolactone 200 milliGRAM(s) Oral daily  spironolactone 100 milliGRAM(s) Oral <User Schedule>    MEDICATIONS  (PRN):  acetaminophen   Tablet .. 500 milliGRAM(s) Oral every 6 hours PRN Mild Pain (1 - 3)  oxyCODONE    IR 5 milliGRAM(s) Oral every 4 hours PRN Severe Pain (7 - 10)      Allergies    No Known Allergies    Intolerances        REVIEW OF SYSTEMS:  Negative unless otherwise specified above.    Vital Signs Last 24 Hrs  T(C): 36.6 (23 May 2021 12:10), Max: 36.8 (22 May 2021 21:16)  T(F): 97.9 (23 May 2021 12:10), Max: 98.2 (22 May 2021 21:16)  HR: 68 (23 May 2021 12:10) (66 - 90)  BP: 100/67 (23 May 2021 12:10) (100/61 - 104/67)  BP(mean): --  RR: 17 (23 May 2021 12:10) (17 - 18)  SpO2: 96% (23 May 2021 12:10) (93% - 96%)        PHYSICAL EXAM:  GENERAL: No apparent distress, appears stated age  HEAD:  Atraumatic, Normocephalic  EYES: Conjunctiva and sclera clear, no discharge  ENMT: Moist mucous membranes, no nasal discharge  NECK: Supple, no JVD  CHEST/LUNG: Decreased BS right lower lung  HEART: Regular rhythm, no rubs or gallops  ABDOMEN: Soft, Nontender, Nondistended; Bowel sounds present  EXTREMITIES:  No clubbing, cyanosis or edema  SKIN: No rash or new discoloration  NERVOUS SYSTEM:  Alert & Oriented; Bilateral Lower extremity mobile, sensation to light touch intact      LABS:                        10.5   3.82  )-----------( 194      ( 23 May 2021 08:04 )             34.1     23 May 2021 08:04    139    |  98     |  25     ----------------------------<  74     4.0     |  23     |  0.81     Ca    11.3       23 May 2021 08:04      PT/INR - ( 23 May 2021 07:23 )   PT: 14.6 sec;   INR: 1.23 ratio         PTT - ( 22 May 2021 07:29 )  PTT:51.5 sec    CAPILLARY BLOOD GLUCOSE          RADIOLOGY & ADDITIONAL TESTS:    Images reviewed personally    Consultant Notes Reviewed and Care Discussed with relevant Consultants/Other Providers.

## 2021-05-23 NOTE — PROGRESS NOTE ADULT - ASSESSMENT
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 Admitted for post-procedure monitoring. Course c/b difficulty getting of pressors due to hypotension thought to be due to overdiuresis and frequent and rapid reaccumulation of R hepatic hydrothorax (s/p multiple thoracenteses this admission). Now switching diuretics to bumex and increasing dose.     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  #Protein S deficiency    Recommendations:  - increase bumex to 3mg BID  - increase spironolactone to 300mg daily  - check daily weights  - collect urine to monitor output as patient is actively being diuresed, strict I/Os  -  TTE w/ bubble study to r/o hepatopulmonary syndrome  - Continue w/ Midodrine at 20mg q8h, goal MAP > 60  - On lovenox (therapeutic), restart xarelto only on discharge  - Closely monitor CBC, CMP, INR  - F/u IR and transplant surgery recs  - OOB, ambulate  - Discharge home once patient able to make it every 1-2 weeks between thoracenteses       Katherine Ramos PGY-4  Gastroenterology Fellow  Pager #[phone] (CARLOS) or [phone] (NS)  Available on [employer] Teams.  Please contact on-call GI fellow via answering service ([phone]) after 5pm and before 8am, and on weekends.

## 2021-05-24 ENCOUNTER — TRANSCRIPTION ENCOUNTER (OUTPATIENT)
Age: 36
End: 2021-05-24

## 2021-05-24 PROBLEM — K74.60 UNSPECIFIED CIRRHOSIS OF LIVER: Chronic | Status: ACTIVE | Noted: 2021-03-20

## 2021-05-24 PROBLEM — I82.0 BUDD-CHIARI SYNDROME: Chronic | Status: ACTIVE | Noted: 2021-03-20

## 2021-05-24 PROBLEM — Z86.2 PERSONAL HISTORY OF DISEASES OF THE BLOOD AND BLOOD-FORMING ORGANS AND CERTAIN DISORDERS INVOLVING THE IMMUNE MECHANISM: Chronic | Status: ACTIVE | Noted: 2021-03-20

## 2021-05-24 LAB
ALBUMIN SERPL ELPH-MCNC: 5.7 G/DL — HIGH (ref 3.3–5)
ALP SERPL-CCNC: 179 U/L — HIGH (ref 40–120)
ALT FLD-CCNC: 22 U/L — SIGNIFICANT CHANGE UP (ref 10–45)
ANION GAP SERPL CALC-SCNC: 17 MMOL/L — SIGNIFICANT CHANGE UP (ref 5–17)
AST SERPL-CCNC: 37 U/L — SIGNIFICANT CHANGE UP (ref 10–40)
BILIRUB SERPL-MCNC: 3 MG/DL — HIGH (ref 0.2–1.2)
BUN SERPL-MCNC: 28 MG/DL — HIGH (ref 7–23)
CALCIUM SERPL-MCNC: 11.8 MG/DL — HIGH (ref 8.4–10.5)
CHLORIDE SERPL-SCNC: 94 MMOL/L — LOW (ref 96–108)
CO2 SERPL-SCNC: 23 MMOL/L — SIGNIFICANT CHANGE UP (ref 22–31)
CREAT SERPL-MCNC: 0.97 MG/DL — SIGNIFICANT CHANGE UP (ref 0.5–1.3)
GLUCOSE SERPL-MCNC: 83 MG/DL — SIGNIFICANT CHANGE UP (ref 70–99)
HBV DNA # SERPL NAA+PROBE: SIGNIFICANT CHANGE UP
HBV DNA SERPL NAA+PROBE-LOG#: SIGNIFICANT CHANGE UP LOG10IU/ML
HCT VFR BLD CALC: 34.6 % — SIGNIFICANT CHANGE UP (ref 34.5–45)
HGB BLD-MCNC: 10.9 G/DL — LOW (ref 11.5–15.5)
INR BLD: 1.26 RATIO — HIGH (ref 0.88–1.16)
MCHC RBC-ENTMCNC: 27 PG — SIGNIFICANT CHANGE UP (ref 27–34)
MCHC RBC-ENTMCNC: 31.5 GM/DL — LOW (ref 32–36)
MCV RBC AUTO: 85.9 FL — SIGNIFICANT CHANGE UP (ref 80–100)
MITOCHONDRIA AB SER-ACNC: SIGNIFICANT CHANGE UP
NRBC # BLD: 0 /100 WBCS — SIGNIFICANT CHANGE UP (ref 0–0)
PLATELET # BLD AUTO: 235 K/UL — SIGNIFICANT CHANGE UP (ref 150–400)
POTASSIUM SERPL-MCNC: 4.3 MMOL/L — SIGNIFICANT CHANGE UP (ref 3.5–5.3)
POTASSIUM SERPL-SCNC: 4.3 MMOL/L — SIGNIFICANT CHANGE UP (ref 3.5–5.3)
PROT SERPL-MCNC: 8.4 G/DL — HIGH (ref 6–8.3)
PROTHROM AB SERPL-ACNC: 15 SEC — HIGH (ref 10.6–13.6)
RBC # BLD: 4.03 M/UL — SIGNIFICANT CHANGE UP (ref 3.8–5.2)
RBC # FLD: 18 % — HIGH (ref 10.3–14.5)
SMOOTH MUSCLE AB SER-ACNC: SIGNIFICANT CHANGE UP
SODIUM SERPL-SCNC: 134 MMOL/L — LOW (ref 135–145)
WBC # BLD: 4.65 K/UL — SIGNIFICANT CHANGE UP (ref 3.8–10.5)
WBC # FLD AUTO: 4.65 K/UL — SIGNIFICANT CHANGE UP (ref 3.8–10.5)

## 2021-05-24 PROCEDURE — 71045 X-RAY EXAM CHEST 1 VIEW: CPT | Mod: 26

## 2021-05-24 PROCEDURE — 99232 SBSQ HOSP IP/OBS MODERATE 35: CPT

## 2021-05-24 PROCEDURE — 93306 TTE W/DOPPLER COMPLETE: CPT | Mod: 26

## 2021-05-24 RX ADMIN — ENOXAPARIN SODIUM 40 MILLIGRAM(S): 100 INJECTION SUBCUTANEOUS at 17:43

## 2021-05-24 RX ADMIN — SPIRONOLACTONE 100 MILLIGRAM(S): 25 TABLET, FILM COATED ORAL at 17:43

## 2021-05-24 RX ADMIN — ENOXAPARIN SODIUM 40 MILLIGRAM(S): 100 INJECTION SUBCUTANEOUS at 05:23

## 2021-05-24 RX ADMIN — PANTOPRAZOLE SODIUM 40 MILLIGRAM(S): 20 TABLET, DELAYED RELEASE ORAL at 06:05

## 2021-05-24 RX ADMIN — SPIRONOLACTONE 200 MILLIGRAM(S): 25 TABLET, FILM COATED ORAL at 05:24

## 2021-05-24 RX ADMIN — BUMETANIDE 3 MILLIGRAM(S): 0.25 INJECTION INTRAMUSCULAR; INTRAVENOUS at 05:23

## 2021-05-24 RX ADMIN — BUMETANIDE 3 MILLIGRAM(S): 0.25 INJECTION INTRAMUSCULAR; INTRAVENOUS at 17:43

## 2021-05-24 RX ADMIN — CHLORHEXIDINE GLUCONATE 1 APPLICATION(S): 213 SOLUTION TOPICAL at 05:25

## 2021-05-24 RX ADMIN — MIDODRINE HYDROCHLORIDE 20 MILLIGRAM(S): 2.5 TABLET ORAL at 22:17

## 2021-05-24 RX ADMIN — MIDODRINE HYDROCHLORIDE 20 MILLIGRAM(S): 2.5 TABLET ORAL at 05:24

## 2021-05-24 RX ADMIN — MIDODRINE HYDROCHLORIDE 20 MILLIGRAM(S): 2.5 TABLET ORAL at 12:28

## 2021-05-24 RX ADMIN — SENNA PLUS 2 TABLET(S): 8.6 TABLET ORAL at 22:17

## 2021-05-24 RX ADMIN — Medication 1 TABLET(S): at 12:28

## 2021-05-24 NOTE — PROGRESS NOTE ADULT - SUBJECTIVE AND OBJECTIVE BOX
Jorge Avila MD  Division of Hospital Medicine  Cell: [phone]  Pager: [phone]  Office: [phone]    Patient is a 35y old  Female who presents with a chief complaint of TIPS (24 May 2021 11:36)        SUBJECTIVE / OVERNIGHT EVENTS: Patient reports some right/center chest pressure and discomfort this afternoon. She noticed it more with ambulation. She also reports some mild RUQ pain. She says she got nauseous with pills on an empty stomach.       MEDICATIONS  (STANDING):  buMETAnide 3 milliGRAM(s) Oral two times a day  chlorhexidine 2% Cloths 1 Application(s) Topical <User Schedule>  enoxaparin Injectable 40 milliGRAM(s) SubCutaneous every 12 hours  melatonin 5 milliGRAM(s) Oral at bedtime  midodrine 20 milliGRAM(s) Oral every 8 hours  multivitamin 1 Tablet(s) Oral daily  pantoprazole    Tablet 40 milliGRAM(s) Oral before breakfast  polyethylene glycol 3350 17 Gram(s) Oral daily  senna 2 Tablet(s) Oral at bedtime  spironolactone 100 milliGRAM(s) Oral <User Schedule>  spironolactone 200 milliGRAM(s) Oral <User Schedule>    MEDICATIONS  (PRN):  acetaminophen   Tablet .. 500 milliGRAM(s) Oral every 6 hours PRN Mild Pain (1 - 3)  oxyCODONE    IR 5 milliGRAM(s) Oral every 4 hours PRN Severe Pain (7 - 10)      Vital Signs Last 24 Hrs  T(C): 36.9 (25 May 2021 04:20), Max: 36.9 (25 May 2021 04:20)  T(F): 98.5 (25 May 2021 04:20), Max: 98.5 (25 May 2021 04:20)  HR: 74 (25 May 2021 04:20) (60 - 77)  BP: 98/65 (25 May 2021 04:20) (98/65 - 105/62)  BP(mean): --  RR: 18 (25 May 2021 04:20) (18 - 18)  SpO2: 96% (25 May 2021 04:20) (94% - 98%)  CAPILLARY BLOOD GLUCOSE        I&O's Summary    23 May 2021 07:01  -  24 May 2021 07:00  --------------------------------------------------------  IN: 240 mL / OUT: 1600 mL / NET: -1360 mL    24 May 2021 07:01  -  25 May 2021 06:17  --------------------------------------------------------  IN: 490 mL / OUT: 1850 mL / NET: -1360 mL          PHYSICAL EXAM:   GENERAL: NAD, thin  HEAD:  Atraumatic, Normocephalic  EYES: conjunctiva and sclera clear  NECK: Supple  CHEST/LUNG: decreased bs on right side.   HEART: S1S2 normal. Regular rate and rhythm; No murmurs, rubs, or gallops  ABDOMEN: Soft, Nontender, Nondistended; Bowel sounds present  EXTREMITIES: No clubbing, cyanosis, or edema  PSYCH/Neuro: AAOx3. Non-focal.   SKIN: No rashes or lesions      LABS:                        10.9   4.65  )-----------( 235      ( 24 May 2021 07:19 )             34.6     05-24    134<L>  |  94<L>  |  28<H>  ----------------------------<  83  4.3   |  23  |  0.97    Ca    11.8<H>      24 May 2021 07:12    TPro  8.4<H>  /  Alb  5.7<H>  /  TBili  3.0<H>  /  DBili  x   /  AST  37  /  ALT  22  /  AlkPhos  179<H>  05-24    PT/INR - ( 24 May 2021 07:19 )   PT: 15.0 sec;   INR: 1.26 ratio           `< from: TTE with Doppler (w/Cont) (05.24.21 @ 06:57) >  Conclusions:  1. Normal mitral valve. Mild mitral regurgitation.  2. Normal trileaflet aortic valve. No aortic valve  regurgitation seen.  3. Normal left ventricular systolic function. No segmental  wall motion abnormalities.  4. Normal right ventricular size and function.  5. Agitated saline injection demonstrates the presence of a  patent foramen ovale.  6. Small, echo-free space seen adjacent to the RA on apical  view likely represent right pleural fluid. Unable to  completely exclude small pericardial effusion adjacent to  the RA. No pericardial effusion seen around the remainder  of the heart.  7. Right pleural effusion.  *** Compared with echocardiogram of 5/20/2019, agitated  saline injection performed today. Right pleural effusion is  seen.    < end of copied text >      < from: Xray Chest 1 View- PORTABLE-Routine (Xray Chest 1 View- PORTABLE-Routine .) (05.24.21 @ 11:17) >  IMPRESSION:    The heart is normal in size. Right pleural effusion. Right lower lobe pneumonia and/or atelectasis. The left lung is clear. No change in appearance the chest when compared to previous study done on May 21, 2021 at 8:46 AM.    < end of copied text >        RADIOLOGY & ADDITIONAL TESTS:    Imaging Personally Reviewed: CXR report. echo  Consultant(s) Notes Reviewed:  hepatology  Care Discussed with Consultants/Other Providers:

## 2021-05-24 NOTE — DISCHARGE NOTE PROVIDER - NSDCMRMEDTOKEN_GEN_ALL_CORE_FT
acetaminophen 325 mg oral tablet: 2 tab(s) orally every 8 hours, As needed, Mild Pain (1 - 3)  aluminum hydroxide-magnesium hydroxide 200 mg-200 mg/5 mL oral suspension: 30 milliliter(s) orally every 4 hours, As needed, Dyspepsia  ascorbic acid 500 mg oral tablet: 1 tab(s) orally once a day  Lasix 40 mg oral tablet: 2 tab(s) orally 2 times a day   Multiple Vitamins oral tablet: 1 tab(s) orally once a day  omeprazole 40 mg oral delayed release capsule: 1 cap(s) orally once a day  polyethylene glycol 3350 oral powder for reconstitution: 17 gram(s) orally once a day, As Needed  Senna 8.6 mg oral tablet: 2 tab(s) orally once a day (at bedtime)  spironolactone 100 mg oral tablet: 2 tab(s) orally once a day   Xarelto 20 mg oral tablet: 1 tab(s) orally once a day   Start 4/27/2021   acetaminophen 325 mg oral tablet: 2 tab(s) orally every 8 hours, As needed, Mild Pain (1 - 3)  ascorbic acid 500 mg oral tablet: 1 tab(s) orally once a day  bumetanide 1 mg oral tablet: 2 tab(s) orally 2 times a day  melatonin 5 mg oral tablet: 1 tab(s) orally once a day (at bedtime)  midodrine 10 mg oral tablet: 2 tab(s) orally every 8 hours  Multiple Vitamins oral tablet: 1 tab(s) orally once a day  omeprazole 40 mg oral delayed release capsule: 1 cap(s) orally once a day  Last dose 5/30 in preparation for Thoracentesis on 6/1/21  oxyCODONE 5 mg oral tablet: 1 tab(s) orally every 4 hours, As needed, Severe Pain (7 - 10) MDD:6  polyethylene glycol 3350 oral powder for reconstitution: 17 gram(s) orally once a day, As Needed  Senna 8.6 mg oral tablet: 2 tab(s) orally once a day (at bedtime)  spironolactone 100 mg oral tablet: 2 tab(s) orally once a day   Xarelto 20 mg oral tablet: 1 tab(s) orally once a day   Start 4/27/2021

## 2021-05-24 NOTE — DISCHARGE NOTE PROVIDER - HOSPITAL COURSE
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         Problem/Plan - 1:  ·  Problem: Decompensated hepatic cirrhosis.  Plan: Decompensated cryptogenic cirrhosis with ascites/hydrothorax, portal HTN and esophageal varices; s/p TIPS 5/11/21  - LFT stable; pt mentating well, no e/o HE; Cr stable   - c/w midodrine 20mg TID.  - s/p lasix 80mg QD; increase Bumex to 3mg PO BID, and increased aldactone to 300mg po daily, per hepatology.    -S/p albumin repletion.   - completed 7 day course of ceftriaxone for SBP ppx (5/11-5/17)   - trended CBC, CMP, INR daily   - hepatology and transplant surgery following  -AFP checked and within normal limits; has history of hepatic lesion. Surveillance monitoring.  -Of note, patient Hep B core positive, surf Ab positive, and surf Ag negative. Suggesting a prior infection which was cleared.   -Hypercalcemia noted. Possibly due to the aggressive albumin repletion.  Monitored daily.  -Of note, patient reports getting a treatment in her home country ([facility]) called [facility] every 6 months for her liver disease.      Problem/Plan - 2:  ·  Problem: Hypotension.  Plan: pt with persistent hypotension since TIPS initially requiring IV pressors; now with stable BPs off Jaleel, on midodrine 20mg q8h   - mentating well, asymptomatic   - MAP goal >60  - c/w midodrine 20mg q8h  -s/p albumin repletion.   - monitor vitals q4h.      Problem/Plan - 3:  ·  Problem: Hydrothorax.  Plan: pt with recurrent R hydrothorax, s/p 1L thora on 5/11 and 1.2L on 5/13 and 5/19.   currently with decreased BS on R side - repeat CXR w rapid fluid reaccumulation   currently with unlabored respirations, mild hypoxia requiring supp O2   - repeat thoracentesis was done 5/19 by IR. -CXR showed improvement, CXR 5/21 stable.  -OOB to chair and incentive spirometer.  -Echo with bubble study to eval for hepatopulmonary syndrome ordered, per hepatology.      Problem/Plan - 4:  ·  Problem: S/P TIPS (transjugular intrahepatic portosystemic shunt).  Plan: s/p successful TIPs on 5/11/21  normal mental status, LFTs stable.      Problem/Plan - 5:  ·  Problem: IVC thrombosis.  Plan: -H/o protein S deficiency.   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  -Lovenox was held for procedure, but then resumed.   - Follows with Dr. Kelechi Venegas at [facility].      Problem/Plan - 6:  Problem: Prophylactic measure. Plan: c/w lovenox 40mg BID for now  fall precautions   OOB as tolerated, PT eval  -Low sodium fluid restricted diet. RD eval.

## 2021-05-24 NOTE — DISCHARGE NOTE PROVIDER - NSDCFUADDAPPT_GEN_ALL_CORE_FT
You will need to follow up with your Hematologist, Dr. Venegas, within one week of discharge - please call to make an appointment. You will need to follow up with your Hepatologist, Dr. Carvajal, within one week of discharge - please call to make an appointment.    You will need to follow up with your primary medical doctor within one week of discharge - please call to make an appointment.    You will need to follow up with your Hematologist, Dr. Venegas, within one week of discharge - please call to make an appointment.

## 2021-05-24 NOTE — DISCHARGE NOTE PROVIDER - NSDCFUADDINST_GEN_ALL_CORE_FT
follow up with Dr. Paris on June 2, 2021 for a 2:00 pm appointment for a thoracentesis. Stop your apixiban 2 days prior on 5/30 should be your last dose.

## 2021-05-24 NOTE — DISCHARGE NOTE PROVIDER - NSDCCPCAREPLAN_GEN_ALL_CORE_FT
PRINCIPAL DISCHARGE DIAGNOSIS  Diagnosis: Hypotension  Assessment and Plan of Treatment: Improved  Continue with Midodrine.  You will need to follow up with your Hepatologist within one week of discharge - please call to make an appointment.      SECONDARY DISCHARGE DIAGNOSES  Diagnosis: S/P TIPS (transjugular intrahepatic portosystemic shunt)  Assessment and Plan of Treatment: You will need to follow up with your Hepatologist within one week of discharge.    Diagnosis: Hydrothorax  Assessment and Plan of Treatment: Continue with diuretics (Bumex and Aldactone)    Diagnosis: Decompensated hepatic cirrhosis  Assessment and Plan of Treatment: You will need to follow up with your Hepatologist within one week of discharge.    Diagnosis: IVC thrombosis  Assessment and Plan of Treatment: You will need to follow up with your Hematologist, Dr. Venegas, within one week of discharge - please call to make an appointment.

## 2021-05-24 NOTE — DISCHARGE NOTE PROVIDER - NPI NUMBER (FOR SYSADMIN USE ONLY) :
[[id number]] [[id number]],[[id number]] [[id number]],[[id number]],[[id number]],[[id number]]

## 2021-05-24 NOTE — DISCHARGE NOTE PROVIDER - CARE PROVIDERS DIRECT ADDRESSES
,[email or web address] ,[email or web address],DirectAddress_Unknown ,[email or web address],DirectAddress_Unknown,[email or web address]

## 2021-05-24 NOTE — DISCHARGE NOTE PROVIDER - NSDCFUSCHEDAPPT_GEN_ALL_CORE_FT
ERIC DAVILA ; 05/27/2021 ; [facility]  ERIC DAVILA ; 06/03/2021 ; [facility] [address] [facility]  ERIC DAVILA ; 06/03/2021 ; [facility] [address] [facility] ERIC Baker ; 06/03/2021 ; [facility] [place] [facility] [address] [facility]  ERIC DAVILA ; 05/27/2021 ; [facility] [place] [facility]  ERIC DAVILA ; 06/01/2021 ; [facility] [place] Hepatology [address] [facility]  ERIC DAVILA ; 06/03/2021 ; [facility] [place] Surg [facility] [address] [facility] ERIC Baker ; 06/03/2021 ; [facility] [place] [facility]urg [facility] [address] [facility] ERIC Baker ; 06/10/2021 ; [facility] [place] Hepatology [address] [facility]  ERIC DAVILA ; 06/15/2021 ; [facility] [place] Hepat[facility] [address] [facility]  ERIC DAVILA ; 06/29/2021 ; [facility] [place] Hepat[facility] [address] [facility]  ERIC DAVILA ; 07/06/2021 ; [facility] [place] Hepat[facility] [address] [facility]

## 2021-05-24 NOTE — DISCHARGE NOTE PROVIDER - CARE PROVIDER_API CALL
Kelechi Venegas)  Hematology; Internal Medicine; Oncology  [address] [place] [id number]  Phone: [phone]  Fax: [phone]  Follow Up Time:    Kelechi Venegas)  Hematology; Internal Medicine; Oncology  [address] [place], [place] [id number]  Phone: [phone]  Fax: [phone]  Follow Up Time:     Haydee Carvajal)  Gastroenterology; Internal Medicine; Transplant Hepatology  [address] [place] [address]  Phone: [phone]  Fax: [phone]  Follow Up Time:     Redd Alcazar)  Internal Medicine  [address] [place] [address] [address]  [place] [address]  Phone: [phone]  Fax: [phone]  Follow Up Time:    Kelechi Venegas)  Hematology; Internal Medicine; Oncology  [address] [place], [place] [id number]  Phone: [phone]  Fax: [phone]  Follow Up Time:     Haydee Carvajal)  Gastroenterology; Internal Medicine; Transplant Hepatology  [address] [place] [address]  Phone: [phone]  Fax: [phone]  Follow Up Time:     Redd Alcazar)  Internal Medicine  [address] [facility], [address]  [place] [address]  Phone: [phone]  Fax: [phone]  Follow Up Time:     Jenaro Paris)  Interventional Radiology and Diagnostic Radiology  [address] [facility], [place] [address]  Phone: [phone]  Fax: [phone]  Follow Up Time:

## 2021-05-24 NOTE — PROGRESS NOTE ADULT - SUBJECTIVE AND OBJECTIVE BOX
Chief Complaint:  Patient is a 35y old  Female who presents with a chief complaint of TIPS (23 May 2021 14:19)    Reason for consult: cirrhosis, hepatic hydrothorax    Interval Events: Pt feels well on her current dose of diuretics, 1.6L UOP documented, remains off O2, spoke with echo lab and pt got TTE w/ bubble study this AM.     Hospital Medications:  acetaminophen   Tablet .. 500 milliGRAM(s) Oral every 6 hours PRN  buMETAnide 3 milliGRAM(s) Oral two times a day  chlorhexidine 2% Cloths 1 Application(s) Topical <User Schedule>  enoxaparin Injectable 40 milliGRAM(s) SubCutaneous every 12 hours  melatonin 5 milliGRAM(s) Oral at bedtime  midodrine 20 milliGRAM(s) Oral every 8 hours  multivitamin 1 Tablet(s) Oral daily  oxyCODONE    IR 5 milliGRAM(s) Oral every 4 hours PRN  pantoprazole    Tablet 40 milliGRAM(s) Oral before breakfast  polyethylene glycol 3350 17 Gram(s) Oral daily  senna 2 Tablet(s) Oral at bedtime  spironolactone 100 milliGRAM(s) Oral <User Schedule>  spironolactone 200 milliGRAM(s) Oral <User Schedule>      ROS:   General:  No  fevers, chills, night sweats, fatigue  Eyes:  Good vision, no reported pain  ENT:  No sore throat, pain, runny nose  CV:  No pain, palpitations  Pulm:  No dyspnea, cough  GI:  See HPI, otherwise negative  :  No  incontinence, nocturia  Muscle:  No pain, weakness  Neuro:  No memory problems  Psych:  No insomnia, mood problems, depression  Endocrine:  No polyuria, polydipsia, cold/heat intolerance  Heme:  No petechiae, ecchymosis, easy bruisability  Skin:  No rash    PHYSICAL EXAM:   Vital Signs:  Vital Signs Last 24 Hrs  T(C): 36.8 (24 May 2021 04:54), Max: 36.8 (23 May 2021 20:15)  T(F): 98.2 (24 May 2021 04:54), Max: 98.3 (23 May 2021 20:15)  HR: 65 (24 May 2021 04:54) (65 - 85)  BP: 96/62 (24 May 2021 04:54) (91/58 - 100/67)  BP(mean): --  RR: 17 (24 May 2021 04:54) (17 - 17)  SpO2: 98% (24 May 2021 04:54) (96% - 98%)  Daily     Daily     GENERAL: no acute distress  NEURO: alert  HEENT: anicteric sclera, no conjunctival pallor appreciated  CHEST: no respiratory distress, no accessory muscle use, decreased BS R base  CARDIAC: regular rate, rhythm  ABDOMEN: soft, non-tender, non-distended, no rebound or guarding  EXTREMITIES: warm, well perfused, no edema  SKIN: no lesions noted    LABS: reviewed                        10.9   4.65  )-----------( 235      ( 24 May 2021 07:19 )             34.6     05-24    134<L>  |  94<L>  |  28<H>  ----------------------------<  83  4.3   |  23  |  0.97    Ca    11.8<H>      24 May 2021 07:12    TPro  8.4<H>  /  Alb  5.7<H>  /  TBili  3.0<H>  /  DBili  x   /  AST  37  /  ALT  22  /  AlkPhos  179<H>  05-24    LIVER FUNCTIONS - ( 24 May 2021 07:12 )  Alb: 5.7 g/dL / Pro: 8.4 g/dL / ALK PHOS: 179 U/L / ALT: 22 U/L / AST: 37 U/L / GGT: x             Interval Diagnostic Studies: see sunrise for full report

## 2021-05-24 NOTE — DIETITIAN INITIAL EVALUATION ADULT. - ORAL INTAKE PTA/DIET HISTORY
[FreeTextEntry1] : Cholesterol under reasonable control. Patient is to continue current regimen. He is advised to reduce his dairy intake. In terms of his right elbow pain this probably a chronic tendinitis. He will be referred to orthopedics. His weight is stable at 182. He will repeat labs in 6 months. Diet History:   No chewing/swallowing difficulty reported.   Allergies: NKFA  Home Nutrition Supplements: multivitamins/minerals, vit C  Home diuretic use noted Diet History: Pt reports loss of taste and decreased po intake in last 3 weeks s/p last hospitalization in April; taste is now slowly returning.  No chewing/swallowing difficulty reported. Pt reports she was trying to drink Ensure at home.  Allergies: NKFA  Home Nutrition Supplements: multivitamins/minerals, vit C  Home diuretic use noted

## 2021-05-24 NOTE — DISCHARGE NOTE PROVIDER - PROVIDER TOKENS
PROVIDER:[TOKEN:[[id number]]] PROVIDER:[TOKEN:[[id number]]],PROVIDER:[TOKEN:[[id number]]],PROVIDER:[TOKEN:[[id number]]] PROVIDER:[TOKEN:[[id number]]],PROVIDER:[TOKEN:[[id number]]],PROVIDER:[TOKEN:[[id number]]],PROVIDER:[TOKEN:[[id number]]]

## 2021-05-24 NOTE — PROGRESS NOTE ADULT - ASSESSMENT
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 Admitted for post-procedure monitoring. Course c/b difficulty getting of pressors due to hypotension thought to be due to overdiuresis and frequent and rapid reaccumulation of R hepatic hydrothorax (s/p multiple thoracenteses this admission). Now on bumex and increasing dose.     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  -varices: small in 2017  -ascites: recurrent c/b recurrent hepatic hydrothorax, on lasix 80mg/spironolactone 200mg at home (increased at last outpatient visit)  -HE: no hx  -HCC: none on imaging  -MELD-Na = 12 on 5/18  #Hepatic hydrothorax - rapid reaccumulation even after TIPS, appears better control w/ increased diuretics dosing  #Protein S deficiency    Recommendations:  - Check CXR today to evaluate pleural effusion  - Continue w/ Bumex to 3mg BID  - Continue w/ spironolactone to 300mg daily  - Check daily weights  - Collect urine to monitor output as patient is actively being diuresed, strict I/Os  -  F/u TTE w/ bubble study to r/o hepatopulmonary syndrome  - Continue w/ Midodrine at 20mg q8h, goal MAP > 60  - On lovenox (therapeutic), restart xarelto only on discharge  - Closely monitor CBC, CMP, INR  - F/u IR and transplant surgery recs  - OOB, ambulate  - Discharge home once patient able to make it every 1-2 weeks between thoracenteses     Kane Orellana  Gastroenterology/Hepatology Fellow  Available via [employer] Teams    NON-URGENT CONSULTS:  Please email [email or web address]  AT NIGHT AND ON WEEKENDS/HOLIDAYS  Contact on-call GI fellow via answering service ([phone])  MONDAY-FRIDAY 8AM-5PM:  Page [phone] ([facility]) or [phone] ([place]) from 8am-5pm M-F     35F w/ hx of protein S deficiency, 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 Admitted for post-procedure monitoring. Course c/b difficulty getting of pressors due to hypotension thought to be due to overdiuresis and frequent and rapid reaccumulation of R hepatic hydrothorax (s/p multiple thoracenteses this admission). Now on bumex and increasing dose.     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   - Continue w/ Bumex to 3mg BID  - Continue w/ spironolactone to 300mg daily  -  F/u TTE w/ bubble study to r/o hepatopulmonary syndrome  - Continue w/ Midodrine at 20mg q8h, goal MAP > 60  - Can restart xarelto on discharge  - OOB, ambulate  - Outpatient follow up with Dr. Carvajal 6/1 at 1115 AM @ [address]  - Will need outpatient thoracentesis set up with Dr. Ragsdale; will need A/C held for 48h prior to procedure, IR to set this up    Kane Orellana  Gastroenterology/Hepatology Fellow  Available via [employer] Teams    NON-URGENT CONSULTS:  Please email [email or web address]  AT NIGHT AND ON WEEKENDS/HOLIDAYS  Contact on-call GI fellow via answering service ([phone])  MONDAY-FRIDAY 8AM-5PM:  Page [phone] ([facility]) or [phone] ([facility] from 8am-5pm M-F

## 2021-05-25 ENCOUNTER — TRANSCRIPTION ENCOUNTER (OUTPATIENT)
Age: 36
End: 2021-05-25

## 2021-05-25 VITALS
HEART RATE: 69 BPM | RESPIRATION RATE: 18 BRPM | TEMPERATURE: 98 F | SYSTOLIC BLOOD PRESSURE: 105 MMHG | DIASTOLIC BLOOD PRESSURE: 67 MMHG | OXYGEN SATURATION: 97 %

## 2021-05-25 LAB
ALBUMIN SERPL ELPH-MCNC: 6.3 G/DL — HIGH (ref 3.3–5)
ALP SERPL-CCNC: 188 U/L — HIGH (ref 40–120)
ALT FLD-CCNC: 19 U/L — SIGNIFICANT CHANGE UP (ref 10–45)
ANION GAP SERPL CALC-SCNC: 22 MMOL/L — HIGH (ref 5–17)
AST SERPL-CCNC: 35 U/L — SIGNIFICANT CHANGE UP (ref 10–40)
BILIRUB SERPL-MCNC: 3.9 MG/DL — HIGH (ref 0.2–1.2)
BUN SERPL-MCNC: 32 MG/DL — HIGH (ref 7–23)
CALCIUM SERPL-MCNC: 11.8 MG/DL — HIGH (ref 8.4–10.5)
CHLORIDE SERPL-SCNC: 91 MMOL/L — LOW (ref 96–108)
CO2 SERPL-SCNC: 22 MMOL/L — SIGNIFICANT CHANGE UP (ref 22–31)
CREAT SERPL-MCNC: 1.2 MG/DL — SIGNIFICANT CHANGE UP (ref 0.5–1.3)
GLUCOSE SERPL-MCNC: 94 MG/DL — SIGNIFICANT CHANGE UP (ref 70–99)
HCT VFR BLD CALC: 36.4 % — SIGNIFICANT CHANGE UP (ref 34.5–45)
HGB BLD-MCNC: 11.3 G/DL — LOW (ref 11.5–15.5)
INR BLD: 1.22 RATIO — HIGH (ref 0.88–1.16)
MCHC RBC-ENTMCNC: 26.5 PG — LOW (ref 27–34)
MCHC RBC-ENTMCNC: 31 GM/DL — LOW (ref 32–36)
MCV RBC AUTO: 85.4 FL — SIGNIFICANT CHANGE UP (ref 80–100)
NRBC # BLD: 0 /100 WBCS — SIGNIFICANT CHANGE UP (ref 0–0)
PLATELET # BLD AUTO: 269 K/UL — SIGNIFICANT CHANGE UP (ref 150–400)
POTASSIUM SERPL-MCNC: 4.2 MMOL/L — SIGNIFICANT CHANGE UP (ref 3.5–5.3)
POTASSIUM SERPL-SCNC: 4.2 MMOL/L — SIGNIFICANT CHANGE UP (ref 3.5–5.3)
PROT SERPL-MCNC: 9 G/DL — HIGH (ref 6–8.3)
PROTHROM AB SERPL-ACNC: 14.5 SEC — HIGH (ref 10.6–13.6)
RBC # BLD: 4.26 M/UL — SIGNIFICANT CHANGE UP (ref 3.8–5.2)
RBC # FLD: 18.2 % — HIGH (ref 10.3–14.5)
SODIUM SERPL-SCNC: 135 MMOL/L — SIGNIFICANT CHANGE UP (ref 135–145)
WBC # BLD: 5.48 K/UL — SIGNIFICANT CHANGE UP (ref 3.8–10.5)
WBC # FLD AUTO: 5.48 K/UL — SIGNIFICANT CHANGE UP (ref 3.8–10.5)

## 2021-05-25 PROCEDURE — 84157 ASSAY OF PROTEIN OTHER: CPT

## 2021-05-25 PROCEDURE — 76937 US GUIDE VASCULAR ACCESS: CPT

## 2021-05-25 PROCEDURE — 83735 ASSAY OF MAGNESIUM: CPT

## 2021-05-25 PROCEDURE — 82565 ASSAY OF CREATININE: CPT

## 2021-05-25 PROCEDURE — 85014 HEMATOCRIT: CPT

## 2021-05-25 PROCEDURE — 83986 ASSAY PH BODY FLUID NOS: CPT

## 2021-05-25 PROCEDURE — 87075 CULTR BACTERIA EXCEPT BLOOD: CPT

## 2021-05-25 PROCEDURE — 82553 CREATINE MB FRACTION: CPT

## 2021-05-25 PROCEDURE — 86769 SARS-COV-2 COVID-19 ANTIBODY: CPT

## 2021-05-25 PROCEDURE — 84484 ASSAY OF TROPONIN QUANT: CPT

## 2021-05-25 PROCEDURE — U0005: CPT

## 2021-05-25 PROCEDURE — C1729: CPT

## 2021-05-25 PROCEDURE — C1725: CPT

## 2021-05-25 PROCEDURE — 86038 ANTINUCLEAR ANTIBODIES: CPT

## 2021-05-25 PROCEDURE — 82550 ASSAY OF CK (CPK): CPT

## 2021-05-25 PROCEDURE — C1887: CPT

## 2021-05-25 PROCEDURE — 84300 ASSAY OF URINE SODIUM: CPT

## 2021-05-25 PROCEDURE — C1874: CPT

## 2021-05-25 PROCEDURE — 86901 BLOOD TYPING SEROLOGIC RH(D): CPT

## 2021-05-25 PROCEDURE — 85025 COMPLETE CBC W/AUTO DIFF WBC: CPT

## 2021-05-25 PROCEDURE — 99232 SBSQ HOSP IP/OBS MODERATE 35: CPT

## 2021-05-25 PROCEDURE — 84702 CHORIONIC GONADOTROPIN TEST: CPT

## 2021-05-25 PROCEDURE — 82310 ASSAY OF CALCIUM: CPT

## 2021-05-25 PROCEDURE — 32555 ASPIRATE PLEURA W/ IMAGING: CPT

## 2021-05-25 PROCEDURE — 83519 RIA NONANTIBODY: CPT

## 2021-05-25 PROCEDURE — 82945 GLUCOSE OTHER FLUID: CPT

## 2021-05-25 PROCEDURE — 82306 VITAMIN D 25 HYDROXY: CPT

## 2021-05-25 PROCEDURE — 86900 BLOOD TYPING SEROLOGIC ABO: CPT

## 2021-05-25 PROCEDURE — 32552 REMOVE LUNG CATHETER: CPT

## 2021-05-25 PROCEDURE — 82105 ALPHA-FETOPROTEIN SERUM: CPT

## 2021-05-25 PROCEDURE — C1773: CPT

## 2021-05-25 PROCEDURE — 82746 ASSAY OF FOLIC ACID SERUM: CPT

## 2021-05-25 PROCEDURE — 83880 ASSAY OF NATRIURETIC PEPTIDE: CPT

## 2021-05-25 PROCEDURE — C1769: CPT

## 2021-05-25 PROCEDURE — 82728 ASSAY OF FERRITIN: CPT

## 2021-05-25 PROCEDURE — 80048 BASIC METABOLIC PNL TOTAL CA: CPT

## 2021-05-25 PROCEDURE — 86381 MITOCHONDRIAL ANTIBODY EACH: CPT

## 2021-05-25 PROCEDURE — 99231 SBSQ HOSP IP/OBS SF/LOW 25: CPT

## 2021-05-25 PROCEDURE — 86850 RBC ANTIBODY SCREEN: CPT

## 2021-05-25 PROCEDURE — 86706 HEP B SURFACE ANTIBODY: CPT

## 2021-05-25 PROCEDURE — 83550 IRON BINDING TEST: CPT

## 2021-05-25 PROCEDURE — 83540 ASSAY OF IRON: CPT

## 2021-05-25 PROCEDURE — P9045: CPT

## 2021-05-25 PROCEDURE — 82330 ASSAY OF CALCIUM: CPT

## 2021-05-25 PROCEDURE — 37182 INSERT HEPATIC SHUNT (TIPS): CPT

## 2021-05-25 PROCEDURE — 84132 ASSAY OF SERUM POTASSIUM: CPT

## 2021-05-25 PROCEDURE — 86704 HEP B CORE ANTIBODY TOTAL: CPT

## 2021-05-25 PROCEDURE — 82042 OTHER SOURCE ALBUMIN QUAN EA: CPT

## 2021-05-25 PROCEDURE — 71045 X-RAY EXAM CHEST 1 VIEW: CPT

## 2021-05-25 PROCEDURE — 83615 LACTATE (LD) (LDH) ENZYME: CPT

## 2021-05-25 PROCEDURE — 83605 ASSAY OF LACTIC ACID: CPT

## 2021-05-25 PROCEDURE — C1894: CPT

## 2021-05-25 PROCEDURE — P9047: CPT

## 2021-05-25 PROCEDURE — 93306 TTE W/DOPPLER COMPLETE: CPT

## 2021-05-25 PROCEDURE — 83970 ASSAY OF PARATHORMONE: CPT

## 2021-05-25 PROCEDURE — 86255 FLUORESCENT ANTIBODY SCREEN: CPT

## 2021-05-25 PROCEDURE — 85027 COMPLETE CBC AUTOMATED: CPT

## 2021-05-25 PROCEDURE — 84100 ASSAY OF PHOSPHORUS: CPT

## 2021-05-25 PROCEDURE — 85730 THROMBOPLASTIN TIME PARTIAL: CPT

## 2021-05-25 PROCEDURE — 85018 HEMOGLOBIN: CPT

## 2021-05-25 PROCEDURE — 85610 PROTHROMBIN TIME: CPT

## 2021-05-25 PROCEDURE — 89051 BODY FLUID CELL COUNT: CPT

## 2021-05-25 PROCEDURE — 86708 HEPATITIS A ANTIBODY: CPT

## 2021-05-25 PROCEDURE — 80076 HEPATIC FUNCTION PANEL: CPT

## 2021-05-25 PROCEDURE — 87205 SMEAR GRAM STAIN: CPT

## 2021-05-25 PROCEDURE — 87517 HEPATITIS B DNA QUANT: CPT

## 2021-05-25 PROCEDURE — 82803 BLOOD GASES ANY COMBINATION: CPT

## 2021-05-25 PROCEDURE — 99239 HOSP IP/OBS DSCHRG MGMT >30: CPT

## 2021-05-25 PROCEDURE — 87070 CULTURE OTHR SPECIMN AEROBIC: CPT

## 2021-05-25 PROCEDURE — 82607 VITAMIN B-12: CPT

## 2021-05-25 PROCEDURE — 84295 ASSAY OF SERUM SODIUM: CPT

## 2021-05-25 PROCEDURE — 97161 PT EVAL LOW COMPLEX 20 MIN: CPT

## 2021-05-25 PROCEDURE — C1759: CPT

## 2021-05-25 PROCEDURE — 82435 ASSAY OF BLOOD CHLORIDE: CPT

## 2021-05-25 PROCEDURE — 71045 X-RAY EXAM CHEST 1 VIEW: CPT | Mod: 26

## 2021-05-25 PROCEDURE — 93005 ELECTROCARDIOGRAM TRACING: CPT

## 2021-05-25 PROCEDURE — 80053 COMPREHEN METABOLIC PANEL: CPT

## 2021-05-25 PROCEDURE — U0003: CPT

## 2021-05-25 PROCEDURE — C9399: CPT

## 2021-05-25 PROCEDURE — 87340 HEPATITIS B SURFACE AG IA: CPT

## 2021-05-25 PROCEDURE — 82947 ASSAY GLUCOSE BLOOD QUANT: CPT

## 2021-05-25 RX ORDER — MIDODRINE HYDROCHLORIDE 2.5 MG/1
2 TABLET ORAL
Qty: 0 | Refills: 0 | DISCHARGE
Start: 2021-05-25

## 2021-05-25 RX ORDER — OXYCODONE HYDROCHLORIDE 5 MG/1
1 TABLET ORAL
Qty: 0 | Refills: 0 | DISCHARGE
Start: 2021-05-25

## 2021-05-25 RX ORDER — BUMETANIDE 0.25 MG/ML
2 INJECTION INTRAMUSCULAR; INTRAVENOUS
Qty: 120 | Refills: 0
Start: 2021-05-25 | End: 2021-06-23

## 2021-05-25 RX ORDER — SPIRONOLACTONE 25 MG/1
2 TABLET, FILM COATED ORAL
Qty: 60 | Refills: 0
Start: 2021-05-25 | End: 2021-06-23

## 2021-05-25 RX ORDER — OXYCODONE HYDROCHLORIDE 5 MG/1
1 TABLET ORAL
Qty: 18 | Refills: 0
Start: 2021-05-25 | End: 2021-05-27

## 2021-05-25 RX ORDER — MIDODRINE HYDROCHLORIDE 2.5 MG/1
2 TABLET ORAL
Qty: 180 | Refills: 0
Start: 2021-05-25 | End: 2021-06-23

## 2021-05-25 RX ORDER — ONDANSETRON 8 MG/1
4 TABLET, FILM COATED ORAL ONCE
Refills: 0 | Status: COMPLETED | OUTPATIENT
Start: 2021-05-25 | End: 2021-05-25

## 2021-05-25 RX ORDER — OMEPRAZOLE 10 MG/1
1 CAPSULE, DELAYED RELEASE ORAL
Qty: 0 | Refills: 0 | DISCHARGE

## 2021-05-25 RX ORDER — LANOLIN ALCOHOL/MO/W.PET/CERES
1 CREAM (GRAM) TOPICAL
Qty: 0 | Refills: 0 | DISCHARGE
Start: 2021-05-25

## 2021-05-25 RX ADMIN — MIDODRINE HYDROCHLORIDE 20 MILLIGRAM(S): 2.5 TABLET ORAL at 06:01

## 2021-05-25 RX ADMIN — ONDANSETRON 4 MILLIGRAM(S): 8 TABLET, FILM COATED ORAL at 06:01

## 2021-05-25 RX ADMIN — PANTOPRAZOLE SODIUM 40 MILLIGRAM(S): 20 TABLET, DELAYED RELEASE ORAL at 06:05

## 2021-05-25 RX ADMIN — ENOXAPARIN SODIUM 40 MILLIGRAM(S): 100 INJECTION SUBCUTANEOUS at 06:01

## 2021-05-25 RX ADMIN — MIDODRINE HYDROCHLORIDE 20 MILLIGRAM(S): 2.5 TABLET ORAL at 13:41

## 2021-05-25 RX ADMIN — SPIRONOLACTONE 200 MILLIGRAM(S): 25 TABLET, FILM COATED ORAL at 06:02

## 2021-05-25 RX ADMIN — BUMETANIDE 3 MILLIGRAM(S): 0.25 INJECTION INTRAMUSCULAR; INTRAVENOUS at 06:01

## 2021-05-25 RX ADMIN — Medication 30 MILLILITER(S): at 00:02

## 2021-05-25 NOTE — PROGRESS NOTE ADULT - PROBLEM SELECTOR PLAN 4
s/p successful TIPs on 5/11/21  normal mental status, LFTs stable.
s/p successful TIPs on 5/11/21  normal mental status, LFTs stable   no contraindication to discharge per IR
s/p successful TIPs on 5/11/21  normal mental status, LFTs stable.
s/p successful TIPs on 5/11/21  normal mental status, LFTs stable   no contraindication to discharge per IR

## 2021-05-25 NOTE — DISCHARGE NOTE NURSING/CASE MANAGEMENT/SOCIAL WORK - NSDCPEXARELTODIET_GEN_ALL_CORE
I reviewed the H&P, I examined the patient, and there are no changes in the patient's condition.  The patient was advised about the clinical findings and treatment options with the risks and benefits and consents to proceed with the procedure as scheduled.    Specific lens type reviewed and verified with patient.  Patient will have Standard IOL.      Visit Vitals  /74   Pulse 86   Temp 97.8 °F (36.6 °C) (Temporal)   Resp 16   Ht 5' 10\" (1.778 m)   Wt 72.7 kg   SpO2 95%   BMI 23.00 kg/m²        Eat healthy foods you enjoy. Rivaroxaban/Xarelto DOES NOT have a special diet. Limit your alcohol intake.

## 2021-05-25 NOTE — PROGRESS NOTE ADULT - PROBLEM SELECTOR PLAN 3
pt with recurrent R hydrothorax, s/p 1L thora on 5/11 and 1.2L on 5/13 and 5/19.   currently with decreased BS on R side - repeat CXR w rapid fluid reaccumulation   currently with unlabored respirations, mild hypoxia requiring supp O2   - repeat thora was done 5/19 by IR. -CXR showed improvement, CXR 5/21 stable. -Plan for repeat CXR this Sunday if not sooner.   -Lovenox was held prior to procedure, but then resumed.   -Consider pulmonary/thoracic surgery evals. ?need for pleurodesis.   -May need TIPS revision given rapid reaccumulation of effusion.  -OOB to chair and incentive spirometer.  -Patient may need home O2. Will check resting and ambulatory O2 sats on RA.  -Echo with bubble study to eval for hepatopulmonary syndrome ordered, per hepatology.    5/22: recurrent hydrothorax. consider pleurex drainage prior to discharge
pt with recurrent R hydrothorax, s/p 1L thora on 5/11 and 1.2L on 5/13  currently with decreased BS on R side - repeat CXR w rapid fluid reaccumulation   currently with unlabored respirations, mild hypoxia requiring supp O2   - repeat thora to be done 5/19 by IR.   -holding lovenox as a precaution for planned procedure; resume once cleared by IR after procedure.   -Consider pulmonary/thoracic surgery evals.  -May need TIPS revision given rapid reaccumulation of effusion.  -OOB to chair and incentive spirometer.
pt with recurrent R hydrothorax, s/p 1L thora on 5/11 and 1.2L on 5/13 and 5/19.   currently with decreased BS on R side - repeat CXR w rapid fluid reaccumulation   currently with unlabored respirations, mild hypoxia requiring supp O2   - repeat thora was done 5/19 by IR. -CXR showed improvement, CXR 5/21 stable. -Plan for repeat CXR this Sunday if not sooner.   -Lovenox was held prior to procedure, but then resumed.   -Consider pulmonary/thoracic surgery evals. ?need for pleurodesis.   -May need TIPS revision given rapid reaccumulation of effusion.  -OOB to chair and incentive spirometer.  -Patient may need home O2. Will check resting and ambulatory O2 sats on RA.  -Echo with bubble study to eval for hepatopulmonary syndrome ordered, per hepatology.    5/22: recurrent hydrothorax. consider pleurex drainage prior to discharge
pt with recurrent R hydrothorax, s/p 1L thora on 5/11 and 1.2L on 5/13 and 5/19.   currently with decreased BS on R side - repeat CXR w rapid fluid reaccumulation   currently with unlabored respirations, mild hypoxia requiring supp O2   - repeat thora was done 5/19 by IR. -CXR showed improvement, CXR 5/21 stable. -f/u repeat CXR.   -Lovenox was held prior to procedure, but then resumed.   -Consider pulmonary/thoracic surgery evals. ?need for pleurodesis.   -May need TIPS revision given rapid reaccumulation of effusion.  -OOB to chair and incentive spirometer.  -Patient may need home O2. Will check resting and ambulatory O2 sats on RA. -so far doesn't need it.   -Echo with bubble study to eval for hepatopulmonary syndrome ordered, per hepatology - showed pfo.
pt with recurrent R hydrothorax, s/p 1L thora on 5/11 and 1.2L on 5/13  currently with decreased BS on R side - will repeat CXR to eval for fluid reaccumulation   currently with unlabored respirations, not hypoxic - repeat thora as needed
pt with recurrent R hydrothorax, s/p 1L thora on 5/11 and 1.2L on 5/13 and 5/19.   currently with decreased BS on R side - repeat CXR w rapid fluid reaccumulation   currently with unlabored respirations, mild hypoxia requiring supp O2   - repeat thora was done 5/19 by IR. -CXR showed improvement, CXR 5/21 stable. -Plan for repeat CXR this Sunday if not sooner.   -Lovenox was held prior to procedure, but then resumed.   -Consider pulmonary/thoracic surgery evals. ?need for pleurodesis.   -May need TIPS revision given rapid reaccumulation of effusion.  -OOB to chair and incentive spirometer.  -Patient may need home O2. Will check resting and ambulatory O2 sats on RA.  -Echo with bubble study to eval for hepatopulmonary syndrome ordered, per hepatology.
pt with recurrent R hydrothorax, s/p 1L thora on 5/11 and 1.2L on 5/13 and 5/19.   currently with decreased BS on R side - repeat CXR w rapid fluid reaccumulation   currently with unlabored respirations, mild hypoxia requiring supp O2   - repeat thora was done 5/19 by IR. -CXR showed improvement, f/u repeat CXR in am.   -Lovenox was held prior to procedure, but then resumed.   -Consider pulmonary/thoracic surgery evals.  -May need TIPS revision given rapid reaccumulation of effusion.  -OOB to chair and incentive spirometer.  -Patient may need home O2.
pt with recurrent R hydrothorax, s/p 1L thora on 5/11 and 1.2L on 5/13  currently with decreased BS on R side - repeat CXR w rapid fluid reaccumulation   currently with unlabored respirations, not hypoxic - repeat thora to be planned  hold lovenox as a precaution for planned procedure
pt with recurrent R hydrothorax, s/p 1L thora on 5/11 and 1.2L on 5/13 and 5/19.   currently with decreased BS on R side - repeat CXR w rapid fluid reaccumulation   currently with unlabored respirations, mild hypoxia requiring supp O2 - now on RA  - repeat thora was done 5/19 by IR. -CXR showed improvement, CXR 5/21 stable. -repeat CXR shows ?slightly worse.   -Lovenox was held prior to procedure, but then resumed.   -Consider pulmonary/thoracic surgery evals. ?need for pleurodesis.   -May need TIPS revision given rapid reaccumulation of effusion. -Need more time to let TIPS work (several months)  -OOB to chair and incentive spirometer.  -Patient may need home O2. Will check resting and ambulatory O2 sats on RA. -so far doesn't need it.   -Echo with bubble study to eval for hepatopulmonary syndrome ordered, per hepatology - showed pfo.  -Discussed with Dr. Ragsdale. Patient will return for thora next week with him.

## 2021-05-25 NOTE — PROVIDER CONTACT NOTE (OTHER) - SITUATION
pt refused PAS ,pt explained and verbalized risks and benefits of PAS
pt stated that 2hrs after she ate fruits she felt stuck in chest not going down ,pt denies any SOB or indigestion, any pain or other distress noted ,and pt refused to change IVL after due to change
pt reports chest pain 4/10 b/l anterior chest wall. pt states "it's really uncomfortable"

## 2021-05-25 NOTE — PROVIDER CONTACT NOTE (OTHER) - REASON
refused PAS
Chest Pain
pt stated that 2hrs after she ate fruits she felt stuck in chest not going down

## 2021-05-25 NOTE — PROGRESS NOTE ADULT - PROBLEM SELECTOR PLAN 7
c/w lovenox 40mg BID for now  fall precautions   OOB as tolerated, PT eval  -Low sodium fluid restricted diet. RD eval.    plan of care d/w BEENA Quesdaa and Dr. Bryant.  careful use of diuretics will trending creatinine.
c/w lovenox 40mg BID for now; will switch to Xarelto on discharge.   fall precautions   OOB as tolerated, PT eval done.  -Low sodium fluid restricted diet. RD eval done.    -Plan discussed with DEBORAH Hercules and RN and Dr. Ragsdale and hepatology fellow and Dr. Carvajal. -Patient and her mother also updated using [place]  at bedside - informed them about rising Cr too. -Patient will follow up with Dr. Ragsdale and Dr. Carvajal next week. Also, gave her referral to local PMD within system - Dr. Wren to follow up. -She knows to call or seek medical attention if new/worsening SOB, cough, chest or abdominal discomfort, etc. She would like to go home and follow up outpatient at this time. -40 minutes spent on the discharge process.
c/w lovenox 40mg BID for now  fall precautions   OOB as tolerated, PT eval  -Low sodium fluid restricted diet. RD eval.    -Plan discussed with ACP Sangita and hepatology. -Will monitor another day in hospital given chest discomfort.
c/w lovenox 40mg BID for now  fall precautions   OOB as tolerated, PT eval  -Low sodium fluid restricted diet. RD eval.    plan of care d/w BEENA Wood and Dr. Bryant

## 2021-05-25 NOTE — DISCHARGE NOTE NURSING/CASE MANAGEMENT/SOCIAL WORK - NSDCFUADDAPPT_GEN_ALL_CORE_FT
You will need to follow up with your Hepatologist, Dr. Carvajal, within one week of discharge - please call to make an appointment.    You will need to follow up with your primary medical doctor within one week of discharge - please call to make an appointment.    You will need to follow up with your Hematologist, Dr. Venegas, within one week of discharge - please call to make an appointment.

## 2021-05-25 NOTE — PROGRESS NOTE ADULT - PROBLEM SELECTOR PLAN 1
Decompensated cryptogenic cirrhosis with ascites/hydrothorax, portal HTN and esophageal varices; s/p TIPS 5/11/21  - LFT stable; pt mentating well, no e/o HE; Cr stable   - c/w midodrine 20mg TID.  - s/p lasix 80mg QD; increase Bumex to 2mg PO BID, per hepatology.    -C/w aldactone 200mg QD (had not been receiving them 2/2 hold parameters for BP, so eliminated hold parameters)  -S/p albumin repletion.   - completed 7 day course of ceftriaxone for SBP ppx (5/11-5/17)   - trend CBC, CMP, INR daily   - hepatology and transplant surgery following - f/u further recs  -AFP checked and within normal limits; has history of hepatic lesion. Surveillance monitoring.  -Of note, patient Hep B core positive, surf Ab positive, and surf Ag negative. Suggesting a prior infection which was cleared. Will send Hep B DNA in am.  -Hypercalcemia noted. Possibly due to the aggressive albumin repletion. Will check vitamin D in am. Continue to monitor. May need further work up.  -Of note, patient reports getting a treatment in her home country ([facility]) called [facility] every 6 months for her liver disease.    5/22: stable. f/up hepatology recs. await TTE with Bubble study  5/23: diuretics as per Hepatology
Decompensated cryptogenic cirrhosis with ascites/hydrothorax, portal HTN and esophageal varices; s/p TIPS 5/11/21  - LFT stable; pt mentating well, no e/o HE; Cr stable   - c/w midodrine 20mg TID.  - s/p lasix 80mg QD; increase Bumex to 2mg PO BID, per hepatology.    -C/w aldactone 200mg QD (had not been receiving them 2/2 hold parameters for BP, so eliminated hold parameters)  -S/p albumin repletion.   - completed 7 day course of ceftriaxone for SBP ppx (5/11-5/17)   - trend CBC, CMP, INR daily   - hepatology and transplant surgery following - f/u further recs  -AFP checked and within normal limits; has history of hepatic lesion. Surveillance monitoring.  -Of note, patient Hep B core positive, surf Ab positive, and surf Ag negative. Suggesting a prior infection which was cleared. Will send Hep B DNA in am.  -Hypercalcemia noted. Possibly due to the aggressive albumin repletion. Will check vitamin D in am. Continue to monitor. May need further work up.  -Of note, patient reports getting a treatment in her home country ([facility]) called [facility] every 6 months for her liver disease.
Decompensated cryptogenic cirrhosis with ascites/hydrothorax, portal HTN and esophageal varices; s/p TIPS 5/11/21  - LFT stable; pt mentating well, no e/o HE; Cr stable   - c/w midodrine 20mg TID.  - s/p lasix 80mg QD; increase Bumex to 2mg PO BID, per hepatology.    -C/w aldactone 200mg QD (had not been receiving them 2/2 hold parameters for BP, so eliminated hold parameters)  -S/p albumin repletion.   - completed 7 day course of ceftriaxone for SBP ppx (5/11-5/17)   - trend CBC, CMP, INR daily   - hepatology and transplant surgery following - f/u further recs  -AFP checked and within normal limits; has history of hepatic lesion. Surveillance monitoring.  -Of note, patient Hep B core positive, surf Ab positive, and surf Ag negative. Suggesting a prior infection which was cleared. Will send Hep B DNA in am.  -Hypercalcemia noted. Possibly due to the aggressive albumin repletion. Will check vitamin D in am. Continue to monitor. May need further work up.  -Of note, patient reports getting a treatment in her home country ([facility]) called [facility] every 6 months for her liver disease.    5/22: stable. f/up hepatology recs. await TTE with Bubble study
Decompensated cryptogenic cirrhosis with ascites/hydrothorax, portal HTN and esophageal varices; s/p TIPS 5/11/21  - LFT stable; pt mentating well, no e/o HE; Cr stable   - c/w midodrine 20mg TID.  - c/w lasix 80mg QD and aldactone 200mg QD (has not been receiving them 2/2 hold parameters for BP, so eliminated hold parameters)  -Keep albumin for now since eliminating hold parameters for high dose diuretics.   - completed 7 day course of ceftriaxone for SBP ppx (5/11-5/17)   - trend CBC, CMP, INR daily   - hepatology and transplant surgery following - f/u further recs  -AFP in am given history of hepatic lesion. Surveillance monitoring.
Decompensated cryptogenic cirrhosis with ascites/hydrothorax, portal HTN and esophageal varices; s/p TIPS 5/11/21  - LFT stable; pt mentating well, no e/o HE; Cr stable   - c/w midodrine and albumin as below  - c/w lasix 80mg QD and aldactone 200mg QD (has not been receiving them 2/2 hold parameters for BP)  - completed 7 day course of ceftriaxone for SBP ppx (5/11-5/17)   - trend CBC, CMP, INR daily   - hepatology and transplant surgery following - f/u further recs in AM
Decompensated cryptogenic cirrhosis with ascites/hydrothorax, portal HTN and esophageal varices; s/p TIPS 5/11/21  - LFT stable; pt mentating well, no e/o HE; Cr stable   - c/w midodrine 20mg TID.  - s/p lasix 80mg QD; increase Bumex to 3mg PO BID, per hepatology.    -C/w aldactone 200mg Qam and 100mg qpm  -S/p albumin repletion.   - completed 7 day course of ceftriaxone for SBP ppx (5/11-5/17)   - trend CBC, CMP, INR daily   - hepatology and transplant surgery following - f/u further recs  -AFP checked and within normal limits; has history of hepatic lesion. Surveillance monitoring.  -Of note, patient Hep B core positive, surf Ab positive, and surf Ag negative. Suggesting a prior infection which was cleared. -sent Hep B DNA.  -Hypercalcemia noted. Possibly due to the aggressive albumin repletion. -f/u vitamin D and pth. Continue to monitor. May need further work up.  -Of note, patient reports getting a treatment in her home country ([facility]) called [facility] every 6 months for her liver disease.  -Echo with bubble shows PFO.
Decompensated cryptogenic cirrhosis with ascites/hydrothorax, portal HTN and esophageal varices; s/p TIPS 5/11/21  - LFT stable; pt mentating well, no e/o HE; Cr stable   - c/w midodrine 20mg TID.  - s/p lasix 80mg QD; will change to Bumex 1mg PO BID, per hepatology.    -C/w aldactone 200mg QD (had not been receiving them 2/2 hold parameters for BP, so eliminated hold parameters)  -S/p albumin repletion.   - completed 7 day course of ceftriaxone for SBP ppx (5/11-5/17)   - trend CBC, CMP, INR daily   - hepatology and transplant surgery following - f/u further recs  -AFP checked and within normal limits; has history of hepatic lesion. Surveillance monitoring.
Decompensated cryptogenic cirrhosis with ascites/hydrothorax, portal HTN and esophageal varices; s/p TIPS 5/11/21  - LFT stable; pt mentating well, no e/o HE; Cr stable   - c/w midodrine and albumin as below  - c/w lasix 80mg QD and aldactone 200mg QD (has not been receiving them 2/2 hold parameters for BP)  - completed 7 day course of ceftriaxone for SBP ppx (5/11-5/17)   - trend CBC, CMP, INR daily   - hepatology and transplant surgery following - f/u further recs
Decompensated cryptogenic cirrhosis with ascites/hydrothorax, portal HTN and esophageal varices; s/p TIPS 5/11/21  - LFT stable; pt mentating well, no e/o HE; -Cr now worsening slightly.   - c/w midodrine 20mg TID.  - s/p lasix 80mg QD; increased Bumex to 3mg PO BID, per hepatology. -Discussed with hepatology, in view of rising Cr, will reduce to 2mg BID.    -C/w aldactone 200mg Qam and 100mg qpm. -Discussed with hepatology, in view of rising Cr, will reduce to 200mg daily.    -S/p albumin repletion.   - completed 7 day course of ceftriaxone for SBP ppx (5/11-5/17)   - trend CBC, CMP, INR daily   - hepatology and transplant surgery following - f/u further recs  -AFP checked and within normal limits; has history of hepatic lesion. Surveillance monitoring.  -Of note, patient Hep B core positive, surf Ab positive, and surf Ag negative. Suggesting a prior infection which was cleared. -sent Hep B DNA - negative.  -Hypercalcemia noted. Possibly due to the aggressive albumin repletion (now has elevated albumin). -Vitamin D borderline low and pth within normal. Continue to monitor. May need further work up outpatient.  -Of note, patient reports getting a treatment in her home country ([facility]) called [facility] every 6 months for her liver disease.  -Echo with bubble shows PFO.

## 2021-05-25 NOTE — PROGRESS NOTE ADULT - NUTRITIONAL ASSESSMENT
This patient has been assessed with a concern for Malnutrition and has been determined to have a diagnosis/diagnoses of Severe protein-calorie malnutrition and Underweight (BMI < 19).    This patient is being managed with:   Diet Regular-  1500mL Fluid Restriction ([id number])  Low Sodium  Entered: May 19 2021  3:04PM    
This patient has been assessed with a concern for Malnutrition and has been determined to have a diagnosis/diagnoses of Severe protein-calorie malnutrition and Underweight (BMI < 19).    This patient is being managed with:   Diet Regular-  1500mL Fluid Restriction ([id number])  Low Sodium  Entered: May 19 2021  3:04PM    
This patient has been assessed with a concern for Malnutrition and has been determined to have a diagnosis/diagnoses of Severe protein-calorie malnutrition and Underweight (BMI < 19).    This patient is being managed with:   Diet Regular-  1500mL Fluid Restriction ([id number])  Low Sodium  Entered: May 19 2021  3:04PM    
This patient has been assessed with a concern for Malnutrition and has been determined to have a diagnosis/diagnoses of Severe protein-calorie malnutrition and Underweight (BMI < 19).    This patient is being managed with:   Diet Regular-  Entered: May 12 2021 10:22AM    
This patient has been assessed with a concern for Malnutrition and has been determined to have a diagnosis/diagnoses of Severe protein-calorie malnutrition and Underweight (BMI < 19).    This patient is being managed with:   Diet NPO after Midnight-     NPO Start Date: 12-May-2021   NPO Start Time: 23:59  Entered: May 12 2021  6:58PM    Diet Regular-  Entered: May 12 2021 10:22AM    
This patient has been assessed with a concern for Malnutrition and has been determined to have a diagnosis/diagnoses of Severe protein-calorie malnutrition and Underweight (BMI < 19).    This patient is being managed with:   Diet Regular-  1500mL Fluid Restriction ([id number])  Low Sodium  Entered: May 19 2021  3:04PM    
This patient has been assessed with a concern for Malnutrition and has been determined to have a diagnosis/diagnoses of Severe protein-calorie malnutrition and Underweight (BMI < 19).    This patient is being managed with:   Diet Regular-  1500mL Fluid Restriction ([id number])  Low Sodium  Entered: May 19 2021  3:04PM    
This patient has been assessed with a concern for Malnutrition and has been determined to have a diagnosis/diagnoses of Severe protein-calorie malnutrition and Underweight (BMI < 19).    This patient is being managed with:   Diet Regular-  Entered: May 12 2021 10:22AM    
This patient has been assessed with a concern for Malnutrition and has been determined to have a diagnosis/diagnoses of Severe protein-calorie malnutrition and Underweight (BMI < 19).    This patient is being managed with:   Diet NPO after Midnight-     NPO Start Date: 12-May-2021   NPO Start Time: 23:59  Entered: May 12 2021  6:58PM    Diet Regular-  Entered: May 12 2021 10:22AM    
This patient has been assessed with a concern for Malnutrition and has been determined to have a diagnosis/diagnoses of Severe protein-calorie malnutrition and Underweight (BMI < 19).    This patient is being managed with:   Diet Regular-  1500mL Fluid Restriction ([id number])  Low Sodium  Entered: May 19 2021  3:04PM    
This patient has been assessed with a concern for Malnutrition and has been determined to have a diagnosis/diagnoses of Severe protein-calorie malnutrition and Underweight (BMI < 19).    This patient is being managed with:   Diet Regular-  1500mL Fluid Restriction ([id number])  Low Sodium  Entered: May 19 2021  3:04PM    
This patient has been assessed with a concern for Malnutrition and has been determined to have a diagnosis/diagnoses of Severe protein-calorie malnutrition and Underweight (BMI < 19).    This patient is being managed with:   Diet Regular-  Entered: May 12 2021 10:22AM

## 2021-05-25 NOTE — PROGRESS NOTE ADULT - SUBJECTIVE AND OBJECTIVE BOX
Interventional Radiology Follow-Up Note.    This is a 35y Female s/p TIPS and thoracentesis in Interventional Radiology with Dr. Ragsdale.   pt reports chest pain yesterday relieved after spironolactone. Denies sob, cough.     Medication:   buMETAnide: (05-25)  enoxaparin Injectable: (05-25)  midodrine: (05-25)  spironolactone: (05-24)  spironolactone: (05-25)    Vitals:   T(F): 98.5, Max: 98.5 (04:20)  HR: 74  BP: 98/65  RR: 18  SpO2: 96%    Physical Exam:  General: Nontoxic, in NAD.    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    - Plan for Thoracentesis as outpt on 6/2 at 14:00hrs.   - Please obtain a COVID test on 5/30 at 14:40 hrs.  - Hold Xarelto on 5/31 and 6/1.  - D/w NP noe and Dr. Ragsdale.     Please [place] IR at [phone] with any questions, concerns, or issues regarding above.       Interventional Radiology Follow-Up Note.    This is a 35y Female s/p TIPS and thoracentesis in Interventional Radiology with Dr. Ragsdale.   pt reports chest pain yesterday relieved after spironolactone. Denies sob, cough.     Medication:   buMETAnide: (05-25)  enoxaparin Injectable: (05-25)  midodrine: (05-25)  spironolactone: (05-24)  spironolactone: (05-25)    Vitals:   T(F): 98.5, Max: 98.5 (04:20)  HR: 74  BP: 98/65  RR: 18  SpO2: 96%    Physical Exam:  General: Nontoxic, in NAD.    LABS:  WBC -- / Hgb -- / Hct -- / Plt --  Na -- / K -- / CO2 -- / Cl -- / BUN -- / Cr -- / Glucose --  ALT -- / AST -- / Alk Phos -- / Tbili --  Ptt -- / Pt 14.5 / INR 1.22    chest reviewed with Dr. Ragsdale pleural effusion improved from before.     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    - Plan for Thoracentesis as outpt on 6/2 at 14:00hrs.   - Please obtain a COVID test on 5/30 at 14:40 hrs.  - Hold Xarelto on 5/31 and 6/1.  - D/w NP noe and Dr. Ragsdale.  - Seen with Dr. Ragsdale.      Please call IR at [phone] with any questions, concerns, or issues regarding above.

## 2021-05-25 NOTE — PROGRESS NOTE ADULT - SUBJECTIVE AND OBJECTIVE BOX
Jorge Avila MD  Division of Hospital Medicine  Cell: [phone]  Pager: [phone]  Office: [phone]    Patient is a 35y old  Female who presents with a chief complaint of TIPS (25 May 2021 11:48)        SUBJECTIVE / OVERNIGHT EVENTS: Patient says breathing and chest discomfort better. She has some mild RUQ/right lower chest discomfort. She says she feels like fruit got stuck in her throat yesterday while eating but eventually passed. -Used [place]  Ira [id number]. -Patient says she would like to go home.       MEDICATIONS  (STANDING):    MEDICATIONS  (PRN):      Vital Signs Last 24 Hrs  T(C): 36.7 (25 May 2021 11:53), Max: 36.7 (25 May 2021 11:53)  T(F): 98.1 (25 May 2021 11:53), Max: 98.1 (25 May 2021 11:53)  HR: 69 (25 May 2021 11:53) (69 - 69)  BP: 105/67 (25 May 2021 11:53) (105/67 - 105/67)  BP(mean): --  RR: 18 (25 May 2021 11:53) (18 - 18)  SpO2: 97% (25 May 2021 11:53) (97% - 97%)  CAPILLARY BLOOD GLUCOSE        I&O's Summary    24 May 2021 07:01  -  25 May 2021 07:00  --------------------------------------------------------  IN: 690 mL / OUT: 2100 mL / NET: -1410 mL          PHYSICAL EXAM:   GENERAL: NAD, thin  HEAD:  Atraumatic, Normocephalic  EYES: conjunctiva and sclera clear  NECK: Supple  CHEST/LUNG: reduced BS in right lower lung field.   HEART: S1S2 normal. Regular rate and rhythm; No murmurs, rubs, or gallops  ABDOMEN: Soft, Nontender, slightly distended; Bowel sounds present  EXTREMITIES: No clubbing, cyanosis, or edema  PSYCH/Neuro: AAOx3. Non-focal.   SKIN: No rashes or lesions      LABS:                        11.3   5.48  )-----------( 269      ( 25 May 2021 07:26 )             36.4     05-25    135  |  91<L>  |  32<H>  ----------------------------<  94  4.2   |  22  |  1.20    Ca    11.8<H>      25 May 2021 07:26    TPro  9.0<H>  /  Alb  6.3<H>  /  TBili  3.9<H>  /  DBili  x   /  AST  35  /  ALT  19  /  AlkPhos  188<H>  05-25    PT/INR - ( 25 May 2021 07:27 )   PT: 14.5 sec;   INR: 1.22 ratio           < from: Xray Chest 1 View- PORTABLE-Routine (Xray Chest 1 View- PORTABLE-Routine .) (05.25.21 @ 10:17) >  IMPRESSION:  Small loculated right pleural effusion, slightly larger in size. Likely associated passive atelectasis.    Possible trace left pleural effusion.    < end of copied text >        RADIOLOGY & ADDITIONAL TESTS:    Imaging Personally Reviewed: CXR reviewed.   Consultant(s) Notes Reviewed:  IR and hepatology  Care Discussed with Consultants/Other Providers: Dr. Ragsdale and hepatology fellow and Dr. Carvajal    Jorge Avila MD  Division of Hospital Medicine  Cell: [phone]  Pager: [phone]  Office: [phone]    Patient is a 35y old  Female who presents with a chief complaint of TIPS (25 May 2021 11:48)        SUBJECTIVE / OVERNIGHT EVENTS: Patient says breathing and chest discomfort better. She has some mild RUQ/right lower chest discomfort. She says she feels like fruit got stuck in her throat yesterday while eating but eventually passed. -I advised her to chew food more and use sips of water to help pass it. But if persists, she will need to f/u with GI/liver outpatient for further eval. -Used Lao  Ira [id number]. -Patient says she would like to go home.       MEDICATIONS  (STANDING):    MEDICATIONS  (PRN):      Vital Signs Last 24 Hrs  T(C): 36.7 (25 May 2021 11:53), Max: 36.7 (25 May 2021 11:53)  T(F): 98.1 (25 May 2021 11:53), Max: 98.1 (25 May 2021 11:53)  HR: 69 (25 May 2021 11:53) (69 - 69)  BP: 105/67 (25 May 2021 11:53) (105/67 - 105/67)  BP(mean): --  RR: 18 (25 May 2021 11:53) (18 - 18)  SpO2: 97% (25 May 2021 11:53) (97% - 97%)  CAPILLARY BLOOD GLUCOSE        I&O's Summary    24 May 2021 07:01  -  25 May 2021 07:00  --------------------------------------------------------  IN: 690 mL / OUT: 2100 mL / NET: -1410 mL          PHYSICAL EXAM:   GENERAL: NAD, thin  HEAD:  Atraumatic, Normocephalic  EYES: conjunctiva and sclera clear  NECK: Supple  CHEST/LUNG: reduced BS in right lower lung field.   HEART: S1S2 normal. Regular rate and rhythm; No murmurs, rubs, or gallops  ABDOMEN: Soft, Nontender, slightly distended; Bowel sounds present  EXTREMITIES: No clubbing, cyanosis, or edema  PSYCH/Neuro: AAOx3. Non-focal.   SKIN: No rashes or lesions      LABS:                        11.3   5.48  )-----------( 269      ( 25 May 2021 07:26 )             36.4     05-25    135  |  91<L>  |  32<H>  ----------------------------<  94  4.2   |  22  |  1.20    Ca    11.8<H>      25 May 2021 07:26    TPro  9.0<H>  /  Alb  6.3<H>  /  TBili  3.9<H>  /  DBili  x   /  AST  35  /  ALT  19  /  AlkPhos  188<H>  05-25    PT/INR - ( 25 May 2021 07:27 )   PT: 14.5 sec;   INR: 1.22 ratio           < from: Xray Chest 1 View- PORTABLE-Routine (Xray Chest 1 View- PORTABLE-Routine .) (05.25.21 @ 10:17) >  IMPRESSION:  Small loculated right pleural effusion, slightly larger in size. Likely associated passive atelectasis.    Possible trace left pleural effusion.    < end of copied text >        RADIOLOGY & ADDITIONAL TESTS:    Imaging Personally Reviewed: CXR reviewed.   Consultant(s) Notes Reviewed:  IR and hepatology  Care Discussed with Consultants/Other Providers: Dr. Ragsdale and hepatology fellow and Dr. Carvajal

## 2021-05-25 NOTE — PROGRESS NOTE ADULT - ATTENDING SUPERVISION STATEMENT
Fellow
ACP
Fellow
Resident
ACP
ACP
ACP/Resident
ACP/Resident
Fellow
Fellow
ACP
ACP/Resident/Fellow
Fellow
ACP
ACP/Resident
ACP/Resident

## 2021-05-25 NOTE — PROVIDER CONTACT NOTE (OTHER) - ASSESSMENT
chest pain 4/10. pt tachycardic to 109. hypotensive on Jaleel @ 0.4 up from 0.2 @ 1900
pt Aox4 denies any pain, chest discomfort or any other distress noted ,vitals documented,IVL wdl
pt Aox4 ,vss ambulating

## 2021-05-25 NOTE — PROGRESS NOTE ADULT - PROBLEM SELECTOR PLAN 6
-Ionized calcium was normal, PTH normal, vitamin D borderline low.  trend daily  monitor. -outpatient f/u.   -Possibly related to the hyperalbuminemia.

## 2021-05-25 NOTE — PROGRESS NOTE ADULT - ATTENDING COMMENTS
35F w/ hx of protein S deficiency, Budd-Chiari leading to decompensated cirrhosis w/ ascites and R hepatic hydrothorax, splenomegaly and enlarged L caudate lobe w/ resulting mass effect on IVC and resulting thrombus/stenosis on A/C, now s/p TIPS on 5/11 doing well.    Still requiring oxygen via NC due to R hepato-hydrothorax reaccumulating.  Pending repeat thoracentesis.  Continue midodrine 20 mg po TID.  Continue lasix 80 + aldactone 200 mg daily.  Liver function is stable.  May need to consider alternative managements to diuretics (increasing TIPS stent size vs pleurodesis) if the R pleural effusion remains a issue.
35F w/ hx of protein S deficiency, Budd-Chiari leading to decompensated cirrhosis w/ ascites and R hepatic hydrothorax, splenomegaly and enlarged L caudate lobe w/ resulting mass effect on IVC and resulting thrombus/stenosis on A/C, now s/p TIPS on 5/11 doing well.    Still requiring oxygen via NC due to R hepato-hydrothorax reaccumulating.  S/p repeat thoracentesis on 5/19. Repeat CXR tmrw.  Continue midodrine 20 mg po TID.  Switch lasix 40 mg po BID to bumex 1 mg po BID.  Continue Aldactone 200 mg daily.  Liver function is stable.  May need to consider alternative managements to diuretics (increasing TIPS stent size vs pleurodesis) if the R pleural effusion remains a issue in the future. Otherwise, will continue pushing diuretic dosing and aim to space out thoracentesis as much as possible.
35F w/ hx of protein S deficiency, Budd-Chiari leading to decompensated cirrhosis w/ ascites and R hepatic hydrothorax, splenomegaly and enlarged L caudate lobe w/ resulting mass effect on IVC and resulting thrombus/stenosis on A/C, now s/p TIPS on 5/11 doing well.    Titrated off pressors and now on the floor.  R lung effusion reaccumulated today, needs repeat thoracentesis.  Continue midodrine 20 mg po TID.  Continue lasix 80 + aldactone 200 mg daily.  Liver function is stable.  May need to consider alternative managements to diuretics (increasing TIPS stent size vs pleurodesis) if the R pleural effusion remains a issue.
35F w/ hx of protein S deficiency, Budd-Chiari leading to decompensated cirrhosis w/ ascites and R hepatic hydrothorax, splenomegaly and enlarged L caudate lobe w/ resulting mass effect on IVC and resulting thrombus/stenosis on A/C, now s/p TIPS on 5/11 doing well.    Titrated off pressors.  Continue midodrine 20 mg po TID.  Continue lasix 80 + aldactone 200 mg daily. Relatively euvolemic currently.  Ok with downgrading to floor. Will continue to follow.  Liver function ok.
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 She completed a 7-day course of ceftriaxone empirically post-TIPS (5/11-17), and BP has been stable recently on midodrine 20 mg po q8h.    She is currently undergoing active diuresis to decrease the rapid reaccumulation of her right hepatic hydrothorax, as thus far this admission she required thoracentesis x3 (1L on 5/11, 1.2L on 5/13, and 1.5L on 5/19), and still with moderate effusion on exam and CXR with associated symptoms. Urine output was recorded from 4pm onward yesterday with only 660 mL since then until this morning, therefore recommend further increasing her diuretics today to: Bumex 3 mg po bid and spironolactone 300 mg po daily. She has had stable electrolytes and renal function thus far.    Awaiting repeat TTE to rule out post-TIPS RV dysfunction, as well as bubble study to rule out hepatopulmonary syndrome (HPS).    Plan will be for her to resume Xarelto for anticoagulation once ready for discharge, but would continue therapeutic LMWH for now in case she requires another thoracentesis prior to discharge.    Please don't hesitate to call with any questions or concerns.    Meme Bryant M.D., Ph.D.  Transplant Hepatology  Cell: [phone]
Hepatology Staff: Haydee Carvajal MD    I saw and examined the patient along with  Dr. Watson 05-15-21 @ 08:49  Patient Medical Record, hosptial course was reviewed and summarized as below:    Vitals: Vital Signs Last 24 Hrs  T(C): 37.3 (15 May 2021 08:00), Max: 37.3 (15 May 2021 08:00)  T(F): 99.1 (15 May 2021 08:00), Max: 99.1 (15 May 2021 08:00)  HR: 87 (15 May 2021 08:30) (64 - 98)  BP: 90/56 (15 May 2021 08:30) (80/53 - 123/77)  BP(mean): 68 (15 May 2021 08:30) (61 - 96)  RR: 17 (15 May 2021 08:30) (17 - 46)  SpO2: 94% (15 May 2021 08:30) (90% - 100%)  Medications:  IV Fluids: albumin human 25% IVPB 100 milliLiter(s) IV Intermittent every 8 hours    Antibiotics:  Diuretics:furosemide    Tablet 80 milliGRAM(s) Oral every 24 hours    Labs:Creatinine, Serum: 0.54 mg/dL (05-15-21 @ 00:23)  INR: 1.25 ratio (05-15-21 @ 00:23)  Bilirubin Total, Serum: 2.5 mg/dL (05-15-21 @ 00:23)      I/O: I&O's Summary    14 May 2021 07:01  -  15 May 2021 07:00  --------------------------------------------------------  IN: 1385.2 mL / OUT: 1045 mL / NET: 340.2 mL    15 May 2021 07:01  -  15 May 2021 08:49  --------------------------------------------------------  IN: 1.6 mL / OUT: 250 mL / NET: -248.4 mL      Nutritional Status:   Albumin, Serum: 3.8 g/dL (05-15-21 @ 00:23)    Recommendations:  Patient is stable clinically. Still needing low dose pf phenylephrine. Agree with Midodrine 20 mg tid. Cont with Lasix 80 mg Lasix daily, and Aldactone 100 mg PO bid.  Check Urine Na+. Uptitrate diuretics based on Urine Na+ ( target > 70 meq /L)  MELD Na score 14.  Patient will be evaluated for OLT as outpatient.    Plan discussed with Primary team.
seen and assessed on 5/14    doing well sp TIPs.  pressor requirement decreasing
35F w/ hx of protein S deficiency, Budd-Chiari leading to decompensated cirrhosis w/ ascites and R hepatic hydrothorax, splenomegaly and enlarged L caudate lobe w/ resulting mass effect on IVC and resulting thrombus/stenosis on A/C, now s/p TIPS on 5/11 doing well.    Off oxygen today but still tenuous.  S/p repeat thoracentesis on 5/19. Repeat CXR monday unless clinically indicated  Continue midodrine 20 mg po TID.  Increase bumex to 2 mg po BID.  Continue Aldactone 200 mg daily.  Liver function is stable.  May need to consider alternative managements to diuretics (increasing TIPS stent size vs pleurodesis) if the R pleural effusion remains a issue in the future. Otherwise, will continue pushing diuretic dosing and aim to space out thoracentesis as much as possible.
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 No evidence of post-TIPS bleeding, hepatic ischemia, or hepatic encephalopathy. Can now aim for lower MAP goal of 65 mmHg and wean off phenylephrine onto midodrine. Agree with resumed diuresis, and she may require another thoracentesis prior to being discharged home. Re-started on anticoagulation today, currently with heparin drip (without bolus) per IR preference given recent procedures.     Please don't hesitate to call with any questions or concerns.    Meme Bryant M.D., Ph.D.  Transplant Hepatology  Cell: [phone] .
Hepatology Staff: Haydee Carvajal MD    I saw and examined the patient along with  Dr. Orellana 05-24-21 @ 14:42  Patient Medical Record, hosptial course was reviewed and summarized as below:    Vitals: Vital Signs Last 24 Hrs  T(C): 36.6 (24 May 2021 12:55), Max: 36.8 (23 May 2021 20:15)  T(F): 97.8 (24 May 2021 12:55), Max: 98.3 (23 May 2021 20:15)  HR: 77 (24 May 2021 12:55) (65 - 85)  BP: 103/68 (24 May 2021 12:55) (91/58 - 103/68)    RR: 18 (24 May 2021 12:55) (17 - 18)  SpO2: 94% (24 May 2021 12:55) (94% - 98%)    Labs:INR: 1.26 ratio (05-24-21 @ 07:19)  Creatinine, Serum: 0.97 mg/dL (05-24-21 @ 07:12)  Bilirubin Total, Serum: 3.0 mg/dL (05-24-21 @ 07:12)      I/O: I&O's Summary    23 May 2021 07:01  -  24 May 2021 07:00  --------------------------------------------------------  IN: 240 mL / OUT: 1600 mL / NET: -1360 mL    24 May 2021 07:01  -  24 May 2021 14:42  --------------------------------------------------------  IN: 240 mL / OUT: 700 mL / NET: -460 mL      Nutritional Status:   Albumin, Serum: 5.7 g/dL (05-24-21 @ 07:12)    Recommendations: This is a 35-year-old female with history of protein S deficiency, Budd-Chiari syndrome with decompensated cirrhosis of the liver complicated with ascites, portal hypertension with esophageal varices, large right-sided hepatic hydrothorax, splenomegaly and enlarged left cardiac lobe resulting in mass-effect on IVC resulting in thrombosis/stenosis of the IVC, currently on anticoagulation, status post TIPS procedure on 5-.  0 brief ICU stay due to hypotension for monitoring but now out of ICU and is doing well in the floor.  She has been requiring frequent thoracentesis due to reaccumulation of the large hepatic hydrothorax in the right side.  She remains on Bumex 3 mg twice daily along with spironolactone 300 mg daily with much better control of her right hepatic hydrothorax right now.  Chest x-ray from this morning was reviewed which revealed essentially significant improvement with minimal right-sided pleural effusion.  Patient appears comfortable.  In addition her echocardiogram findings were also reviewed.    At this time patient appears to be well optimized on should be able to be discharged home with plan for close outpatient follow-up.  We will review her transplant candidacy as an outpatient.  Anticoagulations will be switched to Xarelto at the time of discharge.      Plan discussed with Primary team.
Hepatology Staff: Haydee Carvajal MD    I saw and examined the patient along with  Dr. Orellana on  05-25-21 @ 12:35  Patient Medical Record, hosptial course was reviewed and summarized as below:    Vitals: Vital Signs Last 24 Hrs  T(C): 36.7 (25 May 2021 11:53), Max: 36.9 (25 May 2021 04:20)  T(F): 98.1 (25 May 2021 11:53), Max: 98.5 (25 May 2021 04:20)  HR: 69 (25 May 2021 11:53) (60 - 77)  BP: 105/67 (25 May 2021 11:53) (98/65 - 105/67)    RR: 18 (25 May 2021 11:53) (18 - 18)  SpO2: 97% (25 May 2021 11:53) (94% - 98%)    Labs:INR: 1.22 ratio (05-25-21 @ 07:27)  Creatinine, Serum: 1.20 mg/dL (05-25-21 @ 07:26)  Bilirubin Total, Serum: 3.9 mg/dL (05-25-21 @ 07:26)      I/O: I&O's Summary    24 May 2021 07:01  -  25 May 2021 07:00  --------------------------------------------------------  IN: 690 mL / OUT: 2100 mL / NET: -1410 mL      Nutritional Status:   Albumin, Serum: 6.3 g/dL (05-25-21 @ 07:26)    Recommendations: This is a 35-year-old female with history of protein S deficiency, Budd-Chiari syndrome with decompensated cirrhosis of the liver complicated with ascites, portal hypertension with esophageal varices, large right-sided hepatic hydrothorax, splenomegaly and enlarged left cardiac lobe resulting in mass-effect on IVC resulting in thrombosis/stenosis of the IVC, currently on anticoagulation, status post TIPS procedure on 5-.  0 brief ICU stay due to hypotension for monitoring but now out of ICU and is doing well in the floor.  She has been requiring frequent thoracentesis due to reaccumulation of the large hepatic hydrothorax in the right side.      She remains on Bumex 3 mg twice daily along with spironolactone 300 mg daily with much better control of her right hepatic hydrothorax right now.  Chest x-ray from 5/24 was reviewed which revealed essentially significant improvement with minimal right-sided pleural effusion.  Patient appears comfortable.  In addition her echocardiogram findings were also reviewed, no significant right to left ventricular dysfunction was noted.  She does have mild HERLINDA based on labs from this morning.  I would recommend reducing the Bumex to 2 mg twice a day and reducing Aldactone to 200 mg daily.    At this time patient appears to be well optimized on should be able to be discharged home with plan for close outpatient follow-up.  We will review her transplant candidacy as an outpatient.  Anticoagulations will be switched to Xarelto at the time of discharge.  She has an appointment to see Dr. Ragsdale for thoracentesis next week.  She will also seen in hepatology clinic next week with me.      Plan discussed with Primary team.
nearly off neosynephrine.  anticoagulation per hepatology  on lasix/aldactone
seen and assessed on 5/15  still weaning neosynephrine  otherwise appears well.   will follow hepatology recs.
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 No evidence of post-TIPS bleeding, hepatic ischemia, or hepatic encephalopathy, but symptomatic from large right pleural effusion and planned for repeat thoracentesis again today, and also with hypotension pre- and post-TIPS currently requiring midodrine likely due to intravascular volume depletion in the setting of aggressive diuresis pre-TIPS. Low suspicion for sepsis despite borderline leukocytosis, but continuing empiric ceftriaxone (5/11- ) for now. Recommend IV albumin today. Resume heparin drip (without bolus) post-thoracentesis as she is very high risk for thrombosis.     Please don't hesitate to call with any questions or concerns.    Meme Bryant M.D., Ph.D.  Transplant Hepatology  Cell: [phone] .
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    Post-TIPS course has been complicated by hypotension worse than her baseline, likely worsened due to intravascular volume depletion but also currently continuing ceftriaxone (5/11- ) empirically. Recommend increasing midodrine in order to try to wean off phenylephrine infusion. S/p albumin 5% 250 mL iv x1 yesterday, but recommend giving additional albumin 25% 100 mL iv q8h today, and would also decrease furosemide to 80 mg po daily (instead of bid). Continue spironolactone 200 mg po daily for now.    Given no evidence of post-procedure bleeding thus far, recommend transitioning off heparin drip onto therapeutic Lovenox, but would defer resuming her home Xarelto as anticipate that she may end up needing another thoracentesis prior to being discharged home. Once ready for discharge, she can be resumed on Xarelto.    No evidence of post-TIPS hepatic ischemia or hepatic encephalopathy thus far.    Her plan of care was discussed with SICU team today. Please don't hesitate to call with any questions or concerns.    Meme Bryant M.D., Ph.D.  Transplant Hepatology  Cell: [phone]
Day 1 s/p TIPSS and right thoracentesis for budd-chiari and right hepatohydrothorax  c/o chest pain to left side overnight, now resolved, not pleuritic, no SOB, no cough, no dizziness    on low dose Jaleel for low blood pressure  93% on nasal cannula    decreased right breath sounds, dull percussion c/w large right pleural effusion    abdomen soft non-tender  dressings over IR puncture sites, clean, intact    labs - CBC and LFTs stable    Plan  continue monitoring in SICU  for further thoracentesis with IR  hold xarelto  continue heparin   wean Jaleel  ok for diet and SOOB
Hepatology Staff: Haydee Carvajal MD    I saw and examined the patient along with  Dr. Watson 05-16-21 @ 09:01  Patient Medical Record, hosptial course was reviewed and summarized as below:    Vitals: Vital Signs Last 24 Hrs  T(C): 36.7 (16 May 2021 07:00), Max: 37 (15 May 2021 11:00)  T(F): 98.1 (16 May 2021 07:00), Max: 98.6 (15 May 2021 11:00)  HR: 74 (16 May 2021 07:45) (64 - 92)  BP: 100/66 (16 May 2021 07:30) (75/47 - 112/68)  BP(mean): 77 (16 May 2021 07:30) (55 - 88)  RR: 33 (16 May 2021 07:45) (16 - 44)  SpO2: 95% (16 May 2021 07:45) (89% - 96%)  Medications:  IV Fluids: albumin human 25% IVPB 100 milliLiter(s) IV Intermittent every 8 hours      Labs:Creatinine, Serum: 0.49 mg/dL (05-16-21 @ 05:28)  Bilirubin Total, Serum: 1.9 mg/dL (05-16-21 @ 05:28)  INR: 1.21 ratio (05-16-21 @ 05:28)    I/O: I&O's Summary    15 May 2021 07:01  -  16 May 2021 07:00  --------------------------------------------------------  IN: 747.6 mL / OUT: 950 mL / NET: -202.4 mL    16 May 2021 07:01  -  16 May 2021 09:01  --------------------------------------------------------  IN: 4.8 mL / OUT: 0 mL / NET: 4.8 mL      Nutritional Status:   Albumin, Serum: 3.9 g/dL (05-16-21 @ 05:28)    Recommendations: Patient clinically remains stable although still requiring a low-dose of phenylephrine.  I note that ICU team is targeting a MAP of more than 65.  I suspect patient's being cirrhotic a MAP of more than 60 will be acceptable and we should be able to wean off from phenylephrine.  I agree with the rest of the outlined recommendations fellow's note.  Please discontinue IV Albumin ( albumin level is 3.9)      Plan discussed with Primary team.
s/p TIPSS 5/11  P  -thoracentesis today
- Remains on low dose emerita. D/w hepatology regarding MAP SBP goal.  - S/p thoracentesis, CXR improved. On RA, sat >90. No dyspnea.  - Continue midodrine, albumin, diuresis as per hepatology recommendations.  - Continue lovenox AC.   - Tolerating PO.  - Urine electrolytes pending.  - Hgb 10.0. Monitor.  - Ceftriaxone ppx. Monitor luekocytosis.  - Monitor glycemia.  - Renal US.
N Multimodal pain management. [place] 0.  P Thoracentesis yesterday 1200 cc, cytology pending.  C Jaleel 1.3. Decrease diuretics, increase midodrine. Standing albumin 25% q8h. Hypovolemic/distributive shock with pressor requirements.  R D/c Shayy. Monitor.  G Mckayla PO.  DVT Tx lovenox/SCDs.  I Monitor, SBP ppx, ceftriaxone.    Has potentially life threatening condition requiring SICU evaluation and management.
- MAP goal 60. Wean emerita synephrine.  - Multimodal pain management. [place] 0.  - RA sat>90. No dyspnea. IS.  - Mckayla PO.  - DVT Tx lovenox/SCDs.  - Ceftriaxone ppx.  - D/w hepatology regarding diuretics.  - Albumin 25% q8h.
- N Multimodal pain management. [place] 0.  - P NC 3, sat>90. Significant R hepatic hydrothorax, dyspnea worsened. Thoracentesis today per IR. CXR large R effusion likely with some compression of lung parenchyma, bronchogram in hilum.  - C Continue midodrine. Jaleel-Synephrine at 0.2 MAP >60 weaning.  - R UOP 2.5L. Negative 500/stay. Holding diuresis.  - H Monitor. UFH gtt, hold for IR.  - I Ceftriaxone ppx. Monitor leukocytosis.  - E Monitor glycemia.
- S/p TIPS per IR, difficult procedure. Cryptogenic cirrhosis, decompensated, protein S deficiency, Budd-Chiari, varices, hydrothorax, ascites. On chronic AC for IVC thrombosis, resume UFH gtt, therapeutic.  - Pressors off currently MAP at goal.   - Multimodal pain management.  - NC 1L sat >90.  - S/p thoracentesis, with 1L drainage, appears exudative. Drainage tomorrow.  - Mckayla clears.  - NS at 50. Na 138. MIVF.  - UOP 10-15/h, monitor. Resume home lasix/aldactone.  - Monitor CBC.  - Ceftriaxone SBP ppx.  - NaMELD 13.
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 She completed a 7-day course of ceftriaxone empirically post-TIPS (5/11-17), and BP has been stable recently on midodrine 20 mg po q8h.    She is currently undergoing active diuresis to decrease the rapid reaccumulation of her right hepatic hydrothorax, as thus far this admission she required thoracentesis x3 (1L on 5/11, 1.2L on 5/13, and 1.5L on 5/19), and still with moderate effusion on exam and CXR with associated symptoms. Please monitor strict I/Os and daily weights so that her diuretic regimen can be adjusted accordingly. Currently, she is on Bumex 2 mg po bid (increased yesterday) and spironolactone 200 mg po daily, with stable electrolytes and renal function.    Awaiting repeat TTE to rule out post-TIPS RV dysfunction, as well as bubble study to rule out hepatopulmonary syndrome (HPS).    Plan will be for her to resume Xarelto for anticoagulation once ready for discharge, but would continue therapeutic LMWH for now in case she requires another thoracentesis prior to discharge.    Please don't hesitate to call with any questions or concerns.    Meme Bryant M.D., Ph.D.  Transplant Hepatology  Cell: [phone]
S/p TIPS  Hemodynamically stable, off pressure  On PO, LFT stable  DC IVF  Can Dc Albumin infusion after 48 hrs  Transfer

## 2021-05-25 NOTE — DISCHARGE NOTE NURSING/CASE MANAGEMENT/SOCIAL WORK - NSDCPEXARELTOREACT_GEN_ALL_CORE
Rivaroxaban/Xarelto increases your risk for bleeding. Notify your doctor if you experience any of the following side effects: unusual bleeding or bruising, vomiting blood or coffee ground-like material, red or black stool, itching or hives, chest tightness, trouble breathing, swelling in your face or hands, swelling in your mouth or throat, change in how much or how often you urinate, red or brown urine, heavy menstrual or vaginal bleeding, or blistering or peeling skin. When Rivaroxaban/Xarelto is taken with other medicines, they can affect how it works. Taking other medications such as aspirin, antibiotics, antifungals, blood thinners, nonsteroidal anti-inflammatories, and medications that treat depression can increase your risk of bleeding. It is very important to tell your health care provider about all of the other medicines, including over-the-counter medications, herbs, and vitamins you are taking.  DO NOT start, stop, or change the dosage of any medicine, including over-the-counter medicines, vitamins, and herbal products without your doctor’s approval.  Any products containing aspirin or are nonsteroidal anti-inflammatories lessen the blood’s ability to form clots and adds to the effect of Rivaroxaban/Xarelto. Never take aspirin or medicines that contain aspirin without speaking to your doctor. no fever and no chills. + fever and no chills.

## 2021-05-25 NOTE — PROGRESS NOTE ADULT - ASSESSMENT
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 Admitted for post-procedure monitoring. Course c/b difficulty getting of pressors due to hypotension thought to be due to overdiuresis and frequent and rapid reaccumulation of R hepatic hydrothorax (s/p multiple thoracenteses this admission).     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  -varices: small in 2017  -ascites: recurrent c/b recurrent hepatic hydrothorax  -HE: no hx  -HCC: none on imaging  -MELD-Na = 12 on 5/18  #Hepatic hydrothorax   #Protein S deficiency    Recommendations:  - Okay to discharge home from hepatology perspective.  - Decrease Bumex to 2mg BID on discharge  - Decrease spironolactone to 200mg daily on discharge  - Continue w/ Midodrine at 20mg q8h, goal MAP > 60  - Can restart xarelto on discharge  - OOB, ambulate  - Outpatient follow up with Dr. Carvajal 6/2 @ [address]  - Patient has thoracentesis planned as outpatient as per IR note, will have to hold A/C 2 days prior  - Plan d/w patient and hospitalist    Kane Orellana  Gastroenterology/Hepatology Fellow  Available via [employer] Teams    NON-URGENT CONSULTS:  Please email [email or web address]  AT NIGHT AND ON WEEKENDS/HOLIDAYS  Contact on-call GI fellow via answering service ([phone])  MONDAY-FRIDAY 8AM-5PM:  Page [phone] ([facility]) or [phone] ([facility]) from 8am-5pm M-F

## 2021-05-25 NOTE — PROGRESS NOTE ADULT - PROBLEM SELECTOR PROBLEM 3
Hydrothorax

## 2021-05-25 NOTE — PROGRESS NOTE ADULT - REASON FOR ADMISSION
TIPS

## 2021-05-25 NOTE — DISCHARGE NOTE NURSING/CASE MANAGEMENT/SOCIAL WORK - PATIENT PORTAL LINK FT
You can access the FollowMyHealth Patient Portal offered by [facility] by registering at the following website: [email or web address]. By joining [facility]’s FollowMyHealth portal, you will also be able to view your health information using other applications (apps) compatible with our system.

## 2021-05-25 NOTE — PROVIDER CONTACT NOTE (OTHER) - ACTION/TREATMENT ORDERED:
ordered Maalox 30ml po x1
blood work, EKG, given 500mg tylenol for pain
no new orders ,PA made aware

## 2021-05-25 NOTE — PROGRESS NOTE ADULT - PROBLEM SELECTOR PLAN 5
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
-H/o protein S deficiency.   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  -Lovenox was held for procedure, but then resumed.   -May need heme follow up inpt. Follows with Dr. Kelechi Venegas at [facility]. Outpt f/u.
-H/o protein S deficiency.   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  -Lovenox was held for procedure, but then resumed.   -May need heme follow up inpt. Follows with Dr. Kelechi Venegas at [facility].
-H/o protein S deficiency.   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  -Lovenox was held for procedure, but then resumed.   -May need heme follow up inpt. Follows with Dr. Kele[facility] Venegas at [facility].
-H/o protein S deficiency.   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  -Lovenox was held for procedure, but then resumed.   -May need heme follow up inpt. Follows with Dr. Kelechi Venegas at [facility].
-H/o protein S deficiency.   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  -Lovenox currently on hold for thoracentesis. If delayed, may need to start heparin drip instead.  -May need heme follow up. Follows with Dr. Kelechi Venegas at [facility].
-H/o protein S deficiency.   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  -Lovenox was held for procedure, but then resumed.   -May need heme follow up inpt. Follows with Dr. Kelechi Venegas at [facility].
-H/o protein S deficiency.   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  -Lovenox was held for procedure, but then resumed.   -May need heme follow up inpt. Follows with Dr. Kelechi Venegas at [facility].
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

## 2021-05-25 NOTE — PROGRESS NOTE ADULT - SUBJECTIVE AND OBJECTIVE BOX
Chief Complaint:  Patient is a 35y old  Female who presents with a chief complaint of TIPS (25 May 2021 11:00)    Reason for consult: cirrhosis, hepatic hydrothorax    Interval Events: Pt feeling better, remains off O2. Cr uptrending slightly.     Hospital Medications:  acetaminophen   Tablet .. 500 milliGRAM(s) Oral every 6 hours PRN  buMETAnide 3 milliGRAM(s) Oral two times a day  chlorhexidine 2% Cloths 1 Application(s) Topical <User Schedule>  enoxaparin Injectable 40 milliGRAM(s) SubCutaneous every 12 hours  LORazepam     Tablet 1 milliGRAM(s) Oral three times a day  melatonin 5 milliGRAM(s) Oral at bedtime  midodrine 20 milliGRAM(s) Oral every 8 hours  multivitamin 1 Tablet(s) Oral daily  oxyCODONE    IR 5 milliGRAM(s) Oral every 4 hours PRN  pantoprazole    Tablet 40 milliGRAM(s) Oral before breakfast  polyethylene glycol 3350 17 Gram(s) Oral daily  senna 2 Tablet(s) Oral at bedtime  spironolactone 100 milliGRAM(s) Oral <User Schedule>  spironolactone 200 milliGRAM(s) Oral <User Schedule>      ROS:   General:  No  fevers, chills, night sweats, fatigue  Eyes:  Good vision, no reported pain  ENT:  No sore throat, pain, runny nose  CV:  No pain, palpitations  Pulm:  No dyspnea, cough  GI:  See HPI, otherwise negative  :  No  incontinence, nocturia  Muscle:  No pain, weakness  Neuro:  No memory problems  Psych:  No insomnia, mood problems, depression  Endocrine:  No polyuria, polydipsia, cold/heat intolerance  Heme:  No petechiae, ecchymosis, easy bruisability  Skin:  No rash    PHYSICAL EXAM:   Vital Signs:  Vital Signs Last 24 Hrs  T(C): 36.9 (25 May 2021 04:20), Max: 36.9 (25 May 2021 04:20)  T(F): 98.5 (25 May 2021 04:20), Max: 98.5 (25 May 2021 04:20)  HR: 74 (25 May 2021 04:20) (60 - 77)  BP: 98/65 (25 May 2021 04:20) (98/65 - 105/62)  BP(mean): --  RR: 18 (25 May 2021 04:20) (18 - 18)  SpO2: 96% (25 May 2021 04:20) (94% - 98%)  Daily     Daily Weight in k.8 (25 May 2021 04:06)    GENERAL: no acute distress  NEURO: alert  HEENT: anicteric sclera, no conjunctival pallor appreciated  CHEST: no respiratory distress, no accessory muscle use  CARDIAC: regular rate, rhythm  ABDOMEN: soft, non-tender, non-distended, no rebound or guarding  EXTREMITIES: warm, well perfused, no edema  SKIN: no lesions noted    LABS: reviewed                        11.3   5.48  )-----------( 269      ( 25 May 2021 07:26 )             36.4     05-    135  |  91<L>  |  32<H>  ----------------------------<  94  4.2   |  22  |  1.20    Ca    11.8<H>      25 May 2021 07:26    TPro  9.0<H>  /  Alb  6.3<H>  /  TBili  3.9<H>  /  DBili  x   /  AST  35  /  ALT  19  /  AlkPhos  188<H>  05-25    LIVER FUNCTIONS - ( 25 May 2021 07:26 )  Alb: 6.3 g/dL / Pro: 9.0 g/dL / ALK PHOS: 188 U/L / ALT: 19 U/L / AST: 35 U/L / GGT: x             Interval Diagnostic Studies: see sunrise for full report

## 2021-05-25 NOTE — PROGRESS NOTE ADULT - NSICDXPILOT_GEN_ALL_CORE
[place]

## 2021-05-25 NOTE — PROGRESS NOTE ADULT - PROBLEM SELECTOR PROBLEM 1
Decompensated hepatic cirrhosis

## 2021-05-25 NOTE — PROGRESS NOTE ADULT - PROBLEM SELECTOR PROBLEM 5
IVC thrombosis

## 2021-05-25 NOTE — PROGRESS NOTE ADULT - PROBLEM SELECTOR PLAN 2
pt with persistent hypotension since TIPS initially requiring IV pressors; now with stable BPs off Jaleel, on midodrine 20mg q8h   - mentating well, asymptomatic   - MAP goal >60  - c/w midodrine 20mg q8h  -s/p albumin repletion.   - monitor vitals q4h    4/22: stable hemodynamics
pt with persistent hypotension since TIPS initially requiring IV pressors; now with stable BPs off Jaleel, on midodrine 20mg q8h   - mentating well, asymptomatic   - MAP goal >60  - c/w midodrine 20mg q8h  -s/p albumin repletion.   - monitor vitals q4h    4/22: stable hemodynamics
pt with persistent hypotension since TIPS initially requiring IV pressors; now with stable BPs off Jaleel, on midodrine 20mg q8h   - mentating well, asymptomatic   - MAP goal >60  - c/w midodrine 20mg q8h  -s/p albumin repletion.   - monitor vitals q4h
pt with persistent hypotension since TIPS initially requiring IV pressors; now with stable BPs off Jaleel, on midodrine 20mg q8h   - mentating well, asymptomatic   - MAP goal >60  - c/w midodrine 20mg q8h  -Keep albumin q8h for now since eliminating hold parameters for high dose diuretics.   - monitor vitals q4h
pt with persistent hypotension since TIPS initially requiring IV pressors; now with stable BPs off Jaleel, on midodrine 20mg q8h   - mentating well, asymptomatic   - MAP goal >60  - c/w midodrine 20mg q8h  -s/p albumin repletion.   - monitor vitals q4h
pt with persistent hypotension since TIPS initially requiring IV pressors; now with stable BPs off Jaleel, on midodrine 20mg q8h   - mentating well, asymptomatic   - MAP goal >60  - c/w midodrine 20mg q8h and albumin q8h for intravascular depletion   - monitor vitals q4h
pt with persistent hypotension since TIPS initially requiring IV pressors; now with stable BPs off Jaleel, on midodrine 20mg q8h   - mentating well, asymptomatic   - MAP goal >60  - c/w midodrine 20mg q8h and albumin q8h for intravascular depletion   - monitor vitals q4h
pt with persistent hypotension since TIPS initially requiring IV pressors; now with stable BPs off Jaleel, on midodrine 20mg q8h   - mentating well, asymptomatic   - MAP goal >60  - c/w midodrine 20mg q8h  -s/p albumin repletion.   - monitor vitals q4h.
pt with persistent hypotension since TIPS initially requiring IV pressors; now with stable BPs off Jaleel, on midodrine 20mg q8h   - mentating well, asymptomatic   - MAP goal >60  - c/w midodrine 20mg q8h  -s/p albumin repletion.   - monitor vitals q4h.

## 2021-05-25 NOTE — PROGRESS NOTE ADULT - PROBLEM SELECTOR PROBLEM 4
S/P TIPS (transjugular intrahepatic portosystemic shunt)

## 2021-05-26 ENCOUNTER — OUTPATIENT (OUTPATIENT)
Dept: OUTPATIENT SERVICES | Facility: HOSPITAL | Age: 36
LOS: 1 days | Discharge: HOME | End: 2021-05-26

## 2021-05-26 DIAGNOSIS — I82.0 BUDD-CHIARI SYNDROME: ICD-10-CM

## 2021-05-26 LAB
ANA TITR SER: NEGATIVE — SIGNIFICANT CHANGE UP
CULTURE RESULTS: SIGNIFICANT CHANGE UP
SPECIMEN SOURCE: SIGNIFICANT CHANGE UP

## 2021-05-27 ENCOUNTER — RESULT REVIEW (OUTPATIENT)
Age: 36
End: 2021-05-27

## 2021-05-27 ENCOUNTER — APPOINTMENT (OUTPATIENT)
Dept: HEMATOLOGY ONCOLOGY | Facility: CLINIC | Age: 36
End: 2021-05-27
Payer: COMMERCIAL

## 2021-05-27 VITALS
WEIGHT: 92.59 LBS | OXYGEN SATURATION: 99 % | RESPIRATION RATE: 18 BRPM | HEIGHT: 62 IN | TEMPERATURE: 97.5 F | HEART RATE: 97 BPM | DIASTOLIC BLOOD PRESSURE: 68 MMHG | SYSTOLIC BLOOD PRESSURE: 100 MMHG | BODY MASS INDEX: 17.04 KG/M2

## 2021-05-27 LAB
BASOPHILS # BLD AUTO: 0.03 K/UL — SIGNIFICANT CHANGE UP (ref 0–0.2)
BASOPHILS NFR BLD AUTO: 0.7 % — SIGNIFICANT CHANGE UP (ref 0–2)
EOSINOPHIL # BLD AUTO: 0.16 K/UL — SIGNIFICANT CHANGE UP (ref 0–0.5)
EOSINOPHIL NFR BLD AUTO: 3.9 % — SIGNIFICANT CHANGE UP (ref 0–6)
HCT VFR BLD CALC: 37.6 % — SIGNIFICANT CHANGE UP (ref 34.5–45)
HGB BLD-MCNC: 11.8 G/DL — SIGNIFICANT CHANGE UP (ref 11.5–15.5)
IMM GRANULOCYTES NFR BLD AUTO: 0.2 % — SIGNIFICANT CHANGE UP (ref 0–1.5)
LYMPHOCYTES # BLD AUTO: 0.84 K/UL — LOW (ref 1–3.3)
LYMPHOCYTES # BLD AUTO: 20.3 % — SIGNIFICANT CHANGE UP (ref 13–44)
MCHC RBC-ENTMCNC: 27.3 PG — SIGNIFICANT CHANGE UP (ref 27–34)
MCHC RBC-ENTMCNC: 31.4 G/DL — LOW (ref 32–36)
MCV RBC AUTO: 86.8 FL — SIGNIFICANT CHANGE UP (ref 80–100)
MONOCYTES # BLD AUTO: 0.55 K/UL — SIGNIFICANT CHANGE UP (ref 0–0.9)
MONOCYTES NFR BLD AUTO: 13.3 % — SIGNIFICANT CHANGE UP (ref 2–14)
NEUTROPHILS # BLD AUTO: 2.55 K/UL — SIGNIFICANT CHANGE UP (ref 1.8–7.4)
NEUTROPHILS NFR BLD AUTO: 61.6 % — SIGNIFICANT CHANGE UP (ref 43–77)
NRBC # BLD: 0 /100 WBCS — SIGNIFICANT CHANGE UP (ref 0–0)
PLATELET # BLD AUTO: 230 K/UL — SIGNIFICANT CHANGE UP (ref 150–400)
RBC # BLD: 4.33 M/UL — SIGNIFICANT CHANGE UP (ref 3.8–5.2)
RBC # FLD: 18.1 % — HIGH (ref 10.3–14.5)
RETICS #: 86.2 K/UL — SIGNIFICANT CHANGE UP (ref 25–125)
RETICS/RBC NFR: 2 % — SIGNIFICANT CHANGE UP (ref 0.5–2.5)
WBC # BLD: 4.14 K/UL — SIGNIFICANT CHANGE UP (ref 3.8–10.5)
WBC # FLD AUTO: 4.14 K/UL — SIGNIFICANT CHANGE UP (ref 3.8–10.5)

## 2021-05-27 PROCEDURE — 99214 OFFICE O/P EST MOD 30 MIN: CPT

## 2021-05-28 ENCOUNTER — NON-APPOINTMENT (OUTPATIENT)
Age: 36
End: 2021-05-28

## 2021-05-28 RX ORDER — ENOXAPARIN SODIUM 100 MG/ML
40 INJECTION SUBCUTANEOUS
Qty: 4 | Refills: 0 | Status: COMPLETED | COMMUNITY
Start: 2021-05-06 | End: 2021-05-30

## 2021-05-29 LAB — PTH RELATED PROT SERPL-MCNC: <2 PMOL/L — SIGNIFICANT CHANGE UP

## 2021-05-30 ENCOUNTER — OUTPATIENT (OUTPATIENT)
Dept: OUTPATIENT SERVICES | Facility: HOSPITAL | Age: 36
LOS: 1 days | End: 2021-05-30
Payer: COMMERCIAL

## 2021-05-30 DIAGNOSIS — Z11.52 ENCOUNTER FOR SCREENING FOR COVID-19: ICD-10-CM

## 2021-05-30 LAB — SARS-COV-2 RNA SPEC QL NAA+PROBE: SIGNIFICANT CHANGE UP

## 2021-05-30 PROCEDURE — U0005: CPT

## 2021-05-30 PROCEDURE — U0003: CPT

## 2021-05-30 PROCEDURE — C9803: CPT

## 2021-05-30 NOTE — ASSESSMENT
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  No other explanations for cirrhosis known currently.  Much of this history took place in [facility],  Patient was able to bring records from the evaluation in [facility] as well as earlier evaluation in 2017 at [facility].  After several repeats, Protein S antigen improved to normal at 68%.  Was only low once. Does not appear to be a congenital Protein S deficiency.  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 Follows with hepatology.  Continue Xarelto 20mg daily.  Would only hold anticoagulation.  for clinically significant bleeding. Would otherwise maintain long term anticoagulation.

## 2021-05-30 NOTE — PHYSICAL EXAM
[Normal] : RRR, normal S1S2, no murmurs, rubs, gallops [de-identified] : Mild right upper qudrant tenderness.

## 2021-05-30 NOTE — HISTORY OF PRESENT ILLNESS
[de-identified] : History obtained from prior records and with the use of [facility] #[id number]\par 35 year old woman with history of cirrhosis, portal hypertension, esophageal varices, possible Budd Chiari syndrome, multiple spontaneous abortions here for evaluation of hypercoagulability. Patient had an MRI abdomen 12/8/2020 that showed enlarged heterogenous cirrhotic liver with diffuse patchy enhancement and poor delineation of hepatic veins suggesting Budd Chiari syndrome. Patient has a history of 5 pregnancies- 3 of them were spontaneous abortions. Patient denies h/o PE, DVT or CVA. She denies h/o taking oral contraceptives. She c/o fatigue. She reports a good appetite without recent weight loss. She reports occasional abdominal pain and occasional nausea. She also reports occasional breast pain bilateral X 2 months. She denies nipple discharge. She reports occasional pruritus arms/ trunks without distinct rash. Patient denies any fever/chills, recent infections, CP, SOB, diarrhea, blood in stool, frequent headaches, dizziness, change in vision, hematuria/dysuria, skeletal pain, calf pain/edema or any unexplained bleeding/bruising. \par There is a 2/22/10 document in [place] that she has that [place] that she had a heterozygous prothrombin gene mutation Though this tested normal twice in the [place], once here at [facility], and prior to that at [facility].  \par Protein  S activity and antigen in 2017 form [facility] during [facility] found a Protein S activity 68% and Protein S Ag 66. \par Protein C 90%.  \par AT .\par \par FV L neg \par Factor XI activity 27% factor IX activity 73% and Factor XII 86%.  \par PTT was prolonged.  40.7 sec.  \par Prothrombin gene mutation negative.  [de-identified] : Laina [id number]\par Patient had TIPS procedure on 5/11 followed by 2 Thoracentesis > 1 Liter fluid removed on\par \par BP today 100/68 which his similar to her baseline.  baseline.  Was hypotensive at the hospital, discharged on midodrine. \par \par Menses has become heavier after laving the hospital.  GYN in the [facility].

## 2021-06-01 ENCOUNTER — RX RENEWAL (OUTPATIENT)
Age: 36
End: 2021-06-01

## 2021-06-02 ENCOUNTER — OUTPATIENT (OUTPATIENT)
Dept: OUTPATIENT SERVICES | Facility: HOSPITAL | Age: 36
LOS: 1 days | End: 2021-06-02
Payer: COMMERCIAL

## 2021-06-02 ENCOUNTER — RESULT REVIEW (OUTPATIENT)
Age: 36
End: 2021-06-02

## 2021-06-02 VITALS
RESPIRATION RATE: 18 BRPM | SYSTOLIC BLOOD PRESSURE: 106 MMHG | HEART RATE: 104 BPM | OXYGEN SATURATION: 98 % | DIASTOLIC BLOOD PRESSURE: 77 MMHG | HEIGHT: 62 IN | WEIGHT: 104.94 LBS | TEMPERATURE: 98 F

## 2021-06-02 DIAGNOSIS — J90 PLEURAL EFFUSION, NOT ELSEWHERE CLASSIFIED: ICD-10-CM

## 2021-06-02 PROCEDURE — C1729: CPT

## 2021-06-02 PROCEDURE — 76604 US EXAM CHEST: CPT

## 2021-06-02 PROCEDURE — 76604 US EXAM CHEST: CPT | Mod: 26

## 2021-06-02 NOTE — PRE PROCEDURE NOTE - PRE PROCEDURE EVALUATION
Interventional Radiology Pre Procedure Note    HPI: 35y Female with Budd Chiari status post TIPS placement presenting for right thoracentesis.    Allergies:   Medications (Abx/Cardiac/Anticoagulation/Blood Products)      Data:  157.5  47.6  T(C): 36.9  HR: 104  BP: 106/77  RR: 18  SpO2: 98%    Exam  General: No acute distress  Chest: Non labored breathing    Plan: 35y Female presents for right thoracentesis. Last lovenox yesterday evening. Procedure and risks discussed with patient and she is agreeable to proceed.   -Risks/Benefits/alternatives explained with the patient and/or healthcare proxy and witnessed informed consent obtained.

## 2021-06-02 NOTE — ASU DISCHARGE PLAN (ADULT/PEDIATRIC) - ASU DC SPECIAL INSTRUCTIONSFT
You did not have enough fluid for a thoracentesis. You can resume your blood thinner medications. Please go to your scheduled appointment with Dr. Carvajal tomorrow.

## 2021-06-02 NOTE — ASU PREOP CHECKLIST - ALLERGY BAND ON
Subjective   Lynsey López is a 41 y.o. female.     Chief Complaint   Patient presents with   • Back Pain     NP est, c/o of pain in back in the middle that has been ongoing for a while, also having ruq pain. Had breif bit of nausea but has resolved, had some urinary pain the other day but none now. May have had fever other day but unsure.       History of Present Illness Lynsey López is a 41-year-old female patient with history of thalassemia minor came here to establish care and follow-up on her chronic back pain.  Also complaining of right upper quadrant pain for 1 week.  Pain has improved .  He was also complaining of nausea at that time.  His radiation to the pain.  Denies any acid coming in the mouth.  Patient had some labs done last year in [place].  Currently does not have insurance and she does not want any labs or imaging to be done at this time.  Her last menstrual cycle was 2/18/2020.  She is not on any birth control pills.  We will start regular.  ruq pain x 1 wk     Past Medical History:   Diagnosis Date   • Anemia    • Thalassanemia        History reviewed. No pertinent surgical history.    Family History   Problem Relation Age of Onset   • Hypertension Mother    • Hypertension Father    • Cancer Father         unsure but spread to Lungs       Social History     Socioeconomic History   • Marital status:      Spouse name: Not on file   • Number of children: Not on file   • Years of education: Not on file   • Highest education level: Not on file   Tobacco Use   • Smoking status: Never Smoker   • Smokeless tobacco: Never Used   Substance and Sexual Activity   • Alcohol use: Never     Frequency: Never   • Drug use: Never   • Sexual activity: Yes     Partners: Male     Birth control/protection: None     Comment:        The following portions of the patient's history were reviewed and updated as appropriate: allergies, current medications, past family history, past medical history, past social  "history, past surgical history and problem list.    Review of Systems   Constitutional: Negative for activity change, appetite change, chills, fatigue, fever, unexpected weight gain and unexpected weight loss.   HENT: Negative for congestion, ear pain, postnasal drip, rhinorrhea, sinus pressure and sore throat.    Eyes: Negative for blurred vision, discharge, itching and visual disturbance.   Respiratory: Negative for cough, chest tightness, shortness of breath and wheezing.    Cardiovascular: Negative for chest pain, palpitations and leg swelling.   Gastrointestinal: Positive for abdominal pain. Negative for constipation, diarrhea, nausea, vomiting and GERD.   Genitourinary: Negative for decreased urine volume, dysuria and urgency.   Musculoskeletal: Negative for arthralgias.   Neurological: Negative for headache.   Psychiatric/Behavioral: Negative for agitation, suicidal ideas, depressed mood and stress.         Objective   Vitals:    03/03/20 1519   BP: 98/74   BP Location: Left arm   Patient Position: Sitting   Cuff Size: Adult   Pulse: 70   Temp: 98.2 °F (36.8 °C)   TempSrc: Oral   SpO2: 98%   Weight: 47.7 kg (105 lb 1.6 oz)   Height: 152.4 cm (60\")     Body mass index is 20.53 kg/m².  Physical Exam   Constitutional: She is oriented to person, place, and time. She appears well-developed and well-nourished. No distress.   HENT:   Head: Normocephalic and atraumatic.   Mouth/Throat: Oropharynx is clear and moist.   Eyes: Pupils are equal, round, and reactive to light. EOM are normal. Right eye exhibits no discharge. Left eye exhibits no discharge.   Neck: Normal range of motion. Neck supple.   Cardiovascular: Normal rate, regular rhythm and normal heart sounds.   Pulmonary/Chest: Effort normal and breath sounds normal. She has no wheezes. She has no rales.   Abdominal: Soft. Bowel sounds are normal. She exhibits no mass. There is no hepatomegaly. There is tenderness in the epigastric area. There is no rigidity, no " guarding and no CVA tenderness.   Musculoskeletal: She exhibits no edema.   Lymphadenopathy:     She has no cervical adenopathy.   Neurological: She is alert and oriented to person, place, and time.     Assessment/Plan   Problem List Items Addressed This Visit     None      Visit Diagnoses     Acute superficial gastritis without hemorrhage    -  Primary    Relevant Medications    famotidine (PEPCID) 20 MG tablet    Chronic midline low back pain without sciatica            Lynsey López is a 41-year-old female patient name here to establish care and right upper quadrant pain.  Patient and her  does not want to do anymore labs as her pain has improved.  On examination she has epigastric tenderness.  Talk to her  and and patient that she should start the medication.  I did discuss the differential diagnosis of right upper quadrant pain including acute cholecystitis.  Both of them decided not to do any labs or imaging as her pain has improved.          Return if symptoms worsen or fail to improve.          no known allergies

## 2021-06-02 NOTE — ASU DISCHARGE PLAN (ADULT/PEDIATRIC) - NURSING INSTRUCTIONS
Please feel free to contact us at [phone] if any problems arise. After 6PM, Monday through Friday, on weekends and on holidays, please call [phone] and ask for the radiology resident on call to be paged.

## 2021-06-02 NOTE — ASU PATIENT PROFILE, ADULT - PMH
Budd-Chiari syndrome    Cirrhosis    Cirrhosis  2010  H/O protein S deficiency    H/O protein S deficiency    Miscarriage  x 5

## 2021-06-03 ENCOUNTER — APPOINTMENT (OUTPATIENT)
Dept: HEPATOLOGY | Facility: CLINIC | Age: 36
End: 2021-06-03
Payer: COMMERCIAL

## 2021-06-03 ENCOUNTER — APPOINTMENT (OUTPATIENT)
Dept: TRANSPLANT | Facility: CLINIC | Age: 36
End: 2021-06-03

## 2021-06-03 ENCOUNTER — LABORATORY RESULT (OUTPATIENT)
Age: 36
End: 2021-06-03

## 2021-06-03 ENCOUNTER — APPOINTMENT (OUTPATIENT)
Dept: HEPATOLOGY | Facility: CLINIC | Age: 36
End: 2021-06-03

## 2021-06-03 LAB
ALBUMIN SERPL ELPH-MCNC: 6.1 G/DL
ALP BLD-CCNC: 184 U/L
ALT SERPL-CCNC: 24 U/L
ANION GAP SERPL CALC-SCNC: 23 MMOL/L
AST SERPL-CCNC: 40 U/L
BILIRUB SERPL-MCNC: 2.8 MG/DL
BUN SERPL-MCNC: 29 MG/DL
CALCIUM SERPL-MCNC: 11.4 MG/DL
CHLORIDE SERPL-SCNC: 97 MMOL/L
CO2 SERPL-SCNC: 20 MMOL/L
CREAT SERPL-MCNC: 0.9 MG/DL
FERRITIN SERPL-MCNC: 61 NG/ML
FOLATE SERPL-MCNC: >20 NG/ML
GLUCOSE SERPL-MCNC: 109 MG/DL
IRON SATN MFR SERPL: 11 %
IRON SERPL-MCNC: 33 UG/DL
LDH SERPL-CCNC: 190 U/L
POTASSIUM SERPL-SCNC: 5.1 MMOL/L
PROT SERPL-MCNC: 8.6 G/DL
SODIUM SERPL-SCNC: 139 MMOL/L
TIBC SERPL-MCNC: 309 UG/DL
UIBC SERPL-MCNC: 276 UG/DL
VIT B12 SERPL-MCNC: 1577 PG/ML

## 2021-06-03 PROCEDURE — 99215 OFFICE O/P EST HI 40 MIN: CPT

## 2021-06-03 RX ORDER — FUROSEMIDE 20 MG/1
20 TABLET ORAL DAILY
Qty: 90 | Refills: 0 | Status: DISCONTINUED | COMMUNITY
Start: 2021-04-15 | End: 2021-06-03

## 2021-06-03 NOTE — PLAN
[FreeTextEntry1] : Patient agrees to proceed with the full evaluation for liver transplantation. \par \par Patient was explained alternatives, benefits and risk of liver transplantation, including but not limited to infection, bleeding, hepatic artery or portal vein thrombosis, primary dysfunction or primary non-function of the liver allograft, cardiopulmonary arrest, intra-operative death and other surgical, medical and psychosocial risks as outlined in the evaluation consent form.  she understands these risks and is willing to proceed with liver transplantation.\par Patient was also explained the need to remain on lifelong anti-rejection medications.  We discussed the risks and side effects of immunosuppressive medications including, but not limited to infection, cancer, weight gain, new onset or worsening of diabetes or hypertension in a temporary or permanent state, kidney dysfunction, water retention, back pain, constipation, diarrhea, dizziness, headache, joint pain, loss of appetite, nausea, stomach pain or upset, trouble sleeping, vomiting, and mental or mood changes.  An overview of the follow-up protocol was reviewed including outpatient visits, blood tests and the potential for hospital readmission. She understands these risks and is willing to proceed with liver transplantation.\par \par We also discussed the available donor organ pool. We discussed the assessment of the  donor including age, cause of death, cardiac arrest, electrolyte abnormalities, course and length of hospital stay, use of vasopressors, hepatitis and HIV testing. We reviewed organ donor risk factors that could affect the success of the graft or the health of the patient, including, but not limited to, the donor's history, condition or age of the organs used, or the patient's potential risk of levar the human immunodeficiency virus and other infectious diseases if the disease cannot be detected in an infected donor.  We discussed and defined the option of an extended criteria for cadaveric donors (Hepatitis B core Ab positive donor, Hepatitis C Ab positive donor, steatosis, older donors, split livers and DCD donors) and early and late outcomes of graft survival after transplantation.\par \par The options of  donor liver transplantation vs. live donor liver transplantation were discussed with them. Differences between donation after cardiac death (DCD) compared to donation after brain death (DBD) liver transplantation were also fully disclosed and included lower graft survival rates, the increased incidence of hepatic artery stenosis, bile duct injury, ischemic cholangiopathy and increased re transplant rates seen in recipients of DCD donor livers.\par The use of the U.S. Public Health Services (PHS) Guideline has defined some donors as "Increased Risk Donors" based on their history which may suggest socially increased risk behaviors was discussed. The patient is aware that if PHS increased risk donor is offered to the candidate, the transplant team will discuss the specifics to assist with making an informed decision. We discussed our post-transplant protocol of serology testing if the candidate receives an organ that is PHS increased risk.\par The patient was made aware that it is against the law to be paid or to pay to donate an organ.  If any money was given or will be given in exchange for receiving an organ, the patient may be subject to criminal prosecution, and any insurance coverage may no longer apply and patient may become personally responsible for all the health care costs associated with the donation, and private health information will be available to law enforcement agencies.\par We explained that we store vessels for subsequent later use in transplants.  Again, we discussed the extensive testing done on  donors prior to donation, however despite an extensive evaluation on the donor, there is potential risk a recipient may contract infectious diseases (HIV or Hepatitis) or cancer if they cannot be detected in the donor. In the cases where PHS Increased Risk donor vessels are used, we test the recipient per protocol between 1-2 months post-transplant for any potential infectious disease transmission. The patient understands that there is the potential of use of  donor vessels and PHS increased risk donor vessels. She understands these risks and is willing to receive potentially PHS increased risk donor vessels.\par \par We also discussed the MELD allocation system in depth and the one-year observed and expected patient and graft survival rates according to data from the Scientific Registry for Transplant Recipients. These center specific outcomes were provided in comparison to the national one-year averages as described in the evaluation consent forms.\par \par Prior to signing consent, patient was given an opportunity to ask questions.  After all concerns were addressed, informed consent was signed. Patient is aware that they may withdraw their consent for transplantation at any time.  Further, the patient is aware of the right to refuse an organ offer without penalty at any time. She has acknowledged PHS increased risk.\par \par \par

## 2021-06-03 NOTE — HISTORY OF PRESENT ILLNESS
[MELD Score: ___] : MELD Score is [unfilled] [Other: ___] : [unfilled] [Ascites] : Ascites [Non-Bleeding Varices] : Non-Bleeding Varices [Portal Vein Thrombosis] : Portal Vein Thrombosis [FreeTextEntry1] : 12 [FreeTextEntry2] : B [de-identified] : Ms. Tee is a 35y.o F from  [facility] here for liver transplant evaluation.  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 She was discharged on midodrine 10mg BID, stopped taking on own 2 weeks ago once home post d/c.  BP at home SBP 's \par S/p Right thoracentesis 1L , 1.2L  and 1.2L , attempt for drainage on  aborted d/t small pleural effusion not enough to tap. \par Mentating well no confusion. Has not received Covid Vaccine. \par c/o pruritus \par \par Social Hx : Born in [facility] in the  [place] yrs[place] , [place]. \par Lives with significant other and mother. \par  \par No Family Hx of liver disease. Has a brother who is healthy \par \par \par MELD 12 ABO: B \par Labs 21-TB: 6.1, AST 40, ALT 24, , ALB 6.1\par Cr 0.90, Na 139\par WBC 4.14, H/H 11.8/37.6, \par INR 1.22\par Covid [place] AB >250 (Positive)\par \par Radiology Studies \par 3/30/21- MRI ABD IC- Severely compressed superior inferior vena cava with associated suprarenal thrombus. No superior mesenteric vein thrombus. Budd-Chiari with cirrhosis. Evidence for portal hypertension. Unchanged moderate right pleural effusion and mild ascites. Unchanged 1.3 cm hepatic segment 8 indeterminate lesion. This could represent malignancy. Multiple other subcentimeter similar foci are seen within the liver.\par \par CT Chest 21- Large right pleural effusion measures simple fluid with near complete collapse right lower lobe and partial collapse right middle and upper lobe. Right upper lobe linear and ground glass opacity likely represents.\par \par EGD 2017- Grade A Varices and Gastropathy \par \par Gestational History: \par #1 (): Vaginal delivery girl, at 28 weeks GA . Delivery occurred at [facility]. Pregnancy #1 Comments: ( delivery, baby passed after 30 minutes. Pt remained in hospital due to enlarged liver). \par #2 (): at 16 weeks GA . Delivery occurred at [facility]. Pregnancy #2 Comments: (spontaneous ab, D&C). \par #3 (): at 8 weeks GA . Delivery occurred at [facility]. Pregnancy #3 Comments: (Spontaneous ab, D&C). \par  #4 (): at 4 weeks GA . Delivery occurred at [facility]. Pregnancy #4 Comments: (Induced ab- Pt was diagnosed with Cirrhosis, pt was advised to terminate pregnancy). \par # 5 () at 25 weeks and 5 days  [place] the [place] induction and termination of pregnancy for previable rupture of membrane ( PPROM) at 25 weeks 5 days of pregnancy.\par \par \par \par

## 2021-06-03 NOTE — REVIEW OF SYSTEMS
[Fever] : no fever [Fatigue] : fatigue [SOB] : shortness of breath [Nausea] : no nausea [Diarrhea] : diarrhea [Negative] : Cardiovascular/PVD [FreeTextEntry6] : mild sob

## 2021-06-03 NOTE — PHYSICAL EXAM
[Alert] : alert [No Acute Distress] : no acute distress [No Edema] : no edema [No Skin Discoloration] : no skin discoloration [Scleral Icterus] : no scleral icterus [Clear to Auscultation] : lungs were clear to auscultation bilaterally [Ascites Tense] : no ascites tense [] : right dorsalis pedis palpable [Spider Angioma] : no spider angioma [Asterixis] : no asterixis [Hepatic Encephalopathy] : no hepatic encephalopathy [Depression] : depression [de-identified] : no decreased bs or e to a changes that were previously present [FreeTextEntry1] : Will consent for OLTX workup but MELD 12, does not meet criteria much better with working TIPS. Will consent just for protection. Needs to stay on Xareltio for lifelong therapy. Needs to be vaccinated

## 2021-06-03 NOTE — REASON FOR VISIT
[Follow-Up] : a follow-up visit for [Liver Transplant Evaluation] : liver transplant evaluation [Spouse] : spouse [FreeTextEntry1] : [id number] [FreeTextEntry2] : Feliz  [FreeTextEntry4] : [employer]  [FreeTextEntry3] : Indonesian

## 2021-06-03 NOTE — ASSESSMENT
[Good candidate] : a good candidate. We should proceed with our protocol for evaluation for liver transplantation. 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 MELD 12. \par \par Labs \par MELD 12 ABO: B \par Labs 21-TB: 6.1, AST 40, ALT 24, , ALB 6.1\par Cr 0.90, Na 139\par WBC 4.14, H/H 11.8/37.6, \par \par PLAN: \par -Encouraged  covid vaccine both pt and spouse hesitant  \par -Decrease Bumex to 1mg BID \par -Labs today \par -Needs to f/u with GYN for pap\par -Will proceed with transplant evaluation for protection.\par Consent obtained

## 2021-06-04 LAB
ABO + RH PNL BLD: NORMAL
AFP-TM SERPL-MCNC: 3 NG/ML
ALBUMIN SERPL ELPH-MCNC: 5.1 G/DL
ALP BLD-CCNC: 182 U/L
ALT SERPL-CCNC: 39 U/L
ANION GAP SERPL CALC-SCNC: 12 MMOL/L
APPEARANCE: CLEAR
AST SERPL-CCNC: 49 U/L
BACTERIA: NEGATIVE
BASOPHILS # BLD AUTO: 0.03 K/UL
BASOPHILS NFR BLD AUTO: 1.1 %
BILIRUB SERPL-MCNC: 2 MG/DL
BILIRUBIN URINE: NEGATIVE
BLOOD URINE: NEGATIVE
BUN SERPL-MCNC: 18 MG/DL
CALCIUM SERPL-MCNC: 9.9 MG/DL
CHLORIDE SERPL-SCNC: 103 MMOL/L
CHOLEST SERPL-MCNC: 135 MG/DL
CMV IGG SERPL QL: 6.4 U/ML
CMV IGG SERPL-IMP: POSITIVE
CO2 SERPL-SCNC: 22 MMOL/L
COLOR: YELLOW
COVID-19 SPIKE DOMAIN ANTIBODY INTERPRETATION: POSITIVE
CREAT SERPL-MCNC: 0.49 MG/DL
CREAT SPEC-SCNC: 198 MG/DL
CREAT/PROT UR: 0.1 RATIO
EBV EA AB SER IA-ACNC: <5 U/ML
EBV EA AB TITR SER IF: POSITIVE
EBV EA IGG SER QL IA: 279 U/ML
EBV EA IGG SER-ACNC: NEGATIVE
EBV EA IGM SER IA-ACNC: NEGATIVE
EBV PATRN SPEC IB-IMP: NORMAL
EBV VCA IGG SER IA-ACNC: >750 U/ML
EBV VCA IGM SER QL IA: <10 U/ML
EOSINOPHIL # BLD AUTO: 0.11 K/UL
EOSINOPHIL NFR BLD AUTO: 3.9 %
EPSTEIN-BARR VIRUS CAPSID ANTIGEN IGG: POSITIVE
ESTIMATED AVERAGE GLUCOSE: 100 MG/DL
GLUCOSE QUALITATIVE U: NEGATIVE
GLUCOSE SERPL-MCNC: 147 MG/DL
HAV IGM SER QL: NONREACTIVE
HBA1C MFR BLD HPLC: 5.1 %
HBV CORE IGG+IGM SER QL: REACTIVE
HBV SURFACE AB SER QL: REACTIVE
HBV SURFACE AB SERPL IA-ACNC: 715.6 MIU/ML
HBV SURFACE AG SER QL: NONREACTIVE
HCG SERPL-MCNC: <1 MIU/ML
HCT VFR BLD CALC: 38.4 %
HCV AB SER QL: NONREACTIVE
HCV S/CO RATIO: 0.34 S/CO
HDLC SERPL-MCNC: 58 MG/DL
HEPATITIS A IGG ANTIBODY: REACTIVE
HGB BLD-MCNC: 11.2 G/DL
HIV1+2 AB SPEC QL IA.RAPID: NONREACTIVE
HSV 1+2 IGG SER IA-IMP: POSITIVE
HSV 1+2 IGG SER IA-IMP: POSITIVE
HSV1 IGG SER QL: 49.2 INDEX
HSV2 IGG SER QL: 6.24 INDEX
HYALINE CASTS: 0 /LPF
IMM GRANULOCYTES NFR BLD AUTO: 0.4 %
INR PPP: 2.63 RATIO
KETONES URINE: NEGATIVE
LDLC SERPL CALC-MCNC: 61 MG/DL
LEUKOCYTE ESTERASE URINE: NEGATIVE
LYMPHOCYTES # BLD AUTO: 0.63 K/UL
LYMPHOCYTES NFR BLD AUTO: 22.6 %
MAGNESIUM SERPL-MCNC: 2.3 MG/DL
MAN DIFF?: NORMAL
MCHC RBC-ENTMCNC: 26.8 PG
MCHC RBC-ENTMCNC: 29.2 GM/DL
MCV RBC AUTO: 91.9 FL
MICROSCOPIC-UA: NORMAL
MONOCYTES # BLD AUTO: 0.23 K/UL
MONOCYTES NFR BLD AUTO: 8.2 %
NEUTROPHILS # BLD AUTO: 1.78 K/UL
NEUTROPHILS NFR BLD AUTO: 63.8 %
NITRITE URINE: NEGATIVE
NONHDLC SERPL-MCNC: 77 MG/DL
PH URINE: 5.5
PHOSPHATE SERPL-MCNC: 2.6 MG/DL
PLATELET # BLD AUTO: 143 K/UL
POTASSIUM SERPL-SCNC: 4.8 MMOL/L
PROT SERPL-MCNC: 7.8 G/DL
PROT UR-MCNC: 26 MG/DL
PROTEIN URINE: ABNORMAL
PT BLD: 29.6 SEC
RBC # BLD: 4.18 M/UL
RBC # FLD: 17.8 %
RED BLOOD CELLS URINE: 4 /HPF
RUBV IGG FLD-ACNC: 23.3 INDEX
RUBV IGG SER-IMP: POSITIVE
SARS-COV-2 AB SERPL IA-ACNC: >250 U/ML
SODIUM SERPL-SCNC: 136 MMOL/L
SPECIFIC GRAVITY URINE: 1.03
SQUAMOUS EPITHELIAL CELLS: 2 /HPF
T GONDII AB SER-IMP: NEGATIVE
T GONDII IGG SER QL: <3 IU/ML
T PALLIDUM AB SER QL IA: NEGATIVE
TRIGL SERPL-MCNC: 79 MG/DL
URINE COMMENTS: NORMAL
UROBILINOGEN URINE: NORMAL
VZV AB TITR SER: POSITIVE
VZV IGG SER IF-ACNC: 354 INDEX
WBC # FLD AUTO: 2.79 K/UL
WHITE BLOOD CELLS URINE: 2 /HPF

## 2021-06-07 LAB
DRUG ABUSE PANEL-9, SERUM: NORMAL
EBV DNA SERPL NAA+PROBE-ACNC: NOT DETECTED IU/ML
M TB IFN-G BLD-IMP: NEGATIVE
QUANTIFERON TB PLUS MITOGEN MINUS NIL: 7.77 IU/ML
QUANTIFERON TB PLUS NIL: 0.01 IU/ML
QUANTIFERON TB PLUS TB1 MINUS NIL: 0 IU/ML
QUANTIFERON TB PLUS TB2 MINUS NIL: 0.01 IU/ML

## 2021-06-09 ENCOUNTER — EMERGENCY (EMERGENCY)
Facility: HOSPITAL | Age: 36
LOS: 1 days | Discharge: HOME | End: 2021-06-09
Attending: EMERGENCY MEDICINE
Payer: COMMERCIAL

## 2021-06-09 VITALS
HEART RATE: 87 BPM | RESPIRATION RATE: 16 BRPM | TEMPERATURE: 98 F | SYSTOLIC BLOOD PRESSURE: 101 MMHG | OXYGEN SATURATION: 99 % | DIASTOLIC BLOOD PRESSURE: 71 MMHG

## 2021-06-09 VITALS
HEART RATE: 100 BPM | SYSTOLIC BLOOD PRESSURE: 100 MMHG | WEIGHT: 91.93 LBS | HEIGHT: 62 IN | OXYGEN SATURATION: 99 % | TEMPERATURE: 98 F | DIASTOLIC BLOOD PRESSURE: 68 MMHG | RESPIRATION RATE: 16 BRPM

## 2021-06-09 LAB
ALBUMIN SERPL ELPH-MCNC: 4.7 G/DL — SIGNIFICANT CHANGE UP (ref 3.3–5)
ALP SERPL-CCNC: 186 U/L — HIGH (ref 40–120)
ALT FLD-CCNC: 38 U/L — SIGNIFICANT CHANGE UP (ref 10–45)
ANION GAP SERPL CALC-SCNC: 14 MMOL/L — SIGNIFICANT CHANGE UP (ref 5–17)
APPEARANCE UR: ABNORMAL
AST SERPL-CCNC: 43 U/L — HIGH (ref 10–40)
BACTERIA # UR AUTO: NEGATIVE — SIGNIFICANT CHANGE UP
BASE EXCESS BLDV CALC-SCNC: 6.9 MMOL/L — HIGH (ref -2–2)
BASOPHILS # BLD AUTO: 0.04 K/UL — SIGNIFICANT CHANGE UP (ref 0–0.2)
BASOPHILS NFR BLD AUTO: 1 % — SIGNIFICANT CHANGE UP (ref 0–2)
BILIRUB SERPL-MCNC: 2 MG/DL — HIGH (ref 0.2–1.2)
BILIRUB UR-MCNC: NEGATIVE — SIGNIFICANT CHANGE UP
BUN SERPL-MCNC: 25 MG/DL — HIGH (ref 7–23)
CA-I SERPL-SCNC: 1.1 MMOL/L — LOW (ref 1.12–1.3)
CALCIUM SERPL-MCNC: 10.1 MG/DL — SIGNIFICANT CHANGE UP (ref 8.4–10.5)
CHLORIDE BLDV-SCNC: 102 MMOL/L — SIGNIFICANT CHANGE UP (ref 96–108)
CHLORIDE SERPL-SCNC: 97 MMOL/L — SIGNIFICANT CHANGE UP (ref 96–108)
CO2 BLDV-SCNC: 32 MMOL/L — HIGH (ref 22–30)
CO2 SERPL-SCNC: 26 MMOL/L — SIGNIFICANT CHANGE UP (ref 22–31)
COLOR SPEC: YELLOW — SIGNIFICANT CHANGE UP
CREAT SERPL-MCNC: 0.78 MG/DL — SIGNIFICANT CHANGE UP (ref 0.5–1.3)
DIFF PNL FLD: NEGATIVE — SIGNIFICANT CHANGE UP
EOSINOPHIL # BLD AUTO: 0.14 K/UL — SIGNIFICANT CHANGE UP (ref 0–0.5)
EOSINOPHIL NFR BLD AUTO: 3.6 % — SIGNIFICANT CHANGE UP (ref 0–6)
EPI CELLS # UR: 8 /HPF — HIGH
GAS PNL BLDV: 136 MMOL/L — SIGNIFICANT CHANGE UP (ref 135–145)
GAS PNL BLDV: SIGNIFICANT CHANGE UP
GLUCOSE BLDV-MCNC: 118 MG/DL — HIGH (ref 70–99)
GLUCOSE SERPL-MCNC: 115 MG/DL — HIGH (ref 70–99)
GLUCOSE UR QL: NEGATIVE — SIGNIFICANT CHANGE UP
HCG UR QL: NEGATIVE — SIGNIFICANT CHANGE UP
HCO3 BLDV-SCNC: 31 MMOL/L — HIGH (ref 21–29)
HCT VFR BLD CALC: 35.5 % — SIGNIFICANT CHANGE UP (ref 34.5–45)
HCT VFR BLDA CALC: 36 % — LOW (ref 39–50)
HGB BLD CALC-MCNC: 11.6 G/DL — SIGNIFICANT CHANGE UP (ref 11.5–15.5)
HGB BLD-MCNC: 11.1 G/DL — LOW (ref 11.5–15.5)
HYALINE CASTS # UR AUTO: 4 /LPF — HIGH (ref 0–2)
IMM GRANULOCYTES NFR BLD AUTO: 0.3 % — SIGNIFICANT CHANGE UP (ref 0–1.5)
KETONES UR-MCNC: NEGATIVE — SIGNIFICANT CHANGE UP
LACTATE BLDV-MCNC: 2.2 MMOL/L — HIGH (ref 0.7–2)
LEUKOCYTE ESTERASE UR-ACNC: ABNORMAL
LIDOCAIN IGE QN: 86 U/L — HIGH (ref 7–60)
LYMPHOCYTES # BLD AUTO: 1.06 K/UL — SIGNIFICANT CHANGE UP (ref 1–3.3)
LYMPHOCYTES # BLD AUTO: 27.2 % — SIGNIFICANT CHANGE UP (ref 13–44)
MCHC RBC-ENTMCNC: 27.1 PG — SIGNIFICANT CHANGE UP (ref 27–34)
MCHC RBC-ENTMCNC: 31.3 GM/DL — LOW (ref 32–36)
MCV RBC AUTO: 86.6 FL — SIGNIFICANT CHANGE UP (ref 80–100)
MONOCYTES # BLD AUTO: 0.53 K/UL — SIGNIFICANT CHANGE UP (ref 0–0.9)
MONOCYTES NFR BLD AUTO: 13.6 % — SIGNIFICANT CHANGE UP (ref 2–14)
NEUTROPHILS # BLD AUTO: 2.12 K/UL — SIGNIFICANT CHANGE UP (ref 1.8–7.4)
NEUTROPHILS NFR BLD AUTO: 54.3 % — SIGNIFICANT CHANGE UP (ref 43–77)
NITRITE UR-MCNC: NEGATIVE — SIGNIFICANT CHANGE UP
NRBC # BLD: 0 /100 WBCS — SIGNIFICANT CHANGE UP (ref 0–0)
PCO2 BLDV: 43 MMHG — SIGNIFICANT CHANGE UP (ref 35–50)
PH BLDV: 7.48 — HIGH (ref 7.35–7.45)
PH UR: 6 — SIGNIFICANT CHANGE UP (ref 5–8)
PLATELET # BLD AUTO: 154 K/UL — SIGNIFICANT CHANGE UP (ref 150–400)
PO2 BLDV: 30 MMHG — SIGNIFICANT CHANGE UP (ref 25–45)
POTASSIUM BLDV-SCNC: 3.4 MMOL/L — LOW (ref 3.5–5.3)
POTASSIUM SERPL-MCNC: 3.8 MMOL/L — SIGNIFICANT CHANGE UP (ref 3.5–5.3)
POTASSIUM SERPL-SCNC: 3.8 MMOL/L — SIGNIFICANT CHANGE UP (ref 3.5–5.3)
PROT SERPL-MCNC: 7.8 G/DL — SIGNIFICANT CHANGE UP (ref 6–8.3)
PROT UR-MCNC: ABNORMAL
RBC # BLD: 4.1 M/UL — SIGNIFICANT CHANGE UP (ref 3.8–5.2)
RBC # FLD: 17 % — HIGH (ref 10.3–14.5)
RBC CASTS # UR COMP ASSIST: 2 /HPF — SIGNIFICANT CHANGE UP (ref 0–4)
SAO2 % BLDV: 52 % — LOW (ref 67–88)
SODIUM SERPL-SCNC: 137 MMOL/L — SIGNIFICANT CHANGE UP (ref 135–145)
SP GR SPEC: 1.02 — SIGNIFICANT CHANGE UP (ref 1.01–1.02)
UROBILINOGEN FLD QL: ABNORMAL
WBC # BLD: 3.9 K/UL — SIGNIFICANT CHANGE UP (ref 3.8–10.5)
WBC # FLD AUTO: 3.9 K/UL — SIGNIFICANT CHANGE UP (ref 3.8–10.5)
WBC UR QL: 24 /HPF — HIGH (ref 0–5)

## 2021-06-09 PROCEDURE — 80053 COMPREHEN METABOLIC PANEL: CPT

## 2021-06-09 PROCEDURE — 99284 EMERGENCY DEPT VISIT MOD MDM: CPT

## 2021-06-09 PROCEDURE — 85018 HEMOGLOBIN: CPT

## 2021-06-09 PROCEDURE — 82435 ASSAY OF BLOOD CHLORIDE: CPT

## 2021-06-09 PROCEDURE — 74177 CT ABD & PELVIS W/CONTRAST: CPT | Mod: 26,MG

## 2021-06-09 PROCEDURE — 74177 CT ABD & PELVIS W/CONTRAST: CPT

## 2021-06-09 PROCEDURE — 87086 URINE CULTURE/COLONY COUNT: CPT

## 2021-06-09 PROCEDURE — 82330 ASSAY OF CALCIUM: CPT

## 2021-06-09 PROCEDURE — 81025 URINE PREGNANCY TEST: CPT

## 2021-06-09 PROCEDURE — G1004: CPT

## 2021-06-09 PROCEDURE — 83605 ASSAY OF LACTIC ACID: CPT

## 2021-06-09 PROCEDURE — 81001 URINALYSIS AUTO W/SCOPE: CPT

## 2021-06-09 PROCEDURE — 99284 EMERGENCY DEPT VISIT MOD MDM: CPT | Mod: 25

## 2021-06-09 PROCEDURE — 93005 ELECTROCARDIOGRAM TRACING: CPT

## 2021-06-09 PROCEDURE — 82947 ASSAY GLUCOSE BLOOD QUANT: CPT

## 2021-06-09 PROCEDURE — 93010 ELECTROCARDIOGRAM REPORT: CPT

## 2021-06-09 PROCEDURE — 83690 ASSAY OF LIPASE: CPT

## 2021-06-09 PROCEDURE — 84132 ASSAY OF SERUM POTASSIUM: CPT

## 2021-06-09 PROCEDURE — 84295 ASSAY OF SERUM SODIUM: CPT

## 2021-06-09 PROCEDURE — 82803 BLOOD GASES ANY COMBINATION: CPT

## 2021-06-09 PROCEDURE — 85014 HEMATOCRIT: CPT

## 2021-06-09 PROCEDURE — 85025 COMPLETE CBC W/AUTO DIFF WBC: CPT

## 2021-06-09 RX ORDER — IBUPROFEN 200 MG
600 TABLET ORAL ONCE
Refills: 0 | Status: COMPLETED | OUTPATIENT
Start: 2021-06-09 | End: 2021-06-09

## 2021-06-09 RX ORDER — ONDANSETRON 8 MG/1
4 TABLET, FILM COATED ORAL ONCE
Refills: 0 | Status: COMPLETED | OUTPATIENT
Start: 2021-06-09 | End: 2021-06-09

## 2021-06-09 RX ORDER — MOXIFLOXACIN HYDROCHLORIDE TABLETS, 400 MG 400 MG/1
1 TABLET, FILM COATED ORAL
Qty: 10 | Refills: 0
Start: 2021-06-09 | End: 2021-06-13

## 2021-06-09 RX ORDER — SODIUM CHLORIDE 9 MG/ML
1000 INJECTION INTRAMUSCULAR; INTRAVENOUS; SUBCUTANEOUS ONCE
Refills: 0 | Status: COMPLETED | OUTPATIENT
Start: 2021-06-09 | End: 2021-06-09

## 2021-06-09 RX ORDER — CIPROFLOXACIN LACTATE 400MG/40ML
500 VIAL (ML) INTRAVENOUS ONCE
Refills: 0 | Status: COMPLETED | OUTPATIENT
Start: 2021-06-09 | End: 2021-06-09

## 2021-06-09 RX ORDER — MORPHINE SULFATE 50 MG/1
4 CAPSULE, EXTENDED RELEASE ORAL ONCE
Refills: 0 | Status: DISCONTINUED | OUTPATIENT
Start: 2021-06-09 | End: 2021-06-09

## 2021-06-09 RX ADMIN — SODIUM CHLORIDE 1000 MILLILITER(S): 9 INJECTION INTRAMUSCULAR; INTRAVENOUS; SUBCUTANEOUS at 18:47

## 2021-06-09 RX ADMIN — Medication 500 MILLIGRAM(S): at 22:12

## 2021-06-09 RX ADMIN — Medication 600 MILLIGRAM(S): at 22:12

## 2021-06-09 NOTE — ED PROVIDER NOTE - PATIENT PORTAL LINK FT
You can access the FollowMyHealth Patient Portal offered by [facility] by registering at the following website: [email or web address]. By joining [employer]’s FollowMyHealth portal, you will also be able to view your health information using other applications (apps) compatible with our system.

## 2021-06-09 NOTE — ED PROVIDER NOTE - CLINICAL SUMMARY MEDICAL DECISION MAKING FREE TEXT BOX
Attending Corinne Boyd: 36 y/o female with extensive PMH including cryptogenic cirrhosis, portal hypertension with small esophageal varices, Protein S deficiency, COVID-19 pna (3/4/21), chronic Budd Chiari syndrome c/b small ascites, R hepatic hydrothorax, small non bleeding esophageal varices (last EGD 2017), extensive intra-abdominal ysabel-systemic collaterals, splenomegaly, and caudate lobe hypertrophy with associated mass effect on intrahepatic IVC and IVC thrombosis on xaralto, multiple spontaneous abortionscomplex  presenting with abdominal pain. ct scan performed to further evaluate showing no evidence of acute surgical pathology. d/w pt's hepatologist and PCP. UA shows evidence of UTI. will cover with abx pending culture. pt toelrating po.

## 2021-06-09 NOTE — ED ADULT NURSE NOTE - NSIMPLEMENTINTERV_GEN_ALL_ED
Implemented All Fall with Harm Risk Interventions:  [place] to call system. Call bell, personal items and telephone within reach. Instruct patient to call for assistance. Room bathroom lighting operational. Non-slip footwear when patient is off stretcher. Physically safe environment: no spills, clutter or unnecessary equipment. Stretcher in lowest position, wheels locked, appropriate side rails in place. Provide visual cue, wrist band, yellow gown, etc. Monitor gait and stability. Monitor for mental status changes and reorient to person, place, and time. Review medications for side effects contributing to fall risk. Reinforce activity limits and safety measures with patient and family. Provide visual clues: red socks.

## 2021-06-09 NOTE — ED PROVIDER NOTE - CARE PROVIDER_API CALL
Haydee Carvajal (MD)  Gastroenterology; Internal Medicine; Transplant Hepatology  [address] [place] [address]  Phone: [phone]  Fax: [phone]  Follow Up Time:

## 2021-06-09 NOTE — ED ADULT NURSE NOTE - OBJECTIVE STATEMENT
35 y f came to the ed c/o right lower abdominal pain and right flank pain. patient states the pain started 3 days ago. c/o nausea but denies vomiting. patient is a/ox3. denies fevers, chills, chest pain, sob. abdomen is soft and nontender. denies any urinary complaints. skin is warm and dry.

## 2021-06-09 NOTE — ED PROVIDER NOTE - ATTENDING CONTRIBUTION TO CARE
Attending MD Corinne Boyd:   I personally have seen and examined this patient.  Physician assistant note reviewed and agree on plan of care and except where noted.  See HPI, PE, and MDM for details.

## 2021-06-09 NOTE — ED PROVIDER NOTE - PHYSICAL EXAMINATION
Attending Corinne Boyd: Gen: NAD, heent: atrauamtic, eomi, perrla, mmm, op pink, uvula midline, neck; nttp, no nuchal rigidity, chest: nttp, no crepitus, cv: rrr, no murmurs, lungs: ctab, abd: soft, ttp right lower abdomen, no guarding, ext: wwp, neg homans, skin: no rash, neuro: awake and alert, following commands, speech clear, sensation and strength intact, no focal deficits

## 2021-06-09 NOTE — ED ADULT NURSE REASSESSMENT NOTE - NS ED NURSE REASSESS COMMENT FT1
Received report from KODY Pérez. Pt is awake and alert, resting comfortably in stretcher, speaking in full coherent sentences. Pt denies CP, SOB, fevers, chills, N/V/D, HA, vision changes. Call bell within reach, comfort & safety provided.

## 2021-06-09 NOTE — ED PROVIDER NOTE - OBJECTIVE STATEMENT
HPI: 35F w/ PMHx of cryptogenic cirrhosis, portal hypertension with small esophageal varices, Protein S deficiency, COVID-19 pna (3/4/21) and chronic Budd Chiari syndrome c/b small ascites, R hepatic hydrothorax, small non bleeding esophageal varices (last EGD 2017), extensive intra-abdominal ysabel-systemic collaterals, splenomegaly, and caudate lobe hypertrophy with associated mass effect on intrahepatic IVC and IVC thrombosis on xaralto, multiple spontaneous abortions. Was admitted on 4/19/21 Underwent IVC thrombectomy in IR; occlusion could not be crossed. TIPs was deferred, had thoracentesis on 4/26 and was discharged home.  Now 5/11/21 underwent scheduled IR procedure Transjugular Intrahepatic Portosystemic Shunt (TIPS), RT thoracentesis with 1L straw yellow pleural fluid.  Pt presenting today with abdominal pain. HPI: 35F w/ PMHx of cryptogenic cirrhosis, portal hypertension with small esophageal varices, Protein S deficiency, COVID-19 pna (3/4/21) and chronic Budd Chiari syndrome c/b small ascites, R hepatic hydrothorax, small non bleeding esophageal varices (last EGD 2017), extensive intra-abdominal ysabel-systemic collaterals, splenomegaly, and caudate lobe hypertrophy with associated mass effect on intrahepatic IVC and IVC thrombosis on xaralto, multiple spontaneous abortions. Was admitted on 4/19/21 Underwent IVC thrombectomy in IR; occlusion could not be crossed. TIPs was deferred, had thoracentesis on 4/26 and was discharged home.  Now 5/11/21 underwent scheduled IR procedure Transjugular Intrahepatic Portosystemic Shunt (TIPS), RT thoracentesis with 1L straw yellow pleural fluid.  Pt presenting today with abdominal pain.  Pt has been having RLQ abd pain over the past week.  Associated with some nausea no vomiting no diarrhea, no fevers, no chills.  + constipation.  Spoke with her hepatologist Dr Flores that recommended she come into the emergency department for evaluation.  Never had anything like this before, nothing makes it better palpation  akes it worse.     [id number]

## 2021-06-09 NOTE — ED PROVIDER NOTE - PROGRESS NOTE DETAILS
Spoke with GI, wants Cipro 500 BID x 5 days, call hepatology tomorrow morning and establish follow up.  Spoke with pt, comfortable with plan and will follow up tomorrow.  [place] Attending Corinne Boyd: spoke with pt;s GI team and PCP aware of lab results will follo up tomorrow. close return precautions for any changes

## 2021-06-10 ENCOUNTER — APPOINTMENT (OUTPATIENT)
Dept: HEPATOLOGY | Facility: CLINIC | Age: 36
End: 2021-06-10
Payer: COMMERCIAL

## 2021-06-10 VITALS
HEIGHT: 62 IN | RESPIRATION RATE: 18 BRPM | BODY MASS INDEX: 17.66 KG/M2 | WEIGHT: 96 LBS | TEMPERATURE: 97.9 F | SYSTOLIC BLOOD PRESSURE: 98 MMHG | DIASTOLIC BLOOD PRESSURE: 70 MMHG | HEART RATE: 101 BPM

## 2021-06-10 DIAGNOSIS — K64.9 UNSPECIFIED HEMORRHOIDS: ICD-10-CM

## 2021-06-10 LAB
CULTURE RESULTS: SIGNIFICANT CHANGE UP
SPECIMEN SOURCE: SIGNIFICANT CHANGE UP

## 2021-06-10 PROCEDURE — 99215 OFFICE O/P EST HI 40 MIN: CPT

## 2021-06-10 RX ORDER — MIDODRINE HYDROCHLORIDE 5 MG/1
5 TABLET ORAL TWICE DAILY
Qty: 60 | Refills: 1 | Status: DISCONTINUED | COMMUNITY
Start: 2021-06-03 | End: 2021-06-10

## 2021-06-14 ENCOUNTER — APPOINTMENT (OUTPATIENT)
Dept: OBGYN | Facility: CLINIC | Age: 36
End: 2021-06-14
Payer: COMMERCIAL

## 2021-06-14 VITALS — SYSTOLIC BLOOD PRESSURE: 96 MMHG | DIASTOLIC BLOOD PRESSURE: 68 MMHG

## 2021-06-14 VITALS — TEMPERATURE: 97.3 F

## 2021-06-14 DIAGNOSIS — J90 PLEURAL EFFUSION, NOT ELSEWHERE CLASSIFIED: ICD-10-CM

## 2021-06-14 LAB — HCG UR QL: NEGATIVE

## 2021-06-14 PROCEDURE — 81025 URINE PREGNANCY TEST: CPT

## 2021-06-14 PROCEDURE — 99215 OFFICE O/P EST HI 40 MIN: CPT | Mod: 25

## 2021-06-14 NOTE — PLAN
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 She was admitted to [facility] - and underwent an elective IVC thrombectomy.  underwent an elective TIPS procedure presents to establish GYN care\par \par #HCM\par -f/u PAP/HPV\par -not sexually active at this time, declines STI testing\par \par #Amenorrhea\par -Discussed that likely pt is amenorrheic due to stress of having COVID pneumonia and having IVC thrombectomy as well as TIPS procedure on . \par -Discussed provera challenge vs expectant management. Pt wishes to wait 1-2 months and if period does not return by then will come back for provera challenge for withdrawal bleed. \par \par #Contraception\par -Discussed importance of not getting pregnant. I previously had a multidisciplinary meeting with Dr. Carvajal and Dr. Venegas regarding the best birth control method for this patient. We decided that the hormonal IUD is the best method for her.\par -Pt is not going to be sexually active while she is healing from procedure. Pt understands importance of not getting pregnant prior to liver transplant\par -Pt to return in 1-2 months for IUD placement. Will pre-certify in the meantime. \par

## 2021-06-14 NOTE — HISTORY OF PRESENT ILLNESS
[Regular Cycle Intervals] : periods have been regular [Frequency: Q ___ days] : menstrual periods occur approximately every [unfilled] days [No] : Patient does not have concerns regarding sex [Previously active] : previously active [Men] : men [Vaginal] : vaginal [FreeTextEntry1] : 3/11/21 [FreeTextEntry2] : last active 8 months ago

## 2021-06-15 ENCOUNTER — APPOINTMENT (OUTPATIENT)
Dept: HEPATOLOGY | Facility: CLINIC | Age: 36
End: 2021-06-15
Payer: COMMERCIAL

## 2021-06-15 VITALS
BODY MASS INDEX: 17.48 KG/M2 | SYSTOLIC BLOOD PRESSURE: 100 MMHG | RESPIRATION RATE: 18 BRPM | OXYGEN SATURATION: 98 % | DIASTOLIC BLOOD PRESSURE: 64 MMHG | HEART RATE: 85 BPM | HEIGHT: 62 IN | WEIGHT: 95 LBS | TEMPERATURE: 97.6 F

## 2021-06-15 LAB — HPV HIGH+LOW RISK DNA PNL CVX: NOT DETECTED

## 2021-06-15 PROCEDURE — 99214 OFFICE O/P EST MOD 30 MIN: CPT

## 2021-06-16 LAB
BACTERIA UR CULT: NORMAL
CYTOLOGY CVX/VAG DOC THIN PREP: NORMAL

## 2021-06-16 RX ORDER — POLYETHYLENE GLYCOL 3350 AND ELECTROLYTES WITH LEMON FLAVOR 236; 22.74; 6.74; 5.86; 2.97 G/4L; G/4L; G/4L; G/4L; G/4L
236 POWDER, FOR SOLUTION ORAL
Qty: 1 | Refills: 0 | Status: DISCONTINUED | COMMUNITY
Start: 2021-06-16 | End: 2021-06-16

## 2021-06-16 RX ORDER — POLYETHYLENE GLYCOL 3350 AND ELECTROLYTES WITH LEMON FLAVOR 236; 22.74; 6.74; 5.86; 2.97 G/4L; G/4L; G/4L; G/4L; G/4L
236 POWDER, FOR SOLUTION ORAL
Qty: 1 | Refills: 0 | Status: DISCONTINUED | COMMUNITY
Start: 2021-06-15 | End: 2021-06-16

## 2021-06-24 ENCOUNTER — NON-APPOINTMENT (OUTPATIENT)
Age: 36
End: 2021-06-24

## 2021-06-24 ENCOUNTER — OUTPATIENT (OUTPATIENT)
Dept: OUTPATIENT SERVICES | Facility: HOSPITAL | Age: 36
LOS: 1 days | End: 2021-06-24
Payer: COMMERCIAL

## 2021-06-24 ENCOUNTER — APPOINTMENT (OUTPATIENT)
Dept: ULTRASOUND IMAGING | Facility: HOSPITAL | Age: 36
End: 2021-06-24
Payer: COMMERCIAL

## 2021-06-24 DIAGNOSIS — I82.0 BUDD-CHIARI SYNDROME: ICD-10-CM

## 2021-06-24 PROCEDURE — 93975 VASCULAR STUDY: CPT

## 2021-06-24 PROCEDURE — 93976 VASCULAR STUDY: CPT | Mod: 26

## 2021-06-29 ENCOUNTER — APPOINTMENT (OUTPATIENT)
Dept: HEPATOLOGY | Facility: CLINIC | Age: 36
End: 2021-06-29

## 2021-06-29 ENCOUNTER — NON-APPOINTMENT (OUTPATIENT)
Age: 36
End: 2021-06-29

## 2021-07-06 ENCOUNTER — APPOINTMENT (OUTPATIENT)
Dept: HEPATOLOGY | Facility: CLINIC | Age: 36
End: 2021-07-06

## 2021-07-07 ENCOUNTER — RX RENEWAL (OUTPATIENT)
Age: 36
End: 2021-07-07

## 2021-07-09 ENCOUNTER — LABORATORY RESULT (OUTPATIENT)
Age: 36
End: 2021-07-09

## 2021-07-09 ENCOUNTER — APPOINTMENT (OUTPATIENT)
Dept: HEPATOLOGY | Facility: CLINIC | Age: 36
End: 2021-07-09
Payer: COMMERCIAL

## 2021-07-09 VITALS
BODY MASS INDEX: 17.85 KG/M2 | HEART RATE: 88 BPM | DIASTOLIC BLOOD PRESSURE: 66 MMHG | TEMPERATURE: 97.9 F | HEIGHT: 62 IN | SYSTOLIC BLOOD PRESSURE: 97 MMHG | WEIGHT: 97 LBS | RESPIRATION RATE: 18 BRPM

## 2021-07-09 PROCEDURE — 99214 OFFICE O/P EST MOD 30 MIN: CPT

## 2021-07-09 NOTE — HISTORY OF PRESENT ILLNESS
[FreeTextEntry1] : Ms. 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 Delivery occurred at [facility]. Pregnancy #1 Comments: ( delivery, baby passed after 30 minutes. Pt remained in hospital due to enlarged liver). \par #2 (): at 16 weeks GA. Delivery occurred at [facility]. Pregnancy #2 Comments: (spontaneous ab, D&C). \par #3 (): at 8 weeks GA. Delivery occurred at [facility]. Pregnancy #3 Comments: (Spontaneous ab, D&C). \par  #4 (): at 4 weeks GA. Delivery occurred at [facility]. Pregnancy #4 Comments: (Induced ab- Pt was diagnosed with Cirrhosis, pt was advised to terminate pregnancy). 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 Has a brother who is healthy \par \par She was admitted to [facility] from 21-21. She underwent elective IVC thrombectomy with IR on 21, with venography that showed a tight narrowed segment of the IVC just inferior to the RA that may be due to vascular stricture vs extrinsic compression and that was unable to be trasversed with a wire despite attempts both from above and below that segment. She has retrograde IVC flow into a very lagre azygous collateral with return to SVC. Her case was discussed at length with transplant surgery and IR while she was inpatient, with plan formulated for her to return for re-admission for elective DIPS (from IVC below the narrowed segment to the right portal vein) on 21. She underwent right thoracentesis on 21 prior to being discharged home, with 810 mL removed and pleural fluid studies with albumin 3.0 and protein 5.0 but with no evidence of neutrocytic fluid and negative culture.\par \par She was readmitted on May 11, 2021 for an elective TIPS/DIPS, and had a successful TIPS procedure done. She remained admitted from May 11 through May 25, 2021. Her course was complicated with recurrent hepatic hydrothorax requiring thoracentesis during her hospitalization. She had thoracentesis done on May 11 with 1 L, may 13 with 1.2 L in May 19 with 1.2 L of pleural fluid removed. She required IV pressors, and gradually was tapered off. She was treated with midodrine 20 mg 3 times daily and was discharged on May 25, 2021. \par \par Patient returns for a hepatology follow-up appointment. Since last seen patient had an emergency room visit on 2021 with complaints of abdominal pain and nosebleed. She had a CT scan of the abdomen and pelvis performed. This revealed This revealed no acute findings. Evidence of cirrhosis and Budd-Chiari syndrome and evidence of portal hypertension was noted. Interval placement of TIPS was noted. No evidence of bowel obstruction was noted. Previously seen suprarenal IVC thrombosis is no longer visualized. Portosystemic collaterals in the upper abdomen was noted. Bile ducts were normal caliber. Laboratory test results from 2021 was reviewed. This revealed white cell count 3.9, hemoglobin 11.1, platelet count 1 54,000. Her hemoglobin is essentially stable. Serum sodium was 137, potassium 3.8. Creatinine was 0.78 mg/dL. Liver function test revealed total bilirubin 2, AST 43, ALT 38, alkaline phosphatase 186. INR was 2.63 which is acceptable. She remains on Xarelto 20 mg daily. Hepatitis B surface antibody was reactive suggesting immunity, hepatitis B surface antigen negative, hepatitis B core antibody total reactive, hepatitis A IgM antibody nonreactive, hepatitis C antibody negative. Her blood type is B+.\par \par Since her last visit with  on 06/15/21, she reports of ongoing R sided abdominal pain. Doppler from 21 showed Patient status post TIPS with expected flow patterns in the portal systemShe reports it is\par intermittent colicky pain that is making her difficult to sleep at nights. She is also chronically constipated. She reports lactulose is not helping her enough. She also complains of dizziness when she took bumex so she stopped for 1 week and that has improved. She has been complaint with taking spironolactone 50mg BID and midodrine 10mg BID. She also reports she has been feeling confused at times.\par \par

## 2021-07-09 NOTE — REVIEW OF SYSTEMS
[Abdominal Pain] : abdominal pain [Constipation] : constipation [Negative] : Heme/Lymph [Vomiting] : no vomiting [Diarrhea] : no diarrhea [Heartburn] : no heartburn [Melena] : no melena

## 2021-07-09 NOTE — ASSESSMENT
[FreeTextEntry1] : Assessment and Plan: Ms. 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 Recent ED visit with CAT scan imaging revealing no significant acute abnormality. There were significant for fecalized stool in both small and large bowels. She has not started Bentyl 20 mg 3 times daily as needed for colicky pain as prescribed by . Discussed titrating lactulose to have 2-4 bowel movements. Also will start rifaximin for her confusion. \par \par -Hepatic hydrothorax and ascites: She stopped taking Bumex to 1 mg twice a day due to dizziness and has been taking spironolactone 50mg BID.  She will report if there is any change in her weight or any change in the respiratory symptoms on follow-up.\par \par -HCC Screening: Will discuss repeating MRI at next visit given indeterminate lesion noted on MRI from \par  2021.\par \par -UTI: She reports she was treated for an UTI recently. Will recheck UA with culture and repeat labs now. \par \par 2. Health Maintenance \par Immunizations: Immune to HAV, Hep B core antibody positive (past exposure)\par \par Follow Up: 2 weeks with \par \par Gonzalo Delarosa\par Nurse Practitioner \par Hepatology\par [facility] \par [address]\[place] [address]\par Tel: [phone]\par \par \par

## 2021-07-12 LAB
ALBUMIN SERPL ELPH-MCNC: 4.1 G/DL
ALP BLD-CCNC: 184 U/L
ALT SERPL-CCNC: 27 U/L
ANION GAP SERPL CALC-SCNC: 10 MMOL/L
APPEARANCE: CLEAR
AST SERPL-CCNC: 35 U/L
BACTERIA UR CULT: NORMAL
BASOPHILS # BLD AUTO: 0.03 K/UL
BASOPHILS NFR BLD AUTO: 1.1 %
BILIRUB SERPL-MCNC: 1.8 MG/DL
BILIRUBIN URINE: NEGATIVE
BLOOD URINE: NEGATIVE
BUN SERPL-MCNC: 15 MG/DL
CALCIUM SERPL-MCNC: 9.3 MG/DL
CHLORIDE SERPL-SCNC: 106 MMOL/L
CO2 SERPL-SCNC: 22 MMOL/L
COLOR: YELLOW
CREAT SERPL-MCNC: 0.57 MG/DL
EOSINOPHIL # BLD AUTO: 0.09 K/UL
EOSINOPHIL NFR BLD AUTO: 3.2 %
GLUCOSE QUALITATIVE U: NEGATIVE
GLUCOSE SERPL-MCNC: 91 MG/DL
HCT VFR BLD CALC: 34.6 %
HGB BLD-MCNC: 10.4 G/DL
IMM GRANULOCYTES NFR BLD AUTO: 0.4 %
INR PPP: 2.18 RATIO
KETONES URINE: NEGATIVE
LEUKOCYTE ESTERASE URINE: NEGATIVE
LYMPHOCYTES # BLD AUTO: 0.79 K/UL
LYMPHOCYTES NFR BLD AUTO: 28 %
MAGNESIUM SERPL-MCNC: 2.4 MG/DL
MAN DIFF?: NORMAL
MCHC RBC-ENTMCNC: 26.5 PG
MCHC RBC-ENTMCNC: 30.1 GM/DL
MCV RBC AUTO: 88.3 FL
MONOCYTES # BLD AUTO: 0.33 K/UL
MONOCYTES NFR BLD AUTO: 11.7 %
NEUTROPHILS # BLD AUTO: 1.57 K/UL
NEUTROPHILS NFR BLD AUTO: 55.6 %
NITRITE URINE: NEGATIVE
PH URINE: 6
PLATELET # BLD AUTO: 149 K/UL
POTASSIUM SERPL-SCNC: 4.8 MMOL/L
PROT SERPL-MCNC: 6.7 G/DL
PROTEIN URINE: NORMAL
PT BLD: 24.8 SEC
RBC # BLD: 3.92 M/UL
RBC # FLD: 15.9 %
SODIUM SERPL-SCNC: 137 MMOL/L
SPECIFIC GRAVITY URINE: 1.03
UROBILINOGEN URINE: NORMAL
WBC # FLD AUTO: 2.82 K/UL

## 2021-07-22 ENCOUNTER — APPOINTMENT (OUTPATIENT)
Dept: HEPATOLOGY | Facility: CLINIC | Age: 36
End: 2021-07-22
Payer: COMMERCIAL

## 2021-07-22 VITALS
TEMPERATURE: 98.2 F | DIASTOLIC BLOOD PRESSURE: 64 MMHG | HEIGHT: 62 IN | RESPIRATION RATE: 12 BRPM | SYSTOLIC BLOOD PRESSURE: 93 MMHG | BODY MASS INDEX: 18.4 KG/M2 | OXYGEN SATURATION: 100 % | WEIGHT: 100 LBS

## 2021-07-22 PROCEDURE — 99214 OFFICE O/P EST MOD 30 MIN: CPT

## 2021-07-26 LAB
ALBUMIN SERPL ELPH-MCNC: 3.9 G/DL
ALP BLD-CCNC: 184 U/L
ALT SERPL-CCNC: 26 U/L
ANION GAP SERPL CALC-SCNC: 10 MMOL/L
AST SERPL-CCNC: 40 U/L
BASOPHILS # BLD AUTO: 0.03 K/UL
BASOPHILS NFR BLD AUTO: 0.9 %
BILIRUB SERPL-MCNC: 2.1 MG/DL
BUN SERPL-MCNC: 17 MG/DL
CALCIUM SERPL-MCNC: 9.4 MG/DL
CHLORIDE SERPL-SCNC: 106 MMOL/L
CO2 SERPL-SCNC: 22 MMOL/L
CREAT SERPL-MCNC: 0.58 MG/DL
EOSINOPHIL # BLD AUTO: 0.13 K/UL
EOSINOPHIL NFR BLD AUTO: 3.8 %
GLUCOSE SERPL-MCNC: 105 MG/DL
HCT VFR BLD CALC: 35 %
HGB BLD-MCNC: 10.5 G/DL
IMM GRANULOCYTES NFR BLD AUTO: 0.3 %
INR PPP: 2.9 RATIO
LYMPHOCYTES # BLD AUTO: 0.85 K/UL
LYMPHOCYTES NFR BLD AUTO: 25.1 %
MAN DIFF?: NORMAL
MCHC RBC-ENTMCNC: 26.4 PG
MCHC RBC-ENTMCNC: 30 GM/DL
MCV RBC AUTO: 88.2 FL
MONOCYTES # BLD AUTO: 0.42 K/UL
MONOCYTES NFR BLD AUTO: 12.4 %
NEUTROPHILS # BLD AUTO: 1.95 K/UL
NEUTROPHILS NFR BLD AUTO: 57.5 %
PLATELET # BLD AUTO: 151 K/UL
POTASSIUM SERPL-SCNC: 5.1 MMOL/L
PROT SERPL-MCNC: 6.8 G/DL
PT BLD: 32.8 SEC
RBC # BLD: 3.97 M/UL
RBC # FLD: 15.9 %
SODIUM SERPL-SCNC: 137 MMOL/L
WBC # FLD AUTO: 3.39 K/UL

## 2021-07-29 ENCOUNTER — APPOINTMENT (OUTPATIENT)
Dept: INTERVENTIONAL RADIOLOGY/VASCULAR | Facility: CLINIC | Age: 36
End: 2021-07-29
Payer: COMMERCIAL

## 2021-07-29 PROCEDURE — 99442: CPT

## 2021-07-29 RX ORDER — SODIUM SULFATE, POTASSIUM SULFATE, MAGNESIUM SULFATE 17.5; 3.13; 1.6 G/ML; G/ML; G/ML
17.5-3.13-1.6 SOLUTION, CONCENTRATE ORAL
Qty: 1 | Refills: 0 | Status: DISCONTINUED | COMMUNITY
Start: 2021-06-16 | End: 2021-07-29

## 2021-08-03 ENCOUNTER — RX RENEWAL (OUTPATIENT)
Age: 36
End: 2021-08-03

## 2021-08-04 NOTE — ED PROVIDER NOTE - PATIENT PORTAL LINK FT
You can access the FollowMyHealth Patient Portal offered by [facility] by registering at the following website: [email or web address]. By joining [employer]’s FollowMyHealth portal, you will also be able to view your health information using other applications (apps) compatible with our system.
no

## 2021-08-10 ENCOUNTER — APPOINTMENT (OUTPATIENT)
Dept: HEPATOLOGY | Facility: CLINIC | Age: 36
End: 2021-08-10
Payer: COMMERCIAL

## 2021-08-10 VITALS
HEIGHT: 62 IN | SYSTOLIC BLOOD PRESSURE: 94 MMHG | BODY MASS INDEX: 18.4 KG/M2 | WEIGHT: 100 LBS | RESPIRATION RATE: 12 BRPM | DIASTOLIC BLOOD PRESSURE: 60 MMHG | HEART RATE: 80 BPM | OXYGEN SATURATION: 99 % | TEMPERATURE: 97.9 F

## 2021-08-10 PROCEDURE — 99214 OFFICE O/P EST MOD 30 MIN: CPT

## 2021-08-22 LAB
ALBUMIN SERPL ELPH-MCNC: 4.1 G/DL
ALP BLD-CCNC: 190 U/L
ALT SERPL-CCNC: 25 U/L
ANION GAP SERPL CALC-SCNC: 8 MMOL/L
AST SERPL-CCNC: 37 U/L
BASOPHILS # BLD AUTO: 0.03 K/UL
BASOPHILS NFR BLD AUTO: 0.8 %
BILIRUB SERPL-MCNC: 1.4 MG/DL
BUN SERPL-MCNC: 16 MG/DL
CALCIUM SERPL-MCNC: 9.3 MG/DL
CHLORIDE SERPL-SCNC: 109 MMOL/L
CO2 SERPL-SCNC: 22 MMOL/L
CREAT SERPL-MCNC: 0.5 MG/DL
EOSINOPHIL # BLD AUTO: 0.18 K/UL
EOSINOPHIL NFR BLD AUTO: 4.7 %
GLUCOSE SERPL-MCNC: 94 MG/DL
HCT VFR BLD CALC: 35.5 %
HGB BLD-MCNC: 11 G/DL
IMM GRANULOCYTES NFR BLD AUTO: 0.5 %
INR PPP: 2.92 RATIO
LYMPHOCYTES # BLD AUTO: 0.97 K/UL
LYMPHOCYTES NFR BLD AUTO: 25.6 %
MAN DIFF?: NORMAL
MCHC RBC-ENTMCNC: 26.8 PG
MCHC RBC-ENTMCNC: 31 GM/DL
MCV RBC AUTO: 86.6 FL
MONOCYTES # BLD AUTO: 0.39 K/UL
MONOCYTES NFR BLD AUTO: 10.3 %
NEUTROPHILS # BLD AUTO: 2.2 K/UL
NEUTROPHILS NFR BLD AUTO: 58.1 %
PLATELET # BLD AUTO: 142 K/UL
POTASSIUM SERPL-SCNC: 5.4 MMOL/L
PROT SERPL-MCNC: 6.8 G/DL
PT BLD: 32.7 SEC
RBC # BLD: 4.1 M/UL
RBC # FLD: 15.7 %
SODIUM SERPL-SCNC: 139 MMOL/L
WBC # FLD AUTO: 3.79 K/UL

## 2021-08-26 ENCOUNTER — APPOINTMENT (OUTPATIENT)
Dept: HEPATOLOGY | Facility: CLINIC | Age: 36
End: 2021-08-26

## 2021-09-22 ENCOUNTER — LABORATORY RESULT (OUTPATIENT)
Age: 36
End: 2021-09-22

## 2021-09-22 ENCOUNTER — APPOINTMENT (OUTPATIENT)
Dept: HEPATOLOGY | Facility: CLINIC | Age: 36
End: 2021-09-22
Payer: COMMERCIAL

## 2021-09-22 VITALS
BODY MASS INDEX: 18.58 KG/M2 | DIASTOLIC BLOOD PRESSURE: 61 MMHG | TEMPERATURE: 97.9 F | RESPIRATION RATE: 12 BRPM | HEIGHT: 62 IN | HEART RATE: 91 BPM | WEIGHT: 101 LBS | SYSTOLIC BLOOD PRESSURE: 92 MMHG

## 2021-09-22 PROCEDURE — 99214 OFFICE O/P EST MOD 30 MIN: CPT

## 2021-09-22 RX ORDER — BUMETANIDE 0.5 MG/1
0.5 TABLET ORAL DAILY
Qty: 30 | Refills: 2 | Status: DISCONTINUED | COMMUNITY
End: 2021-09-22

## 2021-09-27 LAB
ALBUMIN SERPL ELPH-MCNC: 4.1 G/DL
ALP BLD-CCNC: 169 U/L
ALPHA-1-FETOPROTEIN-L3: NORMAL %
ALPHA-1-FETOPROTEIN: 2.4 NG/ML
ALT SERPL-CCNC: 36 U/L
ANION GAP SERPL CALC-SCNC: 10 MMOL/L
AST SERPL-CCNC: 45 U/L
BASOPHILS # BLD AUTO: 0.04 K/UL
BASOPHILS NFR BLD AUTO: 1.3 %
BILIRUB SERPL-MCNC: 1.7 MG/DL
BUN SERPL-MCNC: 15 MG/DL
CALCIUM SERPL-MCNC: 9.4 MG/DL
CHLORIDE SERPL-SCNC: 109 MMOL/L
CO2 SERPL-SCNC: 23 MMOL/L
CREAT SERPL-MCNC: 0.62 MG/DL
EOSINOPHIL # BLD AUTO: 0.15 K/UL
EOSINOPHIL NFR BLD AUTO: 5 %
GLUCOSE SERPL-MCNC: 106 MG/DL
HCT VFR BLD CALC: 37.7 %
HGB BLD-MCNC: 11.9 G/DL
IMM GRANULOCYTES NFR BLD AUTO: 0.3 %
INR PPP: 2.87 RATIO
LYMPHOCYTES # BLD AUTO: 0.94 K/UL
LYMPHOCYTES NFR BLD AUTO: 31.4 %
MAN DIFF?: NORMAL
MCHC RBC-ENTMCNC: 27.4 PG
MCHC RBC-ENTMCNC: 31.6 GM/DL
MCV RBC AUTO: 86.7 FL
MONOCYTES # BLD AUTO: 0.29 K/UL
MONOCYTES NFR BLD AUTO: 9.7 %
NEUTROPHILS # BLD AUTO: 1.56 K/UL
NEUTROPHILS NFR BLD AUTO: 52.3 %
PLATELET # BLD AUTO: 144 K/UL
POTASSIUM SERPL-SCNC: 4.9 MMOL/L
PROT SERPL-MCNC: 6.8 G/DL
PT BLD: 32.5 SEC
RBC # BLD: 4.35 M/UL
RBC # FLD: 15.2 %
SODIUM SERPL-SCNC: 142 MMOL/L
WBC # FLD AUTO: 2.99 K/UL

## 2021-11-01 ENCOUNTER — APPOINTMENT (OUTPATIENT)
Dept: HEPATOLOGY | Facility: HOSPITAL | Age: 36
End: 2021-11-01

## 2021-11-01 ENCOUNTER — OUTPATIENT (OUTPATIENT)
Dept: OUTPATIENT SERVICES | Facility: HOSPITAL | Age: 36
LOS: 1 days | End: 2021-11-01
Payer: COMMERCIAL

## 2021-11-01 ENCOUNTER — RESULT REVIEW (OUTPATIENT)
Age: 36
End: 2021-11-01

## 2021-11-01 VITALS
TEMPERATURE: 98 F | OXYGEN SATURATION: 96 % | HEIGHT: 55 IN | RESPIRATION RATE: 11 BRPM | WEIGHT: 97 LBS | DIASTOLIC BLOOD PRESSURE: 62 MMHG | SYSTOLIC BLOOD PRESSURE: 99 MMHG | HEART RATE: 88 BPM

## 2021-11-01 VITALS
OXYGEN SATURATION: 99 % | DIASTOLIC BLOOD PRESSURE: 63 MMHG | RESPIRATION RATE: 14 BRPM | HEART RATE: 83 BPM | SYSTOLIC BLOOD PRESSURE: 95 MMHG

## 2021-11-01 DIAGNOSIS — K74.60 UNSPECIFIED CIRRHOSIS OF LIVER: ICD-10-CM

## 2021-11-01 PROCEDURE — 88305 TISSUE EXAM BY PATHOLOGIST: CPT

## 2021-11-01 PROCEDURE — 43239 EGD BIOPSY SINGLE/MULTIPLE: CPT

## 2021-11-01 PROCEDURE — 88305 TISSUE EXAM BY PATHOLOGIST: CPT | Mod: 26

## 2021-11-01 RX ORDER — ENOXAPARIN SODIUM 100 MG/ML
40 INJECTION SUBCUTANEOUS
Qty: 0 | Refills: 0 | DISCHARGE

## 2021-11-01 RX ORDER — MIDODRINE HYDROCHLORIDE 2.5 MG/1
1 TABLET ORAL
Qty: 0 | Refills: 0 | DISCHARGE
Start: 2021-11-01

## 2021-11-01 RX ORDER — SPIRONOLACTONE 25 MG/1
1 TABLET, FILM COATED ORAL
Qty: 30 | Refills: 0
Start: 2021-11-01 | End: 2021-11-30

## 2021-11-01 RX ORDER — SODIUM CHLORIDE 9 MG/ML
500 INJECTION INTRAMUSCULAR; INTRAVENOUS; SUBCUTANEOUS
Refills: 0 | Status: COMPLETED | OUTPATIENT
Start: 2021-11-01 | End: 2021-11-01

## 2021-11-01 RX ADMIN — SODIUM CHLORIDE 75 MILLILITER(S): 9 INJECTION INTRAMUSCULAR; INTRAVENOUS; SUBCUTANEOUS at 11:26

## 2021-11-01 NOTE — PRE-ANESTHESIA EVALUATION ADULT - NSANTHPMHFT_GEN_ALL_CORE
35 F PMH: Protein S def c/b Bud Chiari Disorder ( IVC--narrowed IVC flow limiting with retrograde flow to SVC and Hepatic Veins, On Xeralto ( last taken yesterday)- Cirrhosis c/b ascitics and hydrothorax s/p thoracocentesis  , Pt is s/p  IVC thrombectomy, TIPS, ECHO- nl LV fxn, (+) PFO,

## 2021-11-01 NOTE — ASU DISCHARGE PLAN (ADULT/PEDIATRIC) - CARE PROVIDER_API CALL
Haydee Carvajal (MD)  Gastroenterology; Internal Medicine; Transplant Hepatology  [address] [place] [address]  Phone: [phone]  Fax: [phone]  Follow Up Time:

## 2021-11-01 NOTE — PRE PROCEDURE NOTE - PRE PROCEDURE EVALUATION
Attending Physician: Haydee Carvajal                        Procedure: EGD    Indication for Procedure: Follow up for established esophageal varices  ________________________________________________________  PAST MEDICAL & SURGICAL HISTORY:  Cirrhosis  2010    Cirrhosis    Budd-Chiari syndrome    H/O protein S deficiency    Miscarriage  x 5    H/O protein S deficiency    No significant past surgical history      ALLERGIES:  No Known Allergies    HOME MEDICATIONS:  ascorbic acid 500 mg oral tablet: 1 tab(s) orally once a day  Lovenox 40 mg/0.4 mL injectable solution: 40 milligram(s) injectable once  Multiple Vitamins oral tablet: 1 tab(s) orally once a day  omeprazole 40 mg oral delayed release capsule: 1 cap(s) orally once a day  Last dose 5/30 in preparation for Thoracentesis on 6/1/21  Senna 8.6 mg oral tablet: 2 tab(s) orally once a day (at bedtime)  Xarelto 20 mg oral tablet: 1 tab(s) orally once a day   Start 4/27/2021    AICD/PPM: [ ] yes   [ ] no    PERTINENT LAB DATA:                      PHYSICAL EXAMINATION:    Height (cm): 105  Weight (kg): 44  BMI (kg/m2): 39.9  BSA (m2): 1.05T(C): 36.8  HR: 88  BP: 99/62  RR: 11  SpO2: 96%    Constitutional: NAD  HEENT: PERRLA, EOMI,    Neck:  No JVD  Respiratory: CTAB/L  Cardiovascular: S1 and S2  Gastrointestinal: BS+, soft, NT/ND  Extremities: No peripheral edema  Neurological: A/O x 3, no focal deficits  Psychiatric: Normal mood, normal affect  Skin: No rashes    ASA Class: I [ ]  II [ ]  III [x ]  IV [ ]    COMMENTS:    The patient is a suitable candidate for the planned procedure unless box checked [ ]  No, explain:    
Attending Physician: Haydee Carvajal                        Procedure: EGD    Indication for Procedure: Esophageal varices./Cirrhosis/Abdominal pain  ________________________________________________________  PAST MEDICAL & SURGICAL HISTORY:  Cirrhosis  2010    Cirrhosis    Budd-Chiari syndrome    H/O protein S deficiency    Miscarriage  x 5    H/O protein S deficiency    No significant past surgical history      ALLERGIES:  No Known Allergies    HOME MEDICATIONS:  ascorbic acid 500 mg oral tablet: 1 tab(s) orally once a day  Lovenox 40 mg/0.4 mL injectable solution: 40 milligram(s) injectable once  Multiple Vitamins oral tablet: 1 tab(s) orally once a day  omeprazole 40 mg oral delayed release capsule: 1 cap(s) orally once a day  Last dose 5/30 in preparation for Thoracentesis on 6/1/21  Senna 8.6 mg oral tablet: 2 tab(s) orally once a day (at bedtime)  Xarelto 20 mg oral tablet: 1 tab(s) orally once a day   Start 4/27/2021    AICD/PPM: [ ] yes   [ ] no    PERTINENT LAB DATA:                      PHYSICAL EXAMINATION:    Height (cm): 105  Weight (kg): 44  BMI (kg/m2): 39.9  BSA (m2): 1.05T(C): 36.8  HR: 88  BP: 99/62  RR: 11  SpO2: 96%    Constitutional: NAD  HEENT: PERRLA, EOMI,    Neck:  No JVD  Respiratory: CTAB/L  Cardiovascular: S1 and S2  Gastrointestinal: BS+, soft, NT/ND  Extremities: No peripheral edema  Neurological: A/O x 3, no focal deficits  Psychiatric: Normal mood, normal affect  Skin: No rashes    ASA Class: I [ ]  II [ ]  III [ x]  IV [ ]    COMMENTS:    The patient is a suitable candidate for the planned procedure unless box checked [ ]  No, explain:

## 2021-11-05 NOTE — ED PROVIDER NOTE - GENITOURINARY NEGATIVE STATEMENT, MLM
55 F w/ PMH as above s/p ex lap, enterolysis, removal of foreign body consult for pain management for acute on chronic pain    Incomplete note 55 F w/ PMH as above s/p ex lap, enterolysis, removal of foreign body consult for pain management for acute on chronic pain    - Patient's PCA is at 0/.2mg/6min, which is a decently high dose, but patient is under using the PCA. I educated the patient on more appropriately using the dilaudid PCA to cover her pain better.  - IV tylenol around the clock  - NSAIDS contraindicated  - When tolerating PO, gabapentin 300mg TID around the clock for opioid sparing  - When patient is tolerating PO would consider switching to oxycodone IR 5mg q4h for moderate pain OR oxycodone IR 10mg q4h PRN for severe pain, would titrated down, when on PO medications prior to 7-8 days post op  - Please reconsult if needed no dysuria, no frequency, and no hematuria.

## 2021-11-08 LAB — SURGICAL PATHOLOGY STUDY: SIGNIFICANT CHANGE UP

## 2021-11-19 ENCOUNTER — OUTPATIENT (OUTPATIENT)
Dept: OUTPATIENT SERVICES | Facility: HOSPITAL | Age: 36
LOS: 1 days | End: 2021-11-19
Payer: COMMERCIAL

## 2021-11-19 ENCOUNTER — NON-APPOINTMENT (OUTPATIENT)
Age: 36
End: 2021-11-19

## 2021-11-19 ENCOUNTER — APPOINTMENT (OUTPATIENT)
Dept: ULTRASOUND IMAGING | Facility: CLINIC | Age: 36
End: 2021-11-19
Payer: COMMERCIAL

## 2021-11-19 DIAGNOSIS — Z95.828 PRESENCE OF OTHER VASCULAR IMPLANTS AND GRAFTS: ICD-10-CM

## 2021-11-19 DIAGNOSIS — I82.0 BUDD-CHIARI SYNDROME: ICD-10-CM

## 2021-11-19 DIAGNOSIS — I82.220 ACUTE EMBOLISM AND THROMBOSIS OF INFERIOR VENA CAVA: ICD-10-CM

## 2021-11-19 PROCEDURE — 93975 VASCULAR STUDY: CPT | Mod: 26

## 2021-11-19 PROCEDURE — 93975 VASCULAR STUDY: CPT

## 2021-11-22 ENCOUNTER — APPOINTMENT (OUTPATIENT)
Dept: MRI IMAGING | Facility: CLINIC | Age: 36
End: 2021-11-22

## 2021-11-22 LAB
ALBUMIN SERPL ELPH-MCNC: 4.1 G/DL
ALP BLD-CCNC: 141 U/L
ALT SERPL-CCNC: 26 U/L
ANION GAP SERPL CALC-SCNC: 12 MMOL/L
AST SERPL-CCNC: 38 U/L
BASOPHILS # BLD AUTO: 0.03 K/UL
BASOPHILS NFR BLD AUTO: 0.9 %
BILIRUB SERPL-MCNC: 1.2 MG/DL
BUN SERPL-MCNC: 13 MG/DL
CALCIUM SERPL-MCNC: 9.4 MG/DL
CHLORIDE SERPL-SCNC: 103 MMOL/L
CO2 SERPL-SCNC: 23 MMOL/L
CREAT SERPL-MCNC: 0.6 MG/DL
EOSINOPHIL # BLD AUTO: 0.22 K/UL
EOSINOPHIL NFR BLD AUTO: 6.4 %
GLUCOSE SERPL-MCNC: 120 MG/DL
HCT VFR BLD CALC: 35.9 %
HGB BLD-MCNC: 11 G/DL
IMM GRANULOCYTES NFR BLD AUTO: 0 %
INR PPP: 1.66 RATIO
LYMPHOCYTES # BLD AUTO: 1.07 K/UL
LYMPHOCYTES NFR BLD AUTO: 30.9 %
MAN DIFF?: NORMAL
MCHC RBC-ENTMCNC: 27 PG
MCHC RBC-ENTMCNC: 30.6 GM/DL
MCV RBC AUTO: 88 FL
MONOCYTES # BLD AUTO: 0.5 K/UL
MONOCYTES NFR BLD AUTO: 14.5 %
NEUTROPHILS # BLD AUTO: 1.64 K/UL
NEUTROPHILS NFR BLD AUTO: 47.3 %
PLATELET # BLD AUTO: 169 K/UL
POTASSIUM SERPL-SCNC: 4.5 MMOL/L
PROT SERPL-MCNC: 6.6 G/DL
PT BLD: 19.1 SEC
RBC # BLD: 4.08 M/UL
RBC # FLD: 14.2 %
SODIUM SERPL-SCNC: 137 MMOL/L
WBC # FLD AUTO: 3.46 K/UL

## 2021-11-24 ENCOUNTER — APPOINTMENT (OUTPATIENT)
Dept: INTERVENTIONAL RADIOLOGY/VASCULAR | Facility: CLINIC | Age: 36
End: 2021-11-24
Payer: COMMERCIAL

## 2021-11-24 PROCEDURE — 99442: CPT

## 2021-11-24 RX ORDER — RIFAXIMIN 550 MG/1
550 TABLET ORAL
Qty: 30 | Refills: 2 | Status: DISCONTINUED | COMMUNITY
Start: 2021-07-09 | End: 2021-11-24

## 2021-11-24 RX ORDER — OMEPRAZOLE 20 MG/1
20 CAPSULE, DELAYED RELEASE ORAL TWICE DAILY
Qty: 14 | Refills: 0 | Status: DISCONTINUED | COMMUNITY
Start: 2021-04-15 | End: 2021-11-24

## 2021-11-27 ENCOUNTER — OUTPATIENT (OUTPATIENT)
Dept: OUTPATIENT SERVICES | Facility: HOSPITAL | Age: 36
LOS: 1 days | End: 2021-11-27
Payer: COMMERCIAL

## 2021-11-27 ENCOUNTER — APPOINTMENT (OUTPATIENT)
Dept: MRI IMAGING | Facility: CLINIC | Age: 36
End: 2021-11-27
Payer: COMMERCIAL

## 2021-11-27 DIAGNOSIS — Z00.8 ENCOUNTER FOR OTHER GENERAL EXAMINATION: ICD-10-CM

## 2021-11-27 DIAGNOSIS — I82.0 BUDD-CHIARI SYNDROME: ICD-10-CM

## 2021-11-27 PROCEDURE — 74183 MRI ABD W/O CNTR FLWD CNTR: CPT

## 2021-11-27 PROCEDURE — 74183 MRI ABD W/O CNTR FLWD CNTR: CPT | Mod: 26

## 2021-11-27 PROCEDURE — A9585: CPT

## 2021-12-12 ENCOUNTER — NON-APPOINTMENT (OUTPATIENT)
Age: 36
End: 2021-12-12

## 2021-12-15 NOTE — CONSULT NOTE ADULT - TIME-BASE BILLING FOR NON-FACE-TO-FACE CONSULT (MINUTES)
OP Anticoagulation Service Note    Date: 2021    Anticoagulation Summary  As of 2021    INR goal:  2.0-3.0   TTR:  64.4 % (6.6 y)   INR used for dosin.70 (2021)   Warfarin maintenance plan:  10 mg (5 mg x 2) every Sat; 7.5 mg (7.5 mg x 1) all other days   Weekly warfarin total:  55 mg   Plan last modified:  Lamont Will, PharmD (8/10/2021)   Next INR check:  2022   Target end date:  Indefinite    Indications    Pulmonary embolism (HCC) [I26.99]  Stroke [434.91] [I63.9]  Factor V Leiden (HCC) [D68.51]             Anticoagulation Episode Summary     INR check location:      Preferred lab:      Send INR reminders to:      Comments:  Marcial      Anticoagulation Care Providers     Provider Role Specialty Phone number    Lawrence ArevaloD Responsible          Anticoagulation Patient Findings      Voice message for patient regarding their anticoagulant.     HPI:   The reason for today's call is to prevent morbidity and mortality from a blood clot and/or stroke and to reduce the risk of bleeding while on a anticoagulant.     PCP:  Delores Michael M.D.  [address]  [place] [address]    Assessment:     • INR  therapeutic.       Current Outpatient Medications:   •  spironolactone, 25 mg, Oral, DAILY  •  warfarin, Take two tablets by mouth daily (alternating with 7.5mg tablet) or as directed by anticoagulation clinic  •  pravastatin, TAKE 1 TABLET DAILY  •  carbidopa-levodopa, TAKE 1 TABLET BY MOUTH TWICE A DAY  •  warfarin, TAKE 1 TABLET DAILY OR AS DIRECTED BY COUMADIN CLINIC  •  pramipexole, TAKE 1 TABLET BY MOUTH TWICE DAILY FOR 30 DAYS  •  nitroGLYCERIN, 0.3 mg, Sublingual, QDAY PRN  •  vitamin D, 5,000 Units, Oral, DAILY  •  B Complex Vitamins (VITAMIN B COMPLEX PO), 1 Tablet, Oral, DAILY      Plan:     • Continue the same warfarin dose, as noted above.       Follow-up:     • Our protocol suggests we test in 4 weeks.        Additional information discussed with patient:      • Asked patient to please call the anticoagulation clinic if they have any signs/symptoms of bleeding and/or thrombosis or any changes to diet or medications.      National recommendations regarding anticoagulation therapy:     The CHEST guidelines recommends frequent INR monitoring at regular intervals (a few days up to a max of 12 weeks) to ensure patients are on the proper dose of warfarin, and patients are not having any complications from therapy.  INRs can dramatically change over a short time period due to diet, medications, and medical conditions.     [facility]  Phone [phone] fax [phone]    This note was created using voice recognition software (Dragon). The accuracy of the dictation is limited by the abilities of the software. I have reviewed the note prior to signing, however some errors in grammar and context are still possible. If you have any questions related to this note please do not hesitate to contact our office.      5-10

## 2021-12-22 ENCOUNTER — APPOINTMENT (OUTPATIENT)
Dept: HEPATOLOGY | Facility: CLINIC | Age: 36
End: 2021-12-22
Payer: COMMERCIAL

## 2021-12-22 VITALS
SYSTOLIC BLOOD PRESSURE: 114 MMHG | RESPIRATION RATE: 12 BRPM | OXYGEN SATURATION: 99 % | TEMPERATURE: 97.7 F | HEIGHT: 62 IN | HEART RATE: 92 BPM | DIASTOLIC BLOOD PRESSURE: 75 MMHG | WEIGHT: 107 LBS | BODY MASS INDEX: 19.69 KG/M2

## 2021-12-22 PROCEDURE — 99215 OFFICE O/P EST HI 40 MIN: CPT

## 2021-12-22 RX ORDER — DICYCLOMINE HYDROCHLORIDE 20 MG/1
20 TABLET ORAL EVERY 8 HOURS
Qty: 21 | Refills: 0 | Status: DISCONTINUED | COMMUNITY
Start: 2021-06-15 | End: 2021-12-22

## 2021-12-22 RX ORDER — METRONIDAZOLE 500 MG/1
500 TABLET ORAL 3 TIMES DAILY
Qty: 42 | Refills: 0 | Status: DISCONTINUED | COMMUNITY
Start: 2021-11-11 | End: 2021-12-22

## 2021-12-22 RX ORDER — SENNOSIDES 8.6 MG TABLETS 8.6 MG/1
8.6 TABLET ORAL AT BEDTIME
Refills: 0 | Status: DISCONTINUED | COMMUNITY
Start: 2021-04-15 | End: 2021-12-22

## 2021-12-22 RX ORDER — CLARITHROMYCIN 500 MG/1
500 TABLET, FILM COATED ORAL
Qty: 28 | Refills: 0 | Status: DISCONTINUED | COMMUNITY
Start: 2021-11-11 | End: 2021-12-22

## 2021-12-22 RX ORDER — HYDROXYZINE HYDROCHLORIDE 10 MG/1
10 TABLET ORAL
Qty: 30 | Refills: 1 | Status: DISCONTINUED | COMMUNITY
Start: 2021-06-03 | End: 2021-12-22

## 2021-12-22 RX ORDER — LACTULOSE SOLUTION USP, 10 G/15 ML 10 G/15ML
10 SOLUTION ORAL; RECTAL
Qty: 3 | Refills: 5 | Status: DISCONTINUED | COMMUNITY
Start: 2021-06-10 | End: 2021-12-22

## 2021-12-22 RX ORDER — AMOXICILLIN 500 MG/1
500 TABLET, FILM COATED ORAL
Qty: 56 | Refills: 0 | Status: DISCONTINUED | COMMUNITY
Start: 2021-11-11 | End: 2021-12-22

## 2021-12-22 RX ORDER — HYDROCORTISONE 25 MG/G
2.5 CREAM TOPICAL 3 TIMES DAILY
Qty: 1 | Refills: 1 | Status: DISCONTINUED | COMMUNITY
Start: 2021-06-10 | End: 2021-12-22

## 2021-12-25 LAB
ALBUMIN SERPL ELPH-MCNC: 4.1 G/DL
ALP BLD-CCNC: 163 U/L
ALT SERPL-CCNC: 25 U/L
ANION GAP SERPL CALC-SCNC: 11 MMOL/L
AST SERPL-CCNC: 38 U/L
BASOPHILS # BLD AUTO: 0.04 K/UL
BASOPHILS NFR BLD AUTO: 1.1 %
BILIRUB SERPL-MCNC: 1.6 MG/DL
BUN SERPL-MCNC: 12 MG/DL
CALCIUM SERPL-MCNC: 9.4 MG/DL
CHLORIDE SERPL-SCNC: 106 MMOL/L
CO2 SERPL-SCNC: 21 MMOL/L
CREAT SERPL-MCNC: 0.57 MG/DL
EOSINOPHIL # BLD AUTO: 0.11 K/UL
EOSINOPHIL NFR BLD AUTO: 2.9 %
GLUCOSE SERPL-MCNC: 92 MG/DL
HCT VFR BLD CALC: 33.4 %
HGB BLD-MCNC: 10 G/DL
IMM GRANULOCYTES NFR BLD AUTO: 0.3 %
INR PPP: 2.93 RATIO
LYMPHOCYTES # BLD AUTO: 1.05 K/UL
LYMPHOCYTES NFR BLD AUTO: 27.9 %
MAN DIFF?: NORMAL
MCHC RBC-ENTMCNC: 24.7 PG
MCHC RBC-ENTMCNC: 29.9 GM/DL
MCV RBC AUTO: 82.5 FL
MONOCYTES # BLD AUTO: 0.37 K/UL
MONOCYTES NFR BLD AUTO: 9.8 %
NEUTROPHILS # BLD AUTO: 2.18 K/UL
NEUTROPHILS NFR BLD AUTO: 58 %
PLATELET # BLD AUTO: 166 K/UL
POTASSIUM SERPL-SCNC: 4.5 MMOL/L
PROT SERPL-MCNC: 6.7 G/DL
PT BLD: 32.9 SEC
RBC # BLD: 4.05 M/UL
RBC # FLD: 14.2 %
SODIUM SERPL-SCNC: 139 MMOL/L
WBC # FLD AUTO: 3.76 K/UL

## 2021-12-27 ENCOUNTER — NON-APPOINTMENT (OUTPATIENT)
Age: 36
End: 2021-12-27

## 2021-12-28 LAB
ALPHA-1-FETOPROTEIN-L3: NORMAL %
ALPHA-1-FETOPROTEIN: 2.2 NG/ML

## 2022-01-12 ENCOUNTER — RX RENEWAL (OUTPATIENT)
Age: 37
End: 2022-01-12

## 2022-01-14 ENCOUNTER — RX RENEWAL (OUTPATIENT)
Age: 37
End: 2022-01-14

## 2022-03-02 ENCOUNTER — APPOINTMENT (OUTPATIENT)
Dept: ULTRASOUND IMAGING | Facility: CLINIC | Age: 37
End: 2022-03-02
Payer: COMMERCIAL

## 2022-03-02 ENCOUNTER — RESULT REVIEW (OUTPATIENT)
Age: 37
End: 2022-03-02

## 2022-03-02 ENCOUNTER — OUTPATIENT (OUTPATIENT)
Dept: OUTPATIENT SERVICES | Facility: HOSPITAL | Age: 37
LOS: 1 days | End: 2022-03-02
Payer: COMMERCIAL

## 2022-03-02 ENCOUNTER — APPOINTMENT (OUTPATIENT)
Dept: MRI IMAGING | Facility: CLINIC | Age: 37
End: 2022-03-02
Payer: COMMERCIAL

## 2022-03-02 DIAGNOSIS — Z95.828 PRESENCE OF OTHER VASCULAR IMPLANTS AND GRAFTS: ICD-10-CM

## 2022-03-02 PROCEDURE — A9585: CPT

## 2022-03-02 PROCEDURE — 74183 MRI ABD W/O CNTR FLWD CNTR: CPT | Mod: 26

## 2022-03-02 PROCEDURE — 93975 VASCULAR STUDY: CPT

## 2022-03-02 PROCEDURE — 93975 VASCULAR STUDY: CPT | Mod: 26

## 2022-03-02 PROCEDURE — 74183 MRI ABD W/O CNTR FLWD CNTR: CPT

## 2022-03-09 ENCOUNTER — APPOINTMENT (OUTPATIENT)
Dept: INTERVENTIONAL RADIOLOGY/VASCULAR | Facility: CLINIC | Age: 37
End: 2022-03-09

## 2022-03-09 DIAGNOSIS — R14.0 ABDOMINAL DISTENSION (GASEOUS): ICD-10-CM

## 2022-03-09 DIAGNOSIS — O92.29 OTHER DISORDERS OF BREAST ASSOCIATED WITH PREGNANCY AND THE PUERPERIUM: ICD-10-CM

## 2022-03-09 DIAGNOSIS — Z87.898 PERSONAL HISTORY OF OTHER SPECIFIED CONDITIONS: ICD-10-CM

## 2022-03-09 PROCEDURE — XXXXX: CPT | Mod: 1L

## 2022-03-16 ENCOUNTER — RX RENEWAL (OUTPATIENT)
Age: 37
End: 2022-03-16

## 2022-03-16 ENCOUNTER — APPOINTMENT (OUTPATIENT)
Dept: HEPATOLOGY | Facility: CLINIC | Age: 37
End: 2022-03-16
Payer: COMMERCIAL

## 2022-03-16 VITALS
RESPIRATION RATE: 14 BRPM | DIASTOLIC BLOOD PRESSURE: 66 MMHG | TEMPERATURE: 97.6 F | HEART RATE: 85 BPM | SYSTOLIC BLOOD PRESSURE: 99 MMHG | BODY MASS INDEX: 19.88 KG/M2 | OXYGEN SATURATION: 97 % | HEIGHT: 62 IN | WEIGHT: 108 LBS

## 2022-03-16 PROCEDURE — 99214 OFFICE O/P EST MOD 30 MIN: CPT

## 2022-03-16 RX ORDER — LORATADINE 5 MG
17 TABLET,CHEWABLE ORAL DAILY
Refills: 0 | Status: DISCONTINUED | COMMUNITY
End: 2022-03-16

## 2022-03-16 RX ORDER — PANTOPRAZOLE 20 MG/1
20 TABLET, DELAYED RELEASE ORAL TWICE DAILY
Qty: 28 | Refills: 0 | Status: DISCONTINUED | COMMUNITY
Start: 2021-11-11 | End: 2022-03-16

## 2022-03-16 RX ORDER — CHOLECALCIFEROL (VITAMIN D3) 1250 MCG
1.25 MG CAPSULE ORAL WEEKLY
Refills: 0 | Status: DISCONTINUED | COMMUNITY
Start: 2021-04-15 | End: 2022-03-16

## 2022-03-16 RX ORDER — TRAMADOL HYDROCHLORIDE 50 MG/1
50 TABLET, COATED ORAL 3 TIMES DAILY
Qty: 21 | Refills: 0 | Status: DISCONTINUED | COMMUNITY
Start: 2021-06-10 | End: 2022-03-16

## 2022-03-16 RX ORDER — MIDODRINE HYDROCHLORIDE 5 MG/1
5 TABLET ORAL
Qty: 90 | Refills: 1 | Status: DISCONTINUED | COMMUNITY
Start: 2021-08-10 | End: 2022-03-16

## 2022-03-17 LAB
ALBUMIN SERPL ELPH-MCNC: 4.4 G/DL
ALP BLD-CCNC: 162 U/L
ALT SERPL-CCNC: 26 U/L
ANION GAP SERPL CALC-SCNC: 11 MMOL/L
AST SERPL-CCNC: 34 U/L
BASOPHILS # BLD AUTO: 0.03 K/UL
BASOPHILS NFR BLD AUTO: 1 %
BILIRUB SERPL-MCNC: 1.3 MG/DL
BUN SERPL-MCNC: 15 MG/DL
CALCIUM SERPL-MCNC: 9.2 MG/DL
CHLORIDE SERPL-SCNC: 110 MMOL/L
CO2 SERPL-SCNC: 21 MMOL/L
CREAT SERPL-MCNC: 0.63 MG/DL
EGFR: 116 ML/MIN/1.73M2
EOSINOPHIL # BLD AUTO: 0.16 K/UL
EOSINOPHIL NFR BLD AUTO: 5.2 %
FERRITIN SERPL-MCNC: 8 NG/ML
GLUCOSE SERPL-MCNC: 112 MG/DL
HCT VFR BLD CALC: 34 %
HGB BLD-MCNC: 9.6 G/DL
IMM GRANULOCYTES NFR BLD AUTO: 0 %
INR PPP: 2.76 RATIO
IRON SATN MFR SERPL: 5 %
IRON SERPL-MCNC: 23 UG/DL
LYMPHOCYTES # BLD AUTO: 1.03 K/UL
LYMPHOCYTES NFR BLD AUTO: 33.8 %
MAN DIFF?: NORMAL
MCHC RBC-ENTMCNC: 22.5 PG
MCHC RBC-ENTMCNC: 28.2 GM/DL
MCV RBC AUTO: 79.8 FL
MONOCYTES # BLD AUTO: 0.33 K/UL
MONOCYTES NFR BLD AUTO: 10.8 %
NEUTROPHILS # BLD AUTO: 1.5 K/UL
NEUTROPHILS NFR BLD AUTO: 49.2 %
PLATELET # BLD AUTO: 175 K/UL
POTASSIUM SERPL-SCNC: 4.7 MMOL/L
PROT SERPL-MCNC: 6.7 G/DL
PT BLD: 32.6 SEC
RBC # BLD: 4.26 M/UL
RBC # FLD: 16.7 %
SODIUM SERPL-SCNC: 142 MMOL/L
TIBC SERPL-MCNC: 433 UG/DL
UIBC SERPL-MCNC: 411 UG/DL
WBC # FLD AUTO: 3.05 K/UL

## 2022-03-20 LAB
ALPHA-1-FETOPROTEIN-L3: NORMAL %
ALPHA-1-FETOPROTEIN: 2 NG/ML

## 2022-03-28 ENCOUNTER — TRANSCRIPTION ENCOUNTER (OUTPATIENT)
Age: 37
End: 2022-03-28

## 2022-04-05 ENCOUNTER — LABORATORY RESULT (OUTPATIENT)
Age: 37
End: 2022-04-05

## 2022-04-05 ENCOUNTER — APPOINTMENT (OUTPATIENT)
Dept: TRANSPLANT | Facility: CLINIC | Age: 37
End: 2022-04-05
Payer: COMMERCIAL

## 2022-04-05 ENCOUNTER — APPOINTMENT (OUTPATIENT)
Dept: HEPATOLOGY | Facility: CLINIC | Age: 37
End: 2022-04-05
Payer: COMMERCIAL

## 2022-04-05 ENCOUNTER — NON-APPOINTMENT (OUTPATIENT)
Age: 37
End: 2022-04-05

## 2022-04-05 VITALS
TEMPERATURE: 98.1 F | SYSTOLIC BLOOD PRESSURE: 111 MMHG | HEART RATE: 87 BPM | RESPIRATION RATE: 12 BRPM | BODY MASS INDEX: 20.85 KG/M2 | WEIGHT: 114 LBS | DIASTOLIC BLOOD PRESSURE: 75 MMHG | HEIGHT: 62 IN | OXYGEN SATURATION: 99 %

## 2022-04-05 PROCEDURE — 99215 OFFICE O/P EST HI 40 MIN: CPT

## 2022-04-05 PROCEDURE — 99072 ADDL SUPL MATRL&STAF TM PHE: CPT

## 2022-04-05 PROCEDURE — 99214 OFFICE O/P EST MOD 30 MIN: CPT

## 2022-04-05 NOTE — ASSESSMENT
[Excellent candidate] : an excellent candidate. We should proceed with our protocol for evaluation for liver transplantation. [FreeTextEntry1] : The patient was explained alternatives, benefits and risk of liver transplantation, including but not limited to infection, bleeding, hepatic artery or portal vein thrombosis, primary dysfunction or primary non-function of the liver allograft, cardiopulmonary arrest, intra-operative death and other surgical, medical and psychosocial risks as outlined in the evaluation consent form.  She understands these risks and is willing to proceed with liver transplantation. \par \par She was also explained the need to remain on lifelong anti-rejection medications.  We discussed the risks and side effects of immunosuppressive medications including, but not limited to infection, cancer, weight gain, new onset or worsening of diabetes or hypertension in a temporary or permanent state, kidney dysfunction, water retention, back pain, constipation, diarrhea, dizziness, headache, joint pain, loss of appetite, nausea, stomach pain or upset, trouble sleeping, vomiting, and mental or mood changes.  An overview of the follow-up protocol was reviewed including outpatient visits, blood tests and the potential for hospital readmission.  She understands these risks and is willing to proceed with liver transplantation.\par \par We also discussed the available donor organ pool. We discussed the assessment of the  donor including age, cause of death, cardiac arrest, electrolyte abnormalities, course and length of hospital stay, use of vasopressors, hepatitis and HIV testing. We reviewed organ donor risk factors that could affect the success of the graft or the health of the patient, including, but not limited to, the donor's history, condition or age of the organs used, or the patient's potential risk of levar the human immunodeficiency virus and other infectious diseases if the disease cannot be detected in an infected donor.  We discussed and defined the option of an extended criteria for cadaveric donors (Hepatitis B core Ab positive donor, Hepatitis C Ab positive donor, steatosis, older donors, split livers and DCD donors) and early and late outcomes of graft survival after transplantation. \par \par The options of  donor liver transplantation vs. live donor liver transplantation were discussed with the patient.  Differences between donation after cardiac death (DCD) compared to donation after brain death (DBD) liver transplantation were also fully disclosed and included lower graft survival rates, the increased incidence of hepatic artery stenosis, bile duct injury, ischemic cholangiopathy and increased re transplant rates seen in recipients of DCD donor livers.\par \par The use of the U.S. Public Health Services (PHS) Guideline has defined some donors as "Increased Risk Donors" based on their history which may suggest socially increased risk behaviors was discussed. The patient is aware that if PHS increased risk donor is offered to the candidate, the transplant team will discuss the specifics to assist with making an informed decision. We discussed our post-transplant protocol of serology testing if the candidate receives an organ that is PHS increased risk.\par \par The patient was made aware that it is against the law to be paid or to pay to donate an organ.  If any money was given or will be given in exchange for receiving an organ, the patient may be subject to criminal prosecution, and any insurance coverage may no longer apply and patient may become personally responsible for all the health care costs associated with the donation, and private health information will be available to law enforcement agencies.\par \par We explained that we store vessels for subsequent later use in transplants.  Again, we discussed the extensive testing done on  donors prior to donation, however despite an extensive evaluation on the donor, there is potential risk a recipient may contract infectious diseases (HIV or Hepatitis) or cancer if they cannot be detected in the donor. In the cases where PHS Increased Risk donor vessels are used, we test the recipient per protocol between 1-2 months post-transplant for any potential infectious disease transmission. The patient understands that there is the potential of use of  donor vessels and PHS increased risk donor vessels. She understands these risks and is willing to receive potentially PHS increased risk donor vessels.\par \par We also discussed the MELD allocation system in depth and the one-year observed and expected patient and graft survival rates according to data from the Scientific Registry for Transplant Recipients. These center specific outcomes were provided in comparison to the national one-year averages as described in the evaluation consent forms.\par \par Prior to signing consent, the patient was given an opportunity to ask questions.  After all concerns were addressed, informed consent was signed. Patient is aware that they may withdraw their consent for transplantation at any time.  Further, the patient is aware of the right to refuse an organ offer without penalty at any time\par \par \par PLAN\par - IMAGING - will schedule for repeat cavagram to assess for caval stricture?  I suspect it has resolved considering she is relatively asymptomatic with minimal LE edema.    Also needs f/u HCC imaging in .  Will make this a CT A/P with triple phase contrast, to eval both tumor and cava\par \par - LABS per txp protocol\par \par - GYN to schedule routine eval/pap\par \par - CARDIAC - needs DSE

## 2022-04-05 NOTE — REVIEW OF SYSTEMS
[Feeling Poorly] : feeling poorly [As Noted in HPI] : as noted in HPI [Abdominal Pain] : abdominal pain [Constipation] : constipation [Itching] : itching [Negative] : Heme/Lymph [Fever] : no fever [Chills] : no chills [Feeling Tired] : not feeling tired [Vomiting] : no vomiting [Diarrhea] : no diarrhea [Heartburn] : no heartburn [Melena] : no melena

## 2022-04-05 NOTE — PHYSICAL EXAM
[Alert] : alert [Healthy Appearing] : healthy appearing [Scleral Icterus] : no scleral icterus [Hepatojugular Reflux] : no hepatojugular reflux [Clear to Auscultation] : lungs were clear to auscultation bilaterally [Breathing Comfortably on room air] : breathing comfortably on room air [Normal Rate] : normal rate [Regular Rhythm] : regular rhythm [Ascites Fluid Wave] : no ascites fluid wave [Splenomegaly] : no splenomegaly [No Edema] : no edema [No Skin Discoloration] : no skin discoloration [Strength in Tact] : strength in tact [] : right dorsalis pedis palpable [Spider Angioma] : no spider angioma [Jaundice] : no jaundice [Palmar Erythema] : no palmar erythema [Asterixis] : no asterixis [Hepatic Encephalopathy] : no hepatic encephalopathy [FreeTextEntry1] : .

## 2022-04-05 NOTE — HISTORY OF PRESENT ILLNESS
[TextBox_42] : 37yo F presents for liver txp eval. \par \par Ms. Tee  has h/o protein S deficiency with h/o miscarriages and chronic Budd-Chiari syndrome, now on Xarelto (since 3/2021).  Has cirrhosis/portal HTN w/ right hepatic hydrothorax, ascites, small non-bleeding EV.   Admitted to [facility] ~1yr ago with venography that showed a narrowed segment of the IVC just inferior to the RA, retrograde caval flow to a large azygous system.  Thought at that time to be 2/2 an enlarged caudate rather than intrinsic caval issues (e.g. web, stricture, clot). She subsequently had a TIPS in 5/2021.  Since, her ascites/effusion has resolved.  Developed a 1.1 cm segment 6 right hepatic lesion with washout 11/2021, suspicious for HCC.  Had not changed size on f/u in 3/2021.\par \par She reports excellent exercise tolerance.  Multiple echos, last in 5/21 with normal LV and RV, normal PA pressures. EF 72%.\par \par Social Hx : Born in [facility] in the  4 yrs. 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 \par \par Meds reviewed. \par \par \par

## 2022-04-06 ENCOUNTER — OUTPATIENT (OUTPATIENT)
Dept: OUTPATIENT SERVICES | Facility: HOSPITAL | Age: 37
LOS: 1 days | Discharge: HOME | End: 2022-04-06

## 2022-04-06 DIAGNOSIS — I82.0 BUDD-CHIARI SYNDROME: ICD-10-CM

## 2022-04-07 ENCOUNTER — APPOINTMENT (OUTPATIENT)
Dept: HEMATOLOGY ONCOLOGY | Facility: CLINIC | Age: 37
End: 2022-04-07
Payer: COMMERCIAL

## 2022-04-07 ENCOUNTER — RESULT REVIEW (OUTPATIENT)
Age: 37
End: 2022-04-07

## 2022-04-07 VITALS
RESPIRATION RATE: 15 BRPM | SYSTOLIC BLOOD PRESSURE: 101 MMHG | DIASTOLIC BLOOD PRESSURE: 69 MMHG | WEIGHT: 113.54 LBS | HEART RATE: 83 BPM | BODY MASS INDEX: 20.89 KG/M2 | TEMPERATURE: 97.9 F | OXYGEN SATURATION: 99 % | HEIGHT: 62 IN

## 2022-04-07 DIAGNOSIS — Z87.19 PERSONAL HISTORY OF OTHER DISEASES OF THE DIGESTIVE SYSTEM: ICD-10-CM

## 2022-04-07 LAB
BASOPHILS # BLD AUTO: 0.03 K/UL — SIGNIFICANT CHANGE UP (ref 0–0.2)
BASOPHILS NFR BLD AUTO: 0.8 % — SIGNIFICANT CHANGE UP (ref 0–2)
EOSINOPHIL # BLD AUTO: 0.14 K/UL — SIGNIFICANT CHANGE UP (ref 0–0.5)
EOSINOPHIL NFR BLD AUTO: 3.8 % — SIGNIFICANT CHANGE UP (ref 0–6)
HCT VFR BLD CALC: 34.1 % — LOW (ref 34.5–45)
HGB BLD-MCNC: 10.7 G/DL — LOW (ref 11.5–15.5)
IMM GRANULOCYTES NFR BLD AUTO: 1.1 % — SIGNIFICANT CHANGE UP (ref 0–1.5)
LYMPHOCYTES # BLD AUTO: 1.05 K/UL — SIGNIFICANT CHANGE UP (ref 1–3.3)
LYMPHOCYTES # BLD AUTO: 28.5 % — SIGNIFICANT CHANGE UP (ref 13–44)
MCHC RBC-ENTMCNC: 25.5 PG — LOW (ref 27–34)
MCHC RBC-ENTMCNC: 31.4 G/DL — LOW (ref 32–36)
MCV RBC AUTO: 81.4 FL — SIGNIFICANT CHANGE UP (ref 80–100)
MONOCYTES # BLD AUTO: 0.37 K/UL — SIGNIFICANT CHANGE UP (ref 0–0.9)
MONOCYTES NFR BLD AUTO: 10.1 % — SIGNIFICANT CHANGE UP (ref 2–14)
NEUTROPHILS # BLD AUTO: 2.05 K/UL — SIGNIFICANT CHANGE UP (ref 1.8–7.4)
NEUTROPHILS NFR BLD AUTO: 55.7 % — SIGNIFICANT CHANGE UP (ref 43–77)
NRBC # BLD: 0 /100 WBCS — SIGNIFICANT CHANGE UP (ref 0–0)
PLATELET # BLD AUTO: 138 K/UL — LOW (ref 150–400)
RBC # BLD: 4.19 M/UL — SIGNIFICANT CHANGE UP (ref 3.8–5.2)
RBC # FLD: 21.1 % — HIGH (ref 10.3–14.5)
WBC # BLD: 3.68 K/UL — LOW (ref 3.8–10.5)
WBC # FLD AUTO: 3.68 K/UL — LOW (ref 3.8–10.5)

## 2022-04-07 PROCEDURE — 99213 OFFICE O/P EST LOW 20 MIN: CPT

## 2022-04-08 PROBLEM — Z87.19 HISTORY OF CIRRHOSIS: Status: RESOLVED | Noted: 2019-04-03 | Resolved: 2022-04-08

## 2022-04-08 LAB
ABO + RH PNL BLD: NORMAL
AFP-TM SERPL-MCNC: 2.7 NG/ML
ALBUMIN SERPL ELPH-MCNC: 4 G/DL
ALP BLD-CCNC: 172 U/L
ALT SERPL-CCNC: 34 U/L
ANION GAP SERPL CALC-SCNC: 13 MMOL/L
APPEARANCE: ABNORMAL
AST SERPL-CCNC: 42 U/L
BACTERIA: NEGATIVE
BASOPHILS # BLD AUTO: 0.03 K/UL
BASOPHILS NFR BLD AUTO: 0.9 %
BILIRUB SERPL-MCNC: 1.3 MG/DL
BILIRUBIN URINE: NEGATIVE
BLOOD URINE: ABNORMAL
BUN SERPL-MCNC: 11 MG/DL
CALCIUM SERPL-MCNC: 9.1 MG/DL
CHLORIDE SERPL-SCNC: 108 MMOL/L
CHOLEST SERPL-MCNC: 179 MG/DL
CMV IGG SERPL QL: 8.7 U/ML
CMV IGG SERPL-IMP: POSITIVE
CO2 SERPL-SCNC: 20 MMOL/L
COLOR: YELLOW
COVID-19 SPIKE DOMAIN ANTIBODY INTERPRETATION: POSITIVE
CREAT SERPL-MCNC: 0.6 MG/DL
CREAT SPEC-SCNC: 121 MG/DL
CREAT/PROT UR: 0.1 RATIO
EBV EA AB SER IA-ACNC: <5 U/ML
EBV EA AB TITR SER IF: POSITIVE
EBV EA IGG SER QL IA: 222 U/ML
EBV EA IGG SER-ACNC: NEGATIVE
EBV EA IGM SER IA-ACNC: NEGATIVE
EBV PATRN SPEC IB-IMP: NORMAL
EBV VCA IGG SER IA-ACNC: >750 U/ML
EBV VCA IGM SER QL IA: <10 U/ML
EGFR: 118 ML/MIN/1.73M2
EOSINOPHIL # BLD AUTO: 0.14 K/UL
EOSINOPHIL NFR BLD AUTO: 4.1 %
EPSTEIN-BARR VIRUS CAPSID ANTIGEN IGG: POSITIVE
ESTIMATED AVERAGE GLUCOSE: 97 MG/DL
GLUCOSE QUALITATIVE U: NEGATIVE
GLUCOSE SERPL-MCNC: 91 MG/DL
HAV IGM SER QL: NONREACTIVE
HBA1C MFR BLD HPLC: 5 %
HBV CORE IGG+IGM SER QL: REACTIVE
HBV SURFACE AB SER QL: REACTIVE
HBV SURFACE AB SERPL IA-ACNC: 939.5 MIU/ML
HBV SURFACE AG SER QL: NONREACTIVE
HCG SERPL-MCNC: <1 MIU/ML
HCT VFR BLD CALC: 36.9 %
HCV AB SER QL: NONREACTIVE
HCV S/CO RATIO: 0.22 S/CO
HDLC SERPL-MCNC: 86 MG/DL
HEPATITIS A IGG ANTIBODY: REACTIVE
HGB BLD-MCNC: 10.7 G/DL
HIV1+2 AB SPEC QL IA.RAPID: NONREACTIVE
HSV 1+2 IGG SER IA-IMP: POSITIVE
HSV 1+2 IGG SER IA-IMP: POSITIVE
HSV1 IGG SER QL: 48 INDEX
HSV2 IGG SER QL: 6.2 INDEX
HYALINE CASTS: 10 /LPF
IMM GRANULOCYTES NFR BLD AUTO: 0.3 %
INR PPP: 2.8 RATIO
KETONES URINE: NEGATIVE
LDLC SERPL CALC-MCNC: 80 MG/DL
LEUKOCYTE ESTERASE URINE: ABNORMAL
LYMPHOCYTES # BLD AUTO: 1.05 K/UL
LYMPHOCYTES NFR BLD AUTO: 30.8 %
M TB IFN-G BLD-IMP: NEGATIVE
MAGNESIUM SERPL-MCNC: 2 MG/DL
MAN DIFF?: NORMAL
MCHC RBC-ENTMCNC: 24.8 PG
MCHC RBC-ENTMCNC: 29 GM/DL
MCV RBC AUTO: 85.4 FL
MICROSCOPIC-UA: NORMAL
MONOCYTES # BLD AUTO: 0.32 K/UL
MONOCYTES NFR BLD AUTO: 9.4 %
NEUTROPHILS # BLD AUTO: 1.86 K/UL
NEUTROPHILS NFR BLD AUTO: 54.5 %
NITRITE URINE: NEGATIVE
NONHDLC SERPL-MCNC: 93 MG/DL
PH URINE: 5.5
PHOSPHATE SERPL-MCNC: 3.6 MG/DL
PLATELET # BLD AUTO: 146 K/UL
POTASSIUM SERPL-SCNC: 4.4 MMOL/L
PROT SERPL-MCNC: 6.5 G/DL
PROT UR-MCNC: 12 MG/DL
PROTEIN URINE: NORMAL
PT BLD: 32.8 SEC
QUANTIFERON TB PLUS MITOGEN MINUS NIL: 9.96 IU/ML
QUANTIFERON TB PLUS NIL: 0.04 IU/ML
QUANTIFERON TB PLUS TB1 MINUS NIL: 0.03 IU/ML
QUANTIFERON TB PLUS TB2 MINUS NIL: 0.02 IU/ML
RBC # BLD: 4.32 M/UL
RBC # FLD: 21.9 %
RED BLOOD CELLS URINE: 3 /HPF
RUBV IGG FLD-ACNC: 21.7 INDEX
RUBV IGG SER-IMP: POSITIVE
SARS-COV-2 AB SERPL IA-ACNC: >250 U/ML
SODIUM SERPL-SCNC: 142 MMOL/L
SPECIFIC GRAVITY URINE: 1.02
SQUAMOUS EPITHELIAL CELLS: 26 /HPF
T GONDII AB SER-IMP: NEGATIVE
T GONDII IGG SER QL: <3 IU/ML
T PALLIDUM AB SER QL IA: NEGATIVE
TRIGL SERPL-MCNC: 64 MG/DL
URINE COMMENTS: NORMAL
UROBILINOGEN URINE: NORMAL
VZV AB TITR SER: POSITIVE
VZV IGG SER IF-ACNC: 377 INDEX
WBC # FLD AUTO: 3.41 K/UL
WHITE BLOOD CELLS URINE: 17 /HPF

## 2022-04-08 NOTE — ASSESSMENT
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  No other explanations for cirrhosis known currently.  Much of this history took place in [facility].  After several repeats, Protein S antigen improved to normal at 68%.  Was only low once. Does not appear to be a congenital Protein S deficiency.  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 Follows with hepatology. She is on long-term anticoagulation with Xarelto 20mg daily.  Presents today for follow up with iron deficiency anemia, does not tolerate oral iron. HGB 10.7g/dL, feels well. Iron deficit calculated to be 661mg, will replace with Venofer 200mg weekly x 3 doses. Risk/Benefits were discussed and written consent obtained. \par \par RTC in 6 months\par Case and management discussed with Dr. Kelechi Venegas.

## 2022-04-08 NOTE — HISTORY OF PRESENT ILLNESS
[de-identified] : History obtained from prior records and with the use of [facility] #[id number]\par 35 year old woman with history of cirrhosis, portal hypertension, esophageal varices, possible Budd Chiari syndrome, multiple spontaneous abortions here for evaluation of hypercoagulability. Patient had an MRI abdomen 12/8/2020 that showed enlarged heterogenous cirrhotic liver with diffuse patchy enhancement and poor delineation of hepatic veins suggesting Budd Chiari syndrome. Patient has a history of 5 pregnancies- 3 of them were spontaneous abortions. Patient denies h/o PE, DVT or CVA. She denies h/o taking oral contraceptives. She c/o fatigue. She reports a good appetite without recent weight loss. She reports occasional abdominal pain and occasional nausea. She also reports occasional breast pain bilateral X 2 months. She denies nipple discharge. She reports occasional pruritus arms/ trunks without distinct rash. Patient denies any fever/chills, recent infections, CP, SOB, diarrhea, blood in stool, frequent headaches, dizziness, change in vision, hematuria/dysuria, skeletal pain, calf pain/edema or any unexplained bleeding/bruising. \par \par There is a 2/22/10 document in [place] that she has that States that she had a heterozygous prothrombin gene mutation Though this tested normal twice in the [place], once here at [facility], and prior to that at [facility].  \par Protein  S activity and antigen in 2017 form [facility] lab during [facility] group found a Protein S activity 68% and Protein S Ag 66. \par Protein C 90%.  \par AT .\par \par FV L neg \par Factor XI activity 27% factor IX activity 73% and Factor XII 86%.  \par PTT was prolonged.  40.7 sec.  \par Prothrombin gene mutation negative.  [de-identified] : Patient seen in follow up, used [facility]  ID# [id number] and [id number]. Noted to have worsening anemia, HGB declined to 9.6 g/dL and ferritin 8 ng/mL on 3/16. She was started on Ferrous Sulfate took it for 1 week but could not tolerate due nausea and lightheadedness. HGB improved to 10.7g/dL today, reports feeling well with no acute complaints. Denies GI bleeding and heavy menses. She is currently being evaluated for liver transplant.\par

## 2022-04-11 LAB — EBV DNA SERPL NAA+PROBE-ACNC: NOT DETECTED IU/ML

## 2022-04-14 ENCOUNTER — APPOINTMENT (OUTPATIENT)
Dept: CARDIOLOGY | Facility: CLINIC | Age: 37
End: 2022-04-14
Payer: COMMERCIAL

## 2022-04-14 ENCOUNTER — NON-APPOINTMENT (OUTPATIENT)
Age: 37
End: 2022-04-14

## 2022-04-14 ENCOUNTER — OUTPATIENT (OUTPATIENT)
Dept: OUTPATIENT SERVICES | Facility: HOSPITAL | Age: 37
LOS: 1 days | End: 2022-04-14
Payer: COMMERCIAL

## 2022-04-14 ENCOUNTER — APPOINTMENT (OUTPATIENT)
Dept: ULTRASOUND IMAGING | Facility: CLINIC | Age: 37
End: 2022-04-14
Payer: COMMERCIAL

## 2022-04-14 VITALS
OXYGEN SATURATION: 99 % | WEIGHT: 114 LBS | RESPIRATION RATE: 17 BRPM | HEIGHT: 62 IN | DIASTOLIC BLOOD PRESSURE: 70 MMHG | SYSTOLIC BLOOD PRESSURE: 105 MMHG | HEART RATE: 80 BPM | BODY MASS INDEX: 20.98 KG/M2

## 2022-04-14 DIAGNOSIS — Z63.5 DISRUPTION OF FAMILY BY SEPARATION AND DIVORCE: ICD-10-CM

## 2022-04-14 DIAGNOSIS — N93.9 ABNORMAL UTERINE AND VAGINAL BLEEDING, UNSPECIFIED: ICD-10-CM

## 2022-04-14 PROCEDURE — 99205 OFFICE O/P NEW HI 60 MIN: CPT

## 2022-04-14 PROCEDURE — 76856 US EXAM PELVIC COMPLETE: CPT

## 2022-04-14 PROCEDURE — 93000 ELECTROCARDIOGRAM COMPLETE: CPT | Mod: NC

## 2022-04-14 PROCEDURE — 93306 TTE W/DOPPLER COMPLETE: CPT

## 2022-04-14 PROCEDURE — 76856 US EXAM PELVIC COMPLETE: CPT | Mod: 26

## 2022-04-14 PROCEDURE — 99072 ADDL SUPL MATRL&STAF TM PHE: CPT

## 2022-04-14 SDOH — SOCIAL STABILITY - SOCIAL INSECURITY: DISRUPTION OF FAMILY BY SEPARATION AND DIVORCE: Z63.5

## 2022-04-14 NOTE — HISTORY OF PRESENT ILLNESS
[FreeTextEntry1] : Patient is a 36 year-old woman, born in [facility], in the  since 2017, with known past medical history of Budd-Chiari syndrome and protein S deficiency (maintained on Xarelto since 3/2021), decompensated cirrhosis, now with HCC, being evaluated for liver transplant.\par In March 2021, she had Covid-19 infection. \par She had successful thrombectomy followed by successful TIPS in May 2021.\par \par She does not exercise, but she remains active at home.\par \par She has been having 10 days of vaginal bleeding.

## 2022-04-14 NOTE — CARDIOLOGY SUMMARY
[de-identified] : 5/24/2021, moderately dilated LA, +PFO, normal LV systolic function, normal pulmonary pressures, LVEF 72% [de-identified] : 4/14/2022, sinus 88 bpm

## 2022-04-14 NOTE — DISCUSSION/SUMMARY
[FreeTextEntry1] : Patient is a 36 year-old woman with complex medical history as above who presents today for cardiac evaluation prior to possible liver transplant.\par She is low risk for ischemic heart disease.\par \par Will repeat echocardiogram, but patient can now be considered for liver transplant.\par \par Case discussed with Haydee Carvajal MD.\par Arranged for her to have a pelvic ultrasound today and to see gynecology soon for evaluation.

## 2022-04-18 ENCOUNTER — APPOINTMENT (OUTPATIENT)
Dept: INFUSION THERAPY | Facility: HOSPITAL | Age: 37
End: 2022-04-18

## 2022-04-18 DIAGNOSIS — D50.9 IRON DEFICIENCY ANEMIA, UNSPECIFIED: ICD-10-CM

## 2022-04-19 ENCOUNTER — NON-APPOINTMENT (OUTPATIENT)
Age: 37
End: 2022-04-19

## 2022-04-21 ENCOUNTER — APPOINTMENT (OUTPATIENT)
Dept: INTERVENTIONAL RADIOLOGY/VASCULAR | Facility: CLINIC | Age: 37
End: 2022-04-21
Payer: COMMERCIAL

## 2022-04-21 PROCEDURE — 99443: CPT | Mod: 95

## 2022-04-21 RX ORDER — HYDROXYZINE HYDROCHLORIDE 25 MG/1
25 TABLET ORAL
Qty: 30 | Refills: 1 | Status: DISCONTINUED | COMMUNITY
Start: 2021-12-22 | End: 2022-04-21

## 2022-04-21 RX ORDER — CHOLESTYRAMINE 4 G/5.5G
4 POWDER, FOR SUSPENSION ORAL TWICE DAILY
Qty: 14 | Refills: 0 | Status: DISCONTINUED | COMMUNITY
Start: 2021-12-22 | End: 2022-04-21

## 2022-04-25 ENCOUNTER — APPOINTMENT (OUTPATIENT)
Dept: INFUSION THERAPY | Facility: HOSPITAL | Age: 37
End: 2022-04-25

## 2022-04-27 ENCOUNTER — APPOINTMENT (OUTPATIENT)
Dept: INFECTIOUS DISEASE | Facility: CLINIC | Age: 37
End: 2022-04-27

## 2022-04-29 ENCOUNTER — NON-APPOINTMENT (OUTPATIENT)
Age: 37
End: 2022-04-29

## 2022-05-02 ENCOUNTER — RESULT REVIEW (OUTPATIENT)
Age: 37
End: 2022-05-02

## 2022-05-02 ENCOUNTER — APPOINTMENT (OUTPATIENT)
Dept: INFUSION THERAPY | Facility: HOSPITAL | Age: 37
End: 2022-05-02

## 2022-05-02 LAB
BASOPHILS # BLD AUTO: 0 K/UL — SIGNIFICANT CHANGE UP (ref 0–0.2)
BASOPHILS NFR BLD AUTO: 0 % — SIGNIFICANT CHANGE UP (ref 0–2)
ELLIPTOCYTES BLD QL SMEAR: SLIGHT — SIGNIFICANT CHANGE UP
EOSINOPHIL # BLD AUTO: 0.08 K/UL — SIGNIFICANT CHANGE UP (ref 0–0.5)
EOSINOPHIL NFR BLD AUTO: 3 % — SIGNIFICANT CHANGE UP (ref 0–6)
FERRITIN SERPL-MCNC: 155 NG/ML — HIGH (ref 15–150)
HCT VFR BLD CALC: 35 % — SIGNIFICANT CHANGE UP (ref 34.5–45)
HGB BLD-MCNC: 10.8 G/DL — LOW (ref 11.5–15.5)
LYMPHOCYTES # BLD AUTO: 1.14 K/UL — SIGNIFICANT CHANGE UP (ref 1–3.3)
LYMPHOCYTES # BLD AUTO: 44 % — SIGNIFICANT CHANGE UP (ref 13–44)
MCHC RBC-ENTMCNC: 27 PG — SIGNIFICANT CHANGE UP (ref 27–34)
MCHC RBC-ENTMCNC: 30.9 G/DL — LOW (ref 32–36)
MCV RBC AUTO: 87.5 FL — SIGNIFICANT CHANGE UP (ref 80–100)
MONOCYTES # BLD AUTO: 0.26 K/UL — SIGNIFICANT CHANGE UP (ref 0–0.9)
MONOCYTES NFR BLD AUTO: 10 % — SIGNIFICANT CHANGE UP (ref 2–14)
NEUTROPHILS # BLD AUTO: 1.11 K/UL — LOW (ref 1.8–7.4)
NEUTROPHILS NFR BLD AUTO: 43 % — SIGNIFICANT CHANGE UP (ref 43–77)
NRBC # BLD: 0 /100 — SIGNIFICANT CHANGE UP (ref 0–0)
NRBC # BLD: SIGNIFICANT CHANGE UP /100 WBCS (ref 0–0)
PLAT MORPH BLD: NORMAL — SIGNIFICANT CHANGE UP
PLATELET # BLD AUTO: 136 K/UL — LOW (ref 150–400)
POIKILOCYTOSIS BLD QL AUTO: SLIGHT — SIGNIFICANT CHANGE UP
RBC # BLD: 4 M/UL — SIGNIFICANT CHANGE UP (ref 3.8–5.2)
RBC # FLD: 21.4 % — HIGH (ref 10.3–14.5)
RBC BLD AUTO: ABNORMAL
WBC # BLD: 2.58 K/UL — LOW (ref 3.8–10.5)
WBC # FLD AUTO: 2.58 K/UL — LOW (ref 3.8–10.5)

## 2022-05-09 ENCOUNTER — OUTPATIENT (OUTPATIENT)
Dept: OUTPATIENT SERVICES | Facility: HOSPITAL | Age: 37
LOS: 1 days | End: 2022-05-09
Payer: COMMERCIAL

## 2022-05-09 DIAGNOSIS — Z11.52 ENCOUNTER FOR SCREENING FOR COVID-19: ICD-10-CM

## 2022-05-09 LAB — SARS-COV-2 RNA SPEC QL NAA+PROBE: SIGNIFICANT CHANGE UP

## 2022-05-09 PROCEDURE — U0003: CPT

## 2022-05-09 PROCEDURE — U0005: CPT

## 2022-05-09 PROCEDURE — C9803: CPT

## 2022-05-10 ENCOUNTER — NON-APPOINTMENT (OUTPATIENT)
Age: 37
End: 2022-05-10

## 2022-05-11 ENCOUNTER — TRANSCRIPTION ENCOUNTER (OUTPATIENT)
Age: 37
End: 2022-05-11

## 2022-05-11 ENCOUNTER — RESULT REVIEW (OUTPATIENT)
Age: 37
End: 2022-05-11

## 2022-05-11 ENCOUNTER — OUTPATIENT (OUTPATIENT)
Dept: OUTPATIENT SERVICES | Facility: HOSPITAL | Age: 37
LOS: 1 days | End: 2022-05-11
Payer: COMMERCIAL

## 2022-05-11 VITALS
HEART RATE: 88 BPM | HEIGHT: 62 IN | OXYGEN SATURATION: 100 % | TEMPERATURE: 98 F | DIASTOLIC BLOOD PRESSURE: 70 MMHG | WEIGHT: 113.98 LBS | SYSTOLIC BLOOD PRESSURE: 108 MMHG | RESPIRATION RATE: 16 BRPM

## 2022-05-11 VITALS
RESPIRATION RATE: 15 BRPM | DIASTOLIC BLOOD PRESSURE: 79 MMHG | OXYGEN SATURATION: 100 % | HEART RATE: 69 BPM | SYSTOLIC BLOOD PRESSURE: 106 MMHG

## 2022-05-11 DIAGNOSIS — I82.220 ACUTE EMBOLISM AND THROMBOSIS OF INFERIOR VENA CAVA: ICD-10-CM

## 2022-05-11 DIAGNOSIS — Z01.818 ENCOUNTER FOR OTHER PREPROCEDURAL EXAMINATION: ICD-10-CM

## 2022-05-11 LAB
ANION GAP SERPL CALC-SCNC: 13 MMOL/L — SIGNIFICANT CHANGE UP (ref 5–17)
BUN SERPL-MCNC: 15 MG/DL — SIGNIFICANT CHANGE UP (ref 7–23)
CALCIUM SERPL-MCNC: 9.5 MG/DL — SIGNIFICANT CHANGE UP (ref 8.4–10.5)
CHLORIDE SERPL-SCNC: 105 MMOL/L — SIGNIFICANT CHANGE UP (ref 96–108)
CO2 SERPL-SCNC: 23 MMOL/L — SIGNIFICANT CHANGE UP (ref 22–31)
CREAT SERPL-MCNC: 0.61 MG/DL — SIGNIFICANT CHANGE UP (ref 0.5–1.3)
EGFR: 117 ML/MIN/1.73M2 — SIGNIFICANT CHANGE UP
GLUCOSE SERPL-MCNC: 98 MG/DL — SIGNIFICANT CHANGE UP (ref 70–99)
INR BLD: 1.32 RATIO — HIGH (ref 0.88–1.16)
POTASSIUM SERPL-MCNC: 4.9 MMOL/L — SIGNIFICANT CHANGE UP (ref 3.5–5.3)
POTASSIUM SERPL-SCNC: 4.9 MMOL/L — SIGNIFICANT CHANGE UP (ref 3.5–5.3)
PROTHROM AB SERPL-ACNC: 15.3 SEC — HIGH (ref 10.5–13.4)
SODIUM SERPL-SCNC: 141 MMOL/L — SIGNIFICANT CHANGE UP (ref 135–145)

## 2022-05-11 PROCEDURE — C1769: CPT

## 2022-05-11 PROCEDURE — 75825 VEIN X-RAY TRUNK: CPT | Mod: 26,59

## 2022-05-11 PROCEDURE — 86900 BLOOD TYPING SEROLOGIC ABO: CPT

## 2022-05-11 PROCEDURE — 36010 PLACE CATHETER IN VEIN: CPT

## 2022-05-11 PROCEDURE — 85610 PROTHROMBIN TIME: CPT

## 2022-05-11 PROCEDURE — 80048 BASIC METABOLIC PNL TOTAL CA: CPT

## 2022-05-11 PROCEDURE — C1887: CPT

## 2022-05-11 PROCEDURE — C1894: CPT

## 2022-05-11 PROCEDURE — 86850 RBC ANTIBODY SCREEN: CPT

## 2022-05-11 PROCEDURE — 86901 BLOOD TYPING SEROLOGIC RH(D): CPT

## 2022-05-11 PROCEDURE — 36415 COLL VENOUS BLD VENIPUNCTURE: CPT

## 2022-05-11 PROCEDURE — 75825 VEIN X-RAY TRUNK: CPT

## 2022-05-11 RX ORDER — OMEPRAZOLE 10 MG/1
1 CAPSULE, DELAYED RELEASE ORAL
Qty: 0 | Refills: 0 | DISCHARGE

## 2022-05-11 RX ORDER — SENNA PLUS 8.6 MG/1
2 TABLET ORAL
Qty: 0 | Refills: 0 | DISCHARGE

## 2022-05-11 NOTE — ASU DISCHARGE PLAN (ADULT/PEDIATRIC) - NS MD DC FALL RISK RISK
For information on Fall & Injury Prevention, visit: [email or web address] OR  [email or web address] OR  https://www.cdc.gov/steadi/patient.html

## 2022-05-11 NOTE — ASU DISCHARGE PLAN (ADULT/PEDIATRIC) - NURSING INSTRUCTIONS
Please feel free to contact us at [phone] if any problems arise. After 6PM, Monday through Friday, on weekends and on holidays, please call [phone] and ask for the radiology resident on call to be paged.

## 2022-05-11 NOTE — ASU DISCHARGE PLAN (ADULT/PEDIATRIC) - ASU DC SPECIAL INSTRUCTIONSFT
TIPS venogram    Discharge Instructions    - You may shower in 24 hours. No soaking or swimming until the site is completely healed.  - Keep the area covered and dry for the next 24 hours.  - Do not perform any heavy lifting for the next few days or until the site is healed.  - You may resume your normal diet.  - You may resume your normal medications however you should wait 48 hours before restarting aspirin, plavix, or blood thinners.  - It is normal to experience some pain over the site for the next few days. You may take Tylenol for that pain. Do not take more frequently than every 6 hours and do not exceed more than 3000mg of Tylenol in a 24 hour period.  - You were given conscious sedation which may make you drowsy, therefore you need someone to stay with you until the morning following the procedure.  - Do not drive, engage in heavy lifting or strenuous activity, or drink any alcoholic beverages for the next 24 hours.   - You may resume normal activity in 24 hours.    Notify your primary physician and/or Interventional Radiology IMMEDIATELY if you experience any of the following       - Fever of 101F or 38C       - Chills or Rigors/ Shakes       - Swelling and/or Redness in the area around the biopsy site       - Worsening Pain       - Blood soaked bandages or worsening bleeding       - Lightheadedness and/or dizziness upon standing       - Chest Pain/ Tightness       - Shortness of Breath       - Difficulty walking    If you have a problem that you believe requires IMMEDIATE attention, please go to your [facility] Emergency Room. If you believe your problem can safely wait until you speak to a physician, please call Interventional Radiology for any concerns.    Please feel free to contact us at [phone] if any problems arise. After 6PM, Monday through Friday, on weekends and on holidays, please call [phone] and ask for the radiology resident on call to be paged. Venogram    Discharge Instructions    - You may shower in 24 hours. No soaking or swimming until the site is completely healed.  - Keep the area covered and dry for the next 24 hours.  - Do not perform any heavy lifting for the next few days or until the site is healed.  - You may resume your normal diet.  - You may resume your normal medications however you should wait 48 hours before restarting aspirin, plavix, or blood thinners.  - It is normal to experience some pain over the site for the next few days. You may take Tylenol for that pain. Do not take more frequently than every 6 hours and do not exceed more than 3000mg of Tylenol in a 24 hour period.  - You were given conscious sedation which may make you drowsy, therefore you need someone to stay with you until the morning following the procedure.  - Do not drive, engage in heavy lifting or strenuous activity, or drink any alcoholic beverages for the next 24 hours.   - You may resume normal activity in 24 hours.    Notify your primary physician and/or Interventional Radiology IMMEDIATELY if you experience any of the following       - Fever of 101F or 38C       - Chills or Rigors/ Shakes       - Swelling and/or Redness in the area around the biopsy site       - Worsening Pain       - Blood soaked bandages or worsening bleeding       - Lightheadedness and/or dizziness upon standing       - Chest Pain/ Tightness       - Shortness of Breath       - Difficulty walking    If you have a problem that you believe requires IMMEDIATE attention, please go to your [facility] Emergency Room. If you believe your problem can safely wait until you speak to a physician, please call Interventional Radiology for any concerns.    Please feel free to contact us at [phone] if any problems arise. After 6PM, Monday through Friday, on weekends and on holidays, please call [phone] and ask for the radiology resident on call to be paged. Venogram     Discharge Instructions    - You may shower in 24 hours. No soaking or swimming until the site is completely healed.  - Keep the area covered and dry for the next 24 hours.  - Do not perform any heavy lifting for the next few days or until the site is healed.  - You may resume your normal diet.  - You may resume your normal medications however you should wait 48 hours before restarting aspirin, plavix, or blood thinners.  - It is normal to experience some pain over the site for the next few days. You may take Tylenol for that pain. Do not take more frequently than every 6 hours and do not exceed more than 3000mg of Tylenol in a 24 hour period.  - You were given conscious sedation which may make you drowsy, therefore you need someone to stay with you until the morning following the procedure.  - Do not drive, engage in heavy lifting or strenuous activity, or drink any alcoholic beverages for the next 24 hours.   - You may resume normal activity in 24 hours.    Notify your primary physician and/or Interventional Radiology IMMEDIATELY if you experience any of the following       - Fever of 101F or 38C       - Chills or Rigors/ Shakes       - Swelling and/or Redness in the area around the biopsy site       - Worsening Pain       - Blood soaked bandages or worsening bleeding       - Lightheadedness and/or dizziness upon standing       - Chest Pain/ Tightness       - Shortness of Breath       - Difficulty walking    If you have a problem that you believe requires IMMEDIATE attention, please go to your [facility] Emergency Room. If you believe your problem can safely wait until you speak to a physician, please call Interventional Radiology for any concerns.    Please feel free to contact us at [phone] if any problems arise. After 6PM, Monday through Friday, on weekends and on holidays, please call [phone] and ask for the radiology resident on call to be paged.

## 2022-05-11 NOTE — PRE PROCEDURE NOTE - PRE PROCEDURE EVALUATION
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        Allergies:   Medications (Abx/Cardiac/Anticoagulation/Blood Products)      Data:  157.5  51.7  T(C): 36.7  HR: 88  BP: 108/70  RR: 16  SpO2: 100%    Encounter for other preprocedural examination    No pertinent family history in first degree relatives    FHx: liver disease    Handoff    44w    Cirrhosis    Cirrhosis    Budd-Chiari syndrome    H/O protein S deficiency    Miscarriage    H/O protein S deficiency    Cirrhosis    No significant past surgical history    No significant past surgical history    No significant past surgical history    SysAdmin_VstLnk        Exam  General: No acute distress  Chest: Non labored breathing        -INR1.32    Imaging:     Plan: 36y Female presents for TIPS venogram.  -Risks/Benefits/alternatives explained with the patient  and witnessed informed consent obtained.    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        Allergies:   Medications (Abx/Cardiac/Anticoagulation/Blood Products)      Data:  157.5  51.7  T(C): 36.7  HR: 88  BP: 108/70  RR: 16  SpO2: 100%    Encounter for other preprocedural examination    No pertinent family history in first degree relatives.    FHx: liver disease    Handoff    44w    Cirrhosis    Cirrhosis    Budd-Chiari syndrome    H/O protein S deficiency    Miscarriage    H/O protein S deficiency    Cirrhosis    No significant past surgical history    No significant past surgical history    No significant past surgical history    SysAdmin_VstLnk        Exam  General: No acute distress  Chest: Non labored breathing    -INR1.32    Imaging:     Plan: 36y Female presents for IVC venogram. Procedure and risks discussed with patient and she is agreeable to proceed.   -Risks/Benefits/alternatives explained with the patient  and witnessed informed consent obtained.

## 2022-05-12 ENCOUNTER — EMERGENCY (EMERGENCY)
Facility: HOSPITAL | Age: 37
LOS: 1 days | Discharge: HOME | End: 2022-05-12
Attending: EMERGENCY MEDICINE
Payer: COMMERCIAL

## 2022-05-12 ENCOUNTER — OUTPATIENT (OUTPATIENT)
Dept: OUTPATIENT SERVICES | Facility: HOSPITAL | Age: 37
LOS: 1 days | Discharge: HOME | End: 2022-05-12

## 2022-05-12 VITALS
HEART RATE: 83 BPM | RESPIRATION RATE: 16 BRPM | WEIGHT: 113.98 LBS | TEMPERATURE: 98 F | OXYGEN SATURATION: 96 % | HEIGHT: 62 IN | SYSTOLIC BLOOD PRESSURE: 102 MMHG | DIASTOLIC BLOOD PRESSURE: 69 MMHG

## 2022-05-12 VITALS
OXYGEN SATURATION: 97 % | SYSTOLIC BLOOD PRESSURE: 100 MMHG | DIASTOLIC BLOOD PRESSURE: 63 MMHG | HEART RATE: 90 BPM | RESPIRATION RATE: 18 BRPM | TEMPERATURE: 99 F

## 2022-05-12 DIAGNOSIS — I82.0 BUDD-CHIARI SYNDROME: ICD-10-CM

## 2022-05-12 LAB
ALBUMIN SERPL ELPH-MCNC: 3.8 G/DL — SIGNIFICANT CHANGE UP (ref 3.3–5)
ALP SERPL-CCNC: 160 U/L — HIGH (ref 40–120)
ALT FLD-CCNC: 25 U/L — SIGNIFICANT CHANGE UP (ref 10–45)
ANION GAP SERPL CALC-SCNC: 11 MMOL/L — SIGNIFICANT CHANGE UP (ref 5–17)
APTT BLD: 43.9 SEC — HIGH (ref 27.5–35.5)
AST SERPL-CCNC: 30 U/L — SIGNIFICANT CHANGE UP (ref 10–40)
BASOPHILS # BLD AUTO: 0 K/UL — SIGNIFICANT CHANGE UP (ref 0–0.2)
BASOPHILS NFR BLD AUTO: 0 % — SIGNIFICANT CHANGE UP (ref 0–2)
BILIRUB SERPL-MCNC: 1.2 MG/DL — SIGNIFICANT CHANGE UP (ref 0.2–1.2)
BUN SERPL-MCNC: 16 MG/DL — SIGNIFICANT CHANGE UP (ref 7–23)
CALCIUM SERPL-MCNC: 9 MG/DL — SIGNIFICANT CHANGE UP (ref 8.4–10.5)
CHLORIDE SERPL-SCNC: 107 MMOL/L — SIGNIFICANT CHANGE UP (ref 96–108)
CO2 SERPL-SCNC: 22 MMOL/L — SIGNIFICANT CHANGE UP (ref 22–31)
CREAT SERPL-MCNC: 0.56 MG/DL — SIGNIFICANT CHANGE UP (ref 0.5–1.3)
EGFR: 120 ML/MIN/1.73M2 — SIGNIFICANT CHANGE UP
EOSINOPHIL # BLD AUTO: 0.17 K/UL — SIGNIFICANT CHANGE UP (ref 0–0.5)
EOSINOPHIL NFR BLD AUTO: 6.1 % — HIGH (ref 0–6)
GLUCOSE SERPL-MCNC: 93 MG/DL — SIGNIFICANT CHANGE UP (ref 70–99)
HCG SERPL-ACNC: <2 MIU/ML — SIGNIFICANT CHANGE UP
HCT VFR BLD CALC: 34.2 % — LOW (ref 34.5–45)
HGB BLD-MCNC: 10.4 G/DL — LOW (ref 11.5–15.5)
INR BLD: 1.21 RATIO — HIGH (ref 0.88–1.16)
LYMPHOCYTES # BLD AUTO: 0.76 K/UL — LOW (ref 1–3.3)
LYMPHOCYTES # BLD AUTO: 27 % — SIGNIFICANT CHANGE UP (ref 13–44)
MCHC RBC-ENTMCNC: 27.2 PG — SIGNIFICANT CHANGE UP (ref 27–34)
MCHC RBC-ENTMCNC: 30.4 GM/DL — LOW (ref 32–36)
MCV RBC AUTO: 89.3 FL — SIGNIFICANT CHANGE UP (ref 80–100)
MONOCYTES # BLD AUTO: 0.19 K/UL — SIGNIFICANT CHANGE UP (ref 0–0.9)
MONOCYTES NFR BLD AUTO: 6.9 % — SIGNIFICANT CHANGE UP (ref 2–14)
NEUTROPHILS # BLD AUTO: 1.69 K/UL — LOW (ref 1.8–7.4)
NEUTROPHILS NFR BLD AUTO: 60 % — SIGNIFICANT CHANGE UP (ref 43–77)
NT-PROBNP SERPL-SCNC: 78 PG/ML — SIGNIFICANT CHANGE UP (ref 0–300)
PLATELET # BLD AUTO: 145 K/UL — LOW (ref 150–400)
POTASSIUM SERPL-MCNC: 3.9 MMOL/L — SIGNIFICANT CHANGE UP (ref 3.5–5.3)
POTASSIUM SERPL-SCNC: 3.9 MMOL/L — SIGNIFICANT CHANGE UP (ref 3.5–5.3)
PROT SERPL-MCNC: 6.6 G/DL — SIGNIFICANT CHANGE UP (ref 6–8.3)
PROTHROM AB SERPL-ACNC: 13.9 SEC — HIGH (ref 10.5–13.4)
RBC # BLD: 3.83 M/UL — SIGNIFICANT CHANGE UP (ref 3.8–5.2)
RBC # FLD: 19.8 % — HIGH (ref 10.3–14.5)
SODIUM SERPL-SCNC: 140 MMOL/L — SIGNIFICANT CHANGE UP (ref 135–145)
TROPONIN T, HIGH SENSITIVITY RESULT: <6 NG/L — SIGNIFICANT CHANGE UP (ref 0–51)
WBC # BLD: 2.82 K/UL — LOW (ref 3.8–10.5)
WBC # FLD AUTO: 2.82 K/UL — LOW (ref 3.8–10.5)

## 2022-05-12 PROCEDURE — 84702 CHORIONIC GONADOTROPIN TEST: CPT

## 2022-05-12 PROCEDURE — 85610 PROTHROMBIN TIME: CPT

## 2022-05-12 PROCEDURE — 93005 ELECTROCARDIOGRAM TRACING: CPT

## 2022-05-12 PROCEDURE — 99285 EMERGENCY DEPT VISIT HI MDM: CPT | Mod: 25

## 2022-05-12 PROCEDURE — 71275 CT ANGIOGRAPHY CHEST: CPT | Mod: MA

## 2022-05-12 PROCEDURE — 76705 ECHO EXAM OF ABDOMEN: CPT

## 2022-05-12 PROCEDURE — 84484 ASSAY OF TROPONIN QUANT: CPT

## 2022-05-12 PROCEDURE — 80053 COMPREHEN METABOLIC PANEL: CPT

## 2022-05-12 PROCEDURE — 99285 EMERGENCY DEPT VISIT HI MDM: CPT

## 2022-05-12 PROCEDURE — 83880 ASSAY OF NATRIURETIC PEPTIDE: CPT

## 2022-05-12 PROCEDURE — 76705 ECHO EXAM OF ABDOMEN: CPT | Mod: 26

## 2022-05-12 PROCEDURE — 85730 THROMBOPLASTIN TIME PARTIAL: CPT

## 2022-05-12 PROCEDURE — 71275 CT ANGIOGRAPHY CHEST: CPT | Mod: 26,MA

## 2022-05-12 PROCEDURE — 74177 CT ABD & PELVIS W/CONTRAST: CPT | Mod: MA

## 2022-05-12 PROCEDURE — 36415 COLL VENOUS BLD VENIPUNCTURE: CPT

## 2022-05-12 PROCEDURE — 71045 X-RAY EXAM CHEST 1 VIEW: CPT

## 2022-05-12 PROCEDURE — 71045 X-RAY EXAM CHEST 1 VIEW: CPT | Mod: 26

## 2022-05-12 PROCEDURE — 99282 EMERGENCY DEPT VISIT SF MDM: CPT | Mod: GC

## 2022-05-12 PROCEDURE — 74177 CT ABD & PELVIS W/CONTRAST: CPT | Mod: 26,MA

## 2022-05-12 PROCEDURE — 85025 COMPLETE CBC W/AUTO DIFF WBC: CPT

## 2022-05-12 NOTE — ED PROVIDER NOTE - ATTENDING CONTRIBUTION TO CARE
Chacho Perry MD:   I personally saw the patient and performed a substantive portion of the visit including all aspects of the medical decision making.    MDM: 36 F w/ Hx of cryptogenic cirrhosis, portal HTN, esophageal varices, protein S def, Budd Chiari, IVC thrombectomy, who has been off Xarelto for last 3 days to obtain hepatic venogram yesterday. Pt now w/ RUQ pain. No pain at the R femoral site of puncture.   ROS with no F/C or vomiting, diarrhea or constipation.  Exam with (+) TTP to the RUQ but no peritoneal signs. R femora site with dressing C/D/I, no hematoma, normal pulses in BLE.   Concern for acute intraabdominal and hepatobiliary pathology. Will obtain labs to evaluate for hematologic disorder, metabolic derangements, hepatic and renal function, and screen for infectious pathology. Will obtain CT Abd/Pelv to assess for acute intraabdominal surgical and infectious pathology.

## 2022-05-12 NOTE — ED PROVIDER NOTE - CLINICAL SUMMARY MEDICAL DECISION MAKING FREE TEXT BOX
36 Y F extensive coagulopathic history with cryptogenic cirrhosis, s/p tips, with known intrahepatic thrombus identified yesterday off xeralto, presenting with RUQ pain. Primary concern for propagated thrombus, PE, vs. additional stent thrombosis. Common labs, imaging with CTPA, CTAP, US 36 Y F extensive coagulopathic history with cryptogenic cirrhosis, s/p tips, with known intrahepatic thrombus identified yesterday off xeralto, presenting with RUQ pain. Primary concern for propagated thrombus, PE, vs. additional stent thrombosis. Common labs, imaging with CTPA, CTAP,

## 2022-05-12 NOTE — CONSULT NOTE ADULT - ASSESSMENT
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     Impression:  #Protein S deficiency c/b budd chiari, cirrhosis with right hepatic hydrothorax s/p thrombectomy and TIPS 5/2021 - on xarelto, spironolactone. Chronic intermittent pain possibly 2/2 msk vs continued clot in IVC seen on venogram. Now resolved. No PE on imaging above, labs at baseline.   - ascites: none  - HCC: concern for HCC lesion (1.1cm seg 6 lesion) on recent MR  - HE: none   - MELD Na: 9    Recommendation:  - restart xarelto today  - c/w all home meds: spironolactone, famotidine  - will email outpatient hep office to schedule follow up with Dr Carvajal to complete transplant eval  - ok for d/c from ED from Hepatology standpoint    Note not finalized until signed by attending.    Katherine Ramos PGY-5  Gastroenterology Fellow  Pager #[phone] ([facility]) or [phone] (NS)  Available on [employer] Teams.  Please contact on-call GI fellow via answering service ([phone]) after 5pm and before 8am, and on weekends.

## 2022-05-12 NOTE — CONSULT NOTE ADULT - ATTENDING COMMENTS
37 yo F well known to our service with protein S deficiency and chronic Budd-Chiari syndrome with IVC thrombosis and with associated decompensated cirrhosis complicated by ascites, recurrent right hepatic hydrothorax, non-bleeding EV, and a 1.1 cm HCC in segment 6 (seen on MRI on 3/2/22), s/p prior IVC thrombectomy (4/20/21) and TIPS with repeated IVC thrombectomy (5/11/21) and who is currently undergoing evaluation for liver transplantation. ABO B with current MELD-Na 9. She underwent outpatient venogram with IR Dr. Ragsdale yesterday that showed recurrent IVC thrombus but with no intervention performed. Post-procedure, she presented to the ER due to right-sided chest pain similar to but more severe than pain she has experienced near the times of prior procedures or when she has reaccumulation of the hydrothorax. She is febrile, hemodynamically stable, non-toxic appearing, and reports that her pain has fully subsided. CT angiogram in the ER was reassuring. Labs appear stable. There is no hepatic contraindication to discharge home and she will have continued close follow-up with our transplant team. Her plan of care was discussed with her outpatient hepatologist Dr. Carvajal and with IR Dr. Ragsdale who concur.    Please don't hesitate to call with any questions or concerns.    Meme Bryant M.D., Ph.D.  Transplant Hepatology

## 2022-05-12 NOTE — CONSULT NOTE ADULT - SUBJECTIVE AND OBJECTIVE BOX
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, splenomegaly and enlarged L caudate lobe s/p TIPS on 5/11/21      Allergies:  No Known Allergies      Home Medications:    Hospital Medications:      PMHX/PSHX:  Cirrhosis    Cirrhosis    Budd-Chiari syndrome    H/O protein S deficiency    Miscarriage    H/O protein S deficiency    No significant past surgical history    No significant past surgical history        Family history:  No pertinent family history in first degree relatives    FHx: liver disease        Social History:     ROS:     General:  No weight loss, fevers, chills, night sweats, fatigue  Eyes:  No vision changes, no yellowing of eyes   ENT:  No throat pain, runny nose  CV:  No chest pain, palpitations  Resp:  No SOB, cough, wheezing  GI:  See HPI  :  No burning with urination, no hematuria   Muscle:  No muscle pain, weakness  Neuro:  No numbness/tingling, memory problems  Psych:  No fatigue, insomnia, mood problems  Heme:  No easy bruisability  Skin:  No rash, itching       PHYSICAL EXAM:     GENERAL:  Appears stated age, well-groomed, well-nourished, no distress  HEENT:  NC/AT,  conjunctivae clear and pink,  no JVD  CHEST:  Full & symmetric excursion, no increased effort, breath sounds clear  HEART:  Regular rhythm, S1, S2, no murmur/rub/S3/S4, no abdominal bruit, no edema  ABDOMEN:  Soft, non-tender, non-distended, normoactive bowel sounds,  no masses ,  EXTREMITIES:  no cyanosis,clubbing or edema  SKIN:  No rash/erythema/ecchymoses/petechiae/wounds/abscess/warm/dry  NEURO:  Alert, oriented    Vital Signs:  Vital Signs Last 24 Hrs  T(C): 36.8 (12 May 2022 11:55), Max: 36.8 (12 May 2022 11:18)  T(F): 98.2 (12 May 2022 11:55), Max: 98.2 (12 May 2022 11:18)  HR: 90 (12 May 2022 14:23) (66 - 90)  BP: 106/70 (12 May 2022 14:23) (91/61 - 106/79)  BP(mean): 77 (12 May 2022 11:55) (71 - 78)  RR: 16 (12 May 2022 14:23) (12 - 18)  SpO2: 97% (12 May 2022 14:23) (96% - 100%)  Daily Height in cm: 157.48 (12 May 2022 11:18)    Daily     LABS:                        10.4   2.82  )-----------( 145      ( 12 May 2022 12:22 )             34.2     05-12    140  |  107  |  16  ----------------------------<  93  3.9   |  22  |  0.56    Ca    9.0      12 May 2022 12:22    TPro  6.6  /  Alb  3.8  /  TBili  1.2  /  DBili  x   /  AST  30  /  ALT  25  /  AlkPhos  160<H>  05-12    LIVER FUNCTIONS - ( 12 May 2022 12:22 )  Alb: 3.8 g/dL / Pro: 6.6 g/dL / ALK PHOS: 160 U/L / ALT: 25 U/L / AST: 30 U/L / GGT: x           PT/INR - ( 12 May 2022 12:22 )   PT: 13.9 sec;   INR: 1.21 ratio         PTT - ( 12 May 2022 12:22 )  PTT:43.9 sec        Imaging:    < from: US Abdomen Liver Followup (05.12.22 @ 14:18) >  Duplex and color flow Doppler evaluation of the abdomen was performed to   assess for TIPS thrombosis.    The liver demonstrates coarse echogenicity consistent with history of   cirrhosis. A hypoechoic lesion, measuring 1.1 cm is seen in the left lobe  of the liver.    The gallbladder demonstrates wall thickening. The common bile duct   measures 2 mm. No bile duct dilatation is seen. The spleen measures 12.4   cm and demonstrates hepatopetal flow.    A patent TIPS stent is seen. TIPS velocities are as follows: Proximal   156, mid 174 and distal 175 cm/s. The hepatic veins were not well   visualized. The distal IVC is narrowed at the level of the right atrium.    The portal veins appear patent. The hepatic arteries demonstrate normal   arterial waveforms.    IMPRESSION: Patent TIPS.    The hepatic veins are not well visualized. The IVC appears narrowed at   the level of the right atrium.    --- End of Report ---    < end of copied text >  < from: CT Angio Chest PE Protocol w/ IV Cont (05.12.22 @ 12:56) >    FINDINGS:    Pulmonary Artery:  There is no main, central, lobar, segmental, or   subsegmental pulmonary embolus.    Tubes/Lines: None.    Lungs, airways and pleura: Small right pleural effusion. The lungs are   clear. The airways are normal.    Mediastinum: The visualized thyroid gland is normal. There are no   enlarged chest lymph nodes. The esophagus is unremarkable.    Right atrium and left atria are likely enlarged. The remainder the heart   is normal in size. No pericardial effusion. The aorta is normalin   caliber.    The azygos vein, hemiazygos vein are enlarged secondary to providing   collateral flow for the occluded intrahepatic segment of the IVC.    Bones And Soft Tissues: The bones are unremarkable.  The soft tissues are   unremarkable.        ABDOMEN AND PELVIS:    Liver, gallbladder and biliary system: Heterogeneous enhancement of the   liver. The caudate lobe is enlarged. No biliary ductal dilatation. The   gallbladder is normal.    Pancreas: The pancreas is normal.    Spleen: The spleen is enlarged and measures 13.0 cm.    Adrenal glands and kidneys: The adrenal glands are normal. The kidneys   are normal. No hydronephrosis. No hydroureter.    Lymph nodes: There are no enlarged chest lymph nodes.    Bowel: The stomach is unremarkable. The small bowel is normal in caliber.   The large bowel is normal in caliber. The appendix is normal. No free   air. No free fluid.    Vascular: The aorta is normal in caliber. The major branches of the aorta   are proximally patent.    A TIPS catheter is in place bridging the main portal vein to the   suprahepatic IVC. The left portal vein is diminutive. The umbilical vein   is recanalized. The splenic vein, portal vein and SMV are patent.    The infrarenal segments of the IVC are patent. Just distal to the inflow   of the left renal vein, the suprarenal IVC is atretic and likely occluded.    Bladder and reproductive system: The bladder is normal. The uterus is   unremarkable. The right adnexa is unremarkable. A coarse calcification is  in the left adnexa.    Bones And Soft Tissues: The bones are unremarkable.  The soft tissues are   unremarkable.      IMPRESSION:    CHEST:    1.  No pulmonary embolus.      ABDOMEN AND PELVIS:    1.  The hepatic veins are not visualized and is heterogeneous perfusion   of the liver, these findings could occur in the clinical setting of   Budd-Chiari syndrome.  2.  No bowel obstruction.    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 She reports it started at 5AM, pressure-like pain, similar to past occurrences. She reports this pain was more severe. She ate breakfast at 9AM without worsening of the pain however she was concerned it was her heart so she came to the ED. She states around 11 on her way in, the pain resolved. Currently feels in her normal state of health, no SOB, fevers, chills, abd pain, n/v, diarrhea.       Allergies:  No Known Allergies      Home Medications:    Hospital Medications:      PMHX/PSHX:  Cirrhosis    Cirrhosis    Budd-Chiari syndrome    H/O protein S deficiency    Miscarriage    H/O protein S deficiency    No significant past surgical history    No significant past surgical history        Family history:  No pertinent family history in first degree relatives    FHx: liver disease        Social History:     ROS:     General:  No weight loss, fevers, chills, night sweats, fatigue  Eyes:  No vision changes, no yellowing of eyes   ENT:  No throat pain, runny nose  CV:  No chest pain, palpitations  Resp:  No SOB, cough, wheezing  GI:  See HPI  :  No burning with urination, no hematuria   Muscle:  No muscle pain, weakness  Neuro:  No numbness/tingling, memory problems  Psych:  No fatigue, insomnia, mood problems  Heme:  No easy bruisability  Skin:  No rash, itching       PHYSICAL EXAM:     GENERAL:  Appears stated age, well-groomed, well-nourished, no distress  HEENT:  NC/AT,  conjunctivae clear and pink,  no JVD  CHEST:  Full & symmetric excursion, no increased effort, breath sounds clear  HEART:  Regular rhythm, S1, S2, no murmur/rub/S3/S4, no abdominal bruit, no edema  ABDOMEN:  Soft, non-tender, non-distended, normoactive bowel sounds,  no masses ,  EXTREMITIES:  no cyanosis,clubbing or edema  SKIN:  No rash/erythema/ecchymoses/petechiae/wounds/abscess/warm/dry  NEURO:  Alert, oriented    Vital Signs:  Vital Signs Last 24 Hrs  T(C): 36.8 (12 May 2022 11:55), Max: 36.8 (12 May 2022 11:18)  T(F): 98.2 (12 May 2022 11:55), Max: 98.2 (12 May 2022 11:18)  HR: 90 (12 May 2022 14:23) (66 - 90)  BP: 106/70 (12 May 2022 14:23) (91/61 - 106/79)  BP(mean): 77 (12 May 2022 11:55) (71 - 78)  RR: 16 (12 May 2022 14:23) (12 - 18)  SpO2: 97% (12 May 2022 14:23) (96% - 100%)  Daily Height in cm: 157.48 (12 May 2022 11:18)    Daily     LABS:                        10.4   2.82  )-----------( 145      ( 12 May 2022 12:22 )             34.2     05-12    140  |  107  |  16  ----------------------------<  93  3.9   |  22  |  0.56    Ca    9.0      12 May 2022 12:22    TPro  6.6  /  Alb  3.8  /  TBili  1.2  /  DBili  x   /  AST  30  /  ALT  25  /  AlkPhos  160<H>  05-12    LIVER FUNCTIONS - ( 12 May 2022 12:22 )  Alb: 3.8 g/dL / Pro: 6.6 g/dL / ALK PHOS: 160 U/L / ALT: 25 U/L / AST: 30 U/L / GGT: x           PT/INR - ( 12 May 2022 12:22 )   PT: 13.9 sec;   INR: 1.21 ratio         PTT - ( 12 May 2022 12:22 )  PTT:43.9 sec        Imaging:    < from: US Abdomen Liver Followup (05.12.22 @ 14:18) >  Duplex and color flow Doppler evaluation of the abdomen was performed to   assess for TIPS thrombosis.    The liver demonstrates coarse echogenicity consistent with history of   cirrhosis. A hypoechoic lesion, measuring 1.1 cm is seen in the left lobe  of the liver.    The gallbladder demonstrates wall thickening. The common bile duct   measures 2 mm. No bile duct dilatation is seen. The spleen measures 12.4   cm and demonstrates hepatopetal flow.    A patent TIPS stent is seen. TIPS velocities are as follows: Proximal   156, mid 174 and distal 175 cm/s. The hepatic veins were not well   visualized. The distal IVC is narrowed at the level of the right atrium.    The portal veins appear patent. The hepatic arteries demonstrate normal   arterial waveforms.    IMPRESSION: Patent TIPS.    The hepatic veins are not well visualized. The IVC appears narrowed at   the level of the right atrium.    --- End of Report ---    < end of copied text >  < from: CT Angio Chest PE Protocol w/ IV Cont (05.12.22 @ 12:56) >    FINDINGS:    Pulmonary Artery:  There is no main, central, lobar, segmental, or   subsegmental pulmonary embolus.    Tubes/Lines: None.    Lungs, airways and pleura: Small right pleural effusion. The lungs are   clear. The airways are normal.    Mediastinum: The visualized thyroid gland is normal. There are no   enlarged chest lymph nodes. The esophagus is unremarkable.    Right atrium and left atria are likely enlarged. The remainder the heart   is normal in size. No pericardial effusion. The aorta is normalin   caliber.    The azygos vein, hemiazygos vein are enlarged secondary to providing   collateral flow for the occluded intrahepatic segment of the IVC.    Bones And Soft Tissues: The bones are unremarkable.  The soft tissues are   unremarkable.        ABDOMEN AND PELVIS:    Liver, gallbladder and biliary system: Heterogeneous enhancement of the   liver. The caudate lobe is enlarged. No biliary ductal dilatation. The   gallbladder is normal.    Pancreas: The pancreas is normal.    Spleen: The spleen is enlarged and measures 13.0 cm.    Adrenal glands and kidneys: The adrenal glands are normal. The kidneys   are normal. No hydronephrosis. No hydroureter.    Lymph nodes: There are no enlarged chest lymph nodes.    Bowel: The stomach is unremarkable. The small bowel is normal in caliber.   The large bowel is normal in caliber. The appendix is normal. No free   air. No free fluid.    Vascular: The aorta is normal in caliber. The major branches of the aorta   are proximally patent.    A TIPS catheter is in place bridging the main portal vein to the   suprahepatic IVC. The left portal vein is diminutive. The umbilical vein   is recanalized. The splenic vein, portal vein and SMV are patent.    The infrarenal segments of the IVC are patent. Just distal to the inflow   of the left renal vein, the suprarenal IVC is atretic and likely occluded.    Bladder and reproductive system: The bladder is normal. The uterus is   unremarkable. The right adnexa is unremarkable. A coarse calcification is  in the left adnexa.    Bones And Soft Tissues: The bones are unremarkable.  The soft tissues are   unremarkable.      IMPRESSION:    CHEST:    1.  No pulmonary embolus.      ABDOMEN AND PELVIS:    1.  The hepatic veins are not visualized and is heterogeneous perfusion   of the liver, these findings could occur in the clinical setting of   Budd-Chiari syndrome.  2.  No bowel obstruction.    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 She reports it started at 5AM, pressure-like pain, similar to past occurrences. She reports this pain was more severe. She ate breakfast at 9AM without worsening of the pain however she was concerned it was her heart so she came to the ED. She states around 11 on her way in, the pain resolved. Currently feels in her normal state of health, no SOB, fevers, chills, abd pain, n/v, diarrhea.       Allergies:  No Known Allergies      Home Medications:    Hospital Medications:      PMHX/PSHX:  Cirrhosis    Cirrhosis    Budd-Chiari syndrome    H/O protein S deficiency    Miscarriage    H/O protein S deficiency    No significant past surgical history    No significant past surgical history        Family history:  No pertinent family history in first degree relatives    FHx: liver disease        Social History:     ROS:     General:  No weight loss, fevers, chills, night sweats, fatigue  Eyes:  No vision changes, no yellowing of eyes   ENT:  No throat pain, runny nose  CV:  No chest pain, palpitations  Resp:  No SOB, cough, wheezing  GI:  See HPI  :  No burning with urination, no hematuria   Muscle:  No muscle pain, weakness  Neuro:  No numbness/tingling, memory problems  Psych:  No fatigue, insomnia, mood problems  Heme:  No easy bruisability  Skin:  No rash, itching       PHYSICAL EXAM:     GENERAL:  Appears stated age, well-groomed, well-nourished, no distress  HEENT:  NC/AT,  conjunctivae clear and pink,  no JVD  CHEST:  Full & symmetric excursion, no increased effort, decreased BS R base, otherwise CTAB  HEART:  Regular rhythm, S1, S2, no murmur/rub/S3/S4, no abdominal bruit, no edema  ABDOMEN:  Soft, non-tender, non-distended, normoactive bowel sounds,  no masses ,  EXTREMITIES:  no cyanosis,clubbing or edema  SKIN:  No rash/erythema/ecchymoses/petechiae/wounds/abscess/warm/dry  NEURO:  Alert, oriented    Vital Signs:  Vital Signs Last 24 Hrs  T(C): 36.8 (12 May 2022 11:55), Max: 36.8 (12 May 2022 11:18)  T(F): 98.2 (12 May 2022 11:55), Max: 98.2 (12 May 2022 11:18)  HR: 90 (12 May 2022 14:23) (66 - 90)  BP: 106/70 (12 May 2022 14:23) (91/61 - 106/79)  BP(mean): 77 (12 May 2022 11:55) (71 - 78)  RR: 16 (12 May 2022 14:23) (12 - 18)  SpO2: 97% (12 May 2022 14:23) (96% - 100%)  Daily Height in cm: 157.48 (12 May 2022 11:18)    Daily     LABS:                        10.4   2.82  )-----------( 145      ( 12 May 2022 12:22 )             34.2     05-12    140  |  107  |  16  ----------------------------<  93  3.9   |  22  |  0.56    Ca    9.0      12 May 2022 12:22    TPro  6.6  /  Alb  3.8  /  TBili  1.2  /  DBili  x   /  AST  30  /  ALT  25  /  AlkPhos  160<H>  05-12    LIVER FUNCTIONS - ( 12 May 2022 12:22 )  Alb: 3.8 g/dL / Pro: 6.6 g/dL / ALK PHOS: 160 U/L / ALT: 25 U/L / AST: 30 U/L / GGT: x           PT/INR - ( 12 May 2022 12:22 )   PT: 13.9 sec;   INR: 1.21 ratio         PTT - ( 12 May 2022 12:22 )  PTT:43.9 sec        Imaging:    < from: US Abdomen Liver Followup (05.12.22 @ 14:18) >  Duplex and color flow Doppler evaluation of the abdomen was performed to   assess for TIPS thrombosis.    The liver demonstrates coarse echogenicity consistent with history of   cirrhosis. A hypoechoic lesion, measuring 1.1 cm is seen in the left lobe  of the liver.    The gallbladder demonstrates wall thickening. The common bile duct   measures 2 mm. No bile duct dilatation is seen. The spleen measures 12.4   cm and demonstrates hepatopetal flow.    A patent TIPS stent is seen. TIPS velocities are as follows: Proximal   156, mid 174 and distal 175 cm/s. The hepatic veins were not well   visualized. The distal IVC is narrowed at the level of the right atrium.    The portal veins appear patent. The hepatic arteries demonstrate normal   arterial waveforms.    IMPRESSION: Patent TIPS.    The hepatic veins are not well visualized. The IVC appears narrowed at   the level of the right atrium.    --- End of Report ---    < end of copied text >  < from: CT Angio Chest PE Protocol w/ IV Cont (05.12.22 @ 12:56) >    FINDINGS:    Pulmonary Artery:  There is no main, central, lobar, segmental, or   subsegmental pulmonary embolus.    Tubes/Lines: None.    Lungs, airways and pleura: Small right pleural effusion. The lungs are   clear. The airways are normal.    Mediastinum: The visualized thyroid gland is normal. There are no   enlarged chest lymph nodes. The esophagus is unremarkable.    Right atrium and left atria are likely enlarged. The remainder the heart   is normal in size. No pericardial effusion. The aorta is normalin   caliber.    The azygos vein, hemiazygos vein are enlarged secondary to providing   collateral flow for the occluded intrahepatic segment of the IVC.    Bones And Soft Tissues: The bones are unremarkable.  The soft tissues are   unremarkable.        ABDOMEN AND PELVIS:    Liver, gallbladder and biliary system: Heterogeneous enhancement of the   liver. The caudate lobe is enlarged. No biliary ductal dilatation. The   gallbladder is normal.    Pancreas: The pancreas is normal.    Spleen: The spleen is enlarged and measures 13.0 cm.    Adrenal glands and kidneys: The adrenal glands are normal. The kidneys   are normal. No hydronephrosis. No hydroureter.    Lymph nodes: There are no enlarged chest lymph nodes.    Bowel: The stomach is unremarkable. The small bowel is normal in caliber.   The large bowel is normal in caliber. The appendix is normal. No free   air. No free fluid.    Vascular: The aorta is normal in caliber. The major branches of the aorta   are proximally patent.    A TIPS catheter is in place bridging the main portal vein to the   suprahepatic IVC. The left portal vein is diminutive. The umbilical vein   is recanalized. The splenic vein, portal vein and SMV are patent.    The infrarenal segments of the IVC are patent. Just distal to the inflow   of the left renal vein, the suprarenal IVC is atretic and likely occluded.    Bladder and reproductive system: The bladder is normal. The uterus is   unremarkable. The right adnexa is unremarkable. A coarse calcification is  in the left adnexa.    Bones And Soft Tissues: The bones are unremarkable.  The soft tissues are   unremarkable.      IMPRESSION:    CHEST:    1.  No pulmonary embolus.      ABDOMEN AND PELVIS:    1.  The hepatic veins are not visualized and is heterogeneous perfusion   of the liver, these findings could occur in the clinical setting of   Budd-Chiari syndrome.  2.  No bowel obstruction.    --- End of Report ---    < end of copied text >

## 2022-05-12 NOTE — ED ADULT NURSE NOTE - OBJECTIVE STATEMENT
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 States pain is constant and not worse with exertion, started on rt side chest now radiating to L side chest. rt fem dressing c/d/i. Denies SOB, n/v/d, fevers, chills, abdominal pain, urinary symptoms, weakness, fatigue, numbness, tingling in upper and lower extremities, HA, blurry vision. VSS updated on plan of care. 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 States pain is constant and not worse with exertion, started on rt side chest now radiating to L side chest. rt fem dressing c/d/i. Denies SOB, n/v/d, fevers, chills, abdominal pain, urinary symptoms, weakness, fatigue, numbness, tingling in upper and lower extremities, HA, blurry vision. VSS updated on plan of care. 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 States pain is constant and not worse with exertion, started on rt side chest now radiating to L side chest. rt fem dressing c/d/i. Denies SOB, n/v/d, fevers, chills, abdominal pain, urinary symptoms, weakness, fatigue, numbness, tingling in upper and lower extremities, HA, blurry vision. VSS updated on plan of care.

## 2022-05-12 NOTE — ED PROVIDER NOTE - OBJECTIVE STATEMENT
36F w/ PMHx of cryptogenic cirrhosis, portal hypertension with small esophageal varices, Protein S deficiency, COVID-19 pna (3/4/21) and chronic Budd Chiari, IVC thrombectomy, off xeralto 3 days for hepatogram yesteray diagnosing intrahepatic stent thrombosis, presenting with RUQ pain. States since 5 am experiencing RUQ pain over liver, improved by 11 am. Denies any chest pain, SOB, leg swelling, nausea, vomiting, abdominal pain.

## 2022-05-12 NOTE — ED PROVIDER NOTE - NS ED ROS FT
GENERAL: No fever or chills  EYES: No change in vision  HEENT: No trouble swallowing or speaking  CARDIAC: No chest pain  PULMONARY: No cough or SOB  GI: + RUQ abdominal pain, no nausea or no vomiting, no diarrhea or constipation  : No changes in urination  SKIN: No rashes  NEURO: No headache, no numbness  MSK: No joint pain  Otherwise as HPI or negative.

## 2022-05-12 NOTE — ED PROVIDER NOTE - PROGRESS NOTE DETAILS
David Stevens MD:  Confirmed with GI attending faby Collins to restart xeralto today, DC with GI followup.

## 2022-05-12 NOTE — ED PROVIDER NOTE - NSFOLLOWUPINSTRUCTIONS_ED_ALL_ED_FT
Advance activity as tolerated.  Continue all previously prescribed medications as directed unless otherwise instructed.  Follow up with your primary care physician in 48-72 hours- bring copies of your results.  Return to the ER for worsening or persistent symptoms, and/or ANY NEW OR CONCERNING SYMPTOMS. If you have issues obtaining follow up, please call: [phone] to obtain a doctor or specialist who takes your insurance in your area.  You may call [phone] to make an appointment with the internal medicine clinic.    Continue taking your xeralto starting today. Followup with Dr. Carvajal for additional transplant evaluation.

## 2022-05-12 NOTE — ED PROVIDER NOTE - PATIENT PORTAL LINK FT
You can access the FollowMyHealth Patient Portal offered by [facility] by registering at the following website: [email or web address]. By joining [facility]’s FollowMyHealth portal, you will also be able to view your health information using other applications (apps) compatible with our system.

## 2022-05-16 ENCOUNTER — APPOINTMENT (OUTPATIENT)
Dept: INFUSION THERAPY | Facility: HOSPITAL | Age: 37
End: 2022-05-16

## 2022-05-16 DIAGNOSIS — D50.9 IRON DEFICIENCY ANEMIA, UNSPECIFIED: ICD-10-CM

## 2022-05-19 ENCOUNTER — APPOINTMENT (OUTPATIENT)
Dept: INFECTIOUS DISEASE | Facility: CLINIC | Age: 37
End: 2022-05-19
Payer: COMMERCIAL

## 2022-05-19 VITALS
RESPIRATION RATE: 16 BRPM | HEIGHT: 62 IN | TEMPERATURE: 98.5 F | SYSTOLIC BLOOD PRESSURE: 110 MMHG | DIASTOLIC BLOOD PRESSURE: 68 MMHG | HEART RATE: 79 BPM | OXYGEN SATURATION: 99 % | BODY MASS INDEX: 20.98 KG/M2 | WEIGHT: 114 LBS

## 2022-05-19 DIAGNOSIS — Z00.00 ENCOUNTER FOR GENERAL ADULT MEDICAL EXAMINATION W/OUT ABNORMAL FINDINGS: ICD-10-CM

## 2022-05-19 PROCEDURE — 99072 ADDL SUPL MATRL&STAF TM PHE: CPT

## 2022-05-19 PROCEDURE — 90677 PCV20 VACCINE IM: CPT

## 2022-05-19 PROCEDURE — 99204 OFFICE O/P NEW MOD 45 MIN: CPT | Mod: 25

## 2022-05-19 PROCEDURE — 90472 IMMUNIZATION ADMIN EACH ADD: CPT

## 2022-05-19 PROCEDURE — G0009: CPT | Mod: 59

## 2022-05-19 PROCEDURE — 90715 TDAP VACCINE 7 YRS/> IM: CPT

## 2022-05-19 NOTE — PHYSICAL EXAM
[General Appearance - Alert] : alert [General Appearance - In No Acute Distress] : in no acute distress [Sclera] : the sclera and conjunctiva were normal [PERRL With Normal Accommodation] : pupils were equal in size, round, reactive to light [Extraocular Movements] : extraocular movements were intact [Outer Ear] : the ears and nose were normal in appearance [Oropharynx] : the oropharynx was normal with no thrush [Neck Appearance] : the appearance of the neck was normal [Neck Cervical Mass (___cm)] : no neck mass was observed [Jugular Venous Distention Increased] : there was no jugular-venous distention [Thyroid Diffuse Enlargement] : the thyroid was not enlarged [Auscultation Breath Sounds / Voice Sounds] : lungs were clear to auscultation bilaterally [Heart Rate And Rhythm] : heart rate was normal and rhythm regular [Heart Sounds] : normal S1 and S2 [Heart Sounds Gallop] : no gallops [Murmurs] : no murmurs [Heart Sounds Pericardial Friction Rub] : no pericardial rub [Full Pulse] : the pedal pulses are present [Edema] : there was no peripheral edema [Bowel Sounds] : normal bowel sounds [Abdomen Soft] : soft [Abdomen Tenderness] : non-tender [Abdomen Mass (___ Cm)] : no abdominal mass palpated [Costovertebral Angle Tenderness] : no CVA tenderness [No Palpable Adenopathy] : no palpable adenopathy [Skin Color & Pigmentation] : normal skin color and pigmentation [] : no rash [Oriented To Time, Place, And Person] : oriented to person, place, and time [Affect] : the affect was normal [Musculoskeletal - Swelling] : no joint swelling [Nail Clubbing] : no clubbing  or cyanosis of the fingernails [Motor Tone] : muscle strength and tone were normal

## 2022-05-20 ENCOUNTER — NON-APPOINTMENT (OUTPATIENT)
Age: 37
End: 2022-05-20

## 2022-05-20 LAB
COVID-19 NUCLEOCAPSID  GAM ANTIBODY INTERPRETATION: POSITIVE
COVID-19 SPIKE DOMAIN ANTIBODY INTERPRETATION: POSITIVE
SARS-COV-2 AB SERPL IA-ACNC: >250 U/ML
SARS-COV-2 AB SERPL QL IA: 45.2 INDEX

## 2022-05-22 NOTE — HISTORY OF PRESENT ILLNESS
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\par History of Hepatitis A infection as a child\par Lived in [facility] and [place]\par no living children\par Lives with Mom and \par Lives in [place] house [place]\par No pets\par Works part time as a home attendant\par \par Reports that\par \par No allergies\par No hobbies\par  [] : No [de-identified] : Umesh [de-identified] : BEV Calvo [de-identified] : home health aide [de-identified] : none [de-identified] : HSV

## 2022-05-22 NOTE — ASSESSMENT
[FreeTextEntry1] : 36 to woman with a history of BuddChiari symdrome, s/p TIPS placement for chronic ascites, hx of a small HCC,\par protein S deficiency s/p multiple miscarraiges\par \par \par \par Hep B immune but with past natrual exposure- woud check HBV viral load\par HAV immune post natural exposure as a child\par Will administer Prevanr 20 today\par Will administer Tdap today\par Serologies to be sent for:\par strongyloides ab\par cystercercosis\par echinococcus\par GI-PCR\par \par past history of exposure to her father with a report of being treated for pulmonary TB, but patient with a negative quantiferon on testing and recent CT angio was not revealing of underlying lung disease suggestive of old TB\par \par \par RTC 2mo or with next visit to Hepatology\par

## 2022-05-23 ENCOUNTER — APPOINTMENT (OUTPATIENT)
Dept: HEMATOLOGY ONCOLOGY | Facility: CLINIC | Age: 37
End: 2022-05-23
Payer: COMMERCIAL

## 2022-05-23 ENCOUNTER — APPOINTMENT (OUTPATIENT)
Dept: INFUSION THERAPY | Facility: HOSPITAL | Age: 37
End: 2022-05-23

## 2022-05-23 ENCOUNTER — LABORATORY RESULT (OUTPATIENT)
Age: 37
End: 2022-05-23

## 2022-05-23 ENCOUNTER — RESULT REVIEW (OUTPATIENT)
Age: 37
End: 2022-05-23

## 2022-05-23 VITALS
OXYGEN SATURATION: 99 % | HEIGHT: 62 IN | RESPIRATION RATE: 16 BRPM | WEIGHT: 114 LBS | BODY MASS INDEX: 20.98 KG/M2 | TEMPERATURE: 97 F | SYSTOLIC BLOOD PRESSURE: 97 MMHG | HEART RATE: 91 BPM | DIASTOLIC BLOOD PRESSURE: 67 MMHG

## 2022-05-23 LAB
BASOPHILS # BLD AUTO: 0.02 K/UL — SIGNIFICANT CHANGE UP (ref 0–0.2)
BASOPHILS NFR BLD AUTO: 1 % — SIGNIFICANT CHANGE UP (ref 0–2)
EOSINOPHIL # BLD AUTO: 0.08 K/UL — SIGNIFICANT CHANGE UP (ref 0–0.5)
EOSINOPHIL NFR BLD AUTO: 3.8 % — SIGNIFICANT CHANGE UP (ref 0–6)
HCT VFR BLD CALC: 36.2 % — SIGNIFICANT CHANGE UP (ref 34.5–45)
HGB BLD-MCNC: 11.4 G/DL — LOW (ref 11.5–15.5)
IMM GRANULOCYTES NFR BLD AUTO: 0.5 % — SIGNIFICANT CHANGE UP (ref 0–1.5)
LYMPHOCYTES # BLD AUTO: 0.59 K/UL — LOW (ref 1–3.3)
LYMPHOCYTES # BLD AUTO: 28.2 % — SIGNIFICANT CHANGE UP (ref 13–44)
MCHC RBC-ENTMCNC: 28.4 PG — SIGNIFICANT CHANGE UP (ref 27–34)
MCHC RBC-ENTMCNC: 31.5 G/DL — LOW (ref 32–36)
MCV RBC AUTO: 90 FL — SIGNIFICANT CHANGE UP (ref 80–100)
MONOCYTES # BLD AUTO: 0.25 K/UL — SIGNIFICANT CHANGE UP (ref 0–0.9)
MONOCYTES NFR BLD AUTO: 12 % — SIGNIFICANT CHANGE UP (ref 2–14)
NEUTROPHILS # BLD AUTO: 1.14 K/UL — LOW (ref 1.8–7.4)
NEUTROPHILS NFR BLD AUTO: 54.5 % — SIGNIFICANT CHANGE UP (ref 43–77)
NRBC # BLD: 0 /100 WBCS — SIGNIFICANT CHANGE UP (ref 0–0)
PLATELET # BLD AUTO: 99 K/UL — LOW (ref 150–400)
RBC # BLD: 4.02 M/UL — SIGNIFICANT CHANGE UP (ref 3.8–5.2)
RBC # FLD: 18.6 % — HIGH (ref 10.3–14.5)
RETICS #: 59.9 K/UL — SIGNIFICANT CHANGE UP (ref 25–125)
RETICS/RBC NFR: 1.5 % — SIGNIFICANT CHANGE UP (ref 0.5–2.5)
WBC # BLD: 2.09 K/UL — LOW (ref 3.8–10.5)
WBC # FLD AUTO: 2.09 K/UL — LOW (ref 3.8–10.5)

## 2022-05-23 PROCEDURE — 99215 OFFICE O/P EST HI 40 MIN: CPT

## 2022-05-23 NOTE — HISTORY OF PRESENT ILLNESS
[de-identified] :  number [id number] [employer]\par Patient returns for follow up after nearing the completion of her course of IV iron. Final scheduled dose today.  Was noted to have some amount of GI bleeding in April, minimal Hg 9.8g/dl while in the hospital.  Was not transfused or given IV iron at the time. Patient remains on the xarelto 20mg daily due to history of hepatic vein thrombosis, Budd Chiari syndrome.  Patient now s/p TIPS.  Dring the episode of anemia and GI bleeding, patient also had a rather heavy menstrual period lasting 10 days.  Hg improved to 11.4g/dl as of today after having 5 of the previous doses of the IV venofer.  Platelets declined slightly to 99k/ul today, wbc 2.0 though  ANC remains 1100\par \par Returned returned to the ER last week.  Hg 10.8g/dl at the time.  Patient suffering form abdominal pain. \par \par Patient had pain in the abdomen the second time. IV contrast scan demonstrated difficulty in hepatic vein visualization consistent with Bud Chiari syndrome.\par TIPS catheter was visualized.   No PE.  \par Patient had an abdominal ultrasound showing the same.\par \par Patient had iron infusions 4/17 through today, which represents the last one of the series\par  Follow up in 3 months.  \par Patient notices she had  4 months of progressive aching in the right thenar eminence more than the joint.   Has not been working excessively, routine housework.  \par Also has pain in the arm and the wrist.  Patient h

## 2022-05-23 NOTE — REVIEW OF SYSTEMS
[Negative] : Neurological [FreeTextEntry8] : Right upper quadrant pain [FreeTextEntry9] : Pain on motion of right thumb

## 2022-05-23 NOTE — PHYSICAL EXAM
[Normal] : normal external exam, normal sphincter tone; no palpable masses; stool guaiac negative [de-identified] : pain at right thenar eminance, negative tinnels test, and phalens/reverse phalens signs.   No

## 2022-05-23 NOTE — ASSESSMENT
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  No other explanations for cirrhosis known currently.  Much of this history took place in [facility],  Patient was able to bring records from the evaluation in [facility] as well as earlier evaluation in 2017 at [facility].  After several repeats, Protein S antigen improved to normal at 68%.  Was only low once. DOES NOT appear to be a congenital Protein S deficiency as was previously believed to have.  SHe was also found to have a prothrombin gene mutation in [facility], however testing in the [place] was normal.  Value was only low once.  \par \par CT scan at the hospital demonstrated difficulty visualizing the hepatic veins consistent with Budd Chiari syndrome.  She is now s/p TIPS procedure.\par \par Patietn became progressively more anemic.  Hg 9.6g/dl at the ER associated with GI bleeding and menorrhagia X 1 episode for 10 days. Hg reached a rehan of 9.6g/dl, imrpved to 11.4g/dl so far after 5 of the 6 scheduled IV iron infusions.  \par \par Despite the bleeding would continue  Xarelto 20mg daily.  Would only hold anticoagulation for clinically significant bleeding, for example  the acute event of the GI bleed in April for which she had her Xarelto held for 2 days.\par \par Complete IV venofer series today for dose #6. Will follow up with patient in 3 months.  COnsider rechecking PRotein C and S levels.\par \par Given Patient's GI bleeding history and menorrhagia, consider possibly of Xarelto 20mg being supra therapeutic.  Check ANti Factor Xa activity which is done as a peak level 4 hours after previous dose. patient twill have to come back on another day at roughly 2 PM given she takes the dose at about 10 AM.  \par \par Spent > 45 minutes in direct patient care and and addressed all questions and concerns.  >50% of this time was in direct face to face contact with patient during exam and counseling.

## 2022-05-24 ENCOUNTER — APPOINTMENT (OUTPATIENT)
Dept: HEMATOLOGY ONCOLOGY | Facility: CLINIC | Age: 37
End: 2022-05-24

## 2022-05-24 NOTE — PATIENT PROFILE ADULT - NSPROPOAURINARYCATHETER_GEN_A_NUR
Quality 226: Preventive Care And Screening: Tobacco Use: Screening And Cessation Intervention: Patient screened for tobacco use and is an ex/non-smoker
Detail Level: Detailed
no

## 2022-05-25 LAB
ECHINOCOCCUS AB TITR SER: NEGATIVE
STRONGYLOIDES AB SER IA-ACNC: NEGATIVE
T SOL LAR AB SPEC QL IB: <0.9

## 2022-05-26 ENCOUNTER — NON-APPOINTMENT (OUTPATIENT)
Age: 37
End: 2022-05-26

## 2022-05-26 LAB
ALBUMIN SERPL ELPH-MCNC: 4.1 G/DL
ALP BLD-CCNC: 137 U/L
ALT SERPL-CCNC: 18 U/L
ANION GAP SERPL CALC-SCNC: 11 MMOL/L
APTT BLD: 49.6 SEC
AST SERPL-CCNC: 28 U/L
BILIRUB SERPL-MCNC: 1.4 MG/DL
BUN SERPL-MCNC: 14 MG/DL
CALCIUM SERPL-MCNC: 8.7 MG/DL
CHLORIDE SERPL-SCNC: 107 MMOL/L
CO2 SERPL-SCNC: 20 MMOL/L
CREAT SERPL-MCNC: 0.62 MG/DL
EGFR: 117 ML/MIN/1.73M2
FERRITIN SERPL-MCNC: 139 NG/ML
GLUCOSE SERPL-MCNC: 154 MG/DL
INR PPP: 1.38 RATIO
IRON SATN MFR SERPL: 24 %
IRON SERPL-MCNC: 68 UG/DL
LDH SERPL-CCNC: 148 U/L
POTASSIUM SERPL-SCNC: 4.3 MMOL/L
PROT SERPL-MCNC: 6.6 G/DL
PT BLD: 16.1 SEC
SODIUM SERPL-SCNC: 139 MMOL/L
TIBC SERPL-MCNC: 287 UG/DL
UIBC SERPL-MCNC: 219 UG/DL

## 2022-06-09 ENCOUNTER — APPOINTMENT (OUTPATIENT)
Dept: OBGYN | Facility: CLINIC | Age: 37
End: 2022-06-09
Payer: COMMERCIAL

## 2022-06-09 VITALS
DIASTOLIC BLOOD PRESSURE: 60 MMHG | HEIGHT: 62 IN | SYSTOLIC BLOOD PRESSURE: 93 MMHG | WEIGHT: 114 LBS | BODY MASS INDEX: 20.98 KG/M2

## 2022-06-09 PROCEDURE — 99395 PREV VISIT EST AGE 18-39: CPT

## 2022-06-09 NOTE — PLAN
[FreeTextEntry1] : 35 y/o  LMP 22 presents for annual\par \par #HCM\par -f/u pap/hpv (obtained in case patient gets off transplant list this year) \par \par #irregular bleeding\par -TVUS reviewed wnl, likely due to xarelto\par -Discussed Mirena IUD for contraception and to help with bleeding. Pt to come back for placement. Discussed that she should not get pregnant at this time given her complex medical history. Pt states understanding.

## 2022-06-09 NOTE — HISTORY OF PRESENT ILLNESS
[Patient reported PAP Smear was normal] : Patient reported PAP Smear was normal [Regular Cycle Intervals] : periods have been regular [PapSmeardate] : 2021 [FreeTextEntry1] : 6/1/22 [Currently Active] : currently active [Men] : men [No] : No [Yes] : Yes [Condoms] : Condoms

## 2022-06-10 LAB — HPV HIGH+LOW RISK DNA PNL CVX: NOT DETECTED

## 2022-06-13 LAB — CYTOLOGY CVX/VAG DOC THIN PREP: NORMAL

## 2022-06-16 ENCOUNTER — OUTPATIENT (OUTPATIENT)
Dept: OUTPATIENT SERVICES | Facility: HOSPITAL | Age: 37
LOS: 1 days | Discharge: HOME | End: 2022-06-16

## 2022-06-16 DIAGNOSIS — I82.0 BUDD-CHIARI SYNDROME: ICD-10-CM

## 2022-06-27 ENCOUNTER — APPOINTMENT (OUTPATIENT)
Dept: HEMATOLOGY ONCOLOGY | Facility: CLINIC | Age: 37
End: 2022-06-27

## 2022-06-27 ENCOUNTER — RESULT REVIEW (OUTPATIENT)
Age: 37
End: 2022-06-27

## 2022-06-27 LAB
BASOPHILS # BLD AUTO: 0.02 K/UL — SIGNIFICANT CHANGE UP (ref 0–0.2)
BASOPHILS NFR BLD AUTO: 0.7 % — SIGNIFICANT CHANGE UP (ref 0–2)
EOSINOPHIL # BLD AUTO: 0.11 K/UL — SIGNIFICANT CHANGE UP (ref 0–0.5)
EOSINOPHIL NFR BLD AUTO: 3.7 % — SIGNIFICANT CHANGE UP (ref 0–6)
HCT VFR BLD CALC: 35.9 % — SIGNIFICANT CHANGE UP (ref 34.5–45)
HGB BLD-MCNC: 11.6 G/DL — SIGNIFICANT CHANGE UP (ref 11.5–15.5)
IMM GRANULOCYTES NFR BLD AUTO: 0.7 % — SIGNIFICANT CHANGE UP (ref 0–1.5)
LYMPHOCYTES # BLD AUTO: 0.94 K/UL — LOW (ref 1–3.3)
LYMPHOCYTES # BLD AUTO: 31.6 % — SIGNIFICANT CHANGE UP (ref 13–44)
MCHC RBC-ENTMCNC: 28.9 PG — SIGNIFICANT CHANGE UP (ref 27–34)
MCHC RBC-ENTMCNC: 32.3 G/DL — SIGNIFICANT CHANGE UP (ref 32–36)
MCV RBC AUTO: 89.5 FL — SIGNIFICANT CHANGE UP (ref 80–100)
MONOCYTES # BLD AUTO: 0.29 K/UL — SIGNIFICANT CHANGE UP (ref 0–0.9)
MONOCYTES NFR BLD AUTO: 9.8 % — SIGNIFICANT CHANGE UP (ref 2–14)
NEUTROPHILS # BLD AUTO: 1.59 K/UL — LOW (ref 1.8–7.4)
NEUTROPHILS NFR BLD AUTO: 53.5 % — SIGNIFICANT CHANGE UP (ref 43–77)
NRBC # BLD: 0 /100 WBCS — SIGNIFICANT CHANGE UP (ref 0–0)
PLATELET # BLD AUTO: 120 K/UL — LOW (ref 150–400)
RBC # BLD: 4.01 M/UL — SIGNIFICANT CHANGE UP (ref 3.8–5.2)
RBC # FLD: 14.6 % — HIGH (ref 10.3–14.5)
WBC # BLD: 2.97 K/UL — LOW (ref 3.8–10.5)
WBC # FLD AUTO: 2.97 K/UL — LOW (ref 3.8–10.5)

## 2022-06-29 ENCOUNTER — NON-APPOINTMENT (OUTPATIENT)
Age: 37
End: 2022-06-29

## 2022-06-29 LAB
FACT XA PPP-ACNC: 1.96 IU/ML
FERRITIN SERPL-MCNC: 42 NG/ML
IRON SATN MFR SERPL: 18 %
IRON SERPL-MCNC: 59 UG/DL
TIBC SERPL-MCNC: 328 UG/DL
UIBC SERPL-MCNC: 269 UG/DL

## 2022-07-05 ENCOUNTER — RX RENEWAL (OUTPATIENT)
Age: 37
End: 2022-07-05

## 2022-07-14 ENCOUNTER — EMERGENCY (EMERGENCY)
Facility: HOSPITAL | Age: 37
LOS: 1 days | Discharge: LEFT BEFORE DISCHARGE | End: 2022-07-14
Attending: EMERGENCY MEDICINE
Payer: COMMERCIAL

## 2022-07-14 VITALS
TEMPERATURE: 98 F | DIASTOLIC BLOOD PRESSURE: 62 MMHG | RESPIRATION RATE: 16 BRPM | HEART RATE: 91 BPM | OXYGEN SATURATION: 99 % | HEIGHT: 62 IN | SYSTOLIC BLOOD PRESSURE: 97 MMHG | WEIGHT: 115.08 LBS

## 2022-07-14 LAB — HCG UR QL: NEGATIVE — SIGNIFICANT CHANGE UP

## 2022-07-14 PROCEDURE — 73090 X-RAY EXAM OF FOREARM: CPT

## 2022-07-14 PROCEDURE — 99284 EMERGENCY DEPT VISIT MOD MDM: CPT | Mod: 25

## 2022-07-14 PROCEDURE — 73030 X-RAY EXAM OF SHOULDER: CPT | Mod: 26,RT

## 2022-07-14 PROCEDURE — 81025 URINE PREGNANCY TEST: CPT

## 2022-07-14 PROCEDURE — 73110 X-RAY EXAM OF WRIST: CPT | Mod: 26,RT

## 2022-07-14 PROCEDURE — 73030 X-RAY EXAM OF SHOULDER: CPT

## 2022-07-14 PROCEDURE — 73090 X-RAY EXAM OF FOREARM: CPT | Mod: 26,LT

## 2022-07-14 PROCEDURE — 99284 EMERGENCY DEPT VISIT MOD MDM: CPT

## 2022-07-14 PROCEDURE — 73110 X-RAY EXAM OF WRIST: CPT

## 2022-07-14 RX ORDER — METHOCARBAMOL 500 MG/1
500 TABLET, FILM COATED ORAL ONCE
Refills: 0 | Status: COMPLETED | OUTPATIENT
Start: 2022-07-14 | End: 2022-07-14

## 2022-07-14 RX ORDER — LIDOCAINE 4 G/100G
1 CREAM TOPICAL ONCE
Refills: 0 | Status: COMPLETED | OUTPATIENT
Start: 2022-07-14 | End: 2022-07-14

## 2022-07-14 RX ADMIN — METHOCARBAMOL 500 MILLIGRAM(S): 500 TABLET, FILM COATED ORAL at 21:19

## 2022-07-14 RX ADMIN — LIDOCAINE 1 PATCH: 4 CREAM TOPICAL at 21:19
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
## 2022-07-14 NOTE — ED PROVIDER NOTE - NS ED ATTENDING STATEMENT MOD
This was a shared visit with the SHAKA. I reviewed and verified the documentation and independently performed the documented:
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
## 2022-07-14 NOTE — ED PROVIDER NOTE - PHYSICAL EXAMINATION
Const: AOX3 nontoxic appearing, no apparent respiratory or physical distress. Stable gait , thin appear   HEENT: NC/AT. Moist mucous membranes. no worthy signs of raccoon eyes   Eyes: ISABELA. EOMI  Neck: Soft and supple. Full ROM without pain. no midline TTP   Cardiac: Regular rate and regular rhythm. +S1/S2. No murmurs.  no chest wall TTP - no seatbelt sign   Resp: Speaking in full sentences. No evidence of respiratory distress. No wheezes, rales or rhonchi. No adventitious breath sounds   Abd: Soft, non-tender, non-distended. Normal bowel sounds in all 4 quadrants. no seatbelt signor erythema or ecchymosis   Back: Spine midline and non-tender. No CVAT. right para spine soft tissue muscle TTP , no erythema or ecchymosis noted   MSK > RUE   No bony TTP over the right hand / wrist and forearm or shoulder noted - ROM grossly intact - radial pulse +2   Skin: No rashes, abrasions or lacerations.  Neuro: Awake, alert & oriented x 3. Moves all extremities symmetrically.
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
## 2022-07-14 NOTE — ED PROVIDER NOTE - PROGRESS NOTE DETAILS
Pt is not at the bed side checked x 3 times pt si already been told xray W.o any acute fracture   Pt is not at the bed side checked x 3 times

## 2022-07-14 NOTE — ED PROVIDER NOTE - CLINICAL SUMMARY MEDICAL DECISION MAKING FREE TEXT BOX
-UA+. Unable to assess for dysuria or other sxs  -Ucx sent. Will cover with pip tazo and vanc will obtain the xray of the right shoulder . wrist and forearm   will obtain the CT abd and pelvis W.o contrast -

## 2022-07-26 NOTE — DATA REVIEWED
[FreeTextEntry1] : I discussed with Dariana her overall health, and touched on the previous consultation.  We again discussed the signiicant risks of continuing the pregnancy, not only for a good obstetrical outcome, but also for her health as well. She is still undecided about whether to continue the pregnancy.  Although she still has legal options, I stressed for her to make a decision as quickly as possible, as the longer she waits, the more at risk she is.  \par \par She is scheduled to meet with her hepatologist at [facility] on May 9 to determine her clinical status and decide if she has decompensated since prepregnancy. She will decide if she wants to terminate the pregnancy or continue at that visit\par \par Sonogram today confirms viability, however multiple large placental sinuses were noted with calcifications throughout the placenta appreciated. It is uncertain if this placental appearance is due to her liver disease, however the appearance is concerning for the health of the developing fetus. 
Unit RN to OR RN

## 2022-08-01 ENCOUNTER — RX RENEWAL (OUTPATIENT)
Age: 37
End: 2022-08-01

## 2022-08-17 ENCOUNTER — OUTPATIENT (OUTPATIENT)
Dept: OUTPATIENT SERVICES | Facility: HOSPITAL | Age: 37
LOS: 1 days | Discharge: HOME | End: 2022-08-17

## 2022-08-17 DIAGNOSIS — I82.0 BUDD-CHIARI SYNDROME: ICD-10-CM

## 2022-08-22 ENCOUNTER — RESULT REVIEW (OUTPATIENT)
Age: 37
End: 2022-08-22

## 2022-08-22 ENCOUNTER — APPOINTMENT (OUTPATIENT)
Dept: HEMATOLOGY ONCOLOGY | Facility: CLINIC | Age: 37
End: 2022-08-22

## 2022-08-22 VITALS
DIASTOLIC BLOOD PRESSURE: 65 MMHG | TEMPERATURE: 98.2 F | SYSTOLIC BLOOD PRESSURE: 97 MMHG | BODY MASS INDEX: 20.98 KG/M2 | HEART RATE: 78 BPM | OXYGEN SATURATION: 100 % | WEIGHT: 114 LBS | RESPIRATION RATE: 16 BRPM | HEIGHT: 62 IN

## 2022-08-22 LAB
BASOPHILS # BLD AUTO: 0.02 K/UL — SIGNIFICANT CHANGE UP (ref 0–0.2)
BASOPHILS NFR BLD AUTO: 0.7 % — SIGNIFICANT CHANGE UP (ref 0–2)
EOSINOPHIL # BLD AUTO: 0.08 K/UL — SIGNIFICANT CHANGE UP (ref 0–0.5)
EOSINOPHIL NFR BLD AUTO: 2.9 % — SIGNIFICANT CHANGE UP (ref 0–6)
HCT VFR BLD CALC: 35.4 % — SIGNIFICANT CHANGE UP (ref 34.5–45)
HGB BLD-MCNC: 11.3 G/DL — LOW (ref 11.5–15.5)
IMM GRANULOCYTES NFR BLD AUTO: 0.4 % — SIGNIFICANT CHANGE UP (ref 0–1.5)
LYMPHOCYTES # BLD AUTO: 0.94 K/UL — LOW (ref 1–3.3)
LYMPHOCYTES # BLD AUTO: 34.1 % — SIGNIFICANT CHANGE UP (ref 13–44)
MCHC RBC-ENTMCNC: 28.5 PG — SIGNIFICANT CHANGE UP (ref 27–34)
MCHC RBC-ENTMCNC: 31.9 G/DL — LOW (ref 32–36)
MCV RBC AUTO: 89.4 FL — SIGNIFICANT CHANGE UP (ref 80–100)
MONOCYTES # BLD AUTO: 0.3 K/UL — SIGNIFICANT CHANGE UP (ref 0–0.9)
MONOCYTES NFR BLD AUTO: 10.9 % — SIGNIFICANT CHANGE UP (ref 2–14)
NEUTROPHILS # BLD AUTO: 1.41 K/UL — LOW (ref 1.8–7.4)
NEUTROPHILS NFR BLD AUTO: 51 % — SIGNIFICANT CHANGE UP (ref 43–77)
NRBC # BLD: 0 /100 WBCS — SIGNIFICANT CHANGE UP (ref 0–0)
PLATELET # BLD AUTO: 116 K/UL — LOW (ref 150–400)
RBC # BLD: 3.96 M/UL — SIGNIFICANT CHANGE UP (ref 3.8–5.2)
RBC # FLD: 13.2 % — SIGNIFICANT CHANGE UP (ref 10.3–14.5)
RETICS #: 55.4 K/UL — SIGNIFICANT CHANGE UP (ref 25–125)
RETICS/RBC NFR: 1.4 % — SIGNIFICANT CHANGE UP (ref 0.5–2.5)
WBC # BLD: 2.76 K/UL — LOW (ref 3.8–10.5)
WBC # FLD AUTO: 2.76 K/UL — LOW (ref 3.8–10.5)

## 2022-08-22 PROCEDURE — 99214 OFFICE O/P EST MOD 30 MIN: CPT

## 2022-08-22 NOTE — DIETITIAN NUTRITION RISK NOTIFICATION - FINDINGS BASED ON COMPREHENSIVE NUTRITION ASSESSMENT, CONSULTATION PERFORMED ON
23-Mar-2021 Birth Control Pills Pregnancy And Lactation Text: This medication should be avoided if pregnant and for the first 30 days post-partum.

## 2022-08-28 NOTE — ASSESSMENT
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  No other explanations for cirrhosis known currently.  Much of this history took place in [facility],  Patient was able to bring records from the evaluation in [facility] as well as earlier evaluation in 2017 at [facility].  After several repeats, Protein S antigen improved to normal at 68%.  Was only low once. DOES NOT appear to be a congenital Protein S deficiency as was previously believed to have.  \par MRI had identified a 6mm lesion.  Was supposed to have a repeat MRI about 2 months ago, she states that she ha another one on the way, though a bit later than expected this is not clinically significant. Encourage her to move forward with this new MRI.  \par \par Patient had GI bleeding and menorrhagia this past winter with the xarelto exacerbating this, now on the prophylactic dose. Hg mostly stable at 11.3g/dl.  Will continue to monitor.  Will use IV venofer when it is required again.  \par \par History of recurrent hepatic vein thrombosis.  Bleeding on the higher dose of xarelto 20mg. Continue xarelto 10mg Daily for long term anticoagulation unless bleeding.  \par \par

## 2022-08-28 NOTE — HISTORY OF PRESENT ILLNESS
[de-identified] : [place]  #[id number]\par CBC today WBC 2.76 Hg 11.3g/dl platelets  116.  \par ANC 1410\par \par Hx of hepatic artery thrombosis, Patient remains  on the xarelto 10mg daily. Patient compliant with this, denies blood in stools or the urine.   Still has pain on the right side.  \par There was a 6mm area in the liver with new arterial enhancement.  Was reccomended to go for a follow up MRI.  
Attending with

## 2022-08-30 LAB
ALBUMIN SERPL ELPH-MCNC: 4 G/DL
ALP BLD-CCNC: 135 U/L
ALT SERPL-CCNC: 23 U/L
ANION GAP SERPL CALC-SCNC: 9 MMOL/L
AST SERPL-CCNC: 33 U/L
BILIRUB SERPL-MCNC: 1.4 MG/DL
BUN SERPL-MCNC: 16 MG/DL
CALCIUM SERPL-MCNC: 9.2 MG/DL
CHLORIDE SERPL-SCNC: 112 MMOL/L
CO2 SERPL-SCNC: 20 MMOL/L
CREAT SERPL-MCNC: 0.62 MG/DL
EGFR: 117 ML/MIN/1.73M2
FACT XA PPP-ACNC: 1.52 IU/ML
FERRITIN SERPL-MCNC: 19 NG/ML
GLUCOSE SERPL-MCNC: 109 MG/DL
IRON SATN MFR SERPL: 12 %
IRON SERPL-MCNC: 41 UG/DL
POTASSIUM SERPL-SCNC: 4.5 MMOL/L
PROT SERPL-MCNC: 6.5 G/DL
SODIUM SERPL-SCNC: 141 MMOL/L
TIBC SERPL-MCNC: 331 UG/DL
UIBC SERPL-MCNC: 290 UG/DL

## 2022-08-31 ENCOUNTER — RESULT REVIEW (OUTPATIENT)
Age: 37
End: 2022-08-31

## 2022-08-31 ENCOUNTER — APPOINTMENT (OUTPATIENT)
Dept: MRI IMAGING | Facility: CLINIC | Age: 37
End: 2022-08-31

## 2022-08-31 ENCOUNTER — OUTPATIENT (OUTPATIENT)
Dept: OUTPATIENT SERVICES | Facility: HOSPITAL | Age: 37
LOS: 1 days | End: 2022-08-31
Payer: COMMERCIAL

## 2022-08-31 DIAGNOSIS — Z00.8 ENCOUNTER FOR OTHER GENERAL EXAMINATION: ICD-10-CM

## 2022-08-31 DIAGNOSIS — C22.0 LIVER CELL CARCINOMA: ICD-10-CM

## 2022-08-31 PROCEDURE — 74183 MRI ABD W/O CNTR FLWD CNTR: CPT | Mod: 26

## 2022-08-31 PROCEDURE — A9585: CPT

## 2022-08-31 PROCEDURE — 74183 MRI ABD W/O CNTR FLWD CNTR: CPT

## 2022-09-07 ENCOUNTER — OUTPATIENT (OUTPATIENT)
Dept: OUTPATIENT SERVICES | Facility: HOSPITAL | Age: 37
LOS: 1 days | End: 2022-09-07
Payer: COMMERCIAL

## 2022-09-07 ENCOUNTER — APPOINTMENT (OUTPATIENT)
Dept: ULTRASOUND IMAGING | Facility: CLINIC | Age: 37
End: 2022-09-07

## 2022-09-07 DIAGNOSIS — Z95.828 PRESENCE OF OTHER VASCULAR IMPLANTS AND GRAFTS: ICD-10-CM

## 2022-09-07 DIAGNOSIS — I82.220 ACUTE EMBOLISM AND THROMBOSIS OF INFERIOR VENA CAVA: ICD-10-CM

## 2022-09-07 DIAGNOSIS — K74.60 UNSPECIFIED CIRRHOSIS OF LIVER: ICD-10-CM

## 2022-09-07 DIAGNOSIS — I82.0 BUDD-CHIARI SYNDROME: ICD-10-CM

## 2022-09-07 PROCEDURE — 93975 VASCULAR STUDY: CPT

## 2022-09-08 PROCEDURE — 93975 VASCULAR STUDY: CPT | Mod: 26

## 2022-09-15 ENCOUNTER — NON-APPOINTMENT (OUTPATIENT)
Age: 37
End: 2022-09-15

## 2022-09-15 ENCOUNTER — APPOINTMENT (OUTPATIENT)
Dept: INTERVENTIONAL RADIOLOGY/VASCULAR | Facility: CLINIC | Age: 37
End: 2022-09-15

## 2022-09-15 PROCEDURE — 99442: CPT

## 2022-09-21 ENCOUNTER — OUTPATIENT (OUTPATIENT)
Dept: OUTPATIENT SERVICES | Facility: HOSPITAL | Age: 37
LOS: 1 days | End: 2022-09-21
Payer: COMMERCIAL

## 2022-09-21 ENCOUNTER — APPOINTMENT (OUTPATIENT)
Dept: ULTRASOUND IMAGING | Facility: CLINIC | Age: 37
End: 2022-09-21

## 2022-09-21 DIAGNOSIS — Z95.828 PRESENCE OF OTHER VASCULAR IMPLANTS AND GRAFTS: ICD-10-CM

## 2022-09-21 DIAGNOSIS — I82.220 ACUTE EMBOLISM AND THROMBOSIS OF INFERIOR VENA CAVA: ICD-10-CM

## 2022-09-21 DIAGNOSIS — Z00.8 ENCOUNTER FOR OTHER GENERAL EXAMINATION: ICD-10-CM

## 2022-09-21 PROCEDURE — 76705 ECHO EXAM OF ABDOMEN: CPT | Mod: 26

## 2022-09-21 PROCEDURE — 76705 ECHO EXAM OF ABDOMEN: CPT

## 2022-09-21 NOTE — H&P ADULT - NSRESEARCHGRNTASSESS_GEN_ALL_CORE FT
----- Message from Jeremy Goodman MD sent at 9/20/2022 11:54 AM CDT -----  I will call patient to personally convey her bronch and biopsy results: positive for sarcoidosis. Please start her on 40 mg x 2 weeks, 30 mg x 2 weeks. 20 mg x 2 weeks and 10 mg x 2 weeks + Omeprazole 40 mg po at bedtime (1 hour pre-meals) + Bactrim for PJP ppx (1 DS tablet once daily until patient reaches 10 mg/day). Please refer to Dr Trejo and Dr Saxena for consults to evaluate the heart and eye to r/o sarcoid. Thanks. B   IMPROVE-DD Assessment completed: May 11 2021  6:23PM

## 2022-09-26 ENCOUNTER — RESULT REVIEW (OUTPATIENT)
Age: 37
End: 2022-09-26

## 2022-09-26 ENCOUNTER — NON-APPOINTMENT (OUTPATIENT)
Age: 37
End: 2022-09-26

## 2022-09-28 NOTE — ED PROVIDER NOTE - CAS EDP CONSULT REGARDING 1
Patient: Allison Thomas Date: 2022  : 2003 Attending: Negar Reno MD  19 year old male       Chief Complaint: Chest Pain LVEF: 60%    Subjective: In bed, mother at bedside.  Denies further chest pain.  Reviewed echo and CTA results with the patient and his mother.     Pertinent Reviewed:     Vitals Last Value 24 Hour Range  Temperature 97.7 °F (36.5 °C) Temp  Min: 97.7 °F (36.5 °C)  Max: 97.9 °F (36.6 °C)  Pulse 70 Pulse  Min: 59  Max: 70  Respiratory 20 Resp  Min: 16  Max: 20  Blood Pressure 103/70 BP  Min: 103/70  Max: 116/80  Arterial BP   No data recorded  Pulse Oximetry 100 % SpO2  Min: 99 %  Max: 100 %    Vitals Today Admission  Weight 67.6 kg (149 lb 0.5 oz) Weight: 64 kg (141 lb)    Weight over the past 48 Hours:  No data found.     Intake/Output:    I/O last 3 completed shifts:  In: 150 [P.O.:150]  Out: -     No intake or output data in the 24 hours ending 22 1325    Rhythm: Sinus Bradycardia    Medications/Infusions:  Scheduled:   Current Facility-Administered Medications   Medication Dose Route Frequency Provider Last Rate Last Admin   • sodium chloride (PF) 0.9 % injection 2 mL  2 mL Intracatheter 2 times per day Orin Kennedy MD   2 mL at 22 0951   • Potassium Standard Replacement Protocol (Levels 3.5 and lower)   Does not apply See Admin Instructions Orin Kennedy MD       • naproxen (NAPROSYN) tablet 250 mg  250 mg Oral TID  Annabelle Kelly MD   250 mg at 22 0949   • metoPROLOL tartrate (LOPRESSOR) tablet 50 mg  50 mg Oral 2 times per day Annabelle Kelly MD   50 mg at 22 0949        Physical Exam:   General Appearance: alert, cooperative and no distress  Heart: regular rate and rhythm, S1, S2 normal, no murmur, click, rub or gallop  Lungs: clear to auscultation bilaterally and normal respiratory effort  Abdomen: soft, nondistended  Extremities: extremities normal, atraumatic, no cyanosis or edema  Pulses: +1 Bilateral Dorsalis Pedis  Neurological:  Mental status: Alert, oriented, thought content appropriate    Laboratory Results:    Recent Labs   Lab 09/28/22  0542 09/27/22  0606 09/26/22  1011 09/26/22  0503   WBC 4.3 4.8  --  5.7   HCT 44.7 43.8  --  40.9   HGB 15.1 14.6  --  14.2    189  --  191   SODIUM 138 140  --  139   POTASSIUM 3.8 3.8 3.9 3.3*   CHLORIDE 108 109  --  108   CO2 23 26  --  26   GLUCOSE 88 94  --  93   BUN 17 13  --  12   CREATININE 0.64* 0.86  --  0.80         Imaging:  CTA HEART CORONARY ARTERIES   Final Result by Fly Sol MD (09/27 6034)   1.   Exam limited by patient motion artifact shows no evidence of   atherosclerotic plaque or stenosis identified within the coronary arterial   system.      Electronically Signed by: FLY SOL M.D.    Signed on: 9/27/2022 4:05 PM          XR CHEST PA OR AP 1 VIEW   Final Result by Diaz Rodriguez MD (09/26 7155)   1.    No acute cardiopulmonary findings seen.      Electronically Signed by: DIAZ RODRIGUEZ M.D.    Signed on: 9/26/2022 7:59 AM            Echo:   STUDY CONCLUSIONS  SUMMARY:     1. Left ventricle: The cavity size is normal. Wall thickness is normal. The     ejection fraction was measured by biplane method of disks. The ejection     fraction is 60%.  2. Right ventricle: The cavity size is normal. Wall thickness is normal.     Systolic function is normal.  3. Unremarkable Doppler study.       Assessment:  High probability for pericarditis with possible myocardial involvement  Multiple substance abuse including Adderall        Plan:   Will try Naprosyn--chest pain better  Echocardiogram - normal with EF of 60%    CTA coronaries limited by artifact, but no significant calcifications noted.     Ok for DC from CV perspective.  Follow up in 2 weeks.     ORION Butts        Note to Patient: The 21st Century Cures Act makes medical notes like these available to patients in the interest of transparency. However, be advised this is a medical document. It is  intended as peer to peer communication. It is written in medical language and may contain abbreviations or verbiage that are unfamiliar. It may appear blunt or direct. Medical documents are intended to carry relevant information, facts as evident, and the clinical opinion of the practitioner.       This note may have been transcribed using a voice dictation system. Voice recognition errors may occur. This should not be taken to alter the content or meaning of this note.    consult

## 2022-10-06 ENCOUNTER — APPOINTMENT (OUTPATIENT)
Dept: HEPATOLOGY | Facility: CLINIC | Age: 37
End: 2022-10-06

## 2022-10-06 VITALS
BODY MASS INDEX: 19.88 KG/M2 | HEART RATE: 86 BPM | RESPIRATION RATE: 16 BRPM | OXYGEN SATURATION: 98 % | HEIGHT: 62 IN | DIASTOLIC BLOOD PRESSURE: 64 MMHG | SYSTOLIC BLOOD PRESSURE: 100 MMHG | WEIGHT: 108 LBS

## 2022-10-06 PROCEDURE — 99214 OFFICE O/P EST MOD 30 MIN: CPT

## 2022-10-18 ENCOUNTER — OUTPATIENT (OUTPATIENT)
Dept: OUTPATIENT SERVICES | Facility: HOSPITAL | Age: 37
LOS: 1 days | End: 2022-10-18
Payer: COMMERCIAL

## 2022-10-18 ENCOUNTER — APPOINTMENT (OUTPATIENT)
Dept: ULTRASOUND IMAGING | Facility: HOSPITAL | Age: 37
End: 2022-10-18

## 2022-10-18 DIAGNOSIS — C22.0 LIVER CELL CARCINOMA: ICD-10-CM

## 2022-10-18 PROCEDURE — 76978 US TRGT DYN MBUBB 1ST LES: CPT

## 2022-10-18 PROCEDURE — 76978 US TRGT DYN MBUBB 1ST LES: CPT | Mod: 26

## 2022-10-26 NOTE — DIETITIAN INITIAL EVALUATION ADULT. - MALNUTRITION
Mild protein-calorie malnutrition Previously Declined (within the last year) Mild malnutrition in the context of chronic illness

## 2022-10-27 ENCOUNTER — APPOINTMENT (OUTPATIENT)
Dept: INTERVENTIONAL RADIOLOGY/VASCULAR | Facility: CLINIC | Age: 37
End: 2022-10-27
Payer: COMMERCIAL

## 2022-10-27 PROCEDURE — XXXXX: CPT | Mod: 1L

## 2022-11-10 ENCOUNTER — OUTPATIENT (OUTPATIENT)
Dept: OUTPATIENT SERVICES | Facility: HOSPITAL | Age: 37
LOS: 1 days | Discharge: HOME | End: 2022-11-10

## 2022-11-10 DIAGNOSIS — I82.0 BUDD-CHIARI SYNDROME: ICD-10-CM

## 2022-11-14 ENCOUNTER — RESULT REVIEW (OUTPATIENT)
Age: 37
End: 2022-11-14

## 2022-11-14 ENCOUNTER — OUTPATIENT (OUTPATIENT)
Dept: OUTPATIENT SERVICES | Facility: HOSPITAL | Age: 37
LOS: 1 days | End: 2022-11-14
Payer: MEDICAID

## 2022-11-14 ENCOUNTER — TRANSCRIPTION ENCOUNTER (OUTPATIENT)
Age: 37
End: 2022-11-14

## 2022-11-14 VITALS
RESPIRATION RATE: 16 BRPM | SYSTOLIC BLOOD PRESSURE: 102 MMHG | WEIGHT: 113.98 LBS | HEART RATE: 72 BPM | TEMPERATURE: 99 F | DIASTOLIC BLOOD PRESSURE: 73 MMHG | OXYGEN SATURATION: 99 %

## 2022-11-14 VITALS
RESPIRATION RATE: 16 BRPM | HEART RATE: 75 BPM | DIASTOLIC BLOOD PRESSURE: 63 MMHG | SYSTOLIC BLOOD PRESSURE: 96 MMHG | OXYGEN SATURATION: 98 %

## 2022-11-14 DIAGNOSIS — C22.0 LIVER CELL CARCINOMA: ICD-10-CM

## 2022-11-14 DIAGNOSIS — K76.9 LIVER DISEASE, UNSPECIFIED: ICD-10-CM

## 2022-11-14 DIAGNOSIS — K76.0 FATTY (CHANGE OF) LIVER, NOT ELSEWHERE CLASSIFIED: ICD-10-CM

## 2022-11-14 LAB
APTT BLD: 46.1 SEC — HIGH (ref 27.5–35.5)
INR BLD: 1.21 RATIO — HIGH (ref 0.88–1.16)
PROTHROM AB SERPL-ACNC: 14 SEC — HIGH (ref 10.5–13.4)

## 2022-11-14 PROCEDURE — 88313 SPECIAL STAINS GROUP 2: CPT | Mod: 26

## 2022-11-14 PROCEDURE — 88307 TISSUE EXAM BY PATHOLOGIST: CPT | Mod: 26

## 2022-11-14 PROCEDURE — 88342 IMHCHEM/IMCYTCHM 1ST ANTB: CPT | Mod: 26

## 2022-11-14 PROCEDURE — 47000 NEEDLE BIOPSY OF LIVER PERQ: CPT

## 2022-11-14 PROCEDURE — 76942 ECHO GUIDE FOR BIOPSY: CPT | Mod: 26

## 2022-11-14 PROCEDURE — 85610 PROTHROMBIN TIME: CPT

## 2022-11-14 PROCEDURE — 85730 THROMBOPLASTIN TIME PARTIAL: CPT

## 2022-11-14 PROCEDURE — 88173 CYTOPATH EVAL FNA REPORT: CPT

## 2022-11-14 PROCEDURE — 88173 CYTOPATH EVAL FNA REPORT: CPT | Mod: 26

## 2022-11-14 PROCEDURE — 86900 BLOOD TYPING SEROLOGIC ABO: CPT

## 2022-11-14 PROCEDURE — 88307 TISSUE EXAM BY PATHOLOGIST: CPT

## 2022-11-14 PROCEDURE — 88313 SPECIAL STAINS GROUP 2: CPT

## 2022-11-14 PROCEDURE — 36415 COLL VENOUS BLD VENIPUNCTURE: CPT

## 2022-11-14 PROCEDURE — 76942 ECHO GUIDE FOR BIOPSY: CPT

## 2022-11-14 PROCEDURE — 86850 RBC ANTIBODY SCREEN: CPT

## 2022-11-14 PROCEDURE — 86901 BLOOD TYPING SEROLOGIC RH(D): CPT

## 2022-11-14 PROCEDURE — 88341 IMHCHEM/IMCYTCHM EA ADD ANTB: CPT | Mod: 26

## 2022-11-14 PROCEDURE — 88341 IMHCHEM/IMCYTCHM EA ADD ANTB: CPT

## 2022-11-14 NOTE — ASU PATIENT PROFILE, ADULT - FALL HARM RISK - UNIVERSAL INTERVENTIONS
Bed in lowest position, wheels locked, appropriate side rails in place/Call bell, personal items and telephone in reach/Instruct patient to call for assistance before getting out of bed or chair/Non-slip footwear when patient is out of bed/[place] to call system/Physically safe environment - no spills, clutter or unnecessary equipment/Purposeful Proactive Rounding/Room/bathroom lighting operational, light cord in reach

## 2022-11-14 NOTE — PROCEDURE NOTE - PROCEDURE FINDINGS AND DETAILS
US w/ hypoechoic liver lesion. S/p successful core needle biopsy x2. Tract embolization with gelfoam slurry.

## 2022-11-14 NOTE — ASU PREOP CHECKLIST - SIDE RAILS UP
0730 - Bedside shift change report given to Jennifer Dorsey RN (oncoming nurse) by Yvonne Em RN (offgoing nurse). Report included the following information SBAR. Patient here for primary C Section for breech baby boy    26 - Dr Cam Xiong at bedside, U/S confirmed baby still breech. Plan to move forward with scheduled C Section    3646 - Patient in OR 1    0915 - Patient out of OR 1 and Back to Room 8    1115 - TRANSFER - OUT REPORT:    Verbal report given to Lindsey Benavides RN (name) on [facility]  being transferred to MIU (unit) for routine progression of care       Report consisted of patients Situation, Background, Assessment and   Recommendations(SBAR). Information from the following report(s) SBAR was reviewed with the receiving nurse. Lines:   Peripheral IV 09/21/22 Distal;Left;Posterior Forearm (Active)   Site Assessment Clean, dry, & intact 09/21/22 0823   Phlebitis Assessment 0 09/21/22 0823   Infiltration Assessment 0 09/21/22 0823   Dressing Status Clean, dry, & intact 09/21/22 0823   Dressing Type Transparent;Tape 09/21/22 0823   Hub Color/Line Status Pink 09/21/22 8358        Opportunity for questions and clarification was provided.       Patient transported with:   Registered Nurse done

## 2022-11-14 NOTE — ASU PREOP CHECKLIST - WARM FLUIDS/WARM BLANKETS
CHIEF COMPLAINT:  Chief Complaint   Patient presents with   • ER F/U     back pain   • Office Visit   • Imm/Inj     Flu & Pneumococcal Vaccines         HPI:  Jagdish Rodas is a pleasant 33 year old male, patient of No Pcp , who is here today for an acute visit for low back pain.    Low back pain:  Patient reports he was outside working on his car on 2/9/2021 when he slipped and fell on the ice.  Since that time he has been having midline back pain with radiation down the left leg to his knee.  Pain is described as constant throbbing ache with exquisite id sharp, electric shooting pains on any motion.  Pain is better when sitting in certain positions.  Pain is worse with extended sitting and any motion.  Patient was seen in emergency room on 2/10/2021.  X-rays of the lumbar spine and pelvis were obtained which, upon review of patient's chart are reported as no acute abnormalities.  Patient was given an injection of Solu-Medrol and a prescription for 40 mg of prednisone daily for 5 days and tizanidine.  Patient states symptoms have not improved.  He is here to follow-up.  Patient denies any bowel or bladder dysfunction.  Patient denies any saddle paresthesia.    Left knee pain:  Patient states since the onset of the low back he has “been walking like a penquin” he aid he states while walking, his left knee will lock up and then almost completely collapse.  Patient denies any trauma to the knee from the fall.        I have reviewed the patient's medications and allergies, past medical, surgical, social and family history, updating these as appropriate.  Please refer to history section in EHR.    REVIEW OF SYSTEMS:    Constitutional:  Denies fatigue, fever, chills,or unusual weight changes   Eyes:  Denies change in visual acuity   HENT:  Denies nasal congestion or sore throat   Respiratory:  Denies cough, shortness of breath or wheezing  Cardiovascular:  Denies chest pain, palpitations or edema   GI:  Denies  abdominal pain, nausea, vomiting, bloody stools or diarrhea   :  Denies dysuria   Integument:  Denies rash   Neurologic:  Denies headache, focal weakness or sensory changes   Endocrine:  Denies polyuria or polydipsia, heat or cold intolerance  Lymphatic:  Denies swollen glands   Psychiatric:  Denies depression or anxiety     I have reviewed the past medical history, family history, social history, medications and allergies listed in the medical record as obtained by my nursing staff and support staff and agree with their documentation.    There is no problem list on file for this patient.      Current Outpatient Medications   Medication Sig Dispense Refill   • fluticasone-salmeterol 100-50 MCG/DOSE inhaler Inhale 1 puff into the lungs two times daily.     • predniSONE (DELTASONE) 20 MG tablet Take 2 tablets by mouth daily for 5 days. Do not start before February 11, 2021. 10 tablet 0   • ibuprofen (MOTRIN) 800 MG tablet Take 1 tablet by mouth 3 times daily as needed for Pain. 30 tablet 0   • albuterol-ipratropium 2.5 mg/0.5 mg (DUONEB) 0.5-2.5 (3) MG/3ML nebulizer solution Inhale 3 mLs into the lungs.     • naproxen (ANAPROX DS) 550 MG tablet Take 1 tablet by mouth 2 times daily (with meals). 20 tablet 1   • lisdexamfetamine (VYVANSE) 40 MG capsule Take 40 mg by mouth every morning.       No current facility-administered medications for this visit.        PHYSICAL EXAM:   Visit Vitals  BP (!) 124/94 (BP Location: RUE - Right upper extremity, Patient Position: Sitting, Cuff Size: Regular)   Pulse 70   Ht 5' 10\" (1.778 m)   Wt 99.4 kg   SpO2 96%   BMI 31.44 kg/m²       General: Alert, cooperative, conversive. The patient is in moderate to severe discomfort.  Skin:  Warm and dry without rash.  HEENT: Normocephalic.  Normal conjunctivae and sclerae.  Moist mucous membranes.  Neck:  Trachea is midline. No adenopathy.  Cardiovascular: Symmetrical pulses.  Regular rate and rhythm without murmur.  Respiratory:  Normal  respiratory effort.  Clear to auscultation.  No wheezes, rales or rhonchi.  Gastrointestinal:  Soft, non-tender.  Normal bowel sounds.  No hepatomegaly.  No splenomegaly.  Musculoskeletal:  No deformity or edema.  Back:  Exquisite tenderness to palpation noted over patient's bilateral sacroiliac joints.  No tenderness to palpation over the lumbar spinous processes, paraspinal muscles or iliac crests.  Prone hip extension is significantly decreased on the right when compared to the left.  Leg length asymmetry is noted with the left leg noticeably shorter than the right indicating SI joint rotation bilaterally.  Neurologic: Oriented x 4.  No focal deficits.  Psychiatric:  Cooperative.  Appropriate mood and affect.    LABS:  No visits with results within 1 Week(s) from this visit.   Latest known visit with results is:   No results found for any previous visit.         ASSESSMENT AND PLAN:     1. Acute left-sided low back pain without sciatica  2. Segmental and somatic dysfunction of pelvic region  3. Segmental and somatic dysfunction of sacral region  Patient advised, based on symptom onset and physical exam, his low back pain is secondary to segmental and somatic dysfunction of the sacrum as well as the bilateral sacroiliac joints, which would likely benefit from osteopathic manipulation.  After obtaining verbal informed consent, with patient in the prone position on the exam table, performed osteopathic manipulation on patient's sacrum in a counter-clockwise direction multiple times.  Following manipulation.  There was significant improvement in the prone hip extension symmetry.  There was still some asymmetry remaining.  Patient was then repositioned into the left lateral decubitus position and manipulation was performed on the right sacroiliac joint in an inferior to superior, posterior anterior direction.  Patient was then repositioned into the right lateral decubitus position and manipulation was performed on the  left sacroiliac joint in a superior to inferior, posterior to anterior direction.  Patient tolerated procedure well and after getting off exam table, reported a significant, although not complete resolution of his lower back pain.  Patient was able to ambulate with significantly less pain.  Patient advised, given the traumatic onset of the pain he should be seen back daily this week.        4. Segmental and somatic dysfunction of lower extremity  Patient advised, his left knee pain was also, likely secondary to segmental and somatic dysfunction of the knee which would benefit from osteopathic manipulation.  With patient in the seated position on the exam table, performed osteopathic manipulation on patient's left knee with pre stressing in internal rotation and with knee flexed at 90° superior to inferior manipulation performed.  Manipulation was performed several times.  Patient tolerated procedure well and reported after getting off exam table his knee pain had resolved.    5. Hospital discharge follow-up    6. Need for vaccination    - INFLUENZA QUADRIVALENT SPLIT PRES FREE 0.5 ML VACC, IM (FLULAVAL,FLUARIX,FLUZONE)  - PNEUMOCOCCAL CONJUGATE 13 VALENT VACC(PREVNAR-13)        Return in about 1 day (around 2/17/2021) for F/U low back pain.          Eugene Jay MD  [facility]   [address] [facility] [id number] [phone]   no

## 2022-11-14 NOTE — PRE PROCEDURE NOTE - PRE PROCEDURE EVALUATION
Interventional Radiology Pre Procedure Note    HPI: 36y Female with budd chiari and liver lesion. Biopsy is requested.     Allergies:   Medications (Abx/Cardiac/Anticoagulation/Blood Products)    Data:  157.5  51.7  T(C): 37  HR: 72  BP: 102/73  RR: 16  SpO2: 99%    Exam  General: No acute distress  Chest: Non labored breathing    -INR1.21    Plan: 36y Female presents for liver lesion biopsy. Procedure and risks discussed with patient and she is agreeable to proceed.   -Risks/Benefits/alternatives explained with the patient and/or healthcare proxy and witnessed informed consent obtained.

## 2022-11-14 NOTE — ASU DISCHARGE PLAN (ADULT/PEDIATRIC) - ASU DC SPECIAL INSTRUCTIONSFT
Biopsy Discharge Instructions    - You have had a biopsy of a liver lesion.  - You may shower in 24 hours. No soaking or swimming until the site is completely healed.  - Do not perform any heavy lifting for the next few days or until the site is healed. You may resume light activity in 24 hours.  - You may resume your normal diet.  - It is normal to experience some pain over the site for the next few days. You may take apply ice to the area (20 minutes on, 20 minutes off) and take Tylenol for that pain. Do not take more frequently than every 6 hours and do not exceed more than 3000mg of Tylenol in a 24 hour period.    - YOU MAY RESUME YOUR HOME XARELTO IN 3 DAYS (11/17/22).    Notify your primary physician and/or Interventional Radiology IMMEDIATELY if you experience any of the following       - Fever of 101F or 38C       - Chills or Rigors/ Shakes       - Swelling and/or Redness in the area around the biopsy site       - Worsening Pain       - Blood soaked bandages or worsening bleeding       - Lightheadedness and/or dizziness upon standing       - Chest Pain/ Tightness       - Shortness of Breath       - Difficulty walking    If you have a problem that you believe requires IMMEDIATE attention, please go to your NEAREST Emergency Room. If you believe your problem can safely wait until you speak to a physician, please call Interventional Radiology for any concerns.    During Normal Weekday Business Hours- You can contact the Interventional Radiology department during normal business hours via telephone.    During Evenings and Weekends- If you need to contact Interventional Radiology during off hours, do so by calling the hospital and requesting to be connected to the Interventional Radiologist on call.

## 2022-11-15 ENCOUNTER — RESULT REVIEW (OUTPATIENT)
Age: 37
End: 2022-11-15

## 2022-11-15 ENCOUNTER — APPOINTMENT (OUTPATIENT)
Dept: HEMATOLOGY ONCOLOGY | Facility: CLINIC | Age: 37
End: 2022-11-15

## 2022-11-15 VITALS
RESPIRATION RATE: 16 BRPM | SYSTOLIC BLOOD PRESSURE: 102 MMHG | TEMPERATURE: 97.7 F | DIASTOLIC BLOOD PRESSURE: 69 MMHG | OXYGEN SATURATION: 98 % | WEIGHT: 111.09 LBS | BODY MASS INDEX: 20.19 KG/M2 | HEART RATE: 86 BPM | HEIGHT: 62.01 IN

## 2022-11-15 LAB
BASOPHILS # BLD AUTO: 0.03 K/UL — SIGNIFICANT CHANGE UP (ref 0–0.2)
BASOPHILS NFR BLD AUTO: 0.9 % — SIGNIFICANT CHANGE UP (ref 0–2)
EOSINOPHIL # BLD AUTO: 0.16 K/UL — SIGNIFICANT CHANGE UP (ref 0–0.5)
EOSINOPHIL NFR BLD AUTO: 4.8 % — SIGNIFICANT CHANGE UP (ref 0–6)
HCT VFR BLD CALC: 38.7 % — SIGNIFICANT CHANGE UP (ref 34.5–45)
HGB BLD-MCNC: 12.6 G/DL — SIGNIFICANT CHANGE UP (ref 11.5–15.5)
IMM GRANULOCYTES NFR BLD AUTO: 0 % — SIGNIFICANT CHANGE UP (ref 0–0.9)
LYMPHOCYTES # BLD AUTO: 0.98 K/UL — LOW (ref 1–3.3)
LYMPHOCYTES # BLD AUTO: 29.3 % — SIGNIFICANT CHANGE UP (ref 13–44)
MCHC RBC-ENTMCNC: 29.5 PG — SIGNIFICANT CHANGE UP (ref 27–34)
MCHC RBC-ENTMCNC: 32.6 G/DL — SIGNIFICANT CHANGE UP (ref 32–36)
MCV RBC AUTO: 90.6 FL — SIGNIFICANT CHANGE UP (ref 80–100)
MONOCYTES # BLD AUTO: 0.29 K/UL — SIGNIFICANT CHANGE UP (ref 0–0.9)
MONOCYTES NFR BLD AUTO: 8.7 % — SIGNIFICANT CHANGE UP (ref 2–14)
NEUTROPHILS # BLD AUTO: 1.88 K/UL — SIGNIFICANT CHANGE UP (ref 1.8–7.4)
NEUTROPHILS NFR BLD AUTO: 56.3 % — SIGNIFICANT CHANGE UP (ref 43–77)
NRBC # BLD: 0 /100 WBCS — SIGNIFICANT CHANGE UP (ref 0–0)
PLATELET # BLD AUTO: 117 K/UL — LOW (ref 150–400)
RBC # BLD: 4.27 M/UL — SIGNIFICANT CHANGE UP (ref 3.8–5.2)
RBC # FLD: 13.2 % — SIGNIFICANT CHANGE UP (ref 10.3–14.5)
WBC # BLD: 3.34 K/UL — LOW (ref 3.8–10.5)
WBC # FLD AUTO: 3.34 K/UL — LOW (ref 3.8–10.5)

## 2022-11-15 PROCEDURE — 99214 OFFICE O/P EST MOD 30 MIN: CPT

## 2022-11-16 NOTE — PATIENT PROFILE ADULT - NSPROHMSYMPCOND_GEN_A_NUR
Yomi Peterson called in INR 3.4 TAKING 1 tab mon- fri and 1-1/2 tab on sat and sun.  Patientto hold coumadin x 2 days then restart 1 tab qd
none

## 2022-11-16 NOTE — ASSESSMENT
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  No other explanations for cirrhosis known currently.  Much of this history took place in [facility],  Patient was able to bring records from the evaluation in [facility] as well as earlier evaluation in 2017 at [facility].  After several repeats, Protein S antigen improved to normal at 68%.  Was only low once. DOES NOT appear to be a congenital Protein S deficiency as was previously believed to have.  \par MRI had identified a 6mm lesion.  repeated MRI demonstrated probable malignancy.  The IR guided biopsy was completed, awaiting results which may come in prior to the follow up she has with Dr. Carvajal on Thursday.   \par Restart xarelto now following the completion of the biopsy.

## 2022-11-17 ENCOUNTER — APPOINTMENT (OUTPATIENT)
Dept: HEPATOLOGY | Facility: CLINIC | Age: 37
End: 2022-11-17

## 2022-11-17 VITALS
DIASTOLIC BLOOD PRESSURE: 65 MMHG | SYSTOLIC BLOOD PRESSURE: 107 MMHG | OXYGEN SATURATION: 97 % | HEART RATE: 93 BPM | BODY MASS INDEX: 20.98 KG/M2 | WEIGHT: 114 LBS | HEIGHT: 62 IN

## 2022-11-17 PROCEDURE — 99214 OFFICE O/P EST MOD 30 MIN: CPT

## 2022-11-18 LAB
ALBUMIN SERPL ELPH-MCNC: 4.4 G/DL
ALP BLD-CCNC: 165 U/L
ALT SERPL-CCNC: 28 U/L
ANION GAP SERPL CALC-SCNC: 10 MMOL/L
AST SERPL-CCNC: 39 U/L
BILIRUB SERPL-MCNC: 1.3 MG/DL
BUN SERPL-MCNC: 9 MG/DL
CALCIUM SERPL-MCNC: 9.5 MG/DL
CHLORIDE SERPL-SCNC: 108 MMOL/L
CO2 SERPL-SCNC: 24 MMOL/L
CREAT SERPL-MCNC: 0.61 MG/DL
EGFR: 117 ML/MIN/1.73M2
GLUCOSE SERPL-MCNC: 72 MG/DL
INR PPP: 1.87 RATIO
POTASSIUM SERPL-SCNC: 4.3 MMOL/L
PROT SERPL-MCNC: 7.2 G/DL
PT BLD: 21.8 SEC
SODIUM SERPL-SCNC: 142 MMOL/L

## 2022-11-22 DIAGNOSIS — K76.9 LIVER DISEASE, UNSPECIFIED: ICD-10-CM

## 2022-11-22 DIAGNOSIS — I82.0 BUDD-CHIARI SYNDROME: ICD-10-CM

## 2022-11-22 LAB
ALPHA-1-FETOPROTEIN-L3: 12.8 %
ALPHA-1-FETOPROTEIN: 3.2 NG/ML

## 2022-11-23 LAB — NON-GYNECOLOGICAL CYTOLOGY STUDY: SIGNIFICANT CHANGE UP

## 2022-11-25 NOTE — HISTORY OF PRESENT ILLNESS
[FreeTextEntry1] : Ms. 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 She has retrograde IVC flow into a very lagre azygous collateral with return to SVC. She was readmitted on May 11, 2021 for an elective TIPS/DIPS, and had a successful TIPS procedure done.  She remained admitted from May 11 through May 25, 2021.  Her course was complicated with recurrent hepatic hydrothorax requiring thoracentesis during her hospitalization.  \par Her blood type is B+.\par \par EGD 2017-Grade A varices and Gastropathy \par EGD: 2021-  Grade I-II esophageal varices. Not amenable to banding.\par                      - Non-bleeding gastric ulcer with no stigmata of bleeding. Biopsied.\par                      - Multiple non-bleeding duodenal ulcers with no stigmata of bleeding.\par Biopsy suggestive of H. Pylori.  She was treated with anti-Helicobacter pylori treatment regimen.\par \par She had an MRI done on 2021 that revealed features suggestive of Budd-Chiari syndrome.  Patent TIPS.  A new 1.2 cm right hepatic lobe lesion that washout and restricted diffusion raising concern for hepatocellular carcinoma was noted.  This was reviewed at the tumor board meeting.  The plan is to have a follow-up MRI in about 3 months.  I discussed results of MRI in detail with the patient.  All questions and concerns were addressed.\par \par She underwent MRI on  which revealed a 1.1cm segment 6 liver lesion with washout that now has arterial enhancement that raises the concern for early HCC.  She also underwent TIPS doppler on 3/2/22 which revealed a patent TIPS.  \par \par Today, she feels well.  She denies any swelling in her feet or any abdominal distention.  Denies any nausea, vomiting, abdominal pain, fever, chills, rigor.\par \par Recent follow-up MRI from 2022 revealed Budd-Chiari with cirrhosis and indwelling TIPS.  Diminished enhancement along the portal venous end of the TIPS.  It was recommended to have a Doppler ultrasound to better assess the TIPS patency.  Right hepatic lobe LR 5 lesion measuring 1.8 cm within segment 6 was noted.  This lesion was biopsied by Dr. Darrick Ragsdale on 2022.  Official pathology result is pending.\par \par We reviewed recent labs from 2022 which essentially revealed a hemoglobin of 11.3 g/dL and platelet count 116,000.  Liver test revealed total bilirubin 1.4 mg/dL, AST 33, ALT 23, alkaline phosphatase 135.  Serum creatinine 0.60 mg/dL.  INR 1.38 on Xarelto 20 mg daily.  Her alpha-fetoprotein level from 2022 revealed 2.7 ng/mL.\par \par Today, on her follow-up visit she reports he has been experiencing occasional abdominal pain.  She also feels dizzy at times.  She denies any nausea, vomiting, fever, chills, rigor.  She continues to remain on Aldactone 50 mg daily with good control of her ascites and lower extremity edema.  We have recently added lactulose as she was complaining of intermittent forgetfulness/confusion.  She also remains on Xarelto 10 mg 1 tablet daily.  She has been taking pantoprazole 40 mg daily as well as ferrous sulfate supplement 1 tablet daily.\par \par Gestational History: \par #1 (): Vaginal delivery girl, at 28 weeks GA. Delivery occurred at [facility]. Pregnancy #1 Comments: ( delivery, baby passed after 30 minutes. Pt remained in hospital due to enlarged liver). \par #2 (): at 16 weeks GA. Delivery occurred at [facility]. Pregnancy #2 Comments: (spontaneous ab, D&C). \par #3 (): at 8 weeks GA. Delivery occurred at [facility]. Pregnancy #3 Comments: (Spontaneous ab, D&C). \par  #4 (): at 4 weeks GA. Delivery occurred at [facility]. Pregnancy #4 Comments: (Induced ab- Pt was diagnosed with Cirrhosis, pt was advised to terminate pregnancy). 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 Has a brother who is healthy

## 2022-11-25 NOTE — ASSESSMENT
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  Patient is currently status post TIPS procedure with resolved ascites and hepatic hydrothorax.  Currently remains on Aldactone 50 mg every day.  Recent MRI revealing a 1.1 cm right hepatic lobe lesion concerning for HCC. Recent follow-up MRI from August 31, 2022 revealed right hepatic lobe lesion measuring 1.8 cm within segment 6 was noted.  Although the lesion appears LR 5 this criteria cannot be applied in Budd-Chiari syndrome.  It was recommended in the tumor board meeting that the patient should undergo a liver biopsy of the lesion.  She has undergone targeted liver biopsy of the suspicious lesion and awaiting results.  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 \par She currently remains on  Aldactone 50 mg daily with no recurrence of ascites or lower extremity edema.\par - Patent TIPS noted on Doppler from March 2nd, 2022.  She is following with IR, Dr. Ragsdael.  \par \par I recommended follow-up labs including CBC, CMP, PT/INR, alpha-fetoprotein.\par \par #Peptic ulcer disease/esophageal varices/H. pylori infection\par \par Recent upper endoscopy revealed Grade 1 - 2 esophageal varices in the distal esophagus that is not amenable to banding.  Nonbleeding gastric ulcer with no stigmata of bleeding was noted.  Random biopsies were obtained and was positive for H. pylori.  Patient is currently status post anti-H. pylori treatment.  \par \par She will continue on Pantoprazole 40 mg PO qd. she is also taking over-the-counter famotidine as needed.\par \par \par # Hepatic hydrothorax and ascites:\par Clinically resolved hepatic hydrothorax.  In addition there is no clinically evident ascites on my examination today.  She currently remains on Aldactone 50 mg daily \par \par - Ms. DAVILA was counseled to adhere to a low sodium (<2 grams of sodium per day) diet by: avoiding adding any salt to meals (removing the salt shaker from the table); eliminating salty foods from her diet; eating more home-cooked meals; choosing fresh or frozen, not canned, vegetables and fruits; and reading ingredient and food labels to choose low sodium foods.\par \par # Small, non-bleeding esophageal varices:\par -She is off beta-blocker due to hypotension.  Small grade 1 through 2 varices that is not amenable to banding.  We will continue to monitor with follow-up endoscopies.  Next endoscopy in \par \par # Indeterminate hepatic lesions/Early HCC: \par MRI from 2 March 2022 revealed a 1.1 cm segment 6 right hepatic lesion with washout unchanged in size compared to November 27, 2021.  Recent follow-up MRI from August 31, 2022 revealed right hepatic lobe lesion measuring 1.8 cm within segment 6 was noted.  Although the lesion appears LR 5 this criteria cannot be applied in Budd-Chiari syndrome.  It was recommended in the tumor board meeting that the patient should undergo a liver biopsy of the lesion.  Alpha-fetoprotein level is within normal range.  She he is currently status post targeted liver biopsy of this suspicious liver lesion and is awaiting results.\par \par # Transplant candidacy\par After successful TIPS he appears to an excellent candidate for evaluation for liver transplantation.Recent follow-up MRI from August 31, 2022 revealed right hepatic lobe lesion measuring 1.8 cm within segment 6 was noted 9increased from 1.1 cm).  Although the lesion appears LR 5 type lesion this criteria cannot be applied in Budd-Chiari syndrome.  It was recommended in the tumor board meeting that the patient should undergo a liver biopsy of the lesion.\par \par She has completed her evaluation and testing for potential liver transplantation.  We will await results of the liver biopsy once it is completed to present her candidacy at the listing committee meeting.\par \par #Generalized pruritus\par Resolved symptoms.  Currently not on any medication.\par \par Return to hepatology clinic in about 6 weeks.

## 2022-12-01 ENCOUNTER — NON-APPOINTMENT (OUTPATIENT)
Age: 37
End: 2022-12-01

## 2022-12-02 ENCOUNTER — NON-APPOINTMENT (OUTPATIENT)
Age: 37
End: 2022-12-02

## 2022-12-08 ENCOUNTER — NON-APPOINTMENT (OUTPATIENT)
Age: 37
End: 2022-12-08

## 2022-12-08 LAB
AFP-TM SERPL-MCNC: 3.8 NG/ML
ALBUMIN SERPL ELPH-MCNC: 4 G/DL
ALP BLD-CCNC: 144 U/L
ALT SERPL-CCNC: 21 U/L
ANION GAP SERPL CALC-SCNC: 16 MMOL/L
AST SERPL-CCNC: 36 U/L
BASOPHILS # BLD AUTO: 0.03 K/UL
BASOPHILS NFR BLD AUTO: 0.9 %
BILIRUB SERPL-MCNC: 2.1 MG/DL
BUN SERPL-MCNC: 12 MG/DL
CALCIUM SERPL-MCNC: 9.5 MG/DL
CHLORIDE SERPL-SCNC: 105 MMOL/L
CO2 SERPL-SCNC: 18 MMOL/L
CREAT SERPL-MCNC: 0.65 MG/DL
EGFR: 115 ML/MIN/1.73M2
EOSINOPHIL # BLD AUTO: 0.15 K/UL
EOSINOPHIL NFR BLD AUTO: 4.5 %
GLUCOSE SERPL-MCNC: 85 MG/DL
HCT VFR BLD CALC: 38.2 %
HGB BLD-MCNC: 11.8 G/DL
IMM GRANULOCYTES NFR BLD AUTO: 0 %
INR PPP: 2.52 RATIO
LYMPHOCYTES # BLD AUTO: 1.1 K/UL
LYMPHOCYTES NFR BLD AUTO: 32.6 %
MAN DIFF?: NORMAL
MCHC RBC-ENTMCNC: 30.2 PG
MCHC RBC-ENTMCNC: 30.9 GM/DL
MCV RBC AUTO: 97.7 FL
MONOCYTES # BLD AUTO: 0.29 K/UL
MONOCYTES NFR BLD AUTO: 8.6 %
NEUTROPHILS # BLD AUTO: 1.8 K/UL
NEUTROPHILS NFR BLD AUTO: 53.4 %
PLATELET # BLD AUTO: 156 K/UL
POTASSIUM SERPL-SCNC: 4.6 MMOL/L
PROT SERPL-MCNC: 6.7 G/DL
PT BLD: 30 SEC
RBC # BLD: 3.91 M/UL
RBC # FLD: 14 %
SODIUM SERPL-SCNC: 139 MMOL/L
WBC # FLD AUTO: 3.37 K/UL

## 2022-12-13 ENCOUNTER — APPOINTMENT (OUTPATIENT)
Dept: MRI IMAGING | Facility: CLINIC | Age: 37
End: 2022-12-13

## 2022-12-13 ENCOUNTER — APPOINTMENT (OUTPATIENT)
Dept: CT IMAGING | Facility: CLINIC | Age: 37
End: 2022-12-13

## 2022-12-13 ENCOUNTER — APPOINTMENT (OUTPATIENT)
Dept: ULTRASOUND IMAGING | Facility: CLINIC | Age: 37
End: 2022-12-13

## 2022-12-13 ENCOUNTER — OUTPATIENT (OUTPATIENT)
Dept: OUTPATIENT SERVICES | Facility: HOSPITAL | Age: 37
LOS: 1 days | End: 2022-12-13
Payer: COMMERCIAL

## 2022-12-13 DIAGNOSIS — C22.0 LIVER CELL CARCINOMA: ICD-10-CM

## 2022-12-13 PROCEDURE — 71250 CT THORAX DX C-: CPT | Mod: 26

## 2022-12-13 PROCEDURE — 74183 MRI ABD W/O CNTR FLWD CNTR: CPT | Mod: 26

## 2022-12-13 PROCEDURE — 74183 MRI ABD W/O CNTR FLWD CNTR: CPT

## 2022-12-13 PROCEDURE — 93975 VASCULAR STUDY: CPT | Mod: 26

## 2022-12-13 PROCEDURE — 71250 CT THORAX DX C-: CPT

## 2022-12-13 PROCEDURE — 93975 VASCULAR STUDY: CPT

## 2022-12-13 PROCEDURE — A9585: CPT

## 2022-12-18 ENCOUNTER — RX RENEWAL (OUTPATIENT)
Age: 37
End: 2022-12-18

## 2022-12-22 ENCOUNTER — APPOINTMENT (OUTPATIENT)
Dept: INTERVENTIONAL RADIOLOGY/VASCULAR | Facility: CLINIC | Age: 37
End: 2022-12-22
Payer: COMMERCIAL

## 2022-12-22 PROCEDURE — XXXXX: CPT | Mod: 1L

## 2022-12-29 ENCOUNTER — NON-APPOINTMENT (OUTPATIENT)
Age: 37
End: 2022-12-29

## 2023-01-04 ENCOUNTER — NON-APPOINTMENT (OUTPATIENT)
Age: 38
End: 2023-01-04

## 2023-01-04 ENCOUNTER — RX RENEWAL (OUTPATIENT)
Age: 38
End: 2023-01-04

## 2023-01-04 LAB
AFP-TM SERPL-MCNC: 3.9 NG/ML
ALBUMIN SERPL ELPH-MCNC: 4.2 G/DL
ALP BLD-CCNC: 167 U/L
ALT SERPL-CCNC: 37 U/L
ANION GAP SERPL CALC-SCNC: 12 MMOL/L
AST SERPL-CCNC: 43 U/L
BASOPHILS # BLD AUTO: 0.02 K/UL
BASOPHILS NFR BLD AUTO: 0.5 %
BILIRUB SERPL-MCNC: 2.2 MG/DL
BUN SERPL-MCNC: 14 MG/DL
CALCIUM SERPL-MCNC: 9.5 MG/DL
CHLORIDE SERPL-SCNC: 104 MMOL/L
CO2 SERPL-SCNC: 20 MMOL/L
CREAT SERPL-MCNC: 0.66 MG/DL
EGFR: 114 ML/MIN/1.73M2
EOSINOPHIL # BLD AUTO: 0.13 K/UL
EOSINOPHIL NFR BLD AUTO: 3.1 %
GLUCOSE SERPL-MCNC: 89 MG/DL
HCT VFR BLD CALC: 37.4 %
HGB BLD-MCNC: 12.1 G/DL
IMM GRANULOCYTES NFR BLD AUTO: 0.5 %
INR PPP: 2.12 RATIO
LYMPHOCYTES # BLD AUTO: 1.25 K/UL
LYMPHOCYTES NFR BLD AUTO: 29.6 %
MAN DIFF?: NORMAL
MCHC RBC-ENTMCNC: 30.6 PG
MCHC RBC-ENTMCNC: 32.4 GM/DL
MCV RBC AUTO: 94.4 FL
MONOCYTES # BLD AUTO: 0.36 K/UL
MONOCYTES NFR BLD AUTO: 8.5 %
NEUTROPHILS # BLD AUTO: 2.44 K/UL
NEUTROPHILS NFR BLD AUTO: 57.8 %
PLATELET # BLD AUTO: 168 K/UL
POTASSIUM SERPL-SCNC: 4.6 MMOL/L
PROT SERPL-MCNC: 6.6 G/DL
PT BLD: 24.8 SEC
RBC # BLD: 3.96 M/UL
RBC # FLD: 14.5 %
SODIUM SERPL-SCNC: 137 MMOL/L
WBC # FLD AUTO: 4.22 K/UL

## 2023-01-06 ENCOUNTER — OUTPATIENT (OUTPATIENT)
Dept: OUTPATIENT SERVICES | Facility: HOSPITAL | Age: 38
LOS: 1 days | End: 2023-01-06
Payer: MEDICAID

## 2023-01-06 VITALS
HEIGHT: 62 IN | TEMPERATURE: 98 F | OXYGEN SATURATION: 96 % | RESPIRATION RATE: 18 BRPM | WEIGHT: 113.98 LBS | SYSTOLIC BLOOD PRESSURE: 90 MMHG | HEART RATE: 75 BPM | DIASTOLIC BLOOD PRESSURE: 56 MMHG

## 2023-01-06 DIAGNOSIS — Z98.890 OTHER SPECIFIED POSTPROCEDURAL STATES: Chronic | ICD-10-CM

## 2023-01-06 DIAGNOSIS — C22.0 LIVER CELL CARCINOMA: ICD-10-CM

## 2023-01-06 DIAGNOSIS — Z95.828 PRESENCE OF OTHER VASCULAR IMPLANTS AND GRAFTS: Chronic | ICD-10-CM

## 2023-01-06 DIAGNOSIS — Z11.52 ENCOUNTER FOR SCREENING FOR COVID-19: ICD-10-CM

## 2023-01-06 LAB
APTT BLD: 52.1 SEC — HIGH (ref 27.5–35.5)
INR BLD: 2.16 RATIO — HIGH (ref 0.88–1.16)
PROTHROM AB SERPL-ACNC: 25 SEC — HIGH (ref 10.5–13.4)
SARS-COV-2 RNA SPEC QL NAA+PROBE: SIGNIFICANT CHANGE UP

## 2023-01-06 PROCEDURE — 86850 RBC ANTIBODY SCREEN: CPT

## 2023-01-06 PROCEDURE — C9803: CPT

## 2023-01-06 PROCEDURE — U0005: CPT

## 2023-01-06 PROCEDURE — G0463: CPT

## 2023-01-06 PROCEDURE — 86901 BLOOD TYPING SEROLOGIC RH(D): CPT

## 2023-01-06 PROCEDURE — U0003: CPT

## 2023-01-06 PROCEDURE — 86900 BLOOD TYPING SEROLOGIC ABO: CPT

## 2023-01-06 NOTE — H&P PST ADULT - HISTORY OF PRESENT ILLNESS
This is a 37 with past medical history  of protein S deficiency on xarelto, Budd-Chiari leading to decompensated cirrhosis w/ ascites and R hepatic hydrothorax, splenomegaly and enlarged L caudate lobe s/p  IVC thrombectomy 4/20/21, TIPS/thora on 5/11/21     presenting after venogram for right sided chest pain that radiated to left. She reports it started at 5AM, pressure-like pain, similar to past occurrences. She reports this pain was more severe. She ate breakfast at 9AM without worsening of the pain however she was concerned it was her heart so she came to the ED.       Presenting to PST  scheduled CT guided liver MWA on 1/10/23 with Dr. 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 Recent MRI revealed hepatic lobe lesion measuring 1.8cm. Possible liver transplantation candiate. Presenting to [facility]  scheduled CT guided liver MWA on 1/10/23 with Dr. Ragsdale     Barbadian Speaking- Barbara [id number]  ** Covid infection 3/4/2021  **Covid swab performed prior to {ST  ** Covid March 2020- Requiring hospitalization

## 2023-01-06 NOTE — H&P PST ADULT - NSICDXPASTSURGICALHX_GEN_ALL_CORE_FT
PAST SURGICAL HISTORY:  History of dilation and curettage     History of transjugular intrahepatic portosystemic shunt     Presence of IVC filter

## 2023-01-06 NOTE — H&P PST ADULT - PROBLEM SELECTOR PLAN 1
Preop instructions given  Labs CBC CMP on chart, T&S and coags performed in PST  Patient instructed by Surgeon to take last dose of Xarelto on 1/6/23  Covid test done prior to PST arrival

## 2023-01-06 NOTE — H&P PST ADULT - PATIENT ON (OXYGEN DELIVERY METHOD)
[address] [facility] Noah: Ms Vy Verma was seen today at 34w4d for NST (found under the pregnancy episode) which I reviewed the RN assessment and agree, and RJ (see ultrasound report under OB procedures tab)  Please don't hesitate to contact our office with any concerns or questions    Orestes Fleming MD room air

## 2023-01-10 ENCOUNTER — OUTPATIENT (OUTPATIENT)
Dept: INPATIENT UNIT | Facility: HOSPITAL | Age: 38
LOS: 1 days | End: 2023-01-10
Payer: MEDICAID

## 2023-01-10 ENCOUNTER — TRANSCRIPTION ENCOUNTER (OUTPATIENT)
Age: 38
End: 2023-01-10

## 2023-01-10 VITALS
RESPIRATION RATE: 16 BRPM | OXYGEN SATURATION: 99 % | SYSTOLIC BLOOD PRESSURE: 101 MMHG | DIASTOLIC BLOOD PRESSURE: 68 MMHG | HEART RATE: 83 BPM

## 2023-01-10 VITALS
WEIGHT: 113.98 LBS | HEART RATE: 74 BPM | TEMPERATURE: 98 F | DIASTOLIC BLOOD PRESSURE: 72 MMHG | OXYGEN SATURATION: 98 % | RESPIRATION RATE: 16 BRPM | SYSTOLIC BLOOD PRESSURE: 107 MMHG

## 2023-01-10 DIAGNOSIS — Z98.890 OTHER SPECIFIED POSTPROCEDURAL STATES: Chronic | ICD-10-CM

## 2023-01-10 DIAGNOSIS — Z95.828 PRESENCE OF OTHER VASCULAR IMPLANTS AND GRAFTS: Chronic | ICD-10-CM

## 2023-01-10 DIAGNOSIS — C22.0 LIVER CELL CARCINOMA: ICD-10-CM

## 2023-01-10 LAB
APTT BLD: 46.7 SEC — HIGH (ref 27.5–35.5)
INR BLD: 1.25 RATIO — HIGH (ref 0.88–1.16)
PROTHROM AB SERPL-ACNC: 14.4 SEC — HIGH (ref 10.5–13.4)

## 2023-01-10 PROCEDURE — 47382 PERCUT ABLATE LIVER RF: CPT

## 2023-01-10 PROCEDURE — 77013 CT GUIDE FOR TISSUE ABLATION: CPT | Mod: 26

## 2023-01-10 RX ORDER — HYDROMORPHONE HYDROCHLORIDE 2 MG/ML
0.5 INJECTION INTRAMUSCULAR; INTRAVENOUS; SUBCUTANEOUS
Refills: 0 | Status: DISCONTINUED | OUTPATIENT
Start: 2023-01-10 | End: 2023-01-10

## 2023-01-10 RX ORDER — FAMOTIDINE 10 MG/ML
1 INJECTION INTRAVENOUS
Qty: 0 | Refills: 0 | DISCHARGE

## 2023-01-10 NOTE — ASU DISCHARGE PLAN (ADULT/PEDIATRIC) - NURSING INSTRUCTIONS
Please feel free to contact us at [phone] if any problems arise. After 6PM, Monday through Friday, on weekends and on holidays, please call [phone] and ask for the radiology resident on call to be paged.

## 2023-01-10 NOTE — ASU DISCHARGE PLAN (ADULT/PEDIATRIC) - ASU DC SPECIAL INSTRUCTIONSFT
You had a liver lesion ablation. You may have mild abdominal pain for a week or so. If the pain worsens or you experience any new concerning symptoms, please call us at .

## 2023-01-10 NOTE — PRE-ANESTHESIA EVALUATION ADULT - MALLAMPATI CLASS
Up with walker for short distances
Class III - visualization of the soft palate and the base of the uvula

## 2023-01-10 NOTE — PRE PROCEDURE NOTE - PRE PROCEDURE EVALUATION
Interventional Radiology Pre Procedure Note    HPI: 37y Female with right lobe HCC. Ablation is requested.     Allergies:   Medications (Abx/Cardiac/Anticoagulation/Blood Products)      Data:    51.7  T(C): 36.7  HR: 74  BP: 107/72  RR: 16  SpO2: 98%    Exam  General: No acute distress  Chest: Non labored breathing    -INR2.16    Plan: 37y Female presents for liver ablation. Procedure and risks discussed with patient and she is agreeable to proceed.   -Risks/Benefits/alternatives explained with the patient and/or healthcare proxy and witnessed informed consent obtained.

## 2023-01-12 PROCEDURE — 85610 PROTHROMBIN TIME: CPT

## 2023-01-12 PROCEDURE — 47382 PERCUT ABLATE LIVER RF: CPT

## 2023-01-12 PROCEDURE — C1889: CPT

## 2023-01-12 PROCEDURE — 77013 CT GUIDE FOR TISSUE ABLATION: CPT

## 2023-01-12 PROCEDURE — 85730 THROMBOPLASTIN TIME PARTIAL: CPT

## 2023-01-14 LAB — ACARBOXYPROTHROMBIN SERPL-MCNC: 0.8 NG/ML

## 2023-02-02 ENCOUNTER — OUTPATIENT (OUTPATIENT)
Dept: OUTPATIENT SERVICES | Facility: HOSPITAL | Age: 38
LOS: 1 days | Discharge: HOME | End: 2023-02-02

## 2023-02-02 ENCOUNTER — EMERGENCY (EMERGENCY)
Facility: HOSPITAL | Age: 38
LOS: 1 days | Discharge: HOME | End: 2023-02-02
Attending: STUDENT IN AN ORGANIZED HEALTH CARE EDUCATION/TRAINING PROGRAM
Payer: MEDICAID

## 2023-02-02 VITALS
HEIGHT: 62 IN | HEART RATE: 86 BPM | SYSTOLIC BLOOD PRESSURE: 96 MMHG | OXYGEN SATURATION: 99 % | RESPIRATION RATE: 18 BRPM | WEIGHT: 139.99 LBS | DIASTOLIC BLOOD PRESSURE: 67 MMHG

## 2023-02-02 DIAGNOSIS — I82.0 BUDD-CHIARI SYNDROME: ICD-10-CM

## 2023-02-02 DIAGNOSIS — Z95.828 PRESENCE OF OTHER VASCULAR IMPLANTS AND GRAFTS: Chronic | ICD-10-CM

## 2023-02-02 DIAGNOSIS — Z98.890 OTHER SPECIFIED POSTPROCEDURAL STATES: Chronic | ICD-10-CM

## 2023-02-02 PROCEDURE — 99284 EMERGENCY DEPT VISIT MOD MDM: CPT

## 2023-02-02 NOTE — ED ADULT TRIAGE NOTE - CHIEF COMPLAINT QUOTE
had two teeth pulled yesterday, arrives for continuous bleeding from gums.  on xarelto. Denies lightheadedness or dizziness

## 2023-02-03 VITALS
SYSTOLIC BLOOD PRESSURE: 101 MMHG | OXYGEN SATURATION: 97 % | DIASTOLIC BLOOD PRESSURE: 64 MMHG | RESPIRATION RATE: 16 BRPM | HEART RATE: 73 BPM

## 2023-02-03 LAB
ALBUMIN SERPL ELPH-MCNC: 3.9 G/DL — SIGNIFICANT CHANGE UP (ref 3.3–5)
ALP SERPL-CCNC: 157 U/L — HIGH (ref 40–120)
ALT FLD-CCNC: 27 U/L — SIGNIFICANT CHANGE UP (ref 10–45)
ANION GAP SERPL CALC-SCNC: 9 MMOL/L — SIGNIFICANT CHANGE UP (ref 5–17)
APTT BLD: 48 SEC — HIGH (ref 27.5–35.5)
AST SERPL-CCNC: 43 U/L — HIGH (ref 10–40)
BASOPHILS # BLD AUTO: 0.02 K/UL — SIGNIFICANT CHANGE UP (ref 0–0.2)
BASOPHILS NFR BLD AUTO: 0.7 % — SIGNIFICANT CHANGE UP (ref 0–2)
BILIRUB SERPL-MCNC: 1.7 MG/DL — HIGH (ref 0.2–1.2)
BUN SERPL-MCNC: 20 MG/DL — SIGNIFICANT CHANGE UP (ref 7–23)
CALCIUM SERPL-MCNC: 9.3 MG/DL — SIGNIFICANT CHANGE UP (ref 8.4–10.5)
CHLORIDE SERPL-SCNC: 110 MMOL/L — HIGH (ref 96–108)
CO2 SERPL-SCNC: 20 MMOL/L — LOW (ref 22–31)
CREAT SERPL-MCNC: 0.57 MG/DL — SIGNIFICANT CHANGE UP (ref 0.5–1.3)
EGFR: 118 ML/MIN/1.73M2 — SIGNIFICANT CHANGE UP
EOSINOPHIL # BLD AUTO: 0.11 K/UL — SIGNIFICANT CHANGE UP (ref 0–0.5)
EOSINOPHIL NFR BLD AUTO: 3.7 % — SIGNIFICANT CHANGE UP (ref 0–6)
GLUCOSE SERPL-MCNC: 102 MG/DL — HIGH (ref 70–99)
HCT VFR BLD CALC: 40.2 % — SIGNIFICANT CHANGE UP (ref 34.5–45)
HGB BLD-MCNC: 13.3 G/DL — SIGNIFICANT CHANGE UP (ref 11.5–15.5)
INR BLD: 1.44 RATIO — HIGH (ref 0.88–1.16)
LYMPHOCYTES # BLD AUTO: 1.26 K/UL — SIGNIFICANT CHANGE UP (ref 1–3.3)
LYMPHOCYTES # BLD AUTO: 42.1 % — SIGNIFICANT CHANGE UP (ref 13–44)
MANUAL SMEAR VERIFICATION: SIGNIFICANT CHANGE UP
MCHC RBC-ENTMCNC: 30.9 PG — SIGNIFICANT CHANGE UP (ref 27–34)
MCHC RBC-ENTMCNC: 33.1 GM/DL — SIGNIFICANT CHANGE UP (ref 32–36)
MCV RBC AUTO: 93.3 FL — SIGNIFICANT CHANGE UP (ref 80–100)
MONOCYTES # BLD AUTO: 0.34 K/UL — SIGNIFICANT CHANGE UP (ref 0–0.9)
MONOCYTES NFR BLD AUTO: 11.4 % — SIGNIFICANT CHANGE UP (ref 2–14)
NEUTROPHILS # BLD AUTO: 1.26 K/UL — LOW (ref 1.8–7.4)
NEUTROPHILS NFR BLD AUTO: 42.1 % — LOW (ref 43–77)
NRBC # BLD: 0 /100 WBCS — SIGNIFICANT CHANGE UP (ref 0–0)
PLAT MORPH BLD: NORMAL — SIGNIFICANT CHANGE UP
PLATELET # BLD AUTO: 128 K/UL — LOW (ref 150–400)
POTASSIUM SERPL-MCNC: 4.3 MMOL/L — SIGNIFICANT CHANGE UP (ref 3.5–5.3)
POTASSIUM SERPL-SCNC: 4.3 MMOL/L — SIGNIFICANT CHANGE UP (ref 3.5–5.3)
PROT SERPL-MCNC: 7 G/DL — SIGNIFICANT CHANGE UP (ref 6–8.3)
PROTHROM AB SERPL-ACNC: 16.6 SEC — HIGH (ref 10.5–13.4)
RBC # BLD: 4.31 M/UL — SIGNIFICANT CHANGE UP (ref 3.8–5.2)
RBC # FLD: 12.8 % — SIGNIFICANT CHANGE UP (ref 10.3–14.5)
RBC BLD AUTO: SIGNIFICANT CHANGE UP
SODIUM SERPL-SCNC: 139 MMOL/L — SIGNIFICANT CHANGE UP (ref 135–145)
WBC # BLD: 2.99 K/UL — LOW (ref 3.8–10.5)
WBC # FLD AUTO: 2.99 K/UL — LOW (ref 3.8–10.5)

## 2023-02-03 PROCEDURE — 85610 PROTHROMBIN TIME: CPT

## 2023-02-03 PROCEDURE — 85025 COMPLETE CBC W/AUTO DIFF WBC: CPT

## 2023-02-03 PROCEDURE — 85730 THROMBOPLASTIN TIME PARTIAL: CPT

## 2023-02-03 PROCEDURE — 99283 EMERGENCY DEPT VISIT LOW MDM: CPT

## 2023-02-03 PROCEDURE — 80053 COMPREHEN METABOLIC PANEL: CPT

## 2023-02-03 RX ORDER — ACETAMINOPHEN 500 MG
975 TABLET ORAL ONCE
Refills: 0 | Status: COMPLETED | OUTPATIENT
Start: 2023-02-03 | End: 2023-02-03

## 2023-02-03 RX ORDER — TRANEXAMIC ACID 100 MG/ML
5 INJECTION, SOLUTION INTRAVENOUS ONCE
Refills: 0 | Status: COMPLETED | OUTPATIENT
Start: 2023-02-03 | End: 2023-02-03

## 2023-02-03 RX ADMIN — Medication 975 MILLIGRAM(S): at 00:47

## 2023-02-03 RX ADMIN — TRANEXAMIC ACID 5 MILLILITER(S): 100 INJECTION, SOLUTION INTRAVENOUS at 02:05

## 2023-02-03 RX ADMIN — Medication 975 MILLIGRAM(S): at 00:17

## 2023-02-03 NOTE — ED PROVIDER NOTE - CLINICAL SUMMARY MEDICAL DECISION MAKING FREE TEXT BOX
Shauna - Patient is a 37-year-old female with a history of liver thrombus on Xarelto who presents 1 day after having 2 teeth extracted for bleeding gums. will check cbc, cmp, coags. tylenol for headache. tranexamic soaked gauze and pressure for mild bleeding gum. exam reassuring

## 2023-02-03 NOTE — ED PROVIDER NOTE - PATIENT PORTAL LINK FT
You can access the FollowMyHealth Patient Portal offered by [facility] by registering at the following website: [email or web address]. By joining [facility] FollowMyHealth portal, you will also be able to view your health information using other applications (apps) compatible with our system.

## 2023-02-03 NOTE — ED PROVIDER NOTE - ATTENDING CONTRIBUTION TO CARE
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 Pt is awake and alert x3 gum w/ hemostatic clot in the lower mandible, no active bleeding. Plan for labs and reassessment. will tx bleeding w/ gauze, soaked w/ TXA initial hypotension in the WR, w/ resolution upon being rechecked in room

## 2023-02-03 NOTE — ED PROVIDER NOTE - NS ED ROS FT
GENERAL: No fever, chills  EYES: no vision changes, no discharge.   ENT: no difficulty swallowing or speaking . +bleeding gums  CARDIAC: no chest pain/pressure, SOB, lower extremity swelling  PULMONARY: no cough, SOB  GI: no abdominal pain, n/v/d  : no dysuria  SKIN: no rashes  NEURO: no headache, lightheadedness, paresthesia  MSK: No joint pain, myalgia, weakness.

## 2023-02-03 NOTE — ED PROVIDER NOTE - PHYSICAL EXAMINATION
Rashida Villatoro MD  GENERAL: Patient awake alert NAD.  HEENT: NC/AT, Moist mucous membranes, EOMI.   L lower gums, 2 teeth extracted, mild oozing from sites of extraction and clot forming appropriately at site.  LUNGS: normal work of breathing  MSK: No pain with movement, no deformities.  NEURO: A&Ox3. Moving all extremities.  SKIN: Warm and dry. No rash.  PSYCH: Normal affect. Rashida Villatoro MD  GENERAL: Patient awake alert NAD.  HEENT: NC/AT, Moist mucous membranes, EOMI.   L lower gums, 1 tooth extracted, mild oozing from site of extraction and clot forming appropriately at site.  R upper gums, 1 tooth extracted, mild oozing from site of extraction and clot forming appropriately at site.  LUNGS: normal work of breathing  MSK: No pain with movement, no deformities.  NEURO: A&Ox3. Moving all extremities.  SKIN: Warm and dry. No rash.  PSYCH: Normal affect.

## 2023-02-03 NOTE — ED PROVIDER NOTE - NSFOLLOWUPINSTRUCTIONS_ED_ALL_ED_FT
You were seen in the ED for bleeding from your gums after tooth extraction.     The bleeding stopped after gauze was held on the site with pressure.  If this happens at home, bite down on a piece of gauze to hold pressure for at least 10 minutes.  If this does not get the bleeding to stop, return to the ED.      Continue to take your Xarelto as prescribed. Call your doctor today to discuss the continued use of this medication.     Please follow up with your primary doctor in 2-3 days. Return to the ED if you experience any worsening or new symptoms or any symptoms that concern you, including fevers, chills, shortness of breath, chest pain, lightheadedness, weakness, shortness of breath, worsening bleeding.

## 2023-02-03 NOTE — ED PROVIDER NOTE - PROGRESS NOTE DETAILS
gums hemostatic. vitals stable. pt states her BP is usually 100/60.  headache resolved  Patient was re-evaluated and doing well. Results, including any incidental findings, were discussed. Return precautions and follow up were discussed. Patient verbalized understanding. gums hemostatic. vitals stable. pt states her BP is usually 100/60.  headache resolved  Patient was re-evaluated and doing well. Results, including any incidental findings, were discussed. Return precautions and follow up were discussed. Patient verbalized understanding.   [id number]  family member at bedside

## 2023-02-03 NOTE — ED ADULT NURSE NOTE - OBJECTIVE STATEMENT
38 y/o female presents to the ED endorsing bleeding from the gums x1 day. A/Ox4. Ambulatory at baseline. Bhutanese-speaking ( ID: [id number]). PMH: Liver Cirrhosis, "Clot in Liver" (On Xarelto 10mg) and Liver tumor removal. Patient endorses recent dental surgery on 2/1/23 where 2 teeth were removed "under anesthesia". Patient endorses taking her xarelto on 2/2/23 at 8:00. Patient endorses not taking her Xarelto prior to the procedure since 1/31/23. Patient endorses when changing the cotton packing in her gums, the bleeding began. Patient denies hemoptysis, chest pain, nausea, vomiting, diarrhea, fever, cough and chills. Upon assessment, minimal bleeding present from the oral mucosa and no purulent drainage. Patient endorses a 3/10 headache x1 hour. Safety measures maintained, side rails intact and bed in the lowest position.

## 2023-02-03 NOTE — ED PROVIDER NOTE - OBJECTIVE STATEMENT
Patient is a 37-year-old female with a history of liver thrombus on Xarelto who presents 1 day after having 2 teeth extracted for bleeding gums.  Patient states that she held her Xarelto on Tuesday and Wednesday, had the procedure done on Wednesday, took her Xarelto as prescribed (10 mg) this morning again.  Since having the procedure done on Wednesday, patient has been having gum bleeding.  She thinks the gum bleeding worsened after taking the Xarelto this morning.  She denies any bleeding from any other site.  Denies shortness of breath, lightheadedness, dizziness, weakness.  She endorses a mild headache that started this evening, has not taken any meds for it.  Denies fevers, chills, nausea, vomiting.  States she has history of anemia and was worried that she would lose too much blood so came to ED.

## 2023-02-07 ENCOUNTER — RESULT REVIEW (OUTPATIENT)
Age: 38
End: 2023-02-07

## 2023-02-07 ENCOUNTER — APPOINTMENT (OUTPATIENT)
Dept: HEMATOLOGY ONCOLOGY | Facility: CLINIC | Age: 38
End: 2023-02-07
Payer: MEDICAID

## 2023-02-07 VITALS
HEIGHT: 62 IN | HEART RATE: 83 BPM | DIASTOLIC BLOOD PRESSURE: 60 MMHG | TEMPERATURE: 98.1 F | OXYGEN SATURATION: 95 % | RESPIRATION RATE: 16 BRPM | SYSTOLIC BLOOD PRESSURE: 89 MMHG | BODY MASS INDEX: 19.39 KG/M2 | WEIGHT: 105.36 LBS

## 2023-02-07 LAB
BASOPHILS # BLD AUTO: 0.02 K/UL — SIGNIFICANT CHANGE UP (ref 0–0.2)
BASOPHILS NFR BLD AUTO: 0.5 % — SIGNIFICANT CHANGE UP (ref 0–2)
EOSINOPHIL # BLD AUTO: 0.19 K/UL — SIGNIFICANT CHANGE UP (ref 0–0.5)
EOSINOPHIL NFR BLD AUTO: 5.1 % — SIGNIFICANT CHANGE UP (ref 0–6)
HCT VFR BLD CALC: 37.4 % — SIGNIFICANT CHANGE UP (ref 34.5–45)
HGB BLD-MCNC: 12.5 G/DL — SIGNIFICANT CHANGE UP (ref 11.5–15.5)
IMM GRANULOCYTES NFR BLD AUTO: 0.3 % — SIGNIFICANT CHANGE UP (ref 0–0.9)
LYMPHOCYTES # BLD AUTO: 1.24 K/UL — SIGNIFICANT CHANGE UP (ref 1–3.3)
LYMPHOCYTES # BLD AUTO: 33.1 % — SIGNIFICANT CHANGE UP (ref 13–44)
MCHC RBC-ENTMCNC: 31.2 PG — SIGNIFICANT CHANGE UP (ref 27–34)
MCHC RBC-ENTMCNC: 33.4 G/DL — SIGNIFICANT CHANGE UP (ref 32–36)
MCV RBC AUTO: 93.3 FL — SIGNIFICANT CHANGE UP (ref 80–100)
MONOCYTES # BLD AUTO: 0.32 K/UL — SIGNIFICANT CHANGE UP (ref 0–0.9)
MONOCYTES NFR BLD AUTO: 8.5 % — SIGNIFICANT CHANGE UP (ref 2–14)
NEUTROPHILS # BLD AUTO: 1.97 K/UL — SIGNIFICANT CHANGE UP (ref 1.8–7.4)
NEUTROPHILS NFR BLD AUTO: 52.5 % — SIGNIFICANT CHANGE UP (ref 43–77)
NRBC # BLD: 0 /100 WBCS — SIGNIFICANT CHANGE UP (ref 0–0)
PLATELET # BLD AUTO: 149 K/UL — LOW (ref 150–400)
RBC # BLD: 4.01 M/UL — SIGNIFICANT CHANGE UP (ref 3.8–5.2)
RBC # FLD: 12.8 % — SIGNIFICANT CHANGE UP (ref 10.3–14.5)
RETICS #: 72.6 K/UL — SIGNIFICANT CHANGE UP (ref 25–125)
RETICS/RBC NFR: 1.8 % — SIGNIFICANT CHANGE UP (ref 0.5–2.5)
WBC # BLD: 3.75 K/UL — LOW (ref 3.8–10.5)
WBC # FLD AUTO: 3.75 K/UL — LOW (ref 3.8–10.5)

## 2023-02-07 PROCEDURE — 99214 OFFICE O/P EST MOD 30 MIN: CPT

## 2023-02-11 NOTE — ASSESSMENT
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  No other explanations for cirrhosis known currently.  Much of this history took place in [facility],  Patient was able to bring records from the evaluation in [facility] as well as earlier evaluation in 2017 at [facility].  After several repeats, Protein S antigen improved to normal at 68%. Does not appear consistent with congenital Protein S deficiency.  \par Patient was found to have a concerning MRI lesion, confirmed hepatocellular carcinoma s/p ablation.\par \par Given history of hepatic vein thrombosis, recommend patient remain on the xarelto 10mg daily for prophylaxis.  Would not attempt to increase this unless new thrombosis is found given hx of GI bleeding.  \par With the patient on elqiuis and the cirrhotic disease, she has suffered from GI bleeding, recheck CBC and ferritin.  WBC today noted to be 3.75, platelets, mildly low but far better than previous values these are from the cirrhosis.  Hg stable at  12.5g/dl today.  Ferritin stable 38ng/mL.  Continue ferrous gluconate daily.  \par \par Will follow up in 3 months.

## 2023-02-11 NOTE — HISTORY OF PRESENT ILLNESS
[de-identified] :  for Gambian. Na [id number]  Patient following with the IR  for the liver\par \par Was in the hospital for tooth removal due to requirements for anesthesia.  More recently was in the hospital 6 days ago, then went back to the ER due to bleeding.   Patient had stopped the Xarelto a day prior to the procedure.  \par \par Patient had the IR CT and US guided ablation of the tumor in Decemer 2022, had pain for a week, but is now recovering.  She remains on the xarelto now.

## 2023-02-17 ENCOUNTER — APPOINTMENT (OUTPATIENT)
Dept: MRI IMAGING | Facility: CLINIC | Age: 38
End: 2023-02-17
Payer: MEDICAID

## 2023-02-17 PROCEDURE — A9585: CPT

## 2023-02-17 PROCEDURE — 74183 MRI ABD W/O CNTR FLWD CNTR: CPT

## 2023-02-21 LAB
ALBUMIN SERPL ELPH-MCNC: 4.1 G/DL
ALP BLD-CCNC: 163 U/L
ALT SERPL-CCNC: 27 U/L
ANION GAP SERPL CALC-SCNC: 11 MMOL/L
AST SERPL-CCNC: 40 U/L
BILIRUB SERPL-MCNC: 1.3 MG/DL
BUN SERPL-MCNC: 15 MG/DL
CALCIUM SERPL-MCNC: 9.5 MG/DL
CHLORIDE SERPL-SCNC: 107 MMOL/L
CO2 SERPL-SCNC: 22 MMOL/L
CREAT SERPL-MCNC: 0.68 MG/DL
EGFR: 114 ML/MIN/1.73M2
FERRITIN SERPL-MCNC: 38 NG/ML
GLUCOSE SERPL-MCNC: 90 MG/DL
IRON SATN MFR SERPL: 22 %
IRON SERPL-MCNC: 60 UG/DL
POTASSIUM SERPL-SCNC: 5.1 MMOL/L
PROT SERPL-MCNC: 6.5 G/DL
SODIUM SERPL-SCNC: 140 MMOL/L
TIBC SERPL-MCNC: 280 UG/DL
UIBC SERPL-MCNC: 220 UG/DL

## 2023-03-02 ENCOUNTER — NON-APPOINTMENT (OUTPATIENT)
Age: 38
End: 2023-03-02

## 2023-03-03 ENCOUNTER — APPOINTMENT (OUTPATIENT)
Dept: ULTRASOUND IMAGING | Facility: CLINIC | Age: 38
End: 2023-03-03
Payer: MEDICAID

## 2023-03-03 ENCOUNTER — RESULT REVIEW (OUTPATIENT)
Age: 38
End: 2023-03-03

## 2023-03-03 ENCOUNTER — APPOINTMENT (OUTPATIENT)
Dept: MAMMOGRAPHY | Facility: CLINIC | Age: 38
End: 2023-03-03
Payer: MEDICAID

## 2023-03-03 PROCEDURE — 77066 DX MAMMO INCL CAD BI: CPT

## 2023-03-03 PROCEDURE — 77062 BREAST TOMOSYNTHESIS BI: CPT

## 2023-03-03 PROCEDURE — 76642 ULTRASOUND BREAST LIMITED: CPT | Mod: LT

## 2023-03-06 NOTE — PROVIDER CONTACT NOTE (OTHER) - DATE AND TIME:
22-Mar-2021 16:20
Lipid panel obtained at today's visit. Goal LDL is less than 70. Continue current meds and low fat/low cholesterol diet with increased exercise as tolerated. Will follow up with labs. Patient to follow up in 3 months or as needed.     
20-Mar-2021 09:55
23-Mar-2021 11:25

## 2023-03-09 ENCOUNTER — APPOINTMENT (OUTPATIENT)
Dept: INTERVENTIONAL RADIOLOGY/VASCULAR | Facility: CLINIC | Age: 38
End: 2023-03-09
Payer: MEDICAID

## 2023-03-09 PROCEDURE — XXXXX: CPT | Mod: 1L

## 2023-03-09 NOTE — HISTORY OF PRESENT ILLNESS
[FreeTextEntry1] : Pt is from [facility] and speaks Palestinian,  ID #\par \par This is a 37 year old female with hx of cryptogenic cirrhosis, portal hypertension with small esophageal varices, hx of ascites, Protein S deficiency, multiple miscarriages, COVID infection (3/4/21), Budd-Chiari syndrome, IVC thrombus s/p thrombectomy/TIPS, and HCC who presents today for follow up. \par \par Pt originally presented to [facility] in 3/2021 with c/o abdominal bloating and was found to have moderate right pleural effusion (likely 2/2 hepatic hydrothorax), ascites, and thrombi in suprarenal IVC and SMV extending to confluence of splenic vein and portal vein. She was started on heparin gtt and transitioned to Xarelto. Hematology w/u was + for Protein S deficiency. Pt evaluated by Transplant Surgery, IR, and Hepatology, and case was discussed at multidisciplinary conference and options were discussed for treatment. This included : (1)anticoagulation alone, (2) IVC thrombectomy and IVC stent, and (3) IVC thrombectomy, DIPS, and IVC stent, (4) IVC thrombectomy , TIPS.\par \par She is s/p IVC thrombectomy on 21 and TIPS/thoracentesis on 2021. Post procedure, she required approximately 2 thoracentesis. She has continued on a/c and denies LE edema, ascites, dyspnea. Reports having one days of mild confusion/dizziness, but it was believed to be due to Bumex, denies further episodes . She continues on lactulose and Xifaxan. \par \par She has been following with Dr. Carvajal and the [facility] Transplant team and she underwent TIPS/IVC venogram on 22 for transplant planning purposes. She is now currently on transplant list. \par \par She was noted to have a right hepatic lobe LR-5 lesion measuring 1.8 cm within segment 6 on imaging in 2022.   She underwent biopsy of the lesion on  and pathology was + for HCC. She is now s/p MWA of lesion on 1/10/23 and presents today to review f/u imaging. \par \par Post procedure, she had some mild pain for about 3 days, felt like she was back to herself at about 1 week post procedure. \par \par Reports feeling some intermittent sharp pain RUQ pain, that lasts a few minutes, mostly felt in the evening when getting into bed or repositioning her self in bed.  It is relieved when lifts her head on pillows. Had symptoms intermittently prior to MWA, but now with symptoms every few days. Does not need to take any pain relieving medications for this. \par \par She reportedly has been diagnosed with cirrhosis of the liver since about . She presented with 4 weeks of pregnancy at [facility], and was diagnosed with cirrhosis of the liver, and was advised to terminate pregnancy. She had two prior spontaneous , and one pre-term delivery. Her pregnancy history is as follows:\par \par #1 (): Vaginal delivery girl, at 28 weeks GA . Delivery occurred at [facility]. Pregnancy #1 Comments: ( delivery, baby  after 30 minutes. Pt remained in hospital due to enlarged liver). \par #2 (): at 16 weeks GA . Delivery occurred at [facility]. Pregnancy #2 Comments: (spontaneous ab, D&C). \par #3 (): at 8 weeks GA . Delivery occurred at [facility]. Pregnancy #3 Comments: (Spontaneous ab, D&C). \par  #4 (): at 4 weeks GA . Delivery occurred at [facility]. Pregnancy #4 Comments: (Induced ab- Pt was diagnosed with Cirrhosis, pt was advised to terminate pregnancy). \par \par She is unmarried, and has a boyfriend, Darron, whom she lives with. Her mother lives in [place], but speaks limited English. She had been seeing a hepatologist at [facility], Dr. Davida Thompson, but now has been following with Dr. Carvajal since 2019. \par \par She denied any prior history of gastrointestinal bleeding, symptoms of distal hepatic encephalopathy or ascites. She reports generalized mild pruritus, and has not been on any specific medication to control her itching. She has been using some emollients or oil for symptoms relief. \par \par \par PMD: does not have one\par Hep: Wei\par Heme: Kelechi Theo\par \par

## 2023-03-09 NOTE — PHYSICAL EXAM
[Alert] : alert [Normal Voice/Communication] : normal voice communication [Oriented x3] : oriented to person, place, and time [Normal Insight/Judgement] : insight and judgment were intact [Normal Affect] : the affect was normal [Normal Mood] : the mood was normal [Recent Memory Normal] : recent memory was not impaired [Remote Memory Normal] : remote memory was not impaired [Restricted in physically strenuous activity but ambulatory and able to carry out work of a light or sedentary nature] : Restricted in physically strenuous activity but ambulatory and able to carry out work of a light or sedentary nature, e.g., light house work, office work

## 2023-03-09 NOTE — DATA REVIEWED
[FreeTextEntry1] : \par \par \par EXAM: [id number] - MR ABDOMEN WAW IC - ORDERED BY: CHANDLER BILL\par \par \par PROCEDURE DATE: 02/17/2023\par \par \par \par INTERPRETATION: CLINICAL INFORMATION: Cirrhosis. Status post microwave ablation of segment 6 hepatocellular carcinoma.\par \par COMPARISON: MRI 12/13/2022.\par \par CONTRAST/COMPLICATIONS:\par IV Contrast: Gadavist 6 cc administered 1.5 cc discarded\par Oral Contrast: NONE\par Complications: None reported at time of study completion\par \par PROCEDURE:\par MRI of the abdomen was performed.\par MRCP was performed.\par \par FINDINGS:\par LOWER CHEST: Within normal limits.\par \par LIVER: Nonvisualized hepatic veins compatible with chronic occlusion and known Budd-Chiari syndrome. Cirrhosis. Few stable T1 hyperintense nodules within the right upper lobe, likely regenerative (LI-RADS 2). No liver lesions suspicious for hepatocellular carcinoma.\par \par Lesion #: 1\par Location: Segment 6\par Size: 2.9 x 2.2 cm microwave ablation cavity (12, 25).\par Enhancement: None.\par Washout: NO\par LI-RADS v 2018 Category: LR-TR Nonviable\par \par BILE DUCTS: Normal caliber.\par GALLBLADDER: Within normal limits.\par SPLEEN: Within normal limits.\par PANCREAS: Within normal limits.\par ADRENALS: Within normal limits.\par KIDNEYS/URETERS: Within normal limits.\par \par VISUALIZED PORTIONS:\par BOWEL: Within normal limits.\par PERITONEUM: Trace perihepatic ascites.\par VESSELS: Within normal limits.\par RETROPERITONEUM/LYMPH NODES: No lymphadenopathy.\par ABDOMINAL WALL: Within normal limits.\par BONES: Within normal limits.\par \par IMPRESSION:\par 1. Budd-Chiari with cirrhosis and portal hypertension. Patent TIPS.\par 2. Status post microwave ablation of segment 6 lesion; LR-TR Nonviable.\par 3. No new liver lesion.\par \par \par --- End of Report ---

## 2023-03-09 NOTE — REASON FOR VISIT
[Follow-Up: _____] : a [unfilled] follow-up visit [Source: ______] : History obtained from [unfilled] [Home] : at home, [unfilled] , at the time of the visit. [Medical Office: ([facility])___] : at the medical office located in  [Verbal consent obtained from patient] : the patient, [unfilled]

## 2023-03-09 NOTE — ASSESSMENT
[FreeTextEntry1] : This is a 37 year old female with hx of cryptogenic cirrhosis, portal hypertension with small esophageal varices, hx of ascites, Protein S deficiency, multiple miscarriages, COVID infection (3/4/21), Budd-Chiari syndrome, IVC thrombus s/p thrombectomy/TIPS, and HCC who presents today for follow up. \par \par 1. Hepatic Lesion\par - 8/31/22 MRI demonstrates Right hepatic lobe LR-5 lesion measuring 1.8 cm within segment 6, biopsy on 11/14/22, pathology + HCC\par - s/p MWA of seg 6 lesion on 1/10/23\par - post procedure course uncomplicated and as expected\par - MRI from 2/17/23 demonstrates Budd-Chiari with cirrhosis and portal hypertension, patent TIPS, and segment 6 lesion s/p albation; LR-TR Nonviable.  No new liver lesions noted.\par - Dr. Ragsdale has reviewed her imaging and I discussed the findings with Ms. BUCKLEYGERONIMOMARTINDINA.  \par - The imaging does not show any evidence of viable tumor so recommending repeat imaging in 3 months. \par - f/u MRI 3 months\par - f/u telehealth after imaging  to review the findings.\par - While there is no indication for intervention at this time, I reinforced the importance of continued surveillance with serial imaging.\par - pt does report intermittent sharp pain in her RUQ that lasts a few minutes and is mostly felt when getting into bed and with repositioning.  She had similar symptoms prior to ablation, feels it most over ablation puncture site.  Unclear etiology, may be due to some scar tissue.  Denies ascites, dyspnea, edema, jaundice.  Instructed to continue to monitor. \par - will re-evaluate TIPS velocities also\par - continue to follow with Dr. Carvajal, has not had visit since 11/2022.  Instructed to call office to make f/u appt\par - continue to follow with [facility] Transplant team\par \par 2. IVC thrombus\par - pt with hx of IVC thrombus \par - now s/p successful IVC thrombectomy on 4/20/21 and TIPS on 5/11/21\par - post procedure, she required several thoracentesis, but has been stable since end of May 2021. Denies ascites, LE edema, recurrent pleural effusions or abdominal pain. \par - Has been tolerating spironolactone and Xarelto\par - underwent TIPS/IVC venogram to evaluate vasculature for possible transplantation on 5/11/22\par - due to repeat abd duplex, last done 12/2023\par - will f/u after imaging\par - continue to follow with hepatology\par \par 3. Hepatic Encephalopathy\par - had 1 episode of confusion/dizziness, hepatology believed to be due to side effect of Bumex, no further episodes since stopping medication\par - continue Lactulose per hepatology\par \par I have provided the patient the opportunity to ask questions and have answered them to her satisfaction. She is encouraged to contact our office with any further questions, concerns, or issues.\par \par Above case and plan reviewed with Dr. Ragsdale.\par  [Other: _____] : [unfilled]

## 2023-03-13 ENCOUNTER — NON-APPOINTMENT (OUTPATIENT)
Age: 38
End: 2023-03-13

## 2023-03-14 NOTE — PATIENT PROFILE ADULT - FUNCTIONAL SCREEN CURRENT LEVEL: COMMUNICATION, MLM
Emergency Department Focused Ultrasound performed at patient's bedside for educational purposes. The study will have a follow up study performed or was performed in the direct supervision of an ultrasound trained attending.
0 = understands/communicates without difficulty

## 2023-03-16 ENCOUNTER — APPOINTMENT (OUTPATIENT)
Dept: INTERVENTIONAL RADIOLOGY/VASCULAR | Facility: CLINIC | Age: 38
End: 2023-03-16

## 2023-03-24 ENCOUNTER — NON-APPOINTMENT (OUTPATIENT)
Age: 38
End: 2023-03-24

## 2023-03-30 ENCOUNTER — APPOINTMENT (OUTPATIENT)
Dept: TRANSPLANT | Facility: CLINIC | Age: 38
End: 2023-03-30

## 2023-04-02 LAB
AFP-TM SERPL-MCNC: 3.2 NG/ML
ALBUMIN SERPL ELPH-MCNC: 4.1 G/DL
ALP BLD-CCNC: 186 U/L
ALT SERPL-CCNC: 31 U/L
ANION GAP SERPL CALC-SCNC: 13 MMOL/L
AST SERPL-CCNC: 38 U/L
BASOPHILS # BLD AUTO: 0.04 K/UL
BASOPHILS NFR BLD AUTO: 0.9 %
BILIRUB SERPL-MCNC: 1.8 MG/DL
BUN SERPL-MCNC: 14 MG/DL
CALCIUM SERPL-MCNC: 9.7 MG/DL
CHLORIDE SERPL-SCNC: 106 MMOL/L
CO2 SERPL-SCNC: 22 MMOL/L
CREAT SERPL-MCNC: 0.65 MG/DL
EGFR: 115 ML/MIN/1.73M2
EOSINOPHIL # BLD AUTO: 0.22 K/UL
EOSINOPHIL NFR BLD AUTO: 4.9 %
GLUCOSE SERPL-MCNC: 87 MG/DL
HCT VFR BLD CALC: 40.9 %
HGB BLD-MCNC: 12.9 G/DL
IMM GRANULOCYTES NFR BLD AUTO: 0.2 %
INR PPP: 2.47 RATIO
LYMPHOCYTES # BLD AUTO: 1.44 K/UL
LYMPHOCYTES NFR BLD AUTO: 32.3 %
MAN DIFF?: NORMAL
MCHC RBC-ENTMCNC: 29.7 PG
MCHC RBC-ENTMCNC: 31.5 GM/DL
MCV RBC AUTO: 94 FL
MONOCYTES # BLD AUTO: 0.4 K/UL
MONOCYTES NFR BLD AUTO: 9 %
NEUTROPHILS # BLD AUTO: 2.35 K/UL
NEUTROPHILS NFR BLD AUTO: 52.7 %
PLATELET # BLD AUTO: 159 K/UL
POTASSIUM SERPL-SCNC: 4.8 MMOL/L
PROT SERPL-MCNC: 6.7 G/DL
PT BLD: 29.2 SEC
RBC # BLD: 4.35 M/UL
RBC # FLD: 13.2 %
SODIUM SERPL-SCNC: 141 MMOL/L
WBC # FLD AUTO: 4.46 K/UL

## 2023-04-03 ENCOUNTER — APPOINTMENT (OUTPATIENT)
Dept: CARDIOLOGY | Facility: CLINIC | Age: 38
End: 2023-04-03

## 2023-04-07 ENCOUNTER — OUTPATIENT (OUTPATIENT)
Dept: OUTPATIENT SERVICES | Facility: HOSPITAL | Age: 38
LOS: 1 days | End: 2023-04-07
Payer: MEDICAID

## 2023-04-07 ENCOUNTER — APPOINTMENT (OUTPATIENT)
Dept: ULTRASOUND IMAGING | Facility: CLINIC | Age: 38
End: 2023-04-07
Payer: MEDICAID

## 2023-04-07 DIAGNOSIS — Z95.828 PRESENCE OF OTHER VASCULAR IMPLANTS AND GRAFTS: ICD-10-CM

## 2023-04-07 DIAGNOSIS — Z98.890 OTHER SPECIFIED POSTPROCEDURAL STATES: Chronic | ICD-10-CM

## 2023-04-07 DIAGNOSIS — Z95.828 PRESENCE OF OTHER VASCULAR IMPLANTS AND GRAFTS: Chronic | ICD-10-CM

## 2023-04-07 PROCEDURE — 93975 VASCULAR STUDY: CPT | Mod: 26

## 2023-04-07 PROCEDURE — 93975 VASCULAR STUDY: CPT

## 2023-04-13 ENCOUNTER — APPOINTMENT (OUTPATIENT)
Dept: HEPATOLOGY | Facility: CLINIC | Age: 38
End: 2023-04-13
Payer: MEDICAID

## 2023-04-13 VITALS
RESPIRATION RATE: 16 BRPM | OXYGEN SATURATION: 98 % | DIASTOLIC BLOOD PRESSURE: 68 MMHG | SYSTOLIC BLOOD PRESSURE: 104 MMHG | WEIGHT: 105 LBS | HEART RATE: 97 BPM | TEMPERATURE: 97.8 F | HEIGHT: 62 IN | BODY MASS INDEX: 19.32 KG/M2

## 2023-04-13 PROCEDURE — 99215 OFFICE O/P EST HI 40 MIN: CPT

## 2023-04-20 ENCOUNTER — NON-APPOINTMENT (OUTPATIENT)
Age: 38
End: 2023-04-20

## 2023-04-20 LAB
ACARBOXYPROTHROMBIN SERPL-MCNC: 0.2 NG/ML
ACARBOXYPROTHROMBIN SERPL-MCNC: 0.2 NG/ML
AFP-TM SERPL-MCNC: 3.8 NG/ML
ALBUMIN SERPL ELPH-MCNC: 4 G/DL
ALP BLD-CCNC: 189 U/L
ALT SERPL-CCNC: 28 U/L
ANION GAP SERPL CALC-SCNC: 11 MMOL/L
AST SERPL-CCNC: 34 U/L
BASOPHILS # BLD AUTO: 0.03 K/UL
BASOPHILS NFR BLD AUTO: 0.8 %
BILIRUB SERPL-MCNC: 1.8 MG/DL
BUN SERPL-MCNC: 16 MG/DL
CALCIUM SERPL-MCNC: 9.2 MG/DL
CHLORIDE SERPL-SCNC: 105 MMOL/L
CO2 SERPL-SCNC: 24 MMOL/L
CREAT SERPL-MCNC: 0.62 MG/DL
EGFR: 116 ML/MIN/1.73M2
EOSINOPHIL # BLD AUTO: 0.17 K/UL
EOSINOPHIL NFR BLD AUTO: 4.3 %
GLUCOSE SERPL-MCNC: 76 MG/DL
HCT VFR BLD CALC: 34.9 %
HGB BLD-MCNC: 10.8 G/DL
IMM GRANULOCYTES NFR BLD AUTO: 0.3 %
INR PPP: 1.91 RATIO
LYMPHOCYTES # BLD AUTO: 1.29 K/UL
LYMPHOCYTES NFR BLD AUTO: 32.4 %
MAN DIFF?: NORMAL
MCHC RBC-ENTMCNC: 29 PG
MCHC RBC-ENTMCNC: 30.9 GM/DL
MCV RBC AUTO: 93.6 FL
MONOCYTES # BLD AUTO: 0.5 K/UL
MONOCYTES NFR BLD AUTO: 12.6 %
NEUTROPHILS # BLD AUTO: 1.98 K/UL
NEUTROPHILS NFR BLD AUTO: 49.6 %
PLATELET # BLD AUTO: 154 K/UL
POTASSIUM SERPL-SCNC: 4.6 MMOL/L
PROT SERPL-MCNC: 6.3 G/DL
PT BLD: 22.3 SEC
RBC # BLD: 3.73 M/UL
RBC # FLD: 13.1 %
SODIUM SERPL-SCNC: 140 MMOL/L
WBC # FLD AUTO: 3.98 K/UL

## 2023-04-28 ENCOUNTER — OUTPATIENT (OUTPATIENT)
Dept: OUTPATIENT SERVICES | Facility: HOSPITAL | Age: 38
LOS: 1 days | Discharge: HOME | End: 2023-04-28

## 2023-04-28 DIAGNOSIS — Z95.828 PRESENCE OF OTHER VASCULAR IMPLANTS AND GRAFTS: Chronic | ICD-10-CM

## 2023-04-28 DIAGNOSIS — Z98.890 OTHER SPECIFIED POSTPROCEDURAL STATES: Chronic | ICD-10-CM

## 2023-04-28 DIAGNOSIS — I82.0 BUDD-CHIARI SYNDROME: ICD-10-CM

## 2023-04-29 ENCOUNTER — RX RENEWAL (OUTPATIENT)
Age: 38
End: 2023-04-29

## 2023-05-09 ENCOUNTER — RESULT REVIEW (OUTPATIENT)
Age: 38
End: 2023-05-09

## 2023-05-09 ENCOUNTER — APPOINTMENT (OUTPATIENT)
Dept: HEMATOLOGY ONCOLOGY | Facility: CLINIC | Age: 38
End: 2023-05-09
Payer: MEDICAID

## 2023-05-09 ENCOUNTER — APPOINTMENT (OUTPATIENT)
Dept: CARDIOLOGY | Facility: CLINIC | Age: 38
End: 2023-05-09

## 2023-05-09 VITALS
SYSTOLIC BLOOD PRESSURE: 100 MMHG | RESPIRATION RATE: 16 BRPM | BODY MASS INDEX: 19.79 KG/M2 | WEIGHT: 107.57 LBS | HEIGHT: 62 IN | DIASTOLIC BLOOD PRESSURE: 40 MMHG | OXYGEN SATURATION: 100 % | TEMPERATURE: 97 F | HEART RATE: 82 BPM

## 2023-05-09 LAB
BASOPHILS # BLD AUTO: 0.03 K/UL — SIGNIFICANT CHANGE UP (ref 0–0.2)
BASOPHILS NFR BLD AUTO: 1.1 % — SIGNIFICANT CHANGE UP (ref 0–2)
EOSINOPHIL # BLD AUTO: 0.13 K/UL — SIGNIFICANT CHANGE UP (ref 0–0.5)
EOSINOPHIL NFR BLD AUTO: 4.9 % — SIGNIFICANT CHANGE UP (ref 0–6)
HCT VFR BLD CALC: 32.7 % — LOW (ref 34.5–45)
HGB BLD-MCNC: 10.4 G/DL — LOW (ref 11.5–15.5)
IMM GRANULOCYTES NFR BLD AUTO: 0 % — SIGNIFICANT CHANGE UP (ref 0–0.9)
LYMPHOCYTES # BLD AUTO: 1.06 K/UL — SIGNIFICANT CHANGE UP (ref 1–3.3)
LYMPHOCYTES # BLD AUTO: 40.3 % — SIGNIFICANT CHANGE UP (ref 13–44)
MCHC RBC-ENTMCNC: 28 PG — SIGNIFICANT CHANGE UP (ref 27–34)
MCHC RBC-ENTMCNC: 31.8 G/DL — LOW (ref 32–36)
MCV RBC AUTO: 87.9 FL — SIGNIFICANT CHANGE UP (ref 80–100)
MONOCYTES # BLD AUTO: 0.27 K/UL — SIGNIFICANT CHANGE UP (ref 0–0.9)
MONOCYTES NFR BLD AUTO: 10.3 % — SIGNIFICANT CHANGE UP (ref 2–14)
NEUTROPHILS # BLD AUTO: 1.14 K/UL — LOW (ref 1.8–7.4)
NEUTROPHILS NFR BLD AUTO: 43.4 % — SIGNIFICANT CHANGE UP (ref 43–77)
NRBC # BLD: 0 /100 WBCS — SIGNIFICANT CHANGE UP (ref 0–0)
PLATELET # BLD AUTO: 144 K/UL — LOW (ref 150–400)
RBC # BLD: 3.72 M/UL — LOW (ref 3.8–5.2)
RBC # FLD: 12 % — SIGNIFICANT CHANGE UP (ref 10.3–14.5)
RETICS #: 56.5 K/UL — SIGNIFICANT CHANGE UP (ref 25–125)
RETICS/RBC NFR: 1.5 % — SIGNIFICANT CHANGE UP (ref 0.5–2.5)
WBC # BLD: 2.63 K/UL — LOW (ref 3.8–10.5)
WBC # FLD AUTO: 2.63 K/UL — LOW (ref 3.8–10.5)

## 2023-05-09 PROCEDURE — 99214 OFFICE O/P EST MOD 30 MIN: CPT

## 2023-05-09 NOTE — PHYSICAL EXAM
[Normal] : full range of motion and no deformities appreciated [de-identified] : significantly decreased ascitites.

## 2023-05-09 NOTE — HISTORY OF PRESENT ILLNESS
[de-identified] :  for Citizen of [place].[id number] [place]. \par \par Patient returns for follow up. WBC slightly low again 2.6 plates mostly Normal at 146. Hg preserved.  Patient had a follow up MRI in February just after our visit in February.  No sign of recurrence    Patient following with the IR  for the liver after the ablation late last year.    They have plans to continue on the evaluation and planning for the liver transplant on June 18th.  Patient feeling well no complaints abdominal symptoms greatly decreased.  \par \par

## 2023-05-09 NOTE — ASSESSMENT
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  No other explanations for cirrhosis known currently.  Much of this history took place in [facility].  Patient was able to bring records from the evaluation in [facility] as well as earlier evaluation in 2017 at [facility].  After several repeats, Protein S antigen improved to normal at 68%. Does not appear consistent with congenital Protein S deficiency.  Patient had a normal ATIII activity 90% prior to the thrombosis of the IVC.  Had protein C and S was low intermittently, but that was because of the liver disfunction.  \par \par Patient did have a report from a [place] in [facility] that described an atypical prothrombin mutation that was not the typical [id number]. Previously I had considered that the mutation tested here in the [place] was negative, but given her thrombotic history this is in question. Will reach out to the laboratory to see if we can get an atypical gene analysis.  \par Patient was found to have a concerning MRI lesion, confirmed hepatocellular carcinoma s/p ablation. Repeat MRI just after our last visit demonstrated MARLY.  \par \par Given history of hepatic vein thrombosis, recommend patient remain on the xarelto 10mg daily for prophylaxis.  \par Follow up in 2 months just after repeat evaluation by transplant team in June will need to start planning anticoagulation just prior and after the transplant.

## 2023-05-12 LAB
ALBUMIN SERPL ELPH-MCNC: 3.8 G/DL
ALP BLD-CCNC: 170 U/L
ALT SERPL-CCNC: 24 U/L
ANION GAP SERPL CALC-SCNC: 8 MMOL/L
AST SERPL-CCNC: 30 U/L
BILIRUB SERPL-MCNC: 1.4 MG/DL
BUN SERPL-MCNC: 17 MG/DL
CALCIUM SERPL-MCNC: 9.2 MG/DL
CHLORIDE SERPL-SCNC: 112 MMOL/L
CO2 SERPL-SCNC: 23 MMOL/L
CREAT SERPL-MCNC: 0.63 MG/DL
EGFR: 116 ML/MIN/1.73M2
FERRITIN SERPL-MCNC: 12 NG/ML
FOLATE SERPL-MCNC: 18.3 NG/ML
GLUCOSE SERPL-MCNC: 100 MG/DL
IRON SATN MFR SERPL: 9 %
IRON SERPL-MCNC: 34 UG/DL
POTASSIUM SERPL-SCNC: 4.5 MMOL/L
PROT SERPL-MCNC: 6.3 G/DL
SODIUM SERPL-SCNC: 143 MMOL/L
TIBC SERPL-MCNC: 357 UG/DL
UIBC SERPL-MCNC: 324 UG/DL
VIT B12 SERPL-MCNC: 838 PG/ML

## 2023-05-15 ENCOUNTER — APPOINTMENT (OUTPATIENT)
Dept: CARDIOLOGY | Facility: CLINIC | Age: 38
End: 2023-05-15
Payer: COMMERCIAL

## 2023-05-15 ENCOUNTER — NON-APPOINTMENT (OUTPATIENT)
Age: 38
End: 2023-05-15

## 2023-05-15 VITALS
WEIGHT: 106 LBS | SYSTOLIC BLOOD PRESSURE: 96 MMHG | DIASTOLIC BLOOD PRESSURE: 58 MMHG | BODY MASS INDEX: 19.39 KG/M2 | HEART RATE: 87 BPM | OXYGEN SATURATION: 100 %

## 2023-05-15 PROCEDURE — 99214 OFFICE O/P EST MOD 30 MIN: CPT

## 2023-05-15 PROCEDURE — 93000 ELECTROCARDIOGRAM COMPLETE: CPT | Mod: NC

## 2023-05-17 ENCOUNTER — APPOINTMENT (OUTPATIENT)
Dept: MRI IMAGING | Facility: CLINIC | Age: 38
End: 2023-05-17
Payer: MEDICAID

## 2023-05-17 ENCOUNTER — OUTPATIENT (OUTPATIENT)
Dept: OUTPATIENT SERVICES | Facility: HOSPITAL | Age: 38
LOS: 1 days | End: 2023-05-17
Payer: MEDICAID

## 2023-05-17 DIAGNOSIS — Z95.828 PRESENCE OF OTHER VASCULAR IMPLANTS AND GRAFTS: Chronic | ICD-10-CM

## 2023-05-17 DIAGNOSIS — C22.0 LIVER CELL CARCINOMA: ICD-10-CM

## 2023-05-17 DIAGNOSIS — Z98.890 OTHER SPECIFIED POSTPROCEDURAL STATES: Chronic | ICD-10-CM

## 2023-05-17 DIAGNOSIS — Z00.8 ENCOUNTER FOR OTHER GENERAL EXAMINATION: ICD-10-CM

## 2023-05-17 PROCEDURE — 74183 MRI ABD W/O CNTR FLWD CNTR: CPT

## 2023-05-17 PROCEDURE — 74183 MRI ABD W/O CNTR FLWD CNTR: CPT | Mod: 26

## 2023-05-17 NOTE — HISTORY OF PRESENT ILLNESS
[FreeTextEntry1] : Patient is a 36 year-old woman, born in [facility], [place] the [place], with known past medical history of Budd-Chiari syndrome and protein S deficiency (maintained on Xarelto since 3/2021), decompensated cirrhosis, now with HCC, being evaluated for liver transplant.\par In March 2021, she had Covid-19 infection. \par She had successful thrombectomy followed by successful TIPS in May 2021.\par \par She does not exercise, but she remains active at home.\par

## 2023-05-17 NOTE — REASON FOR VISIT
[Other: ____] : [unfilled] [FreeTextEntry1] : 5/15/2023\rommel Cori Fenton returns today for routine annual follow up and to maintain her candidacy for possible liver transplant. She continues to feel well. Her only concern is for occasional lightheadedness without syncope.

## 2023-05-17 NOTE — END OF VISIT
[Time Spent: ___ minutes] : I have spent [unfilled] minutes of time on the encounter. [FreeTextEntry3] : I saw the patient with Joanne Velasco NP and I agree with the findings and plan as above.

## 2023-05-17 NOTE — CARDIOLOGY SUMMARY
[de-identified] : 4/14/2022, sinus 88 bpm\par 5/15/2023, sinus 82 bpm, unchanged from prior [de-identified] : 5/24/2021, moderately dilated LA, +PFO, normal LV systolic function, normal pulmonary pressures, LVEF 72%\par 4/21/2023, normal left atrium, normal left ventricular systolic function, normal pulmonary pressures, LVEF 60%

## 2023-05-17 NOTE — DISCUSSION/SUMMARY
[EKG obtained to assist in diagnosis and management of assessed problem(s)] : EKG obtained to assist in diagnosis and management of assessed problem(s) [FreeTextEntry1] : Patient is a 37 year-old woman with complex medical history as above who presents today for cardiac evaluation prior to possible liver transplant.\par She is low risk for ischemic heart disease.\par \par Recent echocardiogram revealed normal left ventricular systolic function, normal pulmonary pressures and an LVEF of 60%.\par Her ECG shows normal sinus rhythm at 82 BPM. It is unchanged from prior tracings. \par \par No further cardiac testing is required prior to transplant consideration. \par \par \par

## 2023-05-23 NOTE — REASON FOR VISIT
[Follow-Up: _____] : a [unfilled] follow-up visit [Source: ______] : History obtained from [unfilled] [Home] : at home, [unfilled] , at the time of the visit. [Medical Office: ([facility])___] : at the medical office located in  [Verbal consent obtained from patient] : the patient, [unfilled]

## 2023-05-25 ENCOUNTER — APPOINTMENT (OUTPATIENT)
Dept: INTERVENTIONAL RADIOLOGY/VASCULAR | Facility: CLINIC | Age: 38
End: 2023-05-25
Payer: MEDICAID

## 2023-05-25 PROCEDURE — XXXXX: CPT

## 2023-05-25 NOTE — DATA REVIEWED
[FreeTextEntry1] : \par \par \par \par EXAM: [id number] - MR ABDOMEN WAW IC - ORDERED BY: CHANDLER BILL\par \par \par PROCEDURE DATE: 05/17/2023\par \par \par \par INTERPRETATION: CLINICAL INFORMATION: HCC status post microwave ablation of segment 6 lesion on 1/10/2023. History of Budd-Chiari.\par \par COMPARISON: MRI 2/17/2023, 8/31/2022, 11/27/2021\par \par CONTRAST/COMPLICATIONS:\par IV Contrast: Gadavist 5 cc administered 2.5 cc discarded\par Oral Contrast: NONE\par Complications: None reported at time of study completion\par \par PROCEDURE:\par MRI of the abdomen was performed.\par MRCP was performed.\par \par FINDINGS:\par LOWER CHEST: Small right pleural effusion is increased since prior.\par \par LIVER: Cirrhosis with chronic nonvisualized hepatic veins and intra/infrahepatic IVC, compatible with Budd-Chiari. TIPS shunt has been inserted in the interim.\par \par Few T1 hyperintense nodules are again noted. The largest measures 1 cm at the dome (31, 9) with, unchanged in size and enhancement dating back to 11/27/2021, but with new washout on delayed imaging. Close interval follow-up suggested.\par \par Other subcentimeter right hepatic T1 hyperintense foci with delayed washout are unchanged dating back to 11/27/2021.\par \par The Segment 6 microwave ablation cavity measures 2.2 x 1.6 cm (22, 34), decreased from 2.9 x 2.2 cm. No associated enhancement seen to suggest viable tumor.\par \par BILE DUCTS: Normal caliber.\par GALLBLADDER: Within normal limits.\par SPLEEN: Splenomegaly.\par PANCREAS: Within normal limits.\par ADRENALS: Within normal limits.\par KIDNEYS/URETERS: Within normal limits.\par \par VISUALIZED PORTIONS:\par BOWEL: Within normal limits.\par PERITONEUM: No ascites.\par VESSELS: Nonvisualization of the hepatic veins again evident, compatible with known Budd-Chiari syndrome. Retroperitoneal collateral veins again noted. The left portal vein is diminutive but patent and unchanged. Right portal vein is not visualized. Main portal vein and SMV are patent.\par RETROPERITONEUM/LYMPH NODES: No lymphadenopathy.\par ABDOMINAL WALL: Within normal limits.\par \par IMPRESSION:\par Budd-Chiari syndrome with cirrhosis and portal hypertension with TIPS.Maria C segment 6 microwave ablation cavity without evidence of viable tumor.\par \par Numerous T1 hyperintense enhancing foci in the right lobe are unchanged in size dating back to 11/27/2021. The largest, measuring 1 cm demonstrates new washout and is indeterminate. Close interval follow-up is recommended.\par \par Small right pleural effusion has slightly increased in size since 2/17/2023.\par \par --- End of Report ---\par \par \par \par \par \par \par \par EXAM: [id number] - US DPLX ABDOMEN - ORDERED BY: CHANDLER BILL\par \par \par PROCEDURE DATE: 04/07/2023\par \par \par \par INTERPRETATION: CLINICAL INFORMATION: History of Budd-Chiari status post TIPS catheter, evaluate stent patency. Patient also has known liver lesion with cirrhosis\par \par COMPARISON: Prior MRIs dated 12/22 and 2/23. Ultrasound 12/22\par \par TECHNIQUE: Sonography of the abdomen. Doppler analysis of the hepatic vasculature and TIPS catheter\par \par FINDINGS:\par \par Liver: Heterogeneous liver with cirrhotic features.. Right hepatic lesion measuring 1.1 cm in size. Characterized on prior MRIs in a patient status post ablation\par Bile ducts: Normal caliber.\par Gallbladder: Within normal limits.\par Pancreas: Visualized portions are within normal limits.\par Spleen: 11.6 cm. Within normal limits.\par Right kidney: 11.2 cm. No hydronephrosis.\par Left kidney: 11.2 cm. No hydronephrosis.\par Ascites: None.\par Aorta and IVC: Visualized portions are within normal limits.\par \par Main portal vein proximal to TIPS is patent with velocity approximately 40 cm/s. Mid TIPS catheter velocity 124 cm/s. Distal TIPS catheter velocity 134 cm/s\par \par TIPS catheter patent.\par \par IMPRESSION:\par Patent TIPS catheter\par Normal abdominal ultrasound.\par \par --- End of Report ---

## 2023-05-25 NOTE — ASSESSMENT
[Other: _____] : [unfilled] [FreeTextEntry1] : This is a 37 year old female with hx of cryptogenic cirrhosis, portal hypertension with small esophageal varices, hx of ascites, Protein S deficiency, multiple miscarriages, COVID infection (3/4/21), Budd-Chiari syndrome, IVC thrombus s/p thrombectomy/TIPS, and HCC who presents today for follow up. \par \par 1. Hepatic Lesion\par - hx of biopsy proven HCC\par - s/p MWA of seg 6 lesion on 1/10/23\par - 5/17/23 MRI demonstrates Budd-Chiari syndrome with cirrhosis and portal hypertension with TIPS.Maria C segment 6 microwave ablation cavity without evidence of viable tumor.Numerous T1 hyperintense enhancing foci in the right lobe are unchanged in size dating back to 11/27/2021. The largest, measuring 1 cm demonstrates new washout and is indeterminate..\par - Dr. Ragsdale has reviewed her imaging and I discussed the findings with Ms. DAVILA.  \par - The imaging does not show any evidence of viable tumor so recommending repeat imaging in 3 months. \par - f/u MRI 3 months\par - f/u telehealth after imaging  to review the findings.\par - While there is no indication for intervention at this time, I reinforced the importance of continued surveillance with serial imaging.\par - the previous noted RUQ pain has resolved. \par - denies dyspnea, cough, ascites. \par - continue to follow with Dr. Carvajal and [facility] Transplant team\par \par 2. IVC thrombus\par - pt with hx of IVC thrombus \par - now s/p successful IVC thrombectomy on 4/20/21 and TIPS on 5/11/21\par - post procedure, she required several thoracentesis, but has been stable since end of May 2021. Denies ascites, LE edema, recurrent pleural effusions or abdominal pain. \par - Has been tolerating spironolactone and Xarelto\par - underwent TIPS/IVC venogram to evaluate vasculature for possible transplantation on 5/11/22\par - 4/7 abdominal duplex with TIPS that is patent with velocity approximately 40 cm/s. Mid TIPS catheter velocity 124 cm/s. Distal TIPS catheter velocity 134 cm/s\par - f/u duplex 6 months\par - continue to follow with hepatology\par \par 3. Hepatic Encephalopathy\par - reports episode of confusion about 1.5 months ago. \par - improved after increase in lactulose dosing, no episodes since\par - continue Lactulose per hepatology\par \par 4. Hepatic Hydrothorax\par - TIPS/thoracentesis on 5/11/2021. Post procedure, she required approximately 2 thoracentesis. \par - recent MR with small right pleural effusion has slightly increased in size since 2/17/2023\par - pt currently on Spironolactone 50 mg daily\par - denies dyspnea/cough\par - discussed findings and encouraged to monitor symptoms and if any dyspnea to contact Dr. Carvajal\par \par I have provided the patient the opportunity to ask questions and have answered them to her satisfaction. She is encouraged to contact our office with any further questions, concerns, or issues.\par \par Above case and plan reviewed with Dr. Ragsdale.\par

## 2023-05-25 NOTE — PHYSICAL EXAM
[Alert] : alert [Normal Voice/Communication] : normal voice communication [Oriented x3] : oriented to person, place, and time [Normal Insight/Judgement] : insight and judgment were intact [Normal Affect] : the affect was normal [Normal Mood] : the mood was normal [Recent Memory Normal] : recent memory was not impaired [Remote Memory Normal] : remote memory was not impaired [Restricted in physically strenuous activity but ambulatory and able to carry out work of a light or sedentary nature] : Restricted in physically strenuous activity but ambulatory and able to carry out work of a light or sedentary nature, e.g., light house work, office work [No Respiratory Distress] : no respiratory distress

## 2023-05-25 NOTE — HISTORY OF PRESENT ILLNESS
[FreeTextEntry1] : Pt is from [facility] and speaks [place],  ID #[id number], assisted with interview. \par \par This is a 37 year old female with hx of cryptogenic cirrhosis, portal hypertension with small esophageal varices, hx of ascites, Protein S deficiency, multiple miscarriages, COVID infection (3/4/21), Budd-Chiari syndrome, IVC thrombus s/p thrombectomy/TIPS, and HCC who presents today for follow up. \par \par Pt originally presented to [facility] in 3/2021 with c/o abdominal bloating and was found to have moderate right pleural effusion (likely 2/2 hepatic hydrothorax), ascites, and thrombi in suprarenal IVC and SMV extending to confluence of splenic vein and portal vein. She was started on heparin gtt and transitioned to Xarelto. Hematology w/u was + for Protein S deficiency. Pt evaluated by Transplant Surgery, IR, and Hepatology, and case was discussed at multidisciplinary conference and options were discussed for treatment. This included : (1)anticoagulation alone, (2) IVC thrombectomy and IVC stent, and (3) IVC thrombectomy, DIPS, and IVC stent, (4) IVC thrombectomy , TIPS.\par \par She is s/p IVC thrombectomy on 21 and TIPS/thoracentesis on 2021. Post procedure, she required approximately 2 thoracentesis. She has continued on a/c and denies LE edema, ascites, dyspnea.  She continues on lactulose and Xifaxan. \par \par She has been following with Dr. Carvajal and the [facility] Transplant team and she underwent TIPS/IVC venogram on 22 for transplant planning purposes. She is now currently on transplant list. \par \par She was noted to have a right hepatic lobe LR-5 lesion measuring 1.8 cm within segment 6 on imaging in 2022.   She underwent biopsy of the lesion on  and pathology was + for HCC. She is now s/p MWA of lesion on 1/10/23 and presents today to review f/u imaging. \par \par Reports to be feeling well, but had episode of confusion about 1.5 months ago.  She experienced some dizziness and blurred vision, but improved after increased dose of lactulose. Has not had any further episodes since. Previously reported RUQ pain, but has reported improvement after starting famotadine.\par \par She reportedly has been diagnosed with cirrhosis of the liver since about . She presented with 4 weeks of pregnancy at [facility], and was diagnosed with cirrhosis of the liver, and was advised to terminate pregnancy. She had two prior spontaneous , and one pre-term delivery. Her pregnancy history is as follows:\par \par #1 (): Vaginal delivery girl, at 28 weeks GA . Delivery occurred at [facility]. Pregnancy #1 Comments: ( delivery, baby  after 30 minutes. Pt remained in hospital due to enlarged liver). \par #2 (): at 16 weeks GA . Delivery occurred at [facility]. Pregnancy #2 Comments: (spontaneous ab, D&C). \par #3 (): at 8 weeks GA . Delivery occurred at [facility]. Pregnancy #3 Comments: (Spontaneous ab, D&C). \par  #4 (): at 4 weeks GA . Delivery occurred at [facility]. Pregnancy #4 Comments: (Induced ab- Pt was diagnosed with Cirrhosis, pt was advised to terminate pregnancy). \par \par She is unmarried, and has a boyfriend, Darron, whom she lives with. Her mother lives in [place], but speaks limited English. She had been seeing a hepatologist at [facility], Dr. Davida Thompson, but now has been following with Dr. Carvajal since 2019. \par \par She denied any prior history of gastrointestinal bleeding or ascites. She reports generalized mild pruritus, and has not been on any specific medication to control her itching. She has been using some emollients or oil for symptoms relief. \par \par \par PMD: does not have one\par Hep: Wei\par Heme: Kelechi Theo\par \par

## 2023-06-05 ENCOUNTER — NON-APPOINTMENT (OUTPATIENT)
Age: 38
End: 2023-06-05

## 2023-06-05 ENCOUNTER — RX RENEWAL (OUTPATIENT)
Age: 38
End: 2023-06-05

## 2023-06-12 ENCOUNTER — FORM ENCOUNTER (OUTPATIENT)
Age: 38
End: 2023-06-12

## 2023-06-12 ENCOUNTER — NON-APPOINTMENT (OUTPATIENT)
Age: 38
End: 2023-06-12

## 2023-06-13 ENCOUNTER — NON-APPOINTMENT (OUTPATIENT)
Age: 38
End: 2023-06-13

## 2023-06-13 ENCOUNTER — APPOINTMENT (OUTPATIENT)
Dept: HEPATOLOGY | Facility: CLINIC | Age: 38
End: 2023-06-13
Payer: MEDICAID

## 2023-06-13 VITALS
DIASTOLIC BLOOD PRESSURE: 68 MMHG | WEIGHT: 112 LBS | HEIGHT: 62 IN | BODY MASS INDEX: 20.61 KG/M2 | SYSTOLIC BLOOD PRESSURE: 101 MMHG | OXYGEN SATURATION: 97 % | RESPIRATION RATE: 14 BRPM | TEMPERATURE: 97.2 F | HEART RATE: 87 BPM

## 2023-06-13 PROCEDURE — 99215 OFFICE O/P EST HI 40 MIN: CPT

## 2023-06-14 LAB
ALBUMIN SERPL ELPH-MCNC: 4 G/DL
ALP BLD-CCNC: 150 U/L
ALT SERPL-CCNC: 23 U/L
ANION GAP SERPL CALC-SCNC: 10 MMOL/L
AST SERPL-CCNC: 32 U/L
BILIRUB SERPL-MCNC: 1.9 MG/DL
BUN SERPL-MCNC: 13 MG/DL
CALCIUM SERPL-MCNC: 9.5 MG/DL
CHLORIDE SERPL-SCNC: 108 MMOL/L
CO2 SERPL-SCNC: 23 MMOL/L
CREAT SERPL-MCNC: 0.64 MG/DL
EGFR: 115 ML/MIN/1.73M2
GLUCOSE SERPL-MCNC: 90 MG/DL
INR PPP: 2.24 RATIO
POTASSIUM SERPL-SCNC: 4.8 MMOL/L
PROT SERPL-MCNC: 6.7 G/DL
PT BLD: 26.2 SEC
SODIUM SERPL-SCNC: 140 MMOL/L

## 2023-06-15 LAB
ALPHA-1-FETOPROTEIN-L3: 14.6 %
ALPHA-1-FETOPROTEIN: 3.1 NG/ML

## 2023-06-19 ENCOUNTER — APPOINTMENT (OUTPATIENT)
Dept: INTERVENTIONAL RADIOLOGY/VASCULAR | Facility: CLINIC | Age: 38
End: 2023-06-19

## 2023-06-19 ENCOUNTER — NON-APPOINTMENT (OUTPATIENT)
Age: 38
End: 2023-06-19

## 2023-06-19 PROCEDURE — XXXXX: CPT | Mod: 1L

## 2023-06-20 LAB — ACARBOXYPROTHROMBIN SERPL-MCNC: 0.2 NG/ML

## 2023-06-20 NOTE — PROVIDER CONTACT NOTE (CHANGE IN STATUS NOTIFICATION) - NAME OF MD/NP/PA/DO NOTIFIED:
Jonathon HINDS Clofazimine Counseling:  I discussed with the patient the risks of clofazimine including but not limited to skin and eye pigmentation, liver damage, nausea/vomiting, gastrointestinal bleeding and allergy.

## 2023-07-01 ENCOUNTER — RX RENEWAL (OUTPATIENT)
Age: 38
End: 2023-07-01

## 2023-07-07 ENCOUNTER — OUTPATIENT (OUTPATIENT)
Dept: OUTPATIENT SERVICES | Facility: HOSPITAL | Age: 38
LOS: 1 days | Discharge: HOME | End: 2023-07-07

## 2023-07-07 DIAGNOSIS — Z98.890 OTHER SPECIFIED POSTPROCEDURAL STATES: Chronic | ICD-10-CM

## 2023-07-07 DIAGNOSIS — Z95.828 PRESENCE OF OTHER VASCULAR IMPLANTS AND GRAFTS: Chronic | ICD-10-CM

## 2023-07-07 DIAGNOSIS — I82.0 BUDD-CHIARI SYNDROME: ICD-10-CM

## 2023-07-14 ENCOUNTER — RESULT REVIEW (OUTPATIENT)
Age: 38
End: 2023-07-14

## 2023-07-14 ENCOUNTER — APPOINTMENT (OUTPATIENT)
Dept: HEMATOLOGY ONCOLOGY | Facility: CLINIC | Age: 38
End: 2023-07-14
Payer: MEDICAID

## 2023-07-14 VITALS
WEIGHT: 106.9 LBS | SYSTOLIC BLOOD PRESSURE: 98 MMHG | BODY MASS INDEX: 19.67 KG/M2 | RESPIRATION RATE: 16 BRPM | DIASTOLIC BLOOD PRESSURE: 66 MMHG | OXYGEN SATURATION: 99 % | TEMPERATURE: 97.1 F | HEART RATE: 78 BPM | HEIGHT: 62 IN

## 2023-07-14 LAB
ANISOCYTOSIS BLD QL: SLIGHT — SIGNIFICANT CHANGE UP
BASOPHILS # BLD AUTO: 0.03 K/UL — SIGNIFICANT CHANGE UP (ref 0–0.2)
BASOPHILS NFR BLD AUTO: 1 % — SIGNIFICANT CHANGE UP (ref 0–2)
DACRYOCYTES BLD QL SMEAR: SLIGHT — SIGNIFICANT CHANGE UP
ELLIPTOCYTES BLD QL SMEAR: SLIGHT — SIGNIFICANT CHANGE UP
EOSINOPHIL # BLD AUTO: 0.11 K/UL — SIGNIFICANT CHANGE UP (ref 0–0.5)
EOSINOPHIL NFR BLD AUTO: 4 % — SIGNIFICANT CHANGE UP (ref 0–6)
HCT VFR BLD CALC: 34.2 % — LOW (ref 34.5–45)
HGB BLD-MCNC: 10.4 G/DL — LOW (ref 11.5–15.5)
LYMPHOCYTES # BLD AUTO: 1.2 K/UL — SIGNIFICANT CHANGE UP (ref 1–3.3)
LYMPHOCYTES # BLD AUTO: 43 % — SIGNIFICANT CHANGE UP (ref 13–44)
MCHC RBC-ENTMCNC: 24.9 PG — LOW (ref 27–34)
MCHC RBC-ENTMCNC: 30.4 G/DL — LOW (ref 32–36)
MCV RBC AUTO: 82 FL — SIGNIFICANT CHANGE UP (ref 80–100)
MONOCYTES # BLD AUTO: 0.2 K/UL — SIGNIFICANT CHANGE UP (ref 0–0.9)
MONOCYTES NFR BLD AUTO: 7 % — SIGNIFICANT CHANGE UP (ref 2–14)
NEUTROPHILS # BLD AUTO: 1.26 K/UL — LOW (ref 1.8–7.4)
NEUTROPHILS NFR BLD AUTO: 45 % — SIGNIFICANT CHANGE UP (ref 43–77)
NRBC # BLD: 0 /100 — SIGNIFICANT CHANGE UP (ref 0–0)
NRBC # BLD: SIGNIFICANT CHANGE UP /100 WBCS (ref 0–0)
PLAT MORPH BLD: NORMAL — SIGNIFICANT CHANGE UP
PLATELET # BLD AUTO: 121 K/UL — LOW (ref 150–400)
POIKILOCYTOSIS BLD QL AUTO: SLIGHT — SIGNIFICANT CHANGE UP
RBC # BLD: 4.17 M/UL — SIGNIFICANT CHANGE UP (ref 3.8–5.2)
RBC # FLD: 16.7 % — HIGH (ref 10.3–14.5)
RBC BLD AUTO: ABNORMAL
RETICS #: 60.8 K/UL — SIGNIFICANT CHANGE UP (ref 25–125)
RETICS/RBC NFR: 1.5 % — SIGNIFICANT CHANGE UP (ref 0.5–2.5)
WBC # BLD: 2.79 K/UL — LOW (ref 3.8–10.5)
WBC # FLD AUTO: 2.79 K/UL — LOW (ref 3.8–10.5)

## 2023-07-14 PROCEDURE — 99214 OFFICE O/P EST MOD 30 MIN: CPT

## 2023-07-14 RX ORDER — HEPARIN SODIUM 5000 [USP'U]/ML
5000 INJECTION, SOLUTION INTRAVENOUS; SUBCUTANEOUS TWICE DAILY
Qty: 2 | Refills: 2 | Status: DISCONTINUED | COMMUNITY
Start: 2023-07-14 | End: 2023-07-14

## 2023-07-17 NOTE — ASSESSMENT
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  No other explanations for cirrhosis known currently.  Much of this history took place in [facility].  Patient was able to bring records from the evaluation in [facility] as well as earlier evaluation in 2017 at [facility].  After several repeats, Protein S antigen improved to normal at 68%. Does not appear consistent with congenital Protein S deficiency.  Patient had a normal ATIII activity 90% prior to the thrombosis of the IVC.  Had protein C and S was low intermittently, but that was because of the liver disfunction.  \par Patient is in process of another radiofrequency ablation coming up soon for a new focal area of hepatocellular cancer.  Patient will then proceed to liver transplant. We had a discussion with her hepatology team.  THe transplant would come suddenly and not have time to allow discontinuation of the eliquis to take place and return hemostasis to normal.  For this reason we can convert to coumadin dosing 5mg daily, and d/c the Eliquis after a short overlap.  FOllow up next week for INR checks.  The coumadin and INR can be more easily reversed with  Kcentra leading up to the transplant.

## 2023-07-17 NOTE — HISTORY OF PRESENT ILLNESS
[de-identified] : This is a 37 year old woman with history of protal vein thrombosis hepatocellular cancer, more recent MRI demonstrated a new lesion in the liver following microwave ablation of the previous one.  Patient is in process of planning for the next ablation therapy with Dr. Darrick Ragsdale.  \par \par Patient hg 10.4g/dll WBC 2.79 Platelets 121 k/ul. while slightly lower than prior this remains stable,\par She remains on xarelto 10mg daily \par \par  [id number] Na who had some issue with the computer system so we had transitioned to a second one.  \par  [place] [id number] [facility] \par

## 2023-07-18 ENCOUNTER — APPOINTMENT (OUTPATIENT)
Dept: MRI IMAGING | Facility: CLINIC | Age: 38
End: 2023-07-18
Payer: COMMERCIAL

## 2023-07-18 ENCOUNTER — APPOINTMENT (OUTPATIENT)
Dept: CT IMAGING | Facility: CLINIC | Age: 38
End: 2023-07-18
Payer: COMMERCIAL

## 2023-07-18 ENCOUNTER — NON-APPOINTMENT (OUTPATIENT)
Age: 38
End: 2023-07-18

## 2023-07-18 ENCOUNTER — RESULT REVIEW (OUTPATIENT)
Age: 38
End: 2023-07-18

## 2023-07-18 ENCOUNTER — OUTPATIENT (OUTPATIENT)
Dept: OUTPATIENT SERVICES | Facility: HOSPITAL | Age: 38
LOS: 1 days | End: 2023-07-18
Payer: COMMERCIAL

## 2023-07-18 DIAGNOSIS — C22.0 LIVER CELL CARCINOMA: ICD-10-CM

## 2023-07-18 DIAGNOSIS — Z98.890 OTHER SPECIFIED POSTPROCEDURAL STATES: Chronic | ICD-10-CM

## 2023-07-18 DIAGNOSIS — Z95.828 PRESENCE OF OTHER VASCULAR IMPLANTS AND GRAFTS: Chronic | ICD-10-CM

## 2023-07-18 DIAGNOSIS — Z00.8 ENCOUNTER FOR OTHER GENERAL EXAMINATION: ICD-10-CM

## 2023-07-18 PROCEDURE — 71250 CT THORAX DX C-: CPT | Mod: 26

## 2023-07-18 PROCEDURE — 74183 MRI ABD W/O CNTR FLWD CNTR: CPT | Mod: 26

## 2023-07-18 PROCEDURE — 74183 MRI ABD W/O CNTR FLWD CNTR: CPT

## 2023-07-18 PROCEDURE — 71250 CT THORAX DX C-: CPT

## 2023-07-18 PROCEDURE — A9585: CPT

## 2023-07-19 ENCOUNTER — APPOINTMENT (OUTPATIENT)
Dept: HEMATOLOGY ONCOLOGY | Facility: CLINIC | Age: 38
End: 2023-07-19

## 2023-07-21 ENCOUNTER — NON-APPOINTMENT (OUTPATIENT)
Age: 38
End: 2023-07-21

## 2023-07-24 LAB
APTT BLD: 61.2 SEC
FERRITIN SERPL-MCNC: 18 NG/ML
FOLATE SERPL-MCNC: 15.2 NG/ML
INR PPP: 1.34 RATIO
INR PPP: 2.53 RATIO
IRON SATN MFR SERPL: 9 %
IRON SERPL-MCNC: 31 UG/DL
LDH SERPL-CCNC: 147 U/L
PT BLD: 15.7 SEC
PT BLD: 30.1 SEC
TIBC SERPL-MCNC: 359 UG/DL
UIBC SERPL-MCNC: 328 UG/DL
VIT B12 SERPL-MCNC: 913 PG/ML

## 2023-07-25 ENCOUNTER — NON-APPOINTMENT (OUTPATIENT)
Age: 38
End: 2023-07-25

## 2023-07-28 ENCOUNTER — APPOINTMENT (OUTPATIENT)
Dept: HEMATOLOGY ONCOLOGY | Facility: CLINIC | Age: 38
End: 2023-07-28

## 2023-07-31 ENCOUNTER — NON-APPOINTMENT (OUTPATIENT)
Age: 38
End: 2023-07-31

## 2023-07-31 ENCOUNTER — APPOINTMENT (OUTPATIENT)
Dept: HEMATOLOGY ONCOLOGY | Facility: CLINIC | Age: 38
End: 2023-07-31

## 2023-07-31 ENCOUNTER — RESULT REVIEW (OUTPATIENT)
Age: 38
End: 2023-07-31

## 2023-07-31 LAB
BASOPHILS # BLD AUTO: 0.02 K/UL — SIGNIFICANT CHANGE UP (ref 0–0.2)
BASOPHILS NFR BLD AUTO: 0.8 % — SIGNIFICANT CHANGE UP (ref 0–2)
EOSINOPHIL # BLD AUTO: 0.17 K/UL — SIGNIFICANT CHANGE UP (ref 0–0.5)
EOSINOPHIL NFR BLD AUTO: 6.5 % — HIGH (ref 0–6)
HCT VFR BLD CALC: 33.6 % — LOW (ref 34.5–45)
HGB BLD-MCNC: 10.1 G/DL — LOW (ref 11.5–15.5)
IMM GRANULOCYTES NFR BLD AUTO: 0 % — SIGNIFICANT CHANGE UP (ref 0–0.9)
INR PPP: 1.77 RATIO
INR PPP: 3.02 RATIO
LYMPHOCYTES # BLD AUTO: 0.94 K/UL — LOW (ref 1–3.3)
LYMPHOCYTES # BLD AUTO: 35.7 % — SIGNIFICANT CHANGE UP (ref 13–44)
MCHC RBC-ENTMCNC: 24.3 PG — LOW (ref 27–34)
MCHC RBC-ENTMCNC: 30.1 G/DL — LOW (ref 32–36)
MCV RBC AUTO: 80.8 FL — SIGNIFICANT CHANGE UP (ref 80–100)
MONOCYTES # BLD AUTO: 0.26 K/UL — SIGNIFICANT CHANGE UP (ref 0–0.9)
MONOCYTES NFR BLD AUTO: 9.9 % — SIGNIFICANT CHANGE UP (ref 2–14)
NEUTROPHILS # BLD AUTO: 1.24 K/UL — LOW (ref 1.8–7.4)
NEUTROPHILS NFR BLD AUTO: 47.1 % — SIGNIFICANT CHANGE UP (ref 43–77)
NRBC # BLD: 0 /100 WBCS — SIGNIFICANT CHANGE UP (ref 0–0)
PLATELET # BLD AUTO: 132 K/UL — LOW (ref 150–400)
PT BLD: 19.8 SEC
PT BLD: 33 SEC
RBC # BLD: 4.16 M/UL — SIGNIFICANT CHANGE UP (ref 3.8–5.2)
RBC # FLD: 16.2 % — HIGH (ref 10.3–14.5)
WBC # BLD: 2.63 K/UL — LOW (ref 3.8–10.5)
WBC # FLD AUTO: 2.63 K/UL — LOW (ref 3.8–10.5)

## 2023-08-01 ENCOUNTER — RX RENEWAL (OUTPATIENT)
Age: 38
End: 2023-08-01

## 2023-08-02 ENCOUNTER — NON-APPOINTMENT (OUTPATIENT)
Age: 38
End: 2023-08-02

## 2023-08-07 ENCOUNTER — APPOINTMENT (OUTPATIENT)
Dept: HEMATOLOGY ONCOLOGY | Facility: CLINIC | Age: 38
End: 2023-08-07

## 2023-08-08 ENCOUNTER — NON-APPOINTMENT (OUTPATIENT)
Age: 38
End: 2023-08-08

## 2023-08-08 LAB
INR PPP: 2.42 RATIO
PT BLD: 26.6 SEC

## 2023-08-14 LAB
ALBUMIN SERPL ELPH-MCNC: 4.2 G/DL
ALP BLD-CCNC: 141 U/L
ALT SERPL-CCNC: 27 U/L
ANION GAP SERPL CALC-SCNC: 10 MMOL/L
AST SERPL-CCNC: 36 U/L
BILIRUB SERPL-MCNC: 1 MG/DL
BUN SERPL-MCNC: 12 MG/DL
CALCIUM SERPL-MCNC: 9.1 MG/DL
CHLORIDE SERPL-SCNC: 106 MMOL/L
CO2 SERPL-SCNC: 24 MMOL/L
CREAT SERPL-MCNC: 0.63 MG/DL
EGFR: 116 ML/MIN/1.73M2
GLUCOSE SERPL-MCNC: 133 MG/DL
POTASSIUM SERPL-SCNC: 4.7 MMOL/L
PROT SERPL-MCNC: 6.5 G/DL
SODIUM SERPL-SCNC: 140 MMOL/L

## 2023-08-22 NOTE — DIETITIAN INITIAL EVALUATION ADULT. - CALCULATED TO (G/KG)
[facility] Call Center    Phone Message    May a detailed message be left on voicemail: yes     Reason for Call: Other: Eform received requesting:     Brain stimulator for cervical dystonia     Called patient and left voice mail for her to call back.    Action Taken: Other: N/A    Travel Screening: Not Applicable                                                                    71.26

## 2023-08-23 ENCOUNTER — APPOINTMENT (OUTPATIENT)
Dept: HEPATOLOGY | Facility: CLINIC | Age: 38
End: 2023-08-23
Payer: MEDICAID

## 2023-08-23 VITALS
OXYGEN SATURATION: 98 % | HEART RATE: 82 BPM | HEIGHT: 65 IN | WEIGHT: 109 LBS | TEMPERATURE: 98.4 F | DIASTOLIC BLOOD PRESSURE: 63 MMHG | BODY MASS INDEX: 18.16 KG/M2 | SYSTOLIC BLOOD PRESSURE: 94 MMHG

## 2023-08-23 PROCEDURE — 99214 OFFICE O/P EST MOD 30 MIN: CPT

## 2023-08-24 RX ORDER — RIVAROXABAN 10 MG/1
10 TABLET, FILM COATED ORAL
Qty: 30 | Refills: 5 | Status: DISCONTINUED | COMMUNITY
Start: 2021-04-08 | End: 2023-08-24

## 2023-08-27 NOTE — HISTORY OF PRESENT ILLNESS
[FreeTextEntry1] : The patient is a 36-year-old female who presents for a hepatology follow-up visit. Her medical history is significant for several pregnancy complications, including  delivery at 28 weeks gestational age with infant loss after 30 minutes in , spontaneous abortions at 8 and 16 weeks gestational age requiring D&C's in  and , and an induced  at 4 weeks gestational age due to advice to terminate pregnancy due to cirrhosis in . She also had a  premature rupture of membranes (PPROM) at 22 weeks 5 days gestational age in .  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 She is currently on anticoagulation with Xarelto since 2021. She also has decompensated cirrhosis complicated by right hepatic hydrothorax, ascites, small non-bleeding esophageal varices (EV), and a recently diagnosed small hepatocellular carcinoma (HCC).  On 2021, the patient underwent elective IVC thrombectomy with IR, with venography that showed a tight narrowed segment of the IVC just inferior to the right atrium that may be due to vascular stricture vs extrinsic compression and that was unable to be traversed with a wire despite attempts both from above and below that segment. She has retrograde IVC flow into a very large azygous collateral with return to the superior vena cava (SVC).  On May 11, 2021, she was readmitted for an elective TIPS/DIPS procedure, which was successful. She remained admitted from May 11 through May 25, 2021. Her course was complicated with recurrent hepatic hydrothorax requiring thoracentesis during her hospitalization.  The patient had an EGD in 2017 that revealed Grade A varices and Gastropathy. In 2021, she had another EGD that showed Grade I-II esophageal varices that were not amenable to banding. A non-bleeding gastric ulcer with no stigmata of bleeding was biopsied, as were multiple non-bleeding duodenal ulcers. The biopsy was suggestive of H. pylori, and she was treated with anti-Helicobacter pylori treatment regimen.  On 2021, an MRI was done, which revealed features suggestive of Budd-Chiari syndrome. The patient had a patent TIPS, and a new 1.2 cm right hepatic lobe lesion that washout and restricted diffusion raising concern for HCC was noted. This was reviewed at the tumor board meeting, and the plan is to have a follow-up MRI in about 3 months. A follow-up MRI on 2022, revealed a 1.1 cm segment 6 liver lesion with washout that now has arterial enhancement that raises the concern for early HCC. She also underwent TIPS doppler on 3/2/22 which revealed a patent TIPS.  On 2023, the patient returned for a follow-up and reported feeling well. The medical team reviewed the patient's laboratory results and conducted an ultrasound duplex, which showed that the TIPS (Transjugular Intrahepatic Portosystemic Shunt) was patent. An MRI conducted in 2023 revealed that the patient was suffering from Budd-Chiari syndrome, cirrhosis, and portal hypertension. However, the TIPS was still patent, and there was no new liver lesion. Additionally, the patient had undergone microwave ablation of a segment 6 lesion, and the MRI confirmed that it was nonviable.  On 2023, she reports having an episode of dizziness 1.5 weeks ago while baking, she believes it was related to the heat in the room.  We reviewed an MRI conducted on May 25, 2023 revealed Budd-Chiari syndrome with cirrhosis with portal hypertension with TIPS. Segment 6 microwave ablation cavity without evidence of viable tumor. Numerous T1 hyperintense enhancing foci in the right lobe are unchanged in size dating back to 2021. The largest, measuring 1cm demonstrates new washout and is indeterminate. Discussed at the Tumor Board meeting, this concerning for HCC.  We will proceed with LDT. Discussed with Dr. Ragsdale. Small right pleural effusion has slightly increased in size since 2023.    Today, she returns for follow-up. She was started on Coumadin therapy by Dr Theo palacios. Recent laboratory test results from 2023 revealed an INR of 2.42 on Coumadin.  LFTs from 2023 revealed mild elevation of alkaline phosphatase level otherwise unremarkable.  Serum creatinine 0.63 mg/dL.  Her MRI from 2023 revealed no significant change in segment 6 ablation Marianela.  No evidence for viable tumor.  Redemonstrated numerous T1 hyperintense foci within the right lobe.  Again noted is associated with involving the largest 1 cm lesion at the dome.  Several of the smaller lesions also appear to demonstrate washout.  These lesions appear similar dating back to 2022 including appearance of ultrasound.  Findings remain indeterminate.  Incidentally has short segment small bowel intussusception in the right abdomen was noted.  Potentially transient.  No bowel dilatation to suggest obstruction.  Patient also remains asymptomatic.  She also made us aware that she has not heard from Dr Ragsdale office in regards to radioembolization therapy of her liver tumor.

## 2023-08-27 NOTE — ASSESSMENT
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 -On a subsequent attempt patient had a successful TIPS procedure performed. -Currently on Aldactone 50 mg daily with no recurrence of ascites or lower extremity edema. -Her MRI from July 18, 2023 revealed no significant change in segment 6 ablation June.  No evidence for viable tumor.  PLAN  I. Peptic ulcer disease/esophageal varices/H. pylori infection: -Grade 1 - 2 esophageal varices in the distal esophagus that is not amenable to banding. -Nonbleeding gastric ulcer with no stigmata of bleeding was noted. -Positive for H. pylori, currently status post anti-H. pylori treatment. -Continue on Pantoprazole 40 mg PO qd, taking over-the-counter famotidine as needed.  II. Hepatic hydrothorax and ascites: -Small right pleural effusion on MRI from May 2023, has slightly increased in size since 2/17/2023. Patient asymptomatic. Recent CT chest from July 18, 2023- was negative for pleural effusion.  -No clinically evident ascites on examination. -Continue Aldactone 50 mg daily. -Adhere to a low sodium (<2 grams of sodium per day) diet.  III. Small, non-bleeding esophageal varices: -Off beta-blocker due to hypotension. -Small grade 1 through 2 varices that is not amenable to banding. -Follow-up endoscopies recommended. However, patient likely will be transplanted soon (considering having MELDF exception score now). Will defer for now.  IV. HCC: -MRI from 2 March 2022 revealed a 1.1 cm segment 6 right hepatic lesion with washout unchanged in size compared to November 27, 2021. -Follow-up MRI from August 31, 2022 revealed right hepatic lobe lesion measuring 1.8 cm within segment 6 was noted. Although the lesion appears LR 5 this criteria cannot be applied in Budd-Chiari syndrome. Liver biopsy of the lesion recommended, and the biopsy was suggestive of HCC (Nov 14, 2022). This was subsequently treated with MWA (Herber 10, 2023) -Follow-up MRI conducted on May 25, 2023 revealed Budd-Chiari syndrome with cirrhosis with portal hypertension with TIPS. Segment 6 microwave ablation cavity without evidence of viable tumor. Numerous T1 hyperintense enhancing foci in the right lobe are unchanged in size dating back to 11/27/2021. The largest, measuring 1 cm demonstrates new washout and is indeterminate. Discussed with Dr. Ragsdale. We will plan to treat this lesion. However, considering she now has MELD exception points- we are debating LDT vs watchful waiting with follow up MRI in 3 months. Discussed with Dr. Dagher. -Close interval follow-up is recommended: Will perform MRI in 3 months. -Alpha-fetoprotein level is within normal range -AFP from June 13, 2023: Negative  V. Transplant candidacy: Currently wait-listed for LT=12/8/22 ABO= B HCV donor acceptance on unet= yes HCC pts due 6/19/23 LAB MELD 18 recert 9/12/23  Follow-up:  Return to hepatology clinic in about 2 months.

## 2023-08-27 NOTE — REASON FOR VISIT
[Follow-Up: _____] : a [unfilled] follow-up visit [[employer] ] : provided by [employer]   [Interpreters_IDNumber] : [id number] [Interpreters_FullName] : Alli [TWNoteComboBox1] : [place]

## 2023-08-30 LAB
INR PPP: 1.27 RATIO
PT BLD: 14.3 SEC

## 2023-09-02 LAB
AFP-TM SERPL-MCNC: 4.3 NG/ML
ALBUMIN SERPL ELPH-MCNC: 4 G/DL
ALP BLD-CCNC: 167 U/L
ALT SERPL-CCNC: 26 U/L
ANION GAP SERPL CALC-SCNC: 12 MMOL/L
AST SERPL-CCNC: 35 U/L
BILIRUB SERPL-MCNC: 1.6 MG/DL
BUN SERPL-MCNC: 18 MG/DL
CALCIUM SERPL-MCNC: 9 MG/DL
CHLORIDE SERPL-SCNC: 107 MMOL/L
CO2 SERPL-SCNC: 21 MMOL/L
CREAT SERPL-MCNC: 0.73 MG/DL
EGFR: 107 ML/MIN/1.73M2
GLUCOSE SERPL-MCNC: 102 MG/DL
INR PPP: 1.26 RATIO
POTASSIUM SERPL-SCNC: 4.5 MMOL/L
PROT SERPL-MCNC: 6.5 G/DL
PT BLD: 14.1 SEC
SODIUM SERPL-SCNC: 140 MMOL/L

## 2023-09-07 ENCOUNTER — TRANSCRIPTION ENCOUNTER (OUTPATIENT)
Age: 38
End: 2023-09-07

## 2023-09-07 ENCOUNTER — INPATIENT (INPATIENT)
Facility: HOSPITAL | Age: 38
LOS: 11 days | Discharge: HOME | DRG: 5 | End: 2023-09-19
Attending: TRANSPLANT SURGERY | Admitting: TRANSPLANT SURGERY
Payer: MEDICAID

## 2023-09-07 VITALS
WEIGHT: 107.59 LBS | HEART RATE: 84 BPM | DIASTOLIC BLOOD PRESSURE: 71 MMHG | OXYGEN SATURATION: 98 % | TEMPERATURE: 98 F | HEIGHT: 62 IN | SYSTOLIC BLOOD PRESSURE: 111 MMHG | RESPIRATION RATE: 18 BRPM

## 2023-09-07 DIAGNOSIS — Z94.4 LIVER TRANSPLANT STATUS: ICD-10-CM

## 2023-09-07 DIAGNOSIS — Z98.890 OTHER SPECIFIED POSTPROCEDURAL STATES: Chronic | ICD-10-CM

## 2023-09-07 DIAGNOSIS — Z95.828 PRESENCE OF OTHER VASCULAR IMPLANTS AND GRAFTS: Chronic | ICD-10-CM

## 2023-09-07 LAB
ALBUMIN SERPL ELPH-MCNC: 3.9 G/DL — SIGNIFICANT CHANGE UP (ref 3.3–5)
ALP SERPL-CCNC: 167 U/L — HIGH (ref 40–120)
ALT FLD-CCNC: 26 U/L — SIGNIFICANT CHANGE UP (ref 10–45)
AMMONIA BLD-MCNC: 105 UMOL/L — HIGH (ref 11–55)
ANION GAP SERPL CALC-SCNC: 13 MMOL/L — SIGNIFICANT CHANGE UP (ref 5–17)
AST SERPL-CCNC: 38 U/L — SIGNIFICANT CHANGE UP (ref 10–40)
BILIRUB SERPL-MCNC: 1.2 MG/DL — SIGNIFICANT CHANGE UP (ref 0.2–1.2)
BUN SERPL-MCNC: 13 MG/DL — SIGNIFICANT CHANGE UP (ref 7–23)
CALCIUM SERPL-MCNC: 9 MG/DL — SIGNIFICANT CHANGE UP (ref 8.4–10.5)
CHLORIDE SERPL-SCNC: 108 MMOL/L — SIGNIFICANT CHANGE UP (ref 96–108)
CO2 SERPL-SCNC: 20 MMOL/L — LOW (ref 22–31)
CREAT SERPL-MCNC: 0.58 MG/DL — SIGNIFICANT CHANGE UP (ref 0.5–1.3)
EGFR: 118 ML/MIN/1.73M2 — SIGNIFICANT CHANGE UP
GLUCOSE SERPL-MCNC: 96 MG/DL — SIGNIFICANT CHANGE UP (ref 70–99)
HCT VFR BLD CALC: 33.8 % — LOW (ref 34.5–45)
HGB BLD-MCNC: 10.1 G/DL — LOW (ref 11.5–15.5)
MAGNESIUM SERPL-MCNC: 2.2 MG/DL — SIGNIFICANT CHANGE UP (ref 1.6–2.6)
MCHC RBC-ENTMCNC: 23.9 PG — LOW (ref 27–34)
MCHC RBC-ENTMCNC: 29.9 GM/DL — LOW (ref 32–36)
MCV RBC AUTO: 80.1 FL — SIGNIFICANT CHANGE UP (ref 80–100)
PHOSPHATE SERPL-MCNC: 2.6 MG/DL — SIGNIFICANT CHANGE UP (ref 2.5–4.5)
PLATELET # BLD AUTO: 150 K/UL — SIGNIFICANT CHANGE UP (ref 150–400)
POTASSIUM SERPL-MCNC: 4.3 MMOL/L — SIGNIFICANT CHANGE UP (ref 3.5–5.3)
POTASSIUM SERPL-SCNC: 4.3 MMOL/L — SIGNIFICANT CHANGE UP (ref 3.5–5.3)
PROT SERPL-MCNC: 6.8 G/DL — SIGNIFICANT CHANGE UP (ref 6–8.3)
RBC # BLD: 4.22 M/UL — SIGNIFICANT CHANGE UP (ref 3.8–5.2)
RBC # FLD: 16.3 % — HIGH (ref 10.3–14.5)
SARS-COV-2 RNA SPEC QL NAA+PROBE: SIGNIFICANT CHANGE UP
SODIUM SERPL-SCNC: 141 MMOL/L — SIGNIFICANT CHANGE UP (ref 135–145)
WBC # BLD: 3.49 K/UL — LOW (ref 3.8–10.5)
WBC # FLD AUTO: 3.49 K/UL — LOW (ref 3.8–10.5)

## 2023-09-07 PROCEDURE — 71045 X-RAY EXAM CHEST 1 VIEW: CPT | Mod: 26

## 2023-09-07 PROCEDURE — 93010 ELECTROCARDIOGRAM REPORT: CPT

## 2023-09-07 RX ORDER — BASILIXIMAB 20 MG/5ML
20 INJECTION, POWDER, FOR SOLUTION INTRAVENOUS ONCE
Refills: 0 | Status: DISCONTINUED | OUTPATIENT
Start: 2023-09-07 | End: 2023-09-08

## 2023-09-07 RX ORDER — AMPICILLIN SODIUM AND SULBACTAM SODIUM 250; 125 MG/ML; MG/ML
3 INJECTION, POWDER, FOR SUSPENSION INTRAMUSCULAR; INTRAVENOUS ONCE
Refills: 0 | Status: DISCONTINUED | OUTPATIENT
Start: 2023-09-07 | End: 2023-09-08

## 2023-09-07 RX ORDER — INFLUENZA VIRUS VACCINE 15; 15; 15; 15 UG/.5ML; UG/.5ML; UG/.5ML; UG/.5ML
0.5 SUSPENSION INTRAMUSCULAR ONCE
Refills: 0 | Status: DISCONTINUED | OUTPATIENT
Start: 2023-09-07 | End: 2023-09-19

## 2023-09-07 NOTE — H&P ADULT - NSHPREVIEWOFSYSTEMS_GEN_ALL_CORE
Gen: No fevers/chills,   Skin: No rashes  Head/Eyes/Ears/Mouth: No headache; Normal hearing; Normal vision w/o blurriness; No sinus pain/discomfort, sore throat  Respiratory: No dyspnea, cough, wheezing, hemoptysis  CV: No chest pain, PND, orthopnea  GI: No diarrhea, constipation, nausea, vomiting, melena, hematochezia  : No increased frequency, dysuria, hematuria, nocturia  MSK: No joint pain/swelling; no back pain; no edema  Neuro: No dizziness/lightheadedness, weakness, seizures, numbness, tingling  Heme: No easy bruising or bleeding  Endo: No heat/cold intolerance  Psych: No significant nervousness, anxiety, stress, depression  All other systems were reviewed and are negative, except as noted.

## 2023-09-07 NOTE — H&P ADULT - HISTORY OF PRESENT ILLNESS
37F with PMHx of Budd-Chiari syndrome with chronic occlusion of the IVC (s/p IVC thrombectomy in 2021) and hepatic veins, decompensated cirrhosis c/b right hepatic hydrothorax ascites, non bleeding EV, and HCC (s/p ablatio 2023), protein S deficiency. s/pl eective e TIPs/DIPS in 2021.      In addition with h/o multiple pregnancy complications, including  delivery at 28 weeks gestational age with infant loss after 30 minutes in , spontaneous abortions at 8 and 16 weeks gestational age requiring D&C's in  and , and an induced  at 4 weeks gestational age due to advice to terminate pregnancy due to cirrhosis in . She also had a  premature rupture of membranes (PPROM) at 22 weeks 5 days gestational age in .     Admitted for Liver Transplant         37F with PMHx of Budd-Chiari syndrome with chronic occlusion of the IVC (s/p IVC thrombectomy in 2021) and hepatic veins, decompensated cirrhosis c/b right hepatic hydrothorax ascites, non bleeding EV, and HCC (s/p ablatio 2023), protein S deficiency. s/pl eective e TIPs/DIPS in 2021.      In addition with h/o multiple pregnancy complications, including  delivery at 28 weeks gestational age with infant loss after 30 minutes in , spontaneous abortions at 8 and 16 weeks gestational age requiring D&C's in  and , and an induced  at 4 weeks gestational age due to advice to terminate pregnancy due to cirrhosis in . She also had a  premature rupture of membranes (PPROM) at 22 weeks 5 days gestational age in 2019.    Admitted for Liver Transplant.    Recipient info:  ABO: B  CMV IgG Status: POSITIVE  EBV IgG Status:  POSITIVE    Donor Information   PHS Known Risk: None  CMV IgG: POSITIVE  EBV IgG: POSITIVE  HBcAb:  Negative  HCV: Negative  HCV ARJUN: Negative      37F with PMHx of Budd-Chiari syndrome with chronic occlusion of the IVC (s/p IVC thrombectomy in 2021) and hepatic veins, decompensated cirrhosis c/b right hepatic hydrothorax ascites, non bleeding EV, and HCC (s/p ablatio 2023), protein S deficiency. s/pl eective e TIPs/DIPS in 2021.  On coumadin 5g daily, last dose this am.     In addition with h/o multiple pregnancy complications, including  delivery at 28 weeks gestational age with infant loss after 30 minutes in , spontaneous abortions at 8 and 16 weeks gestational age requiring D&C's in  and , and an induced  at 4 weeks gestational age due to advice to terminate pregnancy due to cirrhosis in . She also had a  premature rupture of membranes (PPROM) at 22 weeks 5 days gestational age in 2019.    Admitted for Liver Transplant.    Recipient info:  ABO: B  CMV IgG Status: POSITIVE  EBV IgG Status:  POSITIVE    Donor Information   PHS Known Risk: None  CMV IgG: POSITIVE  EBV IgG: POSITIVE  HBcAb:  Negative  HCV: Negative  HCV ARJUN: Negative    Home meds:   Aldactone 50mg daily  Pepcid 20mg daily   Coumadin 5mg daily (last dose today)       37F with PMHx of Budd-Chiari syndrome with chronic occlusion of the IVC (s/p IVC thrombectomy in 2021) and hepatic veins, decompensated cirrhosis c/b right hepatic hydrothorax ascites, non bleeding EV, and HCC (s/p ablatio 2023), protein S deficiency. s/pl eective e TIPs/DIPS in 2021.  On coumadin 5g daily, last dose this am.     In addition with h/o multiple pregnancy complications, including  delivery at 28 weeks gestational age with infant loss after 30 minutes in , spontaneous abortions at 8 and 16 weeks gestational age requiring D&C's in  and , and an induced  at 4 weeks gestational age due to advice to terminate pregnancy due to cirrhosis in . She also had a  premature rupture of membranes (PPROM) at 22 weeks 5 days gestational age in 2019.    Admitted for Liver Transplant. Procedure, risks, and benefits explained to patient at bedside    Recipient info:  ABO: B  CMV IgG Status: POSITIVE  EBV IgG Status:  POSITIVE    Donor Information   PHS Known Risk: None  CMV IgG: POSITIVE  EBV IgG: POSITIVE  HBcAb:  Negative  HCV: Negative  HCV ARJUN: Negative    Home meds:   Aldactone 50mg daily  Pepcid 20mg daily   Coumadin 5mg daily (last dose today)       37F with PMHx of Budd-Chiari syndrome with chronic occlusion of the IVC (s/p IVC thrombectomy in 2021) and hepatic veins, decompensated cirrhosis c/b right hepatic hydrothorax ascites, non bleeding EV, and HCC (s/p ablatio 2023), protein S deficiency. s/pl eective e TIPs/DIPS in 2021.  On coumadin 5g daily, last dose this am.     In addition with h/o multiple pregnancy complications, including  delivery at 28 weeks gestational age with infant loss after 30 minutes in , spontaneous abortions at 8 and 16 weeks gestational age requiring D&C's in  and , and an induced  at 4 weeks gestational age due to advice to terminate pregnancy due to cirrhosis in . She also had a  premature rupture of membranes (PPROM) at 22 weeks 5 days gestational age in 2019.    Admitted for Liver Transplant. Procedure, risks, and benefits explained to patient at bedside with Iranian  [id number]    Recipient info:  ABO: B  CMV IgG Status: POSITIVE  EBV IgG Status:  POSITIVE    Donor Information   PHS Known Risk: None  CMV IgG: POSITIVE  EBV IgG: POSITIVE  HBcAb:  Negative  HCV: Negative  HCV ARJUN: Negative    Home meds:   Aldactone 50mg daily  Pepcid 20mg daily   Coumadin 5mg daily (last dose today)

## 2023-09-07 NOTE — H&P ADULT - NSHPPHYSICALEXAM_GEN_ALL_CORE
Constitutional: Well developed / well nourished  Eyes: Anicteric, PERRLA  ENMT: nc/at  Neck: supple  Respiratory: CTA B/L  Cardiovascular: RRR  Gastrointestinal: Soft abdomen, non tender  Genitourinary: Voiding spontaneously  Neurological: A&O x3  Musculoskeletal: Moving all extremities  Psychiatric: Responsive

## 2023-09-07 NOTE — H&P ADULT - NS ATTEND AMEND GEN_ALL_CORE FT
Admitted for liver transplant.  Risks and benefits of transplant again discussed including the possible need for sternotomy.  Informed consent obtained.  Will proceed with liver transplant.

## 2023-09-07 NOTE — H&P ADULT - ASSESSMENT
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 s/pl eective e TIPs/DIPS in 5/2021.  Admitted for possible liver transplant    -Admit to Transplant Surgery  -Pre-op labs ordered  -EKG/CXR  -COVID PCR (abbott)  -NPO  -Surgical consent      37F with PMHx of Budd-Chiari syndrome with chronic occlusion of the IVC (s/p IVC thrombectomy in 4/2021) and hepatic veins, decompensated cirrhosis c/b right hepatic hydrothorax ascites, non bleeding EV, and HCC (s/p ablatio 1/2023), protein S deficiency. s/pl eective e TIPs/DIPS in 5/2021.  Admitted for possible liver transplant    -Admit to Transplant Surgery  -Pre-op labs ordered  -EKG/CXR  -COVID PCR (abbott)  -NPO  -Surgical consent       Home meds:   Aldactone 50mg daily  Pepcid 20mg daily   Coumadin 5mg daily (last dose today)

## 2023-09-07 NOTE — PATIENT PROFILE ADULT - FALL HARM RISK - UNIVERSAL INTERVENTIONS
Bed in lowest position, wheels locked, appropriate side rails in place/Call bell, personal items and telephone in reach/Instruct patient to call for assistance before getting out of bed or chair/Non-slip footwear when patient is out of bed/[place] to call system/Physically safe environment - no spills, clutter or unnecessary equipment/Purposeful Proactive Rounding/Room/bathroom lighting operational, light cord in reach

## 2023-09-08 ENCOUNTER — TRANSCRIPTION ENCOUNTER (OUTPATIENT)
Age: 38
End: 2023-09-08

## 2023-09-08 ENCOUNTER — RESULT REVIEW (OUTPATIENT)
Age: 38
End: 2023-09-08

## 2023-09-08 LAB
ACARBOXYPROTHROMBIN SERPL-MCNC: 84.7 NG/ML
AFP-TM SERPL-MCNC: 4.7 NG/ML
ALBUMIN SERPL ELPH-MCNC: 1.9 G/DL — LOW (ref 3.3–5)
ALBUMIN SERPL ELPH-MCNC: 2.6 G/DL — LOW (ref 3.3–5)
ALBUMIN SERPL ELPH-MCNC: 2.7 G/DL — LOW (ref 3.3–5)
ALBUMIN SERPL ELPH-MCNC: 4 G/DL
ALP BLD-CCNC: 175 U/L
ALP SERPL-CCNC: 61 U/L — SIGNIFICANT CHANGE UP (ref 40–120)
ALP SERPL-CCNC: 62 U/L — SIGNIFICANT CHANGE UP (ref 40–120)
ALP SERPL-CCNC: 68 U/L — SIGNIFICANT CHANGE UP (ref 40–120)
ALT FLD-CCNC: 520 U/L — HIGH (ref 10–45)
ALT FLD-CCNC: 546 U/L — HIGH (ref 10–45)
ALT FLD-CCNC: 595 U/L — HIGH (ref 10–45)
ALT SERPL-CCNC: 29 U/L
ANION GAP SERPL CALC-SCNC: 10 MMOL/L — SIGNIFICANT CHANGE UP (ref 5–17)
ANION GAP SERPL CALC-SCNC: 11 MMOL/L
ANION GAP SERPL CALC-SCNC: 12 MMOL/L — SIGNIFICANT CHANGE UP (ref 5–17)
ANION GAP SERPL CALC-SCNC: 8 MMOL/L — SIGNIFICANT CHANGE UP (ref 5–17)
APPEARANCE UR: CLEAR — SIGNIFICANT CHANGE UP
APTT BLD: 57.3 SEC — HIGH (ref 24.5–35.6)
APTT BLD: 60.5 SEC — HIGH (ref 24.5–35.6)
APTT BLD: 63.3 SEC — HIGH (ref 24.5–35.6)
AST SERPL-CCNC: 1124 U/L — HIGH (ref 10–40)
AST SERPL-CCNC: 1213 U/L — HIGH (ref 10–40)
AST SERPL-CCNC: 37 U/L
AST SERPL-CCNC: 986 U/L — HIGH (ref 10–40)
BASOPHILS # BLD AUTO: 0.09 K/UL — SIGNIFICANT CHANGE UP (ref 0–0.2)
BASOPHILS NFR BLD AUTO: 2.7 % — HIGH (ref 0–2)
BILIRUB SERPL-MCNC: 0.8 MG/DL — SIGNIFICANT CHANGE UP (ref 0.2–1.2)
BILIRUB SERPL-MCNC: 1.2 MG/DL
BILIRUB SERPL-MCNC: 1.4 MG/DL — HIGH (ref 0.2–1.2)
BILIRUB SERPL-MCNC: 1.5 MG/DL — HIGH (ref 0.2–1.2)
BILIRUB UR-MCNC: NEGATIVE — SIGNIFICANT CHANGE UP
BUN SERPL-MCNC: 11 MG/DL — SIGNIFICANT CHANGE UP (ref 7–23)
BUN SERPL-MCNC: 13 MG/DL — SIGNIFICANT CHANGE UP (ref 7–23)
BUN SERPL-MCNC: 14 MG/DL
BUN SERPL-MCNC: 14 MG/DL — SIGNIFICANT CHANGE UP (ref 7–23)
CALCIUM SERPL-MCNC: 8.3 MG/DL — LOW (ref 8.4–10.5)
CALCIUM SERPL-MCNC: 9.1 MG/DL — SIGNIFICANT CHANGE UP (ref 8.4–10.5)
CALCIUM SERPL-MCNC: 9.2 MG/DL
CALCIUM SERPL-MCNC: 9.4 MG/DL — SIGNIFICANT CHANGE UP (ref 8.4–10.5)
CHLORIDE SERPL-SCNC: 105 MMOL/L
CHLORIDE SERPL-SCNC: 109 MMOL/L — HIGH (ref 96–108)
CHLORIDE SERPL-SCNC: 112 MMOL/L — HIGH (ref 96–108)
CHLORIDE SERPL-SCNC: 113 MMOL/L — HIGH (ref 96–108)
CMV IGG FLD QL: 5.5 U/ML — HIGH
CMV IGG SERPL-IMP: POSITIVE
CO2 SERPL-SCNC: 18 MMOL/L — LOW (ref 22–31)
CO2 SERPL-SCNC: 21 MMOL/L
CO2 SERPL-SCNC: 21 MMOL/L — LOW (ref 22–31)
CO2 SERPL-SCNC: 23 MMOL/L — SIGNIFICANT CHANGE UP (ref 22–31)
COLOR SPEC: COLORLESS — SIGNIFICANT CHANGE UP
CREAT SERPL-MCNC: 0.46 MG/DL — LOW (ref 0.5–1.3)
CREAT SERPL-MCNC: 0.57 MG/DL — SIGNIFICANT CHANGE UP (ref 0.5–1.3)
CREAT SERPL-MCNC: 0.59 MG/DL — SIGNIFICANT CHANGE UP (ref 0.5–1.3)
CREAT SERPL-MCNC: 0.65 MG/DL
DIFF PNL FLD: NEGATIVE — SIGNIFICANT CHANGE UP
EBV EA AB SER IA-ACNC: <5 U/ML — SIGNIFICANT CHANGE UP
EBV EA AB TITR SER IF: POSITIVE
EBV EA IGG SER-ACNC: NEGATIVE — SIGNIFICANT CHANGE UP
EBV NA IGG SER IA-ACNC: 242 U/ML — HIGH
EBV PATRN SPEC IB-IMP: SIGNIFICANT CHANGE UP
EBV VCA IGG AVIDITY SER QL IA: POSITIVE
EBV VCA IGM SER IA-ACNC: <10 U/ML — SIGNIFICANT CHANGE UP
EBV VCA IGM SER IA-ACNC: >750 U/ML — HIGH
EBV VCA IGM TITR FLD: NEGATIVE — SIGNIFICANT CHANGE UP
EGFR: 115 ML/MIN/1.73M2
EGFR: 118 ML/MIN/1.73M2 — SIGNIFICANT CHANGE UP
EGFR: 118 ML/MIN/1.73M2 — SIGNIFICANT CHANGE UP
EGFR: 125 ML/MIN/1.73M2 — SIGNIFICANT CHANGE UP
EOSINOPHIL # BLD AUTO: 0.15 K/UL — SIGNIFICANT CHANGE UP (ref 0–0.5)
EOSINOPHIL NFR BLD AUTO: 4.4 % — SIGNIFICANT CHANGE UP (ref 0–6)
FIBRINOGEN PPP-MCNC: 262 MG/DL — SIGNIFICANT CHANGE UP (ref 200–445)
GAS PNL BLDA: SIGNIFICANT CHANGE UP
GGT SERPL-CCNC: 93 U/L — HIGH (ref 8–40)
GLUCOSE BLDC GLUCOMTR-MCNC: 107 MG/DL — HIGH (ref 70–99)
GLUCOSE BLDC GLUCOMTR-MCNC: 112 MG/DL — HIGH (ref 70–99)
GLUCOSE BLDC GLUCOMTR-MCNC: 159 MG/DL — HIGH (ref 70–99)
GLUCOSE BLDC GLUCOMTR-MCNC: 162 MG/DL — HIGH (ref 70–99)
GLUCOSE SERPL-MCNC: 154 MG/DL — HIGH (ref 70–99)
GLUCOSE SERPL-MCNC: 172 MG/DL — HIGH (ref 70–99)
GLUCOSE SERPL-MCNC: 173 MG/DL — HIGH (ref 70–99)
GLUCOSE SERPL-MCNC: 79 MG/DL
GLUCOSE UR QL: NEGATIVE — SIGNIFICANT CHANGE UP
HBV CORE AB SER-ACNC: REACTIVE
HBV SURFACE AB SER-ACNC: 597.4 MIU/ML — SIGNIFICANT CHANGE UP
HBV SURFACE AG SER-ACNC: SIGNIFICANT CHANGE UP
HCG SERPL-ACNC: <2 MIU/ML — SIGNIFICANT CHANGE UP
HCT VFR BLD CALC: 26.2 % — LOW (ref 34.5–45)
HCT VFR BLD CALC: 29.1 % — LOW (ref 34.5–45)
HCT VFR BLD CALC: 30.1 % — LOW (ref 34.5–45)
HCV AB S/CO SERPL IA: 0.16 S/CO — SIGNIFICANT CHANGE UP (ref 0–0.99)
HCV AB SERPL-IMP: SIGNIFICANT CHANGE UP
HCV RNA SPEC NAA+PROBE-LOG IU: SIGNIFICANT CHANGE UP
HCV RNA SPEC NAA+PROBE-LOG IU: SIGNIFICANT CHANGE UP LOGIU/ML
HEPARINASE TEG R TIME: 10.2 MIN (ref 4.3–8.3)
HEPARINASE TEG R TIME: 5.5 MIN — SIGNIFICANT CHANGE UP (ref 4.3–8.3)
HEPARINASE TEG R TIME: 6.3 MIN — SIGNIFICANT CHANGE UP (ref 4.3–8.3)
HGB BLD-MCNC: 10.2 G/DL — LOW (ref 11.5–15.5)
HGB BLD-MCNC: 9.2 G/DL — LOW (ref 11.5–15.5)
HGB BLD-MCNC: 9.8 G/DL — LOW (ref 11.5–15.5)
HIV 1+2 AB+HIV1 P24 AG SERPL QL IA: SIGNIFICANT CHANGE UP
INR BLD: 2.1 RATIO — HIGH (ref 0.85–1.18)
INR BLD: 2.48 RATIO — HIGH (ref 0.85–1.18)
INR BLD: 3.68 RATIO — HIGH (ref 0.85–1.18)
INR PPP: 2.54 RATIO
KETONES UR-MCNC: NEGATIVE — SIGNIFICANT CHANGE UP
LEUKOCYTE ESTERASE UR-ACNC: NEGATIVE — SIGNIFICANT CHANGE UP
LYMPHOCYTES # BLD AUTO: 1.39 K/UL — SIGNIFICANT CHANGE UP (ref 1–3.3)
LYMPHOCYTES # BLD AUTO: 39.8 % — SIGNIFICANT CHANGE UP (ref 13–44)
MAGNESIUM SERPL-MCNC: 1.9 MG/DL — SIGNIFICANT CHANGE UP (ref 1.6–2.6)
MAGNESIUM SERPL-MCNC: 2.1 MG/DL — SIGNIFICANT CHANGE UP (ref 1.6–2.6)
MAGNESIUM SERPL-MCNC: 2.3 MG/DL — SIGNIFICANT CHANGE UP (ref 1.6–2.6)
MCHC RBC-ENTMCNC: 26.7 PG — LOW (ref 27–34)
MCHC RBC-ENTMCNC: 27.1 PG — SIGNIFICANT CHANGE UP (ref 27–34)
MCHC RBC-ENTMCNC: 27.3 PG — SIGNIFICANT CHANGE UP (ref 27–34)
MCHC RBC-ENTMCNC: 33.7 GM/DL — SIGNIFICANT CHANGE UP (ref 32–36)
MCHC RBC-ENTMCNC: 33.9 GM/DL — SIGNIFICANT CHANGE UP (ref 32–36)
MCHC RBC-ENTMCNC: 35.1 GM/DL — SIGNIFICANT CHANGE UP (ref 32–36)
MCV RBC AUTO: 77.3 FL — LOW (ref 80–100)
MCV RBC AUTO: 79.3 FL — LOW (ref 80–100)
MCV RBC AUTO: 80.5 FL — SIGNIFICANT CHANGE UP (ref 80–100)
MONOCYTES # BLD AUTO: 0.34 K/UL — SIGNIFICANT CHANGE UP (ref 0–0.9)
MONOCYTES NFR BLD AUTO: 9.7 % — SIGNIFICANT CHANGE UP (ref 2–14)
NEUTROPHILS # BLD AUTO: 1.48 K/UL — LOW (ref 1.8–7.4)
NEUTROPHILS NFR BLD AUTO: 42.5 % — LOW (ref 43–77)
NITRITE UR-MCNC: NEGATIVE — SIGNIFICANT CHANGE UP
NRBC # BLD: 0 /100 WBCS — SIGNIFICANT CHANGE UP (ref 0–0)
PH UR: 7.5 — SIGNIFICANT CHANGE UP (ref 5–8)
PHOSPHATE SERPL-MCNC: 3.2 MG/DL — SIGNIFICANT CHANGE UP (ref 2.5–4.5)
PHOSPHATE SERPL-MCNC: 4 MG/DL — SIGNIFICANT CHANGE UP (ref 2.5–4.5)
PHOSPHATE SERPL-MCNC: 4.3 MG/DL — SIGNIFICANT CHANGE UP (ref 2.5–4.5)
PLATELET # BLD AUTO: 71 K/UL — LOW (ref 150–400)
PLATELET # BLD AUTO: 79 K/UL — LOW (ref 150–400)
PLATELET # BLD AUTO: 92 K/UL — LOW (ref 150–400)
POTASSIUM SERPL-MCNC: 3.5 MMOL/L — SIGNIFICANT CHANGE UP (ref 3.5–5.3)
POTASSIUM SERPL-MCNC: 5.2 MMOL/L — SIGNIFICANT CHANGE UP (ref 3.5–5.3)
POTASSIUM SERPL-MCNC: 5.3 MMOL/L — SIGNIFICANT CHANGE UP (ref 3.5–5.3)
POTASSIUM SERPL-SCNC: 3.5 MMOL/L — SIGNIFICANT CHANGE UP (ref 3.5–5.3)
POTASSIUM SERPL-SCNC: 4.6 MMOL/L
POTASSIUM SERPL-SCNC: 5.2 MMOL/L — SIGNIFICANT CHANGE UP (ref 3.5–5.3)
POTASSIUM SERPL-SCNC: 5.3 MMOL/L — SIGNIFICANT CHANGE UP (ref 3.5–5.3)
PROT SERPL-MCNC: 3.6 G/DL — LOW (ref 6–8.3)
PROT SERPL-MCNC: 4.3 G/DL — LOW (ref 6–8.3)
PROT SERPL-MCNC: 4.4 G/DL — LOW (ref 6–8.3)
PROT SERPL-MCNC: 6.7 G/DL
PROT UR-MCNC: NEGATIVE — SIGNIFICANT CHANGE UP
PROTHROM AB SERPL-ACNC: 21.6 SEC — HIGH (ref 9.5–13)
PROTHROM AB SERPL-ACNC: 26.6 SEC — HIGH (ref 9.5–13)
PROTHROM AB SERPL-ACNC: 37.2 SEC — HIGH (ref 9.5–13)
PT BLD: 27.9 SEC
RAPIDTEG MAXIMUM AMPLITUDE: 46.8 MM (ref 52–70)
RAPIDTEG MAXIMUM AMPLITUDE: 50.7 MM (ref 52–70)
RAPIDTEG MAXIMUM AMPLITUDE: 57.3 MM — SIGNIFICANT CHANGE UP (ref 52–70)
RAPIDTEG MAXIMUM AMPLITUDE: 61.6 MM — SIGNIFICANT CHANGE UP (ref 52–70)
RBC # BLD: 3.39 M/UL — LOW (ref 3.8–5.2)
RBC # BLD: 3.67 M/UL — LOW (ref 3.8–5.2)
RBC # BLD: 3.74 M/UL — LOW (ref 3.8–5.2)
RBC # FLD: 14.9 % — HIGH (ref 10.3–14.5)
RBC # FLD: 15.2 % — HIGH (ref 10.3–14.5)
RBC # FLD: 15.5 % — HIGH (ref 10.3–14.5)
SODIUM SERPL-SCNC: 137 MMOL/L
SODIUM SERPL-SCNC: 140 MMOL/L — SIGNIFICANT CHANGE UP (ref 135–145)
SODIUM SERPL-SCNC: 142 MMOL/L — SIGNIFICANT CHANGE UP (ref 135–145)
SODIUM SERPL-SCNC: 144 MMOL/L — SIGNIFICANT CHANGE UP (ref 135–145)
SP GR SPEC: 1.01 — LOW (ref 1.01–1.02)
TEG FUNCTIONAL FIBRINOGEN: 13.9 MM (ref 15–32)
TEG FUNCTIONAL FIBRINOGEN: 14.7 MM (ref 15–32)
TEG FUNCTIONAL FIBRINOGEN: 18.1 MM — SIGNIFICANT CHANGE UP (ref 15–32)
TEG FUNCTIONAL FIBRINOGEN: 21 MM — SIGNIFICANT CHANGE UP (ref 15–32)
TEG LY30 (LYSIS): 0 % — SIGNIFICANT CHANGE UP (ref 0–2.6)
TEG MAXIMUM AMPLITUDE: 58.5 MM — SIGNIFICANT CHANGE UP (ref 52–69)
TEG MAXIMUM AMPLITUDE: 60.3 MM — SIGNIFICANT CHANGE UP (ref 52–69)
TEG MAXIMUM AMPLITUDE: <40 MM (ref 52–69)
TEG REACTION TIME: 11.1 MIN (ref 4.6–9.1)
TEG REACTION TIME: 16.7 MIN (ref 4.6–9.1)
TEG REACTION TIME: 6.8 MIN — SIGNIFICANT CHANGE UP (ref 4.6–9.1)
TEG REACTION TIME: 8.5 MIN — SIGNIFICANT CHANGE UP (ref 4.6–9.1)
UROBILINOGEN FLD QL: NEGATIVE — SIGNIFICANT CHANGE UP
VZV IGG FLD QL IA: 386.9 INDEX — SIGNIFICANT CHANGE UP
VZV IGG FLD QL IA: POSITIVE — SIGNIFICANT CHANGE UP
WBC # BLD: 12.81 K/UL — HIGH (ref 3.8–10.5)
WBC # BLD: 16.27 K/UL — HIGH (ref 3.8–10.5)
WBC # BLD: 8.1 K/UL — SIGNIFICANT CHANGE UP (ref 3.8–10.5)
WBC # FLD AUTO: 12.81 K/UL — HIGH (ref 3.8–10.5)
WBC # FLD AUTO: 16.27 K/UL — HIGH (ref 3.8–10.5)
WBC # FLD AUTO: 8.1 K/UL — SIGNIFICANT CHANGE UP (ref 3.8–10.5)

## 2023-09-08 PROCEDURE — 88313 SPECIAL STAINS GROUP 2: CPT | Mod: 26

## 2023-09-08 PROCEDURE — 76705 ECHO EXAM OF ABDOMEN: CPT | Mod: 26,59

## 2023-09-08 PROCEDURE — 47135 TRANSPLANTATION OF LIVER: CPT | Mod: GC,62,22

## 2023-09-08 PROCEDURE — 93975 VASCULAR STUDY: CPT | Mod: 26

## 2023-09-08 PROCEDURE — 71045 X-RAY EXAM CHEST 1 VIEW: CPT | Mod: 26

## 2023-09-08 PROCEDURE — 99291 CRITICAL CARE FIRST HOUR: CPT

## 2023-09-08 PROCEDURE — 88307 TISSUE EXAM BY PATHOLOGIST: CPT | Mod: 26

## 2023-09-08 PROCEDURE — 88304 TISSUE EXAM BY PATHOLOGIST: CPT | Mod: 26

## 2023-09-08 PROCEDURE — 47135 TRANSPLANTATION OF LIVER: CPT | Mod: 62

## 2023-09-08 PROCEDURE — 88309 TISSUE EXAM BY PATHOLOGIST: CPT | Mod: 26

## 2023-09-08 DEVICE — SURGICEL 4 X 8": Type: IMPLANTABLE DEVICE | Status: FUNCTIONAL

## 2023-09-08 DEVICE — KIT CVC 2LUM MAC 9FR CHG: Type: IMPLANTABLE DEVICE | Status: FUNCTIONAL

## 2023-09-08 DEVICE — STAPLER ETHICON GST ECHELON 45MM WHITE RELOAD: Type: IMPLANTABLE DEVICE | Status: FUNCTIONAL

## 2023-09-08 DEVICE — BONE WAX 2.5GM: Type: IMPLANTABLE DEVICE | Status: FUNCTIONAL

## 2023-09-08 DEVICE — KIT A-LINE 1LUM 20G X 12CM SAFE KIT: Type: IMPLANTABLE DEVICE | Status: FUNCTIONAL

## 2023-09-08 RX ORDER — BENZOCAINE AND MENTHOL 5; 1 G/100ML; G/100ML
1 LIQUID ORAL
Refills: 0 | Status: DISCONTINUED | OUTPATIENT
Start: 2023-09-08 | End: 2023-09-19

## 2023-09-08 RX ORDER — MYCOPHENOLATE MOFETIL 250 MG/1
1000 CAPSULE ORAL EVERY 12 HOURS
Refills: 0 | Status: DISCONTINUED | OUTPATIENT
Start: 2023-09-08 | End: 2023-09-09

## 2023-09-08 RX ORDER — ALBUMIN HUMAN 25 %
250 VIAL (ML) INTRAVENOUS ONCE
Refills: 0 | Status: COMPLETED | OUTPATIENT
Start: 2023-09-08 | End: 2023-09-08

## 2023-09-08 RX ORDER — MAGNESIUM SULFATE 500 MG/ML
2 VIAL (ML) INJECTION ONCE
Refills: 0 | Status: COMPLETED | OUTPATIENT
Start: 2023-09-08 | End: 2023-09-08

## 2023-09-08 RX ORDER — HEPARIN SODIUM 5000 [USP'U]/ML
450 INJECTION INTRAVENOUS; SUBCUTANEOUS
Qty: 25000 | Refills: 0 | Status: DISCONTINUED | OUTPATIENT
Start: 2023-09-08 | End: 2023-09-09

## 2023-09-08 RX ORDER — INSULIN LISPRO 100/ML
VIAL (ML) SUBCUTANEOUS EVERY 4 HOURS
Refills: 0 | Status: DISCONTINUED | OUTPATIENT
Start: 2023-09-08 | End: 2023-09-09

## 2023-09-08 RX ORDER — SODIUM CHLORIDE 9 MG/ML
1000 INJECTION, SOLUTION INTRAVENOUS
Refills: 0 | Status: DISCONTINUED | OUTPATIENT
Start: 2023-09-08 | End: 2023-09-10

## 2023-09-08 RX ORDER — FLUCONAZOLE 150 MG/1
400 TABLET ORAL DAILY
Refills: 0 | Status: DISCONTINUED | OUTPATIENT
Start: 2023-09-08 | End: 2023-09-11

## 2023-09-08 RX ORDER — INSULIN HUMAN 100 [IU]/ML
4 INJECTION, SOLUTION SUBCUTANEOUS
Qty: 100 | Refills: 0 | Status: DISCONTINUED | OUTPATIENT
Start: 2023-09-08 | End: 2023-09-08

## 2023-09-08 RX ORDER — AMPICILLIN SODIUM AND SULBACTAM SODIUM 250; 125 MG/ML; MG/ML
3 INJECTION, POWDER, FOR SUSPENSION INTRAMUSCULAR; INTRAVENOUS EVERY 6 HOURS
Refills: 0 | Status: COMPLETED | OUTPATIENT
Start: 2023-09-08 | End: 2023-09-10

## 2023-09-08 RX ORDER — CHLORHEXIDINE GLUCONATE 213 G/1000ML
15 SOLUTION TOPICAL EVERY 12 HOURS
Refills: 0 | Status: DISCONTINUED | OUTPATIENT
Start: 2023-09-08 | End: 2023-09-08

## 2023-09-08 RX ORDER — VALGANCICLOVIR 450 MG/1
450 TABLET, FILM COATED ORAL DAILY
Refills: 0 | Status: DISCONTINUED | OUTPATIENT
Start: 2023-09-09 | End: 2023-09-11

## 2023-09-08 RX ORDER — HYDROMORPHONE HYDROCHLORIDE 2 MG/ML
0.5 INJECTION INTRAMUSCULAR; INTRAVENOUS; SUBCUTANEOUS
Refills: 0 | Status: DISCONTINUED | OUTPATIENT
Start: 2023-09-08 | End: 2023-09-09

## 2023-09-08 RX ORDER — CHLORHEXIDINE GLUCONATE 213 G/1000ML
1 SOLUTION TOPICAL
Refills: 0 | Status: DISCONTINUED | OUTPATIENT
Start: 2023-09-08 | End: 2023-09-19

## 2023-09-08 RX ORDER — NOREPINEPHRINE BITARTRATE/D5W 8 MG/250ML
0.08 PLASTIC BAG, INJECTION (ML) INTRAVENOUS
Qty: 8 | Refills: 0 | Status: DISCONTINUED | OUTPATIENT
Start: 2023-09-08 | End: 2023-09-08

## 2023-09-08 RX ORDER — SODIUM CHLORIDE 9 MG/ML
10 INJECTION INTRAMUSCULAR; INTRAVENOUS; SUBCUTANEOUS
Refills: 0 | Status: DISCONTINUED | OUTPATIENT
Start: 2023-09-08 | End: 2023-09-11

## 2023-09-08 RX ORDER — INSULIN LISPRO 100/ML
VIAL (ML) SUBCUTANEOUS EVERY 4 HOURS
Refills: 0 | Status: DISCONTINUED | OUTPATIENT
Start: 2023-09-08 | End: 2023-09-08

## 2023-09-08 RX ORDER — BASILIXIMAB 20 MG/5ML
20 INJECTION, POWDER, FOR SOLUTION INTRAVENOUS ONCE
Refills: 0 | Status: DISCONTINUED | OUTPATIENT
Start: 2023-09-12 | End: 2023-09-12

## 2023-09-08 RX ORDER — DEXTROSE 50 % IN WATER 50 %
50 SYRINGE (ML) INTRAVENOUS
Refills: 0 | Status: DISCONTINUED | OUTPATIENT
Start: 2023-09-08 | End: 2023-09-08

## 2023-09-08 RX ORDER — PANTOPRAZOLE SODIUM 20 MG/1
40 TABLET, DELAYED RELEASE ORAL DAILY
Refills: 0 | Status: DISCONTINUED | OUTPATIENT
Start: 2023-09-08 | End: 2023-09-11

## 2023-09-08 RX ORDER — DEXMEDETOMIDINE HYDROCHLORIDE IN 0.9% SODIUM CHLORIDE 4 UG/ML
0.05 INJECTION INTRAVENOUS
Qty: 200 | Refills: 0 | Status: DISCONTINUED | OUTPATIENT
Start: 2023-09-08 | End: 2023-09-08

## 2023-09-08 RX ORDER — POTASSIUM CHLORIDE 20 MEQ
20 PACKET (EA) ORAL
Refills: 0 | Status: COMPLETED | OUTPATIENT
Start: 2023-09-08 | End: 2023-09-08

## 2023-09-08 RX ORDER — INSULIN LISPRO 100/ML
VIAL (ML) SUBCUTANEOUS EVERY 6 HOURS
Refills: 0 | Status: DISCONTINUED | OUTPATIENT
Start: 2023-09-08 | End: 2023-09-08

## 2023-09-08 RX ADMIN — INSULIN HUMAN 4 UNIT(S)/HR: 100 INJECTION, SOLUTION SUBCUTANEOUS at 12:35

## 2023-09-08 RX ADMIN — SODIUM CHLORIDE 125 MILLILITER(S): 9 INJECTION, SOLUTION INTRAVENOUS at 19:42

## 2023-09-08 RX ADMIN — HYDROMORPHONE HYDROCHLORIDE 0.5 MILLIGRAM(S): 2 INJECTION INTRAMUSCULAR; INTRAVENOUS; SUBCUTANEOUS at 16:45

## 2023-09-08 RX ADMIN — AMPICILLIN SODIUM AND SULBACTAM SODIUM 200 GRAM(S): 250; 125 INJECTION, POWDER, FOR SUSPENSION INTRAMUSCULAR; INTRAVENOUS at 21:40

## 2023-09-08 RX ADMIN — Medication 7.32 MICROGRAM(S)/KG/MIN: at 19:43

## 2023-09-08 RX ADMIN — AMPICILLIN SODIUM AND SULBACTAM SODIUM 200 GRAM(S): 250; 125 INJECTION, POWDER, FOR SUSPENSION INTRAMUSCULAR; INTRAVENOUS at 16:23

## 2023-09-08 RX ADMIN — BENZOCAINE AND MENTHOL 1 LOZENGE: 5; 1 LIQUID ORAL at 22:06

## 2023-09-08 RX ADMIN — Medication 125 MILLILITER(S): at 15:17

## 2023-09-08 RX ADMIN — MYCOPHENOLATE MOFETIL 1000 MILLIGRAM(S): 250 CAPSULE ORAL at 17:00

## 2023-09-08 RX ADMIN — HEPARIN SODIUM 4.5 UNIT(S)/HR: 5000 INJECTION INTRAVENOUS; SUBCUTANEOUS at 19:42

## 2023-09-08 RX ADMIN — HEPARIN SODIUM 4.5 UNIT(S)/HR: 5000 INJECTION INTRAVENOUS; SUBCUTANEOUS at 13:39

## 2023-09-08 RX ADMIN — Medication 25 GRAM(S): at 13:24

## 2023-09-08 RX ADMIN — SODIUM CHLORIDE 125 MILLILITER(S): 9 INJECTION, SOLUTION INTRAVENOUS at 12:34

## 2023-09-08 RX ADMIN — Medication 100 MILLIEQUIVALENT(S): at 16:02

## 2023-09-08 RX ADMIN — CHLORHEXIDINE GLUCONATE 1 APPLICATION(S): 213 SOLUTION TOPICAL at 12:36

## 2023-09-08 RX ADMIN — Medication 7.32 MICROGRAM(S)/KG/MIN: at 13:39

## 2023-09-08 RX ADMIN — HEPARIN SODIUM 4 UNIT(S)/HR: 5000 INJECTION INTRAVENOUS; SUBCUTANEOUS at 20:50

## 2023-09-08 RX ADMIN — Medication 100 MILLIEQUIVALENT(S): at 13:24

## 2023-09-08 RX ADMIN — Medication 2: at 17:04

## 2023-09-08 RX ADMIN — Medication 100 MILLIEQUIVALENT(S): at 14:45

## 2023-09-08 RX ADMIN — Medication 125 MILLILITER(S): at 12:34

## 2023-09-08 RX ADMIN — HYDROMORPHONE HYDROCHLORIDE 0.5 MILLIGRAM(S): 2 INJECTION INTRAMUSCULAR; INTRAVENOUS; SUBCUTANEOUS at 17:00

## 2023-09-08 RX ADMIN — Medication 2: at 21:58

## 2023-09-08 NOTE — AIRWAY REMOVAL NOTE  ADULT & PEDS - ARTIFICAL AIRWAY REMOVAL COMMENTS
Pt extubated after verifying MD order,ID band RN Katalina Walker, an attendance to 30% aerosol mask, amelia well, sat-100% Written order for extubation verified. The patient was identified by full name and birth date compared to the identification band.  Present during the procedure was KODY Walker

## 2023-09-08 NOTE — CONSULT NOTE ADULT - ATTENDING COMMENTS
Pt evaluated post op in SICU  S/P OLT, received 8 PRBC, 6 FFP  PMF: Cirrhosis, Budd-Chiari syndrome  H/O protein S deficiency, Miscarriage x5  History of transjugular intrahepatic portosystemic shunt  Presence of IVC filter    Sedated with Precedex, pain control prn Dilaudid  ABG 7.38/41/459/on 100%, VCT no acute finding, wean down FiO2, SBT once hepatic US shows good hepatic anastomosis flow  Not on vasopressor support. PA catheter adjusted  NPO, trend LFT MELISSA outputs  Monitor I/O renal function electrolytes  Heparin drip gtt Pt evaluated post op in SICU  S/P OLT, received 8 PRBC, 6 FFP  PMF: Cirrhosis, Budd-Chiari syndrome  H/O protein S deficiency, Miscarriage x5  History of transjugular intrahepatic portosystemic shunt  Presence of IVC filter    Sedated with Precedex, pain control prn Dilaudid  ABG 7.38/41/459/on 100%, VCT no acute finding, wean down FiO2, SBT once hepatic US shows good hepatic anastomosis flow  Not on vasopressor support. PA catheter adjusted  NPO, trend LFT MELISSA outputs  Monitor I/O renal function electrolytes. IVF plasmalyte  Heparin drip gtt target PTT 50-60,  Avoid platelets Fibrinogen transfusion  Anti rej: steroid, Cellcept, Simulect  PPX abx: Bactrim, valcyte, Fluc  Herminia op abx Unasyn

## 2023-09-08 NOTE — PROGRESS NOTE ADULT - SUBJECTIVE AND OBJECTIVE BOX
ERIC DAVILA is a 37y Female s/p liver transplant on 9/8/23. 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, decompensated cirrhosis and HCC     NKDA  CMV +/+    Transplant Medications  Induction  -Basiliximab 20 mg POD 0 (given in OR) and POD 4  -Methyprednisolone taper (switch to PO prednisone on POD 6)            POD 0: 1000 mg IV in OR            POD 1: 500 mg IV x 1            POD 2: 250 mg IV x 1            POD 3: 125 mg IV x 1            POD 4: 60 mg IV x 1            POD 5: 40 mg IV x 1        Maintenance Immunosuppression  -Tacrolimus 0.05 mg/kg/dose Q12H at 8AM and 8PM (Adjust for goal trough: 8-10) to be started on POD 1 per liver transplant team  -Mycophenolate 1,000 mg PO Q12H  -Prednisone             POD 6: 20 mg PO daily    Anti-infection   -Bactrim SS tablet (frequency based on renal function)  -Valganciclovir (dose based on CMV serostatus and frequency based on renal function)  -Fluconazole 400 mg daily    Surgical prophylaxis pre- and intra-operative dosing  -Ampicillin/sulbactam    Prophylaxis  -HAT ppx: aspirin 81 mg daily to start on POD3 per liver transplant team  -GI ppx: pantoprazole 40 mg IV daily (switch to PO when patient tolerates)  -Bowel ppx: senna  -DVT: sequential compression device  -Pain:            Mild: Acetaminophen 650 mg every 6 hours PRN           Moderate: Tramadol 25 mg every 4 hours PRN (adjust for renal function)           Severe: Tramadol 50 mg every 4 hours PRN (adjust for renal function)    Home Medications:  ferrous gluconate 240 mg (27 mg elemental iron) oral tablet: 1 tab(s) orally once a day (10 Herber 2023 12:52)  pantoprazole 40 mg oral delayed release tablet: 1 tab(s) orally once a day (10 Herber 2023 12:52)  Xarelto 20 mg oral tablet: 1 tab(s) orally once a day    (10 Herber 2023 12:52)      Outpatient medication reconciliation reviewed and will be re-started appropriately.  Plan discussed with multidisciplinary team.

## 2023-09-08 NOTE — PRE-ANESTHESIA EVALUATION ADULT - NSANTHPMHFT_GEN_ALL_CORE
med/surg hx reviewed with patient, no problems with anesthesia  Budd Chiari with chronic IVC occlusion s/p IVC thrombectomy in April 2021  hx TIPS 5/2021 close to cavo-atrial junction  hx HCC  on coumadin

## 2023-09-08 NOTE — PRE-ANESTHESIA EVALUATION ADULT - NSANTHADDINFOFT_GEN_ALL_CORE
All r/b/a discussed with patient via [place] phone , all questions answered. Plan discussed with transplant surgical team.

## 2023-09-08 NOTE — PROGRESS NOTE ADULT - ASSESSMENT
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 s/p elective TIPs/DIPS in 5/2021.  On coumadin 5g daily, last dose this am now s/p Orthotopic Liver Transplant    OLT  Liver US with patent vessels, no collections  Wean vasopressors as tolerated  Continue Heparin drip goal PTT 50-60  Strict I/Os  Monitor MELISSA output  NPO/NGT  IVF @ 125  SCDs  Trend labs q6  Immuosuppression: FK not started yet, MMF 1gm PO BID, Standard prednisone taper. Simulect due POD4 9/12  PPX: FLuconazole, Valcyte, Bactrim  Continue raghavendra-op Unasyn

## 2023-09-08 NOTE — PROGRESS NOTE ADULT - SUBJECTIVE AND OBJECTIVE BOX
Transplant Surgery - Multidisciplinary Rounds  --------------------------------------------------------------   Donor OLTx      Date:    2023     POD# 0    HPI: 37F with PMHx of Budd-Chiari syndrome with chronic occlusion of the IVC (s/p IVC thrombectomy in 2021) and hepatic veins, decompensated cirrhosis c/b right hepatic hydrothorax ascites, non bleeding EV, and HCC (s/p ablatio 2023), protein S deficiency. s/p elective TIPs/DIPS in 2021.  On coumadin 5g daily, last dose this am.     In addition with h/o multiple pregnancy complications, including  delivery at 28 weeks gestational age with infant loss after 30 minutes in , spontaneous abortions at 8 and 16 weeks gestational age requiring D&C's in  and , and an induced  at 4 weeks gestational age due to advice to terminate pregnancy due to cirrhosis in . She also had a  premature rupture of membranes (PPROM) at 22 weeks 5 days gestational age in 2019.    Now S/p Liver Transplant    Interval Events:  Seen and examined in SICU  On Levophed  wound dressing dry and intact  JPs with SS drainage  US with patent vessels  Labs noted    Immunosupression:   Induction:        Simulect                                Maintenance immunosuppression: Tacrolimus not started yet. MMF 1gm BID, Standard Prednisone taper  Ongoing monitoring for signs of rejection.      Potential Discharge date: pending clinical improvement    Education:  Medications    Plan of care:  See Below    MEDICATIONS  (STANDING):  ampicillin/sulbactam  IVPB 3 Gram(s) IV Intermittent every 6 hours  chlorhexidine 2% Cloths 1 Application(s) Topical <User Schedule>  fluconAZOLE IVPB 400 milliGRAM(s) IV Intermittent daily  heparin  Infusion 450 Unit(s)/Hr (4.5 mL/Hr) IV Continuous <Continuous>  influenza   Vaccine 0.5 milliLiter(s) IntraMuscular once  insulin lispro (ADMELOG) corrective regimen sliding scale   SubCutaneous every 4 hours  multiple electrolytes Injection Type 1 1000 milliLiter(s) (125 mL/Hr) IV Continuous <Continuous>  mycophenolate mofetil Suspension 1000 milliGRAM(s) Oral every 12 hours  norepinephrine Infusion 0.08 MICROgram(s)/kG/Min (7.32 mL/Hr) IV Continuous <Continuous>  pantoprazole  Injectable 40 milliGRAM(s) IV Push daily    MEDICATIONS  (PRN):  HYDROmorphone  Injectable 0.5 milliGRAM(s) IV Push every 3 hours PRN Severe Pain (7 - 10)  sodium chloride 0.9% lock flush 10 milliLiter(s) IV Push every 1 hour PRN Pre/post blood products, medications, blood draw, and to maintain line patency      PAST MEDICAL & SURGICAL HISTORY:  Cirrhosis  2010      Cirrhosis      Budd-Chiari syndrome      H/O protein S deficiency      Miscarriage  x 5      H/O protein S deficiency      History of transjugular intrahepatic portosystemic shunt      History of dilation and curettage      Presence of IVC filter          Vital Signs Last 24 Hrs  T(C): 37.8 (08 Sep 2023 15:00), Max: 37.8 (08 Sep 2023 15:00)  T(F): 100 (08 Sep 2023 15:00), Max: 100 (08 Sep 2023 15:00)  HR: 85 (08 Sep 2023 17:30) (71 - 88)  BP: 113/68 (08 Sep 2023 03:36) (111/71 - 113/68)  BP(mean): --  RR: 12 (08 Sep 2023 17:30) (12 - 20)  SpO2: 99% (08 Sep 2023 17:30) (97% - 100%)    Parameters below as of 08 Sep 2023 16:44      O2 Concentration (%): 30    I&O's Summary    08 Sep 2023 07:01  -  08 Sep 2023 17:37  --------------------------------------------------------  IN: 1547.1 mL / OUT: 865 mL / NET: 682.1 mL                              9.8    16.27 )-----------( 92       ( 08 Sep 2023 16:27 )             29.1     09-08    142  |  112<H>  |  14  ----------------------------<  172<H>  5.3   |  18<L>  |  0.57    Ca    9.1      08 Sep 2023 16:27  Phos  4.0       Mg     2.3         TPro  4.3<L>  /  Alb  2.7<L>  /  TBili  1.4<H>  /  DBili  x   /  AST  1124<H>  /  ALT  546<H>  /  AlkPhos  61          Review of systems  Gen: No weight changes, fatigue, fevers/chills, weakness  Skin: No rashes  Head/Eyes/Ears/Mouth: No headache; Normal hearing; Normal vision w/o blurriness; No sinus pain/discomfort, sore throat  Respiratory: No dyspnea, cough, wheezing, hemoptysis  CV: No chest pain, PND, orthopnea  GI: C/O mild abdominal pain at surgical site. no diarrhea, constipation, nausea, vomiting, melena, hematochezia  : No increased frequency, dysuria, hematuria, nocturia  MSK: No joint pain/swelling; no back pain; no edema  Neuro: No dizziness/lightheadedness, weakness, seizures, numbness, tingling  Heme: No easy bruising or bleeding  Endo: No heat/cold intolerance  Psych: No significant nervousness, anxiety, stress, depression  All other systems were reviewed and are negative, except as noted.      PHYSICAL EXAM:  Constitutional: Well developed / well nourished  Eyes: Anicteric, PERRLA  ENMT: nc/at, no thrush  Neck: supple, central line patent  Respiratory: CTA B/L  Cardiovascular: RRR  Gastrointestinal: Soft abdomen, ND, appropriate incisional TTP. chevron incision c/d/i, staples in place. No signs of infection.   Genitourinary: Urinary catheter in place  Extremities: SCD's in place and working bilaterally  Vascular: Palpable dp pulses bilaterally.   Neurological: A&O x3  Skin: no rashes, ulcerations, lesions  Musculoskeletal: Moving all extremities  Psychiatric: Responsive

## 2023-09-08 NOTE — BRIEF OPERATIVE NOTE - OPERATION/FINDINGS
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 Cutdown to fascia. Caudate lobe mobilized. Hepatoduodenal ligament dissected with exposure of CBD, hepatic artery, and portal vein. CBD and hepatic artery taken with silk suture. Liver fully mobilized. Portal vein taken. IVC clamped superiorly and inferior to TIPS and taken. Recipient liver removed. Donor liver prepped on backtable. IVC anastomosis completed followed by portal vein anastomosis and liver reperfused. Hepatic artery anastomosis then completed. Open cholecystectomy on donor liver completed. CBD anastomosis completed. Excellent hemostasis achieved. x3 MELISSA drains placed in standard position. Midline fascia closed in running fashion. Posterior and anterior rectus sheaths closed in running fashion. Skin closed with staples. 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     OLT with inferior/superior IVC anastomosis, portal vein anastomosis, hepatic artery x1 anastomosis, CBD anastomosis, x3 MELISSA in standard position    Bilateral chevron incision with midline extension. Cutdown to fascia. Caudate lobe mobilized. Hepatoduodenal ligament dissected with exposure of CBD, hepatic artery, and portal vein. CBD and hepatic artery taken with silk suture. Liver fully mobilized. Portal vein taken. IVC clamped superiorly and inferior to TIPS and taken. Recipient liver removed. Donor liver prepped on backtable. IVC anastomosis completed followed by portal vein anastomosis and liver reperfused. Hepatic artery anastomosis then completed. Open cholecystectomy on donor liver completed. CBD anastomosis completed. Excellent hemostasis achieved. x3 MELISSA drains placed in standard position. Midline fascia closed in running fashion. Posterior and anterior rectus sheaths closed in running fashion. Skin closed with staples.

## 2023-09-08 NOTE — PRE-ANESTHESIA EVALUATION ADULT - LAST ECHOCARDIOGRAM
2023: normal left atrium, normal left ventricular systolic function, normal pulmonary pressures, LVEF 60%

## 2023-09-08 NOTE — CONSULT NOTE ADULT - SUBJECTIVE AND OBJECTIVE BOX
SICU Consultation Note  =====================================================  HPI: 37y female      Surgery Information  OR time:      EBL:       UOP:              IV Fluids:       Blood Products:             PAST MEDICAL & SURGICAL HISTORY:  Cirrhosis  2010  Cirrhosis  Budd-Chiari syndrome  H/O protein S deficiency  Miscarriage x5  History of transjugular intrahepatic portosystemic shunt  History of dilation and curettage  Presence of IVC filter    Home Meds:   Allergies: No Known Allergies    Soc:   Advanced Directives: Presumed Full Code     ROS:    General: Non-Contributory  Skin/Breast: Non-Contributory  Ophthalmologic: Non-Contributory  ENMT: Non-Contributory  Respiratory and Thorax: Non-Contributory  Cardiovascular: Non-Contributory  Gastrointestinal: Non-Contributory  Genitourinary: Non-Contributory  Musculoskeletal: Non-Contributory  Neurological: Non-Contributory  Psychiatric: Non-Contributory  Hematology/Lymphatics: Non-Contributory  Endocrine: Non-Contributory  Allergic/Immunologic: Non-Contributory    CURRENT MEDICATIONS:   --------------------------------------------------------------------------------------  Neurologic Medications  dexMEDEtomidine Infusion 0.05 MICROgram(s)/kG/Hr IV Continuous <Continuous>  HYDROmorphone  Injectable 0.5 milliGRAM(s) IV Push every 3 hours PRN Severe Pain (7 - 10)    Respiratory Medications    Cardiovascular Medications  norepinephrine Infusion 0.08 MICROgram(s)/kG/Min IV Continuous <Continuous>    Gastrointestinal Medications  multiple electrolytes Injection Type 1 1000 milliLiter(s) IV Continuous <Continuous>  pantoprazole  Injectable 40 milliGRAM(s) IV Push daily  potassium chloride  20 mEq/100 mL IVPB 20 milliEquivalent(s) IV Intermittent every 1 hour  sodium chloride 0.9% lock flush 10 milliLiter(s) IV Push every 1 hour PRN Pre/post blood products, medications, blood draw, and to maintain line patency    Genitourinary Medications    Hematologic/Oncologic Medications  heparin  Infusion 450 Unit(s)/Hr IV Continuous <Continuous>  influenza   Vaccine 0.5 milliLiter(s) IntraMuscular once  mycophenolate mofetil Suspension 1000 milliGRAM(s) Oral every 12 hours    Antimicrobial/Immunologic Medications  ampicillin/sulbactam  IVPB 3 Gram(s) IV Intermittent every 6 hours  fluconAZOLE IVPB 400 milliGRAM(s) IV Intermittent daily    Endocrine/Metabolic Medications  dextrose 50% Injectable 50 milliLiter(s) IV Push every 15 minutes  insulin regular Infusion 4 Unit(s)/Hr IV Continuous <Continuous>    Topical/Other Medications  chlorhexidine 0.12% Liquid 15 milliLiter(s) Oral Mucosa every 12 hours  chlorhexidine 2% Cloths 1 Application(s) Topical <User Schedule>    --------------------------------------------------------------------------------------    VITAL SIGNS, INS/OUTS (last 24 hours):  --------------------------------------------------------------------------------------  ICU Vital Signs Last 24 Hrs  T(C): 36.9 (08 Sep 2023 11:38), Max: 36.9 (07 Sep 2023 22:40)  T(F): 98.4 (08 Sep 2023 11:38), Max: 98.5 (07 Sep 2023 22:40)  HR: 73 (08 Sep 2023 13:00) (71 - 84)  BP: 113/68 (08 Sep 2023 03:36) (111/71 - 113/68)  BP(mean): --  ABP: 107/56 (08 Sep 2023 13:00) (96/47 - 132/76)  ABP(mean): 76 (08 Sep 2023 13:00) (65 - 100)  RR: 16 (08 Sep 2023 13:00) (15 - 18)  SpO2: 100% (08 Sep 2023 13:00) (97% - 100%)    O2 Parameters below as of 08 Sep 2023 11:38  Patient On (Oxygen Delivery Method): ventilator    O2 Concentration (%): 100    --------------------------------------------------------------------------------------    EXAM:  General/Neuro  Exam: No acute distress, Intubated, Sedated.     Respiratory  Exam:  [] Tracheostomy   [X] Intubated  Mechanical Ventilation: Mode: AC/ CMV (Assist Control/ Continuous Mandatory Ventilation), RR (machine): 15, TV (machine): 450, FiO2: 100, PEEP: 5, ITime: 0.9, MAP: 7.4, PIP: 19    Cardiovascular  Exam: S1, S2.  Regular rate and rhythm.    Cardiac Rhythm: Normal Sinus Rhythm    GI  Exam: Abdomen soft, Non-distended.  Nasogastric tube in place.  Colostomy / Ileostomy.    Wound:  Dressing clean dry intact, drains x3 with serosang output.  Current Diet:  NPO      Tubes/Lines/Drains  ***  [x] Peripheral IV  [] Central Venous Line     	[] R	[] L	[] IJ	[] Fem	[] SC        Type:	    Date Placed:   [] Arterial Line		[] R	[] L	[] Fem	[] Rad	[] Ax	Date Placed:   [] PICC:         	[] Midline		[] Mediport           [] Urinary Catheter		Date Placed:     Extremities  Exam: Extremities warm, pink, well-perfused.        Derm:  Exam: Good skin turgor, no skin breakdown.      :   Exam: [place] catheter in place.       --------------------------------------------------------------------------------------  LABS:                        10.2   8.10  )-----------( 79       ( 08 Sep 2023 12:07 )             30.1     09-08    144  |  113<H>  |  11  ----------------------------<  154<H>  3.5   |  23  |  0.46<L>    Ca    9.4      08 Sep 2023 12:07  Phos  3.2     09-08  Mg     1.9     09-08    TPro  3.6<L>  /  Alb  1.9<L>  /  TBili  1.5<H>  /  DBili  x   /  AST  1213<H>  /  ALT  595<H>  /  AlkPhos  68  09-08    PT/INR - ( 08 Sep 2023 12:07 )   PT: 21.6 sec;   INR: 2.10 ratio         PTT - ( 08 Sep 2023 12:07 )  PTT:63.3 sec  Urinalysis Basic - ( 08 Sep 2023 12:07 )    Color: x / Appearance: x / SG: x / pH: x  Gluc: 154 mg/dL / Ketone: x  / Bili: x / Urobili: x   Blood: x / Protein: x / Nitrite: x   Leuk Esterase: x / RBC: x / WBC x   Sq Epi: x / Non Sq Epi: x / Bacteria: x        RADIOLOGY & ADDITIONAL STUDIES:  Ultrasound pending    --------------------------------------------------------------------------------------    OTHER LABS    IMAGING RESULTS      ASSESSMENT:  37F with PMHx of protein S deficiency, 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      PLAN:   Neurologic:  - Paralyzed intraop, sedated now with Precedex gtt  - Dilaudid PRN for pain  - Titrate sedation as appropriate    Respiratory:  - PRVC 14/450/40%/+5  - Wean sedation, extubate when able  - Encourage IS     Cardiovascular:  - Pressors: Levo gtt, Vaso gtt, titrate to maintain MAP >65  - Intraop LA was ***, now cleared  - RIJ intro with [place] placed in OR   - CVP  - *** radial arterial lines    Gastrointestinal/Nutrition: s/p OLT ***/23  - NPO with NGT LIWS  - MELSISA x 3 #1 - R lobe, #2 - [place] , #3 - L lobe. Monitor output [id number].  - Post-transplant hepatic US with doppler pending  - Induction Transplant Medications per Transplant Team       - Simulect (Basiliximab) POD#0 and POD#4       - Methlyprednisolone Taper transition to Prednisone POD#6  - Maintenance Transplant Medications per Transplant Team       - Cellcept (Mycophenolate) q12H       - Tacrolimus starting POD#1       - Prednisone 20mg PO Daily POD#6  - Protonix 40mg IV Daily   - Senna    Genitourinary/Renal:  - Maintain indwelling salamanca  - Strict I/Os  - Monitor electrolytes, supplement PRN  - Intraop recieved *L crystalloid, *L 5% albumin      Hematologic:  - Received * units pRBC, * units FFP, * units PLT, * units cryo intraop   - Follow-up repeat TEG  - No chemical VTE ppx  - SCDs for mechanical VTE ppx    Infectious Disease:  - Transplant ppx with Valcyte (valganciclovir), Bactrim (TMP-SMX), and Diflucan (fluconazole)  - BCx ***     Endocrine:  - History of DM > start ISS        - Resume home Lantus when tolerating diet  - S/p steroid induction  - Methylprednisolone taper to Prednisone per Transplant    Disposition: SICU

## 2023-09-09 LAB
ALBUMIN SERPL ELPH-MCNC: 2.2 G/DL — LOW (ref 3.3–5)
ALBUMIN SERPL ELPH-MCNC: 2.2 G/DL — LOW (ref 3.3–5)
ALBUMIN SERPL ELPH-MCNC: 2.3 G/DL — LOW (ref 3.3–5)
ALBUMIN SERPL ELPH-MCNC: 2.3 G/DL — LOW (ref 3.3–5)
ALBUMIN SERPL ELPH-MCNC: 2.4 G/DL — LOW (ref 3.3–5)
ALBUMIN SERPL ELPH-MCNC: 2.5 G/DL — LOW (ref 3.3–5)
ALP SERPL-CCNC: 45 U/L — SIGNIFICANT CHANGE UP (ref 40–120)
ALP SERPL-CCNC: 46 U/L — SIGNIFICANT CHANGE UP (ref 40–120)
ALP SERPL-CCNC: 47 U/L — SIGNIFICANT CHANGE UP (ref 40–120)
ALP SERPL-CCNC: 50 U/L — SIGNIFICANT CHANGE UP (ref 40–120)
ALP SERPL-CCNC: 55 U/L — SIGNIFICANT CHANGE UP (ref 40–120)
ALP SERPL-CCNC: 58 U/L — SIGNIFICANT CHANGE UP (ref 40–120)
ALT FLD-CCNC: 238 U/L — HIGH (ref 10–45)
ALT FLD-CCNC: 264 U/L — HIGH (ref 10–45)
ALT FLD-CCNC: 286 U/L — HIGH (ref 10–45)
ALT FLD-CCNC: 331 U/L — HIGH (ref 10–45)
ALT FLD-CCNC: 411 U/L — HIGH (ref 10–45)
ALT FLD-CCNC: 440 U/L — HIGH (ref 10–45)
ANION GAP SERPL CALC-SCNC: 10 MMOL/L — SIGNIFICANT CHANGE UP (ref 5–17)
ANION GAP SERPL CALC-SCNC: 10 MMOL/L — SIGNIFICANT CHANGE UP (ref 5–17)
ANION GAP SERPL CALC-SCNC: 12 MMOL/L — SIGNIFICANT CHANGE UP (ref 5–17)
ANION GAP SERPL CALC-SCNC: 12 MMOL/L — SIGNIFICANT CHANGE UP (ref 5–17)
ANION GAP SERPL CALC-SCNC: 9 MMOL/L — SIGNIFICANT CHANGE UP (ref 5–17)
ANION GAP SERPL CALC-SCNC: 9 MMOL/L — SIGNIFICANT CHANGE UP (ref 5–17)
APTT BLD: 26.9 SEC — SIGNIFICANT CHANGE UP (ref 24.5–35.6)
APTT BLD: 29.7 SEC — SIGNIFICANT CHANGE UP (ref 24.5–35.6)
APTT BLD: 30.4 SEC — SIGNIFICANT CHANGE UP (ref 24.5–35.6)
APTT BLD: 42.3 SEC — HIGH (ref 24.5–35.6)
APTT BLD: 58.5 SEC — HIGH (ref 24.5–35.6)
APTT BLD: 62.2 SEC — HIGH (ref 24.5–35.6)
AST SERPL-CCNC: 149 U/L — HIGH (ref 10–40)
AST SERPL-CCNC: 214 U/L — HIGH (ref 10–40)
AST SERPL-CCNC: 275 U/L — HIGH (ref 10–40)
AST SERPL-CCNC: 390 U/L — HIGH (ref 10–40)
AST SERPL-CCNC: 574 U/L — HIGH (ref 10–40)
AST SERPL-CCNC: 748 U/L — HIGH (ref 10–40)
BILIRUB SERPL-MCNC: 0.4 MG/DL — SIGNIFICANT CHANGE UP (ref 0.2–1.2)
BILIRUB SERPL-MCNC: 0.6 MG/DL — SIGNIFICANT CHANGE UP (ref 0.2–1.2)
BILIRUB SERPL-MCNC: 0.6 MG/DL — SIGNIFICANT CHANGE UP (ref 0.2–1.2)
BUN SERPL-MCNC: 14 MG/DL — SIGNIFICANT CHANGE UP (ref 7–23)
BUN SERPL-MCNC: 18 MG/DL — SIGNIFICANT CHANGE UP (ref 7–23)
BUN SERPL-MCNC: 19 MG/DL — SIGNIFICANT CHANGE UP (ref 7–23)
BUN SERPL-MCNC: 22 MG/DL — SIGNIFICANT CHANGE UP (ref 7–23)
CALCIUM SERPL-MCNC: 7.3 MG/DL — LOW (ref 8.4–10.5)
CALCIUM SERPL-MCNC: 7.4 MG/DL — LOW (ref 8.4–10.5)
CALCIUM SERPL-MCNC: 7.4 MG/DL — LOW (ref 8.4–10.5)
CALCIUM SERPL-MCNC: 7.5 MG/DL — LOW (ref 8.4–10.5)
CALCIUM SERPL-MCNC: 7.5 MG/DL — LOW (ref 8.4–10.5)
CALCIUM SERPL-MCNC: 7.9 MG/DL — LOW (ref 8.4–10.5)
CHLORIDE SERPL-SCNC: 105 MMOL/L — SIGNIFICANT CHANGE UP (ref 96–108)
CHLORIDE SERPL-SCNC: 106 MMOL/L — SIGNIFICANT CHANGE UP (ref 96–108)
CHLORIDE SERPL-SCNC: 106 MMOL/L — SIGNIFICANT CHANGE UP (ref 96–108)
CHLORIDE SERPL-SCNC: 107 MMOL/L — SIGNIFICANT CHANGE UP (ref 96–108)
CHLORIDE SERPL-SCNC: 107 MMOL/L — SIGNIFICANT CHANGE UP (ref 96–108)
CHLORIDE SERPL-SCNC: 108 MMOL/L — SIGNIFICANT CHANGE UP (ref 96–108)
CO2 SERPL-SCNC: 21 MMOL/L — LOW (ref 22–31)
CO2 SERPL-SCNC: 22 MMOL/L — SIGNIFICANT CHANGE UP (ref 22–31)
CO2 SERPL-SCNC: 23 MMOL/L — SIGNIFICANT CHANGE UP (ref 22–31)
CO2 SERPL-SCNC: 24 MMOL/L — SIGNIFICANT CHANGE UP (ref 22–31)
CREAT SERPL-MCNC: 0.5 MG/DL — SIGNIFICANT CHANGE UP (ref 0.5–1.3)
CREAT SERPL-MCNC: 0.54 MG/DL — SIGNIFICANT CHANGE UP (ref 0.5–1.3)
CREAT SERPL-MCNC: 0.57 MG/DL — SIGNIFICANT CHANGE UP (ref 0.5–1.3)
CREAT SERPL-MCNC: 0.62 MG/DL — SIGNIFICANT CHANGE UP (ref 0.5–1.3)
EGFR: 116 ML/MIN/1.73M2 — SIGNIFICANT CHANGE UP
EGFR: 118 ML/MIN/1.73M2 — SIGNIFICANT CHANGE UP
EGFR: 120 ML/MIN/1.73M2 — SIGNIFICANT CHANGE UP
EGFR: 122 ML/MIN/1.73M2 — SIGNIFICANT CHANGE UP
GAS PNL BLDA: SIGNIFICANT CHANGE UP
GLUCOSE BLDC GLUCOMTR-MCNC: 139 MG/DL — HIGH (ref 70–99)
GLUCOSE BLDC GLUCOMTR-MCNC: 159 MG/DL — HIGH (ref 70–99)
GLUCOSE BLDC GLUCOMTR-MCNC: 165 MG/DL — HIGH (ref 70–99)
GLUCOSE BLDC GLUCOMTR-MCNC: 170 MG/DL — HIGH (ref 70–99)
GLUCOSE BLDC GLUCOMTR-MCNC: 178 MG/DL — HIGH (ref 70–99)
GLUCOSE SERPL-MCNC: 148 MG/DL — HIGH (ref 70–99)
GLUCOSE SERPL-MCNC: 157 MG/DL — HIGH (ref 70–99)
GLUCOSE SERPL-MCNC: 161 MG/DL — HIGH (ref 70–99)
GLUCOSE SERPL-MCNC: 164 MG/DL — HIGH (ref 70–99)
GLUCOSE SERPL-MCNC: 176 MG/DL — HIGH (ref 70–99)
GLUCOSE SERPL-MCNC: 202 MG/DL — HIGH (ref 70–99)
HCT VFR BLD CALC: 20 % — CRITICAL LOW (ref 34.5–45)
HCT VFR BLD CALC: 23.9 % — LOW (ref 34.5–45)
HCT VFR BLD CALC: 24.7 % — LOW (ref 34.5–45)
HCT VFR BLD CALC: 24.8 % — LOW (ref 34.5–45)
HCT VFR BLD CALC: 25.5 % — LOW (ref 34.5–45)
HCT VFR BLD CALC: 26 % — LOW (ref 34.5–45)
HCT VFR BLD CALC: 27.7 % — LOW (ref 34.5–45)
HGB BLD-MCNC: 6.8 G/DL — CRITICAL LOW (ref 11.5–15.5)
HGB BLD-MCNC: 8.1 G/DL — LOW (ref 11.5–15.5)
HGB BLD-MCNC: 8.4 G/DL — LOW (ref 11.5–15.5)
HGB BLD-MCNC: 8.7 G/DL — LOW (ref 11.5–15.5)
HGB BLD-MCNC: 9 G/DL — LOW (ref 11.5–15.5)
HGB BLD-MCNC: 9 G/DL — LOW (ref 11.5–15.5)
HGB BLD-MCNC: 9.5 G/DL — LOW (ref 11.5–15.5)
INR BLD: 1.22 RATIO — HIGH (ref 0.85–1.18)
INR BLD: 1.37 RATIO — HIGH (ref 0.85–1.18)
INR BLD: 1.49 RATIO — HIGH (ref 0.85–1.18)
INR BLD: 2 RATIO — HIGH (ref 0.85–1.18)
INR BLD: 4.7 RATIO — HIGH (ref 0.85–1.18)
INR BLD: 5.15 RATIO — CRITICAL HIGH (ref 0.85–1.18)
MAGNESIUM SERPL-MCNC: 1.9 MG/DL — SIGNIFICANT CHANGE UP (ref 1.6–2.6)
MAGNESIUM SERPL-MCNC: 1.9 MG/DL — SIGNIFICANT CHANGE UP (ref 1.6–2.6)
MAGNESIUM SERPL-MCNC: 2 MG/DL — SIGNIFICANT CHANGE UP (ref 1.6–2.6)
MAGNESIUM SERPL-MCNC: 2.2 MG/DL — SIGNIFICANT CHANGE UP (ref 1.6–2.6)
MCHC RBC-ENTMCNC: 26.5 PG — LOW (ref 27–34)
MCHC RBC-ENTMCNC: 26.6 PG — LOW (ref 27–34)
MCHC RBC-ENTMCNC: 26.6 PG — LOW (ref 27–34)
MCHC RBC-ENTMCNC: 27.3 PG — SIGNIFICANT CHANGE UP (ref 27–34)
MCHC RBC-ENTMCNC: 27.4 PG — SIGNIFICANT CHANGE UP (ref 27–34)
MCHC RBC-ENTMCNC: 27.5 PG — SIGNIFICANT CHANGE UP (ref 27–34)
MCHC RBC-ENTMCNC: 27.6 PG — SIGNIFICANT CHANGE UP (ref 27–34)
MCHC RBC-ENTMCNC: 33.9 GM/DL — SIGNIFICANT CHANGE UP (ref 32–36)
MCHC RBC-ENTMCNC: 34 GM/DL — SIGNIFICANT CHANGE UP (ref 32–36)
MCHC RBC-ENTMCNC: 34 GM/DL — SIGNIFICANT CHANGE UP (ref 32–36)
MCHC RBC-ENTMCNC: 34.3 GM/DL — SIGNIFICANT CHANGE UP (ref 32–36)
MCHC RBC-ENTMCNC: 34.6 GM/DL — SIGNIFICANT CHANGE UP (ref 32–36)
MCHC RBC-ENTMCNC: 35.1 GM/DL — SIGNIFICANT CHANGE UP (ref 32–36)
MCHC RBC-ENTMCNC: 35.3 GM/DL — SIGNIFICANT CHANGE UP (ref 32–36)
MCV RBC AUTO: 77.8 FL — LOW (ref 80–100)
MCV RBC AUTO: 78 FL — LOW (ref 80–100)
MCV RBC AUTO: 78.2 FL — LOW (ref 80–100)
MCV RBC AUTO: 78.2 FL — LOW (ref 80–100)
MCV RBC AUTO: 78.4 FL — LOW (ref 80–100)
MCV RBC AUTO: 78.8 FL — LOW (ref 80–100)
MCV RBC AUTO: 80.1 FL — SIGNIFICANT CHANGE UP (ref 80–100)
NRBC # BLD: 0 /100 WBCS — SIGNIFICANT CHANGE UP (ref 0–0)
PHOSPHATE SERPL-MCNC: 2.9 MG/DL — SIGNIFICANT CHANGE UP (ref 2.5–4.5)
PHOSPHATE SERPL-MCNC: 3.4 MG/DL — SIGNIFICANT CHANGE UP (ref 2.5–4.5)
PHOSPHATE SERPL-MCNC: 4.1 MG/DL — SIGNIFICANT CHANGE UP (ref 2.5–4.5)
PHOSPHATE SERPL-MCNC: 4.7 MG/DL — HIGH (ref 2.5–4.5)
PHOSPHATE SERPL-MCNC: 4.9 MG/DL — HIGH (ref 2.5–4.5)
PHOSPHATE SERPL-MCNC: 5 MG/DL — HIGH (ref 2.5–4.5)
PLATELET # BLD AUTO: 54 K/UL — LOW (ref 150–400)
PLATELET # BLD AUTO: 57 K/UL — LOW (ref 150–400)
PLATELET # BLD AUTO: 57 K/UL — LOW (ref 150–400)
PLATELET # BLD AUTO: 61 K/UL — LOW (ref 150–400)
PLATELET # BLD AUTO: 62 K/UL — LOW (ref 150–400)
PLATELET # BLD AUTO: 65 K/UL — LOW (ref 150–400)
PLATELET # BLD AUTO: 76 K/UL — LOW (ref 150–400)
POTASSIUM SERPL-MCNC: 4.2 MMOL/L — SIGNIFICANT CHANGE UP (ref 3.5–5.3)
POTASSIUM SERPL-MCNC: 4.3 MMOL/L — SIGNIFICANT CHANGE UP (ref 3.5–5.3)
POTASSIUM SERPL-MCNC: 4.4 MMOL/L — SIGNIFICANT CHANGE UP (ref 3.5–5.3)
POTASSIUM SERPL-MCNC: 4.5 MMOL/L — SIGNIFICANT CHANGE UP (ref 3.5–5.3)
POTASSIUM SERPL-MCNC: 4.6 MMOL/L — SIGNIFICANT CHANGE UP (ref 3.5–5.3)
POTASSIUM SERPL-MCNC: 4.7 MMOL/L — SIGNIFICANT CHANGE UP (ref 3.5–5.3)
POTASSIUM SERPL-SCNC: 4.2 MMOL/L — SIGNIFICANT CHANGE UP (ref 3.5–5.3)
POTASSIUM SERPL-SCNC: 4.3 MMOL/L — SIGNIFICANT CHANGE UP (ref 3.5–5.3)
POTASSIUM SERPL-SCNC: 4.4 MMOL/L — SIGNIFICANT CHANGE UP (ref 3.5–5.3)
POTASSIUM SERPL-SCNC: 4.5 MMOL/L — SIGNIFICANT CHANGE UP (ref 3.5–5.3)
POTASSIUM SERPL-SCNC: 4.6 MMOL/L — SIGNIFICANT CHANGE UP (ref 3.5–5.3)
POTASSIUM SERPL-SCNC: 4.7 MMOL/L — SIGNIFICANT CHANGE UP (ref 3.5–5.3)
PROT SERPL-MCNC: 3.9 G/DL — LOW (ref 6–8.3)
PROT SERPL-MCNC: 3.9 G/DL — LOW (ref 6–8.3)
PROT SERPL-MCNC: 4 G/DL — LOW (ref 6–8.3)
PROT SERPL-MCNC: 4.1 G/DL — LOW (ref 6–8.3)
PROTHROM AB SERPL-ACNC: 13.3 SEC — HIGH (ref 9.5–13)
PROTHROM AB SERPL-ACNC: 14.9 SEC — HIGH (ref 9.5–13)
PROTHROM AB SERPL-ACNC: 16.2 SEC — HIGH (ref 9.5–13)
PROTHROM AB SERPL-ACNC: 21.6 SEC — HIGH (ref 9.5–13)
PROTHROM AB SERPL-ACNC: 47.2 SEC — HIGH (ref 9.5–13)
PROTHROM AB SERPL-ACNC: 51.6 SEC — HIGH (ref 9.5–13)
RAPIDTEG MAXIMUM AMPLITUDE: 51.3 MM (ref 52–70)
RBC # BLD: 2.57 M/UL — LOW (ref 3.8–5.2)
RBC # BLD: 3.05 M/UL — LOW (ref 3.8–5.2)
RBC # BLD: 3.16 M/UL — LOW (ref 3.8–5.2)
RBC # BLD: 3.18 M/UL — LOW (ref 3.8–5.2)
RBC # BLD: 3.26 M/UL — LOW (ref 3.8–5.2)
RBC # BLD: 3.3 M/UL — LOW (ref 3.8–5.2)
RBC # BLD: 3.46 M/UL — LOW (ref 3.8–5.2)
RBC # FLD: 14.9 % — HIGH (ref 10.3–14.5)
RBC # FLD: 14.9 % — HIGH (ref 10.3–14.5)
RBC # FLD: 15 % — HIGH (ref 10.3–14.5)
RBC # FLD: 15.2 % — HIGH (ref 10.3–14.5)
RBC # FLD: 15.7 % — HIGH (ref 10.3–14.5)
RBC # FLD: 15.9 % — HIGH (ref 10.3–14.5)
RBC # FLD: 15.9 % — HIGH (ref 10.3–14.5)
SODIUM SERPL-SCNC: 138 MMOL/L — SIGNIFICANT CHANGE UP (ref 135–145)
SODIUM SERPL-SCNC: 139 MMOL/L — SIGNIFICANT CHANGE UP (ref 135–145)
TEG FUNCTIONAL FIBRINOGEN: 17 MM — SIGNIFICANT CHANGE UP (ref 15–32)
TEG LY30 (LYSIS): 0.1 % — SIGNIFICANT CHANGE UP (ref 0–2.6)
TEG REACTION TIME: 6.4 MIN — SIGNIFICANT CHANGE UP (ref 4.6–9.1)
TROPONIN T, HIGH SENSITIVITY RESULT: 13 NG/L — SIGNIFICANT CHANGE UP (ref 0–51)
WBC # BLD: 10.09 K/UL — SIGNIFICANT CHANGE UP (ref 3.8–10.5)
WBC # BLD: 10.41 K/UL — SIGNIFICANT CHANGE UP (ref 3.8–10.5)
WBC # BLD: 10.99 K/UL — HIGH (ref 3.8–10.5)
WBC # BLD: 12.94 K/UL — HIGH (ref 3.8–10.5)
WBC # BLD: 8.89 K/UL — SIGNIFICANT CHANGE UP (ref 3.8–10.5)
WBC # BLD: 9.66 K/UL — SIGNIFICANT CHANGE UP (ref 3.8–10.5)
WBC # BLD: 9.89 K/UL — SIGNIFICANT CHANGE UP (ref 3.8–10.5)
WBC # FLD AUTO: 10.09 K/UL — SIGNIFICANT CHANGE UP (ref 3.8–10.5)
WBC # FLD AUTO: 10.41 K/UL — SIGNIFICANT CHANGE UP (ref 3.8–10.5)
WBC # FLD AUTO: 10.99 K/UL — HIGH (ref 3.8–10.5)
WBC # FLD AUTO: 12.94 K/UL — HIGH (ref 3.8–10.5)
WBC # FLD AUTO: 8.89 K/UL — SIGNIFICANT CHANGE UP (ref 3.8–10.5)
WBC # FLD AUTO: 9.66 K/UL — SIGNIFICANT CHANGE UP (ref 3.8–10.5)
WBC # FLD AUTO: 9.89 K/UL — SIGNIFICANT CHANGE UP (ref 3.8–10.5)

## 2023-09-09 PROCEDURE — 99233 SBSQ HOSP IP/OBS HIGH 50: CPT

## 2023-09-09 PROCEDURE — 71045 X-RAY EXAM CHEST 1 VIEW: CPT | Mod: 26

## 2023-09-09 PROCEDURE — 93010 ELECTROCARDIOGRAM REPORT: CPT

## 2023-09-09 PROCEDURE — 93975 VASCULAR STUDY: CPT | Mod: 26

## 2023-09-09 PROCEDURE — 76705 ECHO EXAM OF ABDOMEN: CPT | Mod: 26,59

## 2023-09-09 RX ORDER — CALCIUM GLUCONATE 100 MG/ML
2 VIAL (ML) INTRAVENOUS ONCE
Refills: 0 | Status: COMPLETED | OUTPATIENT
Start: 2023-09-09 | End: 2023-09-09

## 2023-09-09 RX ORDER — OXYCODONE HYDROCHLORIDE 5 MG/1
2.5 TABLET ORAL EVERY 4 HOURS
Refills: 0 | Status: DISCONTINUED | OUTPATIENT
Start: 2023-09-09 | End: 2023-09-16

## 2023-09-09 RX ORDER — TACROLIMUS 5 MG/1
0.5 CAPSULE ORAL
Refills: 0 | Status: DISCONTINUED | OUTPATIENT
Start: 2023-09-09 | End: 2023-09-11

## 2023-09-09 RX ORDER — ALBUMIN HUMAN 25 %
250 VIAL (ML) INTRAVENOUS ONCE
Refills: 0 | Status: COMPLETED | OUTPATIENT
Start: 2023-09-09 | End: 2023-09-09

## 2023-09-09 RX ORDER — ONDANSETRON 8 MG/1
4 TABLET, FILM COATED ORAL ONCE
Refills: 0 | Status: COMPLETED | OUTPATIENT
Start: 2023-09-09 | End: 2023-09-09

## 2023-09-09 RX ORDER — MAGNESIUM SULFATE 500 MG/ML
2 VIAL (ML) INJECTION ONCE
Refills: 0 | Status: COMPLETED | OUTPATIENT
Start: 2023-09-09 | End: 2023-09-09

## 2023-09-09 RX ORDER — INSULIN LISPRO 100/ML
VIAL (ML) SUBCUTANEOUS
Refills: 0 | Status: DISCONTINUED | OUTPATIENT
Start: 2023-09-09 | End: 2023-09-11

## 2023-09-09 RX ORDER — MYCOPHENOLATE MOFETIL 250 MG/1
1000 CAPSULE ORAL EVERY 12 HOURS
Refills: 0 | Status: DISCONTINUED | OUTPATIENT
Start: 2023-09-09 | End: 2023-09-11

## 2023-09-09 RX ORDER — OXYCODONE HYDROCHLORIDE 5 MG/1
5 TABLET ORAL EVERY 4 HOURS
Refills: 0 | Status: DISCONTINUED | OUTPATIENT
Start: 2023-09-09 | End: 2023-09-16

## 2023-09-09 RX ORDER — PHYTONADIONE (VIT K1) 5 MG
5 TABLET ORAL ONCE
Refills: 0 | Status: COMPLETED | OUTPATIENT
Start: 2023-09-09 | End: 2023-09-09

## 2023-09-09 RX ORDER — TACROLIMUS 5 MG/1
0.5 CAPSULE ORAL ONCE
Refills: 0 | Status: COMPLETED | OUTPATIENT
Start: 2023-09-09 | End: 2023-09-09

## 2023-09-09 RX ADMIN — HEPARIN SODIUM 3.5 UNIT(S)/HR: 5000 INJECTION INTRAVENOUS; SUBCUTANEOUS at 03:01

## 2023-09-09 RX ADMIN — SODIUM CHLORIDE 75 MILLILITER(S): 9 INJECTION, SOLUTION INTRAVENOUS at 17:19

## 2023-09-09 RX ADMIN — MYCOPHENOLATE MOFETIL 1000 MILLIGRAM(S): 250 CAPSULE ORAL at 06:23

## 2023-09-09 RX ADMIN — AMPICILLIN SODIUM AND SULBACTAM SODIUM 200 GRAM(S): 250; 125 INJECTION, POWDER, FOR SUSPENSION INTRAMUSCULAR; INTRAVENOUS at 04:02

## 2023-09-09 RX ADMIN — HYDROMORPHONE HYDROCHLORIDE 0.5 MILLIGRAM(S): 2 INJECTION INTRAMUSCULAR; INTRAVENOUS; SUBCUTANEOUS at 11:45

## 2023-09-09 RX ADMIN — CHLORHEXIDINE GLUCONATE 1 APPLICATION(S): 213 SOLUTION TOPICAL at 05:04

## 2023-09-09 RX ADMIN — Medication 2: at 10:01

## 2023-09-09 RX ADMIN — OXYCODONE HYDROCHLORIDE 2.5 MILLIGRAM(S): 5 TABLET ORAL at 21:54

## 2023-09-09 RX ADMIN — HYDROMORPHONE HYDROCHLORIDE 0.5 MILLIGRAM(S): 2 INJECTION INTRAMUSCULAR; INTRAVENOUS; SUBCUTANEOUS at 01:36

## 2023-09-09 RX ADMIN — AMPICILLIN SODIUM AND SULBACTAM SODIUM 200 GRAM(S): 250; 125 INJECTION, POWDER, FOR SUSPENSION INTRAMUSCULAR; INTRAVENOUS at 21:47

## 2023-09-09 RX ADMIN — TACROLIMUS 0.5 MILLIGRAM(S): 5 CAPSULE ORAL at 19:43

## 2023-09-09 RX ADMIN — HYDROMORPHONE HYDROCHLORIDE 0.5 MILLIGRAM(S): 2 INJECTION INTRAMUSCULAR; INTRAVENOUS; SUBCUTANEOUS at 07:45

## 2023-09-09 RX ADMIN — HYDROMORPHONE HYDROCHLORIDE 0.5 MILLIGRAM(S): 2 INJECTION INTRAMUSCULAR; INTRAVENOUS; SUBCUTANEOUS at 01:06

## 2023-09-09 RX ADMIN — Medication 125 MILLILITER(S): at 04:36

## 2023-09-09 RX ADMIN — Medication 2: at 02:39

## 2023-09-09 RX ADMIN — HYDROMORPHONE HYDROCHLORIDE 0.5 MILLIGRAM(S): 2 INJECTION INTRAMUSCULAR; INTRAVENOUS; SUBCUTANEOUS at 11:28

## 2023-09-09 RX ADMIN — Medication 2: at 17:19

## 2023-09-09 RX ADMIN — Medication 25 GRAM(S): at 17:19

## 2023-09-09 RX ADMIN — BENZOCAINE AND MENTHOL 1 LOZENGE: 5; 1 LIQUID ORAL at 04:33

## 2023-09-09 RX ADMIN — Medication 2: at 21:47

## 2023-09-09 RX ADMIN — AMPICILLIN SODIUM AND SULBACTAM SODIUM 200 GRAM(S): 250; 125 INJECTION, POWDER, FOR SUSPENSION INTRAMUSCULAR; INTRAVENOUS at 09:27

## 2023-09-09 RX ADMIN — FLUCONAZOLE 200 MILLIGRAM(S): 150 TABLET ORAL at 11:29

## 2023-09-09 RX ADMIN — Medication 10 MILLILITER(S): at 11:28

## 2023-09-09 RX ADMIN — Medication 200 GRAM(S): at 09:00

## 2023-09-09 RX ADMIN — MYCOPHENOLATE MOFETIL 1000 MILLIGRAM(S): 250 CAPSULE ORAL at 17:19

## 2023-09-09 RX ADMIN — OXYCODONE HYDROCHLORIDE 2.5 MILLIGRAM(S): 5 TABLET ORAL at 22:24

## 2023-09-09 RX ADMIN — SODIUM CHLORIDE 125 MILLILITER(S): 9 INJECTION, SOLUTION INTRAVENOUS at 02:40

## 2023-09-09 RX ADMIN — HYDROMORPHONE HYDROCHLORIDE 0.5 MILLIGRAM(S): 2 INJECTION INTRAMUSCULAR; INTRAVENOUS; SUBCUTANEOUS at 16:04

## 2023-09-09 RX ADMIN — HYDROMORPHONE HYDROCHLORIDE 0.5 MILLIGRAM(S): 2 INJECTION INTRAMUSCULAR; INTRAVENOUS; SUBCUTANEOUS at 16:20

## 2023-09-09 RX ADMIN — Medication 100 MILLIGRAM(S): at 05:01

## 2023-09-09 RX ADMIN — BENZOCAINE AND MENTHOL 1 LOZENGE: 5; 1 LIQUID ORAL at 06:52

## 2023-09-09 RX ADMIN — HYDROMORPHONE HYDROCHLORIDE 0.5 MILLIGRAM(S): 2 INJECTION INTRAMUSCULAR; INTRAVENOUS; SUBCUTANEOUS at 07:26

## 2023-09-09 RX ADMIN — SODIUM CHLORIDE 125 MILLILITER(S): 9 INJECTION, SOLUTION INTRAVENOUS at 07:26

## 2023-09-09 RX ADMIN — Medication 101 MILLIGRAM(S): at 02:46

## 2023-09-09 RX ADMIN — SODIUM CHLORIDE 75 MILLILITER(S): 9 INJECTION, SOLUTION INTRAVENOUS at 19:41

## 2023-09-09 RX ADMIN — HEPARIN SODIUM 3.5 UNIT(S)/HR: 5000 INJECTION INTRAVENOUS; SUBCUTANEOUS at 07:26

## 2023-09-09 RX ADMIN — BENZOCAINE AND MENTHOL 1 LOZENGE: 5; 1 LIQUID ORAL at 11:00

## 2023-09-09 RX ADMIN — TACROLIMUS 0.5 MILLIGRAM(S): 5 CAPSULE ORAL at 15:09

## 2023-09-09 RX ADMIN — AMPICILLIN SODIUM AND SULBACTAM SODIUM 200 GRAM(S): 250; 125 INJECTION, POWDER, FOR SUSPENSION INTRAMUSCULAR; INTRAVENOUS at 15:09

## 2023-09-09 RX ADMIN — BENZOCAINE AND MENTHOL 1 LOZENGE: 5; 1 LIQUID ORAL at 01:06

## 2023-09-09 RX ADMIN — ONDANSETRON 4 MILLIGRAM(S): 8 TABLET, FILM COATED ORAL at 06:06

## 2023-09-09 RX ADMIN — VALGANCICLOVIR 450 MILLIGRAM(S): 450 TABLET, FILM COATED ORAL at 13:10

## 2023-09-09 RX ADMIN — Medication 200 GRAM(S): at 17:18

## 2023-09-09 RX ADMIN — PANTOPRAZOLE SODIUM 40 MILLIGRAM(S): 20 TABLET, DELAYED RELEASE ORAL at 11:28

## 2023-09-09 NOTE — PROGRESS NOTE ADULT - ASSESSMENT
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 s/p elective TIPs/DIPS in 5/2021.  On coumadin 5g daily, last dose this am now s/p Orthotopic Liver Transplant on 9/8    OLT (POD1)  Liver US with patent vessels, no collections  Wean vasopressors as tolerated  Continue Heparin drip goal PTT 50-60  Strict I/Os  Monitor MELISSA output  NPO/NGT  IVF @ 125  SCDs  Trend labs q6  Immuosuppression: FK not started yet, MMF 1gm PO BID, Standard prednisone taper. 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 s/p elective TIPs/DIPS in 5/2021.  On coumadin 5g daily, last dose this am now s/p Orthotopic Liver Transplant on 9/8    OLT (POD1)  Liver US with patent vessels, no collections  Hold heparin in setting of decreased hb  Transfuse 1U PRBC +albumin PRN  D/C [place]  MMF today  D/C gallo  Strict I/Os  Monitor MELISSA output  NPO/NGT  IVF @ 125  SCDs  Trend labs q6  Immuosuppression: FK not started yet, MMF 1gm PO BID, Standard prednisone taper. 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 s/p elective TIPs/DIPS in 5/2021.  On coumadin 5g daily, last dose this am now s/p Orthotopic Liver Transplant on 9/8    OLT (POD1)  Liver US with patent vessels, no collections  Hold heparin in setting of decreased hb  Transfuse 1U PRBC +albumin PRN  D/C [place]  D/C gallo  Strict I/Os  Monitor MELISSA output  NPO/NGT  IVF @ 125  SCDs  Trend labs q6  Immuosuppression: FK not started yet, MMF 1gm PO BID, Standard prednisone taper. Simulect due POD4 9/12  PPX: FLuconazole, Valcyte, Bactrim  Continue raghavendra-op Unasyn

## 2023-09-09 NOTE — PHYSICAL THERAPY INITIAL EVALUATION ADULT - PLANNED THERAPY INTERVENTIONS, PT EVAL
GOAL: Pt will negotiate up/down 12 steps with unilateral handrail independently in 2 weeks/balance training/bed mobility training/gait training/strengthening/transfer training

## 2023-09-09 NOTE — PROGRESS NOTE ADULT - SUBJECTIVE AND OBJECTIVE BOX
SICU Daily Progress Note  =====================================================  Interval/Overnight Events:     ***    POD #          	SICU Day #    ***    HPI:  ((insert from previous note)) ***    PMH:   ***    Allergies: No Known Allergies      MEDICATIONS:   --------------------------------------------------------------------------------------  Neurologic Medications  HYDROmorphone  Injectable 0.5 milliGRAM(s) IV Push every 3 hours PRN Severe Pain (7 - 10)    Respiratory Medications    Cardiovascular Medications    Gastrointestinal Medications  multiple electrolytes Injection Type 1 1000 milliLiter(s) IV Continuous <Continuous>  pantoprazole  Injectable 40 milliGRAM(s) IV Push daily  sodium chloride 0.9% lock flush 10 milliLiter(s) IV Push every 1 hour PRN Pre/post blood products, medications, blood draw, and to maintain line patency    Genitourinary Medications    Hematologic/Oncologic Medications  heparin  Infusion 450 Unit(s)/Hr IV Continuous <Continuous>  influenza   Vaccine 0.5 milliLiter(s) IntraMuscular once  mycophenolate mofetil Suspension 1000 milliGRAM(s) Oral every 12 hours    Antimicrobial/Immunologic Medications  ampicillin/sulbactam  IVPB 3 Gram(s) IV Intermittent every 6 hours  fluconAZOLE IVPB 400 milliGRAM(s) IV Intermittent daily  trimethoprim  40 mG/sulfamethoxazole 200 mG Suspension 10 milliLiter(s) Oral daily  valGANciclovir 50 mG/mL Oral Solution 450 milliGRAM(s) Oral daily    Endocrine/Metabolic Medications  insulin lispro (ADMELOG) corrective regimen sliding scale   SubCutaneous every 4 hours  methylPREDNISolone sodium succinate IVPB   IV Intermittent     Topical/Other Medications  benzocaine/menthol Lozenge 1 Lozenge Oral every 2 hours PRN Sore Throat  chlorhexidine 2% Cloths 1 Application(s) Topical <User Schedule>    --------------------------------------------------------------------------------------    VITAL SIGNS, INS/OUTS (last 24 hours):  --------------------------------------------------------------------------------------  ((Insert SICU Vitals/Is+Os here))***  --------------------------------------------------------------------------------------    EXAM  NEUROLOGY  RASS:   	GCS:    Exam: Normal, NAD, alert, oriented x3, no focal deficits. ***    HEENT  Exam: Normocephalic, atraumatic, EOMI.  ***    RESPIRATORY  Exam: Lungs clear to auscultation, Normal expansion/effort. ***  Mechanical Ventilation:     CARDIOVASCULAR  Exam: S1, S2.  Regular rate and rhythm.   ***    GI/NUTRITION  Exam: Abdomen soft, Non-tender, Non-distended.  ***  Wound:  ***  Current Diet:  NPO***    VASCULAR  Exam: Extremities warm, pink, well-perfused. ***    MUSCULOSKELETAL  Exam: All extremities moving spontaneously without limitations. ***    SKIN  Exam: Good skin turgor, no skin breakdown. ***    METABOLIC/FLUIDS/ELECTROLYTES  multiple electrolytes Injection Type 1 1000 milliLiter(s) IV Continuous <Continuous>      HEMATOLOGIC  [x] VTE Prophylaxis: heparin  Infusion 450 Unit(s)/Hr IV Continuous <Continuous>    Transfusions:	[] PRBC	[] Platelets		[] FFP	[] Cryoprecipitate    INFECTIOUS DISEASE  Antimicrobials/Immunologic Medications:  ampicillin/sulbactam  IVPB 3 Gram(s) IV Intermittent every 6 hours  fluconAZOLE IVPB 400 milliGRAM(s) IV Intermittent daily  influenza   Vaccine 0.5 milliLiter(s) IntraMuscular once  mycophenolate mofetil Suspension 1000 milliGRAM(s) Oral every 12 hours  trimethoprim  40 mG/sulfamethoxazole 200 mG Suspension 10 milliLiter(s) Oral daily  valGANciclovir 50 mG/mL Oral Solution 450 milliGRAM(s) Oral daily    Day #      of     ***    Tubes/Lines/Drains  ***  [x] Peripheral IV  [] Central Venous Line     	[] R	[] L	[] IJ	[] Fem	[] SC	Date Placed:   [] Arterial Line		[] R	[] L	[] Fem	[] Rad	[] Ax	Date Placed:   [] PICC		[] Midline		[] Mediport  [] Urinary Catheter		Date Placed:   [x] Necessity of urinary, arterial, and venous catheters discussed    LABS  --------------------------------------------------------------------------------------  ((Insert SICU Labs here))***  --------------------------------------------------------------------------------------    OTHER LABORATORY:     IMAGING STUDIES:  SICU Daily Progress Note  =====================================================  Interval Events;   - OLT (9/8) with inferior/superior IVC anastomosis, portal vein anastomosis, hepatic artery x1 anastomosis, CBD anastomosis, x3 MELISSA in standard position   - episode of chest tighthness worse w/ deep breaths, EKG non-ischemic, trop 13   - L MELISSA drain putting out L sanguinous drainage, clots in the MELISSA drain   - extubated, on RA   - q6h > q4h PTT   - heparin supratherapeutic > heparin ggt reduced to 400 U/hr   - PT/OT pending   - US showing patent vasculature and increased hepatic artery   - POCT q6h > q4h    MEDICATIONS:   --------------------------------------------------------------------------------------  Neurologic Medications  HYDROmorphone  Injectable 0.5 milliGRAM(s) IV Push every 3 hours PRN Severe Pain (7 - 10)    Respiratory Medications    Cardiovascular Medications    Gastrointestinal Medications  multiple electrolytes Injection Type 1 1000 milliLiter(s) IV Continuous <Continuous>  pantoprazole  Injectable 40 milliGRAM(s) IV Push daily  sodium chloride 0.9% lock flush 10 milliLiter(s) IV Push every 1 hour PRN Pre/post blood products, medications, blood draw, and to maintain line patency    Genitourinary Medications    Hematologic/Oncologic Medications  heparin  Infusion 450 Unit(s)/Hr IV Continuous <Continuous>  influenza   Vaccine 0.5 milliLiter(s) IntraMuscular once  mycophenolate mofetil Suspension 1000 milliGRAM(s) Oral every 12 hours    Antimicrobial/Immunologic Medications  ampicillin/sulbactam  IVPB 3 Gram(s) IV Intermittent every 6 hours  fluconAZOLE IVPB 400 milliGRAM(s) IV Intermittent daily  trimethoprim  40 mG/sulfamethoxazole 200 mG Suspension 10 milliLiter(s) Oral daily  valGANciclovir 50 mG/mL Oral Solution 450 milliGRAM(s) Oral daily    Endocrine/Metabolic Medications  insulin lispro (ADMELOG) corrective regimen sliding scale   SubCutaneous every 4 hours  methylPREDNISolone sodium succinate IVPB   IV Intermittent     Topical/Other Medications  benzocaine/menthol Lozenge 1 Lozenge Oral every 2 hours PRN Sore Throat  chlorhexidine 2% Cloths 1 Application(s) Topical <User Schedule>    --------------------------------------------------------------------------------------    VITAL SIGNS, INS/OUTS (last 24 hours):  --------------------------------------------------------------------------------------  ICU Vital Signs Last 24 Hrs  T(C): 37.3 (09 Sep 2023 03:00), Max: 37.8 (08 Sep 2023 15:00)  T(F): 99.1 (09 Sep 2023 03:00), Max: 100 (08 Sep 2023 15:00)  HR: 88 (09 Sep 2023 06:00) (71 - 93)  BP: 97/60 (08 Sep 2023 20:00) (97/60 - 97/60)  BP(mean): 74 (08 Sep 2023 20:00) (74 - 74)  ABP: 101/49 (09 Sep 2023 06:00) (92/48 - 132/76)  ABP(mean): 67 (09 Sep 2023 06:00) (65 - 100)  RR: 20 (09 Sep 2023 06:00) (11 - 20)  SpO2: 93% (09 Sep 2023 06:00) (93% - 100%)    O2 Parameters below as of 09 Sep 2023 06:00  Patient On (Oxygen Delivery Method): room air          --------------------------------------------------------------------------------------    EXAM  NEUROLOGY  Exam: Normal, NAD, alert, oriented x3, no focal deficits.    HEENT  Exam: Normocephalic, atraumatic, EOMI.    RESPIRATORY  Exam: Lungs clear to auscultation, Normal expansion/effort.     CARDIOVASCULAR  Exam: S1, S2.  Regular rate and rhythm.     GI/NUTRITION  Exam: Abdomen soft, Non-tender, Non-distended.  3 MELISSA drains  Current Diet:  NPO    VASCULAR  Exam: Extremities warm, pink, well-perfused.    MUSCULOSKELETAL  Exam: All extremities moving spontaneously without limitations.    SKIN  Exam: Good skin turgor, no skin breakdown.    METABOLIC/FLUIDS/ELECTROLYTES  multiple electrolytes Injection Type 1 1000 milliLiter(s) IV Continuous <Continuous>      HEMATOLOGIC  [x] VTE Prophylaxis: heparin  Infusion 450 Unit(s)/Hr IV Continuous <Continuous>    Transfusions:	[] PRBC	[] Platelets		[] FFP	[] Cryoprecipitate    INFECTIOUS DISEASE  Antimicrobials/Immunologic Medications:  ampicillin/sulbactam  IVPB 3 Gram(s) IV Intermittent every 6 hours  fluconAZOLE IVPB 400 milliGRAM(s) IV Intermittent daily  influenza   Vaccine 0.5 milliLiter(s) IntraMuscular once  mycophenolate mofetil Suspension 1000 milliGRAM(s) Oral every 12 hours  trimethoprim  40 mG/sulfamethoxazole 200 mG Suspension 10 milliLiter(s) Oral daily  valGANciclovir 50 mG/mL Oral Solution 450 milliGRAM(s) Oral daily    Tubes/Lines/Drains  ***  [x] Peripheral IV  [] Central Venous Line     	[] R	[] L	[] IJ	[] Fem	[] SC	Date Placed:   [] Arterial Line		[] R	[] L	[] Fem	[] Rad	[] Ax	Date Placed:   [] PICC		[] Midline		[] Mediport  [] Urinary Catheter		Date Placed:   [x] Necessity of urinary, arterial, and venous catheters discussed    LABS  --------------------------------------------------------------------------------------  CBC (09-09 @ 04:33)                              8.1<L>                         10.09   )----------------(  65<L>      --    % Neutrophils, --    % Lymphocytes, ANC: --                                  23.9<L>              CBC (09-09 @ 00:30)                              8.4<L>                         10.99<H>  )----------------(  61<L>      --    % Neutrophils, --    % Lymphocytes, ANC: --                                  24.7<L>                BMP (09-09 @ 04:33)             139     |  107     |  18    		Ca++ --      Ca 7.4<L>             ---------------------------------( 176<H>		Mg 2.0                4.4     |  22      |  0.57  			Ph 4.7<H>  BMP (09-09 @ 00:30)             139     |  108     |  14    		Ca++ --      Ca 7.5<L>             ---------------------------------( 157<H>		Mg 2.0                4.7     |  21<L>   |  0.62  			Ph 5.0<H>    LFTs (09-09 @ 04:33)      TPro 4.0<L> / Alb 2.2<L> / TBili 0.4 / DBili -- / <H> / <H> / AlkPhos 55  LFTs (09-09 @ 00:30)      TPro 4.0<L> / Alb 2.3<L> / TBili 0.4 / DBili -- / <H> / <H> / AlkPhos 58    Coags (09-09 @ 04:33)  aPTT 58.5<H> / INR 4.70<H> / PT 47.2<H>  Coags (09-09 @ 00:30)  aPTT 62.2<H> / INR 5.15<HH> / PT 51.6<H>    ABG (09-09 @ 04:20)     7.41 / 39 / 145<H> / 25 / 0.1 / 98.4<H>%     Lactate:     ABG (09-09 @ 00:22)     7.42 / 37 / 147<H> / 24 / -0.3 / 98.8<H>%     Lactate:             ABG - ( 09 Sep 2023 04:20 )  pH, Arterial: 7.41  pH, Blood: x     /  pCO2: 39    /  pO2: 145   / HCO3: 25    / Base Excess: 0.1   /  SaO2: 98.4

## 2023-09-09 NOTE — PROGRESS NOTE ADULT - SUBJECTIVE AND OBJECTIVE BOX
Transplant Surgery - Multidisciplinary Rounds  --------------------------------------------------------------   Donor OLTx      Date:  2023       POD#1    Present:   Patient seen and examined with multidisciplinary Transplant team including  (Surgeon: Dr. Dejesus, Dr. Landaverde, Dr. Perez, Dr. Eubanks, Dr. Elias, Dr. Zamora. Hepatologist: Dr. Carvajal, Dr. Bryant.  Nephrologist: Dr. Altamirano, Dr. Garcia, Dr. Joy.  Pharmacist: Rachel Ambrocio. ACPs: Claudia/Jamal/Stanton/Reese/Justyna/Logan, RNs in AM rounds.   Disciplines not in attendance will be notified of the plan.     Interval Events:  s/p OLT (8RBC/8FFP), Tx to SICU intubated on levo gtt, hep gtt; Txp US w/ patent vessls.  Cleared lactate. Extubated 5PM  ON--  off levo gtt; INR jumped 5.1, lfts down trending, LA cleared; INR 5.1-- 5mg Vit K IV x1 --> 4.7; OOBTC in AM; nauseated- zofran x1     Potential Discharge date: Pending clinical improvement    Education:  Medications    Plan of care:  See Below    MEDICATIONS  (STANDING):  ampicillin/sulbactam  IVPB 3 Gram(s) IV Intermittent every 6 hours  chlorhexidine 2% Cloths 1 Application(s) Topical <User Schedule>  fluconAZOLE IVPB 400 milliGRAM(s) IV Intermittent daily  heparin  Infusion 450 Unit(s)/Hr (3.5 mL/Hr) IV Continuous <Continuous>  influenza   Vaccine 0.5 milliLiter(s) IntraMuscular once  insulin lispro (ADMELOG) corrective regimen sliding scale   SubCutaneous every 4 hours  methylPREDNISolone sodium succinate IVPB   IV Intermittent   multiple electrolytes Injection Type 1 1000 milliLiter(s) (125 mL/Hr) IV Continuous <Continuous>  mycophenolate mofetil Suspension 1000 milliGRAM(s) Oral every 12 hours  pantoprazole  Injectable 40 milliGRAM(s) IV Push daily  trimethoprim  40 mG/sulfamethoxazole 200 mG Suspension 10 milliLiter(s) Oral daily  valGANciclovir 50 mG/mL Oral Solution 450 milliGRAM(s) Oral daily    MEDICATIONS  (PRN):  benzocaine/menthol Lozenge 1 Lozenge Oral every 2 hours PRN Sore Throat  HYDROmorphone  Injectable 0.5 milliGRAM(s) IV Push every 3 hours PRN Severe Pain (7 - 10)  sodium chloride 0.9% lock flush 10 milliLiter(s) IV Push every 1 hour PRN Pre/post blood products, medications, blood draw, and to maintain line patency      PAST MEDICAL & SURGICAL HISTORY:  Cirrhosis        Cirrhosis      Budd-Chiari syndrome      H/O protein S deficiency      Miscarriage  x 5      H/O protein S deficiency      History of transjugular intrahepatic portosystemic shunt      History of dilation and curettage      Presence of IVC filter          Vital Signs Last 24 Hrs  T(C): 37.3 (09 Sep 2023 03:00), Max: 37.8 (08 Sep 2023 15:00)  T(F): 99.1 (09 Sep 2023 03:00), Max: 100 (08 Sep 2023 15:00)  HR: 88 (09 Sep 2023 06:00) (71 - 93)  BP: 97/60 (08 Sep 2023 20:00) (97/60 - 97/60)  BP(mean): 74 (08 Sep 2023 20:00) (74 - 74)  RR: 20 (09 Sep 2023 06:00) (11 - 20)  SpO2: 93% (09 Sep 2023 06:00) (93% - 100%)    Parameters below as of 09 Sep 2023 06:00  Patient On (Oxygen Delivery Method): room air        I&O's Summary    08 Sep 2023 07:01  -  09 Sep 2023 07:00  --------------------------------------------------------  IN: 4061.4 mL / OUT: 3240 mL / NET: 821.4 mL                              8.1    10.09 )-----------( 65       ( 09 Sep 2023 04:33 )             23.9     09-09    139  |  107  |  18  ----------------------------<  176<H>  4.4   |  22  |  0.57    Ca    7.4<L>      09 Sep 2023 04:33  Phos  4.7       Mg     2.0         TPro  4.0<L>  /  Alb  2.2<L>  /  TBili  0.4  /  DBili  x   /  AST  574<H>  /  ALT  411<H>  /  AlkPhos  55          Review of systems  Gen: No weight changes, fatigue, fevers/chills, weakness  Skin: No rashes  Head/Eyes/Ears/Mouth: No headache; Normal hearing; Normal vision w/o blurriness; No sinus pain/discomfort, sore throat  Respiratory: No dyspnea, cough, wheezing, hemoptysis  CV: No chest pain, PND, orthopnea  GI: C/O mild abdominal pain at surgical site. no diarrhea, constipation, nausea, vomiting, melena, hematochezia  : No increased frequency, dysuria, hematuria, nocturia  MSK: No joint pain/swelling; no back pain; no edema  Neuro: No dizziness/lightheadedness, weakness, seizures, numbness, tingling  Heme: No easy bruising or bleeding  Endo: No heat/cold intolerance  Psych: No significant nervousness, anxiety, stress, depression  All other systems were reviewed and are negative, except as noted.      PHYSICAL EXAM:  Constitutional: Well developed / well nourished  Eyes:  PERRLA  ENMT: nc/at, no thrush  Neck: supple, central line in place  Respiratory: CTA B/L  Cardiovascular: RRR  Gastrointestinal: Soft abdomen, ND, appropriate incisional TTP. chevron incision c/d/i, staples in place. No signs of infection. JPx3 SS  Genitourinary: Urinary catheter in place with clear urine  Extremities: SCD's in place and working bilaterally  Vascular: Palpable dp pulses bilaterally.   Neurological: A&O x3  Skin: no rashes, ulcerations, lesions  Musculoskeletal: Moving all extremities  Psychiatric: Responsive     Transplant Surgery - Multidisciplinary Rounds  --------------------------------------------------------------   Donor OLTx      Date:  2023       POD#1    Present:   Patient seen and examined with multidisciplinary Transplant team including Surgeon: Dr. Dagher. Hepatologist: Dr. Bryant.  RNs in AM rounds.   Disciplines not in attendance will be notified of the plan.     Interval Events:  s/p OLT (8RBC/8FFP), Tx to SICU intubated on levo gtt, hep gtt; Txp US w/ patent vessls.  Cleared lactate. Extubated 5PM  ON--  off levo gtt; INR jumped 5.1, lfts down trending, LA cleared; INR 5.1-- 5mg Vit K IV x1 --> 4.7; OOBTC in AM; nauseated- zofran x1     Potential Discharge date: Pending clinical improvement    Education:  Medications    Plan of care:  See Below    MEDICATIONS  (STANDING):  ampicillin/sulbactam  IVPB 3 Gram(s) IV Intermittent every 6 hours  chlorhexidine 2% Cloths 1 Application(s) Topical <User Schedule>  fluconAZOLE IVPB 400 milliGRAM(s) IV Intermittent daily  heparin  Infusion 450 Unit(s)/Hr (3.5 mL/Hr) IV Continuous <Continuous>  influenza   Vaccine 0.5 milliLiter(s) IntraMuscular once  insulin lispro (ADMELOG) corrective regimen sliding scale   SubCutaneous every 4 hours  methylPREDNISolone sodium succinate IVPB   IV Intermittent   multiple electrolytes Injection Type 1 1000 milliLiter(s) (125 mL/Hr) IV Continuous <Continuous>  mycophenolate mofetil Suspension 1000 milliGRAM(s) Oral every 12 hours  pantoprazole  Injectable 40 milliGRAM(s) IV Push daily  trimethoprim  40 mG/sulfamethoxazole 200 mG Suspension 10 milliLiter(s) Oral daily  valGANciclovir 50 mG/mL Oral Solution 450 milliGRAM(s) Oral daily    MEDICATIONS  (PRN):  benzocaine/menthol Lozenge 1 Lozenge Oral every 2 hours PRN Sore Throat  HYDROmorphone  Injectable 0.5 milliGRAM(s) IV Push every 3 hours PRN Severe Pain (7 - 10)  sodium chloride 0.9% lock flush 10 milliLiter(s) IV Push every 1 hour PRN Pre/post blood products, medications, blood draw, and to maintain line patency      PAST MEDICAL & SURGICAL HISTORY:  Cirrhosis  2010      Cirrhosis      Budd-Chiari syndrome      H/O protein S deficiency      Miscarriage  x 5      H/O protein S deficiency      History of transjugular intrahepatic portosystemic shunt      History of dilation and curettage      Presence of IVC filter          Vital Signs Last 24 Hrs  T(C): 37.3 (09 Sep 2023 03:00), Max: 37.8 (08 Sep 2023 15:00)  T(F): 99.1 (09 Sep 2023 03:00), Max: 100 (08 Sep 2023 15:00)  HR: 88 (09 Sep 2023 06:00) (71 - 93)  BP: 97/60 (08 Sep 2023 20:00) (97/60 - 97/60)  BP(mean): 74 (08 Sep 2023 20:00) (74 - 74)  RR: 20 (09 Sep 2023 06:00) (11 - 20)  SpO2: 93% (09 Sep 2023 06:00) (93% - 100%)    Parameters below as of 09 Sep 2023 06:00  Patient On (Oxygen Delivery Method): room air        I&O's Summary    08 Sep 2023 07:01  -  09 Sep 2023 07:00  --------------------------------------------------------  IN: 4061.4 mL / OUT: 3240 mL / NET: 821.4 mL                              8.1    10.09 )-----------( 65       ( 09 Sep 2023 04:33 )             23.9     09-09    139  |  107  |  18  ----------------------------<  176<H>  4.4   |  22  |  0.57    Ca    7.4<L>      09 Sep 2023 04:33  Phos  4.7     09-09  Mg     2.0     09-09    TPro  4.0<L>  /  Alb  2.2<L>  /  TBili  0.4  /  DBili  x   /  AST  574<H>  /  ALT  411<H>  /  AlkPhos  55          Review of systems  Gen: No weight changes, fatigue, fevers/chills, weakness  Skin: No rashes  Head/Eyes/Ears/Mouth: No headache; Normal hearing; Normal vision w/o blurriness; No sinus pain/discomfort, sore throat  Respiratory: No dyspnea, cough, wheezing, hemoptysis  CV: No chest pain, PND, orthopnea  GI: C/O mild abdominal pain at surgical site. no diarrhea, constipation, nausea, vomiting, melena, hematochezia  : No increased frequency, dysuria, hematuria, nocturia  MSK: No joint pain/swelling; no back pain; no edema  Neuro: No dizziness/lightheadedness, weakness, seizures, numbness, tingling  Heme: No easy bruising or bleeding  Endo: No heat/cold intolerance  Psych: No significant nervousness, anxiety, stress, depression  All other systems were reviewed and are negative, except as noted.      PHYSICAL EXAM:  Constitutional: Well developed / well nourished  Eyes:  PERRLA  ENMT: nc/at, no thrush  Neck: supple, central line in place  Respiratory: CTA B/L  Cardiovascular: RRR  Gastrointestinal: Soft abdomen, ND, appropriate incisional TTP. chevron incision c/d/i, staples in place. No signs of infection. JPx3 SS  Genitourinary: Urinary catheter in place with clear urine  Extremities: SCD's in place and working bilaterally  Vascular: Palpable dp pulses bilaterally.   Neurological: A&O x3  Skin: no rashes, ulcerations, lesions  Musculoskeletal: Moving all extremities  Psychiatric: Responsive

## 2023-09-09 NOTE — PHYSICAL THERAPY INITIAL EVALUATION ADULT - GENERAL OBSERVATIONS, REHAB EVAL
REc'd in recliner, NAD, +JPx3 (pinned to gown), +[place], +tele, +left radial a-line, +RUE IV, +BLE PAS, +generalized edema x4 extremities

## 2023-09-09 NOTE — PHYSICAL THERAPY INITIAL EVALUATION ADULT - PERTINENT HX OF CURRENT PROBLEM, REHAB EVAL
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 s/p elective TIPs/DIPS in 5/2021. Admitted for Liver Transplant. Hosp Course: 9/8 OR 9/8 for liver transplant; in SICU post-op and extubated 9/8;

## 2023-09-09 NOTE — PHYSICAL THERAPY INITIAL EVALUATION ADULT - STANDING BALANCE: STATIC
fair balance
Detail Level: Zone
Ipledge Number (Optional): [id number]
Anticipated Starting Dosage (Optional): 30mg Daily
Patient Reported Weight (Optional - Include Units): Israel Burnett 42

## 2023-09-10 LAB
ALBUMIN SERPL ELPH-MCNC: 2.1 G/DL — LOW (ref 3.3–5)
ALBUMIN SERPL ELPH-MCNC: 2.2 G/DL — LOW (ref 3.3–5)
ALBUMIN SERPL ELPH-MCNC: 2.5 G/DL — LOW (ref 3.3–5)
ALP SERPL-CCNC: 44 U/L — SIGNIFICANT CHANGE UP (ref 40–120)
ALP SERPL-CCNC: 46 U/L — SIGNIFICANT CHANGE UP (ref 40–120)
ALP SERPL-CCNC: 48 U/L — SIGNIFICANT CHANGE UP (ref 40–120)
ALT FLD-CCNC: 149 U/L — HIGH (ref 10–45)
ALT FLD-CCNC: 171 U/L — HIGH (ref 10–45)
ALT FLD-CCNC: 196 U/L — HIGH (ref 10–45)
ANION GAP SERPL CALC-SCNC: 7 MMOL/L — SIGNIFICANT CHANGE UP (ref 5–17)
ANION GAP SERPL CALC-SCNC: 8 MMOL/L — SIGNIFICANT CHANGE UP (ref 5–17)
ANION GAP SERPL CALC-SCNC: 9 MMOL/L — SIGNIFICANT CHANGE UP (ref 5–17)
APTT BLD: 25.2 SEC — SIGNIFICANT CHANGE UP (ref 24.5–35.6)
APTT BLD: 25.3 SEC — SIGNIFICANT CHANGE UP (ref 24.5–35.6)
APTT BLD: 25.5 SEC — SIGNIFICANT CHANGE UP (ref 24.5–35.6)
APTT BLD: 26.8 SEC — SIGNIFICANT CHANGE UP (ref 24.5–35.6)
AST SERPL-CCNC: 52 U/L — HIGH (ref 10–40)
AST SERPL-CCNC: 69 U/L — HIGH (ref 10–40)
AST SERPL-CCNC: 98 U/L — HIGH (ref 10–40)
BILIRUB SERPL-MCNC: 0.2 MG/DL — SIGNIFICANT CHANGE UP (ref 0.2–1.2)
BILIRUB SERPL-MCNC: 0.3 MG/DL — SIGNIFICANT CHANGE UP (ref 0.2–1.2)
BILIRUB SERPL-MCNC: 0.3 MG/DL — SIGNIFICANT CHANGE UP (ref 0.2–1.2)
BUN SERPL-MCNC: 18 MG/DL — SIGNIFICANT CHANGE UP (ref 7–23)
BUN SERPL-MCNC: 18 MG/DL — SIGNIFICANT CHANGE UP (ref 7–23)
BUN SERPL-MCNC: 19 MG/DL — SIGNIFICANT CHANGE UP (ref 7–23)
CALCIUM SERPL-MCNC: 7.3 MG/DL — LOW (ref 8.4–10.5)
CALCIUM SERPL-MCNC: 7.4 MG/DL — LOW (ref 8.4–10.5)
CALCIUM SERPL-MCNC: 7.7 MG/DL — LOW (ref 8.4–10.5)
CHLORIDE SERPL-SCNC: 104 MMOL/L — SIGNIFICANT CHANGE UP (ref 96–108)
CHLORIDE SERPL-SCNC: 105 MMOL/L — SIGNIFICANT CHANGE UP (ref 96–108)
CHLORIDE SERPL-SCNC: 105 MMOL/L — SIGNIFICANT CHANGE UP (ref 96–108)
CO2 SERPL-SCNC: 25 MMOL/L — SIGNIFICANT CHANGE UP (ref 22–31)
CO2 SERPL-SCNC: 25 MMOL/L — SIGNIFICANT CHANGE UP (ref 22–31)
CO2 SERPL-SCNC: 26 MMOL/L — SIGNIFICANT CHANGE UP (ref 22–31)
CREAT SERPL-MCNC: 0.47 MG/DL — LOW (ref 0.5–1.3)
CREAT SERPL-MCNC: 0.49 MG/DL — LOW (ref 0.5–1.3)
CREAT SERPL-MCNC: 0.51 MG/DL — SIGNIFICANT CHANGE UP (ref 0.5–1.3)
EGFR: 122 ML/MIN/1.73M2 — SIGNIFICANT CHANGE UP
EGFR: 123 ML/MIN/1.73M2 — SIGNIFICANT CHANGE UP
EGFR: 124 ML/MIN/1.73M2 — SIGNIFICANT CHANGE UP
GAS PNL BLDA: SIGNIFICANT CHANGE UP
GLUCOSE BLDC GLUCOMTR-MCNC: 141 MG/DL — HIGH (ref 70–99)
GLUCOSE BLDC GLUCOMTR-MCNC: 152 MG/DL — HIGH (ref 70–99)
GLUCOSE BLDC GLUCOMTR-MCNC: 153 MG/DL — HIGH (ref 70–99)
GLUCOSE BLDC GLUCOMTR-MCNC: 153 MG/DL — HIGH (ref 70–99)
GLUCOSE SERPL-MCNC: 152 MG/DL — HIGH (ref 70–99)
GLUCOSE SERPL-MCNC: 161 MG/DL — HIGH (ref 70–99)
GLUCOSE SERPL-MCNC: 166 MG/DL — HIGH (ref 70–99)
HCT VFR BLD CALC: 23.3 % — LOW (ref 34.5–45)
HCT VFR BLD CALC: 23.8 % — LOW (ref 34.5–45)
HCT VFR BLD CALC: 23.8 % — LOW (ref 34.5–45)
HCT VFR BLD CALC: 24.4 % — LOW (ref 34.5–45)
HGB BLD-MCNC: 8 G/DL — LOW (ref 11.5–15.5)
HGB BLD-MCNC: 8 G/DL — LOW (ref 11.5–15.5)
HGB BLD-MCNC: 8.2 G/DL — LOW (ref 11.5–15.5)
HGB BLD-MCNC: 8.3 G/DL — LOW (ref 11.5–15.5)
INR BLD: 1.17 RATIO — SIGNIFICANT CHANGE UP (ref 0.85–1.18)
INR BLD: 1.19 RATIO — HIGH (ref 0.85–1.18)
INR BLD: 1.23 RATIO — HIGH (ref 0.85–1.18)
INR BLD: 1.23 RATIO — HIGH (ref 0.85–1.18)
MAGNESIUM SERPL-MCNC: 2.2 MG/DL — SIGNIFICANT CHANGE UP (ref 1.6–2.6)
MAGNESIUM SERPL-MCNC: 2.3 MG/DL — SIGNIFICANT CHANGE UP (ref 1.6–2.6)
MAGNESIUM SERPL-MCNC: 2.3 MG/DL — SIGNIFICANT CHANGE UP (ref 1.6–2.6)
MCHC RBC-ENTMCNC: 26.9 PG — LOW (ref 27–34)
MCHC RBC-ENTMCNC: 26.9 PG — LOW (ref 27–34)
MCHC RBC-ENTMCNC: 27.1 PG — SIGNIFICANT CHANGE UP (ref 27–34)
MCHC RBC-ENTMCNC: 27.6 PG — SIGNIFICANT CHANGE UP (ref 27–34)
MCHC RBC-ENTMCNC: 33.6 GM/DL — SIGNIFICANT CHANGE UP (ref 32–36)
MCHC RBC-ENTMCNC: 34 GM/DL — SIGNIFICANT CHANGE UP (ref 32–36)
MCHC RBC-ENTMCNC: 34.3 GM/DL — SIGNIFICANT CHANGE UP (ref 32–36)
MCHC RBC-ENTMCNC: 34.5 GM/DL — SIGNIFICANT CHANGE UP (ref 32–36)
MCV RBC AUTO: 79 FL — LOW (ref 80–100)
MCV RBC AUTO: 79 FL — LOW (ref 80–100)
MCV RBC AUTO: 80.1 FL — SIGNIFICANT CHANGE UP (ref 80–100)
MCV RBC AUTO: 80.1 FL — SIGNIFICANT CHANGE UP (ref 80–100)
NRBC # BLD: 0 /100 WBCS — SIGNIFICANT CHANGE UP (ref 0–0)
PHOSPHATE SERPL-MCNC: 2.7 MG/DL — SIGNIFICANT CHANGE UP (ref 2.5–4.5)
PHOSPHATE SERPL-MCNC: 2.8 MG/DL — SIGNIFICANT CHANGE UP (ref 2.5–4.5)
PHOSPHATE SERPL-MCNC: 3.6 MG/DL — SIGNIFICANT CHANGE UP (ref 2.5–4.5)
PLATELET # BLD AUTO: 50 K/UL — LOW (ref 150–400)
PLATELET # BLD AUTO: 53 K/UL — LOW (ref 150–400)
PLATELET # BLD AUTO: 58 K/UL — LOW (ref 150–400)
PLATELET # BLD AUTO: 60 K/UL — LOW (ref 150–400)
POTASSIUM SERPL-MCNC: 4.1 MMOL/L — SIGNIFICANT CHANGE UP (ref 3.5–5.3)
POTASSIUM SERPL-MCNC: 4.2 MMOL/L — SIGNIFICANT CHANGE UP (ref 3.5–5.3)
POTASSIUM SERPL-MCNC: 4.4 MMOL/L — SIGNIFICANT CHANGE UP (ref 3.5–5.3)
POTASSIUM SERPL-SCNC: 4.1 MMOL/L — SIGNIFICANT CHANGE UP (ref 3.5–5.3)
POTASSIUM SERPL-SCNC: 4.2 MMOL/L — SIGNIFICANT CHANGE UP (ref 3.5–5.3)
POTASSIUM SERPL-SCNC: 4.4 MMOL/L — SIGNIFICANT CHANGE UP (ref 3.5–5.3)
PROT SERPL-MCNC: 3.9 G/DL — LOW (ref 6–8.3)
PROT SERPL-MCNC: 4 G/DL — LOW (ref 6–8.3)
PROT SERPL-MCNC: 4.1 G/DL — LOW (ref 6–8.3)
PROTHROM AB SERPL-ACNC: 12.4 SEC — SIGNIFICANT CHANGE UP (ref 9.5–13)
PROTHROM AB SERPL-ACNC: 12.8 SEC — SIGNIFICANT CHANGE UP (ref 9.5–13)
PROTHROM AB SERPL-ACNC: 12.8 SEC — SIGNIFICANT CHANGE UP (ref 9.5–13)
PROTHROM AB SERPL-ACNC: 13.4 SEC — HIGH (ref 9.5–13)
RBC # BLD: 2.95 M/UL — LOW (ref 3.8–5.2)
RBC # BLD: 2.97 M/UL — LOW (ref 3.8–5.2)
RBC # BLD: 2.97 M/UL — LOW (ref 3.8–5.2)
RBC # BLD: 3.09 M/UL — LOW (ref 3.8–5.2)
RBC # FLD: 15.1 % — HIGH (ref 10.3–14.5)
RBC # FLD: 15.2 % — HIGH (ref 10.3–14.5)
RBC # FLD: 15.4 % — HIGH (ref 10.3–14.5)
RBC # FLD: 15.4 % — HIGH (ref 10.3–14.5)
SODIUM SERPL-SCNC: 137 MMOL/L — SIGNIFICANT CHANGE UP (ref 135–145)
SODIUM SERPL-SCNC: 138 MMOL/L — SIGNIFICANT CHANGE UP (ref 135–145)
SODIUM SERPL-SCNC: 139 MMOL/L — SIGNIFICANT CHANGE UP (ref 135–145)
TACROLIMUS SERPL-MCNC: 3.3 NG/ML — SIGNIFICANT CHANGE UP
WBC # BLD: 6.15 K/UL — SIGNIFICANT CHANGE UP (ref 3.8–10.5)
WBC # BLD: 7.13 K/UL — SIGNIFICANT CHANGE UP (ref 3.8–10.5)
WBC # BLD: 7.75 K/UL — SIGNIFICANT CHANGE UP (ref 3.8–10.5)
WBC # BLD: 7.79 K/UL — SIGNIFICANT CHANGE UP (ref 3.8–10.5)
WBC # FLD AUTO: 6.15 K/UL — SIGNIFICANT CHANGE UP (ref 3.8–10.5)
WBC # FLD AUTO: 7.13 K/UL — SIGNIFICANT CHANGE UP (ref 3.8–10.5)
WBC # FLD AUTO: 7.75 K/UL — SIGNIFICANT CHANGE UP (ref 3.8–10.5)
WBC # FLD AUTO: 7.79 K/UL — SIGNIFICANT CHANGE UP (ref 3.8–10.5)

## 2023-09-10 PROCEDURE — 99233 SBSQ HOSP IP/OBS HIGH 50: CPT

## 2023-09-10 RX ORDER — SODIUM CHLORIDE 0.65 %
1 AEROSOL, SPRAY (ML) NASAL EVERY 4 HOURS
Refills: 0 | Status: DISCONTINUED | OUTPATIENT
Start: 2023-09-10 | End: 2023-09-19

## 2023-09-10 RX ORDER — HEPARIN SODIUM 5000 [USP'U]/ML
500 INJECTION INTRAVENOUS; SUBCUTANEOUS
Qty: 25000 | Refills: 0 | Status: DISCONTINUED | OUTPATIENT
Start: 2023-09-10 | End: 2023-09-10

## 2023-09-10 RX ORDER — HEPARIN SODIUM 5000 [USP'U]/ML
300 INJECTION INTRAVENOUS; SUBCUTANEOUS
Qty: 25000 | Refills: 0 | Status: DISCONTINUED | OUTPATIENT
Start: 2023-09-10 | End: 2023-09-11

## 2023-09-10 RX ORDER — SODIUM CHLORIDE 9 MG/ML
1000 INJECTION, SOLUTION INTRAVENOUS
Refills: 0 | Status: DISCONTINUED | OUTPATIENT
Start: 2023-09-10 | End: 2023-09-11

## 2023-09-10 RX ORDER — HEPARIN SODIUM 5000 [USP'U]/ML
300 INJECTION INTRAVENOUS; SUBCUTANEOUS
Qty: 25000 | Refills: 0 | Status: DISCONTINUED | OUTPATIENT
Start: 2023-09-10 | End: 2023-09-10

## 2023-09-10 RX ORDER — POLYETHYLENE GLYCOL 3350 17 G/17G
17 POWDER, FOR SOLUTION ORAL
Refills: 0 | Status: DISCONTINUED | OUTPATIENT
Start: 2023-09-10 | End: 2023-09-16

## 2023-09-10 RX ADMIN — HEPARIN SODIUM 3.5 UNIT(S)/HR: 5000 INJECTION INTRAVENOUS; SUBCUTANEOUS at 20:12

## 2023-09-10 RX ADMIN — Medication 1 SPRAY(S): at 20:45

## 2023-09-10 RX ADMIN — Medication 255 MILLIMOLE(S): at 23:26

## 2023-09-10 RX ADMIN — Medication 255 MILLIMOLE(S): at 00:15

## 2023-09-10 RX ADMIN — OXYCODONE HYDROCHLORIDE 2.5 MILLIGRAM(S): 5 TABLET ORAL at 06:08

## 2023-09-10 RX ADMIN — TACROLIMUS 0.5 MILLIGRAM(S): 5 CAPSULE ORAL at 20:15

## 2023-09-10 RX ADMIN — OXYCODONE HYDROCHLORIDE 5 MILLIGRAM(S): 5 TABLET ORAL at 18:01

## 2023-09-10 RX ADMIN — AMPICILLIN SODIUM AND SULBACTAM SODIUM 200 GRAM(S): 250; 125 INJECTION, POWDER, FOR SUSPENSION INTRAMUSCULAR; INTRAVENOUS at 09:16

## 2023-09-10 RX ADMIN — OXYCODONE HYDROCHLORIDE 2.5 MILLIGRAM(S): 5 TABLET ORAL at 06:38

## 2023-09-10 RX ADMIN — OXYCODONE HYDROCHLORIDE 5 MILLIGRAM(S): 5 TABLET ORAL at 08:38

## 2023-09-10 RX ADMIN — AMPICILLIN SODIUM AND SULBACTAM SODIUM 200 GRAM(S): 250; 125 INJECTION, POWDER, FOR SUSPENSION INTRAMUSCULAR; INTRAVENOUS at 03:13

## 2023-09-10 RX ADMIN — Medication 2: at 16:48

## 2023-09-10 RX ADMIN — Medication 255 MILLIMOLE(S): at 16:48

## 2023-09-10 RX ADMIN — CHLORHEXIDINE GLUCONATE 1 APPLICATION(S): 213 SOLUTION TOPICAL at 05:03

## 2023-09-10 RX ADMIN — HEPARIN SODIUM 3.5 UNIT(S)/HR: 5000 INJECTION INTRAVENOUS; SUBCUTANEOUS at 17:13

## 2023-09-10 RX ADMIN — Medication 10 MILLILITER(S): at 11:20

## 2023-09-10 RX ADMIN — HEPARIN SODIUM 3 UNIT(S)/HR: 5000 INJECTION INTRAVENOUS; SUBCUTANEOUS at 09:16

## 2023-09-10 RX ADMIN — SODIUM CHLORIDE 50 MILLILITER(S): 9 INJECTION, SOLUTION INTRAVENOUS at 20:10

## 2023-09-10 RX ADMIN — SODIUM CHLORIDE 50 MILLILITER(S): 9 INJECTION, SOLUTION INTRAVENOUS at 11:08

## 2023-09-10 RX ADMIN — Medication 2: at 08:26

## 2023-09-10 RX ADMIN — POLYETHYLENE GLYCOL 3350 17 GRAM(S): 17 POWDER, FOR SOLUTION ORAL at 17:14

## 2023-09-10 RX ADMIN — OXYCODONE HYDROCHLORIDE 5 MILLIGRAM(S): 5 TABLET ORAL at 19:00

## 2023-09-10 RX ADMIN — MYCOPHENOLATE MOFETIL 1000 MILLIGRAM(S): 250 CAPSULE ORAL at 05:03

## 2023-09-10 RX ADMIN — HEPARIN SODIUM 4 UNIT(S)/HR: 5000 INJECTION INTRAVENOUS; SUBCUTANEOUS at 23:10

## 2023-09-10 RX ADMIN — MYCOPHENOLATE MOFETIL 1000 MILLIGRAM(S): 250 CAPSULE ORAL at 17:14

## 2023-09-10 RX ADMIN — Medication 2: at 11:29

## 2023-09-10 RX ADMIN — Medication 100 MILLIGRAM(S): at 05:03

## 2023-09-10 RX ADMIN — OXYCODONE HYDROCHLORIDE 5 MILLIGRAM(S): 5 TABLET ORAL at 00:00

## 2023-09-10 RX ADMIN — VALGANCICLOVIR 450 MILLIGRAM(S): 450 TABLET, FILM COATED ORAL at 11:22

## 2023-09-10 RX ADMIN — TACROLIMUS 0.5 MILLIGRAM(S): 5 CAPSULE ORAL at 08:30

## 2023-09-10 RX ADMIN — OXYCODONE HYDROCHLORIDE 5 MILLIGRAM(S): 5 TABLET ORAL at 09:38

## 2023-09-10 RX ADMIN — FLUCONAZOLE 200 MILLIGRAM(S): 150 TABLET ORAL at 11:20

## 2023-09-10 RX ADMIN — PANTOPRAZOLE SODIUM 40 MILLIGRAM(S): 20 TABLET, DELAYED RELEASE ORAL at 11:21

## 2023-09-10 NOTE — PROGRESS NOTE ADULT - ASSESSMENT
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    PLAN:   Neurologic:  - Switch to oxy PRN for pain    Respiratory:  - satting 94% on RA  - Encourage IS    Cardiovascular:  - off pressors, HD stable  - maintain MAP >65  - Intraop LA was 4.9, now 2.2    Gastrointestinal/Nutrition: s/p OLT 9/8/23  - CLD, NGT removed  - monitor MELISSA outputs q2h, sanguinous on 9/9  - JP1 = 305, JP2 = 460, JP3 = 830  - Post-transplant hepatic US with doppler read pending  - Induction Transplant Medications per Transplant Team       - Simulect (Basiliximab) POD#0 and POD#4       - Methlyprednisolone Taper transition to Prednisone POD#6  - Maintenance Transplant Medications per Transplant Team       - Cellcept (Mycophenolate) q12H       - Tacrolimus started POD#1       - Prednisone 20mg PO Daily POD#6  - Protonix 40mg IV Daily   - Senna    Genitourinary/Renal: Intraop recieved 4L crystalloid, 500cc 5% albumin   - plasmalyte 75cc/h  - Maintain indwelling salamanca  - Strict I/Os (4.5L in, 3.5L out)  - Monitor electrolytes, supplement PRN    Hematologic: Intra op received 8 units pRBC, 6 units FFP, no PLT, no cryo  - s/p phytonadione for INR 4.70 - now 1.37  - Restart 300 heparin drip if CBC 7pm stable 9/9  - Hold home Coumadin 5mg daily (last dose 9/7)  - SCDs for mechanical VTE ppx  - s/p 2u pRBC 9/9 - Hgb 8/7 now, TEG normal    Infectious Disease:  - Transplant ppx with Valcyte (valganciclovir), Bactrim (TMP-SMX), and Diflucan (fluconazole)  - tacrolimus restarted  - c/w unasyn for 48hrs post-op  - Monitor WBC, temperature    Endocrine:  - s/p steroid induction  - Methylprednisolone taper to Prednisone per Transplant    Lines/ Drains/ Tubes:  - b/l radial arterial lines  - RIJ intro with [place]  - MELISSA x 3 #1 - R lobe, #2 - Hilum , #3 - L lobe.     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    PLAN:   Neurologic:  - Switch to oxy PRN for pain    Respiratory:  - satting 94% on RA  - Encourage IS    Cardiovascular:  - off pressors, HD stable  - maintain MAP >65  - Intraop LA was 4.9, now 1.6    Gastrointestinal/Nutrition: s/p OLT 9/8/23  - CLD, NGT removed  - monitor MELISSA outputs q2h, sanguinous on 9/9  - JP1 = 305, JP2 = 460, JP3 = 830  - Post-transplant hepatic US with doppler read pending  - Induction Transplant Medications per Transplant Team       - Simulect (Basiliximab) POD#0 and POD#4       - Methlyprednisolone Taper transition to Prednisone POD#6  - Maintenance Transplant Medications per Transplant Team       - Cellcept (Mycophenolate) q12H       - Tacrolimus started POD#1       - Prednisone 20mg PO Daily POD#6  - Protonix 40mg IV Daily   - Senna    Genitourinary/Renal: Intraop recieved 4L crystalloid, 500cc 5% albumin   - plasmalyte 75cc/h  - Maintain indwelling salamanca  - Strict I/Os (4.5L in, 3.5L out)  - Monitor electrolytes, supplement PRN    Hematologic: Intra op received 8 units pRBC, 6 units FFP, no PLT, no cryo  - s/p phytonadione for INR 4.70 - now 1.37  - Hold home Coumadin 5mg daily (last dose 9/7)  - SCDs for mechanical VTE ppx    Infectious Disease:  - Transplant ppx with Valcyte (valganciclovir), Bactrim (TMP-SMX), and Diflucan (fluconazole)  - tacrolimus restarted  - c/w unasyn for 48hrs post-op  - Monitor WBC, temperature    Endocrine:  - s/p steroid induction  - Methylprednisolone taper to Prednisone per Transplant    Lines/ Drains/ Tubes:  - b/l radial arterial lines  - RIJ intro with [place]  - MELISSA x 3 #1 - R lobe, #2 - Hilum , #3 - L lobe.     [facility]

## 2023-09-10 NOTE — PROGRESS NOTE ADULT - ASSESSMENT
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 s/p elective TIPs/DIPS in 5/2021 now POD 2 s/p OTL.    #OTL  -Continue tacrolimus 0.5 BID  -AM tacrolimus levels  -Continue fluconazole, bactrim, valganciclovir  -Ok to discontinue unasyn  -Keep on CLD for now, possibly advance this PM if return of bowel function  -Can d/c IVF once tolerating regular diet  -Hgb stable, start heparin gtt at 300  -Do not titrate heparin gtt, trend ptts  -Ok for labs q8h  -Discontinue [place] catheter, f/u TOV  -Trend LFTs, Tbili 0.3, ALP AST/ALT downtrending  -F/U MELISSA outputs  -Repeat hepatic ultrasound 9/11  -[place] and central line possibly discontinue 9/11    #Budd-Chiari  -F/U with hematologist about preferred AC outpatient

## 2023-09-10 NOTE — PROGRESS NOTE ADULT - SUBJECTIVE AND OBJECTIVE BOX
24 HOUR EVENTS:  - extubated  - s/p 2u pRBC: 6.8/20 > 8.7/24.8  - holding heparin > restart 300 heparin drip if CBC 7pm stable  - [place] catheter adjusted to be in pulmonary artery, PA diastolic = 14  - MELISSA 1-3 sanguinous  - restarted tacrolimus  - plasmalyte at 75cc/h  - NGT out, advanced to CLD  - f/u Liver US with Doppler read   - Dilaudid PRN for pain, can switch to oxy PRN    SUBJECTIVE/ROS:  [ ] A ten-point review of systems was otherwise negative except as noted.  [ ] Due to altered mental status/intubation, subjective information were not able to be obtained from the patient. History was obtained, to the extent possible, from review of the chart and collateral sources of information.      NEURO  RASS:     GCS:     CAM ICU:  Exam: awake, alert, oriented  Meds: oxyCODONE    IR 5 milliGRAM(s) Oral every 4 hours PRN Severe Pain (7 - 10)  oxyCODONE    IR 2.5 milliGRAM(s) Oral every 4 hours PRN Moderate Pain (4 - 6)    [x] Adequacy of sedation and pain control has been assessed and adjusted      RESPIRATORY  RR: 14 (09-10-23 @ 01:00) (12 - 26)  SpO2: 95% (09-10-23 @ 01:00) (92% - 100%)  Wt(kg): --  Exam: unlabored, clear to auscultation bilaterally  Mechanical Ventilation:   ABG - ( 09 Sep 2023 21:47 )  pH: 7.44  /  pCO2: 40    /  pO2: 102   / HCO3: 27    / Base Excess: 2.8   /  SaO2: 99.3    Lactate: x                [N/A] Extubation Readiness Assessed  Meds:       CARDIOVASCULAR  HR: 90 (09-10-23 @ 01:00) (79 - 99)  BP: --  BP(mean): --  ABP: 113/66 (09-10-23 @ 01:00) (101/49 - 134/68)  ABP(mean): 87 (09-10-23 @ 01:00) (67 - 93)  Wt(kg): --  CVP(cm H2O): --      Exam: regular rate and rhythm  Cardiac Rhythm: sinus  Perfusion     [x]Adequate   [ ]Inadequate  Mentation   [x]Normal       [ ]Reduced  Extremities  [x]Warm         [ ]Cool  Volume Status [ ]Hypervolemic [x]Euvolemic [ ]Hypovolemic  Meds:       GI/NUTRITION  Exam: soft, nontender, nondistended, incision C/D/I  Diet:  Meds: pantoprazole  Injectable 40 milliGRAM(s) IV Push daily      GENITOURINARY  I&O's Detail    09-08 @ 07:01  -  09-09 @ 07:00  --------------------------------------------------------  IN:    Albumin 5%  - 250 mL: 750 mL    Enteral Tube Flush: 170 mL    Heparin: 78 mL    Insulin: 4 mL    IV PiggyBack: 300 mL    IV PiggyBack: 350 mL    multiple electrolytes Injection Type 1.: 2500 mL    Norepinephrine: 67.9 mL  Total IN: 4219.9 mL    OUT:    Bulb (mL): 670 mL    Bulb (mL): 50 mL    Bulb (mL): 555 mL    Dexmedetomidine: 0 mL    Indwelling Catheter - Urethral (mL): 1905 mL    Nasogastric/Oral tube (mL): 100 mL  Total OUT: 3280 mL    Total NET: 939.9 mL      09-09 @ 07:01  -  09-10 @ 01:40  --------------------------------------------------------  IN:    Enteral Tube Flush: 40 mL    Heparin: 3.5 mL    IV PiggyBack: 100 mL    IV PiggyBack: 850 mL    multiple electrolytes Injection Type 1.: 1750 mL    PRBCs (Packed Red Blood Cells): 300 mL  Total IN: 3043.5 mL    OUT:    Bulb (mL): 245 mL    Bulb (mL): 465 mL    Bulb (mL): 285 mL    Indwelling Catheter - Urethral (mL): 1660 mL    Nasogastric/Oral tube (mL): 250 mL  Total OUT: 2905 mL    Total NET: 138.5 mL          09-09    138  |  105  |  19  ----------------------------<  161<H>  4.3   |  24  |  0.50    Ca    7.9<L>      09 Sep 2023 21:57  Phos  2.9     09-09  Mg     2.2     09-09    TPro  4.0<L>  /  Alb  2.3<L>  /  TBili  0.4  /  DBili  x   /  AST  149<H>  /  ALT  238<H>  /  AlkPhos  46  09-09    [ ] Lucas catheter, indication: N/A  Meds: multiple electrolytes Injection Type 1 1000 milliLiter(s) IV Continuous <Continuous>  sodium chloride 0.9% lock flush 10 milliLiter(s) IV Push every 1 hour PRN Pre/post blood products, medications, blood draw, and to maintain line patency        HEMATOLOGIC  Meds:   [x] VTE Prophylaxis                        8.0    7.75  )-----------( 53       ( 10 Sep 2023 01:02 )             23.3     PT/INR - ( 09 Sep 2023 21:57 )   PT: 13.3 sec;   INR: 1.22 ratio         PTT - ( 09 Sep 2023 21:57 )  PTT:26.9 sec  Transfusion     [ ] PRBC   [ ] Platelets   [ ] FFP   [ ] Cryoprecipitate      INFECTIOUS DISEASES  WBC Count: 7.75 K/uL (09-10 @ 01:02)  WBC Count: 9.89 K/uL (09-09 @ 21:57)  WBC Count: 9.66 K/uL (09-09 @ 18:41)  WBC Count: 8.89 K/uL (09-09 @ 15:42)  WBC Count: 10.41 K/uL (09-09 @ 12:46)  WBC Count: 12.94 K/uL (09-09 @ 08:25)  WBC Count: 10.09 K/uL (09-09 @ 04:33)    RECENT CULTURES:    Meds: ampicillin/sulbactam  IVPB 3 Gram(s) IV Intermittent every 6 hours  fluconAZOLE IVPB 400 milliGRAM(s) IV Intermittent daily  influenza   Vaccine 0.5 milliLiter(s) IntraMuscular once  mycophenolate mofetil 1000 milliGRAM(s) Oral every 12 hours  tacrolimus 0.5 milliGRAM(s) Oral <User Schedule>  trimethoprim  40 mG/sulfamethoxazole 200 mG Suspension 10 milliLiter(s) Oral daily  valGANciclovir 50 mG/mL Oral Solution 450 milliGRAM(s) Oral daily        ENDOCRINE  CAPILLARY BLOOD GLUCOSE      POCT Blood Glucose.: 165 mg/dL (09 Sep 2023 21:39)  POCT Blood Glucose.: 159 mg/dL (09 Sep 2023 17:17)  POCT Blood Glucose.: 178 mg/dL (09 Sep 2023 09:55)  POCT Blood Glucose.: 139 mg/dL (09 Sep 2023 06:00)  POCT Blood Glucose.: 170 mg/dL (09 Sep 2023 02:28)    Meds: insulin lispro (ADMELOG) corrective regimen sliding scale   SubCutaneous Before meals and at bedtime  methylPREDNISolone sodium succinate IVPB 250 milliGRAM(s) IV Intermittent every 24 hours  methylPREDNISolone sodium succinate IVPB   IV Intermittent         ACCESS DEVICES:  [ ] Peripheral IV  [ ] Central Venous Line	[ ] R	[ ] L	[ ] IJ	[ ] Fem	[ ] SC	Placed:   [ ] Arterial Line		[ ] R	[ ] L	[ ] Fem	[ ] Rad	[ ] Ax	Placed:   [ ] PICC:					[ ] Mediport  [ ] Urinary Catheter, Date Placed:   [x] Necessity of urinary, arterial, and venous catheters discussed    OTHER MEDICATIONS:  benzocaine/menthol Lozenge 1 Lozenge Oral every 2 hours PRN  chlorhexidine 2% Cloths 1 Application(s) Topical <User Schedule>      CODE STATUS:      IMAGING: 24 HOUR EVENTS:  - extubated  - s/p 2u pRBC: 6.8/20 > 8.7/24.8  - holding heparin gtt  - [place] catheter adjusted to be in pulmonary artery, PA diastolic = 14  - MELISSA 1-3 sanguinous  - restarted tacrolimus  - plasmalyte at 75cc/h  - NGT out, advanced to CLD  - Liver US with patent vasculature  - Dilaudid PRN for pain, can switch to oxy PRN    SUBJECTIVE/ROS:  [ ] A ten-point review of systems was otherwise negative except as noted.  [ ] Due to altered mental status/intubation, subjective information were not able to be obtained from the patient. History was obtained, to the extent possible, from review of the chart and collateral sources of information.      NEURO  Exam: awake, alert, oriented  Meds: oxyCODONE    IR 5 milliGRAM(s) Oral every 4 hours PRN Severe Pain (7 - 10)  oxyCODONE    IR 2.5 milliGRAM(s) Oral every 4 hours PRN Moderate Pain (4 - 6)  [x] Adequacy of sedation and pain control has been assessed and adjusted      RESPIRATORY  RR: 14 (09-10-23 @ 01:00) (12 - 26)  SpO2: 95% (09-10-23 @ 01:00) (92% - 100%)  Exam: unlabored, clear to auscultation bilaterally  Mechanical Ventilation:   ABG - ( 09 Sep 2023 21:47 )  pH: 7.44  /  pCO2: 40    /  pO2: 102   / HCO3: 27    / Base Excess: 2.8   /  SaO2: 99.3        CARDIOVASCULAR  HR: 90 (09-10-23 @ 01:00) (79 - 99)  ABP: 113/66 (09-10-23 @ 01:00) (101/49 - 134/68)  ABP(mean): 87 (09-10-23 @ 01:00) (67 - 93)    Exam: regular rate and rhythm  Cardiac Rhythm: sinus  Perfusion     [x]Adequate   [ ]Inadequate  Mentation   [x]Normal       [ ]Reduced  Extremities  [x]Warm         [ ]Cool  Volume Status [ ]Hypervolemic [x]Euvolemic [ ]Hypovolemic      GI/NUTRITION  Exam: soft, nontender, nondistended, incision C/D/I  Diet: CLD  Meds: pantoprazole  Injectable 40 milliGRAM(s) IV Push daily      GENITOURINARY  I&O's Detail    09-08 @ 07:01 - 09-09 @ 07:00  --------------------------------------------------------  IN:    Albumin 5%  - 250 mL: 750 mL    Enteral Tube Flush: 170 mL    Heparin: 78 mL    Insulin: 4 mL    IV PiggyBack: 300 mL    IV PiggyBack: 350 mL    multiple electrolytes Injection Type 1.: 2500 mL    Norepinephrine: 67.9 mL  Total IN: 4219.9 mL    OUT:    Bulb (mL): 670 mL    Bulb (mL): 50 mL    Bulb (mL): 555 mL    Dexmedetomidine: 0 mL    Indwelling Catheter - Urethral (mL): 1905 mL    Nasogastric/Oral tube (mL): 100 mL  Total OUT: 3280 mL    Total NET: 939.9 mL      09-09 @ 07:01  -  09-10 @ 01:40  --------------------------------------------------------  IN:    Enteral Tube Flush: 40 mL    Heparin: 3.5 mL    IV PiggyBack: 100 mL    IV PiggyBack: 850 mL    multiple electrolytes Injection Type 1.: 1750 mL    PRBCs (Packed Red Blood Cells): 300 mL  Total IN: 3043.5 mL    OUT:    Bulb (mL): 245 mL    Bulb (mL): 465 mL    Bulb (mL): 285 mL    Indwelling Catheter - Urethral (mL): 1660 mL    Nasogastric/Oral tube (mL): 250 mL  Total OUT: 2905 mL    Total NET: 138.5 mL          09-09    138  |  105  |  19  ----------------------------<  161<H>  4.3   |  24  |  0.50    Ca    7.9<L>      09 Sep 2023 21:57  Phos  2.9     09-09  Mg     2.2     09-09    TPro  4.0<L>  /  Alb  2.3<L>  /  TBili  0.4  /  DBili  x   /  AST  149<H>  /  ALT  238<H>  /  AlkPhos  46  09-09    [ ] Lucas catheter, indication: N/A  Meds: multiple electrolytes Injection Type 1 1000 milliLiter(s) IV Continuous <Continuous>  sodium chloride 0.9% lock flush 10 milliLiter(s) IV Push every 1 hour PRN Pre/post blood products, medications, blood draw, and to maintain line patency        HEMATOLOGIC  Meds:   [x] VTE Prophylaxis                        8.0    7.75  )-----------( 53       ( 10 Sep 2023 01:02 )             23.3     PT/INR - ( 09 Sep 2023 21:57 )   PT: 13.3 sec;   INR: 1.22 ratio         PTT - ( 09 Sep 2023 21:57 )  PTT:26.9 sec  Transfusion     [ ] PRBC   [ ] Platelets   [ ] FFP   [ ] Cryoprecipitate      INFECTIOUS DISEASES  WBC Count: 7.75 K/uL (09-10 @ 01:02)  WBC Count: 9.89 K/uL (09-09 @ 21:57)  WBC Count: 9.66 K/uL (09-09 @ 18:41)  WBC Count: 8.89 K/uL (09-09 @ 15:42)  WBC Count: 10.41 K/uL (09-09 @ 12:46)  WBC Count: 12.94 K/uL (09-09 @ 08:25)  WBC Count: 10.09 K/uL (09-09 @ 04:33)    RECENT CULTURES:    Meds: ampicillin/sulbactam  IVPB 3 Gram(s) IV Intermittent every 6 hours  fluconAZOLE IVPB 400 milliGRAM(s) IV Intermittent daily  influenza   Vaccine 0.5 milliLiter(s) IntraMuscular once  mycophenolate mofetil 1000 milliGRAM(s) Oral every 12 hours  tacrolimus 0.5 milliGRAM(s) Oral <User Schedule>  trimethoprim  40 mG/sulfamethoxazole 200 mG Suspension 10 milliLiter(s) Oral daily  valGANciclovir 50 mG/mL Oral Solution 450 milliGRAM(s) Oral daily        ENDOCRINE  CAPILLARY BLOOD GLUCOSE      POCT Blood Glucose.: 165 mg/dL (09 Sep 2023 21:39)  POCT Blood Glucose.: 159 mg/dL (09 Sep 2023 17:17)  POCT Blood Glucose.: 178 mg/dL (09 Sep 2023 09:55)  POCT Blood Glucose.: 139 mg/dL (09 Sep 2023 06:00)  POCT Blood Glucose.: 170 mg/dL (09 Sep 2023 02:28)    Meds: insulin lispro (ADMELOG) corrective regimen sliding scale   SubCutaneous Before meals and at bedtime  methylPREDNISolone sodium succinate IVPB 250 milliGRAM(s) IV Intermittent every 24 hours  methylPREDNISolone sodium succinate IVPB   IV Intermittent         ACCESS DEVICES:  [ ] Peripheral IV  [ ] Central Venous Line	[ ] R	[ ] L	[ ] IJ	[ ] Fem	[ ] SC	Placed:   [ ] Arterial Line		[ ] R	[ ] L	[ ] Fem	[ ] Rad	[ ] Ax	Placed:   [ ] PICC:					[ ] Mediport  [ ] Urinary Catheter, Date Placed:   [x] Necessity of urinary, arterial, and venous catheters discussed    OTHER MEDICATIONS:  benzocaine/menthol Lozenge 1 Lozenge Oral every 2 hours PRN  chlorhexidine 2% Cloths 1 Application(s) Topical <User Schedule>      CODE STATUS:      IMAGING: 24 HOUR EVENTS:  - extubated  - s/p 2u pRBC: 6.8/20 > 8.7/24.8  - holding heparin gtt  - [place] catheter adjusted to be in pulmonary artery, PA diastolic = 14  - MELISSA 1-3 sanguinous  - restarted tacrolimus  - plasmalyte at 75cc/h  - NGT out, advanced to CLD  - Liver US with patent vasculature  - Dilaudid PRN for pain, can switch to oxy PRN    SUBJECTIVE/ROS:  [ ] A ten-point review of systems was otherwise negative except as noted.  [ ] Due to altered mental status/intubation, subjective information were not able to be obtained from the patient. History was obtained, to the extent possible, from review of the chart and collateral sources of information.      NEURO  Exam: awake, alert, oriented  Meds: oxyCODONE    IR 5 milliGRAM(s) Oral every 4 hours PRN Severe Pain (7 - 10)  oxyCODONE    IR 2.5 milliGRAM(s) Oral every 4 hours PRN Moderate Pain (4 - 6)  [x] Adequacy of sedation and pain control has been assessed and adjusted      RESPIRATORY  RR: 14 (09-10-23 @ 01:00) (12 - 26)  SpO2: 95% (09-10-23 @ 01:00) (92% - 100%)  Exam: unlabored, clear to auscultation bilaterally  Mechanical Ventilation:   ABG - ( 09 Sep 2023 21:47 )  pH: 7.44  /  pCO2: 40    /  pO2: 102   / HCO3: 27    / Base Excess: 2.8   /  SaO2: 99.3        CARDIOVASCULAR  HR: 90 (09-10-23 @ 01:00) (79 - 99)  ABP: 113/66 (09-10-23 @ 01:00) (101/49 - 134/68)  ABP(mean): 87 (09-10-23 @ 01:00) (67 - 93)    Exam: regular rate and rhythm  Cardiac Rhythm: sinus  Perfusion     [x]Adequate   [ ]Inadequate  Mentation   [x]Normal       [ ]Reduced  Extremities  [x]Warm         [ ]Cool  Volume Status [ ]Hypervolemic [x]Euvolemic [ ]Hypovolemic      GI/NUTRITION  Exam: soft, nontender, nondistended, incision C/D/I  Diet: CLD  Meds: pantoprazole  Injectable 40 milliGRAM(s) IV Push daily      GENITOURINARY  I&O's Detail    09-08 @ 07:01 - 09-09 @ 07:00  --------------------------------------------------------  IN:    Albumin 5%  - 250 mL: 750 mL    Enteral Tube Flush: 170 mL    Heparin: 78 mL    Insulin: 4 mL    IV PiggyBack: 300 mL    IV PiggyBack: 350 mL    multiple electrolytes Injection Type 1.: 2500 mL    Norepinephrine: 67.9 mL  Total IN: 4219.9 mL    OUT:    Bulb (mL): 670 mL    Bulb (mL): 50 mL    Bulb (mL): 555 mL    Dexmedetomidine: 0 mL    Indwelling Catheter - Urethral (mL): 1905 mL    Nasogastric/Oral tube (mL): 100 mL  Total OUT: 3280 mL    Total NET: 939.9 mL      09-09 @ 07:01  -  09-10 @ 01:40  --------------------------------------------------------  IN:    Enteral Tube Flush: 40 mL    Heparin: 3.5 mL    IV PiggyBack: 100 mL    IV PiggyBack: 850 mL    multiple electrolytes Injection Type 1.: 1750 mL    PRBCs (Packed Red Blood Cells): 300 mL  Total IN: 3043.5 mL    OUT:    Bulb (mL): 245 mL    Bulb (mL): 465 mL    Bulb (mL): 285 mL    Indwelling Catheter - Urethral (mL): 1660 mL    Nasogastric/Oral tube (mL): 250 mL  Total OUT: 2905 mL    Total NET: 138.5 mL      09-09    138  |  105  |  19  ----------------------------<  161<H>  4.3   |  24  |  0.50    Ca    7.9<L>      09 Sep 2023 21:57  Phos  2.9     09-09  Mg     2.2     09-09    TPro  4.0<L>  /  Alb  2.3<L>  /  TBili  0.4  /  DBili  x   /  AST  149<H>  /  ALT  238<H>  /  AlkPhos  46  09-09    [ ] Lucas catheter, indication: N/A  Meds: multiple electrolytes Injection Type 1 1000 milliLiter(s) IV Continuous <Continuous>  sodium chloride 0.9% lock flush 10 milliLiter(s) IV Push every 1 hour PRN Pre/post blood products, medications, blood draw, and to maintain line patency      HEMATOLOGIC  Meds:   [x] VTE Prophylaxis                        8.0    7.75  )-----------( 53       ( 10 Sep 2023 01:02 )             23.3     PT/INR - ( 09 Sep 2023 21:57 )   PT: 13.3 sec;   INR: 1.22 ratio         PTT - ( 09 Sep 2023 21:57 )  PTT:26.9 sec  Transfusion     [ ] PRBC   [ ] Platelets   [ ] FFP   [ ] Cryoprecipitate      INFECTIOUS DISEASES  WBC Count: 7.75 K/uL (09-10 @ 01:02)  WBC Count: 9.89 K/uL (09-09 @ 21:57)  WBC Count: 9.66 K/uL (09-09 @ 18:41)  WBC Count: 8.89 K/uL (09-09 @ 15:42)  WBC Count: 10.41 K/uL (09-09 @ 12:46)  WBC Count: 12.94 K/uL (09-09 @ 08:25)  WBC Count: 10.09 K/uL (09-09 @ 04:33)    RECENT CULTURES:    Meds: ampicillin/sulbactam  IVPB 3 Gram(s) IV Intermittent every 6 hours  fluconAZOLE IVPB 400 milliGRAM(s) IV Intermittent daily  influenza   Vaccine 0.5 milliLiter(s) IntraMuscular once  mycophenolate mofetil 1000 milliGRAM(s) Oral every 12 hours  tacrolimus 0.5 milliGRAM(s) Oral <User Schedule>  trimethoprim  40 mG/sulfamethoxazole 200 mG Suspension 10 milliLiter(s) Oral daily  valGANciclovir 50 mG/mL Oral Solution 450 milliGRAM(s) Oral daily      ENDOCRINE  CAPILLARY BLOOD GLUCOSE  POCT Blood Glucose.: 165 mg/dL (09 Sep 2023 21:39)  POCT Blood Glucose.: 159 mg/dL (09 Sep 2023 17:17)  POCT Blood Glucose.: 178 mg/dL (09 Sep 2023 09:55)  POCT Blood Glucose.: 139 mg/dL (09 Sep 2023 06:00)  POCT Blood Glucose.: 170 mg/dL (09 Sep 2023 02:28)    Meds: insulin lispro (ADMELOG) corrective regimen sliding scale   SubCutaneous Before meals and at bedtime  methylPREDNISolone sodium succinate IVPB 250 milliGRAM(s) IV Intermittent every 24 hours  methylPREDNISolone sodium succinate IVPB   IV Intermittent       OTHER MEDICATIONS:  benzocaine/menthol Lozenge 1 Lozenge Oral every 2 hours PRN  chlorhexidine 2% Cloths 1 Application(s) Topical <User Schedule>      CODE STATUS: full code

## 2023-09-10 NOTE — PROGRESS NOTE ADULT - ATTENDING COMMENTS
Pt evaluated post op in SICU  S/P OLT, received 8 PRBC, 6 FFP  PMF: Cirrhosis, Budd-Chiari syndrome  H/O protein S deficiency, Miscarriage x5  History of transjugular intrahepatic portosystemic shunt  Presence of IVC filter    Awake and alert, pain control with prn Dilaudid  LFT improving,  Hepatic US shows good hepatic anastomosis flow  Not on vasopressor support.  Can start clears  Decrease IVF plasmalyte. Renal function wnl  Resolving bloody MELISSA output with more stable HCT/continue to monitor  Restart Heparin drip at 300 U/hr    Anti rej: steroid, Cellcept, Simulect  PPX abx: Bactrim, valcyte, Fluc  H[place] op abx Unasyn last day    Plan coordinated with transplant team
Pt evaluated post op in SICU  S/P OLT, received 8 PRBC, 6 FFP  PMF: Cirrhosis, Budd-Chiari syndrome  H/O protein S deficiency, Miscarriage x5  History of transjugular intrahepatic portosystemic shunt  Presence of IVC filter    Awake and alert, pain control with prn Dilaudid  CXR reviewed, PA catheter advanced, PA pressure 26/14. Hepatic US shows good hepatic anastomosis flow  Not on vasopressor support.  NPO, IVF plasmalyte. Renal function wnl  High MELISSA output, some of them bloody with drop in HCT, being transfused accordingly. Received Vit K 5 mg for increasing INR  Hold Heparin drip for now    Anti rej: steroid, Cellcept, Simulect  PPX abx: Bactrim, valcyte, Fluc  Herminia op abx Unasyn

## 2023-09-10 NOTE — PROGRESS NOTE ADULT - SUBJECTIVE AND OBJECTIVE BOX
GENERAL SURGERY PROGRESS NOTE    Patient: ERIC DAVILA , 37y ([birth year])Female   MRN: [id number]  Location: [place] [address]  Visit: 09-07-23 Inpatient  Date: 09-10-23 @ 12:20    Hospital Day #:4  Post-Op Day #:2    Procedure/Dx/Injuries: s/p OLT, budd-chiari syndrome    Events of past 24 hours: Hgb stable at 8 from 8.3 after 2 U PRBC 9/9, patient has been out of bed, tolerating clear liquid diet, pain well-controlled. No gas or bowel movements as of yet, but denies nausea/vomiting, and has +bowel sounds. Tacrolimus started yesterday.    PAST MEDICAL & SURGICAL HISTORY:  Cirrhosis  2010      Cirrhosis      Budd-Chiari syndrome      H/O protein S deficiency      Miscarriage  x 5      H/O protein S deficiency      History of transjugular intrahepatic portosystemic shunt      History of dilation and curettage      Presence of IVC filter          Vitals:   T(F): 98.2 (09-10-23 @ 11:00), Max: 98.6 (09-09-23 @ 19:00)  HR: 87 (09-10-23 @ 11:00)  BP: --  RR: 33 (09-10-23 @ 11:00)  SpO2: 94% (09-10-23 @ 11:00)      Diet, Clear Liquid:   Consistent Carbohydrate Evening Snack (CSTCHOSN)      Fluids:     I & O's:    09-09-23 @ 07:01  -  09-10-23 @ 07:00  --------------------------------------------------------  IN:    Enteral Tube Flush: 40 mL    Heparin: 3.5 mL    IV PiggyBack: 100 mL    IV PiggyBack: 1000 mL    multiple electrolytes Injection Type 1.: 2200 mL    Oral Fluid: 75 mL    PRBCs (Packed Red Blood Cells): 300 mL  Total IN: 3718.5 mL    OUT:    Bulb (mL): 605 mL    Bulb (mL): 300 mL    Bulb (mL): 475 mL    Indwelling Catheter - Urethral (mL): 2400 mL    Nasogastric/Oral tube (mL): 250 mL  Total OUT: 4030 mL    Total NET: -311.5 mL          PHYSICAL EXAM:  General: NAD, AAOx3, calm and cooperative  HEENT: NCAT, CESAR, EOMI, Trachea ML, Neck supple  Cardiac: RRR S1, S2, no Murmurs, rubs or gallops  Respiratory: CTAB, normal respiratory effort  Abdomen: Soft, non-distended, minimally tender around incision, no erythema/edema/fluctuance/drainage  Ext: no lower extremity edema, warm and well-perfused  Skin: no breakdown, no jaundice    MEDICATIONS  (STANDING):  chlorhexidine 2% Cloths 1 Application(s) Topical <User Schedule>  fluconAZOLE IVPB 400 milliGRAM(s) IV Intermittent daily  heparin  Infusion 300 Unit(s)/Hr (3 mL/Hr) IV Continuous <Continuous>  influenza   Vaccine 0.5 milliLiter(s) IntraMuscular once  insulin lispro (ADMELOG) corrective regimen sliding scale   SubCutaneous Before meals and at bedtime  multiple electrolytes Injection Type 1 1000 milliLiter(s) (50 mL/Hr) IV Continuous <Continuous>  mycophenolate mofetil 1000 milliGRAM(s) Oral every 12 hours  pantoprazole  Injectable 40 milliGRAM(s) IV Push daily  polyethylene glycol 3350 17 Gram(s) Oral two times a day  tacrolimus 0.5 milliGRAM(s) Oral <User Schedule>  trimethoprim  40 mG/sulfamethoxazole 200 mG Suspension 10 milliLiter(s) Oral daily  valGANciclovir 50 mG/mL Oral Solution 450 milliGRAM(s) Oral daily    MEDICATIONS  (PRN):  benzocaine/menthol Lozenge 1 Lozenge Oral every 2 hours PRN Sore Throat  oxyCODONE    IR 5 milliGRAM(s) Oral every 4 hours PRN Severe Pain (7 - 10)  oxyCODONE    IR 2.5 milliGRAM(s) Oral every 4 hours PRN Moderate Pain (4 - 6)  sodium chloride 0.9% lock flush 10 milliLiter(s) IV Push every 1 hour PRN Pre/post blood products, medications, blood draw, and to maintain line patency      DVT PROPHYLAXIS: heparin  Infusion 300 Unit(s)/Hr IV Continuous <Continuous>    GI PROPHYLAXIS: pantoprazole  Injectable 40 milliGRAM(s) IV Push daily    ANTICOAGULATION:   ANTIBIOTICS:  fluconAZOLE IVPB 400 milliGRAM(s)  trimethoprim  40 mG/sulfamethoxazole 200 mG Suspension 10 milliLiter(s)  valGANciclovir 50 mG/mL Oral Solution 450 milliGRAM(s)            LAB/STUDIES:  Labs:  CAPILLARY BLOOD GLUCOSE      POCT Blood Glucose.: 152 mg/dL (10 Sep 2023 11:28)  POCT Blood Glucose.: 153 mg/dL (10 Sep 2023 08:25)  POCT Blood Glucose.: 165 mg/dL (09 Sep 2023 21:39)  POCT Blood Glucose.: 159 mg/dL (09 Sep 2023 17:17)                          8.3    7.79  )-----------( 58       ( 10 Sep 2023 05:36 )             24.4         09-10    139  |  104  |  18  ----------------------------<  161<H>  4.4   |  26  |  0.51      Calcium: 7.7 mg/dL (09-10-23 @ 05:36)      LFTs:             3.9  | 0.3  | 98       ------------------[44      ( 10 Sep 2023 05:36 )  2.1  | x    | 196         Lipase:x      Amylase:x         Blood Gas Arterial, Lactate: 1.5 mmol/L (09-10-23 @ 04:58)  Blood Gas Arterial, Lactate: 1.6 mmol/L (09-09-23 @ 21:47)  Blood Gas Arterial, Lactate: 2.2 mmol/L (09-09-23 @ 15:37)  Blood Gas Arterial, Lactate: 1.8 mmol/L (09-09-23 @ 12:07)  Blood Gas Arterial, Lactate: 2.9 mmol/L (09-09-23 @ 08:00)  Blood Gas Arterial, Lactate: 1.5 mmol/L (09-09-23 @ 04:20)  Blood Gas Arterial, Lactate: 1.3 mmol/L (09-09-23 @ 00:22)  Blood Gas Arterial, Lactate: 1.1 mmol/L (09-08-23 @ 20:12)  Blood Gas Arterial, Lactate: 1.2 mmol/L (09-08-23 @ 15:55)  Blood Gas Arterial, Lactate: 1.1 mmol/L (09-08-23 @ 11:45)  Blood Gas Arterial, Lactate: 2.8 mmol/L (09-08-23 @ 10:24)  Blood Gas Arterial, Lactate: 4.9 mmol/L (09-08-23 @ 09:20)  Blood Gas Arterial, Lactate: 4.5 mmol/L (09-08-23 @ 08:38)  Blood Gas Arterial, Lactate: 3.6 mmol/L (09-08-23 @ 07:42)  Blood Gas Arterial, Lactate: 2.0 mmol/L (09-08-23 @ 06:20)  Blood Gas Arterial, Lactate: 1.1 mmol/L (09-08-23 @ 04:24)    ABG - ( 10 Sep 2023 04:58 )  pH: 7.44  /  pCO2: 42    /  pO2: 102   / HCO3: 28    / Base Excess: 4.0   /  SaO2: 98.4            ABG - ( 09 Sep 2023 21:47 )  pH: 7.44  /  pCO2: 40    /  pO2: 102   / HCO3: 27    / Base Excess: 2.8   /  SaO2: 99.3            ABG - ( 09 Sep 2023 15:37 )  pH: 7.40  /  pCO2: 40    /  pO2: 85    / HCO3: 25    / Base Excess: 0.0   /  SaO2: 98.2              Coags:     12.8   ----< 1.17    ( 10 Sep 2023 05:36 )     25.5                Urinalysis Basic - ( 10 Sep 2023 05:36 )    Color: x / Appearance: x / SG: x / pH: x  Gluc: 161 mg/dL / Ketone: x  / Bili: x / Urobili: x   Blood: x / Protein: x / Nitrite: x   Leuk Esterase: x / RBC: x / WBC x   Sq Epi: x / Non Sq Epi: x / Bacteria: x                IMAGING:      < from: Xray Chest 1 View- PORTABLE-Routine (Xray Chest 1 View- PORTABLE-Routine in AM.) (09.09.23 @ 07:27) >  IMPRESSION:  Status post extubation. No evidence of pulmonary vascular congestion.   Tubes and lines as above.    --- End of Report ---    < end of copied text >

## 2023-09-11 LAB
ALBUMIN SERPL ELPH-MCNC: 2.4 G/DL — LOW (ref 3.3–5)
ALP SERPL-CCNC: 46 U/L — SIGNIFICANT CHANGE UP (ref 40–120)
ALT FLD-CCNC: 127 U/L — HIGH (ref 10–45)
ANION GAP SERPL CALC-SCNC: 6 MMOL/L — SIGNIFICANT CHANGE UP (ref 5–17)
APTT BLD: 23.4 SEC — LOW (ref 24.5–35.6)
APTT BLD: 26.4 SEC — SIGNIFICANT CHANGE UP (ref 24.5–35.6)
APTT BLD: 28.5 SEC — SIGNIFICANT CHANGE UP (ref 24.5–35.6)
AST SERPL-CCNC: 41 U/L — HIGH (ref 10–40)
BILIRUB SERPL-MCNC: 0.3 MG/DL — SIGNIFICANT CHANGE UP (ref 0.2–1.2)
BUN SERPL-MCNC: 16 MG/DL — SIGNIFICANT CHANGE UP (ref 7–23)
CALCIUM SERPL-MCNC: 7.3 MG/DL — LOW (ref 8.4–10.5)
CHLORIDE SERPL-SCNC: 107 MMOL/L — SIGNIFICANT CHANGE UP (ref 96–108)
CO2 SERPL-SCNC: 24 MMOL/L — SIGNIFICANT CHANGE UP (ref 22–31)
CREAT SERPL-MCNC: 0.49 MG/DL — LOW (ref 0.5–1.3)
EGFR: 123 ML/MIN/1.73M2 — SIGNIFICANT CHANGE UP
GAS PNL BLDA: SIGNIFICANT CHANGE UP
GLUCOSE BLDC GLUCOMTR-MCNC: 118 MG/DL — HIGH (ref 70–99)
GLUCOSE BLDC GLUCOMTR-MCNC: 133 MG/DL — HIGH (ref 70–99)
GLUCOSE BLDC GLUCOMTR-MCNC: 169 MG/DL — HIGH (ref 70–99)
GLUCOSE SERPL-MCNC: 130 MG/DL — HIGH (ref 70–99)
HCT VFR BLD CALC: 23.4 % — LOW (ref 34.5–45)
HCT VFR BLD CALC: 25.4 % — LOW (ref 34.5–45)
HGB BLD-MCNC: 7.9 G/DL — LOW (ref 11.5–15.5)
HGB BLD-MCNC: 8.4 G/DL — LOW (ref 11.5–15.5)
INR BLD: 1.17 RATIO — SIGNIFICANT CHANGE UP (ref 0.85–1.18)
MAGNESIUM SERPL-MCNC: 2.4 MG/DL — SIGNIFICANT CHANGE UP (ref 1.6–2.6)
MCHC RBC-ENTMCNC: 27.2 PG — SIGNIFICANT CHANGE UP (ref 27–34)
MCHC RBC-ENTMCNC: 27.2 PG — SIGNIFICANT CHANGE UP (ref 27–34)
MCHC RBC-ENTMCNC: 33.1 GM/DL — SIGNIFICANT CHANGE UP (ref 32–36)
MCHC RBC-ENTMCNC: 33.8 GM/DL — SIGNIFICANT CHANGE UP (ref 32–36)
MCV RBC AUTO: 80.7 FL — SIGNIFICANT CHANGE UP (ref 80–100)
MCV RBC AUTO: 82.2 FL — SIGNIFICANT CHANGE UP (ref 80–100)
MELD SCORE WITH DIALYSIS: 21 POINTS — SIGNIFICANT CHANGE UP
MELD SCORE WITHOUT DIALYSIS: 8 POINTS — SIGNIFICANT CHANGE UP
NRBC # BLD: 0 /100 WBCS — SIGNIFICANT CHANGE UP (ref 0–0)
NRBC # BLD: 0 /100 WBCS — SIGNIFICANT CHANGE UP (ref 0–0)
PHOSPHATE SERPL-MCNC: 3.2 MG/DL — SIGNIFICANT CHANGE UP (ref 2.5–4.5)
PLATELET # BLD AUTO: 63 K/UL — LOW (ref 150–400)
PLATELET # BLD AUTO: 67 K/UL — LOW (ref 150–400)
POTASSIUM SERPL-MCNC: 4.1 MMOL/L — SIGNIFICANT CHANGE UP (ref 3.5–5.3)
POTASSIUM SERPL-SCNC: 4.1 MMOL/L — SIGNIFICANT CHANGE UP (ref 3.5–5.3)
PROT SERPL-MCNC: 4 G/DL — LOW (ref 6–8.3)
PROTHROM AB SERPL-ACNC: 12.2 SEC — SIGNIFICANT CHANGE UP (ref 9.5–13)
RBC # BLD: 2.9 M/UL — LOW (ref 3.8–5.2)
RBC # BLD: 3.09 M/UL — LOW (ref 3.8–5.2)
RBC # FLD: 15.2 % — HIGH (ref 10.3–14.5)
RBC # FLD: 15.3 % — HIGH (ref 10.3–14.5)
SODIUM SERPL-SCNC: 137 MMOL/L — SIGNIFICANT CHANGE UP (ref 135–145)
TACROLIMUS SERPL-MCNC: 2.7 NG/ML — SIGNIFICANT CHANGE UP
WBC # BLD: 7.08 K/UL — SIGNIFICANT CHANGE UP (ref 3.8–10.5)
WBC # BLD: 7.59 K/UL — SIGNIFICANT CHANGE UP (ref 3.8–10.5)
WBC # FLD AUTO: 7.08 K/UL — SIGNIFICANT CHANGE UP (ref 3.8–10.5)
WBC # FLD AUTO: 7.59 K/UL — SIGNIFICANT CHANGE UP (ref 3.8–10.5)

## 2023-09-11 PROCEDURE — 76705 ECHO EXAM OF ABDOMEN: CPT | Mod: 26,59

## 2023-09-11 PROCEDURE — 93975 VASCULAR STUDY: CPT | Mod: 26

## 2023-09-11 PROCEDURE — 93970 EXTREMITY STUDY: CPT | Mod: 26

## 2023-09-11 PROCEDURE — 99222 1ST HOSP IP/OBS MODERATE 55: CPT

## 2023-09-11 RX ORDER — TACROLIMUS 5 MG/1
1 CAPSULE ORAL
Refills: 0 | Status: DISCONTINUED | OUTPATIENT
Start: 2023-09-11 | End: 2023-09-12

## 2023-09-11 RX ORDER — LANOLIN ALCOHOL/MO/W.PET/CERES
5 CREAM (GRAM) TOPICAL AT BEDTIME
Refills: 0 | Status: DISCONTINUED | OUTPATIENT
Start: 2023-09-11 | End: 2023-09-19

## 2023-09-11 RX ORDER — MYCOPHENOLATE MOFETIL 250 MG/1
1000 CAPSULE ORAL
Refills: 0 | Status: DISCONTINUED | OUTPATIENT
Start: 2023-09-11 | End: 2023-09-19

## 2023-09-11 RX ORDER — SIMETHICONE 80 MG/1
80 TABLET, CHEWABLE ORAL
Refills: 0 | Status: DISCONTINUED | OUTPATIENT
Start: 2023-09-11 | End: 2023-09-19

## 2023-09-11 RX ORDER — INSULIN LISPRO 100/ML
VIAL (ML) SUBCUTANEOUS
Refills: 0 | Status: DISCONTINUED | OUTPATIENT
Start: 2023-09-11 | End: 2023-09-12

## 2023-09-11 RX ORDER — VALGANCICLOVIR 450 MG/1
450 TABLET, FILM COATED ORAL DAILY
Refills: 0 | Status: DISCONTINUED | OUTPATIENT
Start: 2023-09-11 | End: 2023-09-19

## 2023-09-11 RX ORDER — LIDOCAINE 4 G/100G
1 CREAM TOPICAL ONCE
Refills: 0 | Status: COMPLETED | OUTPATIENT
Start: 2023-09-11 | End: 2023-09-13

## 2023-09-11 RX ORDER — INSULIN LISPRO 100/ML
VIAL (ML) SUBCUTANEOUS AT BEDTIME
Refills: 0 | Status: DISCONTINUED | OUTPATIENT
Start: 2023-09-11 | End: 2023-09-12

## 2023-09-11 RX ORDER — TACROLIMUS 5 MG/1
0.5 CAPSULE ORAL ONCE
Refills: 0 | Status: COMPLETED | OUTPATIENT
Start: 2023-09-11 | End: 2023-09-11

## 2023-09-11 RX ORDER — FLUCONAZOLE 150 MG/1
200 TABLET ORAL DAILY
Refills: 0 | Status: DISCONTINUED | OUTPATIENT
Start: 2023-09-11 | End: 2023-09-19

## 2023-09-11 RX ORDER — PANTOPRAZOLE SODIUM 20 MG/1
40 TABLET, DELAYED RELEASE ORAL
Refills: 0 | Status: DISCONTINUED | OUTPATIENT
Start: 2023-09-11 | End: 2023-09-19

## 2023-09-11 RX ORDER — HEPARIN SODIUM 5000 [USP'U]/ML
500 INJECTION INTRAVENOUS; SUBCUTANEOUS
Qty: 25000 | Refills: 0 | Status: DISCONTINUED | OUTPATIENT
Start: 2023-09-11 | End: 2023-09-13

## 2023-09-11 RX ADMIN — CHLORHEXIDINE GLUCONATE 1 APPLICATION(S): 213 SOLUTION TOPICAL at 06:06

## 2023-09-11 RX ADMIN — VALGANCICLOVIR 450 MILLIGRAM(S): 450 TABLET, FILM COATED ORAL at 11:57

## 2023-09-11 RX ADMIN — HEPARIN SODIUM 5 UNIT(S)/HR: 5000 INJECTION INTRAVENOUS; SUBCUTANEOUS at 19:37

## 2023-09-11 RX ADMIN — Medication 1 TABLET(S): at 11:58

## 2023-09-11 RX ADMIN — TACROLIMUS 0.5 MILLIGRAM(S): 5 CAPSULE ORAL at 15:43

## 2023-09-11 RX ADMIN — SODIUM CHLORIDE 50 MILLILITER(S): 9 INJECTION, SOLUTION INTRAVENOUS at 04:26

## 2023-09-11 RX ADMIN — HEPARIN SODIUM 5 UNIT(S)/HR: 5000 INJECTION INTRAVENOUS; SUBCUTANEOUS at 15:43

## 2023-09-11 RX ADMIN — MYCOPHENOLATE MOFETIL 1000 MILLIGRAM(S): 250 CAPSULE ORAL at 06:06

## 2023-09-11 RX ADMIN — OXYCODONE HYDROCHLORIDE 2.5 MILLIGRAM(S): 5 TABLET ORAL at 10:11

## 2023-09-11 RX ADMIN — OXYCODONE HYDROCHLORIDE 2.5 MILLIGRAM(S): 5 TABLET ORAL at 15:43

## 2023-09-11 RX ADMIN — FLUCONAZOLE 200 MILLIGRAM(S): 150 TABLET ORAL at 11:58

## 2023-09-11 RX ADMIN — TACROLIMUS 0.5 MILLIGRAM(S): 5 CAPSULE ORAL at 08:17

## 2023-09-11 RX ADMIN — Medication 5 MILLIGRAM(S): at 22:31

## 2023-09-11 RX ADMIN — Medication 2: at 12:27

## 2023-09-11 RX ADMIN — OXYCODONE HYDROCHLORIDE 2.5 MILLIGRAM(S): 5 TABLET ORAL at 09:11

## 2023-09-11 RX ADMIN — OXYCODONE HYDROCHLORIDE 2.5 MILLIGRAM(S): 5 TABLET ORAL at 16:43

## 2023-09-11 RX ADMIN — TACROLIMUS 1 MILLIGRAM(S): 5 CAPSULE ORAL at 19:45

## 2023-09-11 RX ADMIN — POLYETHYLENE GLYCOL 3350 17 GRAM(S): 17 POWDER, FOR SOLUTION ORAL at 06:06

## 2023-09-11 RX ADMIN — POLYETHYLENE GLYCOL 3350 17 GRAM(S): 17 POWDER, FOR SOLUTION ORAL at 17:44

## 2023-09-11 RX ADMIN — Medication 125 MILLIGRAM(S): at 06:08

## 2023-09-11 RX ADMIN — SIMETHICONE 80 MILLIGRAM(S): 80 TABLET, CHEWABLE ORAL at 02:19

## 2023-09-11 RX ADMIN — MYCOPHENOLATE MOFETIL 1000 MILLIGRAM(S): 250 CAPSULE ORAL at 19:45

## 2023-09-11 NOTE — OCCUPATIONAL THERAPY INITIAL EVALUATION ADULT - DIAGNOSIS, OT EVAL
Pt p/w impaired balance, strength, endurance impacting independence with ADLs and functional mobility.

## 2023-09-11 NOTE — PROGRESS NOTE ADULT - ASSESSMENT
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 s/p elective TIPs/DIPS in 5/2021 now POD 2 s/p OTL.    #OTL  -Continue tacrolimus  -AM tacrolimus levels  -Continue fluconazole, bactrim, valganciclovir  -Reg diet  -Can d/c IVF once tolerating regular diet  -Hep gtt with PTT goal 40-50  -Ok for labs q8h  -Discontinue salamanca catheter, f/u TOV  -F/U MELISSA outputs  -Repeat hepatic ultrasound 9/11  -DC gallo    #Budd-Chiari  -F/U with hematologist about preferred AC outpatient

## 2023-09-11 NOTE — DIETITIAN INITIAL EVALUATION ADULT - ENERGY INTAKE
- Pt is currently ordered for a consistent carbohydrate diet; received first meal tray this morning, untouched. Pt states she is hungry and will eat soon.   - Pt is amenable to receiving Ensure Max 1x/day.  Fair (50-75%) - Pt is currently ordered for a consistent carbohydrate diet; received first meal tray this morning, untouched. Pt states she is hungry and will eat soon.   - Pt is amenable to receiving Ensure Max 1x/day.   - Food preferences obtained, will honor as able.

## 2023-09-11 NOTE — DIETITIAN INITIAL EVALUATION ADULT - ADD RECOMMEND
1) Continue current diet order: consistent carbohydrate diet with evening snack as per team   2) Recommend Ensure Max (150 kcal, 30 g protein, 6 g carbs) 1x/day.   3) Encourage adequate intake of meals and supplements to optimize PO intake.   4) Monitor PO intake/tolerance, weights, labs, hydration status, bowels, and skin integrity.   5) Defer post-transplant nutrition therapy education upon transfer to floors.   6) Monitor GI function and BG closely.  1) Continue current diet order: consistent carbohydrate diet with evening snack as per team. Obtain and honor food preferences as able (obtained today).   2) Recommend Ensure Max (150 kcal, 30 g protein, 6 g carbs) 1x/day.   3) Encourage adequate intake of meals and supplements to optimize PO intake.   4) Monitor PO intake/tolerance, weights, labs, hydration status, bowels, and skin integrity.   5) Defer post-transplant nutrition therapy education upon transfer to floors.   6) Monitor GI function and BG closely.

## 2023-09-11 NOTE — PROGRESS NOTE ADULT - ASSESSMENT
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    PLAN:   Neurologic:  - oxy PRN for pain    Respiratory:  - satting 94% on RA  - Encourage IS    Cardiovascular:  - off pressors, HD stable  - maintain MAP >65  - Intraop LA was 4.9, now 1.0    Gastrointestinal/Nutrition: s/p OLT 9/8/23  - CLD, NGT removed  - progress diet with return of bowel function  - monitor MELISSA outputs q2h, sanguinous on 9/9  - JP1 = 10, JP2 = 75, JP3 = 45  - Post-transplant hepatic US with doppler: patent hepatic vasculature  - f/u repeat hepatic US (9/9)  - Induction Transplant Medications per Transplant Team       - Simulect (Basiliximab) POD#0 and POD#4       - Methylprednisolone Taper transition to Prednisone POD#6  - Maintenance Transplant Medications per Transplant Team       - Cellcept (Mycophenolate) q12H       - Tacrolimus started POD#1       - Prednisone 20mg PO Daily POD#6  - Protonix 40mg IV Daily   - Senna    Genitourinary/Renal: Intraop recieved 4L crystalloid, 500cc 5% albumin   - plasmalyte 50cc/h  - Maintain indwelling salamanca  - Strict I/Os  - Monitor electrolytes, supplement PRN    Hematologic: Intra op received 8 units pRBC, 6 units FFP, no PLT, no cryo  - s/p phytonadione for INR 4.70 - now 1.37  - Hold home Coumadin 5mg daily (last dose 9/7)  - heparin drip subtherapeutic @ 350, increased to 400U/h - goal 35  - SCDs for mechanical VTE ppx  - b/l LE duplex ordered for leg swelling    Infectious Disease:  - Transplant ppx with Valcyte (valganciclovir), Bactrim (TMP-SMX), and Diflucan (fluconazole)  - tacrolimus restarted  - completed unasyn 48hrs post-op  - Monitor WBC, temperature    Endocrine:  - s/p steroid induction  - Methylprednisolone taper to Prednisone per Transplant    Lines/ Drains/ Tubes:  - b/l radial arterial lines  - RIJ intro with [place]  - MELISSA x 3 #1 - R lobe, #2 - Hil[place] , #3 - L lobe.     Disposition: SICU     labs q8h

## 2023-09-11 NOTE — DIETITIAN INITIAL EVALUATION ADULT - PERSON TAUGHT/METHOD
Emphasized the importance of adequate kcal and protein intake, provided recommendations to optimize nutritional intake in case of decreased appetite, recommended small frequent meals by consuming nutrient-dense snacks between meals, to start with protein, and sips of supplement throughout the day./verbal instruction/patient instructed/mother instructed

## 2023-09-11 NOTE — PROGRESS NOTE ADULT - SUBJECTIVE AND OBJECTIVE BOX
GENERAL SURGERY PROGRESS NOTE    Patient: ERIC DAVILA , 37y ([birth year])Female   MRN: [id number]  Location: [place] [address]  Visit: 09-07-23 Inpatient  Date: 09-10-23 @ 12:20    Hospital Day #:5  Post-Op Day #:3    Procedure/Dx/Injuries: s/p OLT, budd-chiari syndrome    Events of past 24 hours:   Hep gtt  goal PTT 50-60 - HELD 9/9 d/t hgb drop, resumed 9/10   Liver doppler in AM   LE duplex for R>L swelling   CMV +/+        MEDICATIONS  (STANDING):  chlorhexidine 2% Cloths 1 Application(s) Topical <User Schedule>  fluconAZOLE   Tablet 200 milliGRAM(s) Oral daily  heparin  Infusion 500 Unit(s)/Hr (5 mL/Hr) IV Continuous <Continuous>  influenza   Vaccine 0.5 milliLiter(s) IntraMuscular once  insulin lispro (ADMELOG) corrective regimen sliding scale   SubCutaneous three times a day before meals  insulin lispro (ADMELOG) corrective regimen sliding scale   SubCutaneous at bedtime  lidocaine   4% Patch 1 Patch Transdermal once  methylPREDNISolone sodium succinate Injectable   IV Push   mycophenolate mofetil 1000 milliGRAM(s) Oral <User Schedule>  pantoprazole    Tablet 40 milliGRAM(s) Oral before breakfast  polyethylene glycol 3350 17 Gram(s) Oral two times a day  tacrolimus 0.5 milliGRAM(s) Oral <User Schedule>  trimethoprim   80 mG/sulfamethoxazole 400 mG 1 Tablet(s) Oral daily  valGANciclovir 450 milliGRAM(s) Oral daily    MEDICATIONS  (PRN):  benzocaine/menthol Lozenge 1 Lozenge Oral every 2 hours PRN Sore Throat  oxyCODONE    IR 5 milliGRAM(s) Oral every 4 hours PRN Severe Pain (7 - 10)  oxyCODONE    IR 2.5 milliGRAM(s) Oral every 4 hours PRN Moderate Pain (4 - 6)  simethicone 80 milliGRAM(s) Chew four times a day PRN Gas  sodium chloride 0.65% Nasal 1 Spray(s) Both Nostrils every 4 hours PRN Nasal Congestion      PAST MEDICAL & SURGICAL HISTORY:  Cirrhosis  2010      Cirrhosis      Budd-Chiari syndrome      H/O protein S deficiency      Miscarriage  x 5      H/O protein S deficiency      History of transjugular intrahepatic portosystemic shunt      History of dilation and curettage      Presence of IVC filter          Vital Signs Last 24 Hrs  T(C): 37.2 (11 Sep 2023 11:00), Max: 37.2 (11 Sep 2023 11:00)  T(F): 99 (11 Sep 2023 11:00), Max: 99 (11 Sep 2023 11:00)  HR: 79 (11 Sep 2023 14:00) (70 - 94)  BP: 108/72 (11 Sep 2023 14:00) (108/72 - 129/74)  BP(mean): 86 (11 Sep 2023 14:00) (83 - 96)  RR: 25 (11 Sep 2023 14:00) (0 - 34)  SpO2: 95% (11 Sep 2023 14:00) (92% - 97%)    Parameters below as of 11 Sep 2023 11:00  Patient On (Oxygen Delivery Method): room air        I&O's Summary    10 Sep 2023 07:01  -  11 Sep 2023 07:00  --------------------------------------------------------  IN: 2447.5 mL / OUT: 3305 mL / NET: -857.5 mL    11 Sep 2023 07:01  -  11 Sep 2023 14:33  --------------------------------------------------------  IN: 589.5 mL / OUT: 1525 mL / NET: -935.5 mL                              8.4    7.59  )-----------( 67       ( 11 Sep 2023 12:16 )             25.4     09-11    137  |  107  |  16  ----------------------------<  130<H>  4.1   |  24  |  0.49<L>    Ca    7.3<L>      11 Sep 2023 06:28  Phos  3.2     09-11  Mg     2.4     09-11    TPro  4.0<L>  /  Alb  2.4<L>  /  TBili  0.3  /  DBili  x   /  AST  41<H>  /  ALT  127<H>  /  AlkPhos  46  09-11    Tacrolimus (), Serum: 2.7 ng/mL (09-11 @ 06:28)        PHYSICAL EXAM:  General: NAD, AAOx3, calm and cooperative  HEENT: NCAT, CESAR, EOMI, Trachea ML, Neck supple  Cardiac: RRR S1, S2, no Murmurs, rubs or gallops  Respiratory: CTAB, normal respiratory effort  Abdomen: Soft, non-distended, minimally tender around incision, no erythema/edema/fluctuance/drainage, MELISSA drain x3 SS  Ext: no lower extremity edema, warm and well-perfused  Skin: no breakdown, no jaundice
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
## 2023-09-11 NOTE — DIETITIAN INITIAL EVALUATION ADULT - OTHER INFO
- Liver transplant: s/p OLT 9/8/23; Simulect, Tacrolimus and Cellcept ordered   - x3 MELISSA drains in place post op.   - Endo: post-transplant steroid regimen ordered (Solu-medrol); SSI ordered.   - Renal: Plasma-lyte ordered. All electrolytes WDL.    - Resp: Extubated on 9/08

## 2023-09-11 NOTE — DIETITIAN INITIAL EVALUATION ADULT - ORAL INTAKE PTA/DIET HISTORY
Pt reports having a good appetite and PO intake PTA. Reports consuming 5-6 small meals a day; consuming >75% of most meals. Follows regular diet. Pt denies any known food allergies or intolerances. Pt confirms use of protein shake 1x/day however does not recall name of supplement at this time. Denies any difficulty chewing/swallowing at this time.

## 2023-09-11 NOTE — PROGRESS NOTE ADULT - SUBJECTIVE AND OBJECTIVE BOX
24 HOUR EVENTS:  - 400u/h heparin drip, PTT goal 35  - plasmolyte decreased 75 > 50 cc/h  - carb consistent CLD and miralax started  - progress to FLD after BM  - switch IV suspensions to pills  - q8 labs  - possible floor tomorrow if still stable  - b/l LE duplex ordered    SUBJECTIVE/ROS:  [ ] A ten-point review of systems was otherwise negative except as noted.  [ ] Due to altered mental status/intubation, subjective information were not able to be obtained from the patient. History was obtained, to the extent possible, from review of the chart and collateral sources of information.      NEURO  Exam: awake, alert, oriented  Meds: oxyCODONE    IR 5 milliGRAM(s) Oral every 4 hours PRN Severe Pain (7 - 10)  oxyCODONE    IR 2.5 milliGRAM(s) Oral every 4 hours PRN Moderate Pain (4 - 6)    [x] Adequacy of sedation and pain control has been assessed and adjusted      RESPIRATORY  RR: 25 (09-11-23 @ 03:00) (0 - 34)  SpO2: 94% (09-11-23 @ 03:00) (93% - 97%)  Exam: unlabored, clear to auscultation bilaterally  Mechanical Ventilation:   ABG - ( 10 Sep 2023 22:42 )  pH: 7.43  /  pCO2: 42    /  pO2: 74    / HCO3: 28    / Base Excess: 3.3   /  SaO2: 96.1    Lactate: x            CARDIOVASCULAR  HR: 78 (09-11-23 @ 03:00) (0 - 97)  BP: 115/62 (09-10-23 @ 20:00) (115/62 - 115/62)  BP(mean): 83 (09-10-23 @ 20:00) (83 - 83)  ABP: 120/69 (09-11-23 @ 03:00) (23/28 - 183/183)  ABP(mean): 91 (09-11-23 @ 03:00) (68 - 183)      Exam: regular rate and rhythm  Cardiac Rhythm: sinus  Perfusion     [x]Adequate   [ ]Inadequate  Mentation   [x]Normal       [ ]Reduced  Extremities  [x]Warm         [ ]Cool  Volume Status [ ]Hypervolemic [x]Euvolemic [ ]Hypovolemic  Meds:       GI/NUTRITION  Exam: soft, nontender, nondistended, incision C/D/I  Diet: CLD  Meds: pantoprazole  Injectable 40 milliGRAM(s) IV Push daily  polyethylene glycol 3350 17 Gram(s) Oral two times a day  simethicone 80 milliGRAM(s) Chew four times a day PRN Gas      GENITOURINARY  I&O's Detail    09-09 @ 07:01  -  09-10 @ 07:00  --------------------------------------------------------  IN:    Enteral Tube Flush: 40 mL    Heparin: 3.5 mL    IV PiggyBack: 100 mL    IV PiggyBack: 1000 mL    multiple electrolytes Injection Type 1.: 2200 mL    Oral Fluid: 75 mL    PRBCs (Packed Red Blood Cells): 300 mL  Total IN: 3718.5 mL    OUT:    Bulb (mL): 605 mL    Bulb (mL): 300 mL    Bulb (mL): 475 mL    Indwelling Catheter - Urethral (mL): 2400 mL    Nasogastric/Oral tube (mL): 250 mL  Total OUT: 4030 mL    Total NET: -311.5 mL      09-10 @ 07:01 - 09-11 @ 03:22  --------------------------------------------------------  IN:    Heparin: 61.5 mL    IV PiggyBack: 800 mL    multiple electrolytes Injection Type 1.: 150 mL    multiple electrolytes Injection Type 1.: 900 mL    Oral Fluid: 200 mL  Total IN: 2111.5 mL    OUT:    Bulb (mL): 60 mL    Bulb (mL): 175 mL    Bulb (mL): 265 mL    Indwelling Catheter - Urethral (mL): 1930 mL  Total OUT: 2430 mL    Total NET: -318.5 mL          09-10    137  |  105  |  18  ----------------------------<  152<H>  4.1   |  25  |  0.49<L>    Ca    7.3<L>      10 Sep 2023 22:42  Phos  2.8     09-10  Mg     2.3     09-10    TPro  4.1<L>  /  Alb  2.5<L>  /  TBili  0.3  /  DBili  x   /  AST  52<H>  /  ALT  149<H>  /  AlkPhos  48  09-10    [ ] Lucas catheter, indication: N/A  Meds: multiple electrolytes Injection Type 1 1000 milliLiter(s) IV Continuous <Continuous>  sodium chloride 0.9% lock flush 10 milliLiter(s) IV Push every 1 hour PRN Pre/post blood products, medications, blood draw, and to maintain line patency        HEMATOLOGIC  Meds: heparin  Infusion 300 Unit(s)/Hr IV Continuous <Continuous>    [x] VTE Prophylaxis                        8.0    6.15  )-----------( 60       ( 10 Sep 2023 22:42 )             23.8     PT/INR - ( 10 Sep 2023 22:42 )   PT: 12.4 sec;   INR: 1.19 ratio         PTT - ( 10 Sep 2023 22:42 )  PTT:25.2 sec  Transfusion     [ ] PRBC   [ ] Platelets   [ ] FFP   [ ] Cryoprecipitate      INFECTIOUS DISEASES  WBC Count: 6.15 K/uL (09-10 @ 22:42)  WBC Count: 7.13 K/uL (09-10 @ 13:58)  WBC Count: 7.79 K/uL (09-10 @ 05:36)    RECENT CULTURES:    Meds: fluconAZOLE IVPB 400 milliGRAM(s) IV Intermittent daily  influenza   Vaccine 0.5 milliLiter(s) IntraMuscular once  mycophenolate mofetil 1000 milliGRAM(s) Oral every 12 hours  tacrolimus 0.5 milliGRAM(s) Oral <User Schedule>  trimethoprim  40 mG/sulfamethoxazole 200 mG Suspension 10 milliLiter(s) Oral daily  valGANciclovir 50 mG/mL Oral Solution 450 milliGRAM(s) Oral daily        ENDOCRINE  CAPILLARY BLOOD GLUCOSE      POCT Blood Glucose.: 141 mg/dL (10 Sep 2023 22:12)  POCT Blood Glucose.: 153 mg/dL (10 Sep 2023 16:46)  POCT Blood Glucose.: 152 mg/dL (10 Sep 2023 11:28)  POCT Blood Glucose.: 153 mg/dL (10 Sep 2023 08:25)    Meds: insulin lispro (ADMELOG) corrective regimen sliding scale   SubCutaneous Before meals and at bedtime  methylPREDNISolone sodium succinate Injectable   IV Push   methylPREDNISolone sodium succinate Injectable 125 milliGRAM(s) IV Push every 24 hours        ACCESS DEVICES:  [ ] Peripheral IV  [ ] Central Venous Line	[ ] R	[ ] L	[ ] IJ	[ ] Fem	[ ] SC	Placed:   [ ] Arterial Line		[ ] R	[ ] L	[ ] Fem	[ ] Rad	[ ] Ax	Placed:   [ ] PICC:					[ ] Mediport  [ ] Urinary Catheter, Date Placed:   [x] Necessity of urinary, arterial, and venous catheters discussed    OTHER MEDICATIONS:  benzocaine/menthol Lozenge 1 Lozenge Oral every 2 hours PRN  chlorhexidine 2% Cloths 1 Application(s) Topical <User Schedule>  sodium chloride 0.65% Nasal 1 Spray(s) Both Nostrils every 4 hours PRN      CODE STATUS: full code

## 2023-09-11 NOTE — PROVIDER CONTACT NOTE (OTHER) - ASSESSMENT
Pt axox4, hemodynamically stable, afebrile, C/O chest tightness.
Pt axox4, hemodynamically stable, afebrile, C/O vaginal bleeding.

## 2023-09-11 NOTE — DIETITIAN INITIAL EVALUATION ADULT - PERTINENT LABORATORY DATA
09-11    137  |  107  |  16  ----------------------------<  130<H>  4.1   |  24  |  0.49<L>    Ca    7.3<L>      11 Sep 2023 06:28  Phos  3.2     09-11  Mg     2.4     09-11    TPro  4.0<L>  /  Alb  2.4<L>  /  TBili  0.3  /  DBili  x   /  AST  41<H>  /  ALT  127<H>  /  AlkPhos  46  09-11  POCT Blood Glucose.: 169 mg/dL (09-11-23 @ 12:27)

## 2023-09-11 NOTE — DIETITIAN INITIAL EVALUATION ADULT - REASON INDICATOR FOR ASSESSMENT
Nutrition consult warranted for: s/p OLT (9/08/23)  Information obtained from: electronic medical record, previous outpatient RD note (2/14/23), patient and pt's mother at bedside. Of note, Pt is Cymro speaking, denied use of  at this time; able to communicate in English.   Chart reviewed, events noted.

## 2023-09-11 NOTE — PROVIDER CONTACT NOTE (OTHER) - ACTION/TREATMENT ORDERED:
No interventions provided. Will continue to monitor.
EKG completed, troponin added on to labs. Will continue to monitor.

## 2023-09-11 NOTE — OCCUPATIONAL THERAPY INITIAL EVALUATION ADULT - RANGE OF MOTION EXAMINATION, LOWER EXTREMITY
Birth Control Pills Counseling: Birth Control Pill Counseling: I discussed with the patient the potential side effects of OCPs including but not limited to increased risk of stroke, heart attack, thrombophlebitis, deep venous thrombosis, hepatic adenomas, breast changes, GI upset, headaches, and depression.  The patient verbalized understanding of the proper use and possible adverse effects of OCPs. All of the patient's questions and concerns were addressed. Tetracycline Counseling: Patient counseled regarding possible photosensitivity and increased risk for sunburn.  Patient instructed to avoid sunlight, if possible.  When exposed to sunlight, patients should wear protective clothing, sunglasses, and sunscreen.  The patient was instructed to call the office immediately if the following severe adverse effects occur:  hearing changes, easy bruising/bleeding, severe headache, or vision changes.  The patient verbalized understanding of the proper use and possible adverse effects of tetracycline.  All of the patient's questions and concerns were addressed. Patient understands to avoid pregnancy while on therapy due to potential birth defects. Isotretinoin Pregnancy And Lactation Text: This medication is Pregnancy Category X and is considered extremely dangerous during pregnancy. It is unknown if it is excreted in breast milk. Topical Retinoid Pregnancy And Lactation Text: This medication is Pregnancy Category C. It is unknown if this medication is excreted in breast milk. Benzoyl Peroxide Pregnancy And Lactation Text: This medication is Pregnancy Category C. It is unknown if benzoyl peroxide is excreted in breast milk. Winlevi Counseling:  I discussed with the patient the risks of topical clascoterone including but not limited to erythema, scaling, itching, and stinging. Patient voiced their understanding. Erythromycin Pregnancy And Lactation Text: This medication is Pregnancy Category B and is considered safe during pregnancy. It is also excreted in breast milk. Spironolactone Counseling: Patient advised regarding risks of diarrhea, abdominal pain, hyperkalemia, birth defects (for female patients), liver toxicity and renal toxicity. The patient may need blood work to monitor liver and kidney function and potassium levels while on therapy. The patient verbalized understanding of the proper use and possible adverse effects of spironolactone.  All of the patient's questions and concerns were addressed. Sarecycline Counseling: Patient advised regarding possible photosensitivity and discoloration of the teeth, skin, lips, tongue and gums.  Patient instructed to avoid sunlight, if possible.  When exposed to sunlight, patients should wear protective clothing, sunglasses, and sunscreen.  The patient was instructed to call the office immediately if the following severe adverse effects occur:  hearing changes, easy bruising/bleeding, severe headache, or vision changes.  The patient verbalized understanding of the proper use and possible adverse effects of sarecycline.  All of the patient's questions and concerns were addressed. Bactrim Counseling:  I discussed with the patient the risks of sulfa antibiotics including but not limited to GI upset, allergic reaction, drug rash, diarrhea, dizziness, photosensitivity, and yeast infections.  Rarely, more serious reactions can occur including but not limited to aplastic anemia, agranulocytosis, methemoglobinemia, blood dyscrasias, liver or kidney failure, lung infiltrates or desquamative/blistering drug rashes. Topical Sulfur Applications Counseling: Topical Sulfur Counseling: Patient counseled that this medication may cause skin irritation or allergic reactions.  In the event of skin irritation, the patient was advised to reduce the amount of the drug applied or use it less frequently.   The patient verbalized understanding of the proper use and possible adverse effects of topical sulfur application.  All of the patient's questions and concerns were addressed. Azithromycin Counseling:  I discussed with the patient the risks of azithromycin including but not limited to GI upset, allergic reaction, drug rash, diarrhea, and yeast infections. Doxycycline Pregnancy And Lactation Text: This medication is Pregnancy Category D and not consider safe during pregnancy. It is also excreted in breast milk but is considered safe for shorter treatment courses. Azelaic Acid Pregnancy And Lactation Text: This medication is considered safe during pregnancy and breast feeding. Include Pregnancy/Lactation Warning?: No Topical Clindamycin Counseling: Patient counseled that this medication may cause skin irritation or allergic reactions.  In the event of skin irritation, the patient was advised to reduce the amount of the drug applied or use it less frequently.   The patient verbalized understanding of the proper use and possible adverse effects of clindamycin.  All of the patient's questions and concerns were addressed. Dapsone Pregnancy And Lactation Text: This medication is Pregnancy Category C and is not considered safe during pregnancy or breast feeding. Aklief Pregnancy And Lactation Text: It is unknown if this medication is safe to use during pregnancy.  It is unknown if this medication is excreted in breast milk.  Breastfeeding women should use the topical cream on the smallest area of the skin for the shortest time needed while breastfeeding.  Do not apply to nipple and areola. Minocycline Counseling: Patient advised regarding possible photosensitivity and discoloration of the teeth, skin, lips, tongue and gums.  Patient instructed to avoid sunlight, if possible.  When exposed to sunlight, patients should wear protective clothing, sunglasses, and sunscreen.  The patient was instructed to call the office immediately if the following severe adverse effects occur:  hearing changes, easy bruising/bleeding, severe headache, or vision changes.  The patient verbalized understanding of the proper use and possible adverse effects of minocycline.  All of the patient's questions and concerns were addressed. Tazorac Counseling:  Patient advised that medication is irritating and drying.  Patient may need to apply sparingly and wash off after an hour before eventually leaving it on overnight.  The patient verbalized understanding of the proper use and possible adverse effects of tazorac.  All of the patient's questions and concerns were addressed. High Dose Vitamin A Counseling: Side effects reviewed, pt to contact office should one occur. Birth Control Pills Pregnancy And Lactation Text: This medication should be avoided if pregnant and for the first 30 days post-partum. Tetracycline Pregnancy And Lactation Text: This medication is Pregnancy Category D and not consider safe during pregnancy. It is also excreted in breast milk. Isotretinoin Counseling: Patient should get monthly blood tests, not donate blood, not drive at night if vision affected, not share medication, and not undergo elective surgery for 6 months after tx completed. Side effects reviewed, pt to contact office should one occur. Spironolactone Pregnancy And Lactation Text: This medication can cause feminization of the male fetus and should be avoided during pregnancy. The active metabolite is also found in breast milk. Topical Retinoid counseling:  Patient advised to apply a pea-sized amount only at bedtime and wait 30 minutes after washing their face before applying.  If too drying, patient may add a non-comedogenic moisturizer. The patient verbalized understanding of the proper use and possible adverse effects of retinoids.  All of the patient's questions and concerns were addressed. Bactrim Pregnancy And Lactation Text: This medication is Pregnancy Category D and is known to cause fetal risk.  It is also excreted in breast milk. Detail Level: Zone Benzoyl Peroxide Counseling: Patient counseled that medicine may cause skin irritation and bleach clothing.  In the event of skin irritation, the patient was advised to reduce the amount of the drug applied or use it less frequently.   The patient verbalized understanding of the proper use and possible adverse effects of benzoyl peroxide.  All of the patient's questions and concerns were addressed. Winlevi Pregnancy And Lactation Text: This medication is considered safe during pregnancy and breastfeeding. Azithromycin Pregnancy And Lactation Text: This medication is considered safe during pregnancy and is also secreted in breast milk. Erythromycin Counseling:  I discussed with the patient the risks of erythromycin including but not limited to GI upset, allergic reaction, drug rash, diarrhea, increase in liver enzymes, and yeast infections. Topical Sulfur Applications Pregnancy And Lactation Text: This medication is Pregnancy Category C and has an unknown safety profile during pregnancy. It is unknown if this topical medication is excreted in breast milk. Doxycycline Counseling:  Patient counseled regarding possible photosensitivity and increased risk for sunburn.  Patient instructed to avoid sunlight, if possible.  When exposed to sunlight, patients should wear protective clothing, sunglasses, and sunscreen.  The patient was instructed to call the office immediately if the following severe adverse effects occur:  hearing changes, easy bruising/bleeding, severe headache, or vision changes.  The patient verbalized understanding of the proper use and possible adverse effects of doxycycline.  All of the patient's questions and concerns were addressed. Azelaic Acid Counseling: Patient counseled that medicine may cause skin irritation and to avoid applying near the eyes.  In the event of skin irritation, the patient was advised to reduce the amount of the drug applied or use it less frequently.   The patient verbalized understanding of the proper use and possible adverse effects of azelaic acid.  All of the patient's questions and concerns were addressed. Topical Clindamycin Pregnancy And Lactation Text: This medication is Pregnancy Category B and is considered safe during pregnancy. It is unknown if it is excreted in breast milk. bilateral LE Active ROM was WFL  (within functional limits)/bilateral LE Passive ROM was WFL  (within functional limits) High Dose Vitamin A Pregnancy And Lactation Text: High dose vitamin A therapy is contraindicated during pregnancy and breast feeding. Aklief counseling:  Patient advised to apply a pea-sized amount only at bedtime and wait 30 minutes after washing their face before applying.  If too drying, patient may add a non-comedogenic moisturizer.  The most commonly reported side effects including irritation, redness, scaling, dryness, stinging, burning, itching, and increased risk of sunburn.  The patient verbalized understanding of the proper use and possible adverse effects of retinoids.  All of the patient's questions and concerns were addressed. Dapsone Counseling: I discussed with the patient the risks of dapsone including but not limited to hemolytic anemia, agranulocytosis, rashes, methemoglobinemia, kidney failure, peripheral neuropathy, headaches, GI upset, and liver toxicity.  Patients who start dapsone require monitoring including baseline LFTs and weekly CBCs for the first month, then every month thereafter.  The patient verbalized understanding of the proper use and possible adverse effects of dapsone.  All of the patient's questions and concerns were addressed. Tazorac Pregnancy And Lactation Text: This medication is not safe during pregnancy. It is unknown if this medication is excreted in breast milk. Detail Level: Detailed

## 2023-09-11 NOTE — DIETITIAN INITIAL EVALUATION ADULT - PHYSCIAL ASSESSMENT
Weight Hx Per:  - Source: patient   - UBW: 105-114 pounds   - Reported weight changes: Pt endorses weight fluctuation.     Weight Hx Per outpatient RD note:   - 114 pounds (4/2022)  - 105 pounds (2/7/23)    Current Admission Weights:  - Dosing weight: 107.9 pounds/48.8 kg (09/08)    Weight Change:  - Noted 6% weight loss x 1 year.     **  Will continue to monitor weight trends as available/able.     IBW: 110 pounds   %IBW: 97%

## 2023-09-11 NOTE — PROVIDER CONTACT NOTE (OTHER) - BACKGROUND
Pt S/P OLT. 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
Pt S/P OLT. 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

## 2023-09-11 NOTE — OCCUPATIONAL THERAPY INITIAL EVALUATION ADULT - LIVES WITH, PROFILE
Pt lives with spouse in  with stairs. Pt reports independence with all aspects of self care and functional mobility without AD PTA.

## 2023-09-11 NOTE — OCCUPATIONAL THERAPY INITIAL EVALUATION ADULT - PERTINENT HX OF CURRENT PROBLEM, REHAB EVAL
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

## 2023-09-11 NOTE — DIETITIAN INITIAL EVALUATION ADULT - PERTINENT MEDS FT
MEDICATIONS  (STANDING):  chlorhexidine 2% Cloths 1 Application(s) Topical <User Schedule>  fluconAZOLE   Tablet 200 milliGRAM(s) Oral daily  heparin  Infusion 300 Unit(s)/Hr (4 mL/Hr) IV Continuous <Continuous>  influenza   Vaccine 0.5 milliLiter(s) IntraMuscular once  insulin lispro (ADMELOG) corrective regimen sliding scale   SubCutaneous three times a day before meals  insulin lispro (ADMELOG) corrective regimen sliding scale   SubCutaneous at bedtime  lidocaine   4% Patch 1 Patch Transdermal once  methylPREDNISolone sodium succinate Injectable   IV Push   mycophenolate mofetil 1000 milliGRAM(s) Oral <User Schedule>  pantoprazole    Tablet 40 milliGRAM(s) Oral before breakfast  polyethylene glycol 3350 17 Gram(s) Oral two times a day  tacrolimus 0.5 milliGRAM(s) Oral <User Schedule>  trimethoprim   80 mG/sulfamethoxazole 400 mG 1 Tablet(s) Oral daily  valGANciclovir 450 milliGRAM(s) Oral daily    MEDICATIONS  (PRN):  benzocaine/menthol Lozenge 1 Lozenge Oral every 2 hours PRN Sore Throat  oxyCODONE    IR 5 milliGRAM(s) Oral every 4 hours PRN Severe Pain (7 - 10)  oxyCODONE    IR 2.5 milliGRAM(s) Oral every 4 hours PRN Moderate Pain (4 - 6)  simethicone 80 milliGRAM(s) Chew four times a day PRN Gas  sodium chloride 0.65% Nasal 1 Spray(s) Both Nostrils every 4 hours PRN Nasal Congestion

## 2023-09-12 LAB
ALBUMIN SERPL ELPH-MCNC: 2.3 G/DL — LOW (ref 3.3–5)
ALP SERPL-CCNC: 45 U/L — SIGNIFICANT CHANGE UP (ref 40–120)
ALT FLD-CCNC: 87 U/L — HIGH (ref 10–45)
ANION GAP SERPL CALC-SCNC: 6 MMOL/L — SIGNIFICANT CHANGE UP (ref 5–17)
APTT BLD: 30.7 SEC — SIGNIFICANT CHANGE UP (ref 24.5–35.6)
APTT BLD: 34.6 SEC — SIGNIFICANT CHANGE UP (ref 24.5–35.6)
APTT BLD: 40 SEC — HIGH (ref 24.5–35.6)
APTT BLD: 43.7 SEC — HIGH (ref 24.5–35.6)
APTT BLD: 52.7 SEC — HIGH (ref 24.5–35.6)
AST SERPL-CCNC: 28 U/L — SIGNIFICANT CHANGE UP (ref 10–40)
BILIRUB SERPL-MCNC: 0.3 MG/DL — SIGNIFICANT CHANGE UP (ref 0.2–1.2)
BUN SERPL-MCNC: 23 MG/DL — SIGNIFICANT CHANGE UP (ref 7–23)
CALCIUM SERPL-MCNC: 7.4 MG/DL — LOW (ref 8.4–10.5)
CHLORIDE SERPL-SCNC: 108 MMOL/L — SIGNIFICANT CHANGE UP (ref 96–108)
CO2 SERPL-SCNC: 24 MMOL/L — SIGNIFICANT CHANGE UP (ref 22–31)
CREAT SERPL-MCNC: 0.62 MG/DL — SIGNIFICANT CHANGE UP (ref 0.5–1.3)
EGFR: 116 ML/MIN/1.73M2 — SIGNIFICANT CHANGE UP
GLUCOSE BLDC GLUCOMTR-MCNC: 121 MG/DL — HIGH (ref 70–99)
GLUCOSE SERPL-MCNC: 109 MG/DL — HIGH (ref 70–99)
HCT VFR BLD CALC: 23.4 % — LOW (ref 34.5–45)
HCT VFR BLD CALC: 26.8 % — LOW (ref 34.5–45)
HGB BLD-MCNC: 7.5 G/DL — LOW (ref 11.5–15.5)
HGB BLD-MCNC: 8.9 G/DL — LOW (ref 11.5–15.5)
INR BLD: 1.24 RATIO — HIGH (ref 0.85–1.18)
MAGNESIUM SERPL-MCNC: 2.2 MG/DL — SIGNIFICANT CHANGE UP (ref 1.6–2.6)
MCHC RBC-ENTMCNC: 26.9 PG — LOW (ref 27–34)
MCHC RBC-ENTMCNC: 27.5 PG — SIGNIFICANT CHANGE UP (ref 27–34)
MCHC RBC-ENTMCNC: 32.1 GM/DL — SIGNIFICANT CHANGE UP (ref 32–36)
MCHC RBC-ENTMCNC: 33.2 GM/DL — SIGNIFICANT CHANGE UP (ref 32–36)
MCV RBC AUTO: 82.7 FL — SIGNIFICANT CHANGE UP (ref 80–100)
MCV RBC AUTO: 83.9 FL — SIGNIFICANT CHANGE UP (ref 80–100)
NRBC # BLD: 0 /100 WBCS — SIGNIFICANT CHANGE UP (ref 0–0)
NRBC # BLD: 0 /100 WBCS — SIGNIFICANT CHANGE UP (ref 0–0)
PHOSPHATE SERPL-MCNC: 3.1 MG/DL — SIGNIFICANT CHANGE UP (ref 2.5–4.5)
PLATELET # BLD AUTO: 64 K/UL — LOW (ref 150–400)
PLATELET # BLD AUTO: 92 K/UL — LOW (ref 150–400)
POTASSIUM SERPL-MCNC: 4.3 MMOL/L — SIGNIFICANT CHANGE UP (ref 3.5–5.3)
POTASSIUM SERPL-SCNC: 4.3 MMOL/L — SIGNIFICANT CHANGE UP (ref 3.5–5.3)
PROT SERPL-MCNC: 3.8 G/DL — LOW (ref 6–8.3)
PROTHROM AB SERPL-ACNC: 12.9 SEC — SIGNIFICANT CHANGE UP (ref 9.5–13)
RBC # BLD: 2.79 M/UL — LOW (ref 3.8–5.2)
RBC # BLD: 3.24 M/UL — LOW (ref 3.8–5.2)
RBC # FLD: 14.8 % — HIGH (ref 10.3–14.5)
RBC # FLD: 15 % — HIGH (ref 10.3–14.5)
SODIUM SERPL-SCNC: 138 MMOL/L — SIGNIFICANT CHANGE UP (ref 135–145)
TACROLIMUS SERPL-MCNC: 2.8 NG/ML — SIGNIFICANT CHANGE UP
WBC # BLD: 5.45 K/UL — SIGNIFICANT CHANGE UP (ref 3.8–10.5)
WBC # BLD: 8.07 K/UL — SIGNIFICANT CHANGE UP (ref 3.8–10.5)
WBC # FLD AUTO: 5.45 K/UL — SIGNIFICANT CHANGE UP (ref 3.8–10.5)
WBC # FLD AUTO: 8.07 K/UL — SIGNIFICANT CHANGE UP (ref 3.8–10.5)

## 2023-09-12 PROCEDURE — 76705 ECHO EXAM OF ABDOMEN: CPT | Mod: 26,59

## 2023-09-12 PROCEDURE — 99232 SBSQ HOSP IP/OBS MODERATE 35: CPT

## 2023-09-12 PROCEDURE — 93975 VASCULAR STUDY: CPT | Mod: 26

## 2023-09-12 RX ORDER — TACROLIMUS 5 MG/1
1 CAPSULE ORAL ONCE
Refills: 0 | Status: COMPLETED | OUTPATIENT
Start: 2023-09-12 | End: 2023-09-12

## 2023-09-12 RX ORDER — BASILIXIMAB 20 MG/5ML
20 INJECTION, POWDER, FOR SOLUTION INTRAVENOUS ONCE
Refills: 0 | Status: COMPLETED | OUTPATIENT
Start: 2023-09-12 | End: 2023-09-12

## 2023-09-12 RX ORDER — TACROLIMUS 5 MG/1
2 CAPSULE ORAL
Refills: 0 | Status: DISCONTINUED | OUTPATIENT
Start: 2023-09-12 | End: 2023-09-18

## 2023-09-12 RX ADMIN — Medication 60 MILLIGRAM(S): at 05:32

## 2023-09-12 RX ADMIN — HEPARIN SODIUM 10 UNIT(S)/HR: 5000 INJECTION INTRAVENOUS; SUBCUTANEOUS at 10:54

## 2023-09-12 RX ADMIN — HEPARIN SODIUM 9 UNIT(S)/HR: 5000 INJECTION INTRAVENOUS; SUBCUTANEOUS at 05:32

## 2023-09-12 RX ADMIN — TACROLIMUS 1 MILLIGRAM(S): 5 CAPSULE ORAL at 08:07

## 2023-09-12 RX ADMIN — OXYCODONE HYDROCHLORIDE 5 MILLIGRAM(S): 5 TABLET ORAL at 17:08

## 2023-09-12 RX ADMIN — PANTOPRAZOLE SODIUM 40 MILLIGRAM(S): 20 TABLET, DELAYED RELEASE ORAL at 08:07

## 2023-09-12 RX ADMIN — MYCOPHENOLATE MOFETIL 1000 MILLIGRAM(S): 250 CAPSULE ORAL at 08:07

## 2023-09-12 RX ADMIN — BASILIXIMAB 100 MILLIGRAM(S): 20 INJECTION, POWDER, FOR SOLUTION INTRAVENOUS at 09:22

## 2023-09-12 RX ADMIN — MYCOPHENOLATE MOFETIL 1000 MILLIGRAM(S): 250 CAPSULE ORAL at 19:23

## 2023-09-12 RX ADMIN — OXYCODONE HYDROCHLORIDE 5 MILLIGRAM(S): 5 TABLET ORAL at 22:15

## 2023-09-12 RX ADMIN — POLYETHYLENE GLYCOL 3350 17 GRAM(S): 17 POWDER, FOR SOLUTION ORAL at 17:43

## 2023-09-12 RX ADMIN — Medication 5 MILLIGRAM(S): at 22:15

## 2023-09-12 RX ADMIN — FLUCONAZOLE 200 MILLIGRAM(S): 150 TABLET ORAL at 12:08

## 2023-09-12 RX ADMIN — OXYCODONE HYDROCHLORIDE 5 MILLIGRAM(S): 5 TABLET ORAL at 01:17

## 2023-09-12 RX ADMIN — OXYCODONE HYDROCHLORIDE 5 MILLIGRAM(S): 5 TABLET ORAL at 22:45

## 2023-09-12 RX ADMIN — TACROLIMUS 2 MILLIGRAM(S): 5 CAPSULE ORAL at 19:22

## 2023-09-12 RX ADMIN — POLYETHYLENE GLYCOL 3350 17 GRAM(S): 17 POWDER, FOR SOLUTION ORAL at 05:32

## 2023-09-12 RX ADMIN — OXYCODONE HYDROCHLORIDE 5 MILLIGRAM(S): 5 TABLET ORAL at 01:47

## 2023-09-12 RX ADMIN — TACROLIMUS 1 MILLIGRAM(S): 5 CAPSULE ORAL at 17:50

## 2023-09-12 RX ADMIN — VALGANCICLOVIR 450 MILLIGRAM(S): 450 TABLET, FILM COATED ORAL at 12:08

## 2023-09-12 RX ADMIN — HEPARIN SODIUM 10 UNIT(S)/HR: 5000 INJECTION INTRAVENOUS; SUBCUTANEOUS at 22:45

## 2023-09-12 RX ADMIN — OXYCODONE HYDROCHLORIDE 5 MILLIGRAM(S): 5 TABLET ORAL at 16:38

## 2023-09-12 RX ADMIN — OXYCODONE HYDROCHLORIDE 5 MILLIGRAM(S): 5 TABLET ORAL at 12:38

## 2023-09-12 RX ADMIN — OXYCODONE HYDROCHLORIDE 5 MILLIGRAM(S): 5 TABLET ORAL at 12:08

## 2023-09-12 RX ADMIN — Medication 1 TABLET(S): at 12:08

## 2023-09-12 RX ADMIN — CHLORHEXIDINE GLUCONATE 1 APPLICATION(S): 213 SOLUTION TOPICAL at 05:31

## 2023-09-12 NOTE — PROGRESS NOTE ADULT - SUBJECTIVE AND OBJECTIVE BOX
TRANSPLANT SURGERY PROGRESS NOTE    Patient: ERIC DAVILA , 37y ([birth year])Female   MRN: [id number]  Location: [place] [address]  Visit: 09-07-23 Inpatient  Date: 09-10-23 @ 12:20    Post-Op Day #4    Procedure/Dx/Injuries: s/p OLT, budd-chiari syndrome    Events of past 24 hours:   POD 3 DC Lucas/passed TOV. ADAT. Dc Sachin. Tacro level 2.7, stat 0.5 and incr to 1/1 from 0.5/0.5 US: PAtent vessels. The periphery of the left hepatic lobe, which may be intra or extrahepatic in location, and may communicate with a complex fluid collection in the perihepatic space. May represent a hematoma, versus bile leak, versus seroma, versus an abscess in the appropriate clinical setting. BLE duplex neg for DVT.  ON: APTT:28.5, Hep gtt increased from 500->700.        MEDICATIONS  (STANDING):  chlorhexidine 2% Cloths 1 Application(s) Topical <User Schedule>  fluconAZOLE   Tablet 200 milliGRAM(s) Oral daily  heparin  Infusion 500 Unit(s)/Hr (10 mL/Hr) IV Continuous <Continuous>  influenza   Vaccine 0.5 milliLiter(s) IntraMuscular once  lidocaine   4% Patch 1 Patch Transdermal once  melatonin 5 milliGRAM(s) Oral at bedtime  methylPREDNISolone sodium succinate Injectable   IV Push   mycophenolate mofetil 1000 milliGRAM(s) Oral <User Schedule>  pantoprazole    Tablet 40 milliGRAM(s) Oral before breakfast  polyethylene glycol 3350 17 Gram(s) Oral two times a day  tacrolimus 1 milliGRAM(s) Oral <User Schedule>  trimethoprim   80 mG/sulfamethoxazole 400 mG 1 Tablet(s) Oral daily  valGANciclovir 450 milliGRAM(s) Oral daily    MEDICATIONS  (PRN):  benzocaine/menthol Lozenge 1 Lozenge Oral every 2 hours PRN Sore Throat  oxyCODONE    IR 5 milliGRAM(s) Oral every 4 hours PRN Severe Pain (7 - 10)  oxyCODONE    IR 2.5 milliGRAM(s) Oral every 4 hours PRN Moderate Pain (4 - 6)  simethicone 80 milliGRAM(s) Chew four times a day PRN Gas  sodium chloride 0.65% Nasal 1 Spray(s) Both Nostrils every 4 hours PRN Nasal Congestion      PAST MEDICAL & SURGICAL HISTORY:  Cirrhosis  2010      Cirrhosis      Budd-Chiari syndrome      H/O protein S deficiency      Miscarriage  x 5      H/O protein S deficiency      History of transjugular intrahepatic portosystemic shunt      History of dilation and curettage      Presence of IVC filter          Vital Signs Last 24 Hrs  T(C): 36.4 (12 Sep 2023 07:00), Max: 36.8 (11 Sep 2023 15:00)  T(F): 97.5 (12 Sep 2023 07:00), Max: 98.3 (11 Sep 2023 19:00)  HR: 84 (12 Sep 2023 10:00) (69 - 98)  BP: 116/64 (12 Sep 2023 10:00) (91/64 - 130/73)  BP(mean): 85 (12 Sep 2023 10:00) (74 - 104)  RR: 25 (12 Sep 2023 10:00) (14 - 33)  SpO2: 96% (12 Sep 2023 10:00) (92% - 96%)    Parameters below as of 12 Sep 2023 07:00  Patient On (Oxygen Delivery Method): room air        I&O's Summary    11 Sep 2023 07:01  -  12 Sep 2023 07:00  --------------------------------------------------------  IN: 989.5 mL / OUT: 2886 mL / NET: -1896.5 mL    12 Sep 2023 07:01  -  12 Sep 2023 12:14  --------------------------------------------------------  IN: 287 mL / OUT: 720 mL / NET: -433 mL                              8.9    8.07  )-----------( 92       ( 12 Sep 2023 09:55 )             26.8     09-12    138  |  108  |  23  ----------------------------<  109<H>  4.3   |  24  |  0.62    Ca    7.4<L>      12 Sep 2023 05:35  Phos  3.1     09-12  Mg     2.2     09-12    TPro  3.8<L>  /  Alb  2.3<L>  /  TBili  0.3  /  DBili  x   /  AST  28  /  ALT  87<H>  /  AlkPhos  45  09-12    Tacrolimus (), Serum: 2.8 ng/mL (09-12 @ 05:37)                      PHYSICAL EXAM:  General: NAD, AAOx3, calm and cooperative  HEENT: NCAT, CESAR, EOMI, Trachea ML, Neck supple  Cardiac: RRR S1, S2, no Murmurs, rubs or gallops  Respiratory: CTAB, normal respiratory effort  Abdomen: Soft, non-distended, minimally tender around incision, no erythema/edema/fluctuance/drainage, MELISSA drain x3 SS  Ext: no lower extremity edema, warm and well-perfused  Skin: no breakdown, no jaundice

## 2023-09-12 NOTE — PROGRESS NOTE ADULT - TIME BILLING
I saw and evaluated patient and agree with above note  overall patient looks well  concern is that patient does have serosanguinous and sanguinous output from MELISSA's  However, the patient has not required ongoing blood transfusion  is on heparin drip for coagulopathy but slow to increase rate due to concern of potential hemorrhage  care carefully coordinated with transplant team  will continue to monitor in the SICU
I saw and evaluated patient and agree with above note  looks well  tolerating diet  still observing for possible hemorrhage  still increasing heparin rate to obtain desired goal as discussed with transplant team  still has bloody drainage from MELISSA, however the left sided MELISSA has the most output and it is serosanguinous.  In addition, the hematocrit has been stable without blood transfusion over last 24 hours.

## 2023-09-12 NOTE — PROGRESS NOTE ADULT - ASSESSMENT
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    PLAN:   Neurologic:  - oxy PRN for pain  - melatonin 5mg for sleep    Respiratory:  - satting well on RA  - Encourage IS    Cardiovascular:  - off pressors, HD stable  - maintain MAP >65  - lactate cleared    Gastrointestinal/Nutrition: s/p OLT 9/8/23  - regular diet  - monitor MELISSA outputs q2h, MELISSA 1 and 2 sanguinous, MELISSA 3 serosanguinous   - Post-transplant hepatic US with doppler: patent hepatic vasculature  - Induction Transplant Medications per Transplant Team       - Simulect (Basiliximab) POD#0 and POD#4       - Methylprednisolone Taper transition to Prednisone POD#6  - Maintenance Transplant Medications per Transplant Team       - Cellcept (Mycophenolate) q12H       - Tacrolimus started POD#1       - Prednisone 20mg PO Daily POD#6  - Protonix 40mg IV Daily   - Senna    Genitourinary/Renal:  - IVL  - Maintain indwelling salamanac  - Strict I/Os  - Monitor electrolytes, supplement PRN    Hematologic:   - s/p phytonadione for INR 4.70 - now 1.37  - Hold home Coumadin 5mg daily (last dose 9/7)  - heparin drip subtherapeutic, increased to 700U/h - goal 40-50  - SCDs for mechanical VTE ppx  - b/l LE duplex neg for DVT    Infectious Disease:  - Transplant ppx with Valcyte, Bactrim, and Diflucan  - tacrolimus restarted  - completed unasyn 48hrs post-op  - Monitor WBC, temperature    Endocrine:  - s/p steroid induction  - Methylprednisolone taper to Prednisone per Transplant    Lines/ Drains/ Tubes:  - L radial A-line  - RIJ intro  - MELISSA x 3 #1 - R lobe, #2 - Hilum , #3 - L lobe.     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     POD#4  PLAN:   Neurologic:  - oxy PRN for pain  - melatonin 5mg for sleep    Respiratory:  - satting well on RA  - Encourage IS    Cardiovascular:  - off pressors, HD stable  - maintain MAP >65  - lactate cleared    Gastrointestinal/Nutrition: s/p OLT 9/8/23  - regular diet  - monitor MELISSA outputs q2h, MELISSA 1 and 2 sanguinous, MELISSA 3 serosanguinous   - Post-transplant hepatic US with doppler: patent hepatic vasculature  - Induction Transplant Medications per Transplant Team       - Simulect (Basiliximab) POD#0 and POD#4       - Methylprednisolone Taper transition to Prednisone POD#6  - Maintenance Transplant Medications per Transplant Team       - Cellcept (Mycophenolate) q12H       - Tacrolimus started POD#1       - Prednisone 20mg PO Daily POD#6  - Protonix 40mg IV Daily   - Senna    Genitourinary/Renal:  - IVL  - Maintain indwelling salamanca  - Strict I/Os  - Monitor electrolytes, supplement PRN    Hematologic:   - s/p phytonadione for INR 4.70 - now 1.37  - Hold home Coumadin 5mg daily (last dose 9/7)  - heparin drip subtherapeutic, increased to 700U/h - goal 40-50  - SCDs for mechanical VTE ppx  - b/l LE duplex neg for DVT    Infectious Disease:  - Transplant ppx with Valcyte, Bactrim, and Diflucan  - tacrolimus restarted  - completed unasyn 48hrs post-op  - Monitor WBC, temperature    Endocrine:  - s/p steroid induction  - Methylprednisolone taper to Prednisone per Transplant    Lines/ Drains/ Tubes:  - L radial A-line  - RIJ intro  - MELISSA x 3 #1 - R lobe, #2 - Hilum , #3 - L lobe.     labs q8h

## 2023-09-12 NOTE — PROGRESS NOTE ADULT - ASSESSMENT
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 s/p elective TIPs/DIPS in 5/2021 now POD 4 s/p OTL.    #OTL  -Continue tacrolimus  -AM tacrolimus levels  -Continue fluconazole, bactrim, valganciclovir  -Reg diet  -Hep gtt with PTT goal 40-50  -Ok for labs q8h  -F/U MELISSA outputs  -D/C Cordis      #Budd-Chiari  -F/U with hematologist about preferred AC outpatient

## 2023-09-12 NOTE — PROGRESS NOTE ADULT - SUBJECTIVE AND OBJECTIVE BOX
24 HOUR EVENTS:  - regular diet started consistent carb  - salamanca out, passed TOV  - d/c R radial A-line  - heparin adjusted to 700u/h, goal pTT 40-50  - b/l LE duplex neg for DVT  - IVL    SUBJECTIVE/ROS:  [ ] A ten-point review of systems was otherwise negative except as noted.  [ ] Due to altered mental status/intubation, subjective information were not able to be obtained from the patient. History was obtained, to the extent possible, from review of the chart and collateral sources of information.      NEURO  Exam: awake, alert, oriented  Meds: melatonin 5 milliGRAM(s) Oral at bedtime  oxyCODONE    IR 2.5 milliGRAM(s) Oral every 4 hours PRN Moderate Pain (4 - 6)  oxyCODONE    IR 5 milliGRAM(s) Oral every 4 hours PRN Severe Pain (7 - 10)    [x] Adequacy of sedation and pain control has been assessed and adjusted      RESPIRATORY  RR: 24 (09-11-23 @ 23:00) (17 - 29)  SpO2: 95% (09-11-23 @ 23:00) (92% - 96%)  Exam: unlabored, clear to auscultation bilaterally  pH: 7.46  /  pCO2: 39    /  pO2: 77    / HCO3: 28    / Base Excess: 3.6   /  SaO2: 97.4    Lactate: x          CARDIOVASCULAR  HR: 77 (09-11-23 @ 23:00) (70 - 98)  BP: 106/60 (09-11-23 @ 23:00) (105/68 - 130/73)  BP(mean): 78 (09-11-23 @ 23:00) (78 - 104)  ABP: 128/71 (09-11-23 @ 11:00) (112/68 - 129/73)  ABP(mean): 96 (09-11-23 @ 11:00) (89 - 98)      Exam: regular rate and rhythm  Cardiac Rhythm: sinus  Perfusion     [x]Adequate   [ ]Inadequate  Mentation   [x]Normal       [ ]Reduced  Extremities  [x]Warm         [ ]Cool  Volume Status [ ]Hypervolemic [x]Euvolemic [ ]Hypovolemic  Meds:       GI/NUTRITION  Exam: soft, appropriately tender, nondistended, incision C/D/I  Diet: regular diet, consistent carbohydrate  Meds: pantoprazole    Tablet 40 milliGRAM(s) Oral before breakfast  polyethylene glycol 3350 17 Gram(s) Oral two times a day  simethicone 80 milliGRAM(s) Chew four times a day PRN Gas      GENITOURINARY  I&O's Detail    09-10 @ 07:01 - 09-11 @ 07:00  --------------------------------------------------------  IN:    Heparin: 77.5 mL    IV PiggyBack: 800 mL    multiple electrolytes Injection Type 1.: 150 mL    multiple electrolytes Injection Type 1.: 1100 mL    Oral Fluid: 320 mL  Total IN: 2447.5 mL    OUT:    Bulb (mL): 215 mL    Bulb (mL): 425 mL    Bulb (mL): 85 mL    Indwelling Catheter - Urethral (mL): 2580 mL  Total OUT: 3305 mL    Total NET: -857.5 mL      09-11 @ 07:01 - 09-12 @ 00:03  --------------------------------------------------------  IN:    Heparin: 29.5 mL    Heparin: 46 mL    multiple electrolytes Injection Type 1.: 100 mL    Oral Fluid: 750 mL  Total IN: 925.5 mL    OUT:    Bulb (mL): 280 mL    Bulb (mL): 70 mL    Bulb (mL): 60 mL    Indwelling Catheter - Urethral (mL): 1375 mL    Voided (mL): 800 mL  Total OUT: 2585 mL    Total NET: -1659.5 mL          09-11    137  |  107  |  16  ----------------------------<  130<H>  4.1   |  24  |  0.49<L>    Ca    7.3<L>      11 Sep 2023 06:28  Phos  3.2     09-11  Mg     2.4     09-11    TPro  4.0<L>  /  Alb  2.4<L>  /  TBili  0.3  /  DBili  x   /  AST  41<H>  /  ALT  127<H>  /  AlkPhos  46  09-11    [ ] Salamanca catheter, indication: N/A  Meds:       HEMATOLOGIC  Meds: heparin  Infusion 500 Unit(s)/Hr IV Continuous <Continuous>    [x] VTE Prophylaxis                        8.4    7.59  )-----------( 67       ( 11 Sep 2023 12:16 )             25.4     PT/INR - ( 11 Sep 2023 06:28 )   PT: 12.2 sec;   INR: 1.17 ratio         PTT - ( 11 Sep 2023 19:42 )  PTT:28.5 sec  Transfusion     [ ] PRBC   [ ] Platelets   [ ] FFP   [ ] Cryoprecipitate      INFECTIOUS DISEASES  WBC Count: 7.59 K/uL (09-11 @ 12:16)  WBC Count: 7.08 K/uL (09-11 @ 06:28)    RECENT CULTURES:    Meds: fluconAZOLE   Tablet 200 milliGRAM(s) Oral daily  influenza   Vaccine 0.5 milliLiter(s) IntraMuscular once  mycophenolate mofetil 1000 milliGRAM(s) Oral <User Schedule>  tacrolimus 1 milliGRAM(s) Oral <User Schedule>  trimethoprim   80 mG/sulfamethoxazole 400 mG 1 Tablet(s) Oral daily  valGANciclovir 450 milliGRAM(s) Oral daily        ENDOCRINE  CAPILLARY BLOOD GLUCOSE      POCT Blood Glucose.: 118 mg/dL (11 Sep 2023 21:23)  POCT Blood Glucose.: 169 mg/dL (11 Sep 2023 12:27)  POCT Blood Glucose.: 133 mg/dL (11 Sep 2023 08:31)    Meds: insulin lispro (ADMELOG) corrective regimen sliding scale   SubCutaneous at bedtime  insulin lispro (ADMELOG) corrective regimen sliding scale   SubCutaneous three times a day before meals  methylPREDNISolone sodium succinate Injectable   IV Push         ACCESS DEVICES:  [ ] Peripheral IV  [ ] Central Venous Line	[ ] R	[ ] L	[ ] IJ	[ ] Fem	[ ] SC	Placed:   [ ] Arterial Line		[ ] R	[ ] L	[ ] Fem	[ ] Rad	[ ] Ax	Placed:   [ ] PICC:					[ ] Mediport  [ ] Urinary Catheter, Date Placed:   [x] Necessity of urinary, arterial, and venous catheters discussed    OTHER MEDICATIONS:  benzocaine/menthol Lozenge 1 Lozenge Oral every 2 hours PRN  chlorhexidine 2% Cloths 1 Application(s) Topical <User Schedule>  lidocaine   4% Patch 1 Patch Transdermal once  sodium chloride 0.65% Nasal 1 Spray(s) Both Nostrils every 4 hours PRN      CODE STATUS: full code 24 HOUR EVENTS:  - regular diet started consistent carb  - salamanca out, passed TOV  - d/c R radial A-line  - heparin adjusted to 700u/h, goal pTT 40-50  - having some vaginal bleeding  - b/l LE duplex neg for DVT  - IVL    SUBJECTIVE/ROS:  [ ] A ten-point review of systems was otherwise negative except as noted.  [ ] Due to altered mental status/intubation, subjective information were not able to be obtained from the patient. History was obtained, to the extent possible, from review of the chart and collateral sources of information.      NEURO  Exam: awake, alert, oriented  Meds: melatonin 5 milliGRAM(s) Oral at bedtime  oxyCODONE    IR 2.5 milliGRAM(s) Oral every 4 hours PRN Moderate Pain (4 - 6)  oxyCODONE    IR 5 milliGRAM(s) Oral every 4 hours PRN Severe Pain (7 - 10)    [x] Adequacy of sedation and pain control has been assessed and adjusted      RESPIRATORY  RR: 24 (09-11-23 @ 23:00) (17 - 29)  SpO2: 95% (09-11-23 @ 23:00) (92% - 96%)  Exam: unlabored, clear to auscultation bilaterally  pH: 7.46  /  pCO2: 39    /  pO2: 77    / HCO3: 28    / Base Excess: 3.6   /  SaO2: 97.4    Lactate: x          CARDIOVASCULAR  HR: 77 (09-11-23 @ 23:00) (70 - 98)  BP: 106/60 (09-11-23 @ 23:00) (105/68 - 130/73)  BP(mean): 78 (09-11-23 @ 23:00) (78 - 104)  ABP: 128/71 (09-11-23 @ 11:00) (112/68 - 129/73)  ABP(mean): 96 (09-11-23 @ 11:00) (89 - 98)      Exam: regular rate and rhythm  Cardiac Rhythm: sinus  Perfusion     [x]Adequate   [ ]Inadequate  Mentation   [x]Normal       [ ]Reduced  Extremities  [x]Warm         [ ]Cool  Volume Status [ ]Hypervolemic [x]Euvolemic [ ]Hypovolemic  Meds:       GI/NUTRITION  Exam: soft, appropriately tender, nondistended, incision C/D/I  Diet: regular diet, consistent carbohydrate  Meds: pantoprazole    Tablet 40 milliGRAM(s) Oral before breakfast  polyethylene glycol 3350 17 Gram(s) Oral two times a day  simethicone 80 milliGRAM(s) Chew four times a day PRN Gas      GENITOURINARY  I&O's Detail    09-10 @ 07:01 - 09-11 @ 07:00  --------------------------------------------------------  IN:    Heparin: 77.5 mL    IV PiggyBack: 800 mL    multiple electrolytes Injection Type 1.: 150 mL    multiple electrolytes Injection Type 1.: 1100 mL    Oral Fluid: 320 mL  Total IN: 2447.5 mL    OUT:    Bulb (mL): 215 mL    Bulb (mL): 425 mL    Bulb (mL): 85 mL    Indwelling Catheter - Urethral (mL): 2580 mL  Total OUT: 3305 mL    Total NET: -857.5 mL      09-11 @ 07:01 - 09-12 @ 00:03  --------------------------------------------------------  IN:    Heparin: 29.5 mL    Heparin: 46 mL    multiple electrolytes Injection Type 1.: 100 mL    Oral Fluid: 750 mL  Total IN: 925.5 mL    OUT:    Bulb (mL): 280 mL    Bulb (mL): 70 mL    Bulb (mL): 60 mL    Indwelling Catheter - Urethral (mL): 1375 mL    Voided (mL): 800 mL  Total OUT: 2585 mL    Total NET: -1659.5 mL          09-11    137  |  107  |  16  ----------------------------<  130<H>  4.1   |  24  |  0.49<L>    Ca    7.3<L>      11 Sep 2023 06:28  Phos  3.2     09-11  Mg     2.4     09-11    TPro  4.0<L>  /  Alb  2.4<L>  /  TBili  0.3  /  DBili  x   /  AST  41<H>  /  ALT  127<H>  /  AlkPhos  46  09-11    [ ] Sal[place] catheter, indication: N/A  Meds:       HEMATOLOGIC  Meds: heparin  Infusion 500 Unit(s)/Hr IV Continuous <Continuous>    [x] VTE Prophylaxis                        8.4    7.59  )-----------( 67       ( 11 Sep 2023 12:16 )             25.4     PT/INR - ( 11 Sep 2023 06:28 )   PT: 12.2 sec;   INR: 1.17 ratio         PTT - ( 11 Sep 2023 19:42 )  PTT:28.5 sec  Transfusion     [ ] PRBC   [ ] Platelets   [ ] FFP   [ ] Cryoprecipitate      INFECTIOUS DISEASES  WBC Count: 7.59 K/uL (09-11 @ 12:16)  WBC Count: 7.08 K/uL (09-11 @ 06:28)    RECENT CULTURES:    Meds: fluconAZOLE   Tablet 200 milliGRAM(s) Oral daily  influenza   Vaccine 0.5 milliLiter(s) IntraMuscular once  mycophenolate mofetil 1000 milliGRAM(s) Oral <User Schedule>  tacrolimus 1 milliGRAM(s) Oral <User Schedule>  trimethoprim   80 mG/sulfamethoxazole 400 mG 1 Tablet(s) Oral daily  valGANciclovir 450 milliGRAM(s) Oral daily        ENDOCRINE  CAPILLARY BLOOD GLUCOSE      POCT Blood Glucose.: 118 mg/dL (11 Sep 2023 21:23)  POCT Blood Glucose.: 169 mg/dL (11 Sep 2023 12:27)  POCT Blood Glucose.: 133 mg/dL (11 Sep 2023 08:31)    Meds: insulin lispro (ADMELOG) corrective regimen sliding scale   SubCutaneous at bedtime  insulin lispro (ADMELOG) corrective regimen sliding scale   SubCutaneous three times a day before meals  methylPREDNISolone sodium succinate Injectable   IV Push         ACCESS DEVICES:  [ ] Peripheral IV  [ ] Central Venous Line	[ ] R	[ ] L	[ ] IJ	[ ] Fem	[ ] SC	Placed:   [ ] Arterial Line		[ ] R	[ ] L	[ ] Fem	[ ] Rad	[ ] Ax	Placed:   [ ] PICC:					[ ] Mediport  [ ] Urinary Catheter, Date Placed:   [x] Necessity of urinary, arterial, and venous catheters discussed    OTHER MEDICATIONS:  benzocaine/menthol Lozenge 1 Lozenge Oral every 2 hours PRN  chlorhexidine 2% Cloths 1 Application(s) Topical <User Schedule>  lidocaine   4% Patch 1 Patch Transdermal once  sodium chloride 0.65% Nasal 1 Spray(s) Both Nostrils every 4 hours PRN      CODE STATUS: full code 24 HOUR EVENTS:  - regular diet started consistent carb  - salamanca out, passed TOV  - d/c R radial A-line  - heparin adjusted to 10cc/hr, goal pTT 40-50  - having some vaginal bleeding  - b/l LE duplex neg for DVT  - IVL    SUBJECTIVE/ROS:  [ ] A ten-point review of systems was otherwise negative except as noted.  [ ] Due to altered mental status/intubation, subjective information were not able to be obtained from the patient. History was obtained, to the extent possible, from review of the chart and collateral sources of information.      NEURO  Exam: awake, alert, oriented  Meds: melatonin 5 milliGRAM(s) Oral at bedtime  oxyCODONE    IR 2.5 milliGRAM(s) Oral every 4 hours PRN Moderate Pain (4 - 6)  oxyCODONE    IR 5 milliGRAM(s) Oral every 4 hours PRN Severe Pain (7 - 10)    [x] Adequacy of sedation and pain control has been assessed and adjusted      RESPIRATORY  RR: 24 (09-11-23 @ 23:00) (17 - 29)  SpO2: 95% (09-11-23 @ 23:00) (92% - 96%)  Exam: unlabored, clear to auscultation bilaterally  pH: 7.46  /  pCO2: 39    /  pO2: 77    / HCO3: 28    / Base Excess: 3.6   /  SaO2: 97.4    Lactate: x          CARDIOVASCULAR  HR: 77 (09-11-23 @ 23:00) (70 - 98)  BP: 106/60 (09-11-23 @ 23:00) (105/68 - 130/73)  BP(mean): 78 (09-11-23 @ 23:00) (78 - 104)  ABP: 128/71 (09-11-23 @ 11:00) (112/68 - 129/73)  ABP(mean): 96 (09-11-23 @ 11:00) (89 - 98)      Exam: regular rate and rhythm  Cardiac Rhythm: sinus  Perfusion     [x]Adequate   [ ]Inadequate  Mentation   [x]Normal       [ ]Reduced  Extremities  [x]Warm         [ ]Cool  Volume Status [ ]Hypervolemic [x]Euvolemic [ ]Hypovolemic  Meds:       GI/NUTRITION  Exam: soft, appropriately tender, nondistended, incision C/D/I  Diet: regular diet, consistent carbohydrate  Meds: pantoprazole    Tablet 40 milliGRAM(s) Oral before breakfast  polyethylene glycol 3350 17 Gram(s) Oral two times a day  simethicone 80 milliGRAM(s) Chew four times a day PRN Gas      GENITOURINARY  I&O's Detail    09-10 @ 07:01 - 09-11 @ 07:00  --------------------------------------------------------  IN:    Heparin: 77.5 mL    IV PiggyBack: 800 mL    multiple electrolytes Injection Type 1.: 150 mL    multiple electrolytes Injection Type 1.: 1100 mL    Oral Fluid: 320 mL  Total IN: 2447.5 mL    OUT:    Bulb (mL): 215 mL    Bulb (mL): 425 mL    Bulb (mL): 85 mL    Indwelling Catheter - Urethral (mL): 2580 mL  Total OUT: 3305 mL    Total NET: -857.5 mL      09-11 @ 07:01 - 09-12 @ 00:03  --------------------------------------------------------  IN:    Heparin: 29.5 mL    Heparin: 46 mL    multiple electrolytes Injection Type 1.: 100 mL    Oral Fluid: 750 mL  Total IN: 925.5 mL    OUT:    Bulb (mL): 280 mL    Bulb (mL): 70 mL    Bulb (mL): 60 mL    Indwelling Catheter - Urethral (mL): 1375 mL    Voided (mL): 800 mL  Total OUT: 2585 mL    Total NET: -1659.5 mL          09-11    137  |  107  |  16  ----------------------------<  130<H>  4.1   |  24  |  0.49<L>    Ca    7.3<L>      11 Sep 2023 06:28  Phos  3.2     09-11  Mg     2.4     09-11    TPro  4.0<L>  /  Alb  2.4<L>  /  TBili  0.3  /  DBili  x   /  AST  41<H>  /  ALT  127<H>  /  AlkPhos  46  09-11    [ ] [place] catheter, indication: N/A  Meds:       HEMATOLOGIC  Meds: heparin  Infusion 500 Unit(s)/Hr IV Continuous <Continuous>    [x] VTE Prophylaxis                        8.4    7.59  )-----------( 67       ( 11 Sep 2023 12:16 )             25.4     PT/INR - ( 11 Sep 2023 06:28 )   PT: 12.2 sec;   INR: 1.17 ratio         PTT - ( 11 Sep 2023 19:42 )  PTT:28.5 sec  Transfusion     [ ] PRBC   [ ] Platelets   [ ] FFP   [ ] Cryoprecipitate      INFECTIOUS DISEASES  WBC Count: 7.59 K/uL (09-11 @ 12:16)  WBC Count: 7.08 K/uL (09-11 @ 06:28)    RECENT CULTURES:    Meds: fluconAZOLE   Tablet 200 milliGRAM(s) Oral daily  influenza   Vaccine 0.5 milliLiter(s) IntraMuscular once  mycophenolate mofetil 1000 milliGRAM(s) Oral <User Schedule>  tacrolimus 1 milliGRAM(s) Oral <User Schedule>  trimethoprim   80 mG/sulfamethoxazole 400 mG 1 Tablet(s) Oral daily  valGANciclovir 450 milliGRAM(s) Oral daily        ENDOCRINE  CAPILLARY BLOOD GLUCOSE      POCT Blood Glucose.: 118 mg/dL (11 Sep 2023 21:23)  POCT Blood Glucose.: 169 mg/dL (11 Sep 2023 12:27)  POCT Blood Glucose.: 133 mg/dL (11 Sep 2023 08:31)    Meds: insulin lispro (ADMELOG) corrective regimen sliding scale   SubCutaneous at bedtime  insulin lispro (ADMELOG) corrective regimen sliding scale   SubCutaneous three times a day before meals  methylPREDNISolone sodium succinate Injectable   IV Push         ACCESS DEVICES:  [ ] Peripheral IV  [ ] Central Venous Line	[ ] R	[ ] L	[ ] IJ	[ ] Fem	[ ] SC	Placed:   [ ] Arterial Line		[ ] R	[ ] L	[ ] Fem	[ ] Rad	[ ] Ax	Placed:   [ ] PICC:					[ ] Mediport  [ ] Urinary Catheter, Date Placed:   [x] Necessity of urinary, arterial, and venous catheters discussed    OTHER MEDICATIONS:  benzocaine/menthol Lozenge 1 Lozenge Oral every 2 hours PRN  chlorhexidine 2% Cloths 1 Application(s) Topical <User Schedule>  lidocaine   4% Patch 1 Patch Transdermal once  sodium chloride 0.65% Nasal 1 Spray(s) Both Nostrils every 4 hours PRN      CODE STATUS: full code

## 2023-09-13 LAB
ALBUMIN SERPL ELPH-MCNC: 2.6 G/DL — LOW (ref 3.3–5)
ALP SERPL-CCNC: 52 U/L — SIGNIFICANT CHANGE UP (ref 40–120)
ALT FLD-CCNC: 72 U/L — HIGH (ref 10–45)
ANION GAP SERPL CALC-SCNC: 9 MMOL/L — SIGNIFICANT CHANGE UP (ref 5–17)
APTT BLD: 53.2 SEC — HIGH (ref 24.5–35.6)
APTT BLD: 62.8 SEC — HIGH (ref 24.5–35.6)
AST SERPL-CCNC: 24 U/L — SIGNIFICANT CHANGE UP (ref 10–40)
BILIRUB SERPL-MCNC: 0.3 MG/DL — SIGNIFICANT CHANGE UP (ref 0.2–1.2)
BUN SERPL-MCNC: 19 MG/DL — SIGNIFICANT CHANGE UP (ref 7–23)
CALCIUM SERPL-MCNC: 7.6 MG/DL — LOW (ref 8.4–10.5)
CHLORIDE SERPL-SCNC: 104 MMOL/L — SIGNIFICANT CHANGE UP (ref 96–108)
CO2 SERPL-SCNC: 25 MMOL/L — SIGNIFICANT CHANGE UP (ref 22–31)
CREAT SERPL-MCNC: 0.63 MG/DL — SIGNIFICANT CHANGE UP (ref 0.5–1.3)
EGFR: 116 ML/MIN/1.73M2 — SIGNIFICANT CHANGE UP
GLUCOSE SERPL-MCNC: 104 MG/DL — HIGH (ref 70–99)
HCT VFR BLD CALC: 27.4 % — LOW (ref 34.5–45)
HGB BLD-MCNC: 8.9 G/DL — LOW (ref 11.5–15.5)
INR BLD: 1.2 RATIO — HIGH (ref 0.85–1.18)
MAGNESIUM SERPL-MCNC: 2 MG/DL — SIGNIFICANT CHANGE UP (ref 1.6–2.6)
MCHC RBC-ENTMCNC: 27.3 PG — SIGNIFICANT CHANGE UP (ref 27–34)
MCHC RBC-ENTMCNC: 32.5 GM/DL — SIGNIFICANT CHANGE UP (ref 32–36)
MCV RBC AUTO: 84 FL — SIGNIFICANT CHANGE UP (ref 80–100)
NRBC # BLD: 0 /100 WBCS — SIGNIFICANT CHANGE UP (ref 0–0)
PHOSPHATE SERPL-MCNC: 3.5 MG/DL — SIGNIFICANT CHANGE UP (ref 2.5–4.5)
PLATELET # BLD AUTO: 91 K/UL — LOW (ref 150–400)
POTASSIUM SERPL-MCNC: 4.3 MMOL/L — SIGNIFICANT CHANGE UP (ref 3.5–5.3)
POTASSIUM SERPL-SCNC: 4.3 MMOL/L — SIGNIFICANT CHANGE UP (ref 3.5–5.3)
PROT SERPL-MCNC: 4.2 G/DL — LOW (ref 6–8.3)
PROTHROM AB SERPL-ACNC: 12.5 SEC — SIGNIFICANT CHANGE UP (ref 9.5–13)
RBC # BLD: 3.26 M/UL — LOW (ref 3.8–5.2)
RBC # FLD: 14.6 % — HIGH (ref 10.3–14.5)
SODIUM SERPL-SCNC: 138 MMOL/L — SIGNIFICANT CHANGE UP (ref 135–145)
TACROLIMUS SERPL-MCNC: 4.3 NG/ML — SIGNIFICANT CHANGE UP
WBC # BLD: 5.97 K/UL — SIGNIFICANT CHANGE UP (ref 3.8–10.5)
WBC # FLD AUTO: 5.97 K/UL — SIGNIFICANT CHANGE UP (ref 3.8–10.5)

## 2023-09-13 RX ORDER — ENOXAPARIN SODIUM 100 MG/ML
50 INJECTION SUBCUTANEOUS EVERY 12 HOURS
Refills: 0 | Status: DISCONTINUED | OUTPATIENT
Start: 2023-09-13 | End: 2023-09-15

## 2023-09-13 RX ORDER — LIDOCAINE 4 G/100G
1 CREAM TOPICAL EVERY 24 HOURS
Refills: 0 | Status: DISCONTINUED | OUTPATIENT
Start: 2023-09-13 | End: 2023-09-19

## 2023-09-13 RX ORDER — HEPARIN SODIUM 5000 [USP'U]/ML
9 INJECTION INTRAVENOUS; SUBCUTANEOUS
Qty: 25000 | Refills: 0 | Status: DISCONTINUED | OUTPATIENT
Start: 2023-09-13 | End: 2023-09-13

## 2023-09-13 RX ADMIN — Medication 40 MILLIGRAM(S): at 05:27

## 2023-09-13 RX ADMIN — OXYCODONE HYDROCHLORIDE 5 MILLIGRAM(S): 5 TABLET ORAL at 18:15

## 2023-09-13 RX ADMIN — MYCOPHENOLATE MOFETIL 1000 MILLIGRAM(S): 250 CAPSULE ORAL at 08:43

## 2023-09-13 RX ADMIN — LIDOCAINE 1 PATCH: 4 CREAM TOPICAL at 19:49

## 2023-09-13 RX ADMIN — OXYCODONE HYDROCHLORIDE 2.5 MILLIGRAM(S): 5 TABLET ORAL at 20:31

## 2023-09-13 RX ADMIN — PANTOPRAZOLE SODIUM 40 MILLIGRAM(S): 20 TABLET, DELAYED RELEASE ORAL at 05:26

## 2023-09-13 RX ADMIN — Medication 1 TABLET(S): at 13:25

## 2023-09-13 RX ADMIN — LIDOCAINE 1 PATCH: 4 CREAM TOPICAL at 18:46

## 2023-09-13 RX ADMIN — OXYCODONE HYDROCHLORIDE 5 MILLIGRAM(S): 5 TABLET ORAL at 10:42

## 2023-09-13 RX ADMIN — POLYETHYLENE GLYCOL 3350 17 GRAM(S): 17 POWDER, FOR SOLUTION ORAL at 05:32

## 2023-09-13 RX ADMIN — Medication 1 TABLET(S): at 17:08

## 2023-09-13 RX ADMIN — OXYCODONE HYDROCHLORIDE 5 MILLIGRAM(S): 5 TABLET ORAL at 11:42

## 2023-09-13 RX ADMIN — HEPARIN SODIUM 9 UNIT(S)/HR: 5000 INJECTION INTRAVENOUS; SUBCUTANEOUS at 16:30

## 2023-09-13 RX ADMIN — TACROLIMUS 2 MILLIGRAM(S): 5 CAPSULE ORAL at 08:43

## 2023-09-13 RX ADMIN — VALGANCICLOVIR 450 MILLIGRAM(S): 450 TABLET, FILM COATED ORAL at 13:00

## 2023-09-13 RX ADMIN — POLYETHYLENE GLYCOL 3350 17 GRAM(S): 17 POWDER, FOR SOLUTION ORAL at 17:07

## 2023-09-13 RX ADMIN — OXYCODONE HYDROCHLORIDE 2.5 MILLIGRAM(S): 5 TABLET ORAL at 21:16

## 2023-09-13 RX ADMIN — CHLORHEXIDINE GLUCONATE 1 APPLICATION(S): 213 SOLUTION TOPICAL at 05:33

## 2023-09-13 RX ADMIN — MYCOPHENOLATE MOFETIL 1000 MILLIGRAM(S): 250 CAPSULE ORAL at 19:47

## 2023-09-13 RX ADMIN — HEPARIN SODIUM 10 UNIT(S)/HR: 5000 INJECTION INTRAVENOUS; SUBCUTANEOUS at 06:59

## 2023-09-13 RX ADMIN — FLUCONAZOLE 200 MILLIGRAM(S): 150 TABLET ORAL at 13:00

## 2023-09-13 RX ADMIN — ENOXAPARIN SODIUM 50 MILLIGRAM(S): 100 INJECTION SUBCUTANEOUS at 18:50

## 2023-09-13 RX ADMIN — LIDOCAINE 1 PATCH: 4 CREAM TOPICAL at 17:33

## 2023-09-13 RX ADMIN — TACROLIMUS 2 MILLIGRAM(S): 5 CAPSULE ORAL at 19:47

## 2023-09-13 RX ADMIN — OXYCODONE HYDROCHLORIDE 5 MILLIGRAM(S): 5 TABLET ORAL at 17:32

## 2023-09-13 RX ADMIN — HEPARIN SODIUM 10 UNIT(S)/HR: 5000 INJECTION INTRAVENOUS; SUBCUTANEOUS at 08:43

## 2023-09-13 NOTE — PHARMACY EDUCATION NOTE - EDUCATION SUMMARY
Met with patient at bedside for post-transplant medication education. Counseling on indications and side-effects was provided. Patient was fully engaged throughout the session. All questions were clarified. Patient was able to answer most questions related to the regimen. Management of tacrolimus on blood draw days and management of missed doses was reinforced. Will continue to follow as needed.    Kashmir Metz, PharmD

## 2023-09-13 NOTE — PROGRESS NOTE ADULT - ASSESSMENT
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 s/p elective TIPs/DIPS in 5/2021 now POD 4 s/p OTL.    #OLT POD5  LFTs downtrending  US with patent vessels  Continue Hep gtt with PTT goal 40-50  Regular diet as tolerated  Strict I/Os  PRN pain meds  Lidocaine patch for back pain  PT consult/mobilize OOB  SCDs  Immunosupprssion: Continue tacrolimus BID based on daily trough, MMF 1gm po BID, Standard steroid taper  PPX: Continue fluconazole, bactrim, valganciclovir  AM tacrolimus levels, CBC, CMP, MG, Phos, INR    #Delta-Charlesari  -F/U with hematologist about preferred AC outpatient  - Continue Heparin drip today. Discuss transitioning to BID Therapeutic Lovenox

## 2023-09-13 NOTE — CHART NOTE - NSCHARTNOTEFT_GEN_A_CORE
Nutrition Follow Up Note  Patient seen for: s/p OLT (23) post transplant food safety education     Chart reviewed, events noted. 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 s/p elective TIPs/DIPS in 2021 now POD 5 s/p OTL."    Source: [x] Patient       [x] Medical Record        [] RN        [] Family at bedside       [] Other: Language Line solutions offered & accepted: [place]ym[place] (#)    -If unable to interview patient: [] Trach/Vent/BiPAP  [] Disoriented/confused/inappropriate to interview    Diet Order:   Diet, Consistent Carbohydrate w/Evening Snack (23)    - Is current order appropriate/adequate? [] Yes  []  No: see recommendations below     - PO intake :   [] >75%  Adequate    [] 50-75%  Fair       [] <50%  Poor    - Nutrition-related concerns:      - Intake: Pt reports appetite/PO intake, reports consuming % of meals.       - s/p OLT 23; ordered for Tacrolimus       - x3 MELISSA drains in place post op.       - Endo: post-transplant steroid regimen ordered (Solu-medrol).       - GI:  Last BM .   Bowel Regimen? [x] Yes   Miralax [] No      - Micronutrient/Other supplementation: calcium carbonate 1250 mG  + Vitamin D      Weights:   Dosing weight: 48.8 kg ()  Daily Weight in k.3 (), Weight in k.5 (),   ** Weight fluctuating - Weight changes likely secondary to fluid shifts. RD to continue to monitor weight trends as able.     Nutritionally Pertinent MEDICATIONS  (STANDING):  calcium carbonate 1250 mG  + Vitamin D (OsCal 500 + D)  fluconAZOLE   Tablet  methylPREDNISolone sodium succinate Injectable  pantoprazole    Tablet  polyethylene glycol 3350  trimethoprim   80 mG/sulfamethoxazole 400 mG  valGANciclovir    Pertinent Labs:  @ 06:34: Na 138, BUN 19, Cr 0.63, <H>, K+ 4.3, Phos 3.5, Mg 2.0, Alk Phos 52, ALT/SGPT 72<H>, AST/SGOT 24, HbA1c --      Finger Sticks:      Skin per nursing documentation: No pressure injuries noted.  Edema per nursing documentation: 1+ L/R ankle, 1+ L/R foot    Estimated Needs: Based on IBW 49.8 kg   Energy needs:  kcal/kg ( kcal/kg)  Protein needs:  g/kg ( g/kg)  Fluid needs:  ml/kg ( ml/kg)   [] no change since previous assessment  [] recalculated:     Previous Nutrition Diagnosis: Increased Nutrient Needs   Nutrition Diagnosis is: [x] ongoing  [] resolved [] not applicable     Nutrition Care Plan:  [x] In Progress  [] Achieved  [] Not applicable    New Nutrition Diagnosis:     Nutrition Interventions:     Education Provided   [x] Yes:  [] No:     Recommendations:      [X] Continue current diet order: consistent carbohydrate diet       [X] Recommend Ensure Max (150 kcal, 30 g protein, 6 g carbs) 1x/day.   [X] Encourage adequate intake of meals and supplements to optimize PO intake.   [X] Monitor and encourage PO intake. Encourage use of daily menus. Honor dietary preferences as expressed as able.   [X] Reinforce post transplant food safety education as able/appropriate.       Monitoring and Evaluation:   Continue to monitor nutritional intake, tolerance to diet prescription, weights, labs, skin integrity    RD remains available upon request and will follow up per protocol  Beverly Palm RD, CDN pager # [phone] or TEAMS Nutrition Follow Up Note  Patient seen for: s/p OLT (23) post transplant food safety education     Chart reviewed, events noted. 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 s/p elective TIPs/DIPS in 2021 now POD 5 s/p OTL."    Source: [x] Patient       [x] Medical Record        [x] RN        [] Family at bedside       [] Other: Language Line solutions offered & accepted: (Scottish, #[id number]) Intepreter name: Stephanie    -If unable to interview patient: [] Trach/Vent/BiPAP  [] Disoriented/confused/inappropriate to interview    Diet Order:   Diet, Consistent Carbohydrate w/Evening Snack (23)    - Is current order appropriate/adequate? [] Yes  []  No: see recommendations below     - PO intake :   [] >75%  Adequate    [] 50-75%  Fair       [x] <50%  Poor    - Nutrition-related concerns:      - Liver s/p OLT 23; Tacrolimus and Cellcept ordered.        - Intake: Pt reports poor appetite/PO intake, reports consuming <50% of meals >/5 days. Obtained food preferences, RD to honor as able. RD assisted Pt with calling diet office to place meal order; Pt appreciative.       - x3 MELISSA drains in place post op.       - Endo: post-transplant steroid regimen ordered (Solu-medrol).       - GI: No GI distress reported.  Last BM: none thus far; bowel regimen ordered (Miralax)      - Micronutrient/Other supplementation: calcium carbonate 1250 mG  + Vitamin D      Weights:   Dosing weight: 48.8 kg (-)  Daily Weight in k.3 (-), Weight in k.5 (-),   ** Weight fluctuating - Weight changes likely secondary to fluid shifts. RD to continue to monitor weight trends as able.     Nutrition focused physical exam conducted with pt's consent:  Subcutaneous fat loss: [mild ] Orbital fat pads region, [none ]Buccal fat region, [mild ]Triceps region,  [ ]Ribs region.  Muscle wasting: [mild ]Temples region, [moderate ]Clavicle region, [moderate]Shoulder region, [ ]Scapula region, [ ]Interosseous region,  [ ]thigh region, [ ]Calf region    Nutritionally Pertinent MEDICATIONS  (STANDING):  calcium carbonate 1250 mG  + Vitamin D (OsCal 500 + D)  fluconAZOLE   Tablet  methylPREDNISolone sodium succinate Injectable  pantoprazole    Tablet  polyethylene glycol 3350  trimethoprim   80 mG/sulfamethoxazole 400 mG  valGANciclovir    Pertinent Labs:  @ 06:34: Na 138, BUN 19, Cr 0.63, <H>, K+ 4.3, Phos 3.5, Mg 2.0, Alk Phos 52, ALT/SGPT 72<H>, AST/SGOT 24, HbA1c --      Finger Sticks:      Skin per nursing documentation: No pressure injuries noted.  Edema per nursing documentation: 1+ L/R ankle, 1+ L/R foot    Estimated Needs: Based on IBW 49.8 kg   Energy needs: 3443-9285 kcal/kg (35-40 kcal/kg)  Protein needs: 74.7-99.6 g/kg (1.5-2.0 g/kg)  Fluid needs: Fluid needs deferred to team    [] no change since previous assessment  [x] recalculated: In setting of increased needs, malnutrition status     Previous Nutrition Diagnosis: Increased Nutrient Needs   Nutrition Diagnosis is: [x] ongoing  [] resolved [] not applicable     Nutrition Care Plan:  [x] In Progress  [] Achieved  [] Not applicable    New Nutrition Diagnosis:   1) Severe acute malnutrition related to Inadequate protein-energy intake as evidenced by Pt meeting <50% of nutrition needs >/5 days and moderate fat depletion.   Goal: Pt to meet >75% of estimated protein- energy needs during hospital stay.   2) Food and Nutrition related knowledge deficit related to limited prior education on post-transplant nutrition therapy and food safety guidelines s/p OLT 23.   Goal: Pt to be able to teach back 2 points discussed.     Nutrition Interventions:     Education Provided   [x] Yes:  [] No:  Provided education on post transplant nutrition therapy and food safety guidelines for transplant recipients. Discussed importance of thoroughly washing all fresh fruits/vegetables, importance of avoiding uncooked/raw/unpasteurized foods, avoiding pre-made deli/buffet/salad bar meals. Foods recommended as healthy well balanced diet and importance of adequate protein intakes for proper post-surgical healing discussed. Reviewed recommendations to avoid grapefruit, pomegranate and star fruit while taking immunosuppressant medication. Reviewed recommendations for moderate intake of sodium and carbohydrates with transplant medications. Reviewed effect of steroids on BG levels and importance of limiting concentrated sweets. Pt was receptive and expressed understanding. All questions answered. Provided nutrition handout: USDA Food Safety for Transplant Recipients booklet.     Recommendations:      [X] Continue current diet order: consistent carbohydrate diet       [X] Recommend Ensure Max (150 kcal, 30 g protein, 6 g carbs) 1x/day.   [X] Encourage adequate intake of meals and supplements to optimize PO intake.   [X] Monitor and encourage PO intake. Encourage use of daily menus. Honor dietary preferences as expressed as able.   [X] Reinforce post transplant food safety education as able/appropriate.   [X] New malnutrition notification sent.       Monitoring and Evaluation:   Continue to monitor nutritional intake, tolerance to diet prescription, weights, labs, skin integrity    RD remains available upon request and will follow up per protocol  Beverly Palm RD, CDN pager # [phone] or TEAMS Nutrition Follow Up Note  Patient seen for: s/p OLT (23) post transplant food safety education     Chart reviewed, events noted. 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 s/p elective TIPs/DIPS in 2021 now POD 5 s/p OTL."    Source: [x] Patient       [x] Medical Record        [x] RN        [] Family at bedside       [] Other: Language Line solutions offered & accepted: ([place], #[id number]) Intepreter name: Stephanie    -If unable to interview patient: [] Trach/Vent/BiPAP  [] Disoriented/confused/inappropriate to interview    Diet Order:   Diet, Consistent Carbohydrate w/Evening Snack (23)    - Is current order appropriate/adequate? [] Yes  []  No: see recommendations below     - PO intake :   [] >75%  Adequate    [] 50-75%  Fair       [x] <50%  Poor    - Nutrition-related concerns:      - Liver s/p OLT 23; Tacrolimus and Cellcept ordered.        - Intake: Pt reports poor appetite/PO intake, reports consuming <50% of meals >/5 days. Obtained food preferences, RD to honor as able. RD assisted Pt with calling diet office to place meal order; Pt appreciative.       - x3 MELISSA drains in place post op.       - Endo: post-transplant steroid regimen ordered (Solu-medrol).       - GI: No GI distress reported.  Last BM: none thus far; bowel regimen ordered (Miralax)      - Micronutrient/Other supplementation: calcium carbonate 1250 mG  + Vitamin D      Weights:   Dosing weight: 48.8 kg (-)  Daily Weight in k.3 (-), Weight in k.5 (-),   ** Weight fluctuating - Weight changes likely secondary to fluid shifts. RD to continue to monitor weight trends as able.     Nutrition focused physical exam conducted with pt's consent:  Subcutaneous fat loss: [mild ] Orbital fat pads region, [none ]Buccal fat region, [mild ]Triceps region,  [ ]Ribs region.  Muscle wasting: [mild ]Temples region, [moderate ]Clavicle region, [moderate]Shoulder region, [ ]Scapula region, [ ]Interosseous region,  [ ]thigh region, [ ]Calf region    Nutritionally Pertinent MEDICATIONS  (STANDING):  calcium carbonate 1250 mG  + Vitamin D (OsCal 500 + D)  fluconAZOLE   Tablet  methylPREDNISolone sodium succinate Injectable  pantoprazole    Tablet  polyethylene glycol 3350  trimethoprim   80 mG/sulfamethoxazole 400 mG  valGANciclovir    Pertinent Labs:  @ 06:34: Na 138, BUN 19, Cr 0.63, <H>, K+ 4.3, Phos 3.5, Mg 2.0, Alk Phos 52, ALT/SGPT 72<H>, AST/SGOT 24, HbA1c --      Finger Sticks:      Skin per nursing documentation: No pressure injuries noted.  Edema per nursing documentation: 1+ L/R ankle, 1+ L/R foot    Estimated Needs: Based on IBW 49.8 kg   Energy needs: 0300-2895 kcal/kg (35-40 kcal/kg)  Protein needs: 74.7-99.6 g/kg (1.5-2.0 g/kg)  Fluid needs: Fluid needs deferred to team    [] no change since previous assessment  [x] recalculated: In setting of increased needs, malnutrition status     Previous Nutrition Diagnosis: Increased Nutrient Needs   Nutrition Diagnosis is: [x] ongoing  [] resolved [] not applicable     Nutrition Care Plan:  [x] In Progress  [] Achieved  [] Not applicable    New Nutrition Diagnosis:   1) Severe acute malnutrition related to Inadequate protein-energy intake as evidenced by Pt meeting <50% of nutrition needs >/5 days and moderate muscle depletion.   Goal: Pt to meet >75% of estimated protein- energy needs during hospital stay.   2) Food and Nutrition related knowledge deficit related to limited prior education on post-transplant nutrition therapy and food safety guidelines s/p OLT 23.   Goal: Pt to be able to teach back 2 points discussed.     Nutrition Interventions:     Education Provided   [x] Yes:  [] No:  Provided education on post transplant nutrition therapy and food safety guidelines for transplant recipients. Discussed importance of thoroughly washing all fresh fruits/vegetables, importance of avoiding uncooked/raw/unpasteurized foods, avoiding pre-made deli/buffet/salad bar meals. Foods recommended as healthy well balanced diet and importance of adequate protein intakes for proper post-surgical healing discussed. Reviewed recommendations to avoid grapefruit, pomegranate and star fruit while taking immunosuppressant medication. Reviewed recommendations for moderate intake of sodium and carbohydrates with transplant medications. Reviewed effect of steroids on BG levels and importance of limiting concentrated sweets. Pt was receptive and expressed understanding. All questions answered. Provided nutrition handout: USDA Food Safety for Transplant Recipients booklet.     Recommendations:      [X] Continue current diet order: consistent carbohydrate diet       [X] Recommend Ensure Max (150 kcal, 30 g protein, 6 g carbs) 1x/day.   [X] Encourage adequate intake of meals and supplements to optimize PO intake.   [X] Monitor and encourage PO intake. Encourage use of daily menus. Honor dietary preferences as expressed as able.   [X] Reinforce post transplant food safety education as able/appropriate.   [X] New malnutrition notification sent.       Monitoring and Evaluation:   Continue to monitor nutritional intake, tolerance to diet prescription, weights, labs, skin integrity    RD remains available upon request and will follow up per protocol  Beverly Palm RD, CDN pager # [phone] or TEAMS

## 2023-09-13 NOTE — PROGRESS NOTE ADULT - SUBJECTIVE AND OBJECTIVE BOX
TRANSPLANT SURGERY PROGRESS NOTE    Patient: ERIC DAVILA , 37y ([birth year])Female   MRN: [id number]  Location: [place] [address]  Visit: 09-07-23 Inpatient  Date: 09-10-23 @ 12:20    Post-Op Day #5    Procedure/Dx/Injuries: s/p OLT, budd-chiari syndrome    Events of past 24 hours:   POD 5 COntinued on Heparin drip  reports some back pain  LFTS downtrending  US with patent vessels        MEDICATIONS  (STANDING):  calcium carbonate 1250 mG  + Vitamin D (OsCal 500 + D) 1 Tablet(s) Oral two times a day  chlorhexidine 2% Cloths 1 Application(s) Topical <User Schedule>  fluconAZOLE   Tablet 200 milliGRAM(s) Oral daily  heparin  Infusion 500 Unit(s)/Hr (10 mL/Hr) IV Continuous <Continuous>  influenza   Vaccine 0.5 milliLiter(s) IntraMuscular once  lidocaine   4% Patch 1 Patch Transdermal every 24 hours  lidocaine   4% Patch 1 Patch Transdermal once  melatonin 5 milliGRAM(s) Oral at bedtime  methylPREDNISolone sodium succinate Injectable   IV Push   mycophenolate mofetil 1000 milliGRAM(s) Oral <User Schedule>  pantoprazole    Tablet 40 milliGRAM(s) Oral before breakfast  polyethylene glycol 3350 17 Gram(s) Oral two times a day  tacrolimus 2 milliGRAM(s) Oral <User Schedule>  trimethoprim   80 mG/sulfamethoxazole 400 mG 1 Tablet(s) Oral daily  valGANciclovir 450 milliGRAM(s) Oral daily    MEDICATIONS  (PRN):  benzocaine/menthol Lozenge 1 Lozenge Oral every 2 hours PRN Sore Throat  oxyCODONE    IR 2.5 milliGRAM(s) Oral every 4 hours PRN Moderate Pain (4 - 6)  oxyCODONE    IR 5 milliGRAM(s) Oral every 4 hours PRN Severe Pain (7 - 10)  simethicone 80 milliGRAM(s) Chew four times a day PRN Gas  sodium chloride 0.65% Nasal 1 Spray(s) Both Nostrils every 4 hours PRN Nasal Congestion      PAST MEDICAL & SURGICAL HISTORY:  Cirrhosis  2010      Cirrhosis      Budd-Chiari syndrome      H/O protein S deficiency      Miscarriage  x 5      H/O protein S deficiency      History of transjugular intrahepatic portosystemic shunt      History of dilation and curettage      Presence of IVC filter          Vital Signs Last 24 Hrs  T(C): 36.8 (13 Sep 2023 13:07), Max: 36.9 (12 Sep 2023 19:00)  T(F): 98.2 (13 Sep 2023 13:07), Max: 98.4 (12 Sep 2023 19:00)  HR: 83 (13 Sep 2023 13:07) (70 - 106)  BP: 109/73 (13 Sep 2023 13:07) (96/67 - 125/70)  BP(mean): 95 (13 Sep 2023 04:00) (78 - 95)  RR: 20 (13 Sep 2023 13:07) (12 - 31)  SpO2: 97% (13 Sep 2023 13:07) (94% - 97%)    Parameters below as of 13 Sep 2023 13:07  Patient On (Oxygen Delivery Method): room air        I&O's Summary    12 Sep 2023 07:01  -  13 Sep 2023 07:00  --------------------------------------------------------  IN: 837 mL / OUT: 3905 mL / NET: -3068 mL    13 Sep 2023 07:01  -  13 Sep 2023 14:58  --------------------------------------------------------  IN: 250 mL / OUT: 220 mL / NET: 30 mL                              8.9    5.97  )-----------( 91       ( 13 Sep 2023 06:34 )             27.4     09-13    138  |  104  |  19  ----------------------------<  104<H>  4.3   |  25  |  0.63    Ca    7.6<L>      13 Sep 2023 06:34  Phos  3.5     09-13  Mg     2.0     09-13    TPro  4.2<L>  /  Alb  2.6<L>  /  TBili  0.3  /  DBili  x   /  AST  24  /  ALT  72<H>  /  AlkPhos  52  09-13    Tacrolimus (), Serum: 4.3 ng/mL (09-13 @ 06:31)      PHYSICAL EXAM:  General: NAD, AAOx3, calm and cooperative  HEENT: NCAT, CESAR, EOMI, Trachea ML, Neck supple  Cardiac: RRR S1, S2, no Murmurs, rubs or gallops  Respiratory: CTAB, normal respiratory effort  Abdomen: Soft, non-distended, minimally tender around incision, no erythema/edema/fluctuance/drainage, MELISSA drain x3 SS  Ext: no lower extremity edema, warm and well-perfused  Skin: no breakdown, no jaundice

## 2023-09-13 NOTE — PROGRESS NOTE ADULT - ASSESSMENT
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 Doing well.    POD#5  PLAN:   Neurologic:  - oxy PRN for pain  - melatonin 5mg for sleep    Respiratory:  - satting well on RA  - Encourage IS    Cardiovascular:  - off pressors, HD stable  - maintain MAP >65    Gastrointestinal/Nutrition: s/p OLT 9/8/23  - regular diet  - monitor MELISSA outputs q2h, MELISSA 1 and 2 sanguinous, MELISSA 3 serosanguinous   - Post-transplant hepatic US with doppler: patent hepatic vasculature  - Induction Transplant Medications per Transplant Team       - Simulect (Basiliximab) POD#0 and POD#4       - Methylprednisolone Taper transition to Prednisone POD#6  - Maintenance Transplant Medications per Transplant Team       - Cellcept (Mycophenolate) q12H       - Tacrolimus started POD#1       - Prednisone 20mg PO Daily POD#6  - Protonix 40mg PO Daily     Genitourinary/Renal:  - IVL  - Strict I/Os  - Monitor electrolytes, supplement PRN    Hematologic: b/l LE duplex neg for DVT  - Hold home Coumadin 5mg daily (last dose 9/7)  - heparin drip continue at 1000 U/h - goal 40-50  - SCDs for mechanical VTE ppx    Infectious Disease:  - Transplant ppx with Valcyte, Bactrim, and Diflucan  - tacrolimus restarted  - completed unasyn 48hrs post-op  - Monitor WBC, temperature    Endocrine:  - s/p steroid induction  - Methylprednisolone taper to Prednisone per Transplant    Lines/ Drains/ Tubes:  - L radial A-line d/c'd  - RIJ intro d/c'd  - MELISSA x 3 #1 - R lobe, #2 - Hilum , #3 - L lobe.     labs q8h

## 2023-09-13 NOTE — PROGRESS NOTE ADULT - SUBJECTIVE AND OBJECTIVE BOX
24 HOUR EVENTS:  - rpt US shows unchanged perihepatic collection  - heparin increased to 1000 units due to subtherapeutic aPTT, now therapeutic  - remain in unit until therapeutic x2 and stable    SUBJECTIVE/ROS:  [ ] A ten-point review of systems was otherwise negative except as noted.  [ ] Due to altered mental status/intubation, subjective information were not able to be obtained from the patient. History was obtained, to the extent possible, from review of the chart and collateral sources of information.      NEURO  Exam: awake, alert, oriented  Meds: melatonin 5 milliGRAM(s) Oral at bedtime  oxyCODONE    IR 5 milliGRAM(s) Oral every 4 hours PRN Severe Pain (7 - 10)  oxyCODONE    IR 2.5 milliGRAM(s) Oral every 4 hours PRN Moderate Pain (4 - 6)    [x] Adequacy of sedation and pain control has been assessed and adjusted      RESPIRATORY  RR: 14 (09-13-23 @ 02:00) (12 - 33)  SpO2: 96% (09-13-23 @ 02:00) (92% - 97%)  Wt(kg): --  Exam: unlabored, clear to auscultation bilaterally  Mechanical Ventilation:   ABG - ( 11 Sep 2023 06:20 )  pH: 7.46  /  pCO2: 39    /  pO2: 77    / HCO3: 28    / Base Excess: 3.6   /  SaO2: 97.4    Lactate: x                [N/A] Extubation Readiness Assessed  Meds:       CARDIOVASCULAR  HR: 72 (09-13-23 @ 02:00) (69 - 106)  BP: 112/70 (09-13-23 @ 02:00) (96/67 - 149/62)  BP(mean): 87 (09-13-23 @ 02:00) (74 - 93)  ABP: --  ABP(mean): --  Wt(kg): --  CVP(cm H2O): --      Exam: regular rate and rhythm  Cardiac Rhythm: sinus  Perfusion     [x]Adequate   [ ]Inadequate  Mentation   [x]Normal       [ ]Reduced  Extremities  [x]Warm         [ ]Cool  Volume Status [ ]Hypervolemic [x]Euvolemic [ ]Hypovolemic  Meds:       GI/NUTRITION  Exam: soft, appropriately tender, nondistended, incision C/D/I  Diet: carb cons diet  Meds: pantoprazole    Tablet 40 milliGRAM(s) Oral before breakfast  polyethylene glycol 3350 17 Gram(s) Oral two times a day  simethicone 80 milliGRAM(s) Chew four times a day PRN Gas      GENITOURINARY  I&O's Detail    09-11 @ 07:01 - 09-12 @ 07:00  --------------------------------------------------------  IN:    Heparin: 29.5 mL    Heparin: 110 mL    multiple electrolytes Injection Type 1.: 100 mL    Oral Fluid: 750 mL  Total IN: 989.5 mL    OUT:    Blood Loss (mL): 1 mL    Bulb (mL): 110 mL    Bulb (mL): 180 mL    Bulb (mL): 420 mL    Indwelling Catheter - Urethral (mL): 1375 mL    Voided (mL): 800 mL  Total OUT: 2886 mL    Total NET: -1896.5 mL      09-12 @ 07:01 - 09-13 @ 03:08  --------------------------------------------------------  IN:    Heparin: 157 mL    Oral Fluid: 610 mL  Total IN: 767 mL    OUT:    Bulb (mL): 120 mL    Bulb (mL): 205 mL    Bulb (mL): 185 mL    Voided (mL): 2850 mL  Total OUT: 3360 mL    Total NET: -2593 mL          09-12    138  |  108  |  23  ----------------------------<  109<H>  4.3   |  24  |  0.62    Ca    7.4<L>      12 Sep 2023 05:35  Phos  3.1     09-12  Mg     2.2     09-12    TPro  3.8<L>  /  Alb  2.3<L>  /  TBili  0.3  /  DBili  x   /  AST  28  /  ALT  87<H>  /  AlkPhos  45  09-12    [ ] Lucas catheter, indication: N/A  Meds:       HEMATOLOGIC  Meds: heparin  Infusion 500 Unit(s)/Hr IV Continuous <Continuous>    [x] VTE Prophylaxis                        8.9    8.07  )-----------( 92       ( 12 Sep 2023 09:55 )             26.8     PT/INR - ( 12 Sep 2023 05:38 )   PT: 12.9 sec;   INR: 1.24 ratio         PTT - ( 12 Sep 2023 22:40 )  PTT:52.7 sec  Transfusion     [ ] PRBC   [ ] Platelets   [ ] FFP   [ ] Cryoprecipitate      INFECTIOUS DISEASES  WBC Count: 8.07 K/uL (09-12 @ 09:55)  WBC Count: 5.45 K/uL (09-12 @ 05:37)    RECENT CULTURES:    Meds: fluconAZOLE   Tablet 200 milliGRAM(s) Oral daily  influenza   Vaccine 0.5 milliLiter(s) IntraMuscular once  mycophenolate mofetil 1000 milliGRAM(s) Oral <User Schedule>  tacrolimus 2 milliGRAM(s) Oral <User Schedule>  trimethoprim   80 mG/sulfamethoxazole 400 mG 1 Tablet(s) Oral daily  valGANciclovir 450 milliGRAM(s) Oral daily        ENDOCRINE  CAPILLARY BLOOD GLUCOSE      POCT Blood Glucose.: 121 mg/dL (12 Sep 2023 07:59)    Meds: methylPREDNISolone sodium succinate Injectable 40 milliGRAM(s) IV Push every 24 hours  methylPREDNISolone sodium succinate Injectable   IV Push         ACCESS DEVICES:  [ ] Peripheral IV  [ ] Central Venous Line	[ ] R	[ ] L	[ ] IJ	[ ] Fem	[ ] SC	Placed:   [ ] Arterial Line		[ ] R	[ ] L	[ ] Fem	[ ] Rad	[ ] Ax	Placed:   [ ] PICC:					[ ] Mediport  [ ] Urinary Catheter, Date Placed:   [x] Necessity of urinary, arterial, and venous catheters discussed    OTHER MEDICATIONS:  benzocaine/menthol Lozenge 1 Lozenge Oral every 2 hours PRN  chlorhexidine 2% Cloths 1 Application(s) Topical <User Schedule>  lidocaine   4% Patch 1 Patch Transdermal once  sodium chloride 0.65% Nasal 1 Spray(s) Both Nostrils every 4 hours PRN      CODE STATUS:      IMAGING:  < from: US Trans Liver W/ Doppler (09.12.23 @ 12:03) >    ACC: [id number] EXAM:  US LIVER TRANSPLANT W DOPP#   ORDERED BY: TIFFANY DENISE     PROCEDURE DATE:  09/12/2023          INTERPRETATION:  CLINICAL INFORMATION: Status post liver transplant.   Assess perihepatic collections.    COMPARISON: 9/11/2023    TECHNIQUE: Grayscale, Color and Spectral Doppler of a hepatic transplant.    FINDINGS:    Liver: Within normal limits.  Bile ducts: Prominent intrahepatic bile ducts, not significantly changed.   CBD 5 mm.  Perihepatic Collection:  Small perihepatic collection on the left   currently measures 3.5 x 0.6 x 1.0 cm, previously 3.7 x 3.6 cm. This is   adjacent to a small complex collection, measuring 2.0 x 1.2 x 1.5 cm,   previously 2.6 x 1.5 x 1.4 cm.  Ascites: Trace      VASCULATURE:    Hepatic arteries:  Main: 62 cm/s.Resistive index 0.76.  Right: 61 cm/s. Resistive index 0.6. Parvus Tardus: No  Left: 39 cm/s. Resistive index 0.6. Parvus Tardus: No    Portal Veins:  Main: 51 cm/s. Normal direction: Yes  Right: 64  Normal direction: Yes  Left: 23  Normal direction: Yes    IVC:Distended. 2.5 cm. Respiratory variation was not seen.    Hepatic Veins:  Right: Patent  Middle: Patent  Left: Patent    Miscellaneous: Small right pleural effusion.    IMPRESSION:  No significant interval change in the perihepatic collection seen on   prior study. No new collection seen.    Patent vessels.          --- End of Report ---            PITO CAMPOS MD; Attending Radiologist  This document has been electronically signed. Sep 12 2023 12:19PM    < end of copied text >

## 2023-09-14 LAB
ALBUMIN SERPL ELPH-MCNC: 2.5 G/DL — LOW (ref 3.3–5)
ALP SERPL-CCNC: 52 U/L — SIGNIFICANT CHANGE UP (ref 40–120)
ALT FLD-CCNC: 57 U/L — HIGH (ref 10–45)
ANION GAP SERPL CALC-SCNC: 7 MMOL/L — SIGNIFICANT CHANGE UP (ref 5–17)
APTT BLD: 25.9 SEC — SIGNIFICANT CHANGE UP (ref 24.5–35.6)
AST SERPL-CCNC: 20 U/L — SIGNIFICANT CHANGE UP (ref 10–40)
BILIRUB SERPL-MCNC: 0.3 MG/DL — SIGNIFICANT CHANGE UP (ref 0.2–1.2)
BUN SERPL-MCNC: 17 MG/DL — SIGNIFICANT CHANGE UP (ref 7–23)
CALCIUM SERPL-MCNC: 8 MG/DL — LOW (ref 8.4–10.5)
CHLORIDE SERPL-SCNC: 104 MMOL/L — SIGNIFICANT CHANGE UP (ref 96–108)
CO2 SERPL-SCNC: 26 MMOL/L — SIGNIFICANT CHANGE UP (ref 22–31)
CREAT SERPL-MCNC: 0.69 MG/DL — SIGNIFICANT CHANGE UP (ref 0.5–1.3)
EGFR: 113 ML/MIN/1.73M2 — SIGNIFICANT CHANGE UP
GLUCOSE SERPL-MCNC: 94 MG/DL — SIGNIFICANT CHANGE UP (ref 70–99)
HCT VFR BLD CALC: 26.7 % — LOW (ref 34.5–45)
HGB BLD-MCNC: 8.9 G/DL — LOW (ref 11.5–15.5)
INR BLD: 1.05 RATIO — SIGNIFICANT CHANGE UP (ref 0.85–1.18)
MAGNESIUM SERPL-MCNC: 1.9 MG/DL — SIGNIFICANT CHANGE UP (ref 1.6–2.6)
MCHC RBC-ENTMCNC: 28.1 PG — SIGNIFICANT CHANGE UP (ref 27–34)
MCHC RBC-ENTMCNC: 33.3 GM/DL — SIGNIFICANT CHANGE UP (ref 32–36)
MCV RBC AUTO: 84.2 FL — SIGNIFICANT CHANGE UP (ref 80–100)
NRBC # BLD: 0 /100 WBCS — SIGNIFICANT CHANGE UP (ref 0–0)
PHOSPHATE SERPL-MCNC: 3.7 MG/DL — SIGNIFICANT CHANGE UP (ref 2.5–4.5)
PLATELET # BLD AUTO: 132 K/UL — LOW (ref 150–400)
POTASSIUM SERPL-MCNC: 4.7 MMOL/L — SIGNIFICANT CHANGE UP (ref 3.5–5.3)
POTASSIUM SERPL-SCNC: 4.7 MMOL/L — SIGNIFICANT CHANGE UP (ref 3.5–5.3)
PROT SERPL-MCNC: 4.2 G/DL — LOW (ref 6–8.3)
PROTHROM AB SERPL-ACNC: 11.5 SEC — SIGNIFICANT CHANGE UP (ref 9.5–13)
RBC # BLD: 3.17 M/UL — LOW (ref 3.8–5.2)
RBC # FLD: 15.2 % — HIGH (ref 10.3–14.5)
SODIUM SERPL-SCNC: 137 MMOL/L — SIGNIFICANT CHANGE UP (ref 135–145)
TACROLIMUS SERPL-MCNC: 6.1 NG/ML — SIGNIFICANT CHANGE UP
WBC # BLD: 8.13 K/UL — SIGNIFICANT CHANGE UP (ref 3.8–10.5)
WBC # FLD AUTO: 8.13 K/UL — SIGNIFICANT CHANGE UP (ref 3.8–10.5)

## 2023-09-14 RX ORDER — SENNA PLUS 8.6 MG/1
2 TABLET ORAL AT BEDTIME
Refills: 0 | Status: DISCONTINUED | OUTPATIENT
Start: 2023-09-14 | End: 2023-09-16

## 2023-09-14 RX ADMIN — ENOXAPARIN SODIUM 50 MILLIGRAM(S): 100 INJECTION SUBCUTANEOUS at 05:08

## 2023-09-14 RX ADMIN — Medication 1 TABLET(S): at 05:09

## 2023-09-14 RX ADMIN — MYCOPHENOLATE MOFETIL 1000 MILLIGRAM(S): 250 CAPSULE ORAL at 08:05

## 2023-09-14 RX ADMIN — TACROLIMUS 2 MILLIGRAM(S): 5 CAPSULE ORAL at 08:04

## 2023-09-14 RX ADMIN — Medication 20 MILLIGRAM(S): at 05:07

## 2023-09-14 RX ADMIN — Medication 1 TABLET(S): at 17:06

## 2023-09-14 RX ADMIN — ENOXAPARIN SODIUM 50 MILLIGRAM(S): 100 INJECTION SUBCUTANEOUS at 21:01

## 2023-09-14 RX ADMIN — TACROLIMUS 2 MILLIGRAM(S): 5 CAPSULE ORAL at 21:03

## 2023-09-14 RX ADMIN — OXYCODONE HYDROCHLORIDE 5 MILLIGRAM(S): 5 TABLET ORAL at 21:31

## 2023-09-14 RX ADMIN — POLYETHYLENE GLYCOL 3350 17 GRAM(S): 17 POWDER, FOR SOLUTION ORAL at 05:08

## 2023-09-14 RX ADMIN — POLYETHYLENE GLYCOL 3350 17 GRAM(S): 17 POWDER, FOR SOLUTION ORAL at 17:06

## 2023-09-14 RX ADMIN — LIDOCAINE 1 PATCH: 4 CREAM TOPICAL at 18:24

## 2023-09-14 RX ADMIN — CHLORHEXIDINE GLUCONATE 1 APPLICATION(S): 213 SOLUTION TOPICAL at 05:10

## 2023-09-14 RX ADMIN — LIDOCAINE 1 PATCH: 4 CREAM TOPICAL at 08:21

## 2023-09-14 RX ADMIN — PANTOPRAZOLE SODIUM 40 MILLIGRAM(S): 20 TABLET, DELAYED RELEASE ORAL at 05:10

## 2023-09-14 RX ADMIN — Medication 5 MILLIGRAM(S): at 10:20

## 2023-09-14 RX ADMIN — OXYCODONE HYDROCHLORIDE 5 MILLIGRAM(S): 5 TABLET ORAL at 21:01

## 2023-09-14 RX ADMIN — LIDOCAINE 1 PATCH: 4 CREAM TOPICAL at 05:07

## 2023-09-14 RX ADMIN — MYCOPHENOLATE MOFETIL 1000 MILLIGRAM(S): 250 CAPSULE ORAL at 21:03

## 2023-09-14 RX ADMIN — LIDOCAINE 1 PATCH: 4 CREAM TOPICAL at 17:04

## 2023-09-14 NOTE — PROGRESS NOTE ADULT - SUBJECTIVE AND OBJECTIVE BOX
TRANSPLANT SURGERY PROGRESS NOTE      Interval events:   - POD 6 s/p OLT, LFTs trending down  - heparin gtt dced, started lovenox  - MELISSA x 3 with SS drainage       MEDICATIONS  (STANDING):  calcium carbonate 1250 mG  + Vitamin D (OsCal 500 + D) 1 Tablet(s) Oral two times a day  chlorhexidine 2% Cloths 1 Application(s) Topical <User Schedule>  enoxaparin Injectable 50 milliGRAM(s) SubCutaneous every 12 hours  fluconAZOLE   Tablet 200 milliGRAM(s) Oral daily  influenza   Vaccine 0.5 milliLiter(s) IntraMuscular once  lidocaine   4% Patch 1 Patch Transdermal every 24 hours  melatonin 5 milliGRAM(s) Oral at bedtime  mycophenolate mofetil 1000 milliGRAM(s) Oral <User Schedule>  pantoprazole    Tablet 40 milliGRAM(s) Oral before breakfast  polyethylene glycol 3350 17 Gram(s) Oral two times a day  senna 2 Tablet(s) Oral at bedtime  tacrolimus 2 milliGRAM(s) Oral <User Schedule>  trimethoprim   80 mG/sulfamethoxazole 400 mG 1 Tablet(s) Oral daily  valGANciclovir 450 milliGRAM(s) Oral daily    MEDICATIONS  (PRN):  benzocaine/menthol Lozenge 1 Lozenge Oral every 2 hours PRN Sore Throat  oxyCODONE    IR 5 milliGRAM(s) Oral every 4 hours PRN Severe Pain (7 - 10)  oxyCODONE    IR 2.5 milliGRAM(s) Oral every 4 hours PRN Moderate Pain (4 - 6)  simethicone 80 milliGRAM(s) Chew four times a day PRN Gas  sodium chloride 0.65% Nasal 1 Spray(s) Both Nostrils every 4 hours PRN Nasal Congestion      PAST MEDICAL & SURGICAL HISTORY:  Cirrhosis 2010  Cirrhosis  Budd-Chiari syndrome  H/O protein S deficiency  Miscarriage x 5  H/O protein S deficiency  History of transjugular intrahepatic portosystemic shunt  History of dilation and curettage  Presence of IVC filter    Vital Signs Last 24 Hrs  T(C): 36.8 (14 Sep 2023 17:00), Max: 36.9 (14 Sep 2023 08:32)  T(F): 98.2 (14 Sep 2023 17:00), Max: 98.4 (14 Sep 2023 08:32)  HR: 79 (14 Sep 2023 17:00) (76 - 98)  BP: 106/72 (14 Sep 2023 17:00) (97/66 - 107/70)  BP(mean): --  RR: 18 (14 Sep 2023 17:00) (18 - 18)  SpO2: 96% (14 Sep 2023 17:00) (93% - 96%)    Parameters below as of 14 Sep 2023 17:00  Patient On (Oxygen Delivery Method): room air    I&O's Summary    13 Sep 2023 07:01  -  14 Sep 2023 07:00  --------------------------------------------------------  IN: 778 mL / OUT: 1942 mL / NET: -1164 mL    14 Sep 2023 07:01  -  14 Sep 2023 21:18  --------------------------------------------------------  IN: 150 mL / OUT: 2199 mL / NET: -2049 mL                        8.9    8.13  )-----------( 132      ( 14 Sep 2023 06:31 )             26.7     09-14    137  |  104  |  17  ----------------------------<  94  4.7   |  26  |  0.69    Ca    8.0<L>      14 Sep 2023 06:31  Phos  3.7     09-14  Mg     1.9     09-14    TPro  4.2<L>  /  Alb  2.5<L>  /  TBili  0.3  /  DBili  x   /  AST  20  /  ALT  57<H>  /  AlkPhos  52  09-14    Tacrolimus (), Serum: 6.1 ng/mL (09-14 @ 06:31)    Review of systems  Gen: No weight changes, fatigue, fevers/chills, weakness  Skin: No rashes  Head/Eyes/Ears/Mouth: No headache; Normal hearing; Normal vision w/o blurriness; No sinus pain/discomfort, sore throat  Respiratory: No dyspnea, cough, wheezing, hemoptysis  CV: No chest pain, PND, orthopnea  GI: C/O mild abdominal pain at surgical site, No diarrhea, constipation, nausea, vomiting, melena, hematochezia  : No increased frequency, dysuria, hematuria, nocturia  MSK: No joint pain/swelling; no back pain; no edema  Neuro: No dizziness/lightheadedness, weakness, seizures, numbness, tingling  Heme: No easy bruising or bleeding  Endo: No heat/cold intolerance  Psych: No significant nervousness, anxiety, stress, depression  All other systems were reviewed and are negative, except as noted.    PHYSICAL EXAM:  General: NAD, AAOx3, calm and cooperative  HEENT: NCAT, CESAR, EOMI, Trachea ML, Neck supple  Cardiac: RRR S1, S2, no Murmurs, rubs or gallops  Respiratory: CTAB, normal respiratory effort  Abdomen: Soft, non-distended, minimally tender around incision, no erythema/edema/fluctuance/drainage, MELISSA drain x3 SS  Ext: no lower extremity edema, warm and well-perfused  Skin: no breakdown, no jaundice

## 2023-09-14 NOTE — PROGRESS NOTE ADULT - ASSESSMENT
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 s/p elective TIPs/DIPS in 5/2021 now POD 4 s/p OTL.    [] POD 6 s/p OLT   - good graft function  - Continue Lovenox bid   - Pain management  - Bowel regimen: miralax, add senna, dulc x 1  - Reg diet  - Strict I/Os  - PT consult/mobilize OOB  - Immuno: FK by level, MMF 1/1, pred 20  - PPX: Continue fluconazole, bactrim, valganciclovir  - Remove MELISSA #1

## 2023-09-15 LAB
ACARBOXYPROTHROMBIN SERPL-MCNC: 279.1 NG/ML
ALBUMIN SERPL ELPH-MCNC: 2.6 G/DL — LOW (ref 3.3–5)
ALP SERPL-CCNC: 59 U/L — SIGNIFICANT CHANGE UP (ref 40–120)
ALT FLD-CCNC: 55 U/L — HIGH (ref 10–45)
ANION GAP SERPL CALC-SCNC: 11 MMOL/L — SIGNIFICANT CHANGE UP (ref 5–17)
APTT BLD: 28.5 SEC — SIGNIFICANT CHANGE UP (ref 24.5–35.6)
AST SERPL-CCNC: 26 U/L — SIGNIFICANT CHANGE UP (ref 10–40)
BILIRUB SERPL-MCNC: 0.3 MG/DL — SIGNIFICANT CHANGE UP (ref 0.2–1.2)
BUN SERPL-MCNC: 14 MG/DL — SIGNIFICANT CHANGE UP (ref 7–23)
CALCIUM SERPL-MCNC: 8.1 MG/DL — LOW (ref 8.4–10.5)
CHLORIDE SERPL-SCNC: 100 MMOL/L — SIGNIFICANT CHANGE UP (ref 96–108)
CO2 SERPL-SCNC: 24 MMOL/L — SIGNIFICANT CHANGE UP (ref 22–31)
CREAT SERPL-MCNC: 0.64 MG/DL — SIGNIFICANT CHANGE UP (ref 0.5–1.3)
EGFR: 115 ML/MIN/1.73M2 — SIGNIFICANT CHANGE UP
GLUCOSE SERPL-MCNC: 101 MG/DL — HIGH (ref 70–99)
HCT VFR BLD CALC: 27.8 % — LOW (ref 34.5–45)
HGB BLD-MCNC: 8.9 G/DL — LOW (ref 11.5–15.5)
INR BLD: 1.11 RATIO — SIGNIFICANT CHANGE UP (ref 0.85–1.18)
MAGNESIUM SERPL-MCNC: 1.8 MG/DL — SIGNIFICANT CHANGE UP (ref 1.6–2.6)
MCHC RBC-ENTMCNC: 27.4 PG — SIGNIFICANT CHANGE UP (ref 27–34)
MCHC RBC-ENTMCNC: 32 GM/DL — SIGNIFICANT CHANGE UP (ref 32–36)
MCV RBC AUTO: 85.5 FL — SIGNIFICANT CHANGE UP (ref 80–100)
NRBC # BLD: 0 /100 WBCS — SIGNIFICANT CHANGE UP (ref 0–0)
PHOSPHATE SERPL-MCNC: 3.9 MG/DL — SIGNIFICANT CHANGE UP (ref 2.5–4.5)
PLATELET # BLD AUTO: 153 K/UL — SIGNIFICANT CHANGE UP (ref 150–400)
POTASSIUM SERPL-MCNC: 4.4 MMOL/L — SIGNIFICANT CHANGE UP (ref 3.5–5.3)
POTASSIUM SERPL-SCNC: 4.4 MMOL/L — SIGNIFICANT CHANGE UP (ref 3.5–5.3)
PROT SERPL-MCNC: 4.5 G/DL — LOW (ref 6–8.3)
PROTHROM AB SERPL-ACNC: 11.6 SEC — SIGNIFICANT CHANGE UP (ref 9.5–13)
RBC # BLD: 3.25 M/UL — LOW (ref 3.8–5.2)
RBC # FLD: 16.1 % — HIGH (ref 10.3–14.5)
SARS-COV-2 RNA SPEC QL NAA+PROBE: SIGNIFICANT CHANGE UP
SODIUM SERPL-SCNC: 135 MMOL/L — SIGNIFICANT CHANGE UP (ref 135–145)
TACROLIMUS SERPL-MCNC: 8.2 NG/ML — SIGNIFICANT CHANGE UP
WBC # BLD: 8.51 K/UL — SIGNIFICANT CHANGE UP (ref 3.8–10.5)
WBC # FLD AUTO: 8.51 K/UL — SIGNIFICANT CHANGE UP (ref 3.8–10.5)

## 2023-09-15 PROCEDURE — 99232 SBSQ HOSP IP/OBS MODERATE 35: CPT | Mod: 24

## 2023-09-15 RX ORDER — OXYCODONE HYDROCHLORIDE 5 MG/1
10 TABLET ORAL ONCE
Refills: 0 | Status: DISCONTINUED | OUTPATIENT
Start: 2023-09-15 | End: 2023-09-15

## 2023-09-15 RX ORDER — LIDOCAINE HCL 20 MG/ML
10 VIAL (ML) INJECTION ONCE
Refills: 0 | Status: DISCONTINUED | OUTPATIENT
Start: 2023-09-15 | End: 2023-09-19

## 2023-09-15 RX ORDER — SIMETHICONE 80 MG/1
80 TABLET, CHEWABLE ORAL ONCE
Refills: 0 | Status: DISCONTINUED | OUTPATIENT
Start: 2023-09-15 | End: 2023-09-19

## 2023-09-15 RX ORDER — APIXABAN 2.5 MG/1
1 TABLET, FILM COATED ORAL
Qty: 60 | Refills: 0
Start: 2023-09-15 | End: 2023-10-14

## 2023-09-15 RX ORDER — APIXABAN 2.5 MG/1
5 TABLET, FILM COATED ORAL
Refills: 0 | Status: DISCONTINUED | OUTPATIENT
Start: 2023-09-15 | End: 2023-09-19

## 2023-09-15 RX ORDER — LIDOCAINE 4 G/100G
1 CREAM TOPICAL ONCE
Refills: 0 | Status: COMPLETED | OUTPATIENT
Start: 2023-09-15 | End: 2023-09-15

## 2023-09-15 RX ADMIN — MYCOPHENOLATE MOFETIL 1000 MILLIGRAM(S): 250 CAPSULE ORAL at 07:36

## 2023-09-15 RX ADMIN — APIXABAN 5 MILLIGRAM(S): 2.5 TABLET, FILM COATED ORAL at 17:39

## 2023-09-15 RX ADMIN — Medication 1 TABLET(S): at 06:15

## 2023-09-15 RX ADMIN — PANTOPRAZOLE SODIUM 40 MILLIGRAM(S): 20 TABLET, DELAYED RELEASE ORAL at 06:15

## 2023-09-15 RX ADMIN — Medication 1 TABLET(S): at 17:36

## 2023-09-15 RX ADMIN — Medication 20 MILLIGRAM(S): at 06:15

## 2023-09-15 RX ADMIN — OXYCODONE HYDROCHLORIDE 5 MILLIGRAM(S): 5 TABLET ORAL at 04:26

## 2023-09-15 RX ADMIN — OXYCODONE HYDROCHLORIDE 5 MILLIGRAM(S): 5 TABLET ORAL at 03:56

## 2023-09-15 RX ADMIN — Medication 1 TABLET(S): at 11:55

## 2023-09-15 RX ADMIN — FLUCONAZOLE 200 MILLIGRAM(S): 150 TABLET ORAL at 11:55

## 2023-09-15 RX ADMIN — VALGANCICLOVIR 450 MILLIGRAM(S): 450 TABLET, FILM COATED ORAL at 11:55

## 2023-09-15 RX ADMIN — ENOXAPARIN SODIUM 50 MILLIGRAM(S): 100 INJECTION SUBCUTANEOUS at 07:36

## 2023-09-15 RX ADMIN — TACROLIMUS 2 MILLIGRAM(S): 5 CAPSULE ORAL at 20:27

## 2023-09-15 RX ADMIN — LIDOCAINE 1 PATCH: 4 CREAM TOPICAL at 07:44

## 2023-09-15 RX ADMIN — LIDOCAINE 1 PATCH: 4 CREAM TOPICAL at 04:19

## 2023-09-15 RX ADMIN — OXYCODONE HYDROCHLORIDE 10 MILLIGRAM(S): 5 TABLET ORAL at 23:08

## 2023-09-15 RX ADMIN — LIDOCAINE 1 PATCH: 4 CREAM TOPICAL at 17:58

## 2023-09-15 RX ADMIN — LIDOCAINE 1 PATCH: 4 CREAM TOPICAL at 20:26

## 2023-09-15 RX ADMIN — MYCOPHENOLATE MOFETIL 1000 MILLIGRAM(S): 250 CAPSULE ORAL at 20:27

## 2023-09-15 RX ADMIN — LIDOCAINE 1 PATCH: 4 CREAM TOPICAL at 06:06

## 2023-09-15 RX ADMIN — CHLORHEXIDINE GLUCONATE 1 APPLICATION(S): 213 SOLUTION TOPICAL at 07:41

## 2023-09-15 RX ADMIN — TACROLIMUS 2 MILLIGRAM(S): 5 CAPSULE ORAL at 07:36

## 2023-09-15 RX ADMIN — OXYCODONE HYDROCHLORIDE 5 MILLIGRAM(S): 5 TABLET ORAL at 21:49

## 2023-09-15 RX ADMIN — OXYCODONE HYDROCHLORIDE 5 MILLIGRAM(S): 5 TABLET ORAL at 20:49

## 2023-09-15 NOTE — PROGRESS NOTE ADULT - SUBJECTIVE AND OBJECTIVE BOX
TRANSPLANT SURGERY PROGRESS NOTE      Interval events:   - POD 7 s/p OLT, LFTs trending down  - MELISSA 2 removed   - no acute overnight events      MEDICATIONS  (STANDING):  apixaban 5 milliGRAM(s) Oral two times a day  calcium carbonate 1250 mG  + Vitamin D (OsCal 500 + D) 1 Tablet(s) Oral two times a day  chlorhexidine 2% Cloths 1 Application(s) Topical <User Schedule>  fluconAZOLE   Tablet 200 milliGRAM(s) Oral daily  influenza   Vaccine 0.5 milliLiter(s) IntraMuscular once  lidocaine   4% Patch 1 Patch Transdermal every 24 hours  lidocaine 1% Injectable 10 milliLiter(s) Local Injection once  melatonin 5 milliGRAM(s) Oral at bedtime  mycophenolate mofetil 1000 milliGRAM(s) Oral <User Schedule>  pantoprazole    Tablet 40 milliGRAM(s) Oral before breakfast  polyethylene glycol 3350 17 Gram(s) Oral two times a day  predniSONE   Tablet 20 milliGRAM(s) Oral daily  senna 2 Tablet(s) Oral at bedtime  tacrolimus 2 milliGRAM(s) Oral <User Schedule>  trimethoprim   80 mG/sulfamethoxazole 400 mG 1 Tablet(s) Oral daily  valGANciclovir 450 milliGRAM(s) Oral daily    MEDICATIONS  (PRN):  benzocaine/menthol Lozenge 1 Lozenge Oral every 2 hours PRN Sore Throat  oxyCODONE    IR 2.5 milliGRAM(s) Oral every 4 hours PRN Moderate Pain (4 - 6)  oxyCODONE    IR 5 milliGRAM(s) Oral every 4 hours PRN Severe Pain (7 - 10)  simethicone 80 milliGRAM(s) Chew four times a day PRN Gas  sodium chloride 0.65% Nasal 1 Spray(s) Both Nostrils every 4 hours PRN Nasal Congestion      PAST MEDICAL & SURGICAL HISTORY:  Cirrhosis 2010  Budd-Chiari syndrome  H/O protein S deficiency  Miscarriage x 5  H/O protein S deficiency  History of transjugular intrahepatic portosystemic shunt  History of dilation and curettage  Presence of IVC filter    Vital Signs Last 24 Hrs  T(C): 36.8 (15 Sep 2023 09:25), Max: 36.9 (14 Sep 2023 12:21)  T(F): 98.2 (15 Sep 2023 09:25), Max: 98.5 (15 Sep 2023 00:50)  HR: 87 (15 Sep 2023 09:25) (79 - 98)  BP: 106/72 (15 Sep 2023 09:25) (101/70 - 119/77)  BP(mean): --  RR: 20 (15 Sep 2023 09:25) (16 - 20)  SpO2: 98% (15 Sep 2023 09:25) (93% - 98%)    Parameters below as of 15 Sep 2023 09:25  Patient On (Oxygen Delivery Method): room air    I&O's Summary    14 Sep 2023 07:01  -  15 Sep 2023 07:00  --------------------------------------------------------  IN: 150 mL / OUT: 3344 mL / NET: -3194 mL    15 Sep 2023 07:01  -  15 Sep 2023 11:56  --------------------------------------------------------  IN: 0 mL / OUT: 550 mL / NET: -550 mL                        8.9    8.51  )-----------( 153      ( 15 Sep 2023 06:07 )             27.8     09-15    135  |  100  |  14  ----------------------------<  101<H>  4.4   |  24  |  0.64    Ca    8.1<L>      15 Sep 2023 06:05  Phos  3.9     09-15  Mg     1.8     09-15    TPro  4.5<L>  /  Alb  2.6<L>  /  TBili  0.3  /  DBili  x   /  AST  26  /  ALT  55<H>  /  AlkPhos  59  09-15    Tacrolimus (), Serum: 8.2 ng/mL (09-15 @ 06:07)    Review of systems  Gen: No weight changes, fatigue, fevers/chills, weakness  Skin: No rashes  Head/Eyes/Ears/Mouth: No headache; Normal hearing; Normal vision w/o blurriness; No sinus pain/discomfort, sore throat  Respiratory: No dyspnea, cough, wheezing, hemoptysis  CV: No chest pain, PND, orthopnea  GI: C/O mild abdominal pain at surgical site, No diarrhea, constipation, nausea, vomiting, melena, hematochezia  : No increased frequency, dysuria, hematuria, nocturia  MSK: No joint pain/swelling; no back pain; no edema  Neuro: No dizziness/lightheadedness, weakness, seizures, numbness, tingling  Heme: No easy bruising or bleeding  Endo: No heat/cold intolerance  Psych: No significant nervousness, anxiety, stress, depression  All other systems were reviewed and are negative, except as noted.    PHYSICAL EXAM:  General: NAD, AAOx3, calm and cooperative  HEENT: NCAT, CESAR, EOMI, Trachea ML, Neck supple  Cardiac: RRR S1, S2, no Murmurs, rubs or gallops  Respiratory: CTAB, normal respiratory effort  Abdomen: Soft, non-distended, minimally tender around incision, no erythema/edema/fluctuance/drainage, MELISSA drain x2 SS  Ext: no lower extremity edema, warm and well-perfused  Skin: no breakdown, no jaundice

## 2023-09-15 NOTE — PROGRESS NOTE ADULT - ASSESSMENT
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 s/p elective TIPs/DIPS in 5/2021 now POD 4 s/p OTL.    [] POD 7 s/p OLT   - good graft function  - Dc lovenox; start Eliquis 5m bid  - Pain management  - Bowel regimen: miralax/senna  - Reg diet  - Strict I/Os  - PT consult/mobilize OOB  - Immuno: FK by level, MMF 1/1, pred 20  - PPX: Continue fluconazole, bactrim, valganciclovir  - Remove MELISSA #1  - Dispo: Monday (home)

## 2023-09-16 LAB
ALBUMIN SERPL ELPH-MCNC: 2.7 G/DL — LOW (ref 3.3–5)
ALBUMIN SERPL ELPH-MCNC: 3.1 G/DL — LOW (ref 3.3–5)
ALP SERPL-CCNC: 65 U/L — SIGNIFICANT CHANGE UP (ref 40–120)
ALP SERPL-CCNC: 84 U/L — SIGNIFICANT CHANGE UP (ref 40–120)
ALT FLD-CCNC: 103 U/L — HIGH (ref 10–45)
ALT FLD-CCNC: 69 U/L — HIGH (ref 10–45)
ANION GAP SERPL CALC-SCNC: 12 MMOL/L — SIGNIFICANT CHANGE UP (ref 5–17)
APTT BLD: 27.2 SEC — SIGNIFICANT CHANGE UP (ref 24.5–35.6)
AST SERPL-CCNC: 46 U/L — HIGH (ref 10–40)
AST SERPL-CCNC: 56 U/L — HIGH (ref 10–40)
BILIRUB DIRECT SERPL-MCNC: <0.1 MG/DL — SIGNIFICANT CHANGE UP (ref 0–0.3)
BILIRUB INDIRECT FLD-MCNC: >0.2 MG/DL — SIGNIFICANT CHANGE UP (ref 0.2–1)
BILIRUB SERPL-MCNC: 0.3 MG/DL — SIGNIFICANT CHANGE UP (ref 0.2–1.2)
BILIRUB SERPL-MCNC: 0.3 MG/DL — SIGNIFICANT CHANGE UP (ref 0.2–1.2)
BUN SERPL-MCNC: 16 MG/DL — SIGNIFICANT CHANGE UP (ref 7–23)
CALCIUM SERPL-MCNC: 8.5 MG/DL — SIGNIFICANT CHANGE UP (ref 8.4–10.5)
CHLORIDE SERPL-SCNC: 99 MMOL/L — SIGNIFICANT CHANGE UP (ref 96–108)
CO2 SERPL-SCNC: 23 MMOL/L — SIGNIFICANT CHANGE UP (ref 22–31)
CREAT SERPL-MCNC: 0.7 MG/DL — SIGNIFICANT CHANGE UP (ref 0.5–1.3)
EGFR: 113 ML/MIN/1.73M2 — SIGNIFICANT CHANGE UP
GLUCOSE SERPL-MCNC: 95 MG/DL — SIGNIFICANT CHANGE UP (ref 70–99)
HCT VFR BLD CALC: 27.7 % — LOW (ref 34.5–45)
HGB BLD-MCNC: 8.8 G/DL — LOW (ref 11.5–15.5)
INR BLD: 1.1 RATIO — SIGNIFICANT CHANGE UP (ref 0.85–1.18)
MAGNESIUM SERPL-MCNC: 1.8 MG/DL — SIGNIFICANT CHANGE UP (ref 1.6–2.6)
MCHC RBC-ENTMCNC: 27.5 PG — SIGNIFICANT CHANGE UP (ref 27–34)
MCHC RBC-ENTMCNC: 31.8 GM/DL — LOW (ref 32–36)
MCV RBC AUTO: 86.6 FL — SIGNIFICANT CHANGE UP (ref 80–100)
NRBC # BLD: 0 /100 WBCS — SIGNIFICANT CHANGE UP (ref 0–0)
PHOSPHATE SERPL-MCNC: 4.1 MG/DL — SIGNIFICANT CHANGE UP (ref 2.5–4.5)
PLATELET # BLD AUTO: 177 K/UL — SIGNIFICANT CHANGE UP (ref 150–400)
POTASSIUM SERPL-MCNC: 4.8 MMOL/L — SIGNIFICANT CHANGE UP (ref 3.5–5.3)
POTASSIUM SERPL-SCNC: 4.8 MMOL/L — SIGNIFICANT CHANGE UP (ref 3.5–5.3)
PROT SERPL-MCNC: 4.6 G/DL — LOW (ref 6–8.3)
PROT SERPL-MCNC: 5.2 G/DL — LOW (ref 6–8.3)
PROTHROM AB SERPL-ACNC: 12.1 SEC — SIGNIFICANT CHANGE UP (ref 9.5–13)
RBC # BLD: 3.2 M/UL — LOW (ref 3.8–5.2)
RBC # FLD: 16.9 % — HIGH (ref 10.3–14.5)
SODIUM SERPL-SCNC: 134 MMOL/L — LOW (ref 135–145)
TACROLIMUS SERPL-MCNC: 8.8 NG/ML — SIGNIFICANT CHANGE UP
WBC # BLD: 8.55 K/UL — SIGNIFICANT CHANGE UP (ref 3.8–10.5)
WBC # FLD AUTO: 8.55 K/UL — SIGNIFICANT CHANGE UP (ref 3.8–10.5)

## 2023-09-16 PROCEDURE — 93975 VASCULAR STUDY: CPT | Mod: 26

## 2023-09-16 PROCEDURE — 76705 ECHO EXAM OF ABDOMEN: CPT | Mod: 26,59

## 2023-09-16 RX ORDER — MAGNESIUM SULFATE 500 MG/ML
2 VIAL (ML) INJECTION ONCE
Refills: 0 | Status: COMPLETED | OUTPATIENT
Start: 2023-09-16 | End: 2023-09-16

## 2023-09-16 RX ADMIN — APIXABAN 5 MILLIGRAM(S): 2.5 TABLET, FILM COATED ORAL at 17:25

## 2023-09-16 RX ADMIN — Medication 20 MILLIGRAM(S): at 05:04

## 2023-09-16 RX ADMIN — OXYCODONE HYDROCHLORIDE 2.5 MILLIGRAM(S): 5 TABLET ORAL at 02:57

## 2023-09-16 RX ADMIN — FLUCONAZOLE 200 MILLIGRAM(S): 150 TABLET ORAL at 11:42

## 2023-09-16 RX ADMIN — OXYCODONE HYDROCHLORIDE 5 MILLIGRAM(S): 5 TABLET ORAL at 22:21

## 2023-09-16 RX ADMIN — LIDOCAINE 1 PATCH: 4 CREAM TOPICAL at 19:41

## 2023-09-16 RX ADMIN — CHLORHEXIDINE GLUCONATE 1 APPLICATION(S): 213 SOLUTION TOPICAL at 05:05

## 2023-09-16 RX ADMIN — Medication 1 TABLET(S): at 05:04

## 2023-09-16 RX ADMIN — OXYCODONE HYDROCHLORIDE 5 MILLIGRAM(S): 5 TABLET ORAL at 23:50

## 2023-09-16 RX ADMIN — Medication 25 GRAM(S): at 19:23

## 2023-09-16 RX ADMIN — OXYCODONE HYDROCHLORIDE 2.5 MILLIGRAM(S): 5 TABLET ORAL at 01:57

## 2023-09-16 RX ADMIN — LIDOCAINE 1 PATCH: 4 CREAM TOPICAL at 06:17

## 2023-09-16 RX ADMIN — Medication 5 MILLIGRAM(S): at 01:09

## 2023-09-16 RX ADMIN — Medication 1 TABLET(S): at 11:42

## 2023-09-16 RX ADMIN — LIDOCAINE 1 PATCH: 4 CREAM TOPICAL at 07:39

## 2023-09-16 RX ADMIN — OXYCODONE HYDROCHLORIDE 5 MILLIGRAM(S): 5 TABLET ORAL at 15:50

## 2023-09-16 RX ADMIN — PANTOPRAZOLE SODIUM 40 MILLIGRAM(S): 20 TABLET, DELAYED RELEASE ORAL at 05:04

## 2023-09-16 RX ADMIN — Medication 100 MILLIGRAM(S): at 22:44

## 2023-09-16 RX ADMIN — OXYCODONE HYDROCHLORIDE 5 MILLIGRAM(S): 5 TABLET ORAL at 14:51

## 2023-09-16 RX ADMIN — LIDOCAINE 1 PATCH: 4 CREAM TOPICAL at 17:30

## 2023-09-16 RX ADMIN — VALGANCICLOVIR 450 MILLIGRAM(S): 450 TABLET, FILM COATED ORAL at 11:42

## 2023-09-16 RX ADMIN — OXYCODONE HYDROCHLORIDE 10 MILLIGRAM(S): 5 TABLET ORAL at 00:08

## 2023-09-16 RX ADMIN — MYCOPHENOLATE MOFETIL 1000 MILLIGRAM(S): 250 CAPSULE ORAL at 20:00

## 2023-09-16 RX ADMIN — Medication 1 TABLET(S): at 17:25

## 2023-09-16 RX ADMIN — APIXABAN 5 MILLIGRAM(S): 2.5 TABLET, FILM COATED ORAL at 05:05

## 2023-09-16 RX ADMIN — TACROLIMUS 2 MILLIGRAM(S): 5 CAPSULE ORAL at 20:00

## 2023-09-16 RX ADMIN — TACROLIMUS 2 MILLIGRAM(S): 5 CAPSULE ORAL at 07:57

## 2023-09-16 NOTE — PROGRESS NOTE ADULT - SUBJECTIVE AND OBJECTIVE BOX
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 s/p elective TIPs/DIPS in 5/2021 now s/p OLT.    Pt seen and examined with the multidisciplinary transplant team: Dr. Perez, Dr. Salazar. Rest of disciplines not in attendance will be notified of plan      Interval events:   Afebrile, VSS  Good graft function  diarrhea o/n    Immunosuppression  FK per level, MMF 1/1, SST  SIMULECT completed  Ongoing monitoring for signs of rejection          MEDICATIONS  (STANDING):  apixaban 5 milliGRAM(s) Oral two times a day  calcium carbonate 1250 mG  + Vitamin D (OsCal 500 + D) 1 Tablet(s) Oral two times a day  chlorhexidine 2% Cloths 1 Application(s) Topical <User Schedule>  fluconAZOLE   Tablet 200 milliGRAM(s) Oral daily  influenza   Vaccine 0.5 milliLiter(s) IntraMuscular once  lidocaine   4% Patch 1 Patch Transdermal every 24 hours  lidocaine 1% Injectable 10 milliLiter(s) Local Injection once  melatonin 5 milliGRAM(s) Oral at bedtime  mycophenolate mofetil 1000 milliGRAM(s) Oral <User Schedule>  pantoprazole    Tablet 40 milliGRAM(s) Oral before breakfast  polyethylene glycol 3350 17 Gram(s) Oral two times a day  predniSONE   Tablet 20 milliGRAM(s) Oral daily  tacrolimus 2 milliGRAM(s) Oral <User Schedule>  trimethoprim   80 mG/sulfamethoxazole 400 mG 1 Tablet(s) Oral daily  valGANciclovir 450 milliGRAM(s) Oral daily    MEDICATIONS  (PRN):  benzocaine/menthol Lozenge 1 Lozenge Oral every 2 hours PRN Sore Throat  oxyCODONE    IR 5 milliGRAM(s) Oral every 4 hours PRN Severe Pain (7 - 10)  oxyCODONE    IR 2.5 milliGRAM(s) Oral every 4 hours PRN Moderate Pain (4 - 6)  simethicone 80 milliGRAM(s) Chew four times a day PRN Gas  simethicone 80 milliGRAM(s) Chew once PRN Gas  sodium chloride 0.65% Nasal 1 Spray(s) Both Nostrils every 4 hours PRN Nasal Congestion      PAST MEDICAL & SURGICAL HISTORY:  Cirrhosis  2010      Cirrhosis      Budd-Chiari syndrome      H/O protein S deficiency      Miscarriage  x 5      H/O protein S deficiency      History of transjugular intrahepatic portosystemic shunt      History of dilation and curettage      Presence of IVC filter          Vital Signs Last 24 Hrs  T(C): 37 (16 Sep 2023 01:00), Max: 37.1 (15 Sep 2023 13:21)  T(F): 98.6 (16 Sep 2023 01:00), Max: 98.8 (15 Sep 2023 13:21)  HR: 84 (16 Sep 2023 01:00) (73 - 99)  BP: 108/71 (16 Sep 2023 01:00) (101/74 - 121/79)  BP(mean): --  RR: 18 (16 Sep 2023 01:00) (18 - 20)  SpO2: 96% (16 Sep 2023 01:00) (95% - 99%)    Parameters below as of 16 Sep 2023 01:00  Patient On (Oxygen Delivery Method): room air        I&O's Summary    14 Sep 2023 07:01  -  15 Sep 2023 07:00  --------------------------------------------------------  IN: 150 mL / OUT: 3344 mL / NET: -3194 mL    15 Sep 2023 07:01  -  16 Sep 2023 03:24  --------------------------------------------------------  IN: 240 mL / OUT: 1690 mL / NET: -1450 mL                              8.9    8.51  )-----------( 153      ( 15 Sep 2023 06:07 )             27.8     09-15    135  |  100  |  14  ----------------------------<  101<H>  4.4   |  24  |  0.64    Ca    8.1<L>      15 Sep 2023 06:05  Phos  3.9     09-15  Mg     1.8     09-15    TPro  4.5<L>  /  Alb  2.6<L>  /  TBili  0.3  /  DBili  x   /  AST  26  /  ALT  55<H>  /  AlkPhos  59  09-15    Tacrolimus (), Serum: 8.2 ng/mL (09-15 @ 06:07)                      Review of systems  Gen: No weight changes, fatigue, fevers/chills, weakness  Skin: No rashes  Head/Eyes/Ears/Mouth: No headache; Normal hearing; Normal vision w/o blurriness; No sinus pain/discomfort, sore throat  Respiratory: No dyspnea, cough, wheezing, hemoptysis  CV: No chest pain, PND, orthopnea  GI: C/O mild abdominal pain at surgical site, No diarrhea, constipation, nausea, vomiting, melena, hematochezia  : No increased frequency, dysuria, hematuria, nocturia  MSK: No joint pain/swelling; no back pain; no edema  Neuro: No dizziness/lightheadedness, weakness, seizures, numbness, tingling  Heme: No easy bruising or bleeding  Endo: No heat/cold intolerance  Psych: No significant nervousness, anxiety, stress, depression  All other systems were reviewed and are negative, except as noted.    PHYSICAL EXAM:  General: NAD, AAOx3, calm and cooperative  HEENT: NCAT, CESAR, EOMI, Trachea ML, Neck supple  Cardiac: RRR S1, S2, no Murmurs, rubs or gallops  Respiratory: CTAB, normal respiratory effort  Abdomen: Soft, non-distended, minimally tender around incision, no erythema/edema/fluctuance/drainage, MELISSA drain x1 SS  Ext: no lower extremity edema, warm and well-perfused  Skin: no breakdown, no jaundice

## 2023-09-16 NOTE — PROGRESS NOTE ADULT - ASSESSMENT
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 s/p elective TIPs/DIPS in 5/2021 now 4 s/p OLT.    [] POD 8 s/p OLT   - good graft function  - Eliquis 5/5  - Pain management  - diarrhea: stop bowel regimen. consider GI PCR vs. Cellcept adjustment if continues  - Reg diet  - Strict I/Os  - PT consult/mobilize OOB  - Immuno: FK by level, MMF 1/1, pred 20  - PPX: Continue fluconazole, bactrim, valganciclovir, ppi, oscal  - Dispo: Monday (home)

## 2023-09-17 LAB
ALBUMIN SERPL ELPH-MCNC: 3.1 G/DL — LOW (ref 3.3–5)
ALP SERPL-CCNC: 80 U/L — SIGNIFICANT CHANGE UP (ref 40–120)
ALT FLD-CCNC: 101 U/L — HIGH (ref 10–45)
ANION GAP SERPL CALC-SCNC: 10 MMOL/L — SIGNIFICANT CHANGE UP (ref 5–17)
APTT BLD: 26 SEC — SIGNIFICANT CHANGE UP (ref 24.5–35.6)
AST SERPL-CCNC: 43 U/L — HIGH (ref 10–40)
BILIRUB SERPL-MCNC: 0.3 MG/DL — SIGNIFICANT CHANGE UP (ref 0.2–1.2)
BUN SERPL-MCNC: 20 MG/DL — SIGNIFICANT CHANGE UP (ref 7–23)
CALCIUM SERPL-MCNC: 8.3 MG/DL — LOW (ref 8.4–10.5)
CHLORIDE SERPL-SCNC: 98 MMOL/L — SIGNIFICANT CHANGE UP (ref 96–108)
CO2 SERPL-SCNC: 25 MMOL/L — SIGNIFICANT CHANGE UP (ref 22–31)
CREAT SERPL-MCNC: 0.69 MG/DL — SIGNIFICANT CHANGE UP (ref 0.5–1.3)
EGFR: 113 ML/MIN/1.73M2 — SIGNIFICANT CHANGE UP
GLUCOSE SERPL-MCNC: 156 MG/DL — HIGH (ref 70–99)
HCT VFR BLD CALC: 32 % — LOW (ref 34.5–45)
HGB BLD-MCNC: 10.2 G/DL — LOW (ref 11.5–15.5)
INR BLD: 1.08 RATIO — SIGNIFICANT CHANGE UP (ref 0.85–1.18)
MAGNESIUM SERPL-MCNC: 2.2 MG/DL — SIGNIFICANT CHANGE UP (ref 1.6–2.6)
MCHC RBC-ENTMCNC: 27.5 PG — SIGNIFICANT CHANGE UP (ref 27–34)
MCHC RBC-ENTMCNC: 31.9 GM/DL — LOW (ref 32–36)
MCV RBC AUTO: 86.3 FL — SIGNIFICANT CHANGE UP (ref 80–100)
NRBC # BLD: 0 /100 WBCS — SIGNIFICANT CHANGE UP (ref 0–0)
PHOSPHATE SERPL-MCNC: 4.8 MG/DL — HIGH (ref 2.5–4.5)
PLATELET # BLD AUTO: 221 K/UL — SIGNIFICANT CHANGE UP (ref 150–400)
POTASSIUM SERPL-MCNC: 5.2 MMOL/L — SIGNIFICANT CHANGE UP (ref 3.5–5.3)
POTASSIUM SERPL-SCNC: 5.2 MMOL/L — SIGNIFICANT CHANGE UP (ref 3.5–5.3)
PROT SERPL-MCNC: 5.2 G/DL — LOW (ref 6–8.3)
PROTHROM AB SERPL-ACNC: 11.3 SEC — SIGNIFICANT CHANGE UP (ref 9.5–13)
RBC # BLD: 3.71 M/UL — LOW (ref 3.8–5.2)
RBC # FLD: 17.5 % — HIGH (ref 10.3–14.5)
SODIUM SERPL-SCNC: 133 MMOL/L — LOW (ref 135–145)
TACROLIMUS SERPL-MCNC: 9 NG/ML — SIGNIFICANT CHANGE UP
WBC # BLD: 8.05 K/UL — SIGNIFICANT CHANGE UP (ref 3.8–10.5)
WBC # FLD AUTO: 8.05 K/UL — SIGNIFICANT CHANGE UP (ref 3.8–10.5)

## 2023-09-17 RX ORDER — OXYCODONE HYDROCHLORIDE 5 MG/1
5 TABLET ORAL EVERY 4 HOURS
Refills: 0 | Status: DISCONTINUED | OUTPATIENT
Start: 2023-09-17 | End: 2023-09-19

## 2023-09-17 RX ADMIN — APIXABAN 5 MILLIGRAM(S): 2.5 TABLET, FILM COATED ORAL at 05:44

## 2023-09-17 RX ADMIN — OXYCODONE HYDROCHLORIDE 5 MILLIGRAM(S): 5 TABLET ORAL at 05:56

## 2023-09-17 RX ADMIN — Medication 1 TABLET(S): at 05:44

## 2023-09-17 RX ADMIN — FLUCONAZOLE 200 MILLIGRAM(S): 150 TABLET ORAL at 12:44

## 2023-09-17 RX ADMIN — Medication 5 MILLIGRAM(S): at 23:29

## 2023-09-17 RX ADMIN — LIDOCAINE 1 PATCH: 4 CREAM TOPICAL at 05:46

## 2023-09-17 RX ADMIN — OXYCODONE HYDROCHLORIDE 5 MILLIGRAM(S): 5 TABLET ORAL at 23:29

## 2023-09-17 RX ADMIN — MYCOPHENOLATE MOFETIL 1000 MILLIGRAM(S): 250 CAPSULE ORAL at 20:59

## 2023-09-17 RX ADMIN — Medication 5 MILLIGRAM(S): at 00:27

## 2023-09-17 RX ADMIN — LIDOCAINE 1 PATCH: 4 CREAM TOPICAL at 19:31

## 2023-09-17 RX ADMIN — OXYCODONE HYDROCHLORIDE 5 MILLIGRAM(S): 5 TABLET ORAL at 12:47

## 2023-09-17 RX ADMIN — Medication 1 TABLET(S): at 12:07

## 2023-09-17 RX ADMIN — APIXABAN 5 MILLIGRAM(S): 2.5 TABLET, FILM COATED ORAL at 17:37

## 2023-09-17 RX ADMIN — TACROLIMUS 2 MILLIGRAM(S): 5 CAPSULE ORAL at 20:59

## 2023-09-17 RX ADMIN — PANTOPRAZOLE SODIUM 40 MILLIGRAM(S): 20 TABLET, DELAYED RELEASE ORAL at 05:43

## 2023-09-17 RX ADMIN — OXYCODONE HYDROCHLORIDE 5 MILLIGRAM(S): 5 TABLET ORAL at 12:06

## 2023-09-17 RX ADMIN — Medication 100 MILLIGRAM(S): at 12:06

## 2023-09-17 RX ADMIN — OXYCODONE HYDROCHLORIDE 5 MILLIGRAM(S): 5 TABLET ORAL at 06:47

## 2023-09-17 RX ADMIN — Medication 20 MILLIGRAM(S): at 05:44

## 2023-09-17 RX ADMIN — MYCOPHENOLATE MOFETIL 1000 MILLIGRAM(S): 250 CAPSULE ORAL at 08:40

## 2023-09-17 RX ADMIN — TACROLIMUS 2 MILLIGRAM(S): 5 CAPSULE ORAL at 08:40

## 2023-09-17 RX ADMIN — LIDOCAINE 1 PATCH: 4 CREAM TOPICAL at 17:33

## 2023-09-17 RX ADMIN — Medication 1 TABLET(S): at 17:37

## 2023-09-17 RX ADMIN — VALGANCICLOVIR 450 MILLIGRAM(S): 450 TABLET, FILM COATED ORAL at 12:06

## 2023-09-17 NOTE — PROGRESS NOTE ADULT - ASSESSMENT
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 s/p elective TIPs/DIPS in 5/2021 now  s/p OLT.    [] POD 9 s/p OLT   - mild transaminitis. 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 s/p elective TIPs/DIPS in 5/2021 now  s/p OLT.    [] POD 9 s/p OLT   - mild transaminitis. s/p pulse steroids challenge: Medrol 250 (9/16), Sol 500mg (9/17)  - Eliquis 5/5  - Pain management  - diarrhea: improving off bowel regimen  - Reg diet  - Strict I/Os  - PT consult/mobilize OOB  - Immuno: FK by level, MMF 1/1, pred 20  - PPX: Continue fluconazole, bactrim, valganciclovir, ppi, oscal

## 2023-09-17 NOTE — PROGRESS NOTE ADULT - SUBJECTIVE AND OBJECTIVE BOX
TRANSPLANT SURGERY MULTIDISCIPLINARY PROGRESS NOTE    OLT POD#9    Pt seen and examined with the multidisciplinary transplant team: Dr. Perez, Dr. Salazar. 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 s/p elective TIPs/DIPS in 5/2021 now s/p OLT.      Interval events:   Afebrile, VSS  mild transaminitis, s/p pulse steroids o/n, tolerated  diarrhea improving  pain controlled    Immunosuppression  FK per level, MMF 1/1, SST  SIMULECT completed  Ongoing monitoring for signs of rejection                  Review of systems  Gen: No weight changes, fatigue, fevers/chills, weakness  Skin: No rashes  Head/Eyes/Ears/Mouth: No headache; Normal hearing; Normal vision w/o blurriness; No sinus pain/discomfort, sore throat  Respiratory: No dyspnea, cough, wheezing, hemoptysis  CV: No chest pain, PND, orthopnea  GI: C/O mild abdominal pain at surgical site, No diarrhea, constipation, nausea, vomiting, melena, hematochezia  : No increased frequency, dysuria, hematuria, nocturia  MSK: No joint pain/swelling; no back pain; no edema  Neuro: No dizziness/lightheadedness, weakness, seizures, numbness, tingling  Heme: No easy bruising or bleeding  Endo: No heat/cold intolerance  Psych: No significant nervousness, anxiety, stress, depression  All other systems were reviewed and are negative, except as noted.    PHYSICAL EXAM:  General: NAD, AAOx3, calm and cooperative  HEENT: NCAT, CESAR, EOMI, Trachea ML, Neck supple  Cardiac: RRR S1, S2, no Murmurs, rubs or gallops  Respiratory: CTAB, normal respiratory effort  Abdomen: Soft, non-distended, minimally tender around incision, no erythema/edema/fluctuance/drainage, MELISSA drain x1 SS  Ext: no lower extremity edema, warm and well-perfused  Skin: no breakdown, no jaundice             TRANSPLANT SURGERY MULTIDISCIPLINARY PROGRESS NOTE    OLT POD#9    Pt seen and examined with the multidisciplinary transplant team: Dr. Perez, Dr. Salazar. 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 s/p elective TIPs/DIPS in 5/2021 now s/p OLT.      Interval events:   Afebrile, VSS  mild transaminitis, s/p pulse steroids o/n, tolerated  diarrhea improving  pain controlled    Immunosuppression  FK per level, MMF 1/1, SST  SIMULECT completed  Ongoing monitoring for signs of rejection      MEDICATIONS  (STANDING):  apixaban 5 milliGRAM(s) Oral two times a day  calcium carbonate 1250 mG  + Vitamin D (OsCal 500 + D) 1 Tablet(s) Oral two times a day  chlorhexidine 2% Cloths 1 Application(s) Topical <User Schedule>  fluconAZOLE   Tablet 200 milliGRAM(s) Oral daily  influenza   Vaccine 0.5 milliLiter(s) IntraMuscular once  lidocaine   4% Patch 1 Patch Transdermal every 24 hours  lidocaine 1% Injectable 10 milliLiter(s) Local Injection once  melatonin 5 milliGRAM(s) Oral at bedtime  mycophenolate mofetil 1000 milliGRAM(s) Oral <User Schedule>  pantoprazole    Tablet 40 milliGRAM(s) Oral before breakfast  predniSONE   Tablet 20 milliGRAM(s) Oral daily  tacrolimus 2 milliGRAM(s) Oral <User Schedule>  trimethoprim   80 mG/sulfamethoxazole 400 mG 1 Tablet(s) Oral daily  valGANciclovir 450 milliGRAM(s) Oral daily    MEDICATIONS  (PRN):  benzocaine/menthol Lozenge 1 Lozenge Oral every 2 hours PRN Sore Throat  oxyCODONE    IR 5 milliGRAM(s) Oral every 4 hours PRN Severe Pain (7 - 10)  simethicone 80 milliGRAM(s) Chew four times a day PRN Gas  simethicone 80 milliGRAM(s) Chew once PRN Gas  sodium chloride 0.65% Nasal 1 Spray(s) Both Nostrils every 4 hours PRN Nasal Congestion      PAST MEDICAL & SURGICAL HISTORY:  Cirrhosis 2010  Budd-Chiari syndrome  H/O protein S deficiency  Miscarriagex 5  H/O protein S deficiency  History of transjugular intrahepatic portosystemic shunt  History of dilation and curettage  Presence of IVC filter    Vital Signs Last 24 Hrs  T(C): 36.5 (17 Sep 2023 13:00), Max: 36.9 (16 Sep 2023 17:34)  T(F): 97.7 (17 Sep 2023 13:00), Max: 98.5 (17 Sep 2023 09:00)  HR: 81 (17 Sep 2023 13:00) (76 - 83)  BP: 116/76 (17 Sep 2023 13:00) (105/78 - 116/76)  BP(mean): --  RR: 18 (17 Sep 2023 13:00) (16 - 18)  SpO2: 95% (17 Sep 2023 13:00) (95% - 96%)    Parameters below as of 17 Sep 2023 13:00  Patient On (Oxygen Delivery Method): room air        I&O's Summary    16 Sep 2023 07:01  -  17 Sep 2023 07:00  --------------------------------------------------------  IN: 0 mL / OUT: 830 mL / NET: -830 mL    17 Sep 2023 07:01  -  17 Sep 2023 16:35  --------------------------------------------------------  IN: 240 mL / OUT: 410 mL / NET: -170 mL                         10.2   8.05  )-----------( 221      ( 17 Sep 2023 06:33 )             32.0     09-17    133<L>  |  98  |  20  ----------------------------<  156<H>  5.2   |  25  |  0.69    Ca    8.3<L>      17 Sep 2023 06:32  Phos  4.8     09-17  Mg     2.2     09-17    TPro  5.2<L>  /  Alb  3.1<L>  /  TBili  0.3  /  DBili  x   /  AST  43<H>  /  ALT  101<H>  /  AlkPhos  80  09-17    Tacrolimus (), Serum: 9.0 ng/mL (09-17 @ 06:35)    Review of systems  Gen: No weight changes, fatigue, fevers/chills, weakness  Skin: No rashes  Head/Eyes/Ears/Mouth: No headache; Normal hearing; Normal vision w/o blurriness; No sinus pain/discomfort, sore throat  Respiratory: No dyspnea, cough, wheezing, hemoptysis  CV: No chest pain, PND, orthopnea  GI: C/O mild abdominal pain at surgical site, No diarrhea, constipation, nausea, vomiting, melena, hematochezia  : No increased frequency, dysuria, hematuria, nocturia  MSK: No joint pain/swelling; no back pain; no edema  Neuro: No dizziness/lightheadedness, weakness, seizures, numbness, tingling  Heme: No easy bruising or bleeding  Endo: No heat/cold intolerance  Psych: No significant nervousness, anxiety, stress, depression  All other systems were reviewed and are negative, except as noted.    PHYSICAL EXAM:  General: NAD, AAOx3, calm and cooperative  HEENT: NCAT, CESAR, EOMI, Trachea ML, Neck supple  Cardiac: RRR S1, S2, no Murmurs, rubs or gallops  Respiratory: CTAB, normal respiratory effort  Abdomen: Soft, non-distended, minimally tender around incision, no erythema/edema/fluctuance/drainage, MELISSA drain x1 SS  Ext: no lower extremity edema, warm and well-perfused  Skin: no breakdown, no jaundice

## 2023-09-18 LAB
ALBUMIN SERPL ELPH-MCNC: 3 G/DL — LOW (ref 3.3–5)
ALP SERPL-CCNC: 69 U/L — SIGNIFICANT CHANGE UP (ref 40–120)
ALT FLD-CCNC: 79 U/L — HIGH (ref 10–45)
ANION GAP SERPL CALC-SCNC: 12 MMOL/L — SIGNIFICANT CHANGE UP (ref 5–17)
APTT BLD: 23.8 SEC — LOW (ref 24.5–35.6)
AST SERPL-CCNC: 26 U/L — SIGNIFICANT CHANGE UP (ref 10–40)
BILIRUB SERPL-MCNC: 0.2 MG/DL — SIGNIFICANT CHANGE UP (ref 0.2–1.2)
BUN SERPL-MCNC: 20 MG/DL — SIGNIFICANT CHANGE UP (ref 7–23)
CALCIUM SERPL-MCNC: 8.7 MG/DL — SIGNIFICANT CHANGE UP (ref 8.4–10.5)
CHLORIDE SERPL-SCNC: 101 MMOL/L — SIGNIFICANT CHANGE UP (ref 96–108)
CO2 SERPL-SCNC: 24 MMOL/L — SIGNIFICANT CHANGE UP (ref 22–31)
CREAT SERPL-MCNC: 0.62 MG/DL — SIGNIFICANT CHANGE UP (ref 0.5–1.3)
EGFR: 116 ML/MIN/1.73M2 — SIGNIFICANT CHANGE UP
GLUCOSE SERPL-MCNC: 132 MG/DL — HIGH (ref 70–99)
HCT VFR BLD CALC: 29.7 % — LOW (ref 34.5–45)
HGB BLD-MCNC: 9.3 G/DL — LOW (ref 11.5–15.5)
INR BLD: 1.09 RATIO — SIGNIFICANT CHANGE UP (ref 0.85–1.18)
MAGNESIUM SERPL-MCNC: 2 MG/DL — SIGNIFICANT CHANGE UP (ref 1.6–2.6)
MCHC RBC-ENTMCNC: 27.5 PG — SIGNIFICANT CHANGE UP (ref 27–34)
MCHC RBC-ENTMCNC: 31.3 GM/DL — LOW (ref 32–36)
MCV RBC AUTO: 87.9 FL — SIGNIFICANT CHANGE UP (ref 80–100)
NRBC # BLD: 0 /100 WBCS — SIGNIFICANT CHANGE UP (ref 0–0)
PHOSPHATE SERPL-MCNC: 3.8 MG/DL — SIGNIFICANT CHANGE UP (ref 2.5–4.5)
PLATELET # BLD AUTO: 245 K/UL — SIGNIFICANT CHANGE UP (ref 150–400)
POTASSIUM SERPL-MCNC: 4.9 MMOL/L — SIGNIFICANT CHANGE UP (ref 3.5–5.3)
POTASSIUM SERPL-SCNC: 4.9 MMOL/L — SIGNIFICANT CHANGE UP (ref 3.5–5.3)
PROT SERPL-MCNC: 5.1 G/DL — LOW (ref 6–8.3)
PROTHROM AB SERPL-ACNC: 11.4 SEC — SIGNIFICANT CHANGE UP (ref 9.5–13)
RBC # BLD: 3.38 M/UL — LOW (ref 3.8–5.2)
RBC # FLD: 18.6 % — HIGH (ref 10.3–14.5)
SODIUM SERPL-SCNC: 137 MMOL/L — SIGNIFICANT CHANGE UP (ref 135–145)
TACROLIMUS SERPL-MCNC: 11.9 NG/ML — SIGNIFICANT CHANGE UP
WBC # BLD: 9.2 K/UL — SIGNIFICANT CHANGE UP (ref 3.8–10.5)
WBC # FLD AUTO: 9.2 K/UL — SIGNIFICANT CHANGE UP (ref 3.8–10.5)

## 2023-09-18 RX ORDER — TACROLIMUS 5 MG/1
2 CAPSULE ORAL
Refills: 0 | Status: DISCONTINUED | OUTPATIENT
Start: 2023-09-19 | End: 2023-09-19

## 2023-09-18 RX ORDER — TACROLIMUS 5 MG/1
1 CAPSULE ORAL
Refills: 0 | Status: DISCONTINUED | OUTPATIENT
Start: 2023-09-18 | End: 2023-09-19

## 2023-09-18 RX ORDER — LIDOCAINE HCL 20 MG/ML
20 VIAL (ML) INJECTION ONCE
Refills: 0 | Status: DISCONTINUED | OUTPATIENT
Start: 2023-09-18 | End: 2023-09-19

## 2023-09-18 RX ORDER — LIDOCAINE HCL 20 MG/ML
5 VIAL (ML) INJECTION ONCE
Refills: 0 | Status: DISCONTINUED | OUTPATIENT
Start: 2023-09-18 | End: 2023-09-19

## 2023-09-18 RX ADMIN — Medication 1 TABLET(S): at 18:00

## 2023-09-18 RX ADMIN — Medication 20 MILLIGRAM(S): at 05:30

## 2023-09-18 RX ADMIN — Medication 1 TABLET(S): at 05:30

## 2023-09-18 RX ADMIN — OXYCODONE HYDROCHLORIDE 5 MILLIGRAM(S): 5 TABLET ORAL at 20:04

## 2023-09-18 RX ADMIN — VALGANCICLOVIR 450 MILLIGRAM(S): 450 TABLET, FILM COATED ORAL at 12:01

## 2023-09-18 RX ADMIN — FLUCONAZOLE 200 MILLIGRAM(S): 150 TABLET ORAL at 12:01

## 2023-09-18 RX ADMIN — MYCOPHENOLATE MOFETIL 1000 MILLIGRAM(S): 250 CAPSULE ORAL at 08:40

## 2023-09-18 RX ADMIN — OXYCODONE HYDROCHLORIDE 5 MILLIGRAM(S): 5 TABLET ORAL at 00:26

## 2023-09-18 RX ADMIN — APIXABAN 5 MILLIGRAM(S): 2.5 TABLET, FILM COATED ORAL at 05:30

## 2023-09-18 RX ADMIN — Medication 100 MILLIGRAM(S): at 10:40

## 2023-09-18 RX ADMIN — OXYCODONE HYDROCHLORIDE 5 MILLIGRAM(S): 5 TABLET ORAL at 11:13

## 2023-09-18 RX ADMIN — TACROLIMUS 1 MILLIGRAM(S): 5 CAPSULE ORAL at 20:04

## 2023-09-18 RX ADMIN — MYCOPHENOLATE MOFETIL 1000 MILLIGRAM(S): 250 CAPSULE ORAL at 20:04

## 2023-09-18 RX ADMIN — Medication 1 TABLET(S): at 12:01

## 2023-09-18 RX ADMIN — LIDOCAINE 1 PATCH: 4 CREAM TOPICAL at 19:00

## 2023-09-18 RX ADMIN — TACROLIMUS 2 MILLIGRAM(S): 5 CAPSULE ORAL at 08:40

## 2023-09-18 RX ADMIN — PANTOPRAZOLE SODIUM 40 MILLIGRAM(S): 20 TABLET, DELAYED RELEASE ORAL at 05:30

## 2023-09-18 RX ADMIN — OXYCODONE HYDROCHLORIDE 5 MILLIGRAM(S): 5 TABLET ORAL at 05:03

## 2023-09-18 RX ADMIN — OXYCODONE HYDROCHLORIDE 5 MILLIGRAM(S): 5 TABLET ORAL at 12:00

## 2023-09-18 RX ADMIN — OXYCODONE HYDROCHLORIDE 5 MILLIGRAM(S): 5 TABLET ORAL at 04:02

## 2023-09-18 RX ADMIN — LIDOCAINE 1 PATCH: 4 CREAM TOPICAL at 18:01

## 2023-09-18 RX ADMIN — OXYCODONE HYDROCHLORIDE 5 MILLIGRAM(S): 5 TABLET ORAL at 21:00

## 2023-09-18 RX ADMIN — LIDOCAINE 1 PATCH: 4 CREAM TOPICAL at 05:21

## 2023-09-18 RX ADMIN — APIXABAN 5 MILLIGRAM(S): 2.5 TABLET, FILM COATED ORAL at 18:00

## 2023-09-18 NOTE — PRE-ANESTHESIA EVALUATION ADULT - NSANTHAPLANRD_GEN_ALL_CORE
Medication refill requested for lisinopril (ZESTRIL) 20 MG tablet  Last Refill/Prescribed: Date 9/21/22, # of tablets: 90, # of refills approved: 3   Medication: see above  Last office visit date: 4/4/23  Next appointment scheduled?: No; Outreach performed, left message for patient to call back.    Number of refills given: 1   general

## 2023-09-18 NOTE — PROGRESS NOTE ADULT - NS ATTEND AMEND GEN_ALL_CORE FT
doing well    immuno plan  pulse steroid 500 today  mmf 1g bid  fk 2:1
Doing well. bleeding seems to have stopped.  Will restart low dose heparin gtt later today.  LFTs look good.

## 2023-09-18 NOTE — PROGRESS NOTE ADULT - ASSESSMENT
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 s/p elective TIPs/DIPS in 5/2021 now  s/p OLT.    [] POD 10 s/p OLT   - mild transaminitis improving with pulse steroids: Medrol 250 (9/16), Sol 500mg (9/17).  Give additional Methylpred 500mg IV today then taper tomorrow  - Eliquis 5/5  - Pain management  - diarrhea: improving off bowel regimen  - Reg diet  - Strict I/Os  - DC MELISSA #1  - PT consult/mobilize OOB  - Immuno: FK by level, MMF 1/1, Pulse steroids as above  - PPX: Continue fluconazole, bactrim, valganciclovir, ppi, oscal

## 2023-09-18 NOTE — PROGRESS NOTE ADULT - SUBJECTIVE AND OBJECTIVE BOX
TRANSPLANT SURGERY MULTIDISCIPLINARY PROGRESS NOTE    OLT POD#10    Pt seen and examined with the multidisciplinary transplant team: Dr. Zamora, Dr. Salazar, [facility]. 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 s/p elective TIPs/DIPS in 5/2021 now s/p OLT.      Interval events:   Afebrile, VSS  mild transaminitis. Received Methylpred 500mg yesterday.  LFTs down today   pain controlled    Immunosuppression  FK per level, MMF 1/1,  pulse steroids  SIMULECT completed  Ongoing monitoring for signs of rejection      MEDICATIONS  (STANDING):  apixaban 5 milliGRAM(s) Oral two times a day  calcium carbonate 1250 mG  + Vitamin D (OsCal 500 + D) 1 Tablet(s) Oral two times a day  chlorhexidine 2% Cloths 1 Application(s) Topical <User Schedule>  fluconAZOLE   Tablet 200 milliGRAM(s) Oral daily  influenza   Vaccine 0.5 milliLiter(s) IntraMuscular once  lidocaine   4% Patch 1 Patch Transdermal every 24 hours  lidocaine 1% Injectable 10 milliLiter(s) Local Injection once  melatonin 5 milliGRAM(s) Oral at bedtime  mycophenolate mofetil 1000 milliGRAM(s) Oral <User Schedule>  pantoprazole    Tablet 40 milliGRAM(s) Oral before breakfast  predniSONE   Tablet 20 milliGRAM(s) Oral daily  tacrolimus 1 milliGRAM(s) Oral <User Schedule>  trimethoprim   80 mG/sulfamethoxazole 400 mG 1 Tablet(s) Oral daily  valGANciclovir 450 milliGRAM(s) Oral daily    MEDICATIONS  (PRN):  benzocaine/menthol Lozenge 1 Lozenge Oral every 2 hours PRN Sore Throat  oxyCODONE    IR 5 milliGRAM(s) Oral every 4 hours PRN Severe Pain (7 - 10)  simethicone 80 milliGRAM(s) Chew once PRN Gas  simethicone 80 milliGRAM(s) Chew four times a day PRN Gas  sodium chloride 0.65% Nasal 1 Spray(s) Both Nostrils every 4 hours PRN Nasal Congestion      PAST MEDICAL & SURGICAL HISTORY:  Cirrhosis  2010      Cirrhosis      Budd-Chiari syndrome      H/O protein S deficiency      Miscarriage  x 5      H/O protein S deficiency      History of transjugular intrahepatic portosystemic shunt      History of dilation and curettage      Presence of IVC filter          Vital Signs Last 24 Hrs  T(C): 36.5 (18 Sep 2023 08:50), Max: 36.9 (18 Sep 2023 00:12)  T(F): 97.7 (18 Sep 2023 08:50), Max: 98.4 (18 Sep 2023 00:12)  HR: 80 (18 Sep 2023 08:50) (71 - 81)  BP: 109/73 (18 Sep 2023 08:50) (109/73 - 122/79)  BP(mean): 85 (18 Sep 2023 08:50) (85 - 85)  RR: 15 (18 Sep 2023 08:50) (15 - 18)  SpO2: 96% (18 Sep 2023 08:50) (95% - 100%)    Parameters below as of 18 Sep 2023 08:50  Patient On (Oxygen Delivery Method): room air        I&O's Summary    17 Sep 2023 07:01  -  18 Sep 2023 07:00  --------------------------------------------------------  IN: 840 mL / OUT: 1245 mL / NET: -405 mL    18 Sep 2023 07:01  -  18 Sep 2023 12:51  --------------------------------------------------------  IN: 610 mL / OUT: 302 mL / NET: 308 mL                              9.3    9.20  )-----------( 245      ( 18 Sep 2023 07:19 )             29.7     09-18    137  |  101  |  20  ----------------------------<  132<H>  4.9   |  24  |  0.62    Ca    8.7      18 Sep 2023 07:18  Phos  3.8     09-18  Mg     2.0     09-18    TPro  5.1<L>  /  Alb  3.0<L>  /  TBili  0.2  /  DBili  x   /  AST  26  /  ALT  79<H>  /  AlkPhos  69  09-18    Tacrolimus (), Serum: 11.9 ng/mL (09-18 @ 07:19)        Review of systems  Gen: No weight changes, fatigue, fevers/chills, weakness  Skin: No rashes  Head/Eyes/Ears/Mouth: No headache; Normal hearing; Normal vision w/o blurriness; No sinus pain/discomfort, sore throat  Respiratory: No dyspnea, cough, wheezing, hemoptysis  CV: No chest pain, PND, orthopnea  GI: C/O mild abdominal pain at surgical site, No diarrhea, constipation, nausea, vomiting, melena, hematochezia  : No increased frequency, dysuria, hematuria, nocturia  MSK: No joint pain/swelling; no back pain; no edema  Neuro: No dizziness/lightheadedness, weakness, seizures, numbness, tingling  Heme: No easy bruising or bleeding  Endo: No heat/cold intolerance  Psych: No significant nervousness, anxiety, stress, depression  All other systems were reviewed and are negative, except as noted.    PHYSICAL EXAM:  General: NAD, AAOx3, calm and cooperative  HEENT: NCAT, CESAR, EOMI, Trachea ML, Neck supple  Cardiac: RRR S1, S2, no Murmurs, rubs or gallops  Respiratory: CTAB, normal respiratory effort  Abdomen: Soft, non-distended, minimally tender around incision, no erythema/edema/fluctuance/drainage, MELISSA drain x1 SS  Ext: no lower extremity edema, warm and well-perfused  Skin: no breakdown, no jaundice

## 2023-09-18 NOTE — CHART NOTE - NSCHARTNOTEFT_GEN_A_CORE
Nutrition Follow Up Note  Patient seen for: Nutrition Department protocol for post liver transplant recipient follow up/ Malnutrition Follow Up     Chart reviewed, events noted. 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 s/p elective TIPs/DIPS in 2021 now s/p OLT."    Source: [X] Patient       [x] Current Medical Record       [] RN        [] Family at bedside       [] Other:    -If unable to interview patient: [] Trach/Vent/BiPAP  [] Disoriented/confused/inappropriate to interview    Diet Order:   Diet, Consistent Carbohydrate w/Evening Snack:   Supplement Feeding Modality:  Oral  Ensure Max Cans or Servings Per Day:  1       Frequency:  Daily (-)    - Is current order appropriate/adequate? [X] Yes  []  No:     - PO intake :   [] >75%  Adequate    [X] 50-75%  Fair       [] <50%  Poor    - Nutrition-related concerns:      - s/p OLT LFTs are trending down       - Ordered for transplant meds: Deltasone, Tacrolimus, Cellcept       - GI:  Last BM 23; pt was previously noted with diarrhea, bowel regimen was discontinued       - Pt was provided with post-transplant education during previous RD visit, able to teach back 1-2 points during today's follow up visit.     Weights:   Daily Weight in k.1 (-18), Weight in k (17), Weight in k.1 (-16), Weight in k.9 (15), Weight in k.5 (-14), Weight in k.3 ()  -- Weight fluctuations in setting of fluid shifts (IVF, subsided edema, and  intraoperative fluids). Will continue to monitor weight trends as available/able.     Nutritionally Pertinent MEDICATIONS  (STANDING):  apixaban 5 milliGRAM(s) Oral two times a day  calcium carbonate 1250 mG  + Vitamin D (OsCal 500 + D) 1 Tablet(s) Oral two times a day  fluconAZOLE   Tablet 200 milliGRAM(s) Oral daily  influenza   Vaccine 0.5 milliLiter(s) IntraMuscular once  mycophenolate mofetil 1000 milliGRAM(s) Oral <User Schedule>  pantoprazole    Tablet 40 milliGRAM(s) Oral before breakfast  predniSONE   Tablet 20 milliGRAM(s) Oral daily  tacrolimus 1 milliGRAM(s) Oral <User Schedule>  trimethoprim   80 mG/sulfamethoxazole 400 mG 1 Tablet(s) Oral daily  valGANciclovir 450 milliGRAM(s) Oral daily    MEDICATIONS  (PRN):  oxyCODONE    IR 5 milliGRAM(s) Oral every 4 hours PRN Severe Pain (7 - 10)  simethicone 80 milliGRAM(s) Chew four times a day PRN Gas  simethicone 80 milliGRAM(s) Chew once PRN Gas  sodium chloride 0.65% Nasal 1 Spray(s) Both Nostrils every 4 hours PRN Nasal Congestion    Pertinent Labs:  @ 07:18: Na 137, BUN 20, Cr 0.62, <H>, K+ 4.9, Phos 3.8, Mg 2.0, Alk Phos 69, ALT/SGPT 79<H>, AST/SGOT 26, HbA1c --    Finger Sticks: not applicable     Skin per nursing documentation: -- No pressure injuries noted per flow sheets   Edema:  -- No edema noted per flow sheets     Estimated Needs:   [] no change since previous assessment  [] recalculated:     Previous Nutrition Diagnosis:   1. Increased Nutrient Needs  2. Food and Nutrition related knowledge deficit   3. Severe acute malnutrition  Nutrition Diagnosis is: [X] ongoing  [] resolved [] not applicable     New Nutrition Diagnosis: [X] Not applicable    Nutrition Care Plan:  [X] In Progress- addressed with diet order, PO encouragement and nutritional supplements   [] Achieved  [] Not applicable    Nutrition Interventions:     Education Provided:       [X] Yes: Reviewed post-transplant nutrition therapy and food safety guidelines for transplant recipients.  Reviewed recommendations to avoid grapefruit, pomegranate and star fruit while taking immunosuppressant medication.  Reviewed recommendations for moderate intake of sodium with transplant medications.  Reviewed Consistent Carbohydrate diet, including carbohydrate content of foods and portion sizes. Pt is advised that BG management may be more challenging after transplant.  [] No:        Recommendations:      1.Continue current diet order: consistent carbohydrate diet   2. Continue Ensure Max Protein (150 kcal, 30 g protein, 6 g carbs) 1x/day.   3. Encourage adequate intake of meals and supplements to optimize PO intake.   4. Monitor and encourage PO intake. Encourage use of daily menus. Honor dietary preferences as expressed as able.   5. Reinforce post transplant food safety education as able/appropriate.      Monitoring and Evaluation:   Continue to monitor patient's weights, labs, PO intakes, urine output, blood glucose levels, and bowel movements.     RD remains available upon request and will follow up per protocol  Fauzia Oseguera MS,RDN,CDN AVAILABLE ON TEAMS

## 2023-09-19 ENCOUNTER — TRANSCRIPTION ENCOUNTER (OUTPATIENT)
Age: 38
End: 2023-09-19

## 2023-09-19 VITALS
DIASTOLIC BLOOD PRESSURE: 73 MMHG | HEART RATE: 77 BPM | RESPIRATION RATE: 20 BRPM | SYSTOLIC BLOOD PRESSURE: 110 MMHG | OXYGEN SATURATION: 97 %

## 2023-09-19 LAB
ALBUMIN SERPL ELPH-MCNC: 3.1 G/DL — LOW (ref 3.3–5)
ALP SERPL-CCNC: 67 U/L — SIGNIFICANT CHANGE UP (ref 40–120)
ALT FLD-CCNC: 74 U/L — HIGH (ref 10–45)
ANION GAP SERPL CALC-SCNC: 11 MMOL/L — SIGNIFICANT CHANGE UP (ref 5–17)
APTT BLD: 24 SEC — LOW (ref 24.5–35.6)
AST SERPL-CCNC: 23 U/L — SIGNIFICANT CHANGE UP (ref 10–40)
BILIRUB SERPL-MCNC: 0.2 MG/DL — SIGNIFICANT CHANGE UP (ref 0.2–1.2)
BUN SERPL-MCNC: 25 MG/DL — HIGH (ref 7–23)
CALCIUM SERPL-MCNC: 8.5 MG/DL — SIGNIFICANT CHANGE UP (ref 8.4–10.5)
CHLORIDE SERPL-SCNC: 100 MMOL/L — SIGNIFICANT CHANGE UP (ref 96–108)
CO2 SERPL-SCNC: 24 MMOL/L — SIGNIFICANT CHANGE UP (ref 22–31)
CREAT SERPL-MCNC: 0.59 MG/DL — SIGNIFICANT CHANGE UP (ref 0.5–1.3)
EGFR: 118 ML/MIN/1.73M2 — SIGNIFICANT CHANGE UP
GLUCOSE SERPL-MCNC: 124 MG/DL — HIGH (ref 70–99)
HCT VFR BLD CALC: 29.6 % — LOW (ref 34.5–45)
HGB BLD-MCNC: 9.2 G/DL — LOW (ref 11.5–15.5)
INR BLD: 1.13 RATIO — SIGNIFICANT CHANGE UP (ref 0.85–1.18)
MAGNESIUM SERPL-MCNC: 1.9 MG/DL — SIGNIFICANT CHANGE UP (ref 1.6–2.6)
MCHC RBC-ENTMCNC: 27.5 PG — SIGNIFICANT CHANGE UP (ref 27–34)
MCHC RBC-ENTMCNC: 31.1 GM/DL — LOW (ref 32–36)
MCV RBC AUTO: 88.4 FL — SIGNIFICANT CHANGE UP (ref 80–100)
NRBC # BLD: 0 /100 WBCS — SIGNIFICANT CHANGE UP (ref 0–0)
PHOSPHATE SERPL-MCNC: 3.5 MG/DL — SIGNIFICANT CHANGE UP (ref 2.5–4.5)
PLATELET # BLD AUTO: 254 K/UL — SIGNIFICANT CHANGE UP (ref 150–400)
POTASSIUM SERPL-MCNC: 4.7 MMOL/L — SIGNIFICANT CHANGE UP (ref 3.5–5.3)
POTASSIUM SERPL-SCNC: 4.7 MMOL/L — SIGNIFICANT CHANGE UP (ref 3.5–5.3)
PROT SERPL-MCNC: 4.9 G/DL — LOW (ref 6–8.3)
PROTHROM AB SERPL-ACNC: 11.8 SEC — SIGNIFICANT CHANGE UP (ref 9.5–13)
RBC # BLD: 3.35 M/UL — LOW (ref 3.8–5.2)
RBC # FLD: 18.6 % — HIGH (ref 10.3–14.5)
SODIUM SERPL-SCNC: 135 MMOL/L — SIGNIFICANT CHANGE UP (ref 135–145)
TACROLIMUS SERPL-MCNC: 6.9 NG/ML — SIGNIFICANT CHANGE UP
WBC # BLD: 9.73 K/UL — SIGNIFICANT CHANGE UP (ref 3.8–10.5)
WBC # FLD AUTO: 9.73 K/UL — SIGNIFICANT CHANGE UP (ref 3.8–10.5)

## 2023-09-19 PROCEDURE — P9011: CPT

## 2023-09-19 PROCEDURE — 86664 EPSTEIN-BARR NUCLEAR ANTIGEN: CPT

## 2023-09-19 PROCEDURE — 86901 BLOOD TYPING SEROLOGIC RH(D): CPT

## 2023-09-19 PROCEDURE — 88309 TISSUE EXAM BY PATHOLOGIST: CPT

## 2023-09-19 PROCEDURE — 82977 ASSAY OF GGT: CPT

## 2023-09-19 PROCEDURE — 86923 COMPATIBILITY TEST ELECTRIC: CPT

## 2023-09-19 PROCEDURE — 87522 HEPATITIS C REVRS TRNSCRPJ: CPT

## 2023-09-19 PROCEDURE — 93970 EXTREMITY STUDY: CPT

## 2023-09-19 PROCEDURE — 88341 IMHCHEM/IMCYTCHM EA ADD ANTB: CPT

## 2023-09-19 PROCEDURE — 85384 FIBRINOGEN ACTIVITY: CPT

## 2023-09-19 PROCEDURE — P9040: CPT

## 2023-09-19 PROCEDURE — 87340 HEPATITIS B SURFACE AG IA: CPT

## 2023-09-19 PROCEDURE — 83605 ASSAY OF LACTIC ACID: CPT

## 2023-09-19 PROCEDURE — 82565 ASSAY OF CREATININE: CPT

## 2023-09-19 PROCEDURE — 86985 SPLIT BLOOD OR PRODUCTS: CPT

## 2023-09-19 PROCEDURE — 97165 OT EVAL LOW COMPLEX 30 MIN: CPT

## 2023-09-19 PROCEDURE — 84295 ASSAY OF SERUM SODIUM: CPT

## 2023-09-19 PROCEDURE — P9045: CPT

## 2023-09-19 PROCEDURE — 86900 BLOOD TYPING SEROLOGIC ABO: CPT

## 2023-09-19 PROCEDURE — 87389 HIV-1 AG W/HIV-1&-2 AB AG IA: CPT

## 2023-09-19 PROCEDURE — 86850 RBC ANTIBODY SCREEN: CPT

## 2023-09-19 PROCEDURE — 86665 EPSTEIN-BARR CAPSID VCA: CPT

## 2023-09-19 PROCEDURE — P9059: CPT

## 2023-09-19 PROCEDURE — 82803 BLOOD GASES ANY COMBINATION: CPT

## 2023-09-19 PROCEDURE — 97116 GAIT TRAINING THERAPY: CPT

## 2023-09-19 PROCEDURE — 85027 COMPLETE CBC AUTOMATED: CPT

## 2023-09-19 PROCEDURE — C1769: CPT

## 2023-09-19 PROCEDURE — 86803 HEPATITIS C AB TEST: CPT

## 2023-09-19 PROCEDURE — 86706 HEP B SURFACE ANTIBODY: CPT

## 2023-09-19 PROCEDURE — 85014 HEMATOCRIT: CPT

## 2023-09-19 PROCEDURE — 85025 COMPLETE CBC W/AUTO DIFF WBC: CPT

## 2023-09-19 PROCEDURE — 86704 HEP B CORE ANTIBODY TOTAL: CPT

## 2023-09-19 PROCEDURE — 80076 HEPATIC FUNCTION PANEL: CPT

## 2023-09-19 PROCEDURE — 87635 SARS-COV-2 COVID-19 AMP PRB: CPT

## 2023-09-19 PROCEDURE — 83735 ASSAY OF MAGNESIUM: CPT

## 2023-09-19 PROCEDURE — 81003 URINALYSIS AUTO W/O SCOPE: CPT

## 2023-09-19 PROCEDURE — 84132 ASSAY OF SERUM POTASSIUM: CPT

## 2023-09-19 PROCEDURE — 76705 ECHO EXAM OF ABDOMEN: CPT

## 2023-09-19 PROCEDURE — 82140 ASSAY OF AMMONIA: CPT

## 2023-09-19 PROCEDURE — 84484 ASSAY OF TROPONIN QUANT: CPT

## 2023-09-19 PROCEDURE — C1889: CPT

## 2023-09-19 PROCEDURE — 80197 ASSAY OF TACROLIMUS: CPT

## 2023-09-19 PROCEDURE — 85018 HEMOGLOBIN: CPT

## 2023-09-19 PROCEDURE — 97530 THERAPEUTIC ACTIVITIES: CPT

## 2023-09-19 PROCEDURE — 80053 COMPREHEN METABOLIC PANEL: CPT

## 2023-09-19 PROCEDURE — 88313 SPECIAL STAINS GROUP 2: CPT

## 2023-09-19 PROCEDURE — 88307 TISSUE EXAM BY PATHOLOGIST: CPT

## 2023-09-19 PROCEDURE — 93010 ELECTROCARDIOGRAM REPORT: CPT

## 2023-09-19 PROCEDURE — 93005 ELECTROCARDIOGRAM TRACING: CPT

## 2023-09-19 PROCEDURE — C1751: CPT

## 2023-09-19 PROCEDURE — 85396 CLOTTING ASSAY WHOLE BLOOD: CPT

## 2023-09-19 PROCEDURE — 36430 TRANSFUSION BLD/BLD COMPNT: CPT

## 2023-09-19 PROCEDURE — 93975 VASCULAR STUDY: CPT

## 2023-09-19 PROCEDURE — 85730 THROMBOPLASTIN TIME PARTIAL: CPT

## 2023-09-19 PROCEDURE — 82435 ASSAY OF BLOOD CHLORIDE: CPT

## 2023-09-19 PROCEDURE — 97162 PT EVAL MOD COMPLEX 30 MIN: CPT

## 2023-09-19 PROCEDURE — 84100 ASSAY OF PHOSPHORUS: CPT

## 2023-09-19 PROCEDURE — 88304 TISSUE EXAM BY PATHOLOGIST: CPT

## 2023-09-19 PROCEDURE — 86663 EPSTEIN-BARR ANTIBODY: CPT

## 2023-09-19 PROCEDURE — 94002 VENT MGMT INPAT INIT DAY: CPT

## 2023-09-19 PROCEDURE — 85610 PROTHROMBIN TIME: CPT

## 2023-09-19 PROCEDURE — 82330 ASSAY OF CALCIUM: CPT

## 2023-09-19 PROCEDURE — 86880 COOMBS TEST DIRECT: CPT

## 2023-09-19 PROCEDURE — 86644 CMV ANTIBODY: CPT

## 2023-09-19 PROCEDURE — 93010 ELECTROCARDIOGRAM REPORT: CPT | Mod: 77

## 2023-09-19 PROCEDURE — 84702 CHORIONIC GONADOTROPIN TEST: CPT

## 2023-09-19 PROCEDURE — 97535 SELF CARE MNGMENT TRAINING: CPT

## 2023-09-19 PROCEDURE — 86787 VARICELLA-ZOSTER ANTIBODY: CPT

## 2023-09-19 PROCEDURE — 86905 BLOOD TYPING RBC ANTIGENS: CPT

## 2023-09-19 PROCEDURE — 36415 COLL VENOUS BLD VENIPUNCTURE: CPT

## 2023-09-19 PROCEDURE — 71045 X-RAY EXAM CHEST 1 VIEW: CPT

## 2023-09-19 PROCEDURE — 82947 ASSAY GLUCOSE BLOOD QUANT: CPT

## 2023-09-19 PROCEDURE — 86891 AUTOLOGOUS BLOOD OP SALVAGE: CPT

## 2023-09-19 PROCEDURE — 82962 GLUCOSE BLOOD TEST: CPT

## 2023-09-19 RX ORDER — TACROLIMUS 5 MG/1
2 CAPSULE ORAL
Qty: 0 | Refills: 0 | DISCHARGE
Start: 2023-09-19

## 2023-09-19 RX ORDER — TACROLIMUS 5 MG/1
2 CAPSULE ORAL
Refills: 0 | Status: DISCONTINUED | OUTPATIENT
Start: 2023-09-19 | End: 2023-09-19

## 2023-09-19 RX ORDER — VALGANCICLOVIR 450 MG/1
1 TABLET, FILM COATED ORAL
Qty: 0 | Refills: 0 | DISCHARGE
Start: 2023-09-19

## 2023-09-19 RX ORDER — PANTOPRAZOLE SODIUM 20 MG/1
1 TABLET, DELAYED RELEASE ORAL
Qty: 0 | Refills: 0 | DISCHARGE

## 2023-09-19 RX ORDER — TACROLIMUS 5 MG/1
3 CAPSULE ORAL
Qty: 0 | Refills: 0 | DISCHARGE
Start: 2023-09-19

## 2023-09-19 RX ORDER — OXYCODONE HYDROCHLORIDE 5 MG/1
1 TABLET ORAL
Qty: 15 | Refills: 0
Start: 2023-09-19 | End: 2023-09-23

## 2023-09-19 RX ORDER — MAGNESIUM SULFATE 500 MG/ML
2 VIAL (ML) INJECTION ONCE
Refills: 0 | Status: COMPLETED | OUTPATIENT
Start: 2023-09-19 | End: 2023-09-19

## 2023-09-19 RX ORDER — MYCOPHENOLATE MOFETIL 250 MG/1
1000 CAPSULE ORAL
Qty: 0 | Refills: 0 | DISCHARGE
Start: 2023-09-19

## 2023-09-19 RX ORDER — PANTOPRAZOLE SODIUM 20 MG/1
1 TABLET, DELAYED RELEASE ORAL
Qty: 0 | Refills: 0 | DISCHARGE
Start: 2023-09-19

## 2023-09-19 RX ORDER — FLUCONAZOLE 150 MG/1
1 TABLET ORAL
Qty: 0 | Refills: 0 | DISCHARGE
Start: 2023-09-19

## 2023-09-19 RX ORDER — TRAMADOL HYDROCHLORIDE 50 MG/1
50 TABLET ORAL EVERY 4 HOURS
Refills: 0 | Status: DISCONTINUED | OUTPATIENT
Start: 2023-09-19 | End: 2023-09-19

## 2023-09-19 RX ORDER — APIXABAN 2.5 MG/1
1 TABLET, FILM COATED ORAL
Qty: 0 | Refills: 0 | DISCHARGE
Start: 2023-09-19

## 2023-09-19 RX ORDER — TACROLIMUS 5 MG/1
1.5 CAPSULE ORAL
Refills: 0 | Status: DISCONTINUED | OUTPATIENT
Start: 2023-09-19 | End: 2023-09-19

## 2023-09-19 RX ORDER — FERROUS GLUCONATE 100 %
1 POWDER (GRAM) MISCELLANEOUS
Qty: 0 | Refills: 0 | DISCHARGE

## 2023-09-19 RX ADMIN — PANTOPRAZOLE SODIUM 40 MILLIGRAM(S): 20 TABLET, DELAYED RELEASE ORAL at 05:31

## 2023-09-19 RX ADMIN — CHLORHEXIDINE GLUCONATE 1 APPLICATION(S): 213 SOLUTION TOPICAL at 11:41

## 2023-09-19 RX ADMIN — MYCOPHENOLATE MOFETIL 1000 MILLIGRAM(S): 250 CAPSULE ORAL at 08:21

## 2023-09-19 RX ADMIN — TACROLIMUS 2 MILLIGRAM(S): 5 CAPSULE ORAL at 08:21

## 2023-09-19 RX ADMIN — Medication 25 GRAM(S): at 11:35

## 2023-09-19 RX ADMIN — Medication 1 TABLET(S): at 05:31

## 2023-09-19 RX ADMIN — Medication 5 MILLIGRAM(S): at 00:22

## 2023-09-19 RX ADMIN — LIDOCAINE 1 PATCH: 4 CREAM TOPICAL at 06:00

## 2023-09-19 RX ADMIN — SIMETHICONE 80 MILLIGRAM(S): 80 TABLET, CHEWABLE ORAL at 05:30

## 2023-09-19 RX ADMIN — Medication 1 TABLET(S): at 11:35

## 2023-09-19 RX ADMIN — APIXABAN 5 MILLIGRAM(S): 2.5 TABLET, FILM COATED ORAL at 05:31

## 2023-09-19 RX ADMIN — VALGANCICLOVIR 450 MILLIGRAM(S): 450 TABLET, FILM COATED ORAL at 11:35

## 2023-09-19 RX ADMIN — OXYCODONE HYDROCHLORIDE 5 MILLIGRAM(S): 5 TABLET ORAL at 05:31

## 2023-09-19 RX ADMIN — SIMETHICONE 80 MILLIGRAM(S): 80 TABLET, CHEWABLE ORAL at 11:40

## 2023-09-19 RX ADMIN — OXYCODONE HYDROCHLORIDE 5 MILLIGRAM(S): 5 TABLET ORAL at 06:30

## 2023-09-19 RX ADMIN — FLUCONAZOLE 200 MILLIGRAM(S): 150 TABLET ORAL at 11:36

## 2023-09-19 NOTE — PROGRESS NOTE ADULT - PROVIDER SPECIALTY LIST ADULT
Pharmacy
SICU
Transplant Surgery
SICU
Transplant Surgery
SICU
Transplant Surgery

## 2023-09-19 NOTE — DISCHARGE NOTE PROVIDER - NSDCFUADDAPPT_GEN_ALL_CORE_FT
-f/u with surgeon on Monday 9/25/23  - Blood work to be done at [facility] lab on Thursday 9/21/23 (Outpt coordinator to follow up labs)

## 2023-09-19 NOTE — PROGRESS NOTE ADULT - ASSESSMENT
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 s/p elective TIPs/DIPS in 5/2021 now  s/p OLT.    [] POD 10 s/p OLT   - mild transaminitis improving with pulse steroids: Medrol 250 (9/16), Sol 500mg (9/17).  Give additional Methylpred 500mg IV today then taper tomorrow  - Eliquis 5/5  - Pain management  - diarrhea: improving off bowel regimen  - Reg diet  - Strict I/Os  - Discharge home with labs to be done on Thursday   - Immuno: FK by level, MMF 1/1,  steroid taper  - PPX: Continue fluconazole, bactrim, valganciclovir, ppi, oscal

## 2023-09-19 NOTE — PROGRESS NOTE ADULT - SUBJECTIVE AND OBJECTIVE BOX
TRANSPLANT SURGERY MULTIDISCIPLINARY PROGRESS NOTE    OLT POD#11    Present:   Patient seen and examined with multidisciplinary team including Transplant Surgeon: Dr. Zamora. Dr. Licona. PGY 3 Cruz Haq. Transplant hepatologist Dr. Salazar, Abbie Berry and unit RN during am rounds.  Disciplines not in attendance will be notified of the plan.     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 s/p elective TIPs/DIPS in 5/2021 now s/p OLT.      Interval events:   Afebrile, VSS  mild transaminitis. Received second Methylpred 500mg yesterday.  LFTs down today   pain controlled    Immunosuppression  FK per level, MMF 1/1,  pulse steroids  SIMULECT completed  Ongoing monitoring for signs of rejection         MEDICATIONS  (STANDING):  apixaban 5 milliGRAM(s) Oral two times a day  calcium carbonate 1250 mG  + Vitamin D (OsCal 500 + D) 1 Tablet(s) Oral two times a day  chlorhexidine 2% Cloths 1 Application(s) Topical <User Schedule>  fluconAZOLE   Tablet 200 milliGRAM(s) Oral daily  influenza   Vaccine 0.5 milliLiter(s) IntraMuscular once  lidocaine   4% Patch 1 Patch Transdermal every 24 hours  lidocaine 1% (Preservative-free) Injectable 5 milliLiter(s) Local Injection once  lidocaine 1% Injectable 10 milliLiter(s) Local Injection once  lidocaine 1% Injectable 20 milliLiter(s) Local Injection once  melatonin 5 milliGRAM(s) Oral at bedtime  mycophenolate mofetil 1000 milliGRAM(s) Oral <User Schedule>  pantoprazole    Tablet 40 milliGRAM(s) Oral before breakfast  predniSONE   Tablet   Oral   predniSONE   Tablet 80 milliGRAM(s) Oral daily  tacrolimus 2 milliGRAM(s) Oral <User Schedule>  tacrolimus 1.5 milliGRAM(s) Oral <User Schedule>  trimethoprim   80 mG/sulfamethoxazole 400 mG 1 Tablet(s) Oral daily  valGANciclovir 450 milliGRAM(s) Oral daily    MEDICATIONS  (PRN):  benzocaine/menthol Lozenge 1 Lozenge Oral every 2 hours PRN Sore Throat  oxyCODONE    IR 5 milliGRAM(s) Oral every 4 hours PRN Severe Pain (7 - 10)  simethicone 80 milliGRAM(s) Chew four times a day PRN Gas  simethicone 80 milliGRAM(s) Chew once PRN Gas  sodium chloride 0.65% Nasal 1 Spray(s) Both Nostrils every 4 hours PRN Nasal Congestion  traMADol 50 milliGRAM(s) Oral every 4 hours PRN Moderate Pain (4 - 6)      PAST MEDICAL & SURGICAL HISTORY:  Cirrhosis  2010      Cirrhosis      Budd-Chiari syndrome      H/O protein S deficiency      Miscarriage  x 5      H/O protein S deficiency      History of transjugular intrahepatic portosystemic shunt      History of dilation and curettage      Presence of IVC filter          Vital Signs Last 24 Hrs  T(C): 36.7 (19 Sep 2023 09:00), Max: 36.7 (18 Sep 2023 20:00)  T(F): 98.1 (19 Sep 2023 09:00), Max: 98.1 (18 Sep 2023 20:00)  HR: 77 (19 Sep 2023 11:31) (65 - 77)  BP: 110/73 (19 Sep 2023 11:31) (109/72 - 132/89)  BP(mean): --  RR: 20 (19 Sep 2023 11:31) (16 - 20)  SpO2: 97% (19 Sep 2023 11:31) (95% - 98%)    Parameters below as of 19 Sep 2023 09:00  Patient On (Oxygen Delivery Method): room air        I&O's Summary    18 Sep 2023 07:01  -  19 Sep 2023 07:00  --------------------------------------------------------  IN: 1490 mL / OUT: 1555 mL / NET: -65 mL    19 Sep 2023 07:01  -  19 Sep 2023 17:33  --------------------------------------------------------  IN: 570 mL / OUT: 350 mL / NET: 220 mL                              9.2    9.73  )-----------( 254      ( 19 Sep 2023 07:08 )             29.6     09-19    135  |  100  |  25<H>  ----------------------------<  124<H>  4.7   |  24  |  0.59    Ca    8.5      19 Sep 2023 07:06  Phos  3.5     09-19  Mg     1.9     09-19    TPro  4.9<L>  /  Alb  3.1<L>  /  TBili  0.2  /  DBili  x   /  AST  23  /  ALT  74<H>  /  AlkPhos  67  09-19    Tacrolimus (), Serum: 6.9 ng/mL (09-19 @ 07:08)                    Review of systems  Gen: No weight changes, fatigue, fevers/chills, weakness  Skin: No rashes  Head/Eyes/Ears/Mouth: No headache; Normal hearing; Normal vision w/o blurriness; No sinus pain/discomfort, sore throat  Respiratory: No dyspnea, cough, wheezing, hemoptysis  CV: No chest pain, PND, orthopnea  GI: C/O mild abdominal pain at surgical site, No diarrhea, constipation, nausea, vomiting, melena, hematochezia  : No increased frequency, dysuria, hematuria, nocturia  MSK: No joint pain/swelling; no back pain; no edema  Neuro: No dizziness/lightheadedness, weakness, seizures, numbness, tingling  Heme: No easy bruising or bleeding  Endo: No heat/cold intolerance  Psych: No significant nervousness, anxiety, stress, depression  All other systems were reviewed and are negative, except as noted.    PHYSICAL EXAM:  General: NAD, AAOx3, calm and cooperative  HEENT: NCAT, CESAR, EOMI, Trachea ML, Neck supple  Cardiac: RRR S1, S2, no Murmurs, rubs or gallops  Respiratory: CTAB, normal respiratory effort  Abdomen: Soft, non-distended, minimally tender around incision, no erythema/edema/fluctuance/drainage, MELISSA drain x1 SS  Ext: no lower extremity edema, warm and well-perfused  Skin: no breakdown, no jaundice

## 2023-09-19 NOTE — DISCHARGE NOTE PROVIDER - CARE PROVIDER_API CALL
Dagher, Nabil Nuhad  Surgery  [address] [place] [address]  Phone: [phone]  Fax: [phone]  Follow Up Time:     Haydee Carvajal  Transplant Hepatology  [address] [place] [address]  Phone: [phone]  Fax: [phone]  Follow Up Time:

## 2023-09-19 NOTE — PROGRESS NOTE ADULT - REASON FOR ADMISSION
Liver Transplant

## 2023-09-19 NOTE — DISCHARGE NOTE PROVIDER - NSDCFUSCHEDAPPT_GEN_ALL_CORE_FT
[facility]  [facility]  Scheduled Appointment: 10/12/2023    Kelechi Venegas  [facility]  [facility]  Scheduled Appointment: 10/12/2023

## 2023-09-19 NOTE — DISCHARGE NOTE NURSING/CASE MANAGEMENT/SOCIAL WORK - PATIENT PORTAL LINK FT
You can access the FollowMyHealth Patient Portal offered by [facility] by registering at the following website: [email or web address]. By joining [facility]’s FollowMyHealth portal, you will also be able to view your health information using other applications (apps) compatible with our system.

## 2023-09-19 NOTE — DISCHARGE NOTE PROVIDER - HOSPITAL COURSE
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 s/p elective TIPs/DIPS in 5/2021 now s/p OLT on 9/8/2023. Post op did well had uptrended LFT's on 9/16 pulse steroid started received three doses total of 1250mg, started pred taper on 9/19/23. D/C summary:    (CHECK STENT EDU NOTES)  Did well from surgical perspective. Tolerated PO, ambulated, had bowel function. passed TOV after salamanca removal  Improving graft function. good flow on doppler. Completed Simulect    Was evaluated daily by our multidisciplinary transplant team of surgeons, hepatologists, pharmacists, ACPs, RNs, Nutrition and deemed safe for d/c home with the following plan:  -Tacrolimus 2mg in am and 1.5mg in pm, MMF 1g BID, Pred taper  -standard PPx Diflucan/Bactrim/Valcyte  -f/u with surgeon on Monday 9/25/23  - Blood work to be done at [facility] lab on Thursday 9/21/23 (Outpt coordinator to follow up labs)

## 2023-09-19 NOTE — DISCHARGE NOTE PROVIDER - DETAILS OF MALNUTRITION DIAGNOSIS/DIAGNOSES
This patient has been assessed with a concern for Malnutrition and was treated during this hospitalization for the following Nutrition diagnosis/diagnoses:     -  09/13/2023: Severe protein-calorie malnutrition

## 2023-09-19 NOTE — DISCHARGE NOTE PROVIDER - NSDCMRMEDTOKEN_GEN_ALL_CORE_FT
apixaban 5 mg oral tablet: 1 tab(s) orally 2 times a day  calcium-vitamin D 500 mg-5 mcg (200 intl units) oral tablet: 1 tab(s) orally 2 times a day  fluconazole 200 mg oral tablet: 1 tab(s) orally once a day  mycophenolate mofetil 250 mg oral capsule: 1,000 milligram(s) orally 2 times a day  oxyCODONE 5 mg oral tablet: 1 tab(s) orally every 8 hours as needed for Severe Pain (7 - 10) MDD: 3  pantoprazole 40 mg oral delayed release tablet: 1 tab(s) orally once a day (before a meal)  predniSONE 5 mg oral tablet: 1 orally once a day Follow taper schedule from med action sheet  sulfamethoxazole-trimethoprim 400 mg-80 mg oral tablet: 1 tab(s) orally once a day  tacrolimus 0.5 mg oral capsule: 3 cap(s) orally once a day (in the evening)  tacrolimus 1 mg oral capsule: 2 cap(s) orally once a day (in the morning)  valGANciclovir 450 mg oral tablet: 1 tab(s) orally once a day

## 2023-09-19 NOTE — DISCHARGE NOTE NURSING/CASE MANAGEMENT/SOCIAL WORK - NSDCPEELIQUISDIET_GEN_ALL_CORE
spoke with pt scheduled at [facility] on dec 17 arrive at 0730 am keron pate--zuleyma  
Eat healthy foods you enjoy. Apixaban/Eliquis DOES NOT have a special diet. Limit your alcohol intake.

## 2023-09-19 NOTE — DISCHARGE NOTE PROVIDER - NSDCCPCAREPLAN_GEN_ALL_CORE_FT
PRINCIPAL DISCHARGE DIAGNOSIS  Diagnosis: S/P liver transplant  Assessment and Plan of Treatment: Liver replaced by transplant  No heavy lifting anything more than 10-15lbs or straining. Otherwise, you may return to your usual level of physical activity. If you are taking narcotic pain medication (such as Percocet), do NOT drive a car, operate machinery or make important decisions.  Call trnansplant clinic If you developed any of the following, fever, pain, redness, swelling at incision site, cough, nausea, vomiting, painful urination, difficulty urination, or not making any urine.  NOTIFY YOUR SURGEON IF: You have any bleeding that does not stop, any pus draining from your wound, any fever (over 100.4 F) or chills, persistent nausea/vomiting with inability to tolerate food or liquids, persistent diarrhea, or if your pain is not controlled on your discharge pain medications.  Immunosuppression:   Keep away from people who have cough, cold, and symptom of flu.  Only take medications that are on your discharge list  If you missed your medications call the transplant office, do not double up medication because you missed a dose.  If you have any question regarding your medication please call transplant office.

## 2023-09-19 NOTE — DISCHARGE NOTE NURSING/CASE MANAGEMENT/SOCIAL WORK - NSDCPEFALRISK_GEN_ALL_CORE
For information on Fall & Injury Prevention, visit: [email or web address] OR  [email or web address] OR  https://www.cdc.gov/steadi/patient.html

## 2023-09-19 NOTE — PROGRESS NOTE ADULT - NUTRITIONAL ASSESSMENT
This patient has been assessed with a concern for Malnutrition and has been determined to have a diagnosis/diagnoses of Severe protein-calorie malnutrition.    This patient is being managed with:   Diet Consistent Carbohydrate w/Evening Snack-  Supplement Feeding Modality:  Oral  Ensure Max Cans or Servings Per Day:  1       Frequency:  Daily  Entered: Sep 13 2023  1:16PM  

## 2023-09-21 LAB
SURGICAL PATHOLOGY STUDY: SIGNIFICANT CHANGE UP

## 2023-09-24 ENCOUNTER — NON-APPOINTMENT (OUTPATIENT)
Age: 38
End: 2023-09-24

## 2023-09-24 ENCOUNTER — INPATIENT (INPATIENT)
Facility: HOSPITAL | Age: 38
LOS: 1 days | Discharge: HOME | DRG: 393 | End: 2023-09-26
Attending: TRANSPLANT SURGERY | Admitting: TRANSPLANT SURGERY
Payer: MEDICAID

## 2023-09-24 VITALS
SYSTOLIC BLOOD PRESSURE: 109 MMHG | TEMPERATURE: 98 F | HEART RATE: 98 BPM | RESPIRATION RATE: 18 BRPM | OXYGEN SATURATION: 98 % | DIASTOLIC BLOOD PRESSURE: 73 MMHG

## 2023-09-24 DIAGNOSIS — Z98.890 OTHER SPECIFIED POSTPROCEDURAL STATES: Chronic | ICD-10-CM

## 2023-09-24 DIAGNOSIS — Z95.828 PRESENCE OF OTHER VASCULAR IMPLANTS AND GRAFTS: Chronic | ICD-10-CM

## 2023-09-24 DIAGNOSIS — Z94.4 LIVER TRANSPLANT STATUS: ICD-10-CM

## 2023-09-24 LAB
ALBUMIN SERPL ELPH-MCNC: 3.9 G/DL — SIGNIFICANT CHANGE UP (ref 3.3–5)
ALP SERPL-CCNC: 75 U/L — SIGNIFICANT CHANGE UP (ref 40–120)
ALT FLD-CCNC: 56 U/L — HIGH (ref 10–45)
ANION GAP SERPL CALC-SCNC: 13 MMOL/L — SIGNIFICANT CHANGE UP (ref 5–17)
ANISOCYTOSIS BLD QL: SLIGHT — SIGNIFICANT CHANGE UP
APPEARANCE UR: CLEAR — SIGNIFICANT CHANGE UP
APTT BLD: 28.7 SEC — SIGNIFICANT CHANGE UP (ref 24.5–35.6)
AST SERPL-CCNC: 19 U/L — SIGNIFICANT CHANGE UP (ref 10–40)
BASOPHILS # BLD AUTO: 0 K/UL — SIGNIFICANT CHANGE UP (ref 0–0.2)
BASOPHILS NFR BLD AUTO: 0 % — SIGNIFICANT CHANGE UP (ref 0–2)
BILIRUB SERPL-MCNC: 0.2 MG/DL — SIGNIFICANT CHANGE UP (ref 0.2–1.2)
BILIRUB UR-MCNC: NEGATIVE — SIGNIFICANT CHANGE UP
BUN SERPL-MCNC: 20 MG/DL — SIGNIFICANT CHANGE UP (ref 7–23)
CALCIUM SERPL-MCNC: 9.6 MG/DL — SIGNIFICANT CHANGE UP (ref 8.4–10.5)
CHLORIDE SERPL-SCNC: 104 MMOL/L — SIGNIFICANT CHANGE UP (ref 96–108)
CO2 SERPL-SCNC: 21 MMOL/L — LOW (ref 22–31)
COLOR SPEC: SIGNIFICANT CHANGE UP
CREAT SERPL-MCNC: 0.52 MG/DL — SIGNIFICANT CHANGE UP (ref 0.5–1.3)
CRP SERPL-MCNC: <3 MG/L — SIGNIFICANT CHANGE UP (ref 0–4)
DACRYOCYTES BLD QL SMEAR: SLIGHT — SIGNIFICANT CHANGE UP
DIFF PNL FLD: NEGATIVE — SIGNIFICANT CHANGE UP
EGFR: 121 ML/MIN/1.73M2 — SIGNIFICANT CHANGE UP
ELLIPTOCYTES BLD QL SMEAR: SLIGHT — SIGNIFICANT CHANGE UP
EOSINOPHIL # BLD AUTO: 0 K/UL — SIGNIFICANT CHANGE UP (ref 0–0.5)
EOSINOPHIL NFR BLD AUTO: 0 % — SIGNIFICANT CHANGE UP (ref 0–6)
ERYTHROCYTE [SEDIMENTATION RATE] IN BLOOD: 20 MM/HR — HIGH (ref 0–15)
GLUCOSE SERPL-MCNC: 142 MG/DL — HIGH (ref 70–99)
GLUCOSE UR QL: NEGATIVE — SIGNIFICANT CHANGE UP
HCT VFR BLD CALC: 34.6 % — SIGNIFICANT CHANGE UP (ref 34.5–45)
HGB BLD-MCNC: 10.7 G/DL — LOW (ref 11.5–15.5)
INR BLD: 1.14 RATIO — SIGNIFICANT CHANGE UP (ref 0.85–1.18)
KETONES UR-MCNC: NEGATIVE — SIGNIFICANT CHANGE UP
LEUKOCYTE ESTERASE UR-ACNC: NEGATIVE — SIGNIFICANT CHANGE UP
LYMPHOCYTES # BLD AUTO: 0.11 K/UL — LOW (ref 1–3.3)
LYMPHOCYTES # BLD AUTO: 0.9 % — LOW (ref 13–44)
MACROCYTES BLD QL: SLIGHT — SIGNIFICANT CHANGE UP
MAGNESIUM SERPL-MCNC: 1.5 MG/DL — LOW (ref 1.6–2.6)
MANUAL SMEAR VERIFICATION: SIGNIFICANT CHANGE UP
MCHC RBC-ENTMCNC: 27.2 PG — SIGNIFICANT CHANGE UP (ref 27–34)
MCHC RBC-ENTMCNC: 30.9 GM/DL — LOW (ref 32–36)
MCV RBC AUTO: 87.8 FL — SIGNIFICANT CHANGE UP (ref 80–100)
MICROCYTES BLD QL: SLIGHT — SIGNIFICANT CHANGE UP
MONOCYTES # BLD AUTO: 0.11 K/UL — SIGNIFICANT CHANGE UP (ref 0–0.9)
MONOCYTES NFR BLD AUTO: 0.9 % — LOW (ref 2–14)
MYELOCYTES NFR BLD: 1.7 % — HIGH (ref 0–0)
NEUTROPHILS # BLD AUTO: 11.3 K/UL — HIGH (ref 1.8–7.4)
NEUTROPHILS NFR BLD AUTO: 96.5 % — HIGH (ref 43–77)
NITRITE UR-MCNC: NEGATIVE — SIGNIFICANT CHANGE UP
OVALOCYTES BLD QL SMEAR: SLIGHT — SIGNIFICANT CHANGE UP
PH UR: 6 — SIGNIFICANT CHANGE UP (ref 5–8)
PHOSPHATE SERPL-MCNC: 4.3 MG/DL — SIGNIFICANT CHANGE UP (ref 2.5–4.5)
PLAT MORPH BLD: NORMAL — SIGNIFICANT CHANGE UP
PLATELET # BLD AUTO: 319 K/UL — SIGNIFICANT CHANGE UP (ref 150–400)
POIKILOCYTOSIS BLD QL AUTO: SLIGHT — SIGNIFICANT CHANGE UP
POLYCHROMASIA BLD QL SMEAR: SLIGHT — SIGNIFICANT CHANGE UP
POTASSIUM SERPL-MCNC: 4.7 MMOL/L — SIGNIFICANT CHANGE UP (ref 3.5–5.3)
POTASSIUM SERPL-SCNC: 4.7 MMOL/L — SIGNIFICANT CHANGE UP (ref 3.5–5.3)
PROT SERPL-MCNC: 6.2 G/DL — SIGNIFICANT CHANGE UP (ref 6–8.3)
PROT UR-MCNC: NEGATIVE — SIGNIFICANT CHANGE UP
PROTHROM AB SERPL-ACNC: 11.9 SEC — SIGNIFICANT CHANGE UP (ref 9.5–13)
RBC # BLD: 3.94 M/UL — SIGNIFICANT CHANGE UP (ref 3.8–5.2)
RBC # FLD: 17.4 % — HIGH (ref 10.3–14.5)
RBC BLD AUTO: ABNORMAL
SODIUM SERPL-SCNC: 138 MMOL/L — SIGNIFICANT CHANGE UP (ref 135–145)
SP GR SPEC: 1.01 — SIGNIFICANT CHANGE UP (ref 1.01–1.02)
UROBILINOGEN FLD QL: NEGATIVE — SIGNIFICANT CHANGE UP
WBC # BLD: 11.71 K/UL — HIGH (ref 3.8–10.5)
WBC # FLD AUTO: 11.71 K/UL — HIGH (ref 3.8–10.5)

## 2023-09-24 PROCEDURE — 93010 ELECTROCARDIOGRAM REPORT: CPT

## 2023-09-24 PROCEDURE — 76705 ECHO EXAM OF ABDOMEN: CPT | Mod: 26,59

## 2023-09-24 PROCEDURE — 93975 VASCULAR STUDY: CPT | Mod: 26

## 2023-09-24 RX ORDER — SODIUM CHLORIDE 9 MG/ML
1000 INJECTION, SOLUTION INTRAVENOUS ONCE
Refills: 0 | Status: COMPLETED | OUTPATIENT
Start: 2023-09-24 | End: 2023-09-24

## 2023-09-24 RX ORDER — LIDOCAINE 4 G/100G
1 CREAM TOPICAL EVERY 24 HOURS
Refills: 0 | Status: DISCONTINUED | OUTPATIENT
Start: 2023-09-24 | End: 2023-09-25

## 2023-09-24 RX ORDER — APIXABAN 2.5 MG/1
5 TABLET, FILM COATED ORAL
Refills: 0 | Status: DISCONTINUED | OUTPATIENT
Start: 2023-09-24 | End: 2023-09-26

## 2023-09-24 RX ORDER — MAGNESIUM SULFATE 500 MG/ML
1 VIAL (ML) INJECTION ONCE
Refills: 0 | Status: COMPLETED | OUTPATIENT
Start: 2023-09-24 | End: 2023-09-24

## 2023-09-24 RX ORDER — VALGANCICLOVIR 450 MG/1
450 TABLET, FILM COATED ORAL DAILY
Refills: 0 | Status: DISCONTINUED | OUTPATIENT
Start: 2023-09-25 | End: 2023-09-26

## 2023-09-24 RX ORDER — LIDOCAINE 4 G/100G
1 CREAM TOPICAL DAILY
Refills: 0 | Status: DISCONTINUED | OUTPATIENT
Start: 2023-09-24 | End: 2023-09-25

## 2023-09-24 RX ORDER — ACETAMINOPHEN 500 MG
650 TABLET ORAL EVERY 6 HOURS
Refills: 0 | Status: DISCONTINUED | OUTPATIENT
Start: 2023-09-24 | End: 2023-09-26

## 2023-09-24 RX ORDER — INFLUENZA VIRUS VACCINE 15; 15; 15; 15 UG/.5ML; UG/.5ML; UG/.5ML; UG/.5ML
0.5 SUSPENSION INTRAMUSCULAR ONCE
Refills: 0 | Status: DISCONTINUED | OUTPATIENT
Start: 2023-09-24 | End: 2023-09-26

## 2023-09-24 RX ORDER — FLUCONAZOLE 150 MG/1
200 TABLET ORAL DAILY
Refills: 0 | Status: DISCONTINUED | OUTPATIENT
Start: 2023-09-25 | End: 2023-09-26

## 2023-09-24 RX ORDER — PANTOPRAZOLE SODIUM 20 MG/1
40 TABLET, DELAYED RELEASE ORAL
Refills: 0 | Status: DISCONTINUED | OUTPATIENT
Start: 2023-09-25 | End: 2023-09-26

## 2023-09-24 RX ORDER — TACROLIMUS 5 MG/1
1.5 CAPSULE ORAL
Refills: 0 | Status: COMPLETED | OUTPATIENT
Start: 2023-09-24 | End: 2023-09-24

## 2023-09-24 RX ORDER — TACROLIMUS 5 MG/1
1.5 CAPSULE ORAL AT BEDTIME
Refills: 0 | Status: DISCONTINUED | OUTPATIENT
Start: 2023-09-24 | End: 2023-09-24

## 2023-09-24 RX ADMIN — Medication 100 GRAM(S): at 17:40

## 2023-09-24 RX ADMIN — Medication 650 MILLIGRAM(S): at 22:30

## 2023-09-24 RX ADMIN — LIDOCAINE 1 PATCH: 4 CREAM TOPICAL at 17:39

## 2023-09-24 RX ADMIN — TACROLIMUS 1.5 MILLIGRAM(S): 5 CAPSULE ORAL at 20:38

## 2023-09-24 RX ADMIN — Medication 650 MILLIGRAM(S): at 23:00

## 2023-09-24 RX ADMIN — APIXABAN 5 MILLIGRAM(S): 2.5 TABLET, FILM COATED ORAL at 17:42

## 2023-09-24 RX ADMIN — LIDOCAINE 1 PATCH: 4 CREAM TOPICAL at 19:00

## 2023-09-24 RX ADMIN — SODIUM CHLORIDE 1000 MILLILITER(S): 9 INJECTION, SOLUTION INTRAVENOUS at 17:40

## 2023-09-24 NOTE — H&P ADULT - NSHPLABSRESULTS_GEN_ALL_CORE
10.7   11.71 )-----------( 319      ( 24 Sep 2023 15:52 )             34.6       09-24    138  |  104  |  20  ----------------------------<  142<H>  4.7   |  21<L>  |  0.52    Ca    9.6      24 Sep 2023 15:52  Phos  4.3     09-24  Mg     1.5     09-24    TPro  6.2  /  Alb  3.9  /  TBili  0.2  /  DBili  x   /  AST  19  /  ALT  56<H>  /  AlkPhos  75  09-24              Urinalysis Basic - ( 24 Sep 2023 15:52 )    Color: x / Appearance: x / SG: x / pH: x  Gluc: 142 mg/dL / Ketone: x  / Bili: x / Urobili: x   Blood: x / Protein: x / Nitrite: x   Leuk Esterase: x / RBC: x / WBC x   Sq Epi: x / Non Sq Epi: x / Bacteria: x            Lactate Trend            CAPILLARY BLOOD GLUCOSE

## 2023-09-24 NOTE — H&P ADULT - HISTORY OF PRESENT ILLNESS
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 Pt states diarrhea started today. BM are non-bloody and range from watery to pudding like consistency. Denies fevers, n/v, abdominal pain or urinary sx. Able to tolerate PO and no decrease in appetite. Not on any BR at home. Also endorsing rectal pain externally and internally. Has been using hemorrhoid creams with some relief. Also endorsing feeling more fatigued, generalized weakness. Having persistent low back pain which began  post transplant.  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 Pt states diarrhea started today. BM are non-bloody and range from watery to pudding like consistency. Denies fevers, n/v, abdominal pain or urinary sx. Able to tolerate PO and no decrease in appetite. Not on any BR at home. Also endorsing rectal pain externally and internally. Has been using hemorrhoid creams with some relief. Also endorsing feeling more fatigued, generalized weakness. Having persistent low back pain which began  post transplant.

## 2023-09-24 NOTE — PATIENT PROFILE ADULT - FALL HARM RISK - UNIVERSAL INTERVENTIONS
Bed in lowest position, wheels locked, appropriate side rails in place/Call bell, personal items and telephone in reach/Instruct patient to call for assistance before getting out of bed or chair/Non-slip footwear when patient is out of bed/[place] to call system/Physically safe environment - no spills, clutter or unnecessary equipment/Purposeful Proactive Rounding/Room/bathroom lighting operational, light cord in reach

## 2023-09-24 NOTE — H&P ADULT - NSHPPHYSICALEXAM_GEN_ALL_CORE
Vital Signs Last 24 Hrs  T(C): 36.9 (24 Sep 2023 15:20), Max: 36.9 (24 Sep 2023 15:20)  T(F): 98.5 (24 Sep 2023 15:20), Max: 98.5 (24 Sep 2023 15:20)  HR: 98 (24 Sep 2023 15:20) (98 - 98)  BP: 109/73 (24 Sep 2023 15:20) (109/73 - 109/73)  BP(mean): --  RR: 18 (24 Sep 2023 15:20) (18 - 18)  SpO2: 98% (24 Sep 2023 15:20) (98% - 98%)    Parameters below as of 24 Sep 2023 15:20  Patient On (Oxygen Delivery Method): room air      Gen: no acute distress  HEENT: atraumatic, normocephalic, EOMI  CV: tachycardic, regular rhythm, no murmurs  Resp: CTAB  GI: soft, nontender, nondistended, BS+, surgical closure sites with staples, c/d/i  Rectal: large, irritated external hemorrhoid, TTP. Probably internal hemorrhoid as well. No stool noted on exam.  MSK: extremities atraumatic, no peripheral edema  Skin: warm, dry, no rashes or lesions  Neuro: no focal deficits, sensation grossly intact  Psych: alert and oriented x3, appropriate mood and affect

## 2023-09-24 NOTE — H&P ADULT - NSHPREVIEWOFSYSTEMS_GEN_ALL_CORE
Constitutional: no fever, no chills, + fatigue  ENT/mouth: no hearing changes, no sore throat, no rhinorrhea  Eyes: no eye pain, no eye redness, no eye swelling, no vision changes  CV: no chest pain, no orthopnea, no palpitations  Resp: no shortness of breath, no cough, no wheezing  GI: no abdominal pain, no nausea, no vomiting, +diarrhea, no constipation  : no dysuria, no urinary frequency or hesitancy, no hematuria  Skin/MSK: +low back pain, no rashes, no lower extremity edema  Neuro: no focal weakness, no numbness, no loss of consciousness, no syncope, no dizziness, no headache

## 2023-09-24 NOTE — H&P ADULT - ASSESSMENT
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    #Diarrhea  Concern for viral infection vs medication SE from MMF  Mild leukocytosis noted however may represent hemoconcentration; does not reach sepsis criteria  Benign abdominal exam, will hold off on further imaging at this time  - GI PCR, CMV PCR ordered  - Holding MMF  - 1L LR bolus    #S/p liver transplant  OLT with caval replacement 9/8/2023  LFTs stable from prior  - US doppler liver ordered  - c/w tacrolimus 2/1.5 and prednisone 20 mg daily  - holding home Cellcept 1000 mg bid given diarrhea  - c/w ppx: bactrim ss daily, valtrex 450 mg daily, fluconazole 200 mg daily  - obtain daily AM FK trough    #DVT ppx  - c/w home eliquis 5 bid

## 2023-09-25 ENCOUNTER — APPOINTMENT (OUTPATIENT)
Dept: TRANSPLANT | Facility: CLINIC | Age: 38
End: 2023-09-25

## 2023-09-25 LAB
ALBUMIN SERPL ELPH-MCNC: 3.3 G/DL — SIGNIFICANT CHANGE UP (ref 3.3–5)
ALP SERPL-CCNC: 57 U/L — SIGNIFICANT CHANGE UP (ref 40–120)
ALT FLD-CCNC: 40 U/L — SIGNIFICANT CHANGE UP (ref 10–45)
ANION GAP SERPL CALC-SCNC: 13 MMOL/L — SIGNIFICANT CHANGE UP (ref 5–17)
AST SERPL-CCNC: 12 U/L — SIGNIFICANT CHANGE UP (ref 10–40)
BASOPHILS # BLD AUTO: 0.01 K/UL — SIGNIFICANT CHANGE UP (ref 0–0.2)
BASOPHILS NFR BLD AUTO: 0.2 % — SIGNIFICANT CHANGE UP (ref 0–2)
BILIRUB SERPL-MCNC: 0.2 MG/DL — SIGNIFICANT CHANGE UP (ref 0.2–1.2)
BUN SERPL-MCNC: 17 MG/DL — SIGNIFICANT CHANGE UP (ref 7–23)
CALCIUM SERPL-MCNC: 8.9 MG/DL — SIGNIFICANT CHANGE UP (ref 8.4–10.5)
CHLORIDE SERPL-SCNC: 104 MMOL/L — SIGNIFICANT CHANGE UP (ref 96–108)
CMV DNA CSF QL NAA+PROBE: SIGNIFICANT CHANGE UP IU/ML
CMV DNA SPEC NAA+PROBE-LOG#: SIGNIFICANT CHANGE UP LOG10IU/ML
CO2 SERPL-SCNC: 24 MMOL/L — SIGNIFICANT CHANGE UP (ref 22–31)
CREAT SERPL-MCNC: 0.65 MG/DL — SIGNIFICANT CHANGE UP (ref 0.5–1.3)
EGFR: 115 ML/MIN/1.73M2 — SIGNIFICANT CHANGE UP
EOSINOPHIL # BLD AUTO: 0.06 K/UL — SIGNIFICANT CHANGE UP (ref 0–0.5)
EOSINOPHIL NFR BLD AUTO: 1 % — SIGNIFICANT CHANGE UP (ref 0–6)
GI PCR PANEL: SIGNIFICANT CHANGE UP
GLUCOSE SERPL-MCNC: 88 MG/DL — SIGNIFICANT CHANGE UP (ref 70–99)
HCT VFR BLD CALC: 28.9 % — LOW (ref 34.5–45)
HGB BLD-MCNC: 8.8 G/DL — LOW (ref 11.5–15.5)
IMM GRANULOCYTES NFR BLD AUTO: 1.9 % — HIGH (ref 0–0.9)
LYMPHOCYTES # BLD AUTO: 1.36 K/UL — SIGNIFICANT CHANGE UP (ref 1–3.3)
LYMPHOCYTES # BLD AUTO: 21.9 % — SIGNIFICANT CHANGE UP (ref 13–44)
MAGNESIUM SERPL-MCNC: 1.7 MG/DL — SIGNIFICANT CHANGE UP (ref 1.6–2.6)
MCHC RBC-ENTMCNC: 26.7 PG — LOW (ref 27–34)
MCHC RBC-ENTMCNC: 30.4 GM/DL — LOW (ref 32–36)
MCV RBC AUTO: 87.8 FL — SIGNIFICANT CHANGE UP (ref 80–100)
MONOCYTES # BLD AUTO: 0.39 K/UL — SIGNIFICANT CHANGE UP (ref 0–0.9)
MONOCYTES NFR BLD AUTO: 6.3 % — SIGNIFICANT CHANGE UP (ref 2–14)
NEUTROPHILS # BLD AUTO: 4.26 K/UL — SIGNIFICANT CHANGE UP (ref 1.8–7.4)
NEUTROPHILS NFR BLD AUTO: 68.7 % — SIGNIFICANT CHANGE UP (ref 43–77)
NRBC # BLD: 0 /100 WBCS — SIGNIFICANT CHANGE UP (ref 0–0)
PHOSPHATE SERPL-MCNC: 4.5 MG/DL — SIGNIFICANT CHANGE UP (ref 2.5–4.5)
PLATELET # BLD AUTO: 286 K/UL — SIGNIFICANT CHANGE UP (ref 150–400)
POTASSIUM SERPL-MCNC: 4.9 MMOL/L — SIGNIFICANT CHANGE UP (ref 3.5–5.3)
POTASSIUM SERPL-SCNC: 4.9 MMOL/L — SIGNIFICANT CHANGE UP (ref 3.5–5.3)
PROT SERPL-MCNC: 4.9 G/DL — LOW (ref 6–8.3)
RBC # BLD: 3.29 M/UL — LOW (ref 3.8–5.2)
RBC # FLD: 17.4 % — HIGH (ref 10.3–14.5)
SODIUM SERPL-SCNC: 141 MMOL/L — SIGNIFICANT CHANGE UP (ref 135–145)
TACROLIMUS SERPL-MCNC: 15.2 NG/ML — SIGNIFICANT CHANGE UP
WBC # BLD: 6.2 K/UL — SIGNIFICANT CHANGE UP (ref 3.8–10.5)
WBC # FLD AUTO: 6.2 K/UL — SIGNIFICANT CHANGE UP (ref 3.8–10.5)

## 2023-09-25 PROCEDURE — 72128 CT CHEST SPINE W/O DYE: CPT | Mod: 26

## 2023-09-25 PROCEDURE — 72131 CT LUMBAR SPINE W/O DYE: CPT | Mod: 26

## 2023-09-25 RX ORDER — LIDOCAINE 4 G/100G
1 CREAM TOPICAL AT BEDTIME
Refills: 0 | Status: DISCONTINUED | OUTPATIENT
Start: 2023-09-25 | End: 2023-09-26

## 2023-09-25 RX ORDER — MAGNESIUM SULFATE 500 MG/ML
2 VIAL (ML) INJECTION ONCE
Refills: 0 | Status: COMPLETED | OUTPATIENT
Start: 2023-09-25 | End: 2023-09-25

## 2023-09-25 RX ORDER — MYCOPHENOLIC ACID 180 MG/1
360 TABLET, DELAYED RELEASE ORAL
Refills: 0 | Status: DISCONTINUED | OUTPATIENT
Start: 2023-09-25 | End: 2023-09-26

## 2023-09-25 RX ORDER — PHENYLEPHRINE-SHARK LIVER OIL-MINERAL OIL-PETROLATUM RECTAL OINTMENT
1 OINTMENT (GRAM) RECTAL DAILY
Refills: 0 | Status: DISCONTINUED | OUTPATIENT
Start: 2023-09-25 | End: 2023-09-26

## 2023-09-25 RX ORDER — SIMETHICONE 80 MG/1
80 TABLET, CHEWABLE ORAL
Refills: 0 | Status: DISCONTINUED | OUTPATIENT
Start: 2023-09-25 | End: 2023-09-26

## 2023-09-25 RX ORDER — MYCOPHENOLIC ACID 180 MG/1
360 TABLET, DELAYED RELEASE ORAL ONCE
Refills: 0 | Status: COMPLETED | OUTPATIENT
Start: 2023-09-25 | End: 2023-09-25

## 2023-09-25 RX ORDER — ASPIRIN/CALCIUM CARB/MAGNESIUM 324 MG
81 TABLET ORAL DAILY
Refills: 0 | Status: DISCONTINUED | OUTPATIENT
Start: 2023-09-25 | End: 2023-09-26

## 2023-09-25 RX ADMIN — Medication 1 TABLET(S): at 11:07

## 2023-09-25 RX ADMIN — VALGANCICLOVIR 450 MILLIGRAM(S): 450 TABLET, FILM COATED ORAL at 11:07

## 2023-09-25 RX ADMIN — LIDOCAINE 1 PATCH: 4 CREAM TOPICAL at 06:26

## 2023-09-25 RX ADMIN — Medication 20 MILLIGRAM(S): at 06:14

## 2023-09-25 RX ADMIN — Medication 1 TABLET(S): at 06:15

## 2023-09-25 RX ADMIN — Medication 81 MILLIGRAM(S): at 13:05

## 2023-09-25 RX ADMIN — Medication 1 TABLET(S): at 17:23

## 2023-09-25 RX ADMIN — APIXABAN 5 MILLIGRAM(S): 2.5 TABLET, FILM COATED ORAL at 17:24

## 2023-09-25 RX ADMIN — LIDOCAINE 1 PATCH: 4 CREAM TOPICAL at 17:19

## 2023-09-25 RX ADMIN — MYCOPHENOLIC ACID 360 MILLIGRAM(S): 180 TABLET, DELAYED RELEASE ORAL at 13:00

## 2023-09-25 RX ADMIN — LIDOCAINE 1 PATCH: 4 CREAM TOPICAL at 21:29

## 2023-09-25 RX ADMIN — SIMETHICONE 80 MILLIGRAM(S): 80 TABLET, CHEWABLE ORAL at 00:54

## 2023-09-25 RX ADMIN — APIXABAN 5 MILLIGRAM(S): 2.5 TABLET, FILM COATED ORAL at 06:14

## 2023-09-25 RX ADMIN — MYCOPHENOLIC ACID 360 MILLIGRAM(S): 180 TABLET, DELAYED RELEASE ORAL at 20:01

## 2023-09-25 RX ADMIN — LIDOCAINE 1 PATCH: 4 CREAM TOPICAL at 04:16

## 2023-09-25 RX ADMIN — PHENYLEPHRINE-SHARK LIVER OIL-MINERAL OIL-PETROLATUM RECTAL OINTMENT 1 APPLICATION(S): at 13:27

## 2023-09-25 RX ADMIN — LIDOCAINE 1 PATCH: 4 CREAM TOPICAL at 07:44

## 2023-09-25 RX ADMIN — FLUCONAZOLE 200 MILLIGRAM(S): 150 TABLET ORAL at 11:05

## 2023-09-25 RX ADMIN — Medication 25 GRAM(S): at 08:42

## 2023-09-25 RX ADMIN — PANTOPRAZOLE SODIUM 40 MILLIGRAM(S): 20 TABLET, DELAYED RELEASE ORAL at 06:15

## 2023-09-26 ENCOUNTER — TRANSCRIPTION ENCOUNTER (OUTPATIENT)
Age: 38
End: 2023-09-26

## 2023-09-26 VITALS
TEMPERATURE: 98 F | RESPIRATION RATE: 20 BRPM | DIASTOLIC BLOOD PRESSURE: 80 MMHG | HEART RATE: 102 BPM | SYSTOLIC BLOOD PRESSURE: 126 MMHG | OXYGEN SATURATION: 94 %

## 2023-09-26 LAB
ALBUMIN SERPL ELPH-MCNC: 3.3 G/DL — SIGNIFICANT CHANGE UP (ref 3.3–5)
ALP SERPL-CCNC: 58 U/L — SIGNIFICANT CHANGE UP (ref 40–120)
ALT FLD-CCNC: 33 U/L — SIGNIFICANT CHANGE UP (ref 10–45)
ANION GAP SERPL CALC-SCNC: 11 MMOL/L — SIGNIFICANT CHANGE UP (ref 5–17)
AST SERPL-CCNC: 13 U/L — SIGNIFICANT CHANGE UP (ref 10–40)
BASOPHILS # BLD AUTO: 0.01 K/UL — SIGNIFICANT CHANGE UP (ref 0–0.2)
BASOPHILS NFR BLD AUTO: 0.2 % — SIGNIFICANT CHANGE UP (ref 0–2)
BILIRUB SERPL-MCNC: 0.2 MG/DL — SIGNIFICANT CHANGE UP (ref 0.2–1.2)
BUN SERPL-MCNC: 23 MG/DL — SIGNIFICANT CHANGE UP (ref 7–23)
CALCIUM SERPL-MCNC: 9 MG/DL — SIGNIFICANT CHANGE UP (ref 8.4–10.5)
CHLORIDE SERPL-SCNC: 101 MMOL/L — SIGNIFICANT CHANGE UP (ref 96–108)
CO2 SERPL-SCNC: 25 MMOL/L — SIGNIFICANT CHANGE UP (ref 22–31)
CREAT SERPL-MCNC: 0.62 MG/DL — SIGNIFICANT CHANGE UP (ref 0.5–1.3)
EGFR: 116 ML/MIN/1.73M2 — SIGNIFICANT CHANGE UP
EOSINOPHIL # BLD AUTO: 0.09 K/UL — SIGNIFICANT CHANGE UP (ref 0–0.5)
EOSINOPHIL NFR BLD AUTO: 1.5 % — SIGNIFICANT CHANGE UP (ref 0–6)
GLUCOSE SERPL-MCNC: 89 MG/DL — SIGNIFICANT CHANGE UP (ref 70–99)
HCT VFR BLD CALC: 29.4 % — LOW (ref 34.5–45)
HGB BLD-MCNC: 9.1 G/DL — LOW (ref 11.5–15.5)
IMM GRANULOCYTES NFR BLD AUTO: 2.7 % — HIGH (ref 0–0.9)
LYMPHOCYTES # BLD AUTO: 1.47 K/UL — SIGNIFICANT CHANGE UP (ref 1–3.3)
LYMPHOCYTES # BLD AUTO: 24.4 % — SIGNIFICANT CHANGE UP (ref 13–44)
MAGNESIUM SERPL-MCNC: 1.7 MG/DL — SIGNIFICANT CHANGE UP (ref 1.6–2.6)
MCHC RBC-ENTMCNC: 27.3 PG — SIGNIFICANT CHANGE UP (ref 27–34)
MCHC RBC-ENTMCNC: 31 GM/DL — LOW (ref 32–36)
MCV RBC AUTO: 88.3 FL — SIGNIFICANT CHANGE UP (ref 80–100)
MONOCYTES # BLD AUTO: 0.38 K/UL — SIGNIFICANT CHANGE UP (ref 0–0.9)
MONOCYTES NFR BLD AUTO: 6.3 % — SIGNIFICANT CHANGE UP (ref 2–14)
NEUTROPHILS # BLD AUTO: 3.91 K/UL — SIGNIFICANT CHANGE UP (ref 1.8–7.4)
NEUTROPHILS NFR BLD AUTO: 64.9 % — SIGNIFICANT CHANGE UP (ref 43–77)
NRBC # BLD: 0 /100 WBCS — SIGNIFICANT CHANGE UP (ref 0–0)
PHOSPHATE SERPL-MCNC: 4 MG/DL — SIGNIFICANT CHANGE UP (ref 2.5–4.5)
PLATELET # BLD AUTO: 295 K/UL — SIGNIFICANT CHANGE UP (ref 150–400)
POTASSIUM SERPL-MCNC: 4 MMOL/L — SIGNIFICANT CHANGE UP (ref 3.5–5.3)
POTASSIUM SERPL-SCNC: 4 MMOL/L — SIGNIFICANT CHANGE UP (ref 3.5–5.3)
PROT SERPL-MCNC: 5.3 G/DL — LOW (ref 6–8.3)
RBC # BLD: 3.33 M/UL — LOW (ref 3.8–5.2)
RBC # FLD: 17.3 % — HIGH (ref 10.3–14.5)
SODIUM SERPL-SCNC: 137 MMOL/L — SIGNIFICANT CHANGE UP (ref 135–145)
TACROLIMUS SERPL-MCNC: 5.6 NG/ML — SIGNIFICANT CHANGE UP
WBC # BLD: 6.02 K/UL — SIGNIFICANT CHANGE UP (ref 3.8–10.5)
WBC # FLD AUTO: 6.02 K/UL — SIGNIFICANT CHANGE UP (ref 3.8–10.5)

## 2023-09-26 PROCEDURE — 80053 COMPREHEN METABOLIC PANEL: CPT

## 2023-09-26 PROCEDURE — 86901 BLOOD TYPING SEROLOGIC RH(D): CPT

## 2023-09-26 PROCEDURE — 36415 COLL VENOUS BLD VENIPUNCTURE: CPT

## 2023-09-26 PROCEDURE — 97161 PT EVAL LOW COMPLEX 20 MIN: CPT

## 2023-09-26 PROCEDURE — 76705 ECHO EXAM OF ABDOMEN: CPT

## 2023-09-26 PROCEDURE — 85610 PROTHROMBIN TIME: CPT

## 2023-09-26 PROCEDURE — 87507 IADNA-DNA/RNA PROBE TQ 12-25: CPT

## 2023-09-26 PROCEDURE — 81003 URINALYSIS AUTO W/O SCOPE: CPT

## 2023-09-26 PROCEDURE — 86140 C-REACTIVE PROTEIN: CPT

## 2023-09-26 PROCEDURE — 85730 THROMBOPLASTIN TIME PARTIAL: CPT

## 2023-09-26 PROCEDURE — 93975 VASCULAR STUDY: CPT

## 2023-09-26 PROCEDURE — 72131 CT LUMBAR SPINE W/O DYE: CPT

## 2023-09-26 PROCEDURE — 86900 BLOOD TYPING SEROLOGIC ABO: CPT

## 2023-09-26 PROCEDURE — 93005 ELECTROCARDIOGRAM TRACING: CPT

## 2023-09-26 PROCEDURE — 72128 CT CHEST SPINE W/O DYE: CPT

## 2023-09-26 PROCEDURE — 84100 ASSAY OF PHOSPHORUS: CPT

## 2023-09-26 PROCEDURE — 86850 RBC ANTIBODY SCREEN: CPT

## 2023-09-26 PROCEDURE — 85025 COMPLETE CBC W/AUTO DIFF WBC: CPT

## 2023-09-26 PROCEDURE — 83735 ASSAY OF MAGNESIUM: CPT

## 2023-09-26 PROCEDURE — 85652 RBC SED RATE AUTOMATED: CPT

## 2023-09-26 PROCEDURE — 80197 ASSAY OF TACROLIMUS: CPT

## 2023-09-26 RX ORDER — TACROLIMUS 5 MG/1
1 CAPSULE ORAL ONCE
Refills: 0 | Status: COMPLETED | OUTPATIENT
Start: 2023-09-26 | End: 2023-09-26

## 2023-09-26 RX ORDER — MYCOPHENOLIC ACID 180 MG/1
2 TABLET, DELAYED RELEASE ORAL
Qty: 120 | Refills: 0
Start: 2023-09-26 | End: 2023-10-25

## 2023-09-26 RX ORDER — VALGANCICLOVIR 450 MG/1
1 TABLET, FILM COATED ORAL
Qty: 0 | Refills: 0 | DISCHARGE
Start: 2023-09-26

## 2023-09-26 RX ORDER — MAGNESIUM SULFATE 500 MG/ML
1 VIAL (ML) INJECTION ONCE
Refills: 0 | Status: COMPLETED | OUTPATIENT
Start: 2023-09-26 | End: 2023-09-26

## 2023-09-26 RX ORDER — TACROLIMUS 5 MG/1
1 CAPSULE ORAL
Refills: 0 | Status: DISCONTINUED | OUTPATIENT
Start: 2023-09-26 | End: 2023-09-26

## 2023-09-26 RX ORDER — TACROLIMUS 5 MG/1
1 CAPSULE ORAL
Qty: 0 | Refills: 0 | DISCHARGE
Start: 2023-09-26

## 2023-09-26 RX ORDER — MYCOPHENOLIC ACID 180 MG/1
1 TABLET, DELAYED RELEASE ORAL
Qty: 0 | Refills: 0 | DISCHARGE
Start: 2023-09-26

## 2023-09-26 RX ORDER — ASPIRIN/CALCIUM CARB/MAGNESIUM 324 MG
1 TABLET ORAL
Qty: 0 | Refills: 0 | DISCHARGE
Start: 2023-09-26

## 2023-09-26 RX ORDER — APIXABAN 2.5 MG/1
1 TABLET, FILM COATED ORAL
Qty: 0 | Refills: 0 | DISCHARGE
Start: 2023-09-26

## 2023-09-26 RX ORDER — PANTOPRAZOLE SODIUM 20 MG/1
1 TABLET, DELAYED RELEASE ORAL
Qty: 0 | Refills: 0 | DISCHARGE
Start: 2023-09-26

## 2023-09-26 RX ORDER — FLUCONAZOLE 150 MG/1
1 TABLET ORAL
Qty: 0 | Refills: 0 | DISCHARGE
Start: 2023-09-26

## 2023-09-26 RX ADMIN — VALGANCICLOVIR 450 MILLIGRAM(S): 450 TABLET, FILM COATED ORAL at 13:45

## 2023-09-26 RX ADMIN — TACROLIMUS 1 MILLIGRAM(S): 5 CAPSULE ORAL at 13:45

## 2023-09-26 RX ADMIN — LIDOCAINE 1 PATCH: 4 CREAM TOPICAL at 06:18

## 2023-09-26 RX ADMIN — Medication 650 MILLIGRAM(S): at 01:18

## 2023-09-26 RX ADMIN — Medication 100 GRAM(S): at 08:57

## 2023-09-26 RX ADMIN — Medication 1 TABLET(S): at 05:38

## 2023-09-26 RX ADMIN — LIDOCAINE 1 PATCH: 4 CREAM TOPICAL at 06:19

## 2023-09-26 RX ADMIN — PANTOPRAZOLE SODIUM 40 MILLIGRAM(S): 20 TABLET, DELAYED RELEASE ORAL at 05:39

## 2023-09-26 RX ADMIN — FLUCONAZOLE 200 MILLIGRAM(S): 150 TABLET ORAL at 13:44

## 2023-09-26 RX ADMIN — APIXABAN 5 MILLIGRAM(S): 2.5 TABLET, FILM COATED ORAL at 05:38

## 2023-09-26 RX ADMIN — MYCOPHENOLIC ACID 360 MILLIGRAM(S): 180 TABLET, DELAYED RELEASE ORAL at 08:57

## 2023-09-26 RX ADMIN — LIDOCAINE 1 PATCH: 4 CREAM TOPICAL at 09:30

## 2023-09-26 RX ADMIN — Medication 81 MILLIGRAM(S): at 13:45

## 2023-09-26 RX ADMIN — Medication 15 MILLIGRAM(S): at 05:40

## 2023-09-26 RX ADMIN — Medication 650 MILLIGRAM(S): at 02:20

## 2023-09-26 RX ADMIN — Medication 1 TABLET(S): at 13:45

## 2023-09-26 NOTE — PHYSICAL THERAPY INITIAL EVALUATION ADULT - PERTINENT HX OF CURRENT PROBLEM, REHAB EVAL
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

## 2023-09-26 NOTE — DISCHARGE NOTE PROVIDER - NSDCMRMEDTOKEN_GEN_ALL_CORE_FT
apixaban 5 mg oral tablet: 1 tab(s) orally 2 times a day  aspirin 81 mg oral delayed release tablet: 1 tab(s) orally once a day  calcium-vitamin D 500 mg-5 mcg (200 intl units) oral tablet: 1 tab(s) orally 2 times a day  fluconazole 200 mg oral tablet: 1 tab(s) orally once a day  magnesium oxide 400 mg oral capsule: 1 cap(s) orally 2 times a day  mycophenolic acid 360 mg oral delayed release tablet: 1 tab(s) orally 2 times a day  pantoprazole 40 mg oral delayed release tablet: 1 tab(s) orally once a day (before a meal)  predniSONE 5 mg oral tablet: 3 tab(s) orally once a day  sulfamethoxazole-trimethoprim 400 mg-80 mg oral tablet: 1 tab(s) orally once a day  tacrolimus 1 mg oral capsule: 1 cap(s) orally 2 times a day  valGANciclovir 450 mg oral tablet: 1 tab(s) orally once a day

## 2023-09-26 NOTE — DISCHARGE NOTE PROVIDER - NSDCCPCAREPLAN_GEN_ALL_CORE_FT
PRINCIPAL DISCHARGE DIAGNOSIS  Diagnosis: Noninfectious diarrhea  Assessment and Plan of Treatment: - likely in setting of Cellcept   - cellcept switched to myfortic   - notify your provider if diarrhea worsens

## 2023-09-26 NOTE — PROGRESS NOTE ADULT - SUBJECTIVE AND OBJECTIVE BOX
Transplant Surgery - Multidisciplinary Rounds  --------------------------------------------------------------   Donor OLTx      Date: 23        POD#18    Present:   Patient seen and examined with multidisciplinary Transplant team including Surgeon: Dr. Dagher. Hepatologist: Dr. Bae.  Pharmacist: Cristóbal. ACPs: Claudia, RNs in AM rounds.   Disciplines not in attendance will be notified of the plan.     Interval Events:  Lytes repleted Tac 15.2 hold, diarrhea improved. start myfortic 360bid/asa/dec pred to 15, PT/Nutrition; CT T/L spine- no acute finding; Txp US- patent vasc, CMV-neg, GI PCR-neg     Potential Discharge date: 2023    Education:  Medications    Plan of care:  See Below    MEDICATIONS  (STANDING):  apixaban 5 milliGRAM(s) Oral two times a day  aspirin enteric coated 81 milliGRAM(s) Oral daily  calcium carbonate 1250 mG  + Vitamin D (OsCal 500 + D) 1 Tablet(s) Oral two times a day  fluconAZOLE   Tablet 200 milliGRAM(s) Oral daily  influenza   Vaccine 0.5 milliLiter(s) IntraMuscular once  lidocaine   4% Patch 1 Patch Transdermal at bedtime  lidocaine   4% Patch 1 Patch Transdermal at bedtime  mycophenolic acid  milliGRAM(s) Oral <User Schedule>  pantoprazole    Tablet 40 milliGRAM(s) Oral before breakfast  predniSONE   Tablet 15 milliGRAM(s) Oral daily  trimethoprim   80 mG/sulfamethoxazole 400 mG 1 Tablet(s) Oral daily  valGANciclovir 450 milliGRAM(s) Oral daily    MEDICATIONS  (PRN):  acetaminophen     Tablet .. 650 milliGRAM(s) Oral every 6 hours PRN Mild Pain (1 - 3), Moderate Pain (4 - 6)  hemorrhoidal Ointment 1 Application(s) Rectal daily PRN itch  simethicone 80 milliGRAM(s) Chew four times a day PRN Gas      PAST MEDICAL & SURGICAL HISTORY:  Cirrhosis  2010      Cirrhosis      Budd-Chiari syndrome      H/O protein S deficiency      Miscarriage  x 5      H/O protein S deficiency      History of transjugular intrahepatic portosystemic shunt      History of dilation and curettage      Presence of IVC filter          Vital Signs Last 24 Hrs  T(C): 36.8 (26 Sep 2023 05:35), Max: 36.8 (26 Sep 2023 05:35)  T(F): 98.2 (26 Sep 2023 05:35), Max: 98.2 (26 Sep 2023 05:35)  HR: 90 (26 Sep 2023 09:48) (87 - 102)  BP: 118/78 (26 Sep 2023 09:48) (105/69 - 121/82)  BP(mean): 88 (26 Sep 2023 05:35) (88 - 88)  RR: 20 (26 Sep 2023 09:48) (18 - 20)  SpO2: 97% (26 Sep 2023 09:48) (96% - 98%)    Parameters below as of 26 Sep 2023 09:48  Patient On (Oxygen Delivery Method): room air        I&O's Summary    25 Sep 2023 07:01  -  26 Sep 2023 07:00  --------------------------------------------------------  IN: 880 mL / OUT: 400 mL / NET: 480 mL    26 Sep 2023 07:01  -  26 Sep 2023 10:09  --------------------------------------------------------  IN: 240 mL / OUT: 0 mL / NET: 240 mL                              9.1    6.02  )-----------( 295      ( 26 Sep 2023 06:45 )             29.4     -    137  |  101  |  23  ----------------------------<  89  4.0   |  25  |  0.62    Ca    9.0      26 Sep 2023 06:41  Phos  4.0       Mg     1.7         TPro  5.3<L>  /  Alb  3.3  /  TBili  0.2  /  DBili  x   /  AST  13  /  ALT  33  /  AlkPhos  58      Tacrolimus (), Serum: 5.6 ng/mL ( @ 07:28)      Review of systems  Gen: No weight changes, fatigue, fevers/chills, weakness  Skin: No rashes  Head/Eyes/Ears/Mouth: No headache; Normal hearing; Normal vision w/o blurriness; No sinus pain/discomfort, sore throat  Respiratory: No dyspnea, cough, wheezing, hemoptysis  CV: No chest pain, PND, orthopnea  GI: C/O mild abdominal pain at surgical site. no diarrhea, constipation, nausea, vomiting, melena, hematochezia  : No increased frequency, dysuria, hematuria, nocturia  MSK: No joint pain/swelling; no back pain; no edema  Neuro: No dizziness/lightheadedness, weakness, seizures, numbness, tingling  Heme: No easy bruising or bleeding  Endo: No heat/cold intolerance  Psych: No significant nervousness, anxiety, stress, depression  All other systems were reviewed and are negative, except as noted.      PHYSICAL EXAM:  Constitutional: Well developed / well nourished  Eyes: PERRLA  ENMT: nc/at, no thrush  Respiratory: CTA B/L  Cardiovascular: RRR  Gastrointestinal: Soft abdomen, ND, appropriate incisional TTP. chevron incision c/d/i, staples in place. No signs of infection  Genitourinary: Voiding spontaneously  Extremities: SCD's in place and working bilaterally  Vascular: Palpable dp pulses bilaterally.   Neurological: A&O x3  Skin: no rashes, ulcerations, lesions  Musculoskeletal: Moving all extremities  Psychiatric: Responsive

## 2023-09-26 NOTE — PHYSICAL THERAPY INITIAL EVALUATION ADULT - ADDITIONAL COMMENTS
lives with spouse on second floor of private home; +full flight of stairs to ascend which have railing

## 2023-09-26 NOTE — DISCHARGE NOTE NURSING/CASE MANAGEMENT/SOCIAL WORK - PATIENT PORTAL LINK FT
You can access the FollowMyHealth Patient Portal offered by [facility] by registering at the following website: [email or web address]. By joining [facility]’s FollowMyHealth portal, you will also be able to view your health information using other applications (apps) compatible with our system.

## 2023-09-26 NOTE — DISCHARGE NOTE PROVIDER - HOSPITAL COURSE
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 MMF held on admission, symptoms improved, switched to Myfortic 360mg bid. Stable graft function.     She was followed closely by our multidisciplinary transplant team:  Surgeons, Hepatologists, Pharmacists, [place], ACPs, RNs, SW, Coordinators, Dieticians, PT/OT and was deemed safe for discharge with the following plan:     Immuno:   FK 1mg bid  Myfortic 360mg bid  Prednisone 15m daily

## 2023-09-26 NOTE — DISCHARGE NOTE PROVIDER - CARE PROVIDER_API CALL
Dagher, Nabil Nuhad  [address] [address]  Phone: [phone]  Fax: [phone]  Follow Up Time:

## 2023-09-26 NOTE — DISCHARGE NOTE PROVIDER - NSDCFUSCHEDAPPT_GEN_ALL_CORE_FT
[facility]  [facility]  Scheduled Appointment: 10/12/2023    Kelechi Venegas  [facility]  [facility]  Scheduled Appointment: 10/12/2023

## 2023-09-26 NOTE — DISCHARGE NOTE NURSING/CASE MANAGEMENT/SOCIAL WORK - NSDCPEFALRISK_GEN_ALL_CORE
For information on Fall & Injury Prevention, visit: [email or web address] OR  [email or web address] OR  https://www.cdc.gov/steadi/patient.html

## 2023-09-26 NOTE — PROGRESS NOTE ADULT - ASSESSMENT
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    OLT 9/8/2023 POD #18  #Diarrhea  Concern for viral infection vs medication SE from MMF  Mild leukocytosis noted however may represent hemoconcentration; does not reach sepsis criteria  Benign abdominal exam, will hold off on further imaging at this time  - GI PCR, CMV PCR negative  - Holding MMF    #S/p liver transplant  OLT with caval replacement 9/8/2023  LFTs stable from prior  - US doppler liver with patent vasculature  - Tacro dose pending level, prednisone 15mg daily  - started on myfortic 360 BID  - holding home Cellcept 1000 mg bid given diarrhea  - c/w ppx: bactrim ss daily, valtrex 450 mg daily, fluconazole 200 mg daily  - obtain daily AM FK trough    #DVT ppx  - c/w home eliquis 5 bid + ASA 81mg

## 2023-09-26 NOTE — PHYSICAL THERAPY INITIAL EVALUATION ADULT - SITTING BALANCE: STATIC
----- Message from Shaylee Engle sent at 10/15/2018  2:41 PM CDT -----  Contact: Magali Vargas [facility] [phone]  Company is calling to speak with someone in  Regards to the pt. She states that the pt's wound on left ankle has closed but their was a tiny bit of drainage on the dressing when changing it. She states that there is an abrasion on the right ankle on an old surgical site, she states the the pt says that it's from her transferring from the wheelchair to the bed. And she would also like to know if the pt is going to be discharged or not. Please call back and advise.        Thanks   good balance

## 2023-09-26 NOTE — PHYSICAL THERAPY INITIAL EVALUATION ADULT - GENERAL OBSERVATIONS, REHAB EVAL
Chart reviewed events to date noted. Blood glucose reviewed. Pt tolerated 45min PT initial evaluation well. Rec'd amb in room in NAD agreeable to PT.

## 2023-10-02 ENCOUNTER — APPOINTMENT (OUTPATIENT)
Dept: TRANSPLANT | Facility: CLINIC | Age: 38
End: 2023-10-02
Payer: MEDICAID

## 2023-10-02 VITALS
HEIGHT: 65 IN | HEART RATE: 99 BPM | SYSTOLIC BLOOD PRESSURE: 121 MMHG | BODY MASS INDEX: 16.16 KG/M2 | WEIGHT: 97 LBS | RESPIRATION RATE: 16 BRPM | DIASTOLIC BLOOD PRESSURE: 82 MMHG | OXYGEN SATURATION: 97 %

## 2023-10-02 LAB
ALBUMIN SERPL ELPH-MCNC: 4.2 G/DL
ALP BLD-CCNC: 69 U/L
ALT SERPL-CCNC: 34 U/L
ANION GAP SERPL CALC-SCNC: 13 MMOL/L
AST SERPL-CCNC: 17 U/L
BILIRUB SERPL-MCNC: <0.2 MG/DL
BUN SERPL-MCNC: 17 MG/DL
CALCIUM SERPL-MCNC: 9.7 MG/DL
CHLORIDE SERPL-SCNC: 102 MMOL/L
CO2 SERPL-SCNC: 26 MMOL/L
CREAT SERPL-MCNC: 0.52 MG/DL
EGFR: 121 ML/MIN/1.73M2
GGT SERPL-CCNC: 50 U/L
GLUCOSE SERPL-MCNC: 99 MG/DL
HCT VFR BLD CALC: 35.3 %
HGB BLD-MCNC: 10.2 G/DL
MAGNESIUM SERPL-MCNC: 1.8 MG/DL
MCHC RBC-ENTMCNC: 26.4 PG
MCHC RBC-ENTMCNC: 28.9 GM/DL
MCV RBC AUTO: 91.2 FL
PHOSPHATE SERPL-MCNC: 4.1 MG/DL
PLATELET # BLD AUTO: 351 K/UL
POTASSIUM SERPL-SCNC: 4.4 MMOL/L
PROT SERPL-MCNC: 5.9 G/DL
RBC # BLD: 3.87 M/UL
RBC # FLD: 17.4 %
SODIUM SERPL-SCNC: 141 MMOL/L
TACROLIMUS SERPL-MCNC: 4.2 NG/ML
WBC # FLD AUTO: 8.45 K/UL

## 2023-10-02 PROCEDURE — 99213 OFFICE O/P EST LOW 20 MIN: CPT | Mod: 24

## 2023-10-03 ENCOUNTER — NON-APPOINTMENT (OUTPATIENT)
Age: 38
End: 2023-10-03

## 2023-10-04 ENCOUNTER — APPOINTMENT (OUTPATIENT)
Dept: TRANSPLANT | Facility: CLINIC | Age: 38
End: 2023-10-04

## 2023-10-04 RX ORDER — CEPHALEXIN 500 MG/1
500 TABLET ORAL
Qty: 20 | Refills: 0 | Status: DISCONTINUED | COMMUNITY
Start: 2023-10-02 | End: 2023-10-04

## 2023-10-05 LAB
CMV DNA SPEC QL NAA+PROBE: NOT DETECTED IU/ML
CMVPCR LOG: NOT DETECTED LOG10IU/ML

## 2023-10-09 ENCOUNTER — APPOINTMENT (OUTPATIENT)
Dept: TRANSPLANT | Facility: CLINIC | Age: 38
End: 2023-10-09
Payer: MEDICAID

## 2023-10-09 VITALS
WEIGHT: 98 LBS | OXYGEN SATURATION: 96 % | HEIGHT: 65 IN | BODY MASS INDEX: 16.33 KG/M2 | DIASTOLIC BLOOD PRESSURE: 83 MMHG | SYSTOLIC BLOOD PRESSURE: 115 MMHG | TEMPERATURE: 97.2 F | HEART RATE: 95 BPM

## 2023-10-09 DIAGNOSIS — K74.60 UNSPECIFIED CIRRHOSIS OF LIVER: ICD-10-CM

## 2023-10-09 DIAGNOSIS — R18.8 UNSPECIFIED CIRRHOSIS OF LIVER: ICD-10-CM

## 2023-10-09 DIAGNOSIS — Z01.818 ENCOUNTER FOR OTHER PREPROCEDURAL EXAMINATION: ICD-10-CM

## 2023-10-09 PROCEDURE — 99214 OFFICE O/P EST MOD 30 MIN: CPT | Mod: 24

## 2023-10-11 LAB
ALBUMIN SERPL ELPH-MCNC: 4.1 G/DL
ALP BLD-CCNC: 65 U/L
ALT SERPL-CCNC: 39 U/L
ANION GAP SERPL CALC-SCNC: 11 MMOL/L
AST SERPL-CCNC: 20 U/L
BILIRUB SERPL-MCNC: 0.2 MG/DL
BUN SERPL-MCNC: 17 MG/DL
CALCIUM SERPL-MCNC: 9.5 MG/DL
CHLORIDE SERPL-SCNC: 105 MMOL/L
CMV DNA SPEC QL NAA+PROBE: NOT DETECTED IU/ML
CMVPCR LOG: NOT DETECTED LOG10IU/ML
CO2 SERPL-SCNC: 26 MMOL/L
CREAT SERPL-MCNC: 0.55 MG/DL
EGFR: 120 ML/MIN/1.73M2
GGT SERPL-CCNC: 49 U/L
GLUCOSE SERPL-MCNC: 94 MG/DL
HBV CORE IGG+IGM SER QL: REACTIVE
HBV SURFACE AB SER QL: REACTIVE
HBV SURFACE AB SER QL: REACTIVE
HBV SURFACE AB SERPL IA-ACNC: 198.5 MIU/ML
HCT VFR BLD CALC: 36.4 %
HCV AB SER QL: NONREACTIVE
HCV RNA SERPL NAA+PROBE-LOG IU: NOT DETECTED LOGIU/ML
HCV S/CO RATIO: 0.06 S/CO
HEPB DNA PCR INT: NOT DETECTED
HEPB DNA PCR LOG: NOT DETECTED LOGIU/ML
HEPC RNA INTERP: NOT DETECTED
HGB BLD-MCNC: 10.7 G/DL
HIV1 RNA # SERPL NAA+PROBE: NORMAL
HIV1 RNA # SERPL NAA+PROBE: NORMAL COPIES/ML
HIV1+2 AB SPEC QL IA.RAPID: NONREACTIVE
MAGNESIUM SERPL-MCNC: 1.9 MG/DL
MCHC RBC-ENTMCNC: 26.1 PG
MCHC RBC-ENTMCNC: 29.4 GM/DL
MCV RBC AUTO: 88.8 FL
PHOSPHATE SERPL-MCNC: 3.7 MG/DL
PLATELET # BLD AUTO: 279 K/UL
POTASSIUM SERPL-SCNC: 4.4 MMOL/L
PROT SERPL-MCNC: 6 G/DL
RBC # BLD: 4.1 M/UL
RBC # FLD: 16.6 %
SODIUM SERPL-SCNC: 142 MMOL/L
TACROLIMUS SERPL-MCNC: 3.3 NG/ML
VIRAL LOAD INTERP: NORMAL
VIRAL LOAD LOG: NORMAL LG COP/ML
WBC # FLD AUTO: 5.62 K/UL

## 2023-10-12 ENCOUNTER — RESULT REVIEW (OUTPATIENT)
Age: 38
End: 2023-10-12

## 2023-10-12 ENCOUNTER — APPOINTMENT (OUTPATIENT)
Dept: HEMATOLOGY ONCOLOGY | Facility: CLINIC | Age: 38
End: 2023-10-12
Payer: MEDICAID

## 2023-10-12 ENCOUNTER — OUTPATIENT (OUTPATIENT)
Dept: OUTPATIENT SERVICES | Facility: HOSPITAL | Age: 38
LOS: 1 days | Discharge: HOME | End: 2023-10-12

## 2023-10-12 VITALS
RESPIRATION RATE: 16 BRPM | HEIGHT: 65 IN | SYSTOLIC BLOOD PRESSURE: 118 MMHG | DIASTOLIC BLOOD PRESSURE: 79 MMHG | WEIGHT: 101.41 LBS | OXYGEN SATURATION: 98 % | HEART RATE: 88 BPM | BODY MASS INDEX: 16.9 KG/M2 | TEMPERATURE: 97 F

## 2023-10-12 DIAGNOSIS — I82.0 BUDD-CHIARI SYNDROME: ICD-10-CM

## 2023-10-12 DIAGNOSIS — Z98.890 OTHER SPECIFIED POSTPROCEDURAL STATES: Chronic | ICD-10-CM

## 2023-10-12 DIAGNOSIS — Z95.828 PRESENCE OF OTHER VASCULAR IMPLANTS AND GRAFTS: Chronic | ICD-10-CM

## 2023-10-12 LAB
ANISOCYTOSIS BLD QL: SLIGHT — SIGNIFICANT CHANGE UP
BASOPHILS # BLD AUTO: 0.04 K/UL — SIGNIFICANT CHANGE UP (ref 0–0.2)
BASOPHILS NFR BLD AUTO: 0.7 % — SIGNIFICANT CHANGE UP (ref 0–2)
DACRYOCYTES BLD QL SMEAR: SLIGHT — SIGNIFICANT CHANGE UP
ELLIPTOCYTES BLD QL SMEAR: SLIGHT — SIGNIFICANT CHANGE UP
EOSINOPHIL # BLD AUTO: 0.09 K/UL — SIGNIFICANT CHANGE UP (ref 0–0.5)
EOSINOPHIL NFR BLD AUTO: 1.6 % — SIGNIFICANT CHANGE UP (ref 0–6)
HCT VFR BLD CALC: 36.1 % — SIGNIFICANT CHANGE UP (ref 34.5–45)
HGB BLD-MCNC: 11 G/DL — LOW (ref 11.5–15.5)
IMM GRANULOCYTES NFR BLD AUTO: 3.6 % — HIGH (ref 0–0.9)
LYMPHOCYTES # BLD AUTO: 1.19 K/UL — SIGNIFICANT CHANGE UP (ref 1–3.3)
LYMPHOCYTES # BLD AUTO: 21.4 % — SIGNIFICANT CHANGE UP (ref 13–44)
MCHC RBC-ENTMCNC: 25.9 PG — LOW (ref 27–34)
MCHC RBC-ENTMCNC: 30.5 G/DL — LOW (ref 32–36)
MCV RBC AUTO: 84.9 FL — SIGNIFICANT CHANGE UP (ref 80–100)
MONOCYTES # BLD AUTO: 0.44 K/UL — SIGNIFICANT CHANGE UP (ref 0–0.9)
MONOCYTES NFR BLD AUTO: 7.9 % — SIGNIFICANT CHANGE UP (ref 2–14)
NEUTROPHILS # BLD AUTO: 3.61 K/UL — SIGNIFICANT CHANGE UP (ref 1.8–7.4)
NEUTROPHILS NFR BLD AUTO: 64.8 % — SIGNIFICANT CHANGE UP (ref 43–77)
NRBC # BLD: 0 /100 WBCS — SIGNIFICANT CHANGE UP (ref 0–0)
PLAT MORPH BLD: NORMAL — SIGNIFICANT CHANGE UP
PLATELET # BLD AUTO: 276 K/UL — SIGNIFICANT CHANGE UP (ref 150–400)
POIKILOCYTOSIS BLD QL AUTO: SLIGHT — SIGNIFICANT CHANGE UP
POLYCHROMASIA BLD QL SMEAR: SLIGHT — SIGNIFICANT CHANGE UP
RBC # BLD: 4.25 M/UL — SIGNIFICANT CHANGE UP (ref 3.8–5.2)
RBC # FLD: 15.5 % — HIGH (ref 10.3–14.5)
RBC BLD AUTO: ABNORMAL
RETICS #: 106.5 K/UL — SIGNIFICANT CHANGE UP (ref 25–125)
RETICS/RBC NFR: 2.5 % — SIGNIFICANT CHANGE UP (ref 0.5–2.5)
SCHISTOCYTES BLD QL AUTO: SLIGHT — SIGNIFICANT CHANGE UP
WBC # BLD: 5.57 K/UL — SIGNIFICANT CHANGE UP (ref 3.8–10.5)
WBC # FLD AUTO: 5.57 K/UL — SIGNIFICANT CHANGE UP (ref 3.8–10.5)

## 2023-10-12 PROCEDURE — 99214 OFFICE O/P EST MOD 30 MIN: CPT

## 2023-10-12 RX ORDER — WARFARIN 5 MG/1
5 TABLET ORAL DAILY
Qty: 30 | Refills: 3 | Status: DISCONTINUED | COMMUNITY
Start: 2023-07-14 | End: 2023-10-12

## 2023-10-12 RX ORDER — WARFARIN 5 MG/1
5 TABLET ORAL
Qty: 90 | Refills: 3 | Status: DISCONTINUED | COMMUNITY
Start: 2023-08-30 | End: 2023-10-12

## 2023-10-12 RX ORDER — WARFARIN 10 MG/1
10 TABLET ORAL DAILY
Qty: 30 | Refills: 1 | Status: DISCONTINUED | COMMUNITY
Start: 2023-08-30 | End: 2023-10-12

## 2023-10-18 LAB
APTT BLD: 31.5 SEC
INR PPP: 1.18 RATIO
IRON SATN MFR SERPL: 8 %
IRON SERPL-MCNC: 34 UG/DL
LDH SERPL-CCNC: 169 U/L
PT BLD: 13.3 SEC
TIBC SERPL-MCNC: 435 UG/DL
UIBC SERPL-MCNC: 401 UG/DL

## 2023-10-19 LAB
ALBUMIN SERPL ELPH-MCNC: 4.4 G/DL
ALP BLD-CCNC: 70 U/L
ALT SERPL-CCNC: 45 U/L
ANION GAP SERPL CALC-SCNC: 11 MMOL/L
AST SERPL-CCNC: 21 U/L
BILIRUB SERPL-MCNC: 0.3 MG/DL
BUN SERPL-MCNC: 18 MG/DL
CALCIUM SERPL-MCNC: 10 MG/DL
CHLORIDE SERPL-SCNC: 104 MMOL/L
CO2 SERPL-SCNC: 25 MMOL/L
CREAT SERPL-MCNC: 0.64 MG/DL
EGFR: 115 ML/MIN/1.73M2
GGT SERPL-CCNC: 68 U/L
GLUCOSE SERPL-MCNC: 108 MG/DL
HCT VFR BLD CALC: 37.4 %
HGB BLD-MCNC: 10.7 G/DL
MAGNESIUM SERPL-MCNC: 1.9 MG/DL
MCHC RBC-ENTMCNC: 25.2 PG
MCHC RBC-ENTMCNC: 28.6 GM/DL
MCV RBC AUTO: 88 FL
PHOSPHATE SERPL-MCNC: 3.8 MG/DL
PLATELET # BLD AUTO: 301 K/UL
POTASSIUM SERPL-SCNC: 4.8 MMOL/L
PROT SERPL-MCNC: 6.3 G/DL
RBC # BLD: 4.25 M/UL
RBC # FLD: 16 %
SODIUM SERPL-SCNC: 140 MMOL/L
TACROLIMUS SERPL-MCNC: 5.4 NG/ML
WBC # FLD AUTO: 5.33 K/UL

## 2023-10-23 ENCOUNTER — APPOINTMENT (OUTPATIENT)
Dept: TRANSPLANT | Facility: CLINIC | Age: 38
End: 2023-10-23
Payer: MEDICAID

## 2023-10-23 VITALS
SYSTOLIC BLOOD PRESSURE: 106 MMHG | WEIGHT: 102 LBS | TEMPERATURE: 98.7 F | DIASTOLIC BLOOD PRESSURE: 76 MMHG | BODY MASS INDEX: 17 KG/M2 | OXYGEN SATURATION: 97 % | HEIGHT: 65 IN | HEART RATE: 91 BPM

## 2023-10-23 PROCEDURE — 99214 OFFICE O/P EST MOD 30 MIN: CPT | Mod: 24

## 2023-10-27 LAB
ALBUMIN SERPL ELPH-MCNC: 4.6 G/DL
ALP BLD-CCNC: 61 U/L
ALT SERPL-CCNC: 24 U/L
ANION GAP SERPL CALC-SCNC: 11 MMOL/L
AST SERPL-CCNC: 18 U/L
BILIRUB SERPL-MCNC: 0.2 MG/DL
BUN SERPL-MCNC: 13 MG/DL
CALCIUM SERPL-MCNC: 9.5 MG/DL
CHLORIDE SERPL-SCNC: 104 MMOL/L
CO2 SERPL-SCNC: 25 MMOL/L
CREAT SERPL-MCNC: 0.69 MG/DL
EGFR: 113 ML/MIN/1.73M2
GGT SERPL-CCNC: 45 U/L
GLUCOSE SERPL-MCNC: 96 MG/DL
HCT VFR BLD CALC: 36.3 %
HGB BLD-MCNC: 10.4 G/DL
MAGNESIUM SERPL-MCNC: 2.1 MG/DL
MCHC RBC-ENTMCNC: 25.6 PG
MCHC RBC-ENTMCNC: 28.7 GM/DL
MCV RBC AUTO: 89.4 FL
PHOSPHATE SERPL-MCNC: 4 MG/DL
PLATELET # BLD AUTO: 279 K/UL
POTASSIUM SERPL-SCNC: 4.3 MMOL/L
PROT SERPL-MCNC: 6.4 G/DL
RBC # BLD: 4.06 M/UL
RBC # FLD: 15.8 %
SODIUM SERPL-SCNC: 140 MMOL/L
TACROLIMUS SERPL-MCNC: 5.5 NG/ML
WBC # FLD AUTO: 4.37 K/UL

## 2023-11-01 LAB
ALBUMIN SERPL ELPH-MCNC: 4.4 G/DL
ALP BLD-CCNC: 49 U/L
ALT SERPL-CCNC: 19 U/L
ANION GAP SERPL CALC-SCNC: 11 MMOL/L
AST SERPL-CCNC: 14 U/L
BILIRUB SERPL-MCNC: 0.2 MG/DL
BUN SERPL-MCNC: 19 MG/DL
CALCIUM SERPL-MCNC: 9.4 MG/DL
CHLORIDE SERPL-SCNC: 106 MMOL/L
CO2 SERPL-SCNC: 24 MMOL/L
CREAT SERPL-MCNC: 0.63 MG/DL
EGFR: 116 ML/MIN/1.73M2
GGT SERPL-CCNC: 32 U/L
GLUCOSE SERPL-MCNC: 96 MG/DL
HCT VFR BLD CALC: 35.4 %
HGB BLD-MCNC: 10.1 G/DL
MAGNESIUM SERPL-MCNC: 1.9 MG/DL
MCHC RBC-ENTMCNC: 25 PG
MCHC RBC-ENTMCNC: 28.5 GM/DL
MCV RBC AUTO: 87.6 FL
PHOSPHATE SERPL-MCNC: 3.7 MG/DL
PLATELET # BLD AUTO: 251 K/UL
POTASSIUM SERPL-SCNC: 4.2 MMOL/L
PROT SERPL-MCNC: 6 G/DL
RBC # BLD: 4.04 M/UL
RBC # FLD: 15.7 %
SODIUM SERPL-SCNC: 141 MMOL/L
TACROLIMUS SERPL-MCNC: 3.8 NG/ML
WBC # FLD AUTO: 4.71 K/UL

## 2023-11-06 ENCOUNTER — APPOINTMENT (OUTPATIENT)
Dept: TRANSPLANT | Facility: CLINIC | Age: 38
End: 2023-11-06
Payer: MEDICAID

## 2023-11-06 VITALS
HEIGHT: 65 IN | BODY MASS INDEX: 16.83 KG/M2 | SYSTOLIC BLOOD PRESSURE: 122 MMHG | TEMPERATURE: 98.6 F | WEIGHT: 101 LBS | HEART RATE: 92 BPM | RESPIRATION RATE: 16 BRPM | OXYGEN SATURATION: 98 % | DIASTOLIC BLOOD PRESSURE: 84 MMHG

## 2023-11-06 LAB
ALBUMIN SERPL ELPH-MCNC: 4.8 G/DL
ALP BLD-CCNC: 51 U/L
ALT SERPL-CCNC: 20 U/L
ANION GAP SERPL CALC-SCNC: 10 MMOL/L
AST SERPL-CCNC: 15 U/L
BILIRUB SERPL-MCNC: 0.3 MG/DL
BUN SERPL-MCNC: 16 MG/DL
CALCIUM SERPL-MCNC: 9.8 MG/DL
CHLORIDE SERPL-SCNC: 103 MMOL/L
CO2 SERPL-SCNC: 25 MMOL/L
CREAT SERPL-MCNC: 0.64 MG/DL
EGFR: 115 ML/MIN/1.73M2
GGT SERPL-CCNC: 29 U/L
GLUCOSE SERPL-MCNC: 93 MG/DL
HCT VFR BLD CALC: 36.7 %
HGB BLD-MCNC: 10.8 G/DL
MAGNESIUM SERPL-MCNC: 2.1 MG/DL
MCHC RBC-ENTMCNC: 24.7 PG
MCHC RBC-ENTMCNC: 29.4 GM/DL
MCV RBC AUTO: 83.8 FL
PHOSPHATE SERPL-MCNC: 3.2 MG/DL
PLATELET # BLD AUTO: 273 K/UL
POTASSIUM SERPL-SCNC: 4.7 MMOL/L
PROT SERPL-MCNC: 6.5 G/DL
RBC # BLD: 4.38 M/UL
RBC # FLD: 15 %
SODIUM SERPL-SCNC: 139 MMOL/L
TACROLIMUS SERPL-MCNC: 5.4 NG/ML
WBC # FLD AUTO: 5.2 K/UL

## 2023-11-06 PROCEDURE — 99215 OFFICE O/P EST HI 40 MIN: CPT | Mod: 24

## 2023-11-14 LAB
ALBUMIN SERPL ELPH-MCNC: 4.8 G/DL
ALP BLD-CCNC: 49 U/L
ALT SERPL-CCNC: 22 U/L
ANION GAP SERPL CALC-SCNC: 12 MMOL/L
AST SERPL-CCNC: 17 U/L
BILIRUB SERPL-MCNC: 0.4 MG/DL
BUN SERPL-MCNC: 19 MG/DL
CALCIUM SERPL-MCNC: 9.9 MG/DL
CHLORIDE SERPL-SCNC: 105 MMOL/L
CMV DNA SPEC QL NAA+PROBE: NOT DETECTED IU/ML
CMVPCR LOG: NOT DETECTED LOG10IU/ML
CO2 SERPL-SCNC: 24 MMOL/L
CREAT SERPL-MCNC: 0.75 MG/DL
EGFR: 104 ML/MIN/1.73M2
GGT SERPL-CCNC: 26 U/L
GLUCOSE SERPL-MCNC: 96 MG/DL
HCT VFR BLD CALC: 38.2 %
HGB BLD-MCNC: 11.2 G/DL
MAGNESIUM SERPL-MCNC: 1.9 MG/DL
MCHC RBC-ENTMCNC: 24.5 PG
MCHC RBC-ENTMCNC: 29.3 GM/DL
MCV RBC AUTO: 83.6 FL
PHOSPHATE SERPL-MCNC: 4.4 MG/DL
PLATELET # BLD AUTO: 246 K/UL
POTASSIUM SERPL-SCNC: 4.9 MMOL/L
PROT SERPL-MCNC: 6.7 G/DL
RBC # BLD: 4.57 M/UL
RBC # FLD: 14.9 %
SODIUM SERPL-SCNC: 141 MMOL/L
TACROLIMUS SERPL-MCNC: 4.4 NG/ML
WBC # FLD AUTO: 3.8 K/UL

## 2023-11-19 NOTE — ED ADULT NURSE NOTE - CHIEF COMPLAINT QUOTE
c/o lower back pain and abd pain and b/l breast pain, c/o spotting brown, , approximately 18 wks pregnant, hx cirrhosis and HTN
Warm

## 2023-11-20 ENCOUNTER — APPOINTMENT (OUTPATIENT)
Dept: TRANSPLANT | Facility: CLINIC | Age: 38
End: 2023-11-20
Payer: MEDICAID

## 2023-11-20 VITALS
BODY MASS INDEX: 16.83 KG/M2 | HEIGHT: 65 IN | RESPIRATION RATE: 16 BRPM | WEIGHT: 101 LBS | OXYGEN SATURATION: 98 % | DIASTOLIC BLOOD PRESSURE: 88 MMHG | SYSTOLIC BLOOD PRESSURE: 124 MMHG | HEART RATE: 91 BPM

## 2023-11-20 LAB
ALBUMIN SERPL ELPH-MCNC: 4.7 G/DL
ALP BLD-CCNC: 43 U/L
ALT SERPL-CCNC: 21 U/L
ANION GAP SERPL CALC-SCNC: 11 MMOL/L
AST SERPL-CCNC: 16 U/L
BILIRUB SERPL-MCNC: 0.3 MG/DL
BUN SERPL-MCNC: 15 MG/DL
CALCIUM SERPL-MCNC: 9.7 MG/DL
CHLORIDE SERPL-SCNC: 105 MMOL/L
CMV DNA SPEC QL NAA+PROBE: NOT DETECTED IU/ML
CMVPCR LOG: NOT DETECTED LOG10IU/ML
CO2 SERPL-SCNC: 26 MMOL/L
CREAT SERPL-MCNC: 0.71 MG/DL
EGFR: 111 ML/MIN/1.73M2
GGT SERPL-CCNC: 18 U/L
GLUCOSE SERPL-MCNC: 92 MG/DL
HCT VFR BLD CALC: 33.8 %
HGB BLD-MCNC: 10 G/DL
MAGNESIUM SERPL-MCNC: 1.9 MG/DL
MCHC RBC-ENTMCNC: 24.3 PG
MCHC RBC-ENTMCNC: 29.6 GM/DL
MCV RBC AUTO: 82.2 FL
PHOSPHATE SERPL-MCNC: 3.8 MG/DL
PLATELET # BLD AUTO: 253 K/UL
POTASSIUM SERPL-SCNC: 4.2 MMOL/L
PROT SERPL-MCNC: 6.3 G/DL
RBC # BLD: 4.11 M/UL
RBC # FLD: 14.6 %
SODIUM SERPL-SCNC: 142 MMOL/L
TACROLIMUS SERPL-MCNC: 4.4 NG/ML
WBC # FLD AUTO: 2.08 K/UL

## 2023-11-20 PROCEDURE — 99214 OFFICE O/P EST MOD 30 MIN: CPT | Mod: 24

## 2023-11-20 RX ORDER — HYDROCORTISONE BUTYRATE 1 MG/G
0.1 CREAM TOPICAL
Qty: 45 | Refills: 1 | Status: COMPLETED | COMMUNITY
Start: 2023-04-13 | End: 2023-11-20

## 2023-11-20 RX ORDER — SPIRONOLACTONE 50 MG/1
50 TABLET ORAL
Qty: 30 | Refills: 5 | Status: COMPLETED | COMMUNITY
Start: 2021-08-03 | End: 2023-11-20

## 2023-11-20 RX ORDER — SENNOSIDES 8.6 MG TABLETS 8.6 MG/1
8.6 TABLET ORAL DAILY
Qty: 30 | Refills: 2 | Status: COMPLETED | COMMUNITY
Start: 2023-09-13 | End: 2023-11-20

## 2023-11-20 RX ORDER — HYDROXYZINE HYDROCHLORIDE 10 MG/1
10 TABLET ORAL 3 TIMES DAILY
Qty: 45 | Refills: 0 | Status: COMPLETED | COMMUNITY
Start: 2023-08-02 | End: 2023-11-20

## 2023-11-20 RX ORDER — SUCRALFATE 1 G/10ML
1 SUSPENSION ORAL 3 TIMES DAILY
Qty: 1 | Refills: 0 | Status: COMPLETED | COMMUNITY
Start: 2023-04-13 | End: 2023-11-20

## 2023-11-20 RX ORDER — FERROUS GLUCONATE 324(38)MG
324 (38 FE) TABLET ORAL DAILY
Qty: 90 | Refills: 1 | Status: COMPLETED | COMMUNITY
Start: 2023-10-12 | End: 2023-11-20

## 2023-11-20 RX ORDER — PANTOPRAZOLE 40 MG/1
40 TABLET, DELAYED RELEASE ORAL
Qty: 30 | Refills: 5 | Status: COMPLETED | COMMUNITY
Start: 2022-04-05 | End: 2023-11-20

## 2023-11-20 RX ORDER — FAMOTIDINE 20 MG/1
20 TABLET, FILM COATED ORAL
Qty: 30 | Refills: 2 | Status: COMPLETED | COMMUNITY
Start: 2021-09-22 | End: 2023-11-20

## 2023-11-20 RX ORDER — LACTULOSE SOLUTION USP, 10 G/15 ML 10 G/15ML
10 SOLUTION ORAL; RECTAL 3 TIMES DAILY
Qty: 1350 | Refills: 3 | Status: COMPLETED | COMMUNITY
Start: 2022-11-20 | End: 2023-11-20

## 2023-11-20 RX ORDER — TACROLIMUS 0.5 MG/1
0.5 CAPSULE ORAL
Qty: 60 | Refills: 5 | Status: COMPLETED | COMMUNITY
Start: 2023-09-13 | End: 2023-11-20

## 2023-11-20 RX ORDER — FERROUS GLUCONATE 324(38)MG
324 (38 FE) TABLET ORAL DAILY
Qty: 30 | Refills: 6 | Status: COMPLETED | COMMUNITY
Start: 2022-08-31 | End: 2023-11-20

## 2023-11-20 RX ORDER — FLUCONAZOLE 200 MG/1
200 TABLET ORAL
Qty: 60 | Refills: 0 | Status: DISCONTINUED | COMMUNITY
Start: 2023-09-13 | End: 2023-11-20

## 2023-11-20 RX ORDER — TACROLIMUS 5 MG/1
5 CAPSULE ORAL
Qty: 60 | Refills: 5 | Status: COMPLETED | COMMUNITY
Start: 2023-09-13 | End: 2023-11-20

## 2023-11-20 RX ORDER — ACETAMINOPHEN 325 MG/1
325 TABLET, FILM COATED ORAL
Qty: 30 | Refills: 2 | Status: COMPLETED | COMMUNITY
Start: 2023-09-13 | End: 2023-11-20

## 2023-11-21 ENCOUNTER — APPOINTMENT (OUTPATIENT)
Dept: DERMATOLOGY | Facility: CLINIC | Age: 38
End: 2023-11-21

## 2023-11-29 LAB
ALBUMIN SERPL ELPH-MCNC: 4.7 G/DL
ALP BLD-CCNC: 45 U/L
ALT SERPL-CCNC: 18 U/L
ANION GAP SERPL CALC-SCNC: 10 MMOL/L
AST SERPL-CCNC: 14 U/L
BILIRUB SERPL-MCNC: 0.3 MG/DL
BUN SERPL-MCNC: 20 MG/DL
CALCIUM SERPL-MCNC: 9.6 MG/DL
CHLORIDE SERPL-SCNC: 103 MMOL/L
CMV DNA SPEC QL NAA+PROBE: NOT DETECTED IU/ML
CMVPCR LOG: NOT DETECTED LOG10IU/ML
CO2 SERPL-SCNC: 27 MMOL/L
CREAT SERPL-MCNC: 0.74 MG/DL
EGFR: 105 ML/MIN/1.73M2
GGT SERPL-CCNC: 19 U/L
GLUCOSE SERPL-MCNC: 92 MG/DL
HCT VFR BLD CALC: 36.5 %
HGB BLD-MCNC: 10.3 G/DL
MAGNESIUM SERPL-MCNC: 1.9 MG/DL
MCHC RBC-ENTMCNC: 23.6 PG
MCHC RBC-ENTMCNC: 28.2 GM/DL
MCV RBC AUTO: 83.5 FL
PHOSPHATE SERPL-MCNC: 3.6 MG/DL
PLATELET # BLD AUTO: 233 K/UL
POTASSIUM SERPL-SCNC: 4.7 MMOL/L
PROT SERPL-MCNC: 6.5 G/DL
RBC # BLD: 4.37 M/UL
RBC # FLD: 14.6 %
SODIUM SERPL-SCNC: 140 MMOL/L
TACROLIMUS SERPL-MCNC: 4.3 NG/ML
WBC # FLD AUTO: 1.46 K/UL

## 2023-11-29 NOTE — PRE-ANESTHESIA EVALUATION ADULT - NSANTHNECKRD_ENT_A_CORE
No Pt. presents to wellness c/o right shoulder pain after lifting a heavy garbage while at work. no pmhx. medicated per order.

## 2023-12-01 ENCOUNTER — APPOINTMENT (OUTPATIENT)
Dept: OPHTHALMOLOGY | Facility: CLINIC | Age: 38
End: 2023-12-01

## 2023-12-04 ENCOUNTER — APPOINTMENT (OUTPATIENT)
Dept: TRANSPLANT | Facility: CLINIC | Age: 38
End: 2023-12-04

## 2023-12-04 LAB
ALBUMIN SERPL ELPH-MCNC: 4.9 G/DL
ALP BLD-CCNC: 51 U/L
ALT SERPL-CCNC: 29 U/L
ANION GAP SERPL CALC-SCNC: 10 MMOL/L
AST SERPL-CCNC: 24 U/L
BILIRUB SERPL-MCNC: 0.4 MG/DL
BUN SERPL-MCNC: 21 MG/DL
CALCIUM SERPL-MCNC: 9.7 MG/DL
CHLORIDE SERPL-SCNC: 105 MMOL/L
CMV DNA SPEC QL NAA+PROBE: NOT DETECTED IU/ML
CMVPCR LOG: NOT DETECTED LOG10IU/ML
CO2 SERPL-SCNC: 25 MMOL/L
CREAT SERPL-MCNC: 0.78 MG/DL
EGFR: 99 ML/MIN/1.73M2
GGT SERPL-CCNC: 27 U/L
GLUCOSE SERPL-MCNC: 104 MG/DL
HCT VFR BLD CALC: 40.3 %
HGB BLD-MCNC: 11.2 G/DL
MAGNESIUM SERPL-MCNC: 2.1 MG/DL
MCHC RBC-ENTMCNC: 22.8 PG
MCHC RBC-ENTMCNC: 27.8 GM/DL
MCV RBC AUTO: 81.9 FL
PHOSPHATE SERPL-MCNC: 3.7 MG/DL
PLATELET # BLD AUTO: 306 K/UL
POTASSIUM SERPL-SCNC: 4.8 MMOL/L
PROT SERPL-MCNC: 6.8 G/DL
RBC # BLD: 4.92 M/UL
RBC # FLD: 14.8 %
SODIUM SERPL-SCNC: 140 MMOL/L
TACROLIMUS SERPL-MCNC: 4.3 NG/ML
WBC # FLD AUTO: 2.19 K/UL

## 2023-12-04 NOTE — PROVIDER CONTACT NOTE (CHANGE IN STATUS NOTIFICATION) - ACTION/TREATMENT ORDERED:
Refill passed per [facility], [facility] protocol. Requested Prescriptions   Pending Prescriptions Disp Refills    albuterol (PROAIR HFA) 108 (90 Base) MCG/ACT Inhalation Aero Soln 8 g 3     Sig: Inhale 2 puffs into the lungs every 6 (six) hours as needed for Wheezing.        Asthma & COPD Medication Protocol Passed - 12/2/2023  2:40 PM        Passed - In person appointment or virtual visit in the past 6 mos or appointment in next 3 mos     Recent Outpatient Visits              1 month ago Routine health maintenance    [address] [facility], [place] Patient, Doris Prescott MD    Office Visit    1 year ago Chronic cough    Keon Tafoya MD    Office Visit    1 year ago Hair loss    [facility], [facility], [place] Patient, Doris Prescott MD    Office Visit    2 years ago Mild persistent asthma without complication    [address], [address] [facility], Joe Martinez MD    Office Visit    2 years ago Cough    [address], [address] [address] [address] [place], [place]    Telemedicine          Future Appointments         Provider Department Appt Notes    In 1 month [facility] Visits              1 month ago Routine health maintenance    [address], [facility], Darling Segundo MD    Office Visit    1 year ago Chronic cough    Keon Tafoya MD    Office Visit    1 year ago Hair loss    [address] [facility], Darling Segundo MD    Office Visit    2 years ago Mild persistent asthma without complication    Kael Nascimento MD    Office Visit    2 years ago Cough    [facility], [address] [place] [address] Appointments         Provider Department Appt Notes    In 1 month [id number] same as above

## 2023-12-05 ENCOUNTER — APPOINTMENT (OUTPATIENT)
Dept: HEPATOLOGY | Facility: CLINIC | Age: 38
End: 2023-12-05
Payer: MEDICAID

## 2023-12-05 DIAGNOSIS — I82.220 ACUTE EMBOLISM AND THROMBOSIS OF INFERIOR VENA CAVA: ICD-10-CM

## 2023-12-05 DIAGNOSIS — Z23 ENCOUNTER FOR IMMUNIZATION: ICD-10-CM

## 2023-12-05 DIAGNOSIS — Z95.828 PRESENCE OF OTHER VASCULAR IMPLANTS AND GRAFTS: ICD-10-CM

## 2023-12-05 DIAGNOSIS — J90 PLEURAL EFFUSION, NOT ELSEWHERE CLASSIFIED: ICD-10-CM

## 2023-12-05 DIAGNOSIS — I85.10 SECONDARY ESOPHAGEAL VARICES W/OUT BLEEDING: ICD-10-CM

## 2023-12-05 DIAGNOSIS — Z87.898 PERSONAL HISTORY OF OTHER SPECIFIED CONDITIONS: ICD-10-CM

## 2023-12-05 DIAGNOSIS — K76.82 HEPATIC ENCEPHALOPATHY: ICD-10-CM

## 2023-12-05 DIAGNOSIS — Z91.81 HISTORY OF FALLING: ICD-10-CM

## 2023-12-05 DIAGNOSIS — I95.1 ORTHOSTATIC HYPOTENSION: ICD-10-CM

## 2023-12-05 DIAGNOSIS — I95.89 OTHER HYPOTENSION: ICD-10-CM

## 2023-12-05 DIAGNOSIS — Z87.2 PERSONAL HISTORY OF DISEASES OF THE SKIN AND SUBCUTANEOUS TISSUE: ICD-10-CM

## 2023-12-05 DIAGNOSIS — Z87.19 PERSONAL HISTORY OF OTHER DISEASES OF THE DIGESTIVE SYSTEM: ICD-10-CM

## 2023-12-05 DIAGNOSIS — Z86.19 PERSONAL HISTORY OF OTHER INFECTIOUS AND PARASITIC DISEASES: ICD-10-CM

## 2023-12-05 DIAGNOSIS — N63.21 UNSPECIFIED LUMP IN THE LEFT BREAST, UPPER OUTER QUADRANT: ICD-10-CM

## 2023-12-05 DIAGNOSIS — M79.643 PAIN IN UNSPECIFIED HAND: ICD-10-CM

## 2023-12-05 DIAGNOSIS — Z01.419 ENCOUNTER FOR GYNECOLOGICAL EXAMINATION (GENERAL) (ROUTINE) W/OUT ABNORMAL FINDINGS: ICD-10-CM

## 2023-12-05 DIAGNOSIS — R21 RASH AND OTHER NONSPECIFIC SKIN ERUPTION: ICD-10-CM

## 2023-12-05 DIAGNOSIS — K76.9 LIVER DISEASE, UNSPECIFIED: ICD-10-CM

## 2023-12-05 PROCEDURE — 99215 OFFICE O/P EST HI 40 MIN: CPT

## 2023-12-09 ENCOUNTER — LABORATORY RESULT (OUTPATIENT)
Age: 38
End: 2023-12-09

## 2023-12-11 PROBLEM — Z86.19 HISTORY OF HELICOBACTER PYLORI INFECTION: Status: RESOLVED | Noted: 2021-11-11 | Resolved: 2023-12-11

## 2023-12-11 PROBLEM — Z87.19 HISTORY OF CHRONIC CONSTIPATION: Status: RESOLVED | Noted: 2021-06-10 | Resolved: 2023-12-11

## 2023-12-11 PROBLEM — Z87.898 HISTORY OF CHEST PAIN: Status: RESOLVED | Noted: 2019-05-10 | Resolved: 2023-12-11

## 2023-12-11 PROBLEM — I82.220 IVC THROMBOSIS: Status: RESOLVED | Noted: 2021-03-26 | Resolved: 2023-12-11

## 2023-12-11 PROBLEM — M79.643 HAND PAIN: Status: RESOLVED | Noted: 2022-05-23 | Resolved: 2023-12-11

## 2023-12-11 PROBLEM — Z95.828 S/P TIPS (TRANSJUGULAR INTRAHEPATIC PORTOSYSTEMIC SHUNT): Status: RESOLVED | Noted: 2021-06-22 | Resolved: 2023-12-11

## 2023-12-11 PROBLEM — I85.10 SECONDARY ESOPHAGEAL VARICES WITHOUT BLEEDING: Status: RESOLVED | Noted: 2019-05-22 | Resolved: 2023-12-11

## 2023-12-11 PROBLEM — Z23 ENCOUNTER FOR IMMUNIZATION: Status: RESOLVED | Noted: 2022-05-19 | Resolved: 2023-12-11

## 2023-12-11 PROBLEM — K76.82 HEPATIC ENCEPHALOPATHY: Status: RESOLVED | Noted: 2023-05-25 | Resolved: 2023-12-11

## 2023-12-11 PROBLEM — I95.1 CHRONIC ORTHOSTATIC HYPOTENSION: Status: RESOLVED | Noted: 2021-08-10 | Resolved: 2023-12-11

## 2023-12-11 PROBLEM — K76.9 LIVER LESION: Status: RESOLVED | Noted: 2019-05-22 | Resolved: 2023-12-11

## 2023-12-11 PROBLEM — Z87.2 HISTORY OF SKIN PRURITUS: Status: RESOLVED | Noted: 2020-05-13 | Resolved: 2023-12-11

## 2023-12-11 PROBLEM — Z87.19 HISTORY OF DUODENAL ULCER: Status: RESOLVED | Noted: 2021-11-01 | Resolved: 2023-12-11

## 2023-12-11 PROBLEM — I95.89 HYPOTENSION, CHRONIC: Status: RESOLVED | Noted: 2021-08-10 | Resolved: 2023-12-11

## 2023-12-11 PROBLEM — R21 SKIN RASH: Status: RESOLVED | Noted: 2023-04-13 | Resolved: 2023-12-11

## 2023-12-11 PROBLEM — J90 RECURRENT RIGHT PLEURAL EFFUSION: Status: RESOLVED | Noted: 2021-05-06 | Resolved: 2023-12-11

## 2023-12-11 PROBLEM — Z01.419 ENCOUNTER FOR CERVICAL PAP SMEAR WITH PELVIC EXAM: Status: RESOLVED | Noted: 2022-06-09 | Resolved: 2023-12-11

## 2023-12-11 PROBLEM — Z91.81 HISTORY OF RECENT FALL: Status: RESOLVED | Noted: 2023-06-19 | Resolved: 2023-12-11

## 2023-12-11 PROBLEM — N63.21 BREAST LUMP ON LEFT SIDE AT 2 O'CLOCK POSITION: Status: RESOLVED | Noted: 2023-02-07 | Resolved: 2023-12-11

## 2023-12-12 ENCOUNTER — APPOINTMENT (OUTPATIENT)
Dept: HEPATOLOGY | Facility: CLINIC | Age: 38
End: 2023-12-12
Payer: MEDICAID

## 2023-12-12 VITALS
HEART RATE: 85 BPM | BODY MASS INDEX: 16.83 KG/M2 | DIASTOLIC BLOOD PRESSURE: 62 MMHG | OXYGEN SATURATION: 97 % | HEIGHT: 65 IN | TEMPERATURE: 96.3 F | SYSTOLIC BLOOD PRESSURE: 97 MMHG | WEIGHT: 101 LBS | RESPIRATION RATE: 16 BRPM

## 2023-12-12 PROCEDURE — 99214 OFFICE O/P EST MOD 30 MIN: CPT

## 2023-12-14 LAB
ALBUMIN SERPL ELPH-MCNC: 4.7 G/DL
ALP BLD-CCNC: 79 U/L
ALT SERPL-CCNC: 29 U/L
ANION GAP SERPL CALC-SCNC: 12 MMOL/L
AST SERPL-CCNC: 18 U/L
BASOPHILS # BLD AUTO: 0.1 K/UL
BASOPHILS NFR BLD AUTO: 0.6 %
BILIRUB SERPL-MCNC: 0.2 MG/DL
BUN SERPL-MCNC: 16 MG/DL
CALCIUM SERPL-MCNC: 9.9 MG/DL
CHLORIDE SERPL-SCNC: 103 MMOL/L
CO2 SERPL-SCNC: 28 MMOL/L
CREAT SERPL-MCNC: 0.8 MG/DL
EGFR: 95 ML/MIN/1.73M2
EOSINOPHIL # BLD AUTO: 0.13 K/UL
EOSINOPHIL NFR BLD AUTO: 0.8 %
GLUCOSE SERPL-MCNC: 82 MG/DL
HCT VFR BLD CALC: 40.8 %
HGB BLD-MCNC: 11.2 G/DL
IMM GRANULOCYTES NFR BLD AUTO: 1.4 %
INR PPP: 1.13 RATIO
LYMPHOCYTES # BLD AUTO: 1.66 K/UL
LYMPHOCYTES NFR BLD AUTO: 10.2 %
MAGNESIUM SERPL-MCNC: 2.1 MG/DL
MAN DIFF?: NORMAL
MCHC RBC-ENTMCNC: 22.9 PG
MCHC RBC-ENTMCNC: 27.5 GM/DL
MCV RBC AUTO: 83.4 FL
MONOCYTES # BLD AUTO: 0.56 K/UL
MONOCYTES NFR BLD AUTO: 3.4 %
NEUTROPHILS # BLD AUTO: 13.59 K/UL
NEUTROPHILS NFR BLD AUTO: 83.6 %
PHOSPHATE SERPL-MCNC: 4.2 MG/DL
PLATELET # BLD AUTO: 295 K/UL
POTASSIUM SERPL-SCNC: 4.4 MMOL/L
PROT SERPL-MCNC: 6.5 G/DL
PT BLD: 12.7 SEC
RBC # BLD: 4.89 M/UL
RBC # FLD: 14.9 %
SODIUM SERPL-SCNC: 143 MMOL/L
TACROLIMUS SERPL-MCNC: 4.8 NG/ML
WBC # FLD AUTO: 16.27 K/UL

## 2023-12-16 ENCOUNTER — LABORATORY RESULT (OUTPATIENT)
Age: 38
End: 2023-12-16

## 2023-12-18 ENCOUNTER — NON-APPOINTMENT (OUTPATIENT)
Age: 38
End: 2023-12-18

## 2023-12-18 ENCOUNTER — APPOINTMENT (OUTPATIENT)
Dept: TRANSPLANT | Facility: CLINIC | Age: 38
End: 2023-12-18

## 2023-12-18 ENCOUNTER — OUTPATIENT (OUTPATIENT)
Dept: OUTPATIENT SERVICES | Facility: HOSPITAL | Age: 38
LOS: 1 days | End: 2023-12-18

## 2023-12-18 DIAGNOSIS — Z98.890 OTHER SPECIFIED POSTPROCEDURAL STATES: Chronic | ICD-10-CM

## 2023-12-18 DIAGNOSIS — Z95.828 PRESENCE OF OTHER VASCULAR IMPLANTS AND GRAFTS: Chronic | ICD-10-CM

## 2023-12-18 RX ORDER — TACROLIMUS 1 MG/1
1 CAPSULE ORAL
Qty: 450 | Refills: 2 | Status: ACTIVE | COMMUNITY
Start: 2023-09-13 | End: 1900-01-01

## 2023-12-18 RX ORDER — MYCOPHENOLATE MOFETIL 500 MG/1
500 TABLET ORAL
Qty: 120 | Refills: 5 | Status: DISCONTINUED | COMMUNITY
Start: 2023-09-13 | End: 2023-12-18

## 2023-12-18 RX ORDER — FILGRASTIM 300 UG/.5ML
300 INJECTION, SOLUTION INTRAVENOUS; SUBCUTANEOUS
Qty: 3 | Refills: 1 | Status: DISCONTINUED | COMMUNITY
Start: 2023-11-29 | End: 2023-12-18

## 2023-12-18 RX ORDER — PREDNISONE 5 MG/1
5 TABLET ORAL
Qty: 90 | Refills: 3 | Status: ACTIVE | COMMUNITY
Start: 2023-09-13 | End: 1900-01-01

## 2023-12-18 RX ORDER — MYCOPHENILIC ACID 360 MG/1
360 TABLET, DELAYED RELEASE ORAL
Qty: 60 | Refills: 5 | Status: DISCONTINUED | COMMUNITY
Start: 2023-11-01 | End: 2023-12-18

## 2023-12-18 RX ORDER — VALGANCICLOVIR HYDROCHLORIDE 450 MG/1
450 TABLET ORAL
Qty: 30 | Refills: 1 | Status: DISCONTINUED | COMMUNITY
Start: 2023-09-13 | End: 2023-12-18

## 2023-12-19 LAB
ALBUMIN SERPL ELPH-MCNC: 4.5 G/DL
ALP BLD-CCNC: 67 U/L
ALT SERPL-CCNC: 35 U/L
ANION GAP SERPL CALC-SCNC: 10 MMOL/L
AST SERPL-CCNC: 23 U/L
BASOPHILS # BLD AUTO: 0.04 K/UL
BASOPHILS NFR BLD AUTO: 0.7 %
BILIRUB SERPL-MCNC: 0.3 MG/DL
BUN SERPL-MCNC: 21 MG/DL
CALCIUM SERPL-MCNC: 9.7 MG/DL
CHLORIDE SERPL-SCNC: 105 MMOL/L
CO2 SERPL-SCNC: 26 MMOL/L
CREAT SERPL-MCNC: 0.77 MG/DL
EGFR: 100 ML/MIN/1.73M2
EOSINOPHIL # BLD AUTO: 0.09 K/UL
EOSINOPHIL NFR BLD AUTO: 1.6 %
GLUCOSE SERPL-MCNC: 100 MG/DL
HCT VFR BLD CALC: 37.3 %
HGB BLD-MCNC: 10.3 G/DL
IMM GRANULOCYTES NFR BLD AUTO: 0.7 %
INR PPP: 1.22 RATIO
LYMPHOCYTES # BLD AUTO: 1.41 K/UL
LYMPHOCYTES NFR BLD AUTO: 25.8 %
MAGNESIUM SERPL-MCNC: 1.9 MG/DL
MAN DIFF?: NORMAL
MCHC RBC-ENTMCNC: 22.6 PG
MCHC RBC-ENTMCNC: 27.6 GM/DL
MCV RBC AUTO: 81.8 FL
MONOCYTES # BLD AUTO: 0.47 K/UL
MONOCYTES NFR BLD AUTO: 8.6 %
NEUTROPHILS # BLD AUTO: 3.42 K/UL
NEUTROPHILS NFR BLD AUTO: 62.6 %
PHOSPHATE SERPL-MCNC: 4.3 MG/DL
PLATELET # BLD AUTO: 223 K/UL
POTASSIUM SERPL-SCNC: 4.7 MMOL/L
PROT SERPL-MCNC: 6.4 G/DL
PT BLD: 13.7 SEC
RBC # BLD: 4.56 M/UL
RBC # FLD: 15.1 %
SODIUM SERPL-SCNC: 140 MMOL/L
TACROLIMUS SERPL-MCNC: 5.3 NG/ML
WBC # FLD AUTO: 5.47 K/UL

## 2024-01-01 NOTE — ED PROVIDER NOTE - CARE PLAN
11.8
Principal Discharge DX:	Portal vein thrombosis  Secondary Diagnosis:	COVID-19  Secondary Diagnosis:	Pleural effusion

## 2024-01-10 ENCOUNTER — OUTPATIENT (OUTPATIENT)
Dept: OUTPATIENT SERVICES | Facility: HOSPITAL | Age: 39
LOS: 1 days | Discharge: HOME | End: 2024-01-10

## 2024-01-10 DIAGNOSIS — I82.0 BUDD-CHIARI SYNDROME: ICD-10-CM

## 2024-01-10 DIAGNOSIS — Z98.890 OTHER SPECIFIED POSTPROCEDURAL STATES: Chronic | ICD-10-CM

## 2024-01-10 DIAGNOSIS — Z95.828 PRESENCE OF OTHER VASCULAR IMPLANTS AND GRAFTS: Chronic | ICD-10-CM

## 2024-01-12 ENCOUNTER — APPOINTMENT (OUTPATIENT)
Dept: HEMATOLOGY ONCOLOGY | Facility: CLINIC | Age: 39
End: 2024-01-12
Payer: MEDICAID

## 2024-01-12 ENCOUNTER — RESULT REVIEW (OUTPATIENT)
Age: 39
End: 2024-01-12

## 2024-01-12 VITALS
OXYGEN SATURATION: 96 % | HEART RATE: 84 BPM | RESPIRATION RATE: 16 BRPM | SYSTOLIC BLOOD PRESSURE: 109 MMHG | WEIGHT: 102.27 LBS | BODY MASS INDEX: 18.58 KG/M2 | DIASTOLIC BLOOD PRESSURE: 76 MMHG | HEIGHT: 62.28 IN | TEMPERATURE: 97 F

## 2024-01-12 LAB
BASOPHILS # BLD AUTO: 0.03 K/UL — SIGNIFICANT CHANGE UP (ref 0–0.2)
BASOPHILS # BLD AUTO: 0.03 K/UL — SIGNIFICANT CHANGE UP (ref 0–0.2)
BASOPHILS NFR BLD AUTO: 0.5 % — SIGNIFICANT CHANGE UP (ref 0–2)
BASOPHILS NFR BLD AUTO: 0.5 % — SIGNIFICANT CHANGE UP (ref 0–2)
EOSINOPHIL # BLD AUTO: 0.13 K/UL — SIGNIFICANT CHANGE UP (ref 0–0.5)
EOSINOPHIL # BLD AUTO: 0.13 K/UL — SIGNIFICANT CHANGE UP (ref 0–0.5)
EOSINOPHIL NFR BLD AUTO: 2.3 % — SIGNIFICANT CHANGE UP (ref 0–6)
EOSINOPHIL NFR BLD AUTO: 2.3 % — SIGNIFICANT CHANGE UP (ref 0–6)
HCT VFR BLD CALC: 36.3 % — SIGNIFICANT CHANGE UP (ref 34.5–45)
HCT VFR BLD CALC: 36.3 % — SIGNIFICANT CHANGE UP (ref 34.5–45)
HGB BLD-MCNC: 10.6 G/DL — LOW (ref 11.5–15.5)
HGB BLD-MCNC: 10.6 G/DL — LOW (ref 11.5–15.5)
IMM GRANULOCYTES NFR BLD AUTO: 0.4 % — SIGNIFICANT CHANGE UP (ref 0–0.9)
IMM GRANULOCYTES NFR BLD AUTO: 0.4 % — SIGNIFICANT CHANGE UP (ref 0–0.9)
LYMPHOCYTES # BLD AUTO: 1.24 K/UL — SIGNIFICANT CHANGE UP (ref 1–3.3)
LYMPHOCYTES # BLD AUTO: 1.24 K/UL — SIGNIFICANT CHANGE UP (ref 1–3.3)
LYMPHOCYTES # BLD AUTO: 22.4 % — SIGNIFICANT CHANGE UP (ref 13–44)
LYMPHOCYTES # BLD AUTO: 22.4 % — SIGNIFICANT CHANGE UP (ref 13–44)
MCHC RBC-ENTMCNC: 22.8 PG — LOW (ref 27–34)
MCHC RBC-ENTMCNC: 22.8 PG — LOW (ref 27–34)
MCHC RBC-ENTMCNC: 29.2 G/DL — LOW (ref 32–36)
MCHC RBC-ENTMCNC: 29.2 G/DL — LOW (ref 32–36)
MCV RBC AUTO: 78.1 FL — LOW (ref 80–100)
MCV RBC AUTO: 78.1 FL — LOW (ref 80–100)
MONOCYTES # BLD AUTO: 0.46 K/UL — SIGNIFICANT CHANGE UP (ref 0–0.9)
MONOCYTES # BLD AUTO: 0.46 K/UL — SIGNIFICANT CHANGE UP (ref 0–0.9)
MONOCYTES NFR BLD AUTO: 8.3 % — SIGNIFICANT CHANGE UP (ref 2–14)
MONOCYTES NFR BLD AUTO: 8.3 % — SIGNIFICANT CHANGE UP (ref 2–14)
NEUTROPHILS # BLD AUTO: 3.66 K/UL — SIGNIFICANT CHANGE UP (ref 1.8–7.4)
NEUTROPHILS # BLD AUTO: 3.66 K/UL — SIGNIFICANT CHANGE UP (ref 1.8–7.4)
NEUTROPHILS NFR BLD AUTO: 66.1 % — SIGNIFICANT CHANGE UP (ref 43–77)
NEUTROPHILS NFR BLD AUTO: 66.1 % — SIGNIFICANT CHANGE UP (ref 43–77)
NRBC # BLD: 0 /100 WBCS — SIGNIFICANT CHANGE UP (ref 0–0)
NRBC # BLD: 0 /100 WBCS — SIGNIFICANT CHANGE UP (ref 0–0)
PLATELET # BLD AUTO: 214 K/UL — SIGNIFICANT CHANGE UP (ref 150–400)
PLATELET # BLD AUTO: 214 K/UL — SIGNIFICANT CHANGE UP (ref 150–400)
RBC # BLD: 4.65 M/UL — SIGNIFICANT CHANGE UP (ref 3.8–5.2)
RBC # BLD: 4.65 M/UL — SIGNIFICANT CHANGE UP (ref 3.8–5.2)
RBC # FLD: 14.4 % — SIGNIFICANT CHANGE UP (ref 10.3–14.5)
RBC # FLD: 14.4 % — SIGNIFICANT CHANGE UP (ref 10.3–14.5)
RETICS #: 60.5 K/UL — SIGNIFICANT CHANGE UP (ref 25–125)
RETICS #: 60.5 K/UL — SIGNIFICANT CHANGE UP (ref 25–125)
RETICS/RBC NFR: 1.3 % — SIGNIFICANT CHANGE UP (ref 0.5–2.5)
RETICS/RBC NFR: 1.3 % — SIGNIFICANT CHANGE UP (ref 0.5–2.5)
WBC # BLD: 5.54 K/UL — SIGNIFICANT CHANGE UP (ref 3.8–10.5)
WBC # BLD: 5.54 K/UL — SIGNIFICANT CHANGE UP (ref 3.8–10.5)
WBC # FLD AUTO: 5.54 K/UL — SIGNIFICANT CHANGE UP (ref 3.8–10.5)
WBC # FLD AUTO: 5.54 K/UL — SIGNIFICANT CHANGE UP (ref 3.8–10.5)

## 2024-01-12 PROCEDURE — 99214 OFFICE O/P EST MOD 30 MIN: CPT

## 2024-01-13 NOTE — ASSESSMENT
[FreeTextEntry1] : This is a 37 year old woman with a history of cirrhosis, portal hypertension, MRI findings that show a heterogenous area in the liver with branches of the hepatic vein that are sclerosed, difficult to visualize suggestive of history of hepatic vein thrombosis in the past, possible underlying Budd Chiari syndrome. No other explanations for cirrhosis known currently. Much of this history took place in [facility]. Patient was able to bring records from the evaluation in [facility] as well as earlier evaluation in 2017 at [facility]. After several repeats, Protein S antigen improved to normal at 68%. Does not appear consistent with congenital Protein S deficiency. Patient had a normal ATIII activity 90% prior to the thrombosis of the IVC.  Patient was anticoagulated on eliquis for a period of a year until transitioned to coumadin just prior to the liver transplant. Patient now s/p liver transplant September 25th.  Patient doing well post transplant.  Mild iron deficiency noted, will restart ferrous gluconate 38mg daily.  Continue eliuiqs 5mg due to history of bud chiari syndrome, prophylaxis in the newly transplanted liver.

## 2024-01-13 NOTE — HISTORY OF PRESENT ILLNESS
[de-identified] : This is a 38 year old woman with history of portal vein thrombosis hepatocellular cancer.  Patient s/p Liver transplant this past 9/25.  Had no complicatins initially, was started on Eliquis rather than the prior coumadin given transplant had been accomplished, no reason to use the reversible agent anymore.  Patient did go on to experience some delated  Following surgery ,feeling much better, ahs not had any abnormal bleeding.   mild microcytic anemia, Hg 10.6g/dl 78.1FL. MCV.   Patient not currently on an iron pill.   Previous MCV post transplant were normal at 81-83 Hg 11.2g/dl.    Due to historyo f portal vein thrombosis and Budd Chiari, woudl only consider discontinuation of the Eliquis 5mg BID for any severe active bleeding.   Continue long term anticoagulation.   [id number] [address] .

## 2024-01-22 LAB
FERRITIN SERPL-MCNC: 6 NG/ML
IRON SATN MFR SERPL: 6 %
IRON SERPL-MCNC: 27 UG/DL
TIBC SERPL-MCNC: 448 UG/DL
UIBC SERPL-MCNC: 421 UG/DL

## 2024-01-29 NOTE — PROGRESS NOTE ADULT - SUBJECTIVE AND OBJECTIVE BOX
PROGRESS NOTE:     Katherine Parmar,  PGY1   Contact: Pager [phone] [facility] | [id number] [facility] | [facility]  Please check Resident Assignment tab on [employer] for Team / Coverage     Patient is a 35y old  Female who presents with a chief complaint of IVC thrombus (30 Mar 2021 12:40)    EVENTS / SUBJECTIVE: Pt seen at bedside. Slept well through night. Some L-sided pleuritic pain with respiration, lower abdomen discomfort. Last BM ovn.    MEDICATIONS  (STANDING):  ascorbic acid 500 milliGRAM(s) Oral daily  enoxaparin Injectable 50 milliGRAM(s) SubCutaneous every 12 hours  furosemide    Tablet 40 milliGRAM(s) Oral every 24 hours  multivitamin 1 Tablet(s) Oral daily  pantoprazole    Tablet 40 milliGRAM(s) Oral every 12 hours  polyethylene glycol 3350 17 Gram(s) Oral daily  senna 2 Tablet(s) Oral at bedtime  spironolactone 100 milliGRAM(s) Oral daily    Allergies  No Known Allergies    PHYSICAL EXAM:  Vital Signs Last 24 Hrs  T(C): 36.6 (31 Mar 2021 04:47), Max: 36.8 (30 Mar 2021 21:59)  T(F): 97.9 (31 Mar 2021 04:47), Max: 98.3 (30 Mar 2021 21:59)  HR: 76 (31 Mar 2021 04:47) (70 - 90)  BP: 96/64 (31 Mar 2021 04:47) (96/64 - 108/77)  RR: 18 (31 Mar 2021 04:47) (18 - 18)  SpO2: 95% (31 Mar 2021 04:47) (94% - 96%)    GENERAL: No acute distress, thin  HEENT: Atraumatic, EOMI, sclera clear, supple neck  CHEST/LUNG: decreased bs bL bases  HEART: Regular rate and rhythm; No murmurs, rubs, or gallops  ABDOMEN: Soft, non-tender, non-distended; normal bowel sounds, no organomegaly  EXTREMITIES:  2+ peripheral pulses b/l, No clubbing, cyanosis, or edema  NEUROLOGY: A&O x 3, no focal deficits  PSYCH: quiet, slightly anxious  SKIN: No rashes or lesions    PATIENT DATA:                        12.5   3.92  )-----------( 151      ( 31 Mar 2021 07:17 )             39.7     pending AM labs    HCG Quantitative, Serum: <2.0  Rapid HIV-1/2 Antibody (03.28.21 @ 20:18) NotDetec  COVID-19 PCR: NotDetec     < from: CT Abdomen and Pelvis w/ IV Cont (03.28.21 @ 23:32) >  EXAM:  CT ABDOMEN AND PELVIS IC                        *** ADDENDUM 03/29/2021  ***  Further information provided that patient had additional interval CT scan between what was reported as the most recent prior study (MRI) on the original dictation. Patient was scanned on 3/20/2021. This addendum is in comparison to that study, the most recent.  When compared to prior study from 3/20/2021, the suprarenal IVC thrombus is not significantly changed/minimally decreased in size. The hypoenhancement within the caudate lobe and size of the largest hypervascular lesion is unchanged. Overall, study is not significantly changed from the most recent prior 3/20/2021.  *** END OF ADDENDUM 03/29/2021  ***    Labs, Radiology, Cardiology, and Other Results: PRIOR WORKUP FROM OTHER CHART:   see current labs in [facility] Results    Thromboelastography Hemostasis - STAT (03.21.21 @ 16:24)   TEG Reaction Time: >17 min   TEG Maximum Amplitude: 58.5 mm   Heparinase TEG R Time: 11.8 min   RapidTEG Maximum Amplitude: 63.5 mm   TEG Functional Fibrinogen: 20.1 mm     Anticardiolipin Antibody Level, Total (03.23.21 @ 12:28) NEGATIVE  Beta 2 Glycoprotein 1 Antibody Screen (03.23.21 @ 12:28) NEGATIVE    Ferritin, Serum (03.23.21 @ 10:31) 104 NG/Ml  D-Dimer Assay, Quantitative (03.23.21 @ 07:12) 844  C-Reactive Protein, Serum (03.23.21 @ 07:09) 2.78  Procalcitonin, Serum (03.23.21 @ 07:09)  0.09    Culture - Body Fluid with Gram Stain (03.22.21 @ 23:03)   Specimen Source: .Body Fluid Peritoneal Fluid   Culture Results:   No growth at 5 days     CTA chest, abdomen, pelvis PROCEDURE DATE:  03/20/2021    INTERPRETATION:  CLINICAL INFORMATION: Cough. Epigastric pain and abdominal distention for several days. History of cirrhosis. Concern for portal vein thrombosis.  CT Angiography of the Chest was performed followed by portal venous phase imaging of the Abdomen and Pelvis.  LUNGS AND LARGE AIRWAYS: Patent central airways. Near complete atelectasis of right lower lobe and partial atelectasis of the right middle lobe. Left lower lobe subsegmental atelectasis. Small patchy airspace opacities in bilateral upper, left lower lobes likely infectious   PLEURA: Moderate to large right pleural effusion.  LIVER: Cirrhotic. Scattered areas of decreased enhancement throughout the liver possibly due to transient hepatic attenuation difference.  BILE DUCTS: Normal caliber.  GALLBLADDER: Diffuse wall thickening likely secondary to cirrhosis.  SPLEEN: Splenomegaly.  PERITONEUM: Small to moderate volume abdominal and pelvic ascites.  VESSELS: Thrombus in the suprarenal inferior vena cava. Thrombus in the superior mesenteric vein extending to the confluence with the splenic vein and possibly into the portal vein. Recanalization of the umbilical vein. Umbilical and splenic varices.  IMPRESSION:  1. No pulmonary embolism.  2. Moderate to large right pleural effusion with adjacent compressive atelectasis including near complete atelectasis of the right lower lobe and partial atelectasis of the right middle lobe.  3. Small patchy airspace opacities in bilateral upper and left lower lobes likely infectious in etiology.  4. Thrombus in the suprarenal inferior vena cava. Thrombus in the superior mesenteric vein extending to the confluence with the splenic vein and possibly into the portal vein.  5. Cirrhosis with portal hypertension.  6. Small to moderate volume abdominal and pelvic ascites.    TTE 3/23/21   Conclusions:  1. Grossly normal left ventricular internal dimensions and  wall thicknesses.  2. Endocardial visualization enhanced with intravenous  injection of Ultrasonic Enhancing Agent (Definity). Grossly  normal left ventricular systolic function. No obvious  segmental wall motion abnormalities.  3. Normal diastolic function.  4. Normal right ventricular size and function.  5. Estimated pulmonary artery systolic pressure equals 19  mm Hg, assuming right atrial pressure equals 8  mm Hg,  consistent with normal pulmonary pressures.  *** No previous Echo exam.               [Vaginal ___] : [unfilled] vaginal delivery(s) [Taking Estrogens] : taking estrogen replacement [Abnormal Pap Smear] : normal pap smear [Sexually Active] : is not sexually active

## 2024-01-31 ENCOUNTER — APPOINTMENT (OUTPATIENT)
Dept: HEPATOLOGY | Facility: CLINIC | Age: 39
End: 2024-01-31
Payer: MEDICAID

## 2024-01-31 VITALS
WEIGHT: 100 LBS | TEMPERATURE: 97.5 F | BODY MASS INDEX: 18.17 KG/M2 | DIASTOLIC BLOOD PRESSURE: 75 MMHG | HEART RATE: 82 BPM | HEIGHT: 62.28 IN | RESPIRATION RATE: 16 BRPM | OXYGEN SATURATION: 99 % | SYSTOLIC BLOOD PRESSURE: 106 MMHG

## 2024-01-31 DIAGNOSIS — I82.0 BUDD-CHIARI SYNDROME: ICD-10-CM

## 2024-01-31 PROCEDURE — 99214 OFFICE O/P EST MOD 30 MIN: CPT

## 2024-02-03 ENCOUNTER — TRANSCRIPTION ENCOUNTER (OUTPATIENT)
Age: 39
End: 2024-02-03

## 2024-02-04 LAB
ALBUMIN SERPL ELPH-MCNC: 4.6 G/DL
ALP BLD-CCNC: 69 U/L
ALT SERPL-CCNC: 62 U/L
ANION GAP SERPL CALC-SCNC: 11 MMOL/L
AST SERPL-CCNC: 30 U/L
BASOPHILS # BLD AUTO: 0.03 K/UL
BASOPHILS NFR BLD AUTO: 0.6 %
BILIRUB SERPL-MCNC: 0.2 MG/DL
BUN SERPL-MCNC: 26 MG/DL
CALCIUM SERPL-MCNC: 9.3 MG/DL
CHLORIDE SERPL-SCNC: 106 MMOL/L
CO2 SERPL-SCNC: 24 MMOL/L
CREAT SERPL-MCNC: 0.8 MG/DL
EGFR: 95 ML/MIN/1.73M2
EOSINOPHIL # BLD AUTO: 0.09 K/UL
EOSINOPHIL NFR BLD AUTO: 1.9 %
GLUCOSE SERPL-MCNC: 104 MG/DL
HCT VFR BLD CALC: 36.1 %
HGB BLD-MCNC: 10.3 G/DL
IMM GRANULOCYTES NFR BLD AUTO: 0.2 %
INR PPP: 1.15 RATIO
LYMPHOCYTES # BLD AUTO: 1.49 K/UL
LYMPHOCYTES NFR BLD AUTO: 30.8 %
MAGNESIUM SERPL-MCNC: 1.9 MG/DL
MAN DIFF?: NORMAL
MCHC RBC-ENTMCNC: 23.3 PG
MCHC RBC-ENTMCNC: 28.5 GM/DL
MCV RBC AUTO: 81.5 FL
MONOCYTES # BLD AUTO: 0.36 K/UL
MONOCYTES NFR BLD AUTO: 7.5 %
NEUTROPHILS # BLD AUTO: 2.85 K/UL
NEUTROPHILS NFR BLD AUTO: 59 %
PHOSPHATE SERPL-MCNC: 3.7 MG/DL
PLATELET # BLD AUTO: 293 K/UL
POTASSIUM SERPL-SCNC: 4.9 MMOL/L
PROT SERPL-MCNC: 6.6 G/DL
PT BLD: 12.9 SEC
RBC # BLD: 4.43 M/UL
RBC # FLD: 17 %
SODIUM SERPL-SCNC: 141 MMOL/L
TACROLIMUS SERPL-MCNC: 8.7 NG/ML
WBC # FLD AUTO: 4.83 K/UL

## 2024-02-09 ENCOUNTER — APPOINTMENT (OUTPATIENT)
Dept: MRI IMAGING | Facility: CLINIC | Age: 39
End: 2024-02-09
Payer: MEDICAID

## 2024-02-09 ENCOUNTER — OUTPATIENT (OUTPATIENT)
Dept: OUTPATIENT SERVICES | Facility: HOSPITAL | Age: 39
LOS: 1 days | End: 2024-02-09
Payer: MEDICAID

## 2024-02-09 DIAGNOSIS — Z98.890 OTHER SPECIFIED POSTPROCEDURAL STATES: Chronic | ICD-10-CM

## 2024-02-09 DIAGNOSIS — Z95.828 PRESENCE OF OTHER VASCULAR IMPLANTS AND GRAFTS: Chronic | ICD-10-CM

## 2024-02-09 DIAGNOSIS — Z00.8 ENCOUNTER FOR OTHER GENERAL EXAMINATION: ICD-10-CM

## 2024-02-09 PROCEDURE — 74183 MRI ABD W/O CNTR FLWD CNTR: CPT | Mod: 26

## 2024-02-09 PROCEDURE — 74183 MRI ABD W/O CNTR FLWD CNTR: CPT

## 2024-02-11 LAB
ALPHA-1-FETOPROTEIN-L3: NORMAL %
ALPHA-1-FETOPROTEIN: 1.7 NG/ML

## 2024-02-12 RX ORDER — B-COMPLEX WITH VITAMIN C
500-5 TABLET ORAL
Qty: 60 | Refills: 5 | Status: ACTIVE | COMMUNITY
Start: 2023-09-13 | End: 1900-01-01

## 2024-02-12 RX ORDER — PANTOPRAZOLE 40 MG/1
40 TABLET, DELAYED RELEASE ORAL DAILY
Qty: 30 | Refills: 5 | Status: ACTIVE | COMMUNITY
Start: 2023-09-13 | End: 1900-01-01

## 2024-02-18 LAB
ALBUMIN SERPL ELPH-MCNC: 4.8 G/DL
ALP BLD-CCNC: 64 U/L
ALT SERPL-CCNC: 50 U/L
ANION GAP SERPL CALC-SCNC: 12 MMOL/L
AST SERPL-CCNC: 27 U/L
BASOPHILS # BLD AUTO: 0.03 K/UL
BASOPHILS NFR BLD AUTO: 0.6 %
BILIRUB SERPL-MCNC: 0.4 MG/DL
BUN SERPL-MCNC: 21 MG/DL
CALCIUM SERPL-MCNC: 9.9 MG/DL
CHLORIDE SERPL-SCNC: 104 MMOL/L
CO2 SERPL-SCNC: 23 MMOL/L
CREAT SERPL-MCNC: 0.77 MG/DL
EGFR: 100 ML/MIN/1.73M2
EOSINOPHIL # BLD AUTO: 0.16 K/UL
EOSINOPHIL NFR BLD AUTO: 3.1 %
GLUCOSE SERPL-MCNC: 94 MG/DL
HCT VFR BLD CALC: 39.4 %
HGB BLD-MCNC: 11.3 G/DL
IMM GRANULOCYTES NFR BLD AUTO: 0.2 %
INR PPP: 1.22 RATIO
LYMPHOCYTES # BLD AUTO: 1.56 K/UL
LYMPHOCYTES NFR BLD AUTO: 29.9 %
MAN DIFF?: NORMAL
MCHC RBC-ENTMCNC: 23.5 PG
MCHC RBC-ENTMCNC: 28.7 GM/DL
MCV RBC AUTO: 81.9 FL
MONOCYTES # BLD AUTO: 0.49 K/UL
MONOCYTES NFR BLD AUTO: 9.4 %
NEUTROPHILS # BLD AUTO: 2.97 K/UL
NEUTROPHILS NFR BLD AUTO: 56.8 %
PLATELET # BLD AUTO: 229 K/UL
POTASSIUM SERPL-SCNC: 4.5 MMOL/L
PROT SERPL-MCNC: 6.8 G/DL
PT BLD: 13.8 SEC
RBC # BLD: 4.81 M/UL
RBC # FLD: 18 %
SODIUM SERPL-SCNC: 140 MMOL/L
TACROLIMUS SERPL-MCNC: 5.7 NG/ML
WBC # FLD AUTO: 5.22 K/UL

## 2024-02-28 ENCOUNTER — APPOINTMENT (OUTPATIENT)
Dept: HEPATOLOGY | Facility: CLINIC | Age: 39
End: 2024-02-28
Payer: MEDICAID

## 2024-02-28 VITALS
DIASTOLIC BLOOD PRESSURE: 82 MMHG | WEIGHT: 101 LBS | TEMPERATURE: 97.1 F | HEART RATE: 84 BPM | OXYGEN SATURATION: 98 % | SYSTOLIC BLOOD PRESSURE: 114 MMHG | RESPIRATION RATE: 16 BRPM | BODY MASS INDEX: 18.35 KG/M2 | HEIGHT: 62.28 IN

## 2024-02-28 DIAGNOSIS — K21.9 GASTRO-ESOPHAGEAL REFLUX DISEASE W/OUT ESOPHAGITIS: ICD-10-CM

## 2024-02-28 DIAGNOSIS — C22.0 LIVER CELL CARCINOMA: ICD-10-CM

## 2024-02-28 PROCEDURE — 99214 OFFICE O/P EST MOD 30 MIN: CPT

## 2024-02-28 RX ORDER — SULFAMETHOXAZOLE AND TRIMETHOPRIM 400; 80 MG/1; MG/1
400-80 TABLET ORAL DAILY
Qty: 30 | Refills: 5 | Status: DISCONTINUED | COMMUNITY
Start: 2023-09-13 | End: 2024-02-28

## 2024-03-02 ENCOUNTER — TRANSCRIPTION ENCOUNTER (OUTPATIENT)
Age: 39
End: 2024-03-02

## 2024-03-02 LAB
ALBUMIN SERPL ELPH-MCNC: 4.7 G/DL
ALP BLD-CCNC: 64 U/L
ALT SERPL-CCNC: 61 U/L
ANION GAP SERPL CALC-SCNC: 12 MMOL/L
AST SERPL-CCNC: 31 U/L
BASOPHILS # BLD AUTO: 0.05 K/UL
BASOPHILS NFR BLD AUTO: 0.9 %
BILIRUB SERPL-MCNC: 0.3 MG/DL
BUN SERPL-MCNC: 24 MG/DL
CALCIUM SERPL-MCNC: 9.6 MG/DL
CHLORIDE SERPL-SCNC: 105 MMOL/L
CMV DNA SPEC QL NAA+PROBE: NOT DETECTED IU/ML
CMVPCR LOG: NOT DETECTED LOG10IU/ML
CO2 SERPL-SCNC: 24 MMOL/L
CREAT SERPL-MCNC: 0.69 MG/DL
EGFR: 112 ML/MIN/1.73M2
EOSINOPHIL # BLD AUTO: 0.12 K/UL
EOSINOPHIL NFR BLD AUTO: 2.1 %
GLUCOSE SERPL-MCNC: 95 MG/DL
HCT VFR BLD CALC: 37.6 %
HGB BLD-MCNC: 11.7 G/DL
IMM GRANULOCYTES NFR BLD AUTO: 1.2 %
INR PPP: 1.14 RATIO
LYMPHOCYTES # BLD AUTO: 1.65 K/UL
LYMPHOCYTES NFR BLD AUTO: 28.9 %
MAGNESIUM SERPL-MCNC: 2.1 MG/DL
MAN DIFF?: NORMAL
MCHC RBC-ENTMCNC: 24.9 PG
MCHC RBC-ENTMCNC: 31.1 GM/DL
MCV RBC AUTO: 80 FL
MONOCYTES # BLD AUTO: 0.52 K/UL
MONOCYTES NFR BLD AUTO: 9.1 %
NEUTROPHILS # BLD AUTO: 3.29 K/UL
NEUTROPHILS NFR BLD AUTO: 57.8 %
PHOSPHATE SERPL-MCNC: 3.7 MG/DL
PLATELET # BLD AUTO: 229 K/UL
POTASSIUM SERPL-SCNC: 4.5 MMOL/L
PROT SERPL-MCNC: 6.6 G/DL
PT BLD: 12.8 SEC
RBC # BLD: 4.7 M/UL
RBC # FLD: 17.5 %
SODIUM SERPL-SCNC: 142 MMOL/L
WBC # FLD AUTO: 5.59 K/UL

## 2024-03-03 NOTE — HISTORY OF PRESENT ILLNESS
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 She also experienced chronic occlusion of hepatic veins, decompensated cirrhosis leading to right hepatic hydrothorax and ascites, non-bleeding esophageal varices, and hepatocellular carcinoma (HCC), which was treated with ablation in January 2023. Additionally, she has a known protein S deficiency. Subsequently, the patient underwent elective Transjugular Intrahepatic Portosystemic Shunt (TIPS) and Distal Intrahepatic Portosystemic Shunt (DIPS) procedures in May 2021, followed by orthotopic liver transplant (OLT) on September 8, 2023.  Postoperatively, the patient initially did well but experienced an uptrend in liver function tests (LFTs) on September 16. This led to the initiation of pulse steroids, with a total of three doses amounting to 1250mg. A prednisone taper was started on September 19, 2023. Following a readmission from September 24 to 26, 2023, due to diarrhea, Mycophenolate mofetil (MMF) was temporarily held, symptoms improved, and she was switched to Myfortic 360mg BID. The patient was discharged with stable graft function.  Explant Pathology (Native liver; explant weight: 967.9 g; explant date: 9/8/2023):  Hepatocellular carcinoma, well to moderately differentiated, post-ablation with complete necrosis (3.4 cm, pT1b PNx). Background liver displays established cirrhosis, regenerative nodules, and histologic features consistent with chronic venous outflow obstruction. Mild autolytic changes noted in the gallbladder.  November 20, 2023: At 2.5 months post-transplant, tremors were milder, anxiety improved with propranolol. No gastrointestinal issues were reported, and she tolerated a regular diet. Excellent graft function.   December 5, 2023: The patient reports doing well with no new complaints. Allograft function remains excellent. Current medications include Prograf 3/2, MMF 1/1, Prednisone 5mg daily, Bactrim 1 tab daily, Valcyte 450 daily, Protonix 40 mg daily, ASA 81 mg daily, and OSCA +D. Labs indicate a WBC count of 2.19. The patient took Neupogen 300 mcg x 1, experiencing a headache throughout the night and some loose stools afterward. Additionally, she is on Propranolol 10 mg daily.  February 28, 2024:   She is here today for a follow-up visit. Recent lab work indicated an ALT level of 50 (improved from 62 noted on Feb 2, 2024). She is currently on Tacro 3/2,  BID, and 5mg of Prednisone daily. Her FK level 5.7 ng/mL. She has newly reported dark stool (formed stool) that began 3 weeks ago and has persisted daily. She is on Ferrous Sulfate. Labs reviewed from February 17, 2024 revealed, mild anemmia with Hb 11.3 g/dl ( improved from 10.3 g/dl)Her last EGD, conducted in 2021 before the transplant, revealed multiple gastric and duodenal ulcers, along with grade 1-2 varices. She is on daily PPI. The most recent MRI, on February 9, 2024, showed a subcentimeter liver cyst.

## 2024-03-03 NOTE — ASSESSMENT
[FreeTextEntry1] : 38-year-old female with a complex medical history including Budd-Chiari syndrome, decompensated cirrhosis, and hepatocellular carcinoma (HCC), who underwent IVC thrombectomy, TIPs/DIPS procedures, and orthotopic liver transplant (OLT) on 9/8/2023. Explant pathology- low risk HCC. No recurrence on recent MRI from Feb 9, 2024.  PLAN # Graft Function: -Graft function is good -Continue close monitoring of graft function. -Tacrolimus level is 5.7 (at goal).  Continue Tacrolimus 3/2.  -Mycophenolic acid (MPA)  360mg BID due to leukopenia.  WBC now improved to 5.   -Prednisone is maintained at 5mg. -Follow up blood works now, and again on March 7th.   # Mild anemia/Dark stools -Likely 2/2 iron tablets, Hg stable and improving. Continue to monitor.   -Continue with Pantoprazole 40 mg daily.  # Prophylaxis: -Discontinue Valcyte and Bactrim per protocol.  # Leukopenia-resolved  -WBC is 5.22   #Ophthalmology: Ophthalmology saw her on 12/1.  # Anticoagulation: -Continue Eliquis 5mg BID.  # HCC Surveillance: -On low-risk HCC surveillance needed per protocol. MRI from Feb 9, 2024,  No suspicious hepatic lesion. Hepatic vasculature is patent. -Plan for repeat MRI in 6 months  # Tremor: - Keep on low-dose propranolol for tremor, with reported improvement.  # Anxiety: -Anxiety has improved on propranolol.  RTC: 1 month with repeat labs

## 2024-03-03 NOTE — REVIEW OF SYSTEMS
[Diarrhea] : diarrhea [Headache] : headache [Fever] : no fever [Fatigue] : no fatigue [Chills] : no chills [Night Sweats] : no night sweats [Sclera anicteric] : sclera icteric [Sore throat] : no sore throat [Palpitations] : no palpitations [Chest Pain] : no chest pain [SOB] : no shortness of breath [Abdominal Pain] : no abdominal pain [Nausea] : no nausea [Constipation] : no constipation [Joint Pain] : no joint pain [Anemia] : no anemia

## 2024-03-03 NOTE — PHYSICAL EXAM
[Alert] : alert [Healthy Appearing] : healthy appearing [No Acute Distress] : no acute distress [EOMI] : extra ocular movement intact [PERRLA] : pupils equal, round, reactive to light and accomodation [Full ROM] : full range of motion [No Lymphadenopathy] : no lymphadenopathy [Clear to Auscultation] : lungs were clear to auscultation bilaterally [Breathing Comfortably on room air] : breathing comfortably on room air [Normal Rate] : normal rate [Regular Rhythm] : regular rhythm [Soft] : soft [Normal Bowel Sounds] : normal bowel sounds [Clean] : clean [Dry] : dry [Healing Well] : healing well [No Edema] : no edema [No Skin Discoloration] : no skin discoloration [No Ulcers] : no ulcers [Strength in Tact] : strength in tact [No Rash] : no rash [Scleral Icterus] : no scleral icterus [Hepatojugular Reflux] : no hepatojugular reflux [Abdominal Bruit] : no abdominal bruit [Ascites Fluid Wave] : no ascites fluid wave [Splenomegaly] : no splenomegaly [Spider Angioma] : no spider angioma [Jaundice] : no jaundice [Palmar Erythema] : no palmar erythema [Asterixis] : no asterixis [de-identified] : mild resting tremor [de-identified] : Healed

## 2024-03-09 LAB
ALPHA-1-FETOPROTEIN-L3: NORMAL %
ALPHA-1-FETOPROTEIN: 1.7 NG/ML

## 2024-03-31 NOTE — PATIENT PROFILE ADULT - PATIENT'S SEXUAL ORIENTATION
Follow up with PCP and Infectious disease after discharge. Take meds as prescribed. Follow a diabetic diet. Activity as tolerated  
How Severe Is Your Skin Lesion?: mild
Have Your Skin Lesions Been Treated?: not been treated
Is This A New Presentation, Or A Follow-Up?: Skin Lesions
Heterosexual

## 2024-04-02 ENCOUNTER — OUTPATIENT (OUTPATIENT)
Dept: OUTPATIENT SERVICES | Facility: HOSPITAL | Age: 39
LOS: 1 days | Discharge: HOME | End: 2024-04-02

## 2024-04-02 DIAGNOSIS — I82.0 BUDD-CHIARI SYNDROME: ICD-10-CM

## 2024-04-02 DIAGNOSIS — Z98.890 OTHER SPECIFIED POSTPROCEDURAL STATES: Chronic | ICD-10-CM

## 2024-04-02 DIAGNOSIS — Z95.828 PRESENCE OF OTHER VASCULAR IMPLANTS AND GRAFTS: Chronic | ICD-10-CM

## 2024-04-10 ENCOUNTER — NON-APPOINTMENT (OUTPATIENT)
Age: 39
End: 2024-04-10

## 2024-04-11 ENCOUNTER — RESULT REVIEW (OUTPATIENT)
Age: 39
End: 2024-04-11

## 2024-04-11 ENCOUNTER — APPOINTMENT (OUTPATIENT)
Dept: HEMATOLOGY ONCOLOGY | Facility: CLINIC | Age: 39
End: 2024-04-11
Payer: MEDICAID

## 2024-04-11 VITALS
DIASTOLIC BLOOD PRESSURE: 76 MMHG | HEART RATE: 72 BPM | OXYGEN SATURATION: 97 % | SYSTOLIC BLOOD PRESSURE: 108 MMHG | RESPIRATION RATE: 16 BRPM | HEIGHT: 62.28 IN | BODY MASS INDEX: 18.48 KG/M2 | TEMPERATURE: 98.6 F | WEIGHT: 101.72 LBS

## 2024-04-11 DIAGNOSIS — D50.9 IRON DEFICIENCY ANEMIA, UNSPECIFIED: ICD-10-CM

## 2024-04-11 LAB
BASOPHILS # BLD AUTO: 0.02 K/UL — SIGNIFICANT CHANGE UP (ref 0–0.2)
BASOPHILS NFR BLD AUTO: 0.4 % — SIGNIFICANT CHANGE UP (ref 0–2)
EOSINOPHIL # BLD AUTO: 0.1 K/UL — SIGNIFICANT CHANGE UP (ref 0–0.5)
EOSINOPHIL NFR BLD AUTO: 2 % — SIGNIFICANT CHANGE UP (ref 0–6)
HCT VFR BLD CALC: 39.7 % — SIGNIFICANT CHANGE UP (ref 34.5–45)
HGB BLD-MCNC: 12.4 G/DL — SIGNIFICANT CHANGE UP (ref 11.5–15.5)
IMM GRANULOCYTES NFR BLD AUTO: 0.6 % — SIGNIFICANT CHANGE UP (ref 0–0.9)
LYMPHOCYTES # BLD AUTO: 1.34 K/UL — SIGNIFICANT CHANGE UP (ref 1–3.3)
LYMPHOCYTES # BLD AUTO: 26.4 % — SIGNIFICANT CHANGE UP (ref 13–44)
MCHC RBC-ENTMCNC: 26.8 PG — LOW (ref 27–34)
MCHC RBC-ENTMCNC: 31.2 G/DL — LOW (ref 32–36)
MCV RBC AUTO: 85.8 FL — SIGNIFICANT CHANGE UP (ref 80–100)
MONOCYTES # BLD AUTO: 0.44 K/UL — SIGNIFICANT CHANGE UP (ref 0–0.9)
MONOCYTES NFR BLD AUTO: 8.7 % — SIGNIFICANT CHANGE UP (ref 2–14)
NEUTROPHILS # BLD AUTO: 3.14 K/UL — SIGNIFICANT CHANGE UP (ref 1.8–7.4)
NEUTROPHILS NFR BLD AUTO: 61.9 % — SIGNIFICANT CHANGE UP (ref 43–77)
NRBC # BLD: 0 /100 WBCS — SIGNIFICANT CHANGE UP (ref 0–0)
PLATELET # BLD AUTO: 196 K/UL — SIGNIFICANT CHANGE UP (ref 150–400)
RBC # BLD: 4.62 M/UL — SIGNIFICANT CHANGE UP (ref 3.8–5.2)
RBC # FLD: 14.6 % — HIGH (ref 10.3–14.5)
RETICS #: 76.7 K/UL — SIGNIFICANT CHANGE UP (ref 25–125)
RETICS/RBC NFR: 1.7 % — SIGNIFICANT CHANGE UP (ref 0.5–2.5)
WBC # BLD: 5.07 K/UL — SIGNIFICANT CHANGE UP (ref 3.8–10.5)
WBC # FLD AUTO: 5.07 K/UL — SIGNIFICANT CHANGE UP (ref 3.8–10.5)

## 2024-04-11 PROCEDURE — 99214 OFFICE O/P EST MOD 30 MIN: CPT

## 2024-04-11 RX ORDER — FERROUS GLUCONATE 324(38)MG
324 (38 FE) TABLET ORAL DAILY
Qty: 90 | Refills: 1 | Status: DISCONTINUED | COMMUNITY
Start: 2024-01-12 | End: 2024-04-11

## 2024-04-12 ENCOUNTER — NON-APPOINTMENT (OUTPATIENT)
Age: 39
End: 2024-04-12

## 2024-04-12 ENCOUNTER — OUTPATIENT (OUTPATIENT)
Dept: OUTPATIENT SERVICES | Facility: HOSPITAL | Age: 39
LOS: 1 days | End: 2024-04-12
Payer: MEDICAID

## 2024-04-12 ENCOUNTER — APPOINTMENT (OUTPATIENT)
Dept: CT IMAGING | Facility: CLINIC | Age: 39
End: 2024-04-12
Payer: MEDICAID

## 2024-04-12 DIAGNOSIS — Z98.890 OTHER SPECIFIED POSTPROCEDURAL STATES: Chronic | ICD-10-CM

## 2024-04-12 DIAGNOSIS — Z95.828 PRESENCE OF OTHER VASCULAR IMPLANTS AND GRAFTS: Chronic | ICD-10-CM

## 2024-04-12 DIAGNOSIS — Z00.8 ENCOUNTER FOR OTHER GENERAL EXAMINATION: ICD-10-CM

## 2024-04-12 DIAGNOSIS — D50.9 IRON DEFICIENCY ANEMIA, UNSPECIFIED: ICD-10-CM

## 2024-04-12 PROCEDURE — 71275 CT ANGIOGRAPHY CHEST: CPT

## 2024-04-12 PROCEDURE — 71275 CT ANGIOGRAPHY CHEST: CPT | Mod: 26

## 2024-04-15 NOTE — HISTORY OF PRESENT ILLNESS
[de-identified] : This is a 38 year old woman with history of portal vein thrombosis hepatocellular cancer.  Patient s/p Liver transplant this past 9/25.  Had no complicatins initially, was started on Eliquis rather than the prior coumadin given transplant had been accomplished, no reason to use the reversible agent anymore.  Patient did go on to experience some delated  Following surgery ,feeling much better, ahs not had any abnormal bleeding.   mild microcytic anemia, Hg 10.6g/dl 78.1FL. MCV.   Patient not currently on an iron pill.   Previous MCV post transplant were normal at 81-83 Hg 11.2g/dl.    Due to history of portal vein thrombosis and Budd Chiari, would only consider discontinuation of the Eliquis 5mg BID for any severe active bleeding.   Continue long term anticoagulation.   [de-identified] : Patient started having pain on the right side just like she had before the transplantation surgery.   Has been going on for  about a month.  Does happen and then she feels like she can not take a deep breath, pain worse on deep inspiration.    [id number] Amie

## 2024-04-15 NOTE — ASSESSMENT
[FreeTextEntry1] : This is a 38 year old woman with a history of cirrhosis, portal hypertension, MRI findings that show a heterogenous area in the liver with branches of the hepatic vein that are sclerosed, difficult to visualize suggestive of history of hepatic vein thrombosis in the past, possible underlying Budd Chiari syndrome. No other explanations for cirrhosis known currently. Much of this history took place in [facility]. Patient was able to bring records from the evaluation in [facility] as well as earlier evaluation in 2017 at [facility]. After several repeats, Protein S antigen improved to normal at 68%. Does not appear consistent with congenital Protein S deficiency. Patient had a normal ATIII activity 90% prior to the thrombosis of the IVC. Patient was anticoagulated on Eliquis for a period of a year until transitioned to coumadin just prior to the liver transplant. Patient now s/p liver transplant September 25th. Patient doing well post transplant. Mild iron deficiency noted, will restart ferrous gluconate 38mg daily 3 months ago, after this Hg normalized now 12.4 today up from 10.3g/dl.  Ferritin rising from 6 to 17.  Continue iron supplements, can stop ferrous gluconate.     Patient does complain of some right sided chest pain, notably on inspiration.  CT angiogram r/o PE ordered for pleuritic right chest painHg 13k/ul WBC 5 Plates 196k/ul

## 2024-04-17 LAB
ALBUMIN SERPL ELPH-MCNC: 4.5 G/DL
ALP BLD-CCNC: 65 U/L
ALT SERPL-CCNC: 42 U/L
ANION GAP SERPL CALC-SCNC: 10 MMOL/L
AST SERPL-CCNC: 22 U/L
BILIRUB SERPL-MCNC: 0.3 MG/DL
BUN SERPL-MCNC: 21 MG/DL
CALCIUM SERPL-MCNC: 9.6 MG/DL
CHLORIDE SERPL-SCNC: 107 MMOL/L
CO2 SERPL-SCNC: 27 MMOL/L
CREAT SERPL-MCNC: 0.7 MG/DL
EGFR: 112 ML/MIN/1.73M2
FERRITIN SERPL-MCNC: 17 NG/ML
GLUCOSE SERPL-MCNC: 98 MG/DL
IRON SATN MFR SERPL: 32 %
IRON SERPL-MCNC: 112 UG/DL
POTASSIUM SERPL-SCNC: 5.2 MMOL/L
PROT SERPL-MCNC: 6.4 G/DL
SODIUM SERPL-SCNC: 144 MMOL/L
TIBC SERPL-MCNC: 355 UG/DL
UIBC SERPL-MCNC: 243 UG/DL

## 2024-04-23 ENCOUNTER — RESULT REVIEW (OUTPATIENT)
Age: 39
End: 2024-04-23

## 2024-04-23 ENCOUNTER — APPOINTMENT (OUTPATIENT)
Dept: HEPATOLOGY | Facility: CLINIC | Age: 39
End: 2024-04-23
Payer: MEDICAID

## 2024-04-23 VITALS
HEART RATE: 84 BPM | OXYGEN SATURATION: 97 % | HEIGHT: 62.28 IN | WEIGHT: 105.82 LBS | DIASTOLIC BLOOD PRESSURE: 76 MMHG | TEMPERATURE: 97.5 F | BODY MASS INDEX: 19.23 KG/M2 | SYSTOLIC BLOOD PRESSURE: 105 MMHG

## 2024-04-23 PROCEDURE — 99214 OFFICE O/P EST MOD 30 MIN: CPT

## 2024-04-28 ENCOUNTER — NON-APPOINTMENT (OUTPATIENT)
Age: 39
End: 2024-04-28

## 2024-04-29 LAB
ALBUMIN SERPL ELPH-MCNC: 4.8 G/DL
ALP BLD-CCNC: 102 U/L
ALT SERPL-CCNC: 76 U/L
ANION GAP SERPL CALC-SCNC: 14 MMOL/L
AST SERPL-CCNC: 37 U/L
BASOPHILS # BLD AUTO: 0.03 K/UL
BASOPHILS NFR BLD AUTO: 0.5 %
BILIRUB SERPL-MCNC: 0.3 MG/DL
BUN SERPL-MCNC: 17 MG/DL
CALCIUM SERPL-MCNC: 10.3 MG/DL
CHLORIDE SERPL-SCNC: 104 MMOL/L
CO2 SERPL-SCNC: 23 MMOL/L
CREAT SERPL-MCNC: 0.67 MG/DL
EGFR: 113 ML/MIN/1.73M2
EOSINOPHIL # BLD AUTO: 0.1 K/UL
EOSINOPHIL NFR BLD AUTO: 1.6 %
GLUCOSE SERPL-MCNC: 95 MG/DL
HCT VFR BLD CALC: 42.6 %
HGB BLD-MCNC: 13.3 G/DL
IMM GRANULOCYTES NFR BLD AUTO: 0.5 %
INR PPP: 1.15 RATIO
LYMPHOCYTES # BLD AUTO: 1.9 K/UL
LYMPHOCYTES NFR BLD AUTO: 29.8 %
MAGNESIUM SERPL-MCNC: 2 MG/DL
MAN DIFF?: NORMAL
MCHC RBC-ENTMCNC: 26.9 PG
MCHC RBC-ENTMCNC: 31.2 GM/DL
MCV RBC AUTO: 86.2 FL
MONOCYTES # BLD AUTO: 0.46 K/UL
MONOCYTES NFR BLD AUTO: 7.2 %
NEUTROPHILS # BLD AUTO: 3.86 K/UL
NEUTROPHILS NFR BLD AUTO: 60.4 %
PHOSPHATE SERPL-MCNC: 4.2 MG/DL
PLATELET # BLD AUTO: 247 K/UL
POTASSIUM SERPL-SCNC: 4.9 MMOL/L
PROT SERPL-MCNC: 6.9 G/DL
PT BLD: 12.9 SEC
RBC # BLD: 4.94 M/UL
RBC # FLD: 15 %
SODIUM SERPL-SCNC: 141 MMOL/L
TACROLIMUS SERPL-MCNC: 11.2 NG/ML
WBC # FLD AUTO: 6.38 K/UL

## 2024-05-07 LAB
ALBUMIN SERPL ELPH-MCNC: 4.5 G/DL
ALP BLD-CCNC: 89 U/L
ALT SERPL-CCNC: 68 U/L
ANION GAP SERPL CALC-SCNC: 10 MMOL/L
AST SERPL-CCNC: 34 U/L
BASOPHILS # BLD AUTO: 0.02 K/UL
BASOPHILS NFR BLD AUTO: 0.4 %
BILIRUB SERPL-MCNC: 0.3 MG/DL
BUN SERPL-MCNC: 18 MG/DL
CALCIUM SERPL-MCNC: 9.6 MG/DL
CHLORIDE SERPL-SCNC: 103 MMOL/L
CO2 SERPL-SCNC: 27 MMOL/L
CREAT SERPL-MCNC: 0.72 MG/DL
EGFR: 108 ML/MIN/1.73M2
EOSINOPHIL # BLD AUTO: 0.14 K/UL
EOSINOPHIL NFR BLD AUTO: 2.8 %
GLUCOSE SERPL-MCNC: 90 MG/DL
HCT VFR BLD CALC: 40.6 %
HGB BLD-MCNC: 12.6 G/DL
IMM GRANULOCYTES NFR BLD AUTO: 0.4 %
LYMPHOCYTES # BLD AUTO: 1.9 K/UL
LYMPHOCYTES NFR BLD AUTO: 37.5 %
MAGNESIUM SERPL-MCNC: 1.9 MG/DL
MAN DIFF?: NORMAL
MCHC RBC-ENTMCNC: 27.2 PG
MCHC RBC-ENTMCNC: 31 GM/DL
MCV RBC AUTO: 87.5 FL
MONOCYTES # BLD AUTO: 0.41 K/UL
MONOCYTES NFR BLD AUTO: 8.1 %
NEUTROPHILS # BLD AUTO: 2.57 K/UL
NEUTROPHILS NFR BLD AUTO: 50.8 %
PHOSPHATE SERPL-MCNC: 4.1 MG/DL
PLATELET # BLD AUTO: 216 K/UL
POTASSIUM SERPL-SCNC: 4.5 MMOL/L
PROT SERPL-MCNC: 6.6 G/DL
RBC # BLD: 4.64 M/UL
RBC # FLD: 14.3 %
SODIUM SERPL-SCNC: 141 MMOL/L
TACROLIMUS SERPL-MCNC: 5.4 NG/ML
WBC # FLD AUTO: 5.06 K/UL

## 2024-05-07 RX ORDER — MYCOPHENILIC ACID 360 MG/1
360 TABLET, DELAYED RELEASE ORAL
Qty: 60 | Refills: 10 | Status: ACTIVE | COMMUNITY
Start: 2023-11-29 | End: 1900-01-01

## 2024-05-08 ENCOUNTER — OUTPATIENT (OUTPATIENT)
Dept: OUTPATIENT SERVICES | Facility: HOSPITAL | Age: 39
LOS: 1 days | End: 2024-05-08
Payer: MEDICAID

## 2024-05-08 ENCOUNTER — APPOINTMENT (OUTPATIENT)
Dept: ULTRASOUND IMAGING | Facility: CLINIC | Age: 39
End: 2024-05-08
Payer: MEDICAID

## 2024-05-08 DIAGNOSIS — Z00.8 ENCOUNTER FOR OTHER GENERAL EXAMINATION: ICD-10-CM

## 2024-05-08 DIAGNOSIS — Z98.890 OTHER SPECIFIED POSTPROCEDURAL STATES: Chronic | ICD-10-CM

## 2024-05-08 DIAGNOSIS — Z94.4 LIVER TRANSPLANT STATUS: ICD-10-CM

## 2024-05-08 DIAGNOSIS — Z95.828 PRESENCE OF OTHER VASCULAR IMPLANTS AND GRAFTS: Chronic | ICD-10-CM

## 2024-05-08 PROCEDURE — 93975 VASCULAR STUDY: CPT | Mod: 26

## 2024-05-08 PROCEDURE — 76705 ECHO EXAM OF ABDOMEN: CPT | Mod: 26,59

## 2024-05-08 PROCEDURE — 76705 ECHO EXAM OF ABDOMEN: CPT

## 2024-05-08 PROCEDURE — 93975 VASCULAR STUDY: CPT

## 2024-05-13 ENCOUNTER — NON-APPOINTMENT (OUTPATIENT)
Age: 39
End: 2024-05-13

## 2024-05-13 RX ORDER — PROPRANOLOL HYDROCHLORIDE 10 MG/1
10 TABLET ORAL DAILY
Qty: 30 | Refills: 5 | Status: ACTIVE | COMMUNITY
Start: 2023-11-06 | End: 1900-01-01

## 2024-05-13 RX ORDER — APIXABAN 5 MG/1
5 TABLET, FILM COATED ORAL
Qty: 60 | Refills: 5 | Status: ACTIVE | COMMUNITY
Start: 2023-10-16 | End: 1900-01-01

## 2024-06-26 RX ORDER — OMEGA-3/DHA/EPA/FISH OIL 300-1000MG
400 CAPSULE ORAL
Qty: 120 | Refills: 5 | Status: ACTIVE | COMMUNITY
Start: 2023-09-13 | End: 1900-01-01

## 2024-07-01 PROBLEM — Z79.899 LONG TERM CURRENT USE OF IMMUNOSUPPRESSIVE DRUG: Status: ACTIVE | Noted: 2023-10-07

## 2024-07-01 PROBLEM — Z94.4 STATUS POST LIVER TRANSPLANT: Status: ACTIVE | Noted: 2023-09-13

## 2024-07-03 DIAGNOSIS — K21.9 GASTRO-ESOPHAGEAL REFLUX DISEASE W/OUT ESOPHAGITIS: ICD-10-CM

## 2024-07-03 RX ORDER — FAMOTIDINE 20 MG/1
20 TABLET, FILM COATED ORAL
Qty: 30 | Refills: 5 | Status: ACTIVE | COMMUNITY
Start: 2024-07-03 | End: 1900-01-01

## 2024-07-05 ENCOUNTER — NON-APPOINTMENT (OUTPATIENT)
Age: 39
End: 2024-07-05

## 2024-07-05 LAB
ALBUMIN SERPL ELPH-MCNC: 4.5 G/DL
ALP BLD-CCNC: 106 U/L
ALT SERPL-CCNC: 62 U/L
ANION GAP SERPL CALC-SCNC: 12 MMOL/L
AST SERPL-CCNC: 35 U/L
BASOPHILS # BLD AUTO: 0.04 K/UL
BASOPHILS NFR BLD AUTO: 0.6 %
BILIRUB SERPL-MCNC: 0.5 MG/DL
BUN SERPL-MCNC: 17 MG/DL
CALCIUM SERPL-MCNC: 9.6 MG/DL
CHLORIDE SERPL-SCNC: 103 MMOL/L
CO2 SERPL-SCNC: 23 MMOL/L
CREAT SERPL-MCNC: 0.65 MG/DL
EGFR: 114 ML/MIN/1.73M2
EOSINOPHIL # BLD AUTO: 0.22 K/UL
EOSINOPHIL NFR BLD AUTO: 3.5 %
FOLATE SERPL-MCNC: 17.7 NG/ML
GLUCOSE SERPL-MCNC: 129 MG/DL
HAPTOGLOB SERPL-MCNC: 70 MG/DL
HCT VFR BLD CALC: 34.6 %
HGB BLD-MCNC: 11.2 G/DL
IMM GRANULOCYTES NFR BLD AUTO: 0.6 %
IRON SATN MFR SERPL: 61 %
IRON SERPL-MCNC: 240 UG/DL
LDH SERPL-CCNC: 143 U/L
LYMPHOCYTES # BLD AUTO: 1.6 K/UL
LYMPHOCYTES NFR BLD AUTO: 25.2 %
MAN DIFF?: NORMAL
MCHC RBC-ENTMCNC: 27.9 PG
MCHC RBC-ENTMCNC: 32.4 GM/DL
MCV RBC AUTO: 86.3 FL
MONOCYTES # BLD AUTO: 0.48 K/UL
MONOCYTES NFR BLD AUTO: 7.6 %
NEUTROPHILS # BLD AUTO: 3.96 K/UL
NEUTROPHILS NFR BLD AUTO: 62.5 %
PLATELET # BLD AUTO: 229 K/UL
POTASSIUM SERPL-SCNC: 4.6 MMOL/L
PROT SERPL-MCNC: 6.3 G/DL
RBC # BLD: 4.01 M/UL
RBC # BLD: 4.01 M/UL
RBC # FLD: 12.1 %
RETICS # AUTO: 2.3 %
RETICS AGGREG/RBC NFR: 91.8 K/UL
SODIUM SERPL-SCNC: 139 MMOL/L
TIBC SERPL-MCNC: 393 UG/DL
UIBC SERPL-MCNC: 154 UG/DL
VIT B12 SERPL-MCNC: 985 PG/ML
WBC # FLD AUTO: 6.34 K/UL

## 2024-07-08 LAB
ALBUMIN SERPL ELPH-MCNC: 4.6 G/DL
ALP BLD-CCNC: 106 U/L
ALT SERPL-CCNC: 51 U/L
ANION GAP SERPL CALC-SCNC: 10 MMOL/L
AST SERPL-CCNC: 30 U/L
BASOPHILS # BLD AUTO: 0.03 K/UL
BASOPHILS NFR BLD AUTO: 0.6 %
BILIRUB SERPL-MCNC: 0.4 MG/DL
BUN SERPL-MCNC: 19 MG/DL
CALCIUM SERPL-MCNC: 9.5 MG/DL
CHLORIDE SERPL-SCNC: 107 MMOL/L
CMV DNA SPEC QL NAA+PROBE: NOT DETECTED IU/ML
CMVPCR LOG: NOT DETECTED LOG10IU/ML
CO2 SERPL-SCNC: 24 MMOL/L
CREAT SERPL-MCNC: 0.66 MG/DL
EGFR: 114 ML/MIN/1.73M2
EOSINOPHIL # BLD AUTO: 0.19 K/UL
EOSINOPHIL NFR BLD AUTO: 3.6 %
GGT SERPL-CCNC: 70 U/L
GLUCOSE SERPL-MCNC: 110 MG/DL
HCT VFR BLD CALC: 36.9 %
HGB BLD-MCNC: 11.2 G/DL
IMM GRANULOCYTES NFR BLD AUTO: 0.4 %
LYMPHOCYTES # BLD AUTO: 1.55 K/UL
LYMPHOCYTES NFR BLD AUTO: 29.5 %
MAGNESIUM SERPL-MCNC: 1.8 MG/DL
MAN DIFF?: NORMAL
MCHC RBC-ENTMCNC: 26.9 PG
MCHC RBC-ENTMCNC: 30.4 GM/DL
MCV RBC AUTO: 88.7 FL
MONOCYTES # BLD AUTO: 0.43 K/UL
MONOCYTES NFR BLD AUTO: 8.2 %
NEUTROPHILS # BLD AUTO: 3.04 K/UL
NEUTROPHILS NFR BLD AUTO: 57.7 %
PHOSPHATE SERPL-MCNC: 3.5 MG/DL
PLATELET # BLD AUTO: 240 K/UL
POTASSIUM SERPL-SCNC: 4.9 MMOL/L
PROT SERPL-MCNC: 6.3 G/DL
RBC # BLD: 4.16 M/UL
RBC # FLD: 12 %
SODIUM SERPL-SCNC: 141 MMOL/L
TACROLIMUS SERPL-MCNC: 7.9 NG/ML
WBC # FLD AUTO: 5.26 K/UL

## 2024-07-10 ENCOUNTER — APPOINTMENT (OUTPATIENT)
Dept: HEPATOLOGY | Facility: CLINIC | Age: 39
End: 2024-07-10
Payer: COMMERCIAL

## 2024-07-10 VITALS
HEART RATE: 91 BPM | WEIGHT: 102 LBS | SYSTOLIC BLOOD PRESSURE: 99 MMHG | HEIGHT: 62 IN | TEMPERATURE: 97.4 F | BODY MASS INDEX: 18.77 KG/M2 | DIASTOLIC BLOOD PRESSURE: 70 MMHG | OXYGEN SATURATION: 100 %

## 2024-07-10 DIAGNOSIS — Z79.899 OTHER LONG TERM (CURRENT) DRUG THERAPY: ICD-10-CM

## 2024-07-10 DIAGNOSIS — D50.9 IRON DEFICIENCY ANEMIA, UNSPECIFIED: ICD-10-CM

## 2024-07-10 DIAGNOSIS — I82.0 BUDD-CHIARI SYNDROME: ICD-10-CM

## 2024-07-10 DIAGNOSIS — Z94.4 LIVER TRANSPLANT STATUS: ICD-10-CM

## 2024-07-10 DIAGNOSIS — C22.0 LIVER CELL CARCINOMA: ICD-10-CM

## 2024-07-10 PROCEDURE — 99214 OFFICE O/P EST MOD 30 MIN: CPT

## 2024-07-11 ENCOUNTER — OUTPATIENT (OUTPATIENT)
Dept: OUTPATIENT SERVICES | Facility: HOSPITAL | Age: 39
LOS: 1 days | Discharge: HOME | End: 2024-07-11

## 2024-07-11 DIAGNOSIS — Z98.890 OTHER SPECIFIED POSTPROCEDURAL STATES: Chronic | ICD-10-CM

## 2024-07-11 DIAGNOSIS — I82.0 BUDD-CHIARI SYNDROME: ICD-10-CM

## 2024-07-11 DIAGNOSIS — Z95.828 PRESENCE OF OTHER VASCULAR IMPLANTS AND GRAFTS: Chronic | ICD-10-CM

## 2024-07-15 ENCOUNTER — RESULT REVIEW (OUTPATIENT)
Age: 39
End: 2024-07-15

## 2024-07-15 ENCOUNTER — APPOINTMENT (OUTPATIENT)
Dept: HEMATOLOGY ONCOLOGY | Facility: CLINIC | Age: 39
End: 2024-07-15
Payer: MEDICAID

## 2024-07-15 VITALS
HEART RATE: 82 BPM | RESPIRATION RATE: 16 BRPM | OXYGEN SATURATION: 96 % | SYSTOLIC BLOOD PRESSURE: 99 MMHG | TEMPERATURE: 98.6 F | BODY MASS INDEX: 18.75 KG/M2 | WEIGHT: 102.49 LBS | DIASTOLIC BLOOD PRESSURE: 68 MMHG

## 2024-07-15 LAB
BASOPHILS # BLD AUTO: 0.02 K/UL — SIGNIFICANT CHANGE UP (ref 0–0.2)
BASOPHILS NFR BLD AUTO: 0.5 % — SIGNIFICANT CHANGE UP (ref 0–2)
EOSINOPHIL # BLD AUTO: 0.15 K/UL — SIGNIFICANT CHANGE UP (ref 0–0.5)
EOSINOPHIL NFR BLD AUTO: 4 % — SIGNIFICANT CHANGE UP (ref 0–6)
FERRITIN SERPL-MCNC: 18 NG/ML
HCT VFR BLD CALC: 35 % — SIGNIFICANT CHANGE UP (ref 34.5–45)
HGB BLD-MCNC: 11.3 G/DL — LOW (ref 11.5–15.5)
IMM GRANULOCYTES NFR BLD AUTO: 0.3 % — SIGNIFICANT CHANGE UP (ref 0–0.9)
IRON SATN MFR SERPL: 7 %
IRON SERPL-MCNC: 26 UG/DL
LYMPHOCYTES # BLD AUTO: 1.04 K/UL — SIGNIFICANT CHANGE UP (ref 1–3.3)
LYMPHOCYTES # BLD AUTO: 27.8 % — SIGNIFICANT CHANGE UP (ref 13–44)
MCHC RBC-ENTMCNC: 28 PG — SIGNIFICANT CHANGE UP (ref 27–34)
MCHC RBC-ENTMCNC: 32.3 G/DL — SIGNIFICANT CHANGE UP (ref 32–36)
MCV RBC AUTO: 86.8 FL — SIGNIFICANT CHANGE UP (ref 80–100)
MONOCYTES # BLD AUTO: 0.41 K/UL — SIGNIFICANT CHANGE UP (ref 0–0.9)
MONOCYTES NFR BLD AUTO: 11 % — SIGNIFICANT CHANGE UP (ref 2–14)
NEUTROPHILS # BLD AUTO: 2.11 K/UL — SIGNIFICANT CHANGE UP (ref 1.8–7.4)
NEUTROPHILS NFR BLD AUTO: 56.4 % — SIGNIFICANT CHANGE UP (ref 43–77)
NRBC # BLD: 0 /100 WBCS — SIGNIFICANT CHANGE UP (ref 0–0)
PLATELET # BLD AUTO: 202 K/UL — SIGNIFICANT CHANGE UP (ref 150–400)
RBC # BLD: 4.03 M/UL — SIGNIFICANT CHANGE UP (ref 3.8–5.2)
RBC # FLD: 12.5 % — SIGNIFICANT CHANGE UP (ref 10.3–14.5)
TIBC SERPL-MCNC: 386 UG/DL
UIBC SERPL-MCNC: 360 UG/DL
WBC # BLD: 3.74 K/UL — LOW (ref 3.8–10.5)
WBC # FLD AUTO: 3.74 K/UL — LOW (ref 3.8–10.5)

## 2024-07-15 PROCEDURE — 99214 OFFICE O/P EST MOD 30 MIN: CPT

## 2024-07-15 RX ORDER — FERROUS GLUCONATE 324(38)MG
324 (38 FE) TABLET ORAL
Qty: 90 | Refills: 3 | Status: ACTIVE | COMMUNITY
Start: 2024-07-15 | End: 1900-01-01

## 2024-08-02 RX ORDER — PREDNISONE 20 MG/1
20 TABLET ORAL
Qty: 60 | Refills: 0 | Status: ACTIVE | COMMUNITY
Start: 2024-08-02 | End: 1900-01-01

## 2024-08-06 LAB
ALBUMIN SERPL ELPH-MCNC: 4.7 G/DL
ALP BLD-CCNC: 100 U/L
ALT SERPL-CCNC: 63 U/L
ANION GAP SERPL CALC-SCNC: 12 MMOL/L
AST SERPL-CCNC: 19 U/L
BILIRUB SERPL-MCNC: 0.4 MG/DL
BUN SERPL-MCNC: 24 MG/DL
CALCIUM SERPL-MCNC: 9.6 MG/DL
CHLORIDE SERPL-SCNC: 101 MMOL/L
CO2 SERPL-SCNC: 24 MMOL/L
CREAT SERPL-MCNC: 0.71 MG/DL
EGFR: 110 ML/MIN/1.73M2
GGT SERPL-CCNC: 71 U/L
GLUCOSE SERPL-MCNC: 92 MG/DL
POTASSIUM SERPL-SCNC: 4.1 MMOL/L
PROT SERPL-MCNC: 6.6 G/DL
SODIUM SERPL-SCNC: 137 MMOL/L
TACROLIMUS SERPL-MCNC: 5.4 NG/ML

## 2024-08-19 LAB
ALBUMIN SERPL ELPH-MCNC: 4.5 G/DL
ALP BLD-CCNC: 88 U/L
ALT SERPL-CCNC: 93 U/L
ANION GAP SERPL CALC-SCNC: 9 MMOL/L
AST SERPL-CCNC: 41 U/L
BASOPHILS # BLD AUTO: 0.03 K/UL
BASOPHILS NFR BLD AUTO: 0.4 %
BILIRUB SERPL-MCNC: 0.3 MG/DL
BUN SERPL-MCNC: 19 MG/DL
CALCIUM SERPL-MCNC: 9.7 MG/DL
CHLORIDE SERPL-SCNC: 103 MMOL/L
CO2 SERPL-SCNC: 28 MMOL/L
CREAT SERPL-MCNC: 0.69 MG/DL
EGFR: 112 ML/MIN/1.73M2
EOSINOPHIL # BLD AUTO: 0.11 K/UL
EOSINOPHIL NFR BLD AUTO: 1.5 %
GGT SERPL-CCNC: 108 U/L
GLUCOSE SERPL-MCNC: 96 MG/DL
HCT VFR BLD CALC: 40.4 %
HGB BLD-MCNC: 12 G/DL
IMM GRANULOCYTES NFR BLD AUTO: 0.7 %
LYMPHOCYTES # BLD AUTO: 2.31 K/UL
LYMPHOCYTES NFR BLD AUTO: 31.8 %
MAGNESIUM SERPL-MCNC: 2 MG/DL
MAN DIFF?: NORMAL
MCHC RBC-ENTMCNC: 26.5 PG
MCHC RBC-ENTMCNC: 29.7 GM/DL
MCV RBC AUTO: 89.2 FL
MONOCYTES # BLD AUTO: 0.38 K/UL
MONOCYTES NFR BLD AUTO: 5.2 %
NEUTROPHILS # BLD AUTO: 4.38 K/UL
NEUTROPHILS NFR BLD AUTO: 60.4 %
PHOSPHATE SERPL-MCNC: 4.2 MG/DL
PLATELET # BLD AUTO: 242 K/UL
POTASSIUM SERPL-SCNC: 4.6 MMOL/L
PROT SERPL-MCNC: 6.4 G/DL
RBC # BLD: 4.53 M/UL
RBC # FLD: 13.2 %
SODIUM SERPL-SCNC: 140 MMOL/L
TACROLIMUS SERPL-MCNC: 8.5 NG/ML
WBC # FLD AUTO: 7.26 K/UL

## 2024-08-20 LAB
CMV DNA SPEC QL NAA+PROBE: NOT DETECTED IU/ML
CMVPCR LOG: NOT DETECTED LOG10IU/ML

## 2024-08-21 NOTE — PROGRESS NOTE ADULT - ASSESSMENT
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 Pending IR guided thoracentesis and diagnostic paracentesis.  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 Pending IR guided thoracentesis and diagnostic paracentesis.

## 2024-08-23 RX ORDER — PREDNISONE 5 MG/1
5 TABLET ORAL
Qty: 180 | Refills: 3 | Status: ACTIVE | COMMUNITY
Start: 2024-08-23 | End: 1900-01-01

## 2024-08-26 LAB
ALBUMIN SERPL ELPH-MCNC: 4.6 G/DL
ALP BLD-CCNC: 78 U/L
ALT SERPL-CCNC: 72 U/L
ANION GAP SERPL CALC-SCNC: 12 MMOL/L
AST SERPL-CCNC: 36 U/L
BASOPHILS # BLD AUTO: 0.03 K/UL
BASOPHILS NFR BLD AUTO: 0.5 %
BILIRUB SERPL-MCNC: 0.4 MG/DL
BUN SERPL-MCNC: 19 MG/DL
CALCIUM SERPL-MCNC: 9.6 MG/DL
CHLORIDE SERPL-SCNC: 105 MMOL/L
CO2 SERPL-SCNC: 25 MMOL/L
CREAT SERPL-MCNC: 0.64 MG/DL
EGFR: 114 ML/MIN/1.73M2
EOSINOPHIL # BLD AUTO: 0.12 K/UL
EOSINOPHIL NFR BLD AUTO: 1.8 %
GGT SERPL-CCNC: 84 U/L
GLUCOSE SERPL-MCNC: 87 MG/DL
HCT VFR BLD CALC: 41.7 %
HGB BLD-MCNC: 12.5 G/DL
IMM GRANULOCYTES NFR BLD AUTO: 0.3 %
LYMPHOCYTES # BLD AUTO: 2.14 K/UL
LYMPHOCYTES NFR BLD AUTO: 32.6 %
MAGNESIUM SERPL-MCNC: 2 MG/DL
MAN DIFF?: NORMAL
MCHC RBC-ENTMCNC: 26.8 PG
MCHC RBC-ENTMCNC: 30 GM/DL
MCV RBC AUTO: 89.3 FL
MONOCYTES # BLD AUTO: 0.4 K/UL
MONOCYTES NFR BLD AUTO: 6.1 %
NEUTROPHILS # BLD AUTO: 3.85 K/UL
NEUTROPHILS NFR BLD AUTO: 58.7 %
PHOSPHATE SERPL-MCNC: 3.2 MG/DL
PLATELET # BLD AUTO: 206 K/UL
POTASSIUM SERPL-SCNC: 4.3 MMOL/L
PROT SERPL-MCNC: 6.5 G/DL
RBC # BLD: 4.67 M/UL
RBC # FLD: 13.7 %
SODIUM SERPL-SCNC: 142 MMOL/L
TACROLIMUS SERPL-MCNC: 8.2 NG/ML
WBC # FLD AUTO: 6.56 K/UL

## 2024-09-02 ENCOUNTER — RX RENEWAL (OUTPATIENT)
Age: 39
End: 2024-09-02

## 2024-09-02 RX ORDER — PREDNISONE 10 MG/1
10 TABLET ORAL DAILY
Qty: 30 | Refills: 2 | Status: ACTIVE | COMMUNITY
Start: 2024-09-02 | End: 1900-01-01

## 2024-09-03 DIAGNOSIS — C22.0 LIVER CELL CARCINOMA: ICD-10-CM

## 2024-09-09 ENCOUNTER — LABORATORY RESULT (OUTPATIENT)
Age: 39
End: 2024-09-09

## 2024-09-16 ENCOUNTER — LABORATORY RESULT (OUTPATIENT)
Age: 39
End: 2024-09-16

## 2024-09-18 DIAGNOSIS — Z94.4 LIVER TRANSPLANT STATUS: ICD-10-CM

## 2024-09-22 ENCOUNTER — OUTPATIENT (OUTPATIENT)
Dept: OUTPATIENT SERVICES | Facility: HOSPITAL | Age: 39
LOS: 1 days | Discharge: HOME | End: 2024-09-22

## 2024-09-22 DIAGNOSIS — I82.0 BUDD-CHIARI SYNDROME: ICD-10-CM

## 2024-09-22 DIAGNOSIS — Z98.890 OTHER SPECIFIED POSTPROCEDURAL STATES: Chronic | ICD-10-CM

## 2024-09-22 DIAGNOSIS — Z95.828 PRESENCE OF OTHER VASCULAR IMPLANTS AND GRAFTS: Chronic | ICD-10-CM

## 2024-09-22 NOTE — ED CLERICAL - DIVISION
Pt arrives ambulatory through triage c/o sore throat, cough, runny nose, fever, chills, and nausea since 9/19. Denies known sick contacts. Took tylenol for pain/fever last night.  
[facility]...

## 2024-09-30 DIAGNOSIS — Z94.4 LIVER TRANSPLANT STATUS: ICD-10-CM

## 2024-10-01 ENCOUNTER — APPOINTMENT (OUTPATIENT)
Dept: HEMATOLOGY ONCOLOGY | Facility: CLINIC | Age: 39
End: 2024-10-01
Payer: MEDICAID

## 2024-10-01 ENCOUNTER — RESULT REVIEW (OUTPATIENT)
Age: 39
End: 2024-10-01

## 2024-10-01 VITALS
TEMPERATURE: 97.9 F | HEART RATE: 78 BPM | DIASTOLIC BLOOD PRESSURE: 82 MMHG | OXYGEN SATURATION: 95 % | WEIGHT: 101.41 LBS | BODY MASS INDEX: 18.55 KG/M2 | SYSTOLIC BLOOD PRESSURE: 115 MMHG | RESPIRATION RATE: 15 BRPM

## 2024-10-01 DIAGNOSIS — D50.9 IRON DEFICIENCY ANEMIA, UNSPECIFIED: ICD-10-CM

## 2024-10-01 DIAGNOSIS — I82.0 BUDD-CHIARI SYNDROME: ICD-10-CM

## 2024-10-01 LAB
FERRITIN SERPL-MCNC: 34 NG/ML
FOLATE SERPL-MCNC: >20 NG/ML
IRON SATN MFR SERPL: 25 %
IRON SERPL-MCNC: 90 UG/DL
RETICS #: 87.6 K/UL — SIGNIFICANT CHANGE UP (ref 25–125)
RETICS/RBC NFR: 1.8 % — SIGNIFICANT CHANGE UP (ref 0.5–2.5)
TIBC SERPL-MCNC: 362 UG/DL
UIBC SERPL-MCNC: 271 UG/DL
VIT B12 SERPL-MCNC: 939 PG/ML

## 2024-10-01 PROCEDURE — 99214 OFFICE O/P EST MOD 30 MIN: CPT

## 2024-10-01 PROCEDURE — T1013A: CUSTOM

## 2024-10-03 NOTE — ASSESSMENT
[FreeTextEntry1] : This is a 38 year old woman with a history of cirrhosis, portal hypertension, MRI findings that show a heterogenous area in the liver with branches of the hepatic vein that are sclerosed, difficult to visualize suggestive of history of hepatic vein thrombosis in the past, possible underlying Budd Chiari syndrome. No other explanations for cirrhosis known currently. Much of this history took place in [facility]. Patient was able to bring records from the evaluation in [facility] as well as earlier evaluation in 2017 at [facility]. After several repeats, Protein S antigen improved to normal at 68%. Does not appear consistent with congenital Protein S deficiency. Patient had a normal ATIII activity 90% prior to the thrombosis of the IVC. Patient was anticoagulated on Eliquis for a period of a year until transitioned to coumadin just prior to the liver transplant. Patient now s/p liver transplant 9/25/23. Patient doing well post transplant. Remains on the Eliquis 5mg BID with no evidence of recurrent thromboses.  Liver US with Doppler from 5/8/24 showed patent vessels. Will discuss with transplant hepatology first about possibly decreasing Eliquis to 2.5mg BID. Given her prior thrombotic history, still want to keep her on at least prophylactic AC.  RTC 3 months  Patient seen and examined with Dr. Venegas.   Rudy Anglin M.D. Hematology/Oncology Fellow PGY-6 [facility]   I discussed this patient in a pre-clinic session with the fellow including review of clinical status and last labs.  I also saw the patient and discussed history, completed an exam and discussed the plan together with the fellow.  Total time spent today on this patient   36   minutes.  This is a 38 year old woman with a history of a hepatic vein thrombosis, Bud Chiari syndrome, HCC now s/p liver transplant. Most recently we have been following for long term anticoagulation and iron deficiency anemia form menorrhagia exacerbated by the presence of ether anticoagulation .Will discuss with transplant and hepatology teams about the prospect of decreasing Eliquis to the 2.5mg PO BID dosing for prophylaxis, doing so may decrease the menorrhagia experienced and limit the iron defeicny anemia.

## 2024-10-03 NOTE — PHYSICAL EXAM
[Normal] : grossly intact [de-identified] : Abdominal scar from liver transplantation healed well

## 2024-10-03 NOTE — ASSESSMENT
[FreeTextEntry1] : This is a 38 year old woman with a history of cirrhosis, portal hypertension, MRI findings that show a heterogenous area in the liver with branches of the hepatic vein that are sclerosed, difficult to visualize suggestive of history of hepatic vein thrombosis in the past, possible underlying Budd Chiari syndrome. No other explanations for cirrhosis known currently. Much of this history took place in [facility]. Patient was able to bring records from the evaluation in [facility] as well as earlier evaluation in 2017 at [facility]. After several repeats, Protein S antigen improved to normal at 68%. Does not appear consistent with congenital Protein S deficiency. Patient had a normal ATIII activity 90% prior to the thrombosis of the IVC. Patient was anticoagulated on Eliquis for a period of a year until transitioned to coumadin just prior to the liver transplant. Patient now s/p liver transplant 9/25/23. Patient doing well post transplant. Remains on the Eliquis 5mg BID with no evidence of recurrent thromboses.  Liver US with Doppler from 5/8/24 showed patent vessels. Will discuss with transplant hepatology first about possibly decreasing Eliquis to 2.5mg BID. Given her prior thrombotic history, still want to keep her on at least prophylactic AC.  RTC 3 months  Patient seen and examined with Dr. Venegas.   Rudy Anglin M.D. Hematology/Oncology Fellow PGY-6 [facility]   I discussed this patient in a pre-clinic session with the fellow including review of clinical status and last labs.  I also saw the patient and discussed history, completed an exam and discussed the plan together with the fellow.  Total time spent today on this patient   36   minutes.  This is a 38 year old woman with a history of a hepatic vein thrombosis, Bud Chiari syndrome, HCC now s/p liver transplant. Most recently we have been following for long term anticoagulation and iron deficiency anemia form menorrhagia exacerbated by the presence of ether anticoagulation .Will discuss with transplant and hepatology teams about the prospect of decreasing Eliquis to the 2.5mg PO BID dosing for prophylaxis, doing so may decrease the menorrhagia experienced and limit the iron defeicny anemia.

## 2024-10-03 NOTE — HISTORY OF PRESENT ILLNESS
[de-identified] : This is a 38 year old woman with history of portal vein thrombosis hepatocellular cancer.  Patient s/p Liver transplant this past 9/25.  Had no complicatins initially, was started on Eliquis rather than the prior coumadin given transplant had been accomplished, no reason to use the reversible agent anymore.  Patient did go on to experience some delated  Following surgery ,feeling much better, ahs not had any abnormal bleeding.   mild microcytic anemia, Hg 10.6g/dl 78.1FL. MCV.   Patient not currently on an iron pill.   Previous MCV post transplant were normal at 81-83 Hg 11.2g/dl.    Due to history of portal vein thrombosis and Budd Chiari, would only consider discontinuation of the Eliquis 5mg BID for any severe active bleeding.   Continue long term anticoagulation.   [de-identified] : Patient returns for follow up on her history of HCC, Bud-Chiari syndrome s/p liver transplant. After her transplant, she has been taking Eliquis 5mg BID. Reporting pain in the upper abdominal pain that intermittently bothers her. Not associated with eating. Patient remains on tacrolimus and MMF rejection prophylaxis. She is having very heavy menstrual periods, likely due to taking both Eliquis and ASA 81mg. She inquired as to whether she still needs to take ferrous gluconate BID.

## 2024-10-03 NOTE — HISTORY OF PRESENT ILLNESS
[de-identified] : This is a 38 year old woman with history of portal vein thrombosis hepatocellular cancer.  Patient s/p Liver transplant this past 9/25.  Had no complicatins initially, was started on Eliquis rather than the prior coumadin given transplant had been accomplished, no reason to use the reversible agent anymore.  Patient did go on to experience some delated  Following surgery ,feeling much better, ahs not had any abnormal bleeding.   mild microcytic anemia, Hg 10.6g/dl 78.1FL. MCV.   Patient not currently on an iron pill.   Previous MCV post transplant were normal at 81-83 Hg 11.2g/dl.    Due to history of portal vein thrombosis and Budd Chiari, would only consider discontinuation of the Eliquis 5mg BID for any severe active bleeding.   Continue long term anticoagulation.   [de-identified] : Patient returns for follow up on her history of HCC, Bud-Chiari syndrome s/p liver transplant. After her transplant, she has been taking Eliquis 5mg BID. Reporting pain in the upper abdominal pain that intermittently bothers her. Not associated with eating. Patient remains on tacrolimus and MMF rejection prophylaxis. She is having very heavy menstrual periods, likely due to taking both Eliquis and ASA 81mg. She inquired as to whether she still needs to take ferrous gluconate BID.

## 2024-10-03 NOTE — REVIEW OF SYSTEMS
[Diarrhea: Grade 0] : Diarrhea: Grade 0 [Negative] : Allergic/Immunologic [FreeTextEntry8] : Heavy menses

## 2024-10-03 NOTE — PHYSICAL EXAM
[Normal] : grossly intact [de-identified] : Abdominal scar from liver transplantation healed well

## 2024-10-03 NOTE — REASON FOR VISIT
[Follow-Up Visit] : a follow-up visit for [[employer] ] : provided by [employer]   [Time Spent: ____ minutes] : Total time spent using  services: [unfilled] minutes. The patient's primary language is not English thus required  services. [Interpreters_IDNumber] : [id number] [Interpreters_FullName] : Dot [TWNoteComboBox1] : Sudanese

## 2024-10-03 NOTE — REASON FOR VISIT
[Follow-Up Visit] : a follow-up visit for [[employer] ] : provided by [employer]   [Time Spent: ____ minutes] : Total time spent using  services: [unfilled] minutes. The patient's primary language is not English thus required  services. [Interpreters_IDNumber] : [id number] [Interpreters_FullName] : Dot [TWNoteComboBox1] : Zimbabwean

## 2024-10-09 ENCOUNTER — APPOINTMENT (OUTPATIENT)
Dept: HEPATOLOGY | Facility: CLINIC | Age: 39
End: 2024-10-09

## 2024-10-15 ENCOUNTER — APPOINTMENT (OUTPATIENT)
Dept: HEPATOLOGY | Facility: CLINIC | Age: 39
End: 2024-10-15
Payer: MEDICAID

## 2024-10-15 VITALS
BODY MASS INDEX: 19.51 KG/M2 | HEART RATE: 94 BPM | HEIGHT: 62 IN | SYSTOLIC BLOOD PRESSURE: 120 MMHG | WEIGHT: 106 LBS | OXYGEN SATURATION: 97 % | RESPIRATION RATE: 16 BRPM | DIASTOLIC BLOOD PRESSURE: 82 MMHG | TEMPERATURE: 96.3 F

## 2024-10-15 DIAGNOSIS — Z94.4 LIVER TRANSPLANT STATUS: ICD-10-CM

## 2024-10-15 DIAGNOSIS — R10.13 EPIGASTRIC PAIN: ICD-10-CM

## 2024-10-15 PROCEDURE — 99204 OFFICE O/P NEW MOD 45 MIN: CPT

## 2024-10-19 LAB
ENTEROPATHOGENIC E. COLI (EPEC): DETECTED
GI PCR PANEL: DETECTED

## 2024-10-20 LAB
BACTERIA STL CULT: NORMAL
H PYLORI AG STL QL: POSITIVE

## 2024-10-21 LAB — DEPRECATED O AND P PREP STL: NORMAL

## 2024-10-29 ENCOUNTER — APPOINTMENT (OUTPATIENT)
Dept: DERMATOLOGY | Facility: CLINIC | Age: 39
End: 2024-10-29
Payer: MEDICAID

## 2024-10-29 PROCEDURE — 99203 OFFICE O/P NEW LOW 30 MIN: CPT

## 2024-11-01 DIAGNOSIS — Z94.4 LIVER TRANSPLANT STATUS: ICD-10-CM

## 2024-11-03 NOTE — PROGRESS NOTE ADULT - ASSESSMENT
Follow up with the primary care provider in one week.  Present immediately back to the urgent care clinic or emergency room if any worsening, additional concerns or new symptoms.      Patient is a 35F with hx of cryptogenic cirrhosis, portal hypertension, Budd Chiari syndrome, Protein S deficiency, recent attempted TIPS but unable to perform 2/2 intrahepatic IVC occlusion, s/p TIPS and thoracentesis. Post-procedure on phenylephrine, admitted to SICU for hemodynamic monitoring    NEURO:  - Monitor mental status  - Pain control with tylenol, oxy prn    RESPIRATORY: hx of right hydrothorax requiring frequent thoracenthesis  - IS / OOBTC / ambulation as tolerated to prevent atelectasis  - IR thoracentesis on 05/13/21: removed 1200cc  -follow up CXR    CARDIOVASCULAR: hypotension  - require intermittend norsynephrine drip  -cbc stable, no signs of infection so far  -episodic chest pain: however on work up EKG no ST elevation and cardiac enzymes normal   - On 10mg midodrine q8hrs for hypotension    GI/NUTRITION: cryptogenic cirrhosis, portal hypertension, Budd Chiari syndrome s/p TIPS  - Regular,   - Protonix  - Bowel regimen with senna and miralax    GENITOURINARY/RENAL:  - Monitor I/Os, [place] in place  - Monitor and replete electrolytes  - during day given  bolus of 250c 5% albumin  -lasix with holidng parameters      HEMATOLOGIC: Protein S deficiency, hx of intrahepatic IVC  - Heparin gtt for IVC thrombus on  hold still for IR thora, f/u with prim team when to resume  - Monitor H/H    INFECTIOUS DISEASE:  - Ceftriaxone for SBP ppx  - Monitor WBC and temperature    ENDOCRINE:  - Monitor BMP glucose

## 2024-11-22 DIAGNOSIS — Z94.4 LIVER TRANSPLANT STATUS: ICD-10-CM

## 2024-11-22 LAB
ALBUMIN SERPL ELPH-MCNC: 4.7 G/DL
ALP BLD-CCNC: 99 U/L
ALT SERPL-CCNC: 60 U/L
ANION GAP SERPL CALC-SCNC: 12 MMOL/L
AST SERPL-CCNC: 34 U/L
BASOPHILS # BLD AUTO: 0.02 K/UL
BASOPHILS NFR BLD AUTO: 0.3 %
BILIRUB SERPL-MCNC: 0.4 MG/DL
BUN SERPL-MCNC: 19 MG/DL
CALCIUM SERPL-MCNC: 10 MG/DL
CHLORIDE SERPL-SCNC: 106 MMOL/L
CO2 SERPL-SCNC: 24 MMOL/L
CREAT SERPL-MCNC: 0.7 MG/DL
EGFR: 112 ML/MIN/1.73M2
EOSINOPHIL # BLD AUTO: 0.18 K/UL
EOSINOPHIL NFR BLD AUTO: 2.8 %
GGT SERPL-CCNC: 112 U/L
GLUCOSE SERPL-MCNC: 107 MG/DL
HCT VFR BLD CALC: 43.5 %
HGB BLD-MCNC: 13.9 G/DL
IMM GRANULOCYTES NFR BLD AUTO: 0.3 %
LYMPHOCYTES # BLD AUTO: 1.96 K/UL
LYMPHOCYTES NFR BLD AUTO: 30.7 %
MAN DIFF?: NORMAL
MCHC RBC-ENTMCNC: 28.3 PG
MCHC RBC-ENTMCNC: 32 G/DL
MCV RBC AUTO: 88.4 FL
MONOCYTES # BLD AUTO: 0.5 K/UL
MONOCYTES NFR BLD AUTO: 7.8 %
NEUTROPHILS # BLD AUTO: 3.71 K/UL
NEUTROPHILS NFR BLD AUTO: 58.1 %
PLATELET # BLD AUTO: 238 K/UL
POTASSIUM SERPL-SCNC: 5.5 MMOL/L
PROT SERPL-MCNC: 6.6 G/DL
RBC # BLD: 4.92 M/UL
RBC # FLD: 13 %
SODIUM SERPL-SCNC: 141 MMOL/L
TACROLIMUS SERPL-MCNC: 10.4 NG/ML
WBC # FLD AUTO: 6.39 K/UL

## 2024-12-02 ENCOUNTER — OUTPATIENT (OUTPATIENT)
Dept: OUTPATIENT SERVICES | Facility: HOSPITAL | Age: 39
LOS: 1 days | Discharge: HOME | End: 2024-12-02

## 2024-12-02 DIAGNOSIS — Z98.890 OTHER SPECIFIED POSTPROCEDURAL STATES: Chronic | ICD-10-CM

## 2024-12-02 DIAGNOSIS — Z95.828 PRESENCE OF OTHER VASCULAR IMPLANTS AND GRAFTS: Chronic | ICD-10-CM

## 2024-12-02 DIAGNOSIS — I82.0 BUDD-CHIARI SYNDROME: ICD-10-CM

## 2024-12-04 ENCOUNTER — APPOINTMENT (OUTPATIENT)
Dept: ULTRASOUND IMAGING | Facility: CLINIC | Age: 39
End: 2024-12-04
Payer: MEDICAID

## 2024-12-04 ENCOUNTER — OUTPATIENT (OUTPATIENT)
Dept: OUTPATIENT SERVICES | Facility: HOSPITAL | Age: 39
LOS: 1 days | End: 2024-12-04
Payer: MEDICAID

## 2024-12-04 DIAGNOSIS — Z98.890 OTHER SPECIFIED POSTPROCEDURAL STATES: Chronic | ICD-10-CM

## 2024-12-04 DIAGNOSIS — Z00.8 ENCOUNTER FOR OTHER GENERAL EXAMINATION: ICD-10-CM

## 2024-12-04 DIAGNOSIS — Z95.828 PRESENCE OF OTHER VASCULAR IMPLANTS AND GRAFTS: Chronic | ICD-10-CM

## 2024-12-04 DIAGNOSIS — Z94.4 LIVER TRANSPLANT STATUS: ICD-10-CM

## 2024-12-04 PROCEDURE — 76705 ECHO EXAM OF ABDOMEN: CPT | Mod: 26,59

## 2024-12-04 PROCEDURE — 76705 ECHO EXAM OF ABDOMEN: CPT

## 2024-12-04 PROCEDURE — 93975 VASCULAR STUDY: CPT

## 2024-12-04 PROCEDURE — 93975 VASCULAR STUDY: CPT | Mod: 26

## 2024-12-05 DIAGNOSIS — Z94.4 LIVER TRANSPLANT STATUS: ICD-10-CM

## 2024-12-09 ENCOUNTER — APPOINTMENT (OUTPATIENT)
Dept: MRI IMAGING | Facility: CLINIC | Age: 39
End: 2024-12-09
Payer: MEDICAID

## 2024-12-09 ENCOUNTER — OUTPATIENT (OUTPATIENT)
Dept: OUTPATIENT SERVICES | Facility: HOSPITAL | Age: 39
LOS: 1 days | End: 2024-12-09
Payer: MEDICAID

## 2024-12-09 ENCOUNTER — APPOINTMENT (OUTPATIENT)
Dept: CT IMAGING | Facility: CLINIC | Age: 39
End: 2024-12-09
Payer: MEDICAID

## 2024-12-09 DIAGNOSIS — Z94.4 LIVER TRANSPLANT STATUS: ICD-10-CM

## 2024-12-09 DIAGNOSIS — Z98.890 OTHER SPECIFIED POSTPROCEDURAL STATES: Chronic | ICD-10-CM

## 2024-12-09 DIAGNOSIS — Z95.828 PRESENCE OF OTHER VASCULAR IMPLANTS AND GRAFTS: Chronic | ICD-10-CM

## 2024-12-09 DIAGNOSIS — Z00.8 ENCOUNTER FOR OTHER GENERAL EXAMINATION: ICD-10-CM

## 2024-12-09 PROCEDURE — 71250 CT THORAX DX C-: CPT

## 2024-12-09 PROCEDURE — 71250 CT THORAX DX C-: CPT | Mod: 26

## 2024-12-10 ENCOUNTER — APPOINTMENT (OUTPATIENT)
Dept: HEMATOLOGY ONCOLOGY | Facility: CLINIC | Age: 39
End: 2024-12-10
Payer: MEDICAID

## 2024-12-10 ENCOUNTER — RESULT REVIEW (OUTPATIENT)
Age: 39
End: 2024-12-10

## 2024-12-10 VITALS
DIASTOLIC BLOOD PRESSURE: 63 MMHG | HEIGHT: 62 IN | TEMPERATURE: 97.4 F | BODY MASS INDEX: 18.7 KG/M2 | WEIGHT: 101.62 LBS | HEART RATE: 84 BPM | RESPIRATION RATE: 17 BRPM | SYSTOLIC BLOOD PRESSURE: 97 MMHG | OXYGEN SATURATION: 98 %

## 2024-12-10 LAB
RETICS #: 101 K/UL — SIGNIFICANT CHANGE UP (ref 25–125)
RETICS/RBC NFR: 2.3 % — SIGNIFICANT CHANGE UP (ref 0.5–2.5)

## 2024-12-10 PROCEDURE — 99214 OFFICE O/P EST MOD 30 MIN: CPT

## 2024-12-13 LAB
FERRITIN SERPL-MCNC: 28 NG/ML
FOLATE SERPL-MCNC: 16.1 NG/ML
IRON SATN MFR SERPL: 22 %
IRON SERPL-MCNC: 86 UG/DL
LDH SERPL-CCNC: 166 U/L
TIBC SERPL-MCNC: 386 UG/DL
UIBC SERPL-MCNC: 299 UG/DL
VIT B12 SERPL-MCNC: 990 PG/ML

## 2024-12-16 ENCOUNTER — NON-APPOINTMENT (OUTPATIENT)
Age: 39
End: 2024-12-16

## 2024-12-17 ENCOUNTER — APPOINTMENT (OUTPATIENT)
Dept: HEPATOLOGY | Facility: CLINIC | Age: 39
End: 2024-12-17
Payer: MEDICAID

## 2024-12-17 VITALS
DIASTOLIC BLOOD PRESSURE: 67 MMHG | RESPIRATION RATE: 17 BRPM | OXYGEN SATURATION: 97 % | TEMPERATURE: 97.9 F | HEART RATE: 84 BPM | SYSTOLIC BLOOD PRESSURE: 105 MMHG | HEIGHT: 62 IN | WEIGHT: 105 LBS | BODY MASS INDEX: 19.32 KG/M2

## 2024-12-17 DIAGNOSIS — Z94.4 LIVER TRANSPLANT STATUS: ICD-10-CM

## 2024-12-17 PROCEDURE — 99214 OFFICE O/P EST MOD 30 MIN: CPT

## 2024-12-30 RX ORDER — VALGANCICLOVIR HYDROCHLORIDE 450 MG/1
450 TABLET ORAL
Qty: 30 | Refills: 1 | Status: ACTIVE | COMMUNITY
Start: 2024-12-30 | End: 1900-01-01

## 2024-12-30 RX ORDER — SULFAMETHOXAZOLE AND TRIMETHOPRIM 400; 80 MG/1; MG/1
400-80 TABLET ORAL DAILY
Qty: 30 | Refills: 1 | Status: ACTIVE | COMMUNITY
Start: 2024-12-30 | End: 1900-01-01

## 2024-12-30 RX ORDER — PREDNISONE 20 MG/1
20 TABLET ORAL
Qty: 10 | Refills: 0 | Status: ACTIVE | COMMUNITY
Start: 2024-12-30 | End: 1900-01-01

## 2024-12-30 RX ORDER — NYSTATIN 100000 [USP'U]/ML
100000 SUSPENSION ORAL 4 TIMES DAILY
Qty: 280 | Refills: 0 | Status: ACTIVE | COMMUNITY
Start: 2024-12-30 | End: 1900-01-01

## 2024-12-30 RX ORDER — PREDNISONE 10 MG/1
10 TABLET ORAL
Qty: 30 | Refills: 1 | Status: ACTIVE | COMMUNITY
Start: 2024-12-30 | End: 1900-01-01

## 2024-12-31 ENCOUNTER — OUTPATIENT (OUTPATIENT)
Dept: OUTPATIENT SERVICES | Facility: HOSPITAL | Age: 39
LOS: 1 days | End: 2024-12-31

## 2024-12-31 ENCOUNTER — APPOINTMENT (OUTPATIENT)
Dept: MRI IMAGING | Facility: CLINIC | Age: 39
End: 2024-12-31
Payer: COMMERCIAL

## 2024-12-31 DIAGNOSIS — Z95.828 PRESENCE OF OTHER VASCULAR IMPLANTS AND GRAFTS: Chronic | ICD-10-CM

## 2024-12-31 DIAGNOSIS — Z94.4 LIVER TRANSPLANT STATUS: ICD-10-CM

## 2024-12-31 DIAGNOSIS — Z98.890 OTHER SPECIFIED POSTPROCEDURAL STATES: Chronic | ICD-10-CM

## 2024-12-31 PROCEDURE — 74183 MRI ABD W/O CNTR FLWD CNTR: CPT | Mod: 26

## 2025-01-07 LAB
ALBUMIN SERPL ELPH-MCNC: 4.5 G/DL
ALP BLD-CCNC: 146 U/L
ALT SERPL-CCNC: 110 U/L
ANION GAP SERPL CALC-SCNC: 9 MMOL/L
AST SERPL-CCNC: 39 U/L
BASOPHILS # BLD AUTO: 0.04 K/UL
BASOPHILS NFR BLD AUTO: 0.5 %
BILIRUB SERPL-MCNC: 0.2 MG/DL
BUN SERPL-MCNC: 13 MG/DL
CALCIUM SERPL-MCNC: 9.9 MG/DL
CHLORIDE SERPL-SCNC: 103 MMOL/L
CMV DNA SPEC QL NAA+PROBE: NOT DETECTED IU/ML
CMVPCR LOG: NOT DETECTED LOG10IU/ML
CO2 SERPL-SCNC: 26 MMOL/L
CREAT SERPL-MCNC: 0.62 MG/DL
EGFR: 115 ML/MIN/1.73M2
EOSINOPHIL # BLD AUTO: 0.08 K/UL
EOSINOPHIL NFR BLD AUTO: 1.1 %
GGT SERPL-CCNC: 107 U/L
GLUCOSE SERPL-MCNC: 98 MG/DL
HCT VFR BLD CALC: 40 %
HGB BLD-MCNC: 12.6 G/DL
IMM GRANULOCYTES NFR BLD AUTO: 0.7 %
INR PPP: 1.1 RATIO
LYMPHOCYTES # BLD AUTO: 2.29 K/UL
LYMPHOCYTES NFR BLD AUTO: 31.4 %
MAN DIFF?: NORMAL
MCHC RBC-ENTMCNC: 27.3 PG
MCHC RBC-ENTMCNC: 31.5 G/DL
MCV RBC AUTO: 86.6 FL
MONOCYTES # BLD AUTO: 0.46 K/UL
MONOCYTES NFR BLD AUTO: 6.3 %
NEUTROPHILS # BLD AUTO: 4.38 K/UL
NEUTROPHILS NFR BLD AUTO: 60 %
PLATELET # BLD AUTO: 299 K/UL
POTASSIUM SERPL-SCNC: 4.6 MMOL/L
PROT SERPL-MCNC: 6.9 G/DL
PT BLD: 13.1 SEC
RBC # BLD: 4.62 M/UL
RBC # FLD: 11.6 %
SODIUM SERPL-SCNC: 139 MMOL/L
TACROLIMUS SERPL-MCNC: 4.6 NG/ML
WBC # FLD AUTO: 7.3 K/UL

## 2025-01-14 ENCOUNTER — NON-APPOINTMENT (OUTPATIENT)
Age: 40
End: 2025-01-14

## 2025-01-14 ENCOUNTER — EMERGENCY (EMERGENCY)
Facility: HOSPITAL | Age: 40
LOS: 1 days | Discharge: HOME | End: 2025-01-14
Attending: STUDENT IN AN ORGANIZED HEALTH CARE EDUCATION/TRAINING PROGRAM
Payer: MEDICARE

## 2025-01-14 VITALS
HEART RATE: 106 BPM | HEIGHT: 62 IN | WEIGHT: 92.59 LBS | SYSTOLIC BLOOD PRESSURE: 102 MMHG | TEMPERATURE: 98 F | DIASTOLIC BLOOD PRESSURE: 71 MMHG | RESPIRATION RATE: 20 BRPM | OXYGEN SATURATION: 97 %

## 2025-01-14 DIAGNOSIS — Z98.890 OTHER SPECIFIED POSTPROCEDURAL STATES: Chronic | ICD-10-CM

## 2025-01-14 DIAGNOSIS — Z95.828 PRESENCE OF OTHER VASCULAR IMPLANTS AND GRAFTS: Chronic | ICD-10-CM

## 2025-01-14 PROCEDURE — 99285 EMERGENCY DEPT VISIT HI MDM: CPT

## 2025-01-14 NOTE — ED ADULT NURSE NOTE - PRO INTERPRETER NEED 2
Patient with complaint of left ear pain and sore throat, with ear pain rated 10/10 and patient is sobbing due to discomfort.  Otoscopic examination is required due to level of complaint, therefore patient will attempt to attend [facility] tonight and if unable, will attend ER for face to face care.  No service provided.   English

## 2025-01-14 NOTE — ED ADULT NURSE NOTE - OBJECTIVE STATEMENT
Pt is a 39 y.o female c.o cold sx. Pt is A&Ox4 resting in stretcher. Pt endorses runny nose, cough and chest pain x the past 3 weeks.  Pt was treated by PCP with minimal relief. Pt states the pain and discomfort worsened today which prompted the visit to the ED. Spontaneously breathing on RA>95%, CM NSR, peripheral pulses present, skin dry and intact, PERRLA. PT denies any SOB, N/V/D, fever, chills, dysuria or hematuria, or recent travel. Safety and comfort measures initiated- bed placed in lowest position and side rails raised.

## 2025-01-15 VITALS
RESPIRATION RATE: 21 BRPM | HEART RATE: 87 BPM | OXYGEN SATURATION: 98 % | DIASTOLIC BLOOD PRESSURE: 76 MMHG | SYSTOLIC BLOOD PRESSURE: 108 MMHG | TEMPERATURE: 99 F

## 2025-01-15 LAB
ALBUMIN SERPL ELPH-MCNC: 3.7 G/DL — SIGNIFICANT CHANGE UP (ref 3.3–5)
ALBUMIN SERPL ELPH-MCNC: 4.1 G/DL — SIGNIFICANT CHANGE UP (ref 3.3–5)
ALP SERPL-CCNC: 104 U/L — SIGNIFICANT CHANGE UP (ref 40–120)
ALP SERPL-CCNC: 94 U/L — SIGNIFICANT CHANGE UP (ref 40–120)
ALT FLD-CCNC: 71 U/L — HIGH (ref 10–45)
ALT FLD-CCNC: 83 U/L — HIGH (ref 10–45)
ANION GAP SERPL CALC-SCNC: 11 MMOL/L — SIGNIFICANT CHANGE UP (ref 5–17)
ANION GAP SERPL CALC-SCNC: 13 MMOL/L — SIGNIFICANT CHANGE UP (ref 5–17)
APTT BLD: 34.8 SEC — SIGNIFICANT CHANGE UP (ref 24.5–35.6)
AST SERPL-CCNC: 37 U/L — SIGNIFICANT CHANGE UP (ref 10–40)
AST SERPL-CCNC: 54 U/L — HIGH (ref 10–40)
BASOPHILS # BLD AUTO: 0.01 K/UL — SIGNIFICANT CHANGE UP (ref 0–0.2)
BASOPHILS NFR BLD AUTO: 0.3 % — SIGNIFICANT CHANGE UP (ref 0–2)
BILIRUB SERPL-MCNC: 0.3 MG/DL — SIGNIFICANT CHANGE UP (ref 0.2–1.2)
BILIRUB SERPL-MCNC: 0.3 MG/DL — SIGNIFICANT CHANGE UP (ref 0.2–1.2)
BUN SERPL-MCNC: 12 MG/DL — SIGNIFICANT CHANGE UP (ref 7–23)
BUN SERPL-MCNC: 14 MG/DL — SIGNIFICANT CHANGE UP (ref 7–23)
CALCIUM SERPL-MCNC: 8.4 MG/DL — SIGNIFICANT CHANGE UP (ref 8.4–10.5)
CALCIUM SERPL-MCNC: 9 MG/DL — SIGNIFICANT CHANGE UP (ref 8.4–10.5)
CHLORIDE SERPL-SCNC: 102 MMOL/L — SIGNIFICANT CHANGE UP (ref 96–108)
CHLORIDE SERPL-SCNC: 104 MMOL/L — SIGNIFICANT CHANGE UP (ref 96–108)
CO2 SERPL-SCNC: 20 MMOL/L — LOW (ref 22–31)
CO2 SERPL-SCNC: 20 MMOL/L — LOW (ref 22–31)
CREAT SERPL-MCNC: 0.47 MG/DL — LOW (ref 0.5–1.3)
CREAT SERPL-MCNC: 0.5 MG/DL — SIGNIFICANT CHANGE UP (ref 0.5–1.3)
CRP SERPL-MCNC: 16 MG/L — HIGH (ref 0–4)
D DIMER BLD IA.RAPID-MCNC: 151 NG/ML DDU — SIGNIFICANT CHANGE UP
EGFR: 121 ML/MIN/1.73M2 — SIGNIFICANT CHANGE UP
EGFR: 123 ML/MIN/1.73M2 — SIGNIFICANT CHANGE UP
EOSINOPHIL # BLD AUTO: 0 K/UL — SIGNIFICANT CHANGE UP (ref 0–0.5)
EOSINOPHIL NFR BLD AUTO: 0 % — SIGNIFICANT CHANGE UP (ref 0–6)
GLUCOSE SERPL-MCNC: 106 MG/DL — HIGH (ref 70–99)
GLUCOSE SERPL-MCNC: 120 MG/DL — HIGH (ref 70–99)
HCT VFR BLD CALC: 36.9 % — SIGNIFICANT CHANGE UP (ref 34.5–45)
HGB BLD-MCNC: 11.5 G/DL — SIGNIFICANT CHANGE UP (ref 11.5–15.5)
IMM GRANULOCYTES NFR BLD AUTO: 0.5 % — SIGNIFICANT CHANGE UP (ref 0–0.9)
INR BLD: 1.45 RATIO — HIGH (ref 0.85–1.16)
LYMPHOCYTES # BLD AUTO: 0.59 K/UL — LOW (ref 1–3.3)
LYMPHOCYTES # BLD AUTO: 15 % — SIGNIFICANT CHANGE UP (ref 13–44)
MAGNESIUM SERPL-MCNC: 1.9 MG/DL — SIGNIFICANT CHANGE UP (ref 1.6–2.6)
MCHC RBC-ENTMCNC: 27.1 PG — SIGNIFICANT CHANGE UP (ref 27–34)
MCHC RBC-ENTMCNC: 31.2 G/DL — LOW (ref 32–36)
MCV RBC AUTO: 87 FL — SIGNIFICANT CHANGE UP (ref 80–100)
MONOCYTES # BLD AUTO: 0.22 K/UL — SIGNIFICANT CHANGE UP (ref 0–0.9)
MONOCYTES NFR BLD AUTO: 5.6 % — SIGNIFICANT CHANGE UP (ref 2–14)
NEUTROPHILS # BLD AUTO: 3.1 K/UL — SIGNIFICANT CHANGE UP (ref 1.8–7.4)
NEUTROPHILS NFR BLD AUTO: 78.6 % — HIGH (ref 43–77)
NRBC # BLD: 0 /100 WBCS — SIGNIFICANT CHANGE UP (ref 0–0)
NT-PROBNP SERPL-SCNC: 72 PG/ML — SIGNIFICANT CHANGE UP (ref 0–300)
PLATELET # BLD AUTO: 234 K/UL — SIGNIFICANT CHANGE UP (ref 150–400)
POTASSIUM SERPL-MCNC: 4 MMOL/L — SIGNIFICANT CHANGE UP (ref 3.5–5.3)
POTASSIUM SERPL-MCNC: 5.5 MMOL/L — HIGH (ref 3.5–5.3)
POTASSIUM SERPL-SCNC: 4 MMOL/L — SIGNIFICANT CHANGE UP (ref 3.5–5.3)
POTASSIUM SERPL-SCNC: 5.5 MMOL/L — HIGH (ref 3.5–5.3)
PROCALCITONIN SERPL-MCNC: 0.05 NG/ML — SIGNIFICANT CHANGE UP (ref 0.02–0.1)
PROT SERPL-MCNC: 5.9 G/DL — LOW (ref 6–8.3)
PROT SERPL-MCNC: 6.8 G/DL — SIGNIFICANT CHANGE UP (ref 6–8.3)
PROTHROM AB SERPL-ACNC: 16.5 SEC — HIGH (ref 9.9–13.4)
RBC # BLD: 4.24 M/UL — SIGNIFICANT CHANGE UP (ref 3.8–5.2)
RBC # FLD: 11.7 % — SIGNIFICANT CHANGE UP (ref 10.3–14.5)
SODIUM SERPL-SCNC: 135 MMOL/L — SIGNIFICANT CHANGE UP (ref 135–145)
SODIUM SERPL-SCNC: 135 MMOL/L — SIGNIFICANT CHANGE UP (ref 135–145)
TACROLIMUS SERPL-MCNC: 13.1 NG/ML — SIGNIFICANT CHANGE UP
TROPONIN T, HIGH SENSITIVITY RESULT: <6 NG/L — SIGNIFICANT CHANGE UP (ref 0–51)
WBC # BLD: 3.94 K/UL — SIGNIFICANT CHANGE UP (ref 3.8–10.5)
WBC # FLD AUTO: 3.94 K/UL — SIGNIFICANT CHANGE UP (ref 3.8–10.5)

## 2025-01-15 PROCEDURE — 36415 COLL VENOUS BLD VENIPUNCTURE: CPT

## 2025-01-15 PROCEDURE — 80197 ASSAY OF TACROLIMUS: CPT

## 2025-01-15 PROCEDURE — 83880 ASSAY OF NATRIURETIC PEPTIDE: CPT

## 2025-01-15 PROCEDURE — 71046 X-RAY EXAM CHEST 2 VIEWS: CPT

## 2025-01-15 PROCEDURE — 99285 EMERGENCY DEPT VISIT HI MDM: CPT | Mod: 25

## 2025-01-15 PROCEDURE — 86140 C-REACTIVE PROTEIN: CPT

## 2025-01-15 PROCEDURE — 84484 ASSAY OF TROPONIN QUANT: CPT

## 2025-01-15 PROCEDURE — 83735 ASSAY OF MAGNESIUM: CPT

## 2025-01-15 PROCEDURE — 85610 PROTHROMBIN TIME: CPT

## 2025-01-15 PROCEDURE — 80053 COMPREHEN METABOLIC PANEL: CPT

## 2025-01-15 PROCEDURE — 84145 PROCALCITONIN (PCT): CPT

## 2025-01-15 PROCEDURE — 85379 FIBRIN DEGRADATION QUANT: CPT

## 2025-01-15 PROCEDURE — 85730 THROMBOPLASTIN TIME PARTIAL: CPT

## 2025-01-15 PROCEDURE — 71046 X-RAY EXAM CHEST 2 VIEWS: CPT | Mod: 26

## 2025-01-15 PROCEDURE — 93005 ELECTROCARDIOGRAM TRACING: CPT

## 2025-01-15 PROCEDURE — 85025 COMPLETE CBC W/AUTO DIFF WBC: CPT

## 2025-01-15 NOTE — ED PROVIDER NOTE - PHYSICAL EXAMINATION
CONSTITUTIONAL: awake; in no acute distress.   HEAD: Normocephalic; atraumatic.  ENT: No nasal discharge; airway clear.  CARD: S1, S2 normal; . Regular rate and rhythm.   RESP: No wheezes, rales or rhonchi. Good air movement bilaterally.   ABD: soft ntnd, no guarding, no distention, no rigidity.   EXT:   No cyanosis or edema.   NEURO: Alert, oriented, grossly unremarkable  PSYCH: Cooperative, appropriate.

## 2025-01-15 NOTE — ED PROVIDER NOTE - ATTENDING CONTRIBUTION TO CARE
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  Transient improvement in symptoms for started however worsening thereafter.  No recent antibiotics.  Spoke with her hepatologist who put her on prednisone again.  Secondary to having chest pain after coughing patient came to the ED for further evaluation.  Patient states she had a chest x-ray done on December 30 that she reports did not show any actionable findings.  Denies known sick contacts.  Denies recent travel.  Denies weight changes.  Denies bleeding anywhere.  Still tolerating p.o.  Normal urination.  Denies skin color changes.  Patient states she felt like she had a fever but measured her temperature to be 98.8 took Tylenol.    Hemodynamically stable. NAD. AAOx4 (person, place, time, event). PERRL 3mm, EOMI w/o nystagmus, well hydrated, RRR, no audible cardiac murmurs. clear lungs, no inc work of breathing. benign abdomen, - her, no peritoneal signs, no cva ttp. no visible rashes nor deformities, no signs of jaundice, fluid overload, nor anemia. symm calves, no edema. full str/rom/neurovasc all 4 extrem. Steady gait.    Chest pain worse after coughing without evidence of pneumothorax on exam low suspicion for VTE, not consistent with ACS.  Rule out bacterial pneumonia given immunocompromise history.  Check labs, cardiac biomarkers, EKG, CXR, place on cardiac monitor for telemetry monitoring.  D-dimer.  Summary of presentation, physical exam findings, plan, expected turn around time for diagnostics discussed with patient. Agrees.

## 2025-01-15 NOTE — ED PROVIDER NOTE - PATIENT PORTAL LINK FT
You can access the FollowMyHealth Patient Portal offered by [facility] by registering at the following website: [email or web address]. By joining [facility]’s FollowMyHealth portal, you will also be able to view your health information using other applications (apps) compatible with our system.

## 2025-01-15 NOTE — ED PROVIDER NOTE - PROGRESS NOTE DETAILS
Macho Bradford MD PGY3: pt feels well with no pain, no resp distress. results nonactionable. Discussed with patient all results including any incidentals. Discussed discharge, follow up, return precautions, medications. Patient verbalizes understanding and is amenable at this time. Answered patient questions.

## 2025-01-15 NOTE — ED PROVIDER NOTE - NSFOLLOWUPINSTRUCTIONS_ED_ALL_ED_FT
You were seen in the ED for cough and chest pain    Your evaluation including blood tests, x ray showed no findings requiring further evaluation or treatment in the hospital at this time.    Please follow up with your PCP as discussed within 1 week. Discuss your symptoms, medications, and results in this packet.    Seek immediate medical attention if you experience new or worsening symptoms, including worsening difficulty breathing or new chest pain.

## 2025-01-15 NOTE — ED PROVIDER NOTE - EKG/XRAY ADDITIONAL INFORMATION
Anatoliy VEGA (ED Attending), independently interpreted the EKG:  Interpreted at: 2:07 AM  Sinus rhythm with short WI, rate 93 WI 98 QRS 90 QTc 460  No diagnostic ischemic ekg changes.

## 2025-01-15 NOTE — ED PROVIDER NOTE - OBJECTIVE STATEMENT
39 female past medical history of protein S deficiency, Budd-Chiari syndrome with chronic occlusion of IVC and hepatic vein status post IVC thrombectomy, cirrhosis complicated by hepatic hydrothorax HCC status post ablation, OLT with caval  replacement 9/8/2023 presenting for cough and chest pain.  Patient states she had about 3 weeks of cough that was treated at outpatient provider with benzonatate, and intermittent improvement has returned over the past few days.  Today experienced worsening pain to central chest nonradiating with cough.  No other provocative factors.  Denies associated shortness of breath, diaphoresis, nausea.  No stool changes or urinary complaints.  No abdominal pain.  No travel or sick contacts.  No extremity pain or swelling.

## 2025-01-15 NOTE — ED PROVIDER NOTE - CLINICAL SUMMARY MEDICAL DECISION MAKING FREE TEXT BOX
39 female past medical history of protein S deficiency, Budd-Chiari syndrome with chronic occlusion of IVC and hepatic vein status post IVC thrombectomy, cirrhosis complicated by hepatic hydrothorax HCC status post ablation, OLT with caval  replacement 9/8/2023 presenting for cough and chest pain.  With initial vital signs with mild tachycardia to 106.  Presenting with new chest pain present with coughing, cough that has been ongoing for 3 weeks intermittently.  Well and nontoxic-appearing, without other anginal equivalents.  Likely with costochondral pain, lower suspicion for bacterial pneumonia, versus PE given significant history.  To evaluate labs, including dimer, x-ray, monitor and reassess.

## 2025-01-15 NOTE — ED PROVIDER NOTE - NSCAREINITIATED _GEN_ER
Detail Level: Detailed Show Applicator Variable?: Yes Application Tool (Optional): Liquid Nitrogen Sprayer Render Note In Bullet Format When Appropriate: No Consent: The patient's consent was obtained including but not limited to risks of crusting, scabbing, blistering, scarring, darker or lighter pigmentary change, recurrence, incomplete removal and infection. Duration Of Freeze Thaw-Cycle (Seconds): 2 Post-Care Instructions: I reviewed with the patient in detail post-care instructions. Patient is to wear sunprotection, and avoid picking at any of the treated lesions. Pt may apply Vaseline to crusted or scabbing areas. Macho Bradford(Resident) No

## 2025-01-16 LAB
ALBUMIN SERPL ELPH-MCNC: 4.7 G/DL
ALP BLD-CCNC: 106 U/L
ALT SERPL-CCNC: 61 U/L
ANION GAP SERPL CALC-SCNC: 12 MMOL/L
AST SERPL-CCNC: 34 U/L
BASOPHILS # BLD AUTO: 0.03 K/UL
BASOPHILS NFR BLD AUTO: 0.3 %
BILIRUB SERPL-MCNC: 0.3 MG/DL
BUN SERPL-MCNC: 10 MG/DL
CALCIUM SERPL-MCNC: 9.7 MG/DL
CHLORIDE SERPL-SCNC: 101 MMOL/L
CO2 SERPL-SCNC: 26 MMOL/L
CREAT SERPL-MCNC: 0.76 MG/DL
EGFR: 101 ML/MIN/1.73M2
EOSINOPHIL # BLD AUTO: 0.05 K/UL
EOSINOPHIL NFR BLD AUTO: 0.6 %
GGT SERPL-CCNC: 84 U/L
GLUCOSE SERPL-MCNC: 101 MG/DL
HCT VFR BLD CALC: 38.6 %
HGB BLD-MCNC: 12.3 G/DL
IMM GRANULOCYTES NFR BLD AUTO: 0.5 %
LYMPHOCYTES # BLD AUTO: 0.94 K/UL
LYMPHOCYTES NFR BLD AUTO: 10.9 %
MAN DIFF?: NORMAL
MCHC RBC-ENTMCNC: 27.5 PG
MCHC RBC-ENTMCNC: 31.9 G/DL
MCV RBC AUTO: 86.2 FL
MONOCYTES # BLD AUTO: 0.59 K/UL
MONOCYTES NFR BLD AUTO: 6.8 %
NEUTROPHILS # BLD AUTO: 7 K/UL
NEUTROPHILS NFR BLD AUTO: 80.9 %
PLATELET # BLD AUTO: 304 K/UL
POTASSIUM SERPL-SCNC: 4.1 MMOL/L
PROT SERPL-MCNC: 6.6 G/DL
RBC # BLD: 4.48 M/UL
RBC # FLD: 11.7 %
SODIUM SERPL-SCNC: 139 MMOL/L
TACROLIMUS SERPL-MCNC: 6.8 NG/ML
WBC # FLD AUTO: 8.65 K/UL

## 2025-01-24 DIAGNOSIS — Z94.4 LIVER TRANSPLANT STATUS: ICD-10-CM

## 2025-01-24 LAB
ALBUMIN SERPL ELPH-MCNC: 4.3 G/DL
ALP BLD-CCNC: 81 U/L
ALT SERPL-CCNC: 46 U/L
ANION GAP SERPL CALC-SCNC: 10 MMOL/L
AST SERPL-CCNC: 27 U/L
BASOPHILS # BLD AUTO: 0.02 K/UL
BASOPHILS NFR BLD AUTO: 0.4 %
BILIRUB SERPL-MCNC: 0.3 MG/DL
BUN SERPL-MCNC: 14 MG/DL
CALCIUM SERPL-MCNC: 9.1 MG/DL
CHLORIDE SERPL-SCNC: 106 MMOL/L
CO2 SERPL-SCNC: 26 MMOL/L
CREAT SERPL-MCNC: 0.69 MG/DL
EGFR: 112 ML/MIN/1.73M2
EOSINOPHIL # BLD AUTO: 0.04 K/UL
EOSINOPHIL NFR BLD AUTO: 0.8 %
GGT SERPL-CCNC: 66 U/L
GLUCOSE SERPL-MCNC: 93 MG/DL
HCT VFR BLD CALC: 34 %
HGB BLD-MCNC: 10.8 G/DL
IMM GRANULOCYTES NFR BLD AUTO: 0.2 %
LYMPHOCYTES # BLD AUTO: 2.39 K/UL
LYMPHOCYTES NFR BLD AUTO: 47.6 %
MAN DIFF?: NORMAL
MCHC RBC-ENTMCNC: 27.5 PG
MCHC RBC-ENTMCNC: 31.8 G/DL
MCV RBC AUTO: 86.5 FL
MONOCYTES # BLD AUTO: 0.18 K/UL
MONOCYTES NFR BLD AUTO: 3.6 %
NEUTROPHILS # BLD AUTO: 2.38 K/UL
NEUTROPHILS NFR BLD AUTO: 47.4 %
PLATELET # BLD AUTO: 249 K/UL
POTASSIUM SERPL-SCNC: 5 MMOL/L
PROT SERPL-MCNC: 6.3 G/DL
RBC # BLD: 3.93 M/UL
RBC # FLD: 11.6 %
SODIUM SERPL-SCNC: 142 MMOL/L
TACROLIMUS SERPL-MCNC: 12.5 NG/ML
WBC # FLD AUTO: 5.02 K/UL

## 2025-01-29 NOTE — ASSESSMENT
[facility] HEMATOLOGY & ONCOLOGY  [address]  [facility] [address] [phone]    Patient Name: Berna Brumfield  Encounter Date: 02/04/2025  YOB: 1971  Patient Number: [id number]      REASON FOR VISIT: Berna Pierce OH/HER2 + via  [FreeTextEntry1] : 35 y.o Female with cryptogenic cirrhosis ? Budd Chiari, min ascites, non bleeding EV,  pruritus. multiple failed pregnancies. No significant problem at present. NO leg swelling. Min ascites on 25 mg of aldactone. Will evaluate blood test and a new MRV to look at patency of vessels. RTC in 1 month. No indication for invasive procedure at this time.   breast biopsy, 5/6/EXPANDER PLACEMENT;  Surgeon: Kalin Regalado 

## 2025-02-04 DIAGNOSIS — Z94.4 LIVER TRANSPLANT STATUS: ICD-10-CM

## 2025-02-12 NOTE — ED ADULT NURSE NOTE - ED STAT RN HANDOFF TIME
Faith Galo MD    [facility]  [facility] MEDICINE  [address], [address]  [place] [place] [facility] [address]  Dept: [phone]  Dept Fax: [phone]     Patient ID: Helene Rubin is a 24 y.o. female    HPI    Established patient here today for f/u on chronic medical problems, go over labs and/or diagnostic studies, and medication refills.     History of Present Illness  24-year-old white female presents for an annual exam, new patient after 3 years.    Currently 8 months pregnant, no complications, first pregnancy, expecting a male, scheduled to deliver at [facility]. Reports regular bowel movements and mild leg swelling.    Experiencing mild congestion and ear popping recently.    Consults psychiatrist Dr. Reyes-Short monthly due to hormonal changes, seeking refills for psychiatric medications. Current regimen: Zoloft 100 mg, Seroquel 50 mg at night, Ambien as needed. Reports satisfactory symptom control.    MEDICATIONS  Zoloft, Seroquel, Ambien        Otherwise pt doing well on current tx and no other concerns today.     The patient's past medical, surgical, social, and family history as well as his current medications and allergies were reviewed as documented in today's encounter.      My previous office notes, consult notes, labs and diagnostic studies were reviewed prior to and during encounter.    Current Outpatient Medications on File Prior to Visit   Medication Sig Dispense Refill    desvenlafaxine succinate (PRISTIQ) 50 MG TB24 extended release tablet TAKE 1 TABLET BY MOUTH ONE TIME A DAY  4    methylphenidate (CONCERTA) 36 MG extended release tablet Take 1 tablet by mouth daily. Per Dr. Barfield.  0    Mirabegron ER (MYRBETRIQ) 50 MG TB24 Take 1 tablet by mouth daily      lithium (ESKALITH) 450 MG extended release tablet Take 450 mg by mouth 2 times daily      etonogestrel (NEXPLANON) 68 MG implant 68 mg by Subdermal route once      zolpidem (AMBIEN) 10 MG  19:01

## 2025-02-19 ENCOUNTER — APPOINTMENT (OUTPATIENT)
Dept: HEPATOLOGY | Facility: CLINIC | Age: 40
End: 2025-02-19
Payer: MEDICARE

## 2025-02-19 VITALS
HEART RATE: 86 BPM | HEIGHT: 62 IN | OXYGEN SATURATION: 98 % | TEMPERATURE: 97.9 F | WEIGHT: 102 LBS | RESPIRATION RATE: 17 BRPM | DIASTOLIC BLOOD PRESSURE: 64 MMHG | BODY MASS INDEX: 18.77 KG/M2 | SYSTOLIC BLOOD PRESSURE: 100 MMHG

## 2025-02-19 DIAGNOSIS — A04.8 OTHER SPECIFIED BACTERIAL INTESTINAL INFECTIONS: ICD-10-CM

## 2025-02-19 DIAGNOSIS — K21.9 GASTRO-ESOPHAGEAL REFLUX DISEASE W/OUT ESOPHAGITIS: ICD-10-CM

## 2025-02-19 DIAGNOSIS — Z94.4 LIVER TRANSPLANT STATUS: ICD-10-CM

## 2025-02-19 PROCEDURE — 99204 OFFICE O/P NEW MOD 45 MIN: CPT

## 2025-02-19 RX ORDER — BISMUTH SUBCITRATE POTASSIUM, METRONIDAZOLE, AND TETRACYCLINE HYDROCHLORIDE 140; 125; 125 MG/1; MG/1; MG/1
140-125-125 CAPSULE ORAL 4 TIMES DAILY
Qty: 168 | Refills: 0 | Status: ACTIVE | COMMUNITY
Start: 2025-02-19 | End: 1900-01-01

## 2025-03-03 ENCOUNTER — OUTPATIENT (OUTPATIENT)
Dept: OUTPATIENT SERVICES | Facility: HOSPITAL | Age: 40
LOS: 1 days | Discharge: HOME | End: 2025-03-03

## 2025-03-03 DIAGNOSIS — I82.0 BUDD-CHIARI SYNDROME: ICD-10-CM

## 2025-03-03 DIAGNOSIS — Z98.890 OTHER SPECIFIED POSTPROCEDURAL STATES: Chronic | ICD-10-CM

## 2025-03-03 DIAGNOSIS — Z95.828 PRESENCE OF OTHER VASCULAR IMPLANTS AND GRAFTS: Chronic | ICD-10-CM

## 2025-03-04 ENCOUNTER — APPOINTMENT (OUTPATIENT)
Dept: HEMATOLOGY ONCOLOGY | Facility: CLINIC | Age: 40
End: 2025-03-04
Payer: MEDICARE

## 2025-03-04 VITALS
HEART RATE: 86 BPM | DIASTOLIC BLOOD PRESSURE: 75 MMHG | SYSTOLIC BLOOD PRESSURE: 106 MMHG | TEMPERATURE: 97 F | WEIGHT: 102.51 LBS | BODY MASS INDEX: 18.75 KG/M2 | RESPIRATION RATE: 17 BRPM | OXYGEN SATURATION: 99 %

## 2025-03-04 PROCEDURE — 99204 OFFICE O/P NEW MOD 45 MIN: CPT

## 2025-03-06 DIAGNOSIS — Z94.4 LIVER TRANSPLANT STATUS: ICD-10-CM

## 2025-03-07 LAB
FERRITIN SERPL-MCNC: 6 NG/ML
IRON SATN MFR SERPL: 3 %
IRON SERPL-MCNC: 11 UG/DL
TIBC SERPL-MCNC: 392 UG/DL
UIBC SERPL-MCNC: 381 UG/DL

## 2025-03-29 NOTE — PATIENT PROFILE ADULT - HOME ACCESSIBILITY CONCERNS
none #Acute hypoxic respiratory failure, resolved  CT findings of multifocal PNA, HAP vs. CAP vs. aspiration (recent hospitalization for aspiration PNA 2/25)  On 3L nasal cannula currently    Plan  -zosyn 4.5g x5 days   - Start airway clearance with duonebs and hypersal q12  - NC2L, titrate O2 to keep SpO2>92%  - aspiration precautions

## 2025-04-16 NOTE — ED ADULT NURSE NOTE - CAS DISCH ACCOMP BY
Procedure Note for IR Procedure Dictation  Conscious sedation: 5 mg IVP Versed, 200 mcg IVP fentanyl  Sedation time: 25 minutes  Fluoro time: 12.6 minutes  Total Fluoro Dose: 812.8 mGy (Air Kerma)  Contrast: 70 ml Isouve  Local anesthetic: 10 ml 1% lidocaine    Self

## 2025-04-21 ENCOUNTER — TRANSCRIPTION ENCOUNTER (OUTPATIENT)
Age: 40
End: 2025-04-21

## 2025-04-21 ENCOUNTER — OUTPATIENT (OUTPATIENT)
Dept: OUTPATIENT SERVICES | Facility: HOSPITAL | Age: 40
LOS: 1 days | End: 2025-04-21
Payer: MEDICARE

## 2025-04-21 ENCOUNTER — APPOINTMENT (OUTPATIENT)
Dept: HEPATOLOGY | Facility: HOSPITAL | Age: 40
End: 2025-04-21

## 2025-04-21 VITALS
HEART RATE: 88 BPM | SYSTOLIC BLOOD PRESSURE: 112 MMHG | DIASTOLIC BLOOD PRESSURE: 64 MMHG | RESPIRATION RATE: 20 BRPM | OXYGEN SATURATION: 99 %

## 2025-04-21 VITALS
OXYGEN SATURATION: 100 % | HEART RATE: 95 BPM | HEIGHT: 62 IN | DIASTOLIC BLOOD PRESSURE: 73 MMHG | SYSTOLIC BLOOD PRESSURE: 113 MMHG | TEMPERATURE: 98 F | RESPIRATION RATE: 20 BRPM | WEIGHT: 100.97 LBS

## 2025-04-21 DIAGNOSIS — Z98.890 OTHER SPECIFIED POSTPROCEDURAL STATES: Chronic | ICD-10-CM

## 2025-04-21 DIAGNOSIS — K21.9 GASTRO-ESOPHAGEAL REFLUX DISEASE WITHOUT ESOPHAGITIS: ICD-10-CM

## 2025-04-21 DIAGNOSIS — Z94.4 LIVER TRANSPLANT STATUS: Chronic | ICD-10-CM

## 2025-04-21 DIAGNOSIS — Z95.828 PRESENCE OF OTHER VASCULAR IMPLANTS AND GRAFTS: Chronic | ICD-10-CM

## 2025-04-21 PROCEDURE — 43235 EGD DIAGNOSTIC BRUSH WASH: CPT

## 2025-04-21 RX ORDER — PROPRANOLOL HCL 60 MG
0 TABLET ORAL
Refills: 0 | DISCHARGE

## 2025-04-21 RX ORDER — PROPRANOLOL HCL 60 MG
1 TABLET ORAL
Refills: 0 | DISCHARGE

## 2025-04-21 RX ADMIN — Medication 30 MILLILITER(S): at 08:48

## 2025-04-21 NOTE — PRE PROCEDURE NOTE - PRE PROCEDURE EVALUATION
Attending Physician:  Haydee Carvajal                        Procedure: EGD    Indication for Procedure: Abdominal pain, hx of H. Pylori infection  ________________________________________________________  PAST MEDICAL & SURGICAL HISTORY:  Cirrhosis  2010      Cirrhosis      Budd-Chiari syndrome      H/O protein S deficiency      Miscarriage  x 5      H/O protein S deficiency      History of transjugular intrahepatic portosystemic shunt      History of dilation and curettage      Presence of IVC filter      S/P liver transplant  2021        ALLERGIES:  No Known Allergies    HOME MEDICATIONS:  apixaban 5 mg oral tablet: 1 tab(s) orally 2 times a day  aspirin 81 mg oral delayed release tablet: 1 tab(s) orally once a day  calcium-vitamin D 500 mg-5 mcg (200 intl units) oral tablet: 1 tab(s) orally 2 times a day  magnesium oxide 400 mg oral capsule: 1 cap(s) orally 2 times a day  mycophenolic acid 360 mg oral delayed release tablet: 1 tab(s) orally 2 times a day  pantoprazole 40 mg oral delayed release tablet: 1 tab(s) orally once a day (before a meal)  propranolol:   propranolol 10 mg oral tablet: 1 tab(s) orally once a day  Protonix 40 mg oral delayed release tablet: 1 tab(s) orally once a day  tacrolimus 1 mg oral capsule: 1 cap(s) orally 2 times a day    AICD/PPM: [ ] yes   [ ] no    PERTINENT LAB DATA:                      PHYSICAL EXAMINATION:    Height (cm): 157.5  Weight (kg): 45.8  BMI (kg/m2): 18.5  BSA (m2): 1.43T(C): 36.9  HR: 95  BP: 113/73  RR: 20  SpO2: 100%    Constitutional: NAD  HEENT: PERRLA, EOMI,    Neck:  No JVD  Respiratory: CTAB/L  Cardiovascular: S1 and S2  Gastrointestinal: BS+, soft, NT/ND  Extremities: No peripheral edema  Neurological: A/O x 3, no focal deficits  Psychiatric: Normal mood, normal affect  Skin: No rashes    ASA Class: I [ ]  II [ ]  III [ X]  IV [ ]    COMMENTS:    The patient is a suitable candidate for the planned procedure unless box checked [ ]  No, explain:

## 2025-04-21 NOTE — ASU PATIENT PROFILE, ADULT - FALL HARM RISK - UNIVERSAL INTERVENTIONS
Bed in lowest position, wheels locked, appropriate side rails in place/Call bell, personal items and telephone in reach/Instruct patient to call for assistance before getting out of bed or chair/Non-slip footwear when patient is out of bed/[place] to call system/Physically safe environment - no spills, clutter or unnecessary equipment/Purposeful Proactive Rounding/Room/bathroom lighting operational, light cord in reach

## 2025-04-21 NOTE — PRE-ANESTHESIA EVALUATION ADULT - NSANTHPMHFT_GEN_ALL_CORE
Pt is Guamanian speaking,  used. Currently denies CP, SOB. METS >4. Denies issues with anesthesia in the past

## 2025-04-21 NOTE — ASU PATIENT PROFILE, ADULT - NSICDXPASTSURGICALHX_GEN_ALL_CORE_FT
PAST SURGICAL HISTORY:  History of dilation and curettage     History of transjugular intrahepatic portosystemic shunt     Presence of IVC filter     S/P liver transplant 2021

## 2025-04-21 NOTE — ASU DISCHARGE PLAN (ADULT/PEDIATRIC) - FINANCIAL ASSISTANCE
[facility] provides services at a reduced cost to those who are determined to be eligible through [facility]’s financial assistance program. Information regarding [facility]’s financial assistance program can be found by going to [email or web address] or by calling [phone].

## 2025-05-02 ENCOUNTER — APPOINTMENT (OUTPATIENT)
Dept: HEPATOLOGY | Facility: CLINIC | Age: 40
End: 2025-05-02

## 2025-05-16 ENCOUNTER — APPOINTMENT (OUTPATIENT)
Dept: HEPATOLOGY | Facility: CLINIC | Age: 40
End: 2025-05-16
Payer: MEDICARE

## 2025-05-16 VITALS
HEART RATE: 67 BPM | HEIGHT: 62 IN | OXYGEN SATURATION: 98 % | RESPIRATION RATE: 16 BRPM | TEMPERATURE: 97.3 F | DIASTOLIC BLOOD PRESSURE: 68 MMHG | SYSTOLIC BLOOD PRESSURE: 109 MMHG | WEIGHT: 102 LBS | BODY MASS INDEX: 18.77 KG/M2

## 2025-05-16 DIAGNOSIS — Z94.4 LIVER TRANSPLANT STATUS: ICD-10-CM

## 2025-05-16 DIAGNOSIS — C22.0 LIVER CELL CARCINOMA: ICD-10-CM

## 2025-05-16 PROCEDURE — 99214 OFFICE O/P EST MOD 30 MIN: CPT

## 2025-05-30 ENCOUNTER — OUTPATIENT (OUTPATIENT)
Dept: OUTPATIENT SERVICES | Facility: HOSPITAL | Age: 40
LOS: 1 days | Discharge: HOME | End: 2025-05-30

## 2025-05-30 DIAGNOSIS — Z94.4 LIVER TRANSPLANT STATUS: Chronic | ICD-10-CM

## 2025-05-30 DIAGNOSIS — Z98.890 OTHER SPECIFIED POSTPROCEDURAL STATES: Chronic | ICD-10-CM

## 2025-05-30 DIAGNOSIS — I82.0 BUDD-CHIARI SYNDROME: ICD-10-CM

## 2025-05-30 DIAGNOSIS — Z95.828 PRESENCE OF OTHER VASCULAR IMPLANTS AND GRAFTS: Chronic | ICD-10-CM

## 2025-06-02 ENCOUNTER — LABORATORY RESULT (OUTPATIENT)
Age: 40
End: 2025-06-02

## 2025-06-03 ENCOUNTER — APPOINTMENT (OUTPATIENT)
Dept: HEMATOLOGY ONCOLOGY | Facility: CLINIC | Age: 40
End: 2025-06-03
Payer: MEDICARE

## 2025-06-03 ENCOUNTER — RESULT REVIEW (OUTPATIENT)
Age: 40
End: 2025-06-03

## 2025-06-03 VITALS
WEIGHT: 101.41 LBS | HEART RATE: 73 BPM | DIASTOLIC BLOOD PRESSURE: 73 MMHG | BODY MASS INDEX: 18.55 KG/M2 | OXYGEN SATURATION: 98 % | SYSTOLIC BLOOD PRESSURE: 104 MMHG | RESPIRATION RATE: 16 BRPM

## 2025-06-03 LAB
BASOPHILS # BLD AUTO: 0.02 K/UL — SIGNIFICANT CHANGE UP (ref 0–0.2)
BASOPHILS NFR BLD AUTO: 0.5 % — SIGNIFICANT CHANGE UP (ref 0–2)
EOSINOPHIL # BLD AUTO: 0.1 K/UL — SIGNIFICANT CHANGE UP (ref 0–0.5)
EOSINOPHIL NFR BLD AUTO: 2.3 % — SIGNIFICANT CHANGE UP (ref 0–6)
FERRITIN SERPL-MCNC: 10 NG/ML
FOLATE SERPL-MCNC: >20 NG/ML
HCT VFR BLD CALC: 34.5 % — SIGNIFICANT CHANGE UP (ref 34.5–45)
HGB BLD-MCNC: 9.9 G/DL — LOW (ref 11.5–15.5)
IMM GRANULOCYTES NFR BLD AUTO: 0.2 % — SIGNIFICANT CHANGE UP (ref 0–0.9)
IRON SATN MFR SERPL: 10 %
IRON SERPL-MCNC: 41 UG/DL
LYMPHOCYTES # BLD AUTO: 1.7 K/UL — SIGNIFICANT CHANGE UP (ref 1–3.3)
LYMPHOCYTES # BLD AUTO: 39.8 % — SIGNIFICANT CHANGE UP (ref 13–44)
MCHC RBC-ENTMCNC: 22.2 PG — LOW (ref 27–34)
MCHC RBC-ENTMCNC: 28.7 G/DL — LOW (ref 32–36)
MCV RBC AUTO: 77.4 FL — LOW (ref 80–100)
MONOCYTES # BLD AUTO: 0.43 K/UL — SIGNIFICANT CHANGE UP (ref 0–0.9)
MONOCYTES NFR BLD AUTO: 10.1 % — SIGNIFICANT CHANGE UP (ref 2–14)
NEUTROPHILS # BLD AUTO: 2.01 K/UL — SIGNIFICANT CHANGE UP (ref 1.8–7.4)
NEUTROPHILS NFR BLD AUTO: 47.1 % — SIGNIFICANT CHANGE UP (ref 43–77)
NRBC BLD AUTO-RTO: 0 /100 WBCS — SIGNIFICANT CHANGE UP (ref 0–0)
PLATELET # BLD AUTO: 217 K/UL — SIGNIFICANT CHANGE UP (ref 150–400)
RBC # BLD: 4.46 M/UL — SIGNIFICANT CHANGE UP (ref 3.8–5.2)
RBC # FLD: 16.2 % — HIGH (ref 10.3–14.5)
TIBC SERPL-MCNC: 435 UG/DL
UIBC SERPL-MCNC: 393 UG/DL
VIT B12 SERPL-MCNC: 952 PG/ML
WBC # BLD: 4.27 K/UL — SIGNIFICANT CHANGE UP (ref 3.8–10.5)
WBC # FLD AUTO: 4.27 K/UL — SIGNIFICANT CHANGE UP (ref 3.8–10.5)

## 2025-06-03 PROCEDURE — G2211 COMPLEX E/M VISIT ADD ON: CPT

## 2025-06-03 PROCEDURE — 99214 OFFICE O/P EST MOD 30 MIN: CPT

## 2025-06-03 RX ORDER — PREDNISONE 10 MG/1
10 TABLET ORAL
Qty: 40 | Refills: 0 | Status: ACTIVE | COMMUNITY
Start: 2025-06-03 | End: 1900-01-01

## 2025-06-07 ENCOUNTER — APPOINTMENT (OUTPATIENT)
Dept: INFUSION THERAPY | Facility: HOSPITAL | Age: 40
End: 2025-06-07

## 2025-06-09 DIAGNOSIS — D50.9 IRON DEFICIENCY ANEMIA, UNSPECIFIED: ICD-10-CM

## 2025-06-11 LAB
ALBUMIN SERPL ELPH-MCNC: 4.5 G/DL
ALP BLD-CCNC: 62 U/L
ALT SERPL-CCNC: 56 U/L
ANION GAP SERPL CALC-SCNC: 14 MMOL/L
AST SERPL-CCNC: 19 U/L
BASOPHILS # BLD AUTO: 0.04 K/UL
BASOPHILS NFR BLD AUTO: 0.7 %
BILIRUB SERPL-MCNC: 0.3 MG/DL
BUN SERPL-MCNC: 20 MG/DL
CALCIUM SERPL-MCNC: 9.6 MG/DL
CHLORIDE SERPL-SCNC: 104 MMOL/L
CMV DNA SPEC QL NAA+PROBE: NOT DETECTED IU/ML
CMVPCR LOG: NOT DETECTED LOG10IU/ML
CO2 SERPL-SCNC: 23 MMOL/L
CREAT SERPL-MCNC: 0.64 MG/DL
EGFRCR SERPLBLD CKD-EPI 2021: 114 ML/MIN/1.73M2
EOSINOPHIL # BLD AUTO: 0.15 K/UL
EOSINOPHIL NFR BLD AUTO: 2.7 %
GGT SERPL-CCNC: 41 U/L
GLUCOSE SERPL-MCNC: 91 MG/DL
HCT VFR BLD CALC: 34.7 %
HGB BLD-MCNC: 9.9 G/DL
IMM GRANULOCYTES NFR BLD AUTO: 0.2 %
LYMPHOCYTES # BLD AUTO: 2.55 K/UL
LYMPHOCYTES NFR BLD AUTO: 46.4 %
MAN DIFF?: NORMAL
MCHC RBC-ENTMCNC: 22.2 PG
MCHC RBC-ENTMCNC: 28.5 G/DL
MCV RBC AUTO: 77.8 FL
MONOCYTES # BLD AUTO: 0.49 K/UL
MONOCYTES NFR BLD AUTO: 8.9 %
NEUTROPHILS # BLD AUTO: 2.25 K/UL
NEUTROPHILS NFR BLD AUTO: 41.1 %
PLATELET # BLD AUTO: 234 K/UL
POTASSIUM SERPL-SCNC: 4.5 MMOL/L
PROT SERPL-MCNC: 6.3 G/DL
RBC # BLD: 4.46 M/UL
RBC # FLD: 16.7 %
SODIUM SERPL-SCNC: 140 MMOL/L
TACROLIMUS SERPL-MCNC: 4.4 NG/ML
WBC # FLD AUTO: 5.49 K/UL

## 2025-06-17 ENCOUNTER — NON-APPOINTMENT (OUTPATIENT)
Age: 40
End: 2025-06-17

## 2025-06-17 LAB
ALBUMIN SERPL ELPH-MCNC: 4.5 G/DL
ALP BLD-CCNC: 53 U/L
ALT SERPL-CCNC: 32 U/L
ANION GAP SERPL CALC-SCNC: 13 MMOL/L
AST SERPL-CCNC: 17 U/L
BASOPHILS # BLD AUTO: 0.02 K/UL
BASOPHILS NFR BLD AUTO: 0.4 %
BILIRUB SERPL-MCNC: 0.3 MG/DL
BUN SERPL-MCNC: 15 MG/DL
CALCIUM SERPL-MCNC: 9.4 MG/DL
CHLORIDE SERPL-SCNC: 105 MMOL/L
CMV DNA SPEC QL NAA+PROBE: NOT DETECTED IU/ML
CMVPCR LOG: NOT DETECTED LOG10IU/ML
CO2 SERPL-SCNC: 21 MMOL/L
CREAT SERPL-MCNC: 0.66 MG/DL
EGFRCR SERPLBLD CKD-EPI 2021: 113 ML/MIN/1.73M2
EOSINOPHIL # BLD AUTO: 0.13 K/UL
EOSINOPHIL NFR BLD AUTO: 2.8 %
GGT SERPL-CCNC: 38 U/L
GLUCOSE SERPL-MCNC: 94 MG/DL
HCT VFR BLD CALC: 38 %
HGB BLD-MCNC: 11.1 G/DL
IMM GRANULOCYTES NFR BLD AUTO: 0.6 %
LYMPHOCYTES # BLD AUTO: 1.47 K/UL
LYMPHOCYTES NFR BLD AUTO: 31.5 %
MAN DIFF?: NORMAL
MCHC RBC-ENTMCNC: 23.3 PG
MCHC RBC-ENTMCNC: 29.2 G/DL
MCV RBC AUTO: 79.7 FL
MONOCYTES # BLD AUTO: 0.47 K/UL
MONOCYTES NFR BLD AUTO: 10.1 %
NEUTROPHILS # BLD AUTO: 2.55 K/UL
NEUTROPHILS NFR BLD AUTO: 54.6 %
PLATELET # BLD AUTO: 234 K/UL
POTASSIUM SERPL-SCNC: 4.6 MMOL/L
PROT SERPL-MCNC: 6.1 G/DL
RBC # BLD: 4.77 M/UL
RBC # FLD: 18.8 %
SODIUM SERPL-SCNC: 139 MMOL/L
TACROLIMUS SERPL-MCNC: 7.2 NG/ML
WBC # FLD AUTO: 4.67 K/UL

## 2025-06-21 ENCOUNTER — APPOINTMENT (OUTPATIENT)
Dept: INFUSION THERAPY | Facility: HOSPITAL | Age: 40
End: 2025-06-21

## 2025-07-09 ENCOUNTER — NON-APPOINTMENT (OUTPATIENT)
Age: 40
End: 2025-07-09

## 2025-07-15 ENCOUNTER — APPOINTMENT (OUTPATIENT)
Dept: MRI IMAGING | Facility: CLINIC | Age: 40
End: 2025-07-15
Payer: MEDICARE

## 2025-07-15 ENCOUNTER — OUTPATIENT (OUTPATIENT)
Dept: OUTPATIENT SERVICES | Facility: HOSPITAL | Age: 40
LOS: 1 days | End: 2025-07-15

## 2025-07-15 DIAGNOSIS — Z98.890 OTHER SPECIFIED POSTPROCEDURAL STATES: Chronic | ICD-10-CM

## 2025-07-15 DIAGNOSIS — C22.0 LIVER CELL CARCINOMA: ICD-10-CM

## 2025-07-15 DIAGNOSIS — Z94.4 LIVER TRANSPLANT STATUS: Chronic | ICD-10-CM

## 2025-07-15 DIAGNOSIS — Z95.828 PRESENCE OF OTHER VASCULAR IMPLANTS AND GRAFTS: Chronic | ICD-10-CM

## 2025-07-15 PROCEDURE — 74183 MRI ABD W/O CNTR FLWD CNTR: CPT | Mod: 26

## 2025-07-17 NOTE — ASU PREOP CHECKLIST - LOOSE TEETH
"Continue diet exercise weight loss  Follow-up with colorectal specialist for colonoscopy  Return in 1 year or sooner if needed    Patient Education     Routine physical for adults   The Basics   Written by the doctors and editors at [facility]   What is a physical? -- A physical is a routine visit, or \"check-up,\" with your doctor. You might also hear it called a \"wellness visit\" or \"preventive visit.\"  During each visit, the doctor will:   Ask about your physical and mental health   Ask about your habits, behaviors, and lifestyle   Do an exam   Give you vaccines if needed   Talk to you about any medicines you take   Give advice about your health   Answer your questions  Getting regular check-ups is an important part of taking care of your health. It can help your doctor find and treat any problems you have. But it's also important for preventing health problems.  A routine physical is different from a \"sick visit.\" A sick visit is when you see a doctor because of a health concern or problem. Since physicals are scheduled ahead of time, you can think about what you want to ask the doctor.  How often should I get a physical? -- It depends on your age and health. In general, for people age 21 years and older:   If you are younger than 50 years, you might be able to get a physical every 3 years.   If you are 50 years or older, your doctor might recommend a physical every year.  If you have an ongoing health condition, like diabetes or high blood pressure, your doctor will probably want to see you more often.  What happens during a physical? -- In general, each visit will include:   Physical exam - The doctor or nurse will check your height, weight, heart rate, and blood pressure. They will also look at your eyes and ears. They will ask about how you are feeling and whether you have any symptoms that bother you.   Medicines - It's a good idea to bring a list of all the medicines you take to each doctor visit. Your doctor " "will talk to you about your medicines and answer any questions. Tell them if you are having any side effects that bother you. You should also tell them if you are having trouble paying for any of your medicines.   Habits and behaviors - This includes:   Your diet   Your exercise habits   Whether you smoke, drink alcohol, or use drugs   Whether you are sexually active   Whether you feel safe at home  Your doctor will talk to you about things you can do to improve your health and lower your risk of health problems. They will also offer help and support. For example, if you want to quit smoking, they can give you advice and might prescribe medicines. If you want to improve your diet or get more physical activity, they can help you with this, too.   Lab tests, if needed - The tests you get will depend on your age and situation. For example, your doctor might want to check your:   Cholesterol   Blood sugar   Iron level   Vaccines - The recommended vaccines will depend on your age, health, and what vaccines you already had. Vaccines are very important because they can prevent certain serious or deadly infections.   Discussion of screening - \"Screening\" means checking for diseases or other health problems before they cause symptoms. Your doctor can recommend screening based on your age, risk, and preferences. This might include tests to check for:   Cancer, such as breast, prostate, cervical, ovarian, colorectal, prostate, lung, or skin cancer   Sexually transmitted infections, such as chlamydia and gonorrhea   Mental health conditions like depression and anxiety  Your doctor will talk to you about the different types of screening tests. They can help you decide which screenings to have. They can also explain what the results might mean.   Answering questions - The physical is a good time to ask the doctor or nurse questions about your health. If needed, they can refer you to other doctors or specialists, too.  Adults " older than 65 years often need other care, too. As you get older, your doctor will talk to you about:   How to prevent falling at home   Hearing or vision tests   Memory testing   How to take your medicines safely   Making sure that you have the help and support you need at home  All topics are updated as new evidence becomes available and our peer review process is complete.  This topic retrieved from [employer] on: May 02, 2024.  Topic 627688 Version 1.0  Release: 32.4.3 - C32.122  © 2024 UpToDate, Inc. and/or its affiliates. All rights reserved.  Consumer Information Use and Disclaimer   Disclaimer: This generalized information is a limited summary of diagnosis, treatment, and/or medication information. It is not meant to be comprehensive and should be used as a tool to help the user understand and/or assess potential diagnostic and treatment options. It does NOT include all information about conditions, treatments, medications, side effects, or risks that may apply to a specific patient. It is not intended to be medical advice or a substitute for the medical advice, diagnosis, or treatment of a health care provider based on the health care provider's examination and assessment of a patient's specific and unique circumstances. Patients must speak with a health care provider for complete information about their health, medical questions, and treatment options, including any risks or benefits regarding use of medications. This information does not endorse any treatments or medications as safe, effective, or approved for treating a specific patient. UpToDate, Inc. and its affiliates disclaim any warranty or liability relating to this information or the use thereof.The use of this information is governed by the Terms of Use, available at https://www.wolterskluwer.com/en/know/clinical-effectiveness-terms. 2024© UpToDate, Inc. and its affiliates and/or licensors. All rights reserved.  Copyright   © 2024 Up[employer]Date, Inc.  and/or its affiliates. All rights reserved.     no

## 2025-07-18 ENCOUNTER — LABORATORY RESULT (OUTPATIENT)
Age: 40
End: 2025-07-18

## 2025-07-18 ENCOUNTER — APPOINTMENT (OUTPATIENT)
Dept: OBGYN | Facility: CLINIC | Age: 40
End: 2025-07-18

## 2025-07-18 VITALS
BODY MASS INDEX: 18.45 KG/M2 | SYSTOLIC BLOOD PRESSURE: 95 MMHG | HEIGHT: 62 IN | WEIGHT: 100.25 LBS | DIASTOLIC BLOOD PRESSURE: 68 MMHG

## 2025-07-18 PROBLEM — Z11.51 SCREENING FOR HPV (HUMAN PAPILLOMAVIRUS): Status: ACTIVE | Noted: 2025-07-18

## 2025-07-18 PROBLEM — Z12.4 PAP SMEAR FOR CERVICAL CANCER SCREENING: Status: ACTIVE | Noted: 2025-07-18

## 2025-07-18 PROCEDURE — 99459 PELVIC EXAMINATION: CPT

## 2025-07-18 PROCEDURE — G0101: CPT

## 2025-07-18 PROCEDURE — G0444 DEPRESSION SCREEN ANNUAL: CPT | Mod: 59

## 2025-07-18 PROCEDURE — 99386 PREV VISIT NEW AGE 40-64: CPT

## 2025-07-20 LAB — HPV HIGH+LOW RISK DNA PNL CVX: DETECTED

## 2025-07-21 ENCOUNTER — NON-APPOINTMENT (OUTPATIENT)
Age: 40
End: 2025-07-21

## 2025-07-28 ENCOUNTER — TRANSCRIPTION ENCOUNTER (OUTPATIENT)
Age: 40
End: 2025-07-28

## 2025-08-01 ENCOUNTER — APPOINTMENT (OUTPATIENT)
Dept: HEPATOLOGY | Facility: CLINIC | Age: 40
End: 2025-08-01
Payer: MEDICARE

## 2025-08-01 VITALS
HEART RATE: 82 BPM | SYSTOLIC BLOOD PRESSURE: 115 MMHG | WEIGHT: 99 LBS | TEMPERATURE: 97.8 F | HEIGHT: 62 IN | DIASTOLIC BLOOD PRESSURE: 80 MMHG | BODY MASS INDEX: 18.22 KG/M2 | OXYGEN SATURATION: 97 %

## 2025-08-01 DIAGNOSIS — Z94.4 LIVER TRANSPLANT STATUS: ICD-10-CM

## 2025-08-01 DIAGNOSIS — D50.9 IRON DEFICIENCY ANEMIA, UNSPECIFIED: ICD-10-CM

## 2025-08-01 DIAGNOSIS — K64.9 UNSPECIFIED HEMORRHOIDS: ICD-10-CM

## 2025-08-01 PROCEDURE — 99214 OFFICE O/P EST MOD 30 MIN: CPT

## 2025-08-01 RX ORDER — HYDROCORTISONE 25 MG/G
2.5 CREAM TOPICAL
Qty: 2 | Refills: 3 | Status: ACTIVE | COMMUNITY
Start: 2025-08-01 | End: 1900-01-01

## 2025-08-12 LAB
ALBUMIN SERPL ELPH-MCNC: 4.8 G/DL
ALP BLD-CCNC: 76 U/L
ALT SERPL-CCNC: 36 U/L
ANION GAP SERPL CALC-SCNC: 13 MMOL/L
AST SERPL-CCNC: 27 U/L
BASOPHILS # BLD AUTO: 0.02 K/UL
BASOPHILS NFR BLD AUTO: 0.5 %
BILIRUB SERPL-MCNC: 0.4 MG/DL
BUN SERPL-MCNC: 16 MG/DL
CALCIUM SERPL-MCNC: 9.8 MG/DL
CHLORIDE SERPL-SCNC: 105 MMOL/L
CMV DNA SPEC QL NAA+PROBE: NOT DETECTED IU/ML
CMVPCR LOG: NOT DETECTED LOG10IU/ML
CO2 SERPL-SCNC: 23 MMOL/L
CREAT SERPL-MCNC: 0.69 MG/DL
EGFRCR SERPLBLD CKD-EPI 2021: 112 ML/MIN/1.73M2
EOSINOPHIL # BLD AUTO: 0.1 K/UL
EOSINOPHIL NFR BLD AUTO: 2.5 %
FERRITIN SERPL-MCNC: 20 NG/ML
FOLATE SERPL-MCNC: 11.8 NG/ML
GGT SERPL-CCNC: 39 U/L
GLUCOSE SERPL-MCNC: 96 MG/DL
HAPTOGLOB SERPL-MCNC: 68 MG/DL
HCT VFR BLD CALC: 41.4 %
HGB BLD-MCNC: 12.7 G/DL
IMM GRANULOCYTES NFR BLD AUTO: 0.2 %
IRON SATN MFR SERPL: 12 %
IRON SERPL-MCNC: 46 UG/DL
LDH SERPL-CCNC: 151 U/L
LYMPHOCYTES # BLD AUTO: 1.31 K/UL
LYMPHOCYTES NFR BLD AUTO: 32.4 %
MAGNESIUM SERPL-MCNC: 2 MG/DL
MAN DIFF?: NORMAL
MCHC RBC-ENTMCNC: 26 PG
MCHC RBC-ENTMCNC: 30.7 G/DL
MCV RBC AUTO: 84.7 FL
MONOCYTES # BLD AUTO: 0.39 K/UL
MONOCYTES NFR BLD AUTO: 9.7 %
NEUTROPHILS # BLD AUTO: 2.21 K/UL
NEUTROPHILS NFR BLD AUTO: 54.7 %
PHOSPHATE SERPL-MCNC: 4 MG/DL
PLATELET # BLD AUTO: 212 K/UL
POTASSIUM SERPL-SCNC: 5.2 MMOL/L
PROT SERPL-MCNC: 6.9 G/DL
RBC # BLD: 4.89 M/UL
RBC # BLD: 4.89 M/UL
RBC # FLD: 15 %
RETICS # AUTO: 1.3 %
RETICS AGGREG/RBC NFR: 65 K/UL
SODIUM SERPL-SCNC: 141 MMOL/L
TACROLIMUS SERPL-MCNC: 10.2 NG/ML
TIBC SERPL-MCNC: 400 UG/DL
UIBC SERPL-MCNC: 353 UG/DL
VIT B12 SERPL-MCNC: 986 PG/ML
WBC # FLD AUTO: 4.04 K/UL

## 2025-08-19 ENCOUNTER — APPOINTMENT (OUTPATIENT)
Dept: HEPATOLOGY | Facility: CLINIC | Age: 40
End: 2025-08-19

## (undated) DEVICE — SENSOR O2 FINGER ADULT

## (undated) DEVICE — WARMING BLANKET FULL UNDERBODY

## (undated) DEVICE — SUT PDS II 6-0 30" C-1

## (undated) DEVICE — ATF 40 FAST START KIT (AT3)

## (undated) DEVICE — LAP PAD 18 X 18"

## (undated) DEVICE — SUT PROLENE 6-0 30" C-1

## (undated) DEVICE — PREP CHLORAPREP HI-LITE ORANGE 26ML

## (undated) DEVICE — SPECIMEN CONTAINER 100ML

## (undated) DEVICE — TUBING SUCTION 20FT

## (undated) DEVICE — FILTER REINFUSION FOR SALVAGED BLOOD DISP

## (undated) DEVICE — BLADE SCALPEL SAFETYLOCK #11

## (undated) DEVICE — ELCTR DEFIB PAD PRO-PADZ W 10FT LEAD WIRES ADULT

## (undated) DEVICE — WARMING BLANKET UPPER ADULT

## (undated) DEVICE — DRAPE IOBAN 23" X 23"

## (undated) DEVICE — SUT SILK 0 30" TIES

## (undated) DEVICE — SUT SOFSILK 2-0 18" TIES

## (undated) DEVICE — SUT PROLENE 3-0 36" SH

## (undated) DEVICE — GLV 8 DERMAPRENE ULTRA

## (undated) DEVICE — SUT SOFSILK 2-0 18" V-20 (POP-OFF)

## (undated) DEVICE — SYR ASEPTO

## (undated) DEVICE — DRAPE TOWEL BLUE 17" X 24"

## (undated) DEVICE — TUBING SUCTION CONN 6FT STERILE

## (undated) DEVICE — DRAPE 1/2 SHEET 40X57"

## (undated) DEVICE — SYR LUER LOK 30CC

## (undated) DEVICE — GLV 7.5 PROTEXIS (BLUE)

## (undated) DEVICE — SYR LUER LOK 20CC

## (undated) DEVICE — TUBING SUCTION NON CONDUCTIVE 316X18" STERILE

## (undated) DEVICE — STAPLER SKIN VISI-STAT 35 WIDE

## (undated) DEVICE — VISITEC 4X4

## (undated) DEVICE — NDL COUNTER FOAM AND MAGNET 40-70

## (undated) DEVICE — SYR LUER LOK 3CC

## (undated) DEVICE — SYR ALLIANCE II INFLATION 60ML

## (undated) DEVICE — FOLEY HOLDER STATLOCK 2 WAY ADULT

## (undated) DEVICE — SUT PDS II 6-0 24" BV-1

## (undated) DEVICE — Device

## (undated) DEVICE — TUBING KIT FAST START ATF 40

## (undated) DEVICE — SET CATH RIC RAPID INFUSION EXCHANGE 7FRX5.08CM

## (undated) DEVICE — PACK BASIN SPECIAL PROCEDURE

## (undated) DEVICE — PRESSURE MONITORING SET

## (undated) DEVICE — GLV 8 PROTEXIS (WHITE)

## (undated) DEVICE — SOL IRR BAG NS 0.9% 3000ML

## (undated) DEVICE — PACK BASIC

## (undated) DEVICE — GLV 7.5 PROTEXIS (WHITE)

## (undated) DEVICE — FOLEY TRAY 16FR SURESTEP LF URINE METER TEMP SENSING STATLOCK

## (undated) DEVICE — BAG DECANTER IV STERILE

## (undated) DEVICE — TUBING ALARIS PUMP MODULE NON-DEHP

## (undated) DEVICE — SUT MONOSOF 3-0 18" C-14

## (undated) DEVICE — ELCTR HANDPIECE ARGON BEND A BEAM 6"

## (undated) DEVICE — NDL HYPO SAFE 18G X 1.5" (PINK)

## (undated) DEVICE — DRAPE 3/4 SHEET W REINFORCEMENT 56X77"

## (undated) DEVICE — TUBING IV SET GRAVITY 3Y 100" MACRO

## (undated) DEVICE — ELCTR HANDPIECE ARGON BEND A BEAM 9"

## (undated) DEVICE — PACK IV START WITH CHG

## (undated) DEVICE — SUT PROLENE 4-0 30" SH-1

## (undated) DEVICE — DRSG XEROFORM 5 X 9"

## (undated) DEVICE — GLV 6.5 PROTEXIS (WHITE)

## (undated) DEVICE — DRAIN RESERVOIR FOR JACKSON PRATT 100CC CARDINAL

## (undated) DEVICE — CATH IV SAFE BC 22G X 1" (BLUE)

## (undated) DEVICE — SUT SOFSILK 4-0 30" TIES

## (undated) DEVICE — GLV 8.5 PROTEXIS (WHITE)

## (undated) DEVICE — LAP PAD 4 X 18"

## (undated) DEVICE — SUT SOFSILK 4-0 18" TIES

## (undated) DEVICE — SUT PDS II 5-0 30" C-1

## (undated) DEVICE — DRAPE ISOLATION BAG 20X20"

## (undated) DEVICE — SUT SOFSILK 3-0 18" V-20 (POP-OFF)

## (undated) DEVICE — TUBING IV EXTENSION MACRO 2Y-CLAVE 32"

## (undated) DEVICE — TUBING TUR 2 PRONG

## (undated) DEVICE — VESSEL LOOP MAXI-RED  0.120" X 16"

## (undated) DEVICE — SUT SOFSILK 3-0 30" TIES

## (undated) DEVICE — TUBING TRUWAVE PRESSURE MALE/FEMALE 72"

## (undated) DEVICE — SUT SOFSILK 2-0 30" TIES

## (undated) DEVICE — PACK MAJOR ABDOMINAL SUPINE

## (undated) DEVICE — PACK CV SPLIT PACK II

## (undated) DEVICE — BITE BLOCK ADULT 20 X 27MM (GREEN)

## (undated) DEVICE — SUCTION YANKAUER NO CONTROL VENT

## (undated) DEVICE — WARMING BLANKET LOWER ADULT

## (undated) DEVICE — GLV 7 PROTEXIS (WHITE)

## (undated) DEVICE — SUT SURGIPRO 1 60" GS-26

## (undated) DEVICE — SAW BLADE MICROAIRE STERNUM AGGRESSIVE 9.4X34X1MM

## (undated) DEVICE — CATH IV SAFE BC 20G X 1.16" (PINK)

## (undated) DEVICE — SUCTION YANKAUER OPEN TIP NO VENT CURVE

## (undated) DEVICE — SOL INJ NS 0.9% 500ML 2 PORT

## (undated) DEVICE — SUT BOOT STANDARD (ASSORTED) 5 PAIR

## (undated) DEVICE — DRAPE SLUSH / WARMER 44 X 66"

## (undated) DEVICE — STAPLER ETHICON ECHELON FLEX 45MM X 340MM

## (undated) DEVICE — GLV 6 PROTEXIS (WHITE)

## (undated) DEVICE — BALLOON US ENDO

## (undated) DEVICE — SYR LUER LOK 50CC